# Patient Record
Sex: MALE | Race: WHITE | NOT HISPANIC OR LATINO | URBAN - METROPOLITAN AREA
[De-identification: names, ages, dates, MRNs, and addresses within clinical notes are randomized per-mention and may not be internally consistent; named-entity substitution may affect disease eponyms.]

---

## 2017-01-23 ENCOUNTER — TELEPHONE (OUTPATIENT)
Dept: FAMILY MEDICINE | Facility: CLINIC | Age: 62
End: 2017-01-23

## 2017-01-23 DIAGNOSIS — Z12.11 SPECIAL SCREENING FOR MALIGNANT NEOPLASMS, COLON: ICD-10-CM

## 2017-01-23 DIAGNOSIS — E11.9 TYPE 2 DIABETES MELLITUS WITHOUT COMPLICATION, WITHOUT LONG-TERM CURRENT USE OF INSULIN: Primary | ICD-10-CM

## 2017-01-23 DIAGNOSIS — Z00.00 ANNUAL PHYSICAL EXAM: ICD-10-CM

## 2017-01-23 NOTE — TELEPHONE ENCOUNTER
----- Message from Ashleigh Hawkins sent at 1/23/2017  9:42 AM CST -----  Contact: self  Hemoglobin A1  Lipid(fasting cholesterol)  Colonoscopy/ or FOBT annual screen    Pt calling to schedule the above testing per letter on a Tues or Thurs. Please call [phone].

## 2017-01-27 ENCOUNTER — LAB VISIT (OUTPATIENT)
Dept: LAB | Facility: HOSPITAL | Age: 62
End: 2017-01-27
Attending: FAMILY MEDICINE
Payer: COMMERCIAL

## 2017-01-27 DIAGNOSIS — Z12.11 SPECIAL SCREENING FOR MALIGNANT NEOPLASMS, COLON: ICD-10-CM

## 2017-01-27 DIAGNOSIS — Z00.00 ANNUAL PHYSICAL EXAM: ICD-10-CM

## 2017-01-27 DIAGNOSIS — E11.9 TYPE 2 DIABETES MELLITUS WITHOUT COMPLICATION, WITHOUT LONG-TERM CURRENT USE OF INSULIN: ICD-10-CM

## 2017-01-27 LAB
ALBUMIN SERPL BCP-MCNC: 3.8 G/DL
ALP SERPL-CCNC: 74 U/L
ALT SERPL W/O P-5'-P-CCNC: 27 U/L
ANION GAP SERPL CALC-SCNC: 5 MMOL/L
AST SERPL-CCNC: 21 U/L
BASOPHILS # BLD AUTO: 0.03 K/UL
BASOPHILS NFR BLD: 0.4 %
BILIRUB SERPL-MCNC: 1 MG/DL
BUN SERPL-MCNC: 14 MG/DL
CALCIUM SERPL-MCNC: 9.2 MG/DL
CHLORIDE SERPL-SCNC: 103 MMOL/L
CHOLEST/HDLC SERPL: 5.5 {RATIO}
CO2 SERPL-SCNC: 31 MMOL/L
COMPLEXED PSA SERPL-MCNC: 2.7 NG/ML
CREAT SERPL-MCNC: 1 MG/DL
DIFFERENTIAL METHOD: ABNORMAL
EOSINOPHIL # BLD AUTO: 0.2 K/UL
EOSINOPHIL NFR BLD: 2.2 %
ERYTHROCYTE [DISTWIDTH] IN BLOOD BY AUTOMATED COUNT: 13.2 %
EST. GFR  (AFRICAN AMERICAN): >60 ML/MIN/1.73 M^2
EST. GFR  (NON AFRICAN AMERICAN): >60 ML/MIN/1.73 M^2
GLUCOSE SERPL-MCNC: 122 MG/DL
HCT VFR BLD AUTO: 54.9 %
HDL/CHOLESTEROL RATIO: 18.2 %
HDLC SERPL-MCNC: 154 MG/DL
HDLC SERPL-MCNC: 28 MG/DL
HGB BLD-MCNC: 18.5 G/DL
LDLC SERPL CALC-MCNC: 86.6 MG/DL
LYMPHOCYTES # BLD AUTO: 1.9 K/UL
LYMPHOCYTES NFR BLD: 28 %
MCH RBC QN AUTO: 30.8 PG
MCHC RBC AUTO-ENTMCNC: 33.7 %
MCV RBC AUTO: 91 FL
MONOCYTES # BLD AUTO: 0.5 K/UL
MONOCYTES NFR BLD: 7.2 %
NEUTROPHILS # BLD AUTO: 4.3 K/UL
NEUTROPHILS NFR BLD: 61.8 %
NONHDLC SERPL-MCNC: 126 MG/DL
PLATELET # BLD AUTO: 207 K/UL
PMV BLD AUTO: 9.1 FL
POTASSIUM SERPL-SCNC: 4.8 MMOL/L
PROT SERPL-MCNC: 6.9 G/DL
RBC # BLD AUTO: 6.01 M/UL
SODIUM SERPL-SCNC: 139 MMOL/L
TRIGL SERPL-MCNC: 197 MG/DL
TSH SERPL DL<=0.005 MIU/L-ACNC: 0.89 UIU/ML
URATE SERPL-MCNC: 4.2 MG/DL
WBC # BLD AUTO: 6.92 K/UL

## 2017-01-27 PROCEDURE — 80053 COMPREHEN METABOLIC PANEL: CPT

## 2017-01-27 PROCEDURE — 36415 COLL VENOUS BLD VENIPUNCTURE: CPT | Mod: PO

## 2017-01-27 PROCEDURE — 85025 COMPLETE CBC W/AUTO DIFF WBC: CPT

## 2017-01-27 PROCEDURE — 84443 ASSAY THYROID STIM HORMONE: CPT

## 2017-01-27 PROCEDURE — 84550 ASSAY OF BLOOD/URIC ACID: CPT

## 2017-01-27 PROCEDURE — 80061 LIPID PANEL: CPT

## 2017-01-27 PROCEDURE — 83036 HEMOGLOBIN GLYCOSYLATED A1C: CPT

## 2017-01-27 PROCEDURE — 84153 ASSAY OF PSA TOTAL: CPT

## 2017-01-28 LAB
ESTIMATED AVG GLUCOSE: 154 MG/DL
HBA1C MFR BLD HPLC: 7 %

## 2017-02-07 ENCOUNTER — LAB VISIT (OUTPATIENT)
Dept: LAB | Facility: HOSPITAL | Age: 62
End: 2017-02-07
Attending: INTERNAL MEDICINE
Payer: COMMERCIAL

## 2017-02-07 ENCOUNTER — OFFICE VISIT (OUTPATIENT)
Dept: FAMILY MEDICINE | Facility: CLINIC | Age: 62
End: 2017-02-07
Payer: COMMERCIAL

## 2017-02-07 VITALS
DIASTOLIC BLOOD PRESSURE: 62 MMHG | BODY MASS INDEX: 31.53 KG/M2 | HEART RATE: 66 BPM | TEMPERATURE: 97 F | SYSTOLIC BLOOD PRESSURE: 140 MMHG | OXYGEN SATURATION: 97 % | HEIGHT: 70 IN | RESPIRATION RATE: 14 BRPM | WEIGHT: 220.25 LBS

## 2017-02-07 DIAGNOSIS — M25.561 CHRONIC PAIN OF RIGHT KNEE: ICD-10-CM

## 2017-02-07 DIAGNOSIS — E11.9 TYPE 2 DIABETES MELLITUS WITHOUT COMPLICATION: ICD-10-CM

## 2017-02-07 DIAGNOSIS — D58.2 ELEVATED HEMOGLOBIN: ICD-10-CM

## 2017-02-07 DIAGNOSIS — Z12.11 COLON CANCER SCREENING: ICD-10-CM

## 2017-02-07 DIAGNOSIS — E11.9 TYPE 2 DIABETES MELLITUS WITHOUT COMPLICATION, WITHOUT LONG-TERM CURRENT USE OF INSULIN: Primary | ICD-10-CM

## 2017-02-07 DIAGNOSIS — M17.11 OSTEOARTHRITIS OF RIGHT KNEE, UNSPECIFIED OSTEOARTHRITIS TYPE: ICD-10-CM

## 2017-02-07 DIAGNOSIS — G89.29 CHRONIC PAIN OF RIGHT KNEE: ICD-10-CM

## 2017-02-07 PROCEDURE — 82668 ASSAY OF ERYTHROPOIETIN: CPT

## 2017-02-07 PROCEDURE — 81270 JAK2 GENE: CPT

## 2017-02-07 PROCEDURE — 99214 OFFICE O/P EST MOD 30 MIN: CPT | Mod: S$GLB,,, | Performed by: FAMILY MEDICINE

## 2017-02-07 PROCEDURE — 83036 HEMOGLOBIN GLYCOSYLATED A1C: CPT

## 2017-02-07 PROCEDURE — 3044F HG A1C LEVEL LT 7.0%: CPT | Mod: S$GLB,,, | Performed by: FAMILY MEDICINE

## 2017-02-07 PROCEDURE — 3078F DIAST BP <80 MM HG: CPT | Mod: S$GLB,,, | Performed by: FAMILY MEDICINE

## 2017-02-07 PROCEDURE — 3060F POS MICROALBUMINURIA REV: CPT | Mod: S$GLB,,, | Performed by: FAMILY MEDICINE

## 2017-02-07 PROCEDURE — 99999 PR PBB SHADOW E&M-EST. PATIENT-LVL III: CPT | Mod: PBBFAC,,, | Performed by: FAMILY MEDICINE

## 2017-02-07 PROCEDURE — 3077F SYST BP >= 140 MM HG: CPT | Mod: S$GLB,,, | Performed by: FAMILY MEDICINE

## 2017-02-07 RX ORDER — HYDROCODONE BITARTRATE AND ACETAMINOPHEN 10; 325 MG/1; MG/1
1 TABLET ORAL 3 TIMES DAILY PRN
Qty: 90 TABLET | Refills: 0 | Status: SHIPPED | OUTPATIENT
Start: 2017-04-07 | End: 2017-02-07 | Stop reason: SDUPTHER

## 2017-02-07 RX ORDER — HYDROCODONE BITARTRATE AND ACETAMINOPHEN 10; 325 MG/1; MG/1
1 TABLET ORAL 3 TIMES DAILY PRN
Qty: 90 TABLET | Refills: 0 | Status: SHIPPED | OUTPATIENT
Start: 2017-03-07 | End: 2017-02-07 | Stop reason: SDUPTHER

## 2017-02-07 RX ORDER — HYDROCODONE BITARTRATE AND ACETAMINOPHEN 10; 325 MG/1; MG/1
1 TABLET ORAL 3 TIMES DAILY PRN
Qty: 90 TABLET | Refills: 0 | Status: SHIPPED | OUTPATIENT
Start: 2017-02-07 | End: 2017-02-07 | Stop reason: SDUPTHER

## 2017-02-07 RX ORDER — HYDROCODONE BITARTRATE AND ACETAMINOPHEN 10; 325 MG/1; MG/1
1 TABLET ORAL 3 TIMES DAILY PRN
Qty: 90 TABLET | Refills: 0 | Status: SHIPPED | OUTPATIENT
Start: 2017-03-28 | End: 2017-08-01 | Stop reason: SDUPTHER

## 2017-02-07 RX ORDER — HYDROCODONE BITARTRATE AND ACETAMINOPHEN 10; 325 MG/1; MG/1
1 TABLET ORAL 3 TIMES DAILY PRN
Qty: 90 TABLET | Refills: 0 | Status: SHIPPED | OUTPATIENT
Start: 2017-05-01 | End: 2017-02-07 | Stop reason: SDUPTHER

## 2017-02-07 RX ORDER — HYDROCODONE BITARTRATE AND ACETAMINOPHEN 10; 325 MG/1; MG/1
1 TABLET ORAL 3 TIMES DAILY PRN
Qty: 90 TABLET | Refills: 0 | Status: SHIPPED | OUTPATIENT
Start: 2017-04-28 | End: 2017-08-01 | Stop reason: SDUPTHER

## 2017-02-07 RX ORDER — HYDROCODONE BITARTRATE AND ACETAMINOPHEN 10; 325 MG/1; MG/1
1 TABLET ORAL 3 TIMES DAILY PRN
Qty: 90 TABLET | Refills: 0 | Status: SHIPPED | OUTPATIENT
Start: 2017-04-01 | End: 2017-02-07 | Stop reason: SDUPTHER

## 2017-02-07 RX ORDER — HYDROCODONE BITARTRATE AND ACETAMINOPHEN 10; 325 MG/1; MG/1
1 TABLET ORAL 3 TIMES DAILY PRN
Qty: 90 TABLET | Refills: 0 | Status: SHIPPED | OUTPATIENT
Start: 2017-05-28 | End: 2017-08-01 | Stop reason: SDUPTHER

## 2017-02-07 RX ORDER — HYDROCODONE BITARTRATE AND ACETAMINOPHEN 10; 325 MG/1; MG/1
1 TABLET ORAL 3 TIMES DAILY PRN
Qty: 90 TABLET | Refills: 0 | Status: SHIPPED | OUTPATIENT
Start: 2017-03-01 | End: 2017-02-07 | Stop reason: SDUPTHER
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
## 2017-02-08 LAB
ESTIMATED AVG GLUCOSE: 148 MG/DL
HBA1C MFR BLD HPLC: 6.8 %

## 2017-02-10 ENCOUNTER — LAB VISIT (OUTPATIENT)
Dept: LAB | Facility: HOSPITAL | Age: 62
End: 2017-02-10
Attending: FAMILY MEDICINE
Payer: COMMERCIAL

## 2017-02-10 DIAGNOSIS — Z12.11 COLON CANCER SCREENING: ICD-10-CM

## 2017-02-10 LAB
ERYTHROPOIETIN: 17.8 MIU/ML
JAK2 P.V617F BLD/T QL: NORMAL
JAK2 V617F MUTATION DETECTION BLD RESULT: NORMAL

## 2017-02-10 PROCEDURE — 82272 OCCULT BLD FECES 1-3 TESTS: CPT

## 2017-02-11 LAB
OB PNL STL: NEGATIVE

## 2017-02-21 ENCOUNTER — TELEPHONE (OUTPATIENT)
Dept: FAMILY MEDICINE | Facility: CLINIC | Age: 62
End: 2017-02-21

## 2017-02-21 NOTE — TELEPHONE ENCOUNTER
----- Message from Christi Yepez sent at 2/21/2017 11:38 AM CST -----  Contact: self  [phone]  Pt is requesting lab results. Labs were done on 2-10-17. Pls call pt [phone]. Thanks......Fernanda

## 2017-03-28 ENCOUNTER — OFFICE VISIT (OUTPATIENT)
Dept: OPTOMETRY | Facility: CLINIC | Age: 62
End: 2017-03-28
Payer: COMMERCIAL

## 2017-03-28 DIAGNOSIS — H25.13 NUCLEAR SCLEROSIS, BILATERAL: ICD-10-CM

## 2017-03-28 DIAGNOSIS — E11.9 DIABETES MELLITUS TYPE 2 WITHOUT RETINOPATHY: Primary | ICD-10-CM

## 2017-03-28 DIAGNOSIS — H52.03 HYPEROPIA WITH PRESBYOPIA, BILATERAL: ICD-10-CM

## 2017-03-28 DIAGNOSIS — H52.4 HYPEROPIA WITH PRESBYOPIA, BILATERAL: ICD-10-CM

## 2017-03-28 PROCEDURE — 92014 COMPRE OPH EXAM EST PT 1/>: CPT | Mod: S$GLB,,, | Performed by: OPTOMETRIST

## 2017-03-28 PROCEDURE — 92015 DETERMINE REFRACTIVE STATE: CPT | Mod: S$GLB,,, | Performed by: OPTOMETRIST

## 2017-03-28 PROCEDURE — 99999 PR PBB SHADOW E&M-EST. PATIENT-LVL II: CPT | Mod: PBBFAC,,, | Performed by: OPTOMETRIST

## 2017-03-28 NOTE — MR AVS SNAPSHOT
[facility]  [address] [facility]  [place] [address]  Phone: [phone]  Fax: [phone]                  King Cool Jr.   3/28/2017 7:30 AM   Office Visit    Description:  Male : 1955   Provider:  Tuan Bhakta OD   Department:  [facility]           Reason for Visit     Diabetic Eye Exam           Diagnoses this Visit        Comments    Diabetes mellitus type 2 without retinopathy    -  Primary     Nuclear sclerosis, bilateral         Hyperopia with presbyopia, bilateral                To Do List           Goals (5 Years of Data)     None      Follow-Up and Disposition     Return in about 1 year (around 3/28/2018).      [facility] On Call     [facility] On Call Nurse Care Line -  Assistance  Registered nurses in the [facility] On Call Center provide clinical advisement, health education, appointment booking, and other advisory services.  Call for this free service at [phone].             Medications           Message regarding Medications     Verify the changes and/or additions to your medication regime listed below are the same as discussed with your clinician today.  If any of these changes or additions are incorrect, please notify your healthcare provider.             Verify that the below list of medications is an accurate representation of the medications you are currently taking.  If none reported, the list may be blank. If incorrect, please contact your healthcare provider. Carry this list with you in case of emergency.           Current Medications     ASCORBATE CALCIUM (VITAMIN C ORAL) Take by mouth.    aspirin (ECOTRIN) 81 MG EC tablet Take 81 mg by mouth once daily.    dapagliflozin-metformin (XIGDUO XR) 10-1,000 mg TBph Take 1 tablet by mouth once daily.    fish oil-omega-3 fatty acids 300-1,000 mg capsule Take 2 g by mouth once daily.    hydrocodone-acetaminophen 10-325mg (NORCO)  mg Tab Take 1 tablet by mouth 3 (three) times daily as needed (pain).     hydrocodone-acetaminophen 10-325mg (NORCO)  mg Tab Starting on May 28, 2017. Take 1 tablet by mouth 3 (three) times daily as needed (pain).    hydrocodone-acetaminophen 10-325mg (NORCO)  mg Tab Starting on Apr 28, 2017. Take 1 tablet by mouth 3 (three) times daily as needed (pain).    multivitamin with minerals tablet Take 1 tablet by mouth once daily.    sildenafil (VIAGRA) 100 MG tablet Take 1 tablet (100 mg total) by mouth daily as needed for Erectile Dysfunction.           Clinical Reference Information           Allergies as of 3/28/2017     Penicillins      Immunizations Administered on Date of Encounter - 3/28/2017     None      [facility] Sign-Up     Activating your [facility] account is as easy as 1-2-3!     1) Visit [email or web address], select Sign Up Now, enter this activation code and your date of birth, then select Next.  [email or web address]  Expires: 5/12/2017  8:23 AM      2) Create a username and password to use when you visit [facility] in the future and select a security question in case you lose your password and select Next.    3) Enter your e-mail address and click Sign Up!    Additional Information  If you have questions, please e-mail [email or web address] or call [phone] to talk to our [facility] staff. Remember, [facility] is NOT to be used for urgent needs. For medical emergencies, dial 911.         Language Assistance Services     ATTENTION: Language assistance services are available, free of charge. Please call [phone].      ATENCIÓN: Si habla livia, tiene a kan disposición servicios gratuitos de asistencia lingüística. Llame al [phone].     CHÚ Ý: N?u b?n nói Ti?ng Vi?t, có các d?ch v? h? tr? ngôn ng? mi?n phí dành cho b?n. G?i s? [phone].         [facility] complies with applicable Federal civil rights laws and does not discriminate on the basis of race, color, national origin, age, disability, or sex.

## 2017-03-28 NOTE — PROGRESS NOTES
HPI     Diabetic Eye Exam    Additional comments: Pt is here for DM Check. Last BS was 144 this   morning.           Comments   Pt is here for DM Check. Last BS was 144 this morning.  Denies eye pain and f/f.   No itching, burning or tearing.   No noticeable VA changes since last visit.   No problems with glare.    Meds: At's prn     Hemoglobin A1C       Date                     Value               Ref Range             Status                02/07/2017               6.8 (H)             4.5 - 6.2 %           Final          01/27/2017               7.0 (H)             4.5 - 6.2 %           Final              08/23/2016               7.6 (H)             4.5 - 6.2 %           Final    ----------         Last edited by Tuan Bhakta, OD on 3/28/2017  8:22 AM. (History)            Assessment /Plan     For exam results, see Encounter Report.    Diabetes mellitus type 2 without retinopathy  -No retinopathy noted today.  Continued control with primary care physician and annual comprehensive eye exam.    Nuclear sclerosis, bilateral  -Educated patient on presence of cataracts at today's exam, monitor at annual dilated fundus exam. 8+ years surgical estimate.    Hyperopia with presbyopia, bilateral  Eyeglass Final Rx     Eyeglass Final Rx      Sphere Cylinder Add   Right +0.75 Sphere +2.00   Left +0.75 Sphere +2.00       Type:  Bifocal    Expiration Date:  3/29/2018              Optional  Trial framed in office to demonstrate BVA potential    RTC 1 yr

## 2017-05-24 ENCOUNTER — OFFICE VISIT (OUTPATIENT)
Dept: FAMILY MEDICINE | Facility: CLINIC | Age: 62
End: 2017-05-24
Payer: COMMERCIAL

## 2017-05-24 ENCOUNTER — HOSPITAL ENCOUNTER (OUTPATIENT)
Dept: RADIOLOGY | Facility: HOSPITAL | Age: 62
Discharge: HOME | End: 2017-05-24
Attending: FAMILY MEDICINE
Payer: COMMERCIAL

## 2017-05-24 VITALS
SYSTOLIC BLOOD PRESSURE: 142 MMHG | TEMPERATURE: 98 F | HEIGHT: 70 IN | DIASTOLIC BLOOD PRESSURE: 60 MMHG | BODY MASS INDEX: 30.3 KG/M2 | HEART RATE: 61 BPM | OXYGEN SATURATION: 98 % | WEIGHT: 211.63 LBS | RESPIRATION RATE: 16 BRPM

## 2017-05-24 DIAGNOSIS — G89.4 CHRONIC PAIN SYNDROME: ICD-10-CM

## 2017-05-24 DIAGNOSIS — M79.673 HEEL PAIN, UNSPECIFIED LATERALITY: ICD-10-CM

## 2017-05-24 DIAGNOSIS — E11.9 TYPE 2 DIABETES MELLITUS WITHOUT COMPLICATION, UNSPECIFIED LONG TERM INSULIN USE STATUS: ICD-10-CM

## 2017-05-24 DIAGNOSIS — M79.673 HEEL PAIN, UNSPECIFIED LATERALITY: Primary | ICD-10-CM

## 2017-05-24 PROCEDURE — 73630 X-RAY EXAM OF FOOT: CPT | Mod: 26,RT,, | Performed by: RADIOLOGY

## 2017-05-24 PROCEDURE — 73630 X-RAY EXAM OF FOOT: CPT | Mod: TC,PO,RT

## 2017-05-24 PROCEDURE — 99214 OFFICE O/P EST MOD 30 MIN: CPT | Mod: 25,S$GLB,, | Performed by: FAMILY MEDICINE

## 2017-05-24 PROCEDURE — 3044F HG A1C LEVEL LT 7.0%: CPT | Mod: S$GLB,,, | Performed by: FAMILY MEDICINE

## 2017-05-24 PROCEDURE — 99999 PR PBB SHADOW E&M-EST. PATIENT-LVL IV: CPT | Mod: PBBFAC,,, | Performed by: FAMILY MEDICINE

## 2017-05-24 PROCEDURE — 96372 THER/PROPH/DIAG INJ SC/IM: CPT | Mod: S$GLB,,, | Performed by: FAMILY MEDICINE

## 2017-05-24 RX ORDER — HYDROCODONE BITARTRATE AND ACETAMINOPHEN 10; 325 MG/1; MG/1
1 TABLET ORAL 3 TIMES DAILY PRN
Qty: 90 TABLET | Refills: 0 | Status: SHIPPED | OUTPATIENT
Start: 2017-07-24 | End: 2017-05-26 | Stop reason: SDUPTHER

## 2017-05-24 RX ORDER — TRIAMCINOLONE ACETONIDE 40 MG/ML
40 INJECTION, SUSPENSION INTRA-ARTICULAR; INTRAMUSCULAR
Status: COMPLETED | OUTPATIENT
Start: 2017-05-24 | End: 2017-05-24

## 2017-05-24 RX ORDER — HYDROCODONE BITARTRATE AND ACETAMINOPHEN 10; 325 MG/1; MG/1
1 TABLET ORAL 3 TIMES DAILY PRN
Qty: 90 TABLET | Refills: 0 | Status: SHIPPED | OUTPATIENT
Start: 2017-06-26 | End: 2017-07-26

## 2017-05-24 RX ORDER — HYDROCODONE BITARTRATE AND ACETAMINOPHEN 10; 325 MG/1; MG/1
1 TABLET ORAL 3 TIMES DAILY PRN
Qty: 90 TABLET | Refills: 0 | Status: SHIPPED | OUTPATIENT
Start: 2017-08-24 | End: 2017-09-22

## 2017-05-24 RX ADMIN — TRIAMCINOLONE ACETONIDE 40 MG: 40 INJECTION, SUSPENSION INTRA-ARTICULAR; INTRAMUSCULAR at 09:05

## 2017-05-24 NOTE — PROGRESS NOTES
..Patient given kenalog 40 mg injection left ventrogluteal, tolerated well, no complaints, no reaction noted
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
## 2017-05-26 ENCOUNTER — TELEPHONE (OUTPATIENT)
Dept: FAMILY MEDICINE | Facility: CLINIC | Age: 62
End: 2017-05-26

## 2017-05-26 DIAGNOSIS — G89.4 CHRONIC PAIN SYNDROME: ICD-10-CM

## 2017-05-26 RX ORDER — HYDROCODONE BITARTRATE AND ACETAMINOPHEN 10; 325 MG/1; MG/1
1 TABLET ORAL 3 TIMES DAILY PRN
Qty: 90 TABLET | Refills: 0 | Status: SHIPPED | OUTPATIENT
Start: 2017-05-26 | End: 2017-06-24

## 2017-05-26 NOTE — TELEPHONE ENCOUNTER
Returned call to patient, unable to LM due to VM not being set up. Patient was given scripts with correct dates for months 5/2017- 8/2017

## 2017-05-26 NOTE — TELEPHONE ENCOUNTER
Patient arrived to clinic with script written Feb for May, due to pharmacy policies script written date will not be accepted over 90 days, patient requires new script written. Script given to MD for review

## 2017-05-29 ENCOUNTER — TELEPHONE (OUTPATIENT)
Dept: FAMILY MEDICINE | Facility: CLINIC | Age: 62
End: 2017-05-29

## 2017-05-29 NOTE — TELEPHONE ENCOUNTER
Patient has an apponitment 6/14/17 7:45am with Dr. Mcpherson at [facility], patient was notified.       Patient states that since getting the shot that was given to him in the office, he has broken out in a rash.  Please call the patient.

## 2017-05-29 NOTE — TELEPHONE ENCOUNTER
Patient stated that he broke out in a rash Thursday or Friday night and think that it is from the kenalog given 4/24, patient unsure of when the rash actually started but says he had it over the weekend for sure and it is going away. Patient used cortisone cream and benadryl.
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
## 2017-06-14 ENCOUNTER — OFFICE VISIT (OUTPATIENT)
Dept: PODIATRY | Facility: CLINIC | Age: 62
End: 2017-06-14
Payer: COMMERCIAL

## 2017-06-14 VITALS
BODY MASS INDEX: 29.63 KG/M2 | SYSTOLIC BLOOD PRESSURE: 118 MMHG | HEIGHT: 70 IN | WEIGHT: 207 LBS | DIASTOLIC BLOOD PRESSURE: 60 MMHG

## 2017-06-14 DIAGNOSIS — G89.29 HEEL PAIN, CHRONIC, RIGHT: ICD-10-CM

## 2017-06-14 DIAGNOSIS — M72.2 PLANTAR FASCIITIS: ICD-10-CM

## 2017-06-14 DIAGNOSIS — M79.671 HEEL PAIN, CHRONIC, RIGHT: ICD-10-CM

## 2017-06-14 DIAGNOSIS — E11.9 COMPREHENSIVE DIABETIC FOOT EXAMINATION, TYPE 2 DM, ENCOUNTER FOR: Primary | ICD-10-CM

## 2017-06-14 PROCEDURE — 3044F HG A1C LEVEL LT 7.0%: CPT | Mod: S$GLB,,, | Performed by: PODIATRIST

## 2017-06-14 PROCEDURE — 99999 PR PBB SHADOW E&M-EST. PATIENT-LVL III: CPT | Mod: PBBFAC,,, | Performed by: PODIATRIST

## 2017-06-14 PROCEDURE — 4010F ACE/ARB THERAPY RXD/TAKEN: CPT | Mod: S$GLB,,, | Performed by: PODIATRIST

## 2017-06-14 PROCEDURE — 99203 OFFICE O/P NEW LOW 30 MIN: CPT | Mod: S$GLB,,, | Performed by: PODIATRIST

## 2017-06-14 RX ORDER — LOSARTAN POTASSIUM 100 MG/1
100 TABLET ORAL DAILY
COMMUNITY
Start: 2017-05-30 | End: 2017-12-26 | Stop reason: SDUPTHER

## 2017-06-14 RX ORDER — MELOXICAM 15 MG/1
15 TABLET ORAL DAILY
Qty: 30 TABLET | Refills: 0 | Status: SHIPPED | OUTPATIENT
Start: 2017-06-14 | End: 2017-09-09

## 2017-06-14 RX ORDER — TRIAMCINOLONE ACETONIDE 1 MG/G
OINTMENT TOPICAL
COMMUNITY
Start: 2017-05-30 | End: 2018-03-20

## 2017-06-14 NOTE — PATIENT INSTRUCTIONS
Recommend lotions: eucerin, aquaphor, A&D ointment, gold bond for diabetics, sween    Shoe recommendations: (try 6pm.com, zappos.[email or web address] , [email or web address], or shoes.[email or web address] for discounted prices) you can visit DSW shoes in [place] as well    Asics (GT 1000 or gel foundations), new balance, saucony (stabil c3),  Calderon (transcend), vionic, propet (tennis shoe)    soft brand, clarks, crocs, aerosoles, naturalizers, SAS, ecco, almita, yayo mcnulty, rockports (dress shoes)    Vionic, volitiles, burkenstocks, fitflops, propet (sandals)    Nike comfort thong sandals, crocs (house shoes)    Nail Home remedy:  Vicks Vapor rub OR Listerine and apple cider vinegar in a spray bottle to nails    Occasional soaks for 15-20 mins in luke warm water with 1 cup of listerine and 1 cup of apple cider vinegar are ok You may add several drops of oil of oregano or tea tree oil as well      Understanding Plantar Fasciitis    Plantar fasciitis is a condition that causes foot and heel pain. The plantar fascia is a tough band of tissue that runs across the bottom of the foot from the heel to the toes. This tissue pulls on the heel bone. It supports the arch of the foot as it pushes off the ground. If the tissue becomes irritated or red and swollen (inflamed), it is called plantar fasciitis.  How to say it  PLAN-tuhr fa-see-IY-[id number]   What causes plantar fasciitis?  Plantar fasciitis most often occurs from overusing the plantar fascia. The tissue may become damaged from activities that put repeated stress on the heel and foot. Or it may wear down over time with age and ankle stiffness. You are more likely to have plantar fasciitis if you:  · Do activities that require a lot of running, jumping, or dancing  · Have a job that requires being on your feet for long periods  · Are overweight or obese  · Have certain foot problems, such as a tight Achilles tendon, flat feet, or high arches  · Often wear poorly fitting shoes  Symptoms of plantar fasciitis  The  condition most often causes pain in the heel and the bottom of the foot. The pain may occur when you take your first steps in the morning. It may get better as you walk throughout the day. But as you continue to put weight on the foot, the pain often returns. Pain may also occur after standing or sitting for long periods.  Treating plantar fasciitis  Treatments for plantar fasciitis include:  · Resting the foot. This involves limiting movements that make your foot hurt. You may also need to avoid certain sports and types of work for a time.  · Using cold packs. Put an ice pack on the heel and foot to help reduce pain and swelling.  · Taking pain medicines. Prescription and over-the-counter pain medicines can help relieve pain and swelling.  · Using heel cups or foot inserts (orthotics). These are placed in the shoes to help support the heel or arch and cushion the heel. You may also be told to buy proper-fitting shoes with good arch support and cushioned soles.  · Taping the foot. This supports the arch and limits the movement of the plantar fascia to help relieve symptoms.  · Wearing a night splint. This stretches the plantar fascia and leg muscles while you sleep. This may help relieve pain.  · Doing exercises and physical therapy. These stretch and strengthen the plantar fascia and the muscles in the leg that support the heel and foot.  · Getting shots of medicine into the foot. These may help relieve symptoms for a time.  · Having surgery. This may be needed if other treatments fail to relieve symptoms. During surgery, the surgeon may partially cut the plantar fascia to release tension.  Possible complications of plantar fasciitis  Without proper care and treatment, healing may take longer than normal. Also, symptoms may continue or get worse. Over time, the plantar fascia may be damaged. This can make it hard to walk or even stand without pain.  When to call your healthcare provider  Call your healthcare  provider right away if you have any of these:  · Fever of 100.4°F (38°C) or higher, or as directed  · Symptoms that dont get better with treatment, or get worse  · New symptoms, such as numbness, tingling, or weakness in the foot   © 9349-1679 [employer]. [address] [place] [address], [place] [address]. All rights reserved. This information is not intended as a substitute for professional medical care. Always follow your healthcare professional's instructions.        Treating Plantar Fasciitis  First, your healthcare provider tries to determine the cause of your problem in order to suggest ways to relieve pain. If your pain is due to poor foot mechanics, custom-made shoe inserts (orthoses) may help.    Reduce symptoms  · To relieve mild symptoms, try aspirin, ibuprofen, or other medicines as directed. Rubbing ice on the affected area may also help.  · To reduce severe pain and swelling, your healthcare provider may prescribe pills or injections or a walking cast in some instances. Physical therapy, such as ultrasound or a daily stretching program, may also be recommended. Surgery is rarely required.  · To reduce symptoms caused by poor foot mechanics, your foot may be taped. This supports the arch and temporarily controls movement. Night splints may also help by stretching the fascia.  Control movement  If taping helps, your healthcare provider may prescribe orthoses. Built from plaster casts of your feet, these inserts control the way your foot moves. As a result, your symptoms should go away.  Reduce overuse  Every time your foot strikes the ground, the plantar fascia is stretched. You can reduce the strain on the plantar fascia and the possibility of overuse by following these suggestions:  · Lose any excess weight.  · Avoid running on hard or uneven ground.  · Use orthoses at all times in your shoes and house slippers.  If surgery is needed  Your healthcare provider may consider surgery if other types  of treatment don't control your pain. During surgery, the plantar fascia is partially cut to release tension. As you heal, fibrous tissue fills the space between the heel bone and the plantar fascia.   © 7302-6362 [employer]. [address] [place] [address], [place] [address]. All rights reserved. This information is not intended as a substitute for professional medical care. Always follow your healthcare professional's instructions.        Wearing Proper Shoes                    You walk on your feet every day, forcing them to support the weight of your body. Repeated stress on your feet can cause damage over time. The right shoes can help protect your feet. The wrong shoes can cause more foot problems. Read the information below to help you find a shoe that fits your foot needs.     A good shoe fit will cover your foot outline.       A shoe that doesnt cover the outline is a bad fit.      Whats Your Foot Shape?  To get a good fit, you need to know the shape of your foot. Do this simple test: While standing, place your foot on a piece of paper and trace around it. Is your foot straight or curved? Do you have a foot problem, such as a bunion, that causes your foot outline to show a bulge on the side of your big toe?  Finding Your Fit  Bring your foot outline to the [employer] store to help you find the right shoe. Place a shoe you like on top of the outline to see if it matches the shape. The shoe should cover the outline. (If you have a bunion, the shoe may not cover the bulge on the outline. Look for soft leather shoes to stretch over the bunion.) Once youve found a pair of proper shoes, put them on. Walk around. Be sure the shoes dont rub or pinch. If the shoes feel good, youve found your fit!  The Right Shoe for You  A good shoe has features that provide comfort and support. It must also be the right size and shape for your feet. Look for a shoe made of breathable fabric and lining, such as leather or  canvas. Be sure that shoes have enough tread to prevent slipping. Go to a good shoe store for help finding the right shoe.  Good Shoe Features  An ideal shoe has the following:  · Laces for support. If tying laces is a problem for you, try shoes with Velcro fasteners or joanie.  · A front of the shoe (toe box) with ½ inch space in front of your longest toes.  · An arch shape that supports your foot.  · No more than 1½ inches of heel.  · A stiff, snug back of the shoe to keep your foot from sliding around.  · A smooth lining with no rough seams.  Shoe Shopping Tips  Below are some dos and donts for when you go to the shoe store.  Do:  · Select the shoes that feel right. Wear them around the house. Then bring them to your foot doctor to check for fit. If they dont fit well, return them.  · Shop late in the day, when your feet will be slightly bigger.  · Each time you buy shoes, have both your feet measured while you are standing. Foot size changes with time.  · Pick shoes to suit their purpose. High heels are okay for an occasional night on the town. But for everyday wear, choose a more sensible shoe.  · Try on shoes while wearing any inserts specially made for your feet (orthoses).  · Try on both the right and left shoes. If your feet are different sizes, pick a pair that fits the larger foot.  Dont:  · Dont buy shoes based on shoe size alone. Always try on shoes, as sizes differ from brand to brand and within brands.  · Dont expect shoes to break in. If they dont fit at the store, dont buy them.  · Dont buy a shoe that doesnt match your foot shape.  What About Socks?  Always wear socks with shoes. Socks help absorb sweat and reduce friction and blistering. When shopping for shoes, choose soft, padded socks with seams that dont irritate your feet.  If You Have Foot Problems  Some foot problems cause deformities. This can make it hard to find a good fit. Look for shoes made of soft leather to stretch  over the deformity. If you have bunions, buy shoes with a wider toe box. To fit hammertoes, look for shoes with a tall toe box. If you have arch problems, you may need inserts. In some cases, youll need to have custom footwear or orthoses made for your feet.  Suggested Footwear  Ask your healthcare provider what kind of footwear you need. He or she may recommend a certain brand or shoe store.  © 9421-3546 [employer]. [address] [place] [address]. All rights reserved. This information is not intended as a substitute for professional medical care. Always follow your healthcare professional's instructions.        Toe Extension (Flexibility)    These instructions are for your right foot. Switch sides for your left foot.  1. Sit in a chair. Rest your right ankle on your left knee.  2. Hold your toes with your right hand. Gently bend the toes backward. Feel a stretch in the undersides of the toes and ball of the foot. Hold for 30 to 60 seconds.  3. Then gently bend the toes in the other direction. Gently press on them until your foot is pointed. Hold for 30 to 60 seconds.  4. Repeat 5 times, or as instructed.  © 3882-4315 [employer]. [address] [place] [address]. All rights reserved. This information is not intended as a substitute for professional medical care. Always follow your healthcare professional's instructions.        Recommend lotions: eucerin, aquaphor, A&D ointment, gold bond for diabetics, sween    Shoe recommendations: (try 6pm.com, zappos.com , nordstromrack.com, or shoes.com for discounted prices) you can visit DSW shoes in [place] as well    Asics (GT 1000 or gel foundations), new balance, saucony (stabil c3),  Calderon (transcend), vionic, propet (tennis shoe)    soft brand, clarks, crocs, aerosoles, naturalizers, SAS, ecco, almita, yayo mcnulty, rockports (dress shoes)    Vionic, volitiles, burkenstocks, fitflops, propet (sandals)    Nike comfort thong  sandals, crocs (house shoes)    Nail Home remedy:  Vicks Vapor rub OR Listerine and apple cider vinegar in a spray bottle to nails    Occasional soaks for 15-20 mins in luke warm water with 1 cup of listerine and 1 cup of apple cider vinegar are ok You may add several drops of oil of oregano or tea tree oil as well      Diabetes: Inspecting Your Feet  Diabetes increases your chances of developing foot problems. So inspect your feet every day. This helps you find small skin irritations before they become serious infections. If you have trouble seeing the bottoms of your feet, use a mirror or ask a family member or friend to help.     Pressure spots on the bottom of the foot are common areas where problems develop.   How to check your feet  Below are tips to help you look for foot problems. Try to check your feet at the same time each day, such as when you get out of bed in the morning:  · Check the top of each foot. The tops of toes, back of the heel, and outer edge of the foot can get a lot of rubbing from poor-fitting shoes.  · Check the bottom of each foot. Daily wear and tear often leads to problems at pressure spots.  · Check the toes and nails. Fungal infections often occur between toes. Toenail problems can also be a sign of fungal infections or lead to breaks in the skin.  · Check your shoes, too. Loose objects inside a shoe can injure the foot. Use your hand to feel inside your shoes for things like sylvia, loose stitching, or rough areas that could irritate your skin.  Warning signs  Look for any color changes in the foot. Redness with streaks can signal a severe infection, which needs immediate medical attention. Tell your doctor right away if you have any of these problems:  · Swelling, sometimes with color changes, may be a sign of poor blood flow or infection. Symptoms include tenderness and an increase in the size of your foot.  · Warm or hot areas on your feet may be signs of infection. A foot that  is cold may not be getting enough blood.  · Sensations such as burning, tingling, or pins and needles can be signs of a problem. Also check for areas that may be numb.  · Hot spots are caused by friction or pressure. Look for hot spots in areas that get a lot of rubbing. Hot spots can turn into blisters, calluses, or sores.  · Cracks and sores are caused by dry or irritated skin. They are a sign that the skin is breaking down, which can lead to infection.  · Toenail problems to watch for include nails growing into the skin (ingrown toenail) and causing redness or pain. Thick, yellow, or discolored nails can signal a fungal infection.  · Drainage and odor can develop from untreated sores and ulcers. Call your doctor right away if you notice white or yellow drainage, bleeding, or unpleasant odor.   © 9166-7324 [employer]. [address] [place] [address], [place] [address]. All rights reserved. This information is not intended as a substitute for professional medical care. Always follow your healthcare professional's instructions.

## 2017-06-14 NOTE — LETTER
June 14, 2017      Zeb Hook MD  [address] [place] [address]           [facility]  [address] [facility] [place] [address]  Phone: [phone]          Patient: King Cool Jr.   MR Number: [id number]   YOB: 1955   Date of Visit: 6/14/2017       Dear Dr. Zeb Hook:    Thank you for referring King Cool to me for evaluation. Attached you will find relevant portions of my assessment and plan of care.    If you have questions, please do not hesitate to call me. I look forward to following King Cool along with you.    Sincerely,    Nai Mcpherson DPM    Enclosure  CC:  No Recipients    If you would like to receive this communication electronically, please contact [email or web address] or [phone] to request more information on [employer] Link access.    For providers and/or their staff who would like to refer a patient to [facility], please contact us through our one-stop-shop provider referral line, [facility], at [phone].    If you feel you have received this communication in error or would no longer like to receive these types of communications, please e-mail [email or web address]
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
## 2017-08-01 ENCOUNTER — OFFICE VISIT (OUTPATIENT)
Dept: FAMILY MEDICINE | Facility: CLINIC | Age: 62
End: 2017-08-01
Payer: COMMERCIAL

## 2017-08-01 VITALS
SYSTOLIC BLOOD PRESSURE: 130 MMHG | BODY MASS INDEX: 29.67 KG/M2 | DIASTOLIC BLOOD PRESSURE: 70 MMHG | OXYGEN SATURATION: 97 % | HEART RATE: 72 BPM | WEIGHT: 207.25 LBS | TEMPERATURE: 99 F | HEIGHT: 70 IN

## 2017-08-01 DIAGNOSIS — M25.561 CHRONIC PAIN OF RIGHT KNEE: ICD-10-CM

## 2017-08-01 DIAGNOSIS — H60.90 OTITIS EXTERNA, UNSPECIFIED CHRONICITY, UNSPECIFIED LATERALITY, UNSPECIFIED TYPE: Primary | ICD-10-CM

## 2017-08-01 DIAGNOSIS — M17.11 OSTEOARTHRITIS OF RIGHT KNEE, UNSPECIFIED OSTEOARTHRITIS TYPE: ICD-10-CM

## 2017-08-01 DIAGNOSIS — G89.29 CHRONIC PAIN OF RIGHT KNEE: ICD-10-CM

## 2017-08-01 PROCEDURE — 99215 OFFICE O/P EST HI 40 MIN: CPT | Mod: S$GLB,,, | Performed by: FAMILY MEDICINE

## 2017-08-01 PROCEDURE — 99999 PR PBB SHADOW E&M-EST. PATIENT-LVL III: CPT | Mod: PBBFAC,,, | Performed by: FAMILY MEDICINE

## 2017-08-01 RX ORDER — HYDROCODONE BITARTRATE AND ACETAMINOPHEN 10; 325 MG/1; MG/1
1 TABLET ORAL 3 TIMES DAILY PRN
Qty: 90 TABLET | Refills: 0 | Status: SHIPPED | OUTPATIENT
Start: 2017-08-24 | End: 2017-10-03 | Stop reason: SDUPTHER

## 2017-08-01 RX ORDER — HYDROCODONE BITARTRATE AND ACETAMINOPHEN 10; 325 MG/1; MG/1
1 TABLET ORAL 3 TIMES DAILY PRN
Qty: 90 TABLET | Refills: 0 | Status: SHIPPED | OUTPATIENT
Start: 2017-10-24 | End: 2017-10-03 | Stop reason: SDUPTHER

## 2017-08-01 RX ORDER — CIPROFLOXACIN AND DEXAMETHASONE 3; 1 MG/ML; MG/ML
4 SUSPENSION/ DROPS AURICULAR (OTIC) 2 TIMES DAILY
Qty: 7.5 ML | Refills: 1 | Status: SHIPPED | OUTPATIENT
Start: 2017-08-01 | End: 2018-03-20

## 2017-08-01 RX ORDER — HYDROCODONE BITARTRATE AND ACETAMINOPHEN 10; 325 MG/1; MG/1
1 TABLET ORAL 3 TIMES DAILY PRN
Qty: 90 TABLET | Refills: 0 | Status: SHIPPED | OUTPATIENT
Start: 2017-09-24 | End: 2017-10-03 | Stop reason: SDUPTHER
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
## 2017-08-25 RX ORDER — DAPAGLIFLOZIN AND METFORMIN HYDROCHLORIDE 10; 1000 MG/1; MG/1
1 TABLET, FILM COATED, EXTENDED RELEASE ORAL DAILY
Qty: 30 EACH | Refills: 11 | Status: SHIPPED | OUTPATIENT
Start: 2017-08-25 | End: 2017-11-21 | Stop reason: SDUPTHER

## 2017-08-25 RX ORDER — DAPAGLIFLOZIN AND METFORMIN HYDROCHLORIDE 10; 1000 MG/1; MG/1
TABLET, FILM COATED, EXTENDED RELEASE ORAL
Qty: 30 EACH | Refills: 2 | Status: SHIPPED | OUTPATIENT
Start: 2017-08-25 | End: 2017-08-25

## 2017-08-25 NOTE — TELEPHONE ENCOUNTER
----- Message from Farnaz Lee sent at 8/25/2017  8:45 AM CDT -----  Contact: self  Patient is calling for a RX refill of Xigduo XR 10mg  called into [facility]  pharmacy at [phone]

## 2017-09-09 ENCOUNTER — HOSPITAL ENCOUNTER (EMERGENCY)
Facility: OTHER | Age: 62
Discharge: HOME | End: 2017-09-09
Attending: EMERGENCY MEDICINE
Payer: COMMERCIAL

## 2017-09-09 VITALS
HEIGHT: 71 IN | TEMPERATURE: 99 F | WEIGHT: 203 LBS | RESPIRATION RATE: 18 BRPM | HEART RATE: 69 BPM | DIASTOLIC BLOOD PRESSURE: 78 MMHG | SYSTOLIC BLOOD PRESSURE: 144 MMHG | BODY MASS INDEX: 28.42 KG/M2 | OXYGEN SATURATION: 99 %

## 2017-09-09 DIAGNOSIS — M19.90 OSTEOARTHRITIS, UNSPECIFIED OSTEOARTHRITIS TYPE, UNSPECIFIED SITE: Primary | ICD-10-CM

## 2017-09-09 DIAGNOSIS — M54.50 PAIN, LOW BACK: ICD-10-CM

## 2017-09-09 LAB
BILIRUBIN, POC UA: NEGATIVE
BLOOD, POC UA: NEGATIVE
CLARITY, POC UA: CLEAR
COLOR, POC UA: YELLOW
GLUCOSE, POC UA: ABNORMAL
KETONES, POC UA: NEGATIVE
LEUKOCYTE EST, POC UA: NEGATIVE
NITRITE, POC UA: NEGATIVE
PH UR STRIP: 7 [PH]
POCT GLUCOSE: 184 MG/DL (ref 70–110)
PROTEIN, POC UA: NEGATIVE
SPECIFIC GRAVITY, POC UA: 1.01
UROBILINOGEN, POC UA: 0.2 E.U./DL

## 2017-09-09 PROCEDURE — 81003 URINALYSIS AUTO W/O SCOPE: CPT

## 2017-09-09 PROCEDURE — 82947 ASSAY GLUCOSE BLOOD QUANT: CPT

## 2017-09-09 PROCEDURE — 96372 THER/PROPH/DIAG INJ SC/IM: CPT

## 2017-09-09 PROCEDURE — 63600175 PHARM REV CODE 636 W HCPCS: Performed by: EMERGENCY MEDICINE

## 2017-09-09 PROCEDURE — 99284 EMERGENCY DEPT VISIT MOD MDM: CPT | Mod: 25

## 2017-09-09 RX ORDER — KETOROLAC TROMETHAMINE 30 MG/ML
30 INJECTION, SOLUTION INTRAMUSCULAR; INTRAVENOUS
Status: COMPLETED | OUTPATIENT
Start: 2017-09-09 | End: 2017-09-09

## 2017-09-09 RX ORDER — NAPROXEN 500 MG/1
500 TABLET ORAL 2 TIMES DAILY WITH MEALS
Qty: 20 TABLET | Refills: 0 | Status: SHIPPED | OUTPATIENT
Start: 2017-09-09 | End: 2018-03-20

## 2017-09-09 RX ORDER — METHYLPREDNISOLONE 4 MG/1
TABLET ORAL
Qty: 1 PACKAGE | Refills: 0 | Status: SHIPPED | OUTPATIENT
Start: 2017-09-09 | End: 2017-09-30

## 2017-09-09 RX ORDER — METHOCARBAMOL 750 MG/1
1500 TABLET, FILM COATED ORAL 3 TIMES DAILY
Qty: 30 TABLET | Refills: 0 | Status: SHIPPED | OUTPATIENT
Start: 2017-09-09 | End: 2017-09-14

## 2017-09-09 RX ORDER — ORPHENADRINE CITRATE 30 MG/ML
60 INJECTION INTRAMUSCULAR; INTRAVENOUS
Status: DISCONTINUED | OUTPATIENT
Start: 2017-09-09 | End: 2017-09-09

## 2017-09-09 RX ADMIN — KETOROLAC TROMETHAMINE 30 MG: 30 INJECTION, SOLUTION INTRAMUSCULAR at 09:09
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
## 2017-09-15 DIAGNOSIS — E11.9 TYPE 2 DIABETES MELLITUS WITHOUT COMPLICATION: ICD-10-CM

## 2017-10-03 ENCOUNTER — OFFICE VISIT (OUTPATIENT)
Dept: FAMILY MEDICINE | Facility: CLINIC | Age: 62
End: 2017-10-03
Payer: COMMERCIAL

## 2017-10-03 VITALS
WEIGHT: 209.44 LBS | RESPIRATION RATE: 20 BRPM | HEART RATE: 83 BPM | TEMPERATURE: 98 F | DIASTOLIC BLOOD PRESSURE: 80 MMHG | BODY MASS INDEX: 29.32 KG/M2 | OXYGEN SATURATION: 97 % | SYSTOLIC BLOOD PRESSURE: 146 MMHG | HEIGHT: 71 IN

## 2017-10-03 DIAGNOSIS — M25.561 CHRONIC PAIN OF RIGHT KNEE: ICD-10-CM

## 2017-10-03 DIAGNOSIS — M25.561 RECURRENT PAIN OF RIGHT KNEE: ICD-10-CM

## 2017-10-03 DIAGNOSIS — Z79.4 TYPE 2 DIABETES MELLITUS WITH HYPEROSMOLAR COMA, WITH LONG-TERM CURRENT USE OF INSULIN: Primary | ICD-10-CM

## 2017-10-03 DIAGNOSIS — M17.11 OSTEOARTHRITIS OF RIGHT KNEE, UNSPECIFIED OSTEOARTHRITIS TYPE: ICD-10-CM

## 2017-10-03 DIAGNOSIS — I10 ESSENTIAL HYPERTENSION: ICD-10-CM

## 2017-10-03 DIAGNOSIS — E11.01 TYPE 2 DIABETES MELLITUS WITH HYPEROSMOLAR COMA, WITH LONG-TERM CURRENT USE OF INSULIN: Primary | ICD-10-CM

## 2017-10-03 DIAGNOSIS — G89.29 CHRONIC PAIN OF RIGHT KNEE: ICD-10-CM

## 2017-10-03 LAB
AMP D-AMPHETAMINE 1000 NG/ML: POSITIVE
BAR SECOBARBITAL 300 NG/ML: NEGATIVE
BUP BUPRENORPHINE 10 NG/ML: NEGATIVE
BZO OXAZEPAM 300 NG/ML: POSITIVE
COC BENZOYLECGONINE 300 NG/ML: NEGATIVE
CTP QC/QA: YES
MET D-METHAMPHETAMINE 500 NG/ML: NEGATIVE
MOP MORPHINE 300 NG/ML: POSITIVE
MTD METHADONE 300 NG/ML: NEGATIVE
QXY OXYCODONE 100 NG/ML: POSITIVE
THC 11-NOR-9-TETRAHYDROCANNABINOL-9-CARBOXYLIC ACID: NEGATIVE

## 2017-10-03 PROCEDURE — 99215 OFFICE O/P EST HI 40 MIN: CPT | Mod: 25,S$GLB,, | Performed by: FAMILY MEDICINE

## 2017-10-03 PROCEDURE — 99999 PR PBB SHADOW E&M-EST. PATIENT-LVL III: CPT | Mod: PBBFAC,,, | Performed by: FAMILY MEDICINE

## 2017-10-03 PROCEDURE — 80305 DRUG TEST PRSMV DIR OPT OBS: CPT | Mod: QW,S$GLB,, | Performed by: FAMILY MEDICINE

## 2017-10-03 RX ORDER — HYDROCODONE BITARTRATE AND ACETAMINOPHEN 10; 325 MG/1; MG/1
1 TABLET ORAL 3 TIMES DAILY PRN
Qty: 90 TABLET | Refills: 0 | Status: SHIPPED | OUTPATIENT
Start: 2017-10-24 | End: 2017-12-26 | Stop reason: SDUPTHER

## 2017-10-03 RX ORDER — HYDROCODONE BITARTRATE AND ACETAMINOPHEN 10; 325 MG/1; MG/1
1 TABLET ORAL 3 TIMES DAILY PRN
Qty: 90 TABLET | Refills: 0 | Status: SHIPPED | OUTPATIENT
Start: 2017-12-24 | End: 2017-12-21 | Stop reason: SDUPTHER

## 2017-10-03 RX ORDER — HYDROCODONE BITARTRATE AND ACETAMINOPHEN 10; 325 MG/1; MG/1
1 TABLET ORAL 3 TIMES DAILY PRN
Qty: 90 TABLET | Refills: 0 | Status: SHIPPED | OUTPATIENT
Start: 2017-11-24 | End: 2017-12-26 | Stop reason: SDUPTHER
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
## 2017-11-21 RX ORDER — DAPAGLIFLOZIN AND METFORMIN HYDROCHLORIDE 10; 1000 MG/1; MG/1
1 TABLET, FILM COATED, EXTENDED RELEASE ORAL DAILY
Qty: 30 EACH | Refills: 11 | Status: SHIPPED | OUTPATIENT
Start: 2017-11-21 | End: 2017-12-26 | Stop reason: SDUPTHER

## 2017-11-21 NOTE — TELEPHONE ENCOUNTER
----- Message from Dwayne Stafford sent at 11/21/2017  9:19 AM CST -----  Contact: PT /[phone]  Calling TO get new script diabetic medication. Pt states he's out of med by Thursday.[facility]/ [place].

## 2017-12-07 ENCOUNTER — TELEPHONE (OUTPATIENT)
Dept: FAMILY MEDICINE | Facility: CLINIC | Age: 62
End: 2017-12-07

## 2017-12-07 NOTE — TELEPHONE ENCOUNTER
Patient wanted to know if he was ever tested for HPV. Patient notified that there were no test in his records noting that

## 2017-12-19 ENCOUNTER — TELEPHONE (OUTPATIENT)
Dept: FAMILY MEDICINE | Facility: CLINIC | Age: 62
End: 2017-12-19

## 2017-12-19 DIAGNOSIS — M25.561 CHRONIC PAIN OF RIGHT KNEE: ICD-10-CM

## 2017-12-19 DIAGNOSIS — G89.29 CHRONIC PAIN OF RIGHT KNEE: ICD-10-CM

## 2017-12-19 DIAGNOSIS — M17.11 OSTEOARTHRITIS OF RIGHT KNEE, UNSPECIFIED OSTEOARTHRITIS TYPE: ICD-10-CM

## 2017-12-19 NOTE — TELEPHONE ENCOUNTER
----- Message from Yoni Diaz sent at 12/19/2017  9:45 AM CST -----  Contact: King [phone]  Pt is calling because his prescription was filled out for Sunday. The pharmacy will not fill it out. Instead he wants another prescription for Friday the 22th. The medication is as follows: hydrocodone-acetaminophen 10-325mg (NORCO)  mg Tab. Please call at your earliest inconvenience.

## 2017-12-21 RX ORDER — HYDROCODONE BITARTRATE AND ACETAMINOPHEN 10; 325 MG/1; MG/1
1 TABLET ORAL 3 TIMES DAILY PRN
Qty: 90 TABLET | Refills: 0 | Status: SHIPPED | OUTPATIENT
Start: 2017-12-22 | End: 2017-12-26 | Stop reason: SDUPTHER

## 2017-12-21 NOTE — TELEPHONE ENCOUNTER
Patient notified new script sent, and to bring in old script to office, patient verbalized understanding

## 2017-12-23 ENCOUNTER — OFFICE VISIT (OUTPATIENT)
Dept: URGENT CARE | Facility: CLINIC | Age: 62
End: 2017-12-23
Payer: COMMERCIAL

## 2017-12-23 VITALS
SYSTOLIC BLOOD PRESSURE: 144 MMHG | DIASTOLIC BLOOD PRESSURE: 85 MMHG | HEART RATE: 82 BPM | WEIGHT: 205 LBS | HEIGHT: 71 IN | TEMPERATURE: 97 F | BODY MASS INDEX: 28.7 KG/M2 | OXYGEN SATURATION: 97 %

## 2017-12-23 DIAGNOSIS — B35.6 TINEA CRURIS: Primary | ICD-10-CM

## 2017-12-23 PROCEDURE — 99203 OFFICE O/P NEW LOW 30 MIN: CPT | Mod: S$GLB,,, | Performed by: FAMILY MEDICINE

## 2017-12-23 RX ORDER — NYSTATIN 100000 U/G
OINTMENT TOPICAL 2 TIMES DAILY
Qty: 30 G | Refills: 0 | Status: SHIPPED | OUTPATIENT
Start: 2017-12-23 | End: 2017-12-26

## 2017-12-23 NOTE — PATIENT INSTRUCTIONS
Jock Itch  Jock itch is a rash caused by a fungal infection. It occurs in the skin fold between the thigh and groin where it is warm and moist.  It starts as a small, red, itchy patch that grows larger in the shape of a round ring, 1- to 2- inches wide, and may cause the skin to flake. It may also spread to the scrotum or the skin that covers your testicles. This infection is treated with skin creams or oral medicine.  Home care  The following will help you care for yourself at home:  · If you were prescribed a cream, it should be applied exactly as directed. Some antifungal creams are available without a prescription.  · It may take a week before the fungus starts to go away and it can take about 2 to 3 weeks to completely clear. To prevent recurrence, it is important to keep using the medicine until the rash is all gone.  · Wash the groin area at least once a day with soap and water. Pat dry and apply the medicine. Change to freshly laundered underwear daily.  · Once the rash is gone, keep the area clean and dry to keep it from coming back. If recurrence is a problem, use a medicated antifungal powder daily in the groin area.  Prevention  The following tips may help prevent jock itch:  · Don't share clothes, towels, or sports gear with others unless they have been washed.  · Change underwear daily.  · Keep skin clean and dry, especially after showering or swimming.  · Lose weight.  · Do not wear tight underwear.  · Treat athletes foot if it occurs.  Follow-up care  Follow up with your healthcare provider as advised by our staff if the rash is not starting to get better after 10 days of treatment or if the rash continues to spread.  When to seek medical care  Get prompt medical attention if any of the following occur:  · Fluid draining from the rash  · Increasing redness around the rash  · Rash returns soon after treatment  Date Last Reviewed: 8/1/2016  © 9221-8793 [employer]. [address] [place] [address]  Road, [place] [address]. All rights reserved. This information is not intended as a substitute for professional medical care. Always follow your healthcare professional's instructions.        Understanding Tinea Cruris  Tinea cruris is a type of fungal infection. The fungus infects the skin in the groin. It is more commonly called jock itch. Men are more prone to it than women.  How to say it  TIN-ee-a CROO-ris   What causes tinea cruris?  Tinea cruris occurs when certain types of fungus grow in the groin area. The infection often spreads from the feet to the groin. The fungal infection on the foot is called athletes foot (tinea pedis). The infection can also be passed from person to person. You can get it if you touch an infected surface, such as a towel or bench in a locker room. It is more common if you have unusual heat, sweating, or friction in the groin, or are overweight.  What are the symptoms of tinea cruris?  Symptoms include:  · Red patches along the upper thigh, butt, and groin. The patches have a well-marked border, and sometimes a clear central area.  · Itchiness  · Scaling skin along the border of the rash  · Red bumps (pustules and papules)  · Burning or stinging sensation  How is tinea cruris treated?  With proper treatment, tinea cruris will go away in 2 to 3 weeks. Treatments include:  · Oral or skin medicines. These may help reduce itching.  · Over-the-counter or prescription antifungal medicines for the skin. Thesedestroy the fungus and ease symptoms. You may need to take other fungal medicine by mouth if the infection does not go away.  · Good hygiene.  Keep the groin area clean and dry. Take a bath or shower as soon as possible after physical activity. Dont wear sweaty clothes for an extended time. Wear loose cotton underwear. Drying powders may be helpful if you sweat a lot.  What are the possible complications of tinea cruris?  Possible complications include:  · Repeated fungal  infections  · Bacterial infection of the affected skin  When should I call my healthcare provider?  Call your healthcare provider right away if you have any of these:  · Pain that gets worse  · Symptoms that dont get better, or get worse  · New symptoms   Date Last Reviewed: 3/30/2016  © 4515-9894 [employer]. [address] [place] [address], [place] [address]. All rights reserved. This information is not intended as a substitute for professional medical care. Always follow your healthcare professional's instructions.

## 2017-12-23 NOTE — PROGRESS NOTES
"Subjective:       Patient ID: King Cool Jr. is a 62 y.o. male.    Vitals:  height is 5' 10.5" (1.791 m) and weight is 93 kg (205 lb). His temperature is 97.1 °F (36.2 °C). His blood pressure is 144/85 (abnormal) and his pulse is 82. His oxygen saturation is 97%.     Chief Complaint: Rash    Pt has itchy, swollen rash in the genital area. Pt is sexually active, unprotected w/his girlfriend, she has no rash. Pt had varicella as a child.      Rash   This is a new problem. The current episode started in the past 7 days. The problem has been gradually worsening since onset. The rash is characterized by itchiness and swelling. Pertinent negatives include no fever, joint pain, shortness of breath or sore throat. Past treatments include anti-itch cream. The treatment provided no relief. His past medical history is significant for varicella.     Review of Systems   Constitution: Negative for chills and fever.   HENT: Negative for sore throat.    Respiratory: Negative for shortness of breath.    Skin: Positive for itching and rash.   Musculoskeletal: Negative for joint pain.       Objective:      Physical Exam   Constitutional: He is oriented to person, place, and time. Vital signs are normal. He appears well-developed and well-nourished.   HENT:   Head: Normocephalic.   Nose: Nose normal.   Mouth/Throat: Oropharynx is clear and moist.   Eyes: Pupils are equal, round, and reactive to light.   Neck: Normal range of motion.   Cardiovascular: Normal rate.    Pulmonary/Chest: Effort normal.   Genitourinary: Testes normal. Circumcised. Penile erythema present. No phimosis, paraphimosis, hypospadias or penile tenderness. No discharge found.         Musculoskeletal: Normal range of motion.   Neurological: He is alert and oriented to person, place, and time.   Skin: Rash noted. No purpura noted. Rash is macular and maculopapular. Rash is not nodular, not pustular, not vesicular and not urticarial. There is erythema.     "   Assessment:       1. Tinea cruris        Plan:         Tinea cruris  -     Likely due to irritation from shaving  -     nystatin (MYCOSTATIN) ointment; Apply topically 2 (two) times daily.  Dispense: 30 g; Refill: 0        Patient Instructions       Jock Itch  Jock itch is a rash caused by a fungal infection. It occurs in the skin fold between the thigh and groin where it is warm and moist.  It starts as a small, red, itchy patch that grows larger in the shape of a round ring, 1- to 2- inches wide, and may cause the skin to flake. It may also spread to the scrotum or the skin that covers your testicles. This infection is treated with skin creams or oral medicine.  Home care  The following will help you care for yourself at home:  · If you were prescribed a cream, it should be applied exactly as directed. Some antifungal creams are available without a prescription.  · It may take a week before the fungus starts to go away and it can take about 2 to 3 weeks to completely clear. To prevent recurrence, it is important to keep using the medicine until the rash is all gone.  · Wash the groin area at least once a day with soap and water. Pat dry and apply the medicine. Change to freshly laundered underwear daily.  · Once the rash is gone, keep the area clean and dry to keep it from coming back. If recurrence is a problem, use a medicated antifungal powder daily in the groin area.  Prevention  The following tips may help prevent jock itch:  · Don't share clothes, towels, or sports gear with others unless they have been washed.  · Change underwear daily.  · Keep skin clean and dry, especially after showering or swimming.  · Lose weight.  · Do not wear tight underwear.  · Treat athletes foot if it occurs.  Follow-up care  Follow up with your healthcare provider as advised by our staff if the rash is not starting to get better after 10 days of treatment or if the rash continues to spread.  When to seek medical care  Get  prompt medical attention if any of the following occur:  · Fluid draining from the rash  · Increasing redness around the rash  · Rash returns soon after treatment  Date Last Reviewed: 8/1/2016  © 8219-1035 [employer]. [address] [place] [address], [place] [address]. All rights reserved. This information is not intended as a substitute for professional medical care. Always follow your healthcare professional's instructions.        Understanding Tinea Cruris  Tinea cruris is a type of fungal infection. The fungus infects the skin in the groin. It is more commonly called jock itch. Men are more prone to it than women.  How to say it  TIN-ee-a CROO-ris   What causes tinea cruris?  Tinea cruris occurs when certain types of fungus grow in the groin area. The infection often spreads from the feet to the groin. The fungal infection on the foot is called athletes foot (tinea pedis). The infection can also be passed from person to person. You can get it if you touch an infected surface, such as a towel or bench in a locker room. It is more common if you have unusual heat, sweating, or friction in the groin, or are overweight.  What are the symptoms of tinea cruris?  Symptoms include:  · Red patches along the upper thigh, butt, and groin. The patches have a well-marked border, and sometimes a clear central area.  · Itchiness  · Scaling skin along the border of the rash  · Red bumps (pustules and papules)  · Burning or stinging sensation  How is tinea cruris treated?  With proper treatment, tinea cruris will go away in 2 to 3 weeks. Treatments include:  · Oral or skin medicines. These may help reduce itching.  · Over-the-counter or prescription antifungal medicines for the skin. Thesedestroy the fungus and ease symptoms. You may need to take other fungal medicine by mouth if the infection does not go away.  · Good hygiene.  Keep the groin area clean and dry. Take a bath or shower as soon as possible after physical  activity. Dont wear sweaty clothes for an extended time. Wear loose cotton underwear. Drying powders may be helpful if you sweat a lot.  What are the possible complications of tinea cruris?  Possible complications include:  · Repeated fungal infections  · Bacterial infection of the affected skin  When should I call my healthcare provider?  Call your healthcare provider right away if you have any of these:  · Pain that gets worse  · Symptoms that dont get better, or get worse  · New symptoms   Date Last Reviewed: 3/30/2016  © 0209-6661 [employer]. [address] [place] [address]. All rights reserved. This information is not intended as a substitute for professional medical care. Always follow your healthcare professional's instructions.

## 2017-12-26 ENCOUNTER — LAB VISIT (OUTPATIENT)
Dept: LAB | Facility: HOSPITAL | Age: 62
End: 2017-12-26
Attending: FAMILY MEDICINE
Payer: COMMERCIAL

## 2017-12-26 ENCOUNTER — OFFICE VISIT (OUTPATIENT)
Dept: FAMILY MEDICINE | Facility: CLINIC | Age: 62
End: 2017-12-26
Payer: COMMERCIAL

## 2017-12-26 VITALS
WEIGHT: 212.06 LBS | OXYGEN SATURATION: 97 % | TEMPERATURE: 98 F | SYSTOLIC BLOOD PRESSURE: 142 MMHG | DIASTOLIC BLOOD PRESSURE: 80 MMHG | RESPIRATION RATE: 14 BRPM | BODY MASS INDEX: 30.36 KG/M2 | HEIGHT: 70 IN | HEART RATE: 66 BPM

## 2017-12-26 DIAGNOSIS — G89.29 CHRONIC PAIN OF RIGHT KNEE: ICD-10-CM

## 2017-12-26 DIAGNOSIS — M25.561 RECURRENT PAIN OF RIGHT KNEE: ICD-10-CM

## 2017-12-26 DIAGNOSIS — M17.11 OSTEOARTHRITIS OF RIGHT KNEE, UNSPECIFIED OSTEOARTHRITIS TYPE: ICD-10-CM

## 2017-12-26 DIAGNOSIS — B35.6 TINEA CRURIS: Primary | ICD-10-CM

## 2017-12-26 DIAGNOSIS — E11.9 TYPE 2 DIABETES MELLITUS WITHOUT COMPLICATION, WITHOUT LONG-TERM CURRENT USE OF INSULIN: ICD-10-CM

## 2017-12-26 DIAGNOSIS — I10 ESSENTIAL HYPERTENSION: ICD-10-CM

## 2017-12-26 DIAGNOSIS — M25.561 CHRONIC PAIN OF RIGHT KNEE: ICD-10-CM

## 2017-12-26 DIAGNOSIS — E11.9 TYPE 2 DIABETES MELLITUS WITHOUT COMPLICATION: ICD-10-CM

## 2017-12-26 LAB
ALBUMIN SERPL BCP-MCNC: 4 G/DL
ALP SERPL-CCNC: 74 U/L
ALT SERPL W/O P-5'-P-CCNC: 26 U/L
ANION GAP SERPL CALC-SCNC: 7 MMOL/L
AST SERPL-CCNC: 21 U/L
BASOPHILS # BLD AUTO: 0.05 K/UL
BASOPHILS NFR BLD: 0.8 %
BILIRUB SERPL-MCNC: 1.1 MG/DL
BUN SERPL-MCNC: 18 MG/DL
CALCIUM SERPL-MCNC: 9.8 MG/DL
CHLORIDE SERPL-SCNC: 103 MMOL/L
CHOLEST SERPL-MCNC: 159 MG/DL
CHOLEST/HDLC SERPL: 5 {RATIO}
CO2 SERPL-SCNC: 31 MMOL/L
COMPLEXED PSA SERPL-MCNC: 0.83 NG/ML
CREAT SERPL-MCNC: 1.1 MG/DL
DIFFERENTIAL METHOD: ABNORMAL
EOSINOPHIL # BLD AUTO: 0.3 K/UL
EOSINOPHIL NFR BLD: 4.9 %
ERYTHROCYTE [DISTWIDTH] IN BLOOD BY AUTOMATED COUNT: 12.4 %
EST. GFR  (AFRICAN AMERICAN): >60 ML/MIN/1.73 M^2
EST. GFR  (NON AFRICAN AMERICAN): >60 ML/MIN/1.73 M^2
ESTIMATED AVG GLUCOSE: 131 MG/DL
GLUCOSE SERPL-MCNC: 135 MG/DL
HBA1C MFR BLD HPLC: 6.2 %
HCT VFR BLD AUTO: 53.8 %
HDLC SERPL-MCNC: 32 MG/DL
HDLC SERPL: 20.1 %
HGB BLD-MCNC: 18.2 G/DL
IMM GRANULOCYTES # BLD AUTO: 0.01 K/UL
IMM GRANULOCYTES NFR BLD AUTO: 0.2 %
LDLC SERPL CALC-MCNC: 96 MG/DL
LYMPHOCYTES # BLD AUTO: 1.5 K/UL
LYMPHOCYTES NFR BLD: 25.6 %
MCH RBC QN AUTO: 31.4 PG
MCHC RBC AUTO-ENTMCNC: 33.8 G/DL
MCV RBC AUTO: 93 FL
MONOCYTES # BLD AUTO: 0.6 K/UL
MONOCYTES NFR BLD: 10.1 %
NEUTROPHILS # BLD AUTO: 3.5 K/UL
NEUTROPHILS NFR BLD: 58.4 %
NONHDLC SERPL-MCNC: 127 MG/DL
NRBC BLD-RTO: 0 /100 WBC
PLATELET # BLD AUTO: 202 K/UL
PMV BLD AUTO: 8.7 FL
POTASSIUM SERPL-SCNC: 4.8 MMOL/L
PROT SERPL-MCNC: 7.2 G/DL
RBC # BLD AUTO: 5.8 M/UL
SODIUM SERPL-SCNC: 141 MMOL/L
T4 FREE SERPL-MCNC: 0.86 NG/DL
TRIGL SERPL-MCNC: 155 MG/DL
TSH SERPL DL<=0.005 MIU/L-ACNC: 1.01 UIU/ML
WBC # BLD AUTO: 5.97 K/UL

## 2017-12-26 PROCEDURE — 84443 ASSAY THYROID STIM HORMONE: CPT

## 2017-12-26 PROCEDURE — 99215 OFFICE O/P EST HI 40 MIN: CPT | Mod: S$GLB,,, | Performed by: FAMILY MEDICINE

## 2017-12-26 PROCEDURE — 36415 COLL VENOUS BLD VENIPUNCTURE: CPT | Mod: PO

## 2017-12-26 PROCEDURE — 84153 ASSAY OF PSA TOTAL: CPT

## 2017-12-26 PROCEDURE — 83036 HEMOGLOBIN GLYCOSYLATED A1C: CPT

## 2017-12-26 PROCEDURE — 80061 LIPID PANEL: CPT

## 2017-12-26 PROCEDURE — 99999 PR PBB SHADOW E&M-EST. PATIENT-LVL IV: CPT | Mod: PBBFAC,,, | Performed by: FAMILY MEDICINE

## 2017-12-26 PROCEDURE — 80053 COMPREHEN METABOLIC PANEL: CPT

## 2017-12-26 PROCEDURE — 84439 ASSAY OF FREE THYROXINE: CPT

## 2017-12-26 RX ORDER — HYDROCODONE BITARTRATE AND ACETAMINOPHEN 10; 325 MG/1; MG/1
1 TABLET ORAL 3 TIMES DAILY PRN
Qty: 90 TABLET | Refills: 0 | Status: SHIPPED | OUTPATIENT
Start: 2018-01-19 | End: 2018-03-20 | Stop reason: SDUPTHER

## 2017-12-26 RX ORDER — DAPAGLIFLOZIN AND METFORMIN HYDROCHLORIDE 10; 1000 MG/1; MG/1
1 TABLET, FILM COATED, EXTENDED RELEASE ORAL DAILY
Qty: 30 EACH | Refills: 11 | Status: SHIPPED | OUTPATIENT
Start: 2017-12-26 | End: 2018-06-15 | Stop reason: SDUPTHER

## 2017-12-26 RX ORDER — LANCETS
1 EACH MISCELLANEOUS DAILY
Qty: 60 EACH | Refills: 3 | Status: SHIPPED | OUTPATIENT
Start: 2017-12-26 | End: 2022-03-08

## 2017-12-26 RX ORDER — HYDROCODONE BITARTRATE AND ACETAMINOPHEN 10; 325 MG/1; MG/1
1 TABLET ORAL 3 TIMES DAILY PRN
Qty: 90 TABLET | Refills: 0 | Status: SHIPPED | OUTPATIENT
Start: 2018-02-19 | End: 2018-03-20 | Stop reason: SDUPTHER

## 2017-12-26 RX ORDER — LOSARTAN POTASSIUM 100 MG/1
100 TABLET ORAL DAILY
Qty: 90 TABLET | Refills: 2 | Status: SHIPPED | OUTPATIENT
Start: 2017-12-26 | End: 2020-01-27

## 2017-12-26 RX ORDER — KETOCONAZOLE 20 MG/G
CREAM TOPICAL DAILY
Qty: 30 G | Refills: 1 | Status: SHIPPED | OUTPATIENT
Start: 2017-12-26 | End: 2018-03-20

## 2017-12-26 RX ORDER — KETOCONAZOLE 200 MG/1
200 TABLET ORAL DAILY
Qty: 10 TABLET | Refills: 0 | Status: SHIPPED | OUTPATIENT
Start: 2017-12-26 | End: 2018-01-25

## 2017-12-26 RX ORDER — HYDROCODONE BITARTRATE AND ACETAMINOPHEN 10; 325 MG/1; MG/1
1 TABLET ORAL 3 TIMES DAILY PRN
Qty: 90 TABLET | Refills: 0 | Status: SHIPPED | OUTPATIENT
Start: 2018-03-19 | End: 2018-03-20 | Stop reason: SDUPTHER

## 2017-12-26 RX ORDER — INSULIN PUMP SYRINGE, 3 ML
EACH MISCELLANEOUS
Qty: 1 EACH | Refills: 0 | Status: SHIPPED | OUTPATIENT
Start: 2017-12-26 | End: 2019-01-15 | Stop reason: SDUPTHER
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
## 2017-12-27 ENCOUNTER — TELEPHONE (OUTPATIENT)
Dept: FAMILY MEDICINE | Facility: CLINIC | Age: 62
End: 2017-12-27

## 2017-12-27 NOTE — TELEPHONE ENCOUNTER
Returned call to patient, lm for patient noting provider out of the office today also Message has been forwarded to  for review and response.

## 2017-12-27 NOTE — TELEPHONE ENCOUNTER
----- Message from Terese Mesa sent at 12/27/2017 10:52 AM CST -----  Contact: [phone]  Pt said he is still feeling worse its still spreading more and he is requesting another stronger prescription Please call pt at your earliest convenience.  Thanks !

## 2018-01-05 NOTE — TELEPHONE ENCOUNTER
----- Message from Jaja Ulloa sent at 1/5/2018  1:19 PM CST -----  Contact: self  Patient calling back regarding get medication; Nyquil is not working. Please contact him  at [phone]. Thanks!

## 2018-01-05 NOTE — TELEPHONE ENCOUNTER
----- Message from Dwayne Stafford sent at 1/5/2018 10:20 AM CST -----  Contact: [phone]/PT  Calling to speak with nurse to get something called in for congestive coughing. [facility]/[place]

## 2018-01-05 NOTE — TELEPHONE ENCOUNTER
Patient notified that  is not in office today, messages have been forwarded to the provider. I offered patient an appointment, patient declined. Patient notified message will be forwarded to provider to address.   Please see below. Thanks

## 2018-01-05 NOTE — TELEPHONE ENCOUNTER
Patient stated that he is not better from LOV and was told that additional meds could be called in, patient has a cough and congestion. Please advise, thank you

## 2018-01-08 RX ORDER — FLUTICASONE PROPIONATE 50 MCG
1 SPRAY, SUSPENSION (ML) NASAL DAILY
Qty: 16 G | Refills: 12 | Status: SHIPPED | OUTPATIENT
Start: 2018-01-08 | End: 2018-02-07

## 2018-01-08 RX ORDER — AZITHROMYCIN 250 MG/1
TABLET, FILM COATED ORAL
Qty: 6 TABLET | Refills: 0 | Status: SHIPPED | OUTPATIENT
Start: 2018-01-08 | End: 2018-01-13

## 2018-03-20 ENCOUNTER — OFFICE VISIT (OUTPATIENT)
Dept: FAMILY MEDICINE | Facility: CLINIC | Age: 63
End: 2018-03-20
Payer: MEDICARE

## 2018-03-20 VITALS
WEIGHT: 215.63 LBS | HEART RATE: 88 BPM | OXYGEN SATURATION: 95 % | BODY MASS INDEX: 30.87 KG/M2 | SYSTOLIC BLOOD PRESSURE: 126 MMHG | DIASTOLIC BLOOD PRESSURE: 62 MMHG | TEMPERATURE: 98 F | HEIGHT: 70 IN | RESPIRATION RATE: 20 BRPM

## 2018-03-20 DIAGNOSIS — M25.561 RECURRENT PAIN OF RIGHT KNEE: ICD-10-CM

## 2018-03-20 DIAGNOSIS — M17.11 OSTEOARTHRITIS OF RIGHT KNEE, UNSPECIFIED OSTEOARTHRITIS TYPE: ICD-10-CM

## 2018-03-20 DIAGNOSIS — J01.90 ACUTE BACTERIAL SINUSITIS: ICD-10-CM

## 2018-03-20 DIAGNOSIS — G89.29 CHRONIC PAIN OF RIGHT KNEE: ICD-10-CM

## 2018-03-20 DIAGNOSIS — M25.561 CHRONIC PAIN OF RIGHT KNEE: ICD-10-CM

## 2018-03-20 DIAGNOSIS — B96.89 ACUTE BACTERIAL SINUSITIS: ICD-10-CM

## 2018-03-20 DIAGNOSIS — E11.9 TYPE 2 DIABETES MELLITUS WITHOUT COMPLICATION, WITHOUT LONG-TERM CURRENT USE OF INSULIN: Primary | ICD-10-CM

## 2018-03-20 DIAGNOSIS — I10 ESSENTIAL HYPERTENSION: ICD-10-CM

## 2018-03-20 PROCEDURE — 3078F DIAST BP <80 MM HG: CPT | Mod: CPTII,S$GLB,, | Performed by: FAMILY MEDICINE

## 2018-03-20 PROCEDURE — 3074F SYST BP LT 130 MM HG: CPT | Mod: CPTII,S$GLB,, | Performed by: FAMILY MEDICINE

## 2018-03-20 PROCEDURE — 3044F HG A1C LEVEL LT 7.0%: CPT | Mod: CPTII,S$GLB,, | Performed by: FAMILY MEDICINE

## 2018-03-20 PROCEDURE — 99215 OFFICE O/P EST HI 40 MIN: CPT | Mod: S$GLB,,, | Performed by: FAMILY MEDICINE

## 2018-03-20 PROCEDURE — 99999 PR PBB SHADOW E&M-EST. PATIENT-LVL III: CPT | Mod: PBBFAC,,, | Performed by: FAMILY MEDICINE

## 2018-03-20 RX ORDER — HYDROCODONE BITARTRATE AND ACETAMINOPHEN 10; 325 MG/1; MG/1
1 TABLET ORAL 3 TIMES DAILY PRN
Qty: 90 TABLET | Refills: 0 | Status: SHIPPED | OUTPATIENT
Start: 2018-06-20 | End: 2018-06-15 | Stop reason: SDUPTHER

## 2018-03-20 RX ORDER — LORATADINE 10 MG/1
10 TABLET ORAL DAILY
Qty: 30 TABLET | Refills: 1 | Status: SHIPPED | OUTPATIENT
Start: 2018-03-20 | End: 2019-12-11 | Stop reason: SDUPTHER

## 2018-03-20 RX ORDER — HYDROCODONE BITARTRATE AND ACETAMINOPHEN 10; 325 MG/1; MG/1
1 TABLET ORAL 3 TIMES DAILY PRN
Qty: 90 TABLET | Refills: 0 | Status: SHIPPED | OUTPATIENT
Start: 2018-04-19 | End: 2018-06-15 | Stop reason: SDUPTHER

## 2018-03-20 RX ORDER — HYDROCODONE BITARTRATE AND ACETAMINOPHEN 10; 325 MG/1; MG/1
1 TABLET ORAL 3 TIMES DAILY PRN
Qty: 90 TABLET | Refills: 0 | Status: SHIPPED | OUTPATIENT
Start: 2018-05-19 | End: 2018-06-15 | Stop reason: SDUPTHER

## 2018-03-20 RX ORDER — AZITHROMYCIN 250 MG/1
TABLET, FILM COATED ORAL
Qty: 6 TABLET | Refills: 0 | Status: SHIPPED | OUTPATIENT
Start: 2018-03-20 | End: 2018-05-07 | Stop reason: SDUPTHER

## 2018-03-20 RX ORDER — FLUTICASONE PROPIONATE 50 MCG
1 SPRAY, SUSPENSION (ML) NASAL DAILY
Qty: 16 G | Refills: 12 | Status: SHIPPED | OUTPATIENT
Start: 2018-03-20 | End: 2018-04-19
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
## 2018-04-02 ENCOUNTER — HOSPITAL ENCOUNTER (OUTPATIENT)
Dept: RADIOLOGY | Facility: HOSPITAL | Age: 63
Discharge: HOME | End: 2018-04-02
Attending: FAMILY MEDICINE
Payer: MEDICARE

## 2018-04-02 ENCOUNTER — OFFICE VISIT (OUTPATIENT)
Dept: FAMILY MEDICINE | Facility: CLINIC | Age: 63
End: 2018-04-02
Payer: MEDICARE

## 2018-04-02 VITALS
BODY MASS INDEX: 29.89 KG/M2 | HEART RATE: 66 BPM | DIASTOLIC BLOOD PRESSURE: 66 MMHG | WEIGHT: 208.75 LBS | RESPIRATION RATE: 20 BRPM | HEIGHT: 70 IN | TEMPERATURE: 98 F | OXYGEN SATURATION: 98 % | SYSTOLIC BLOOD PRESSURE: 106 MMHG

## 2018-04-02 DIAGNOSIS — M17.31 POST-TRAUMATIC OSTEOARTHRITIS OF RIGHT KNEE: ICD-10-CM

## 2018-04-02 DIAGNOSIS — E11.9 TYPE 2 DIABETES MELLITUS WITHOUT COMPLICATION, WITHOUT LONG-TERM CURRENT USE OF INSULIN: Primary | ICD-10-CM

## 2018-04-02 DIAGNOSIS — I10 ESSENTIAL HYPERTENSION: ICD-10-CM

## 2018-04-02 DIAGNOSIS — M25.561 RECURRENT PAIN OF RIGHT KNEE: ICD-10-CM

## 2018-04-02 PROCEDURE — 99999 PR PBB SHADOW E&M-EST. PATIENT-LVL IV: CPT | Mod: PBBFAC,,, | Performed by: FAMILY MEDICINE

## 2018-04-02 PROCEDURE — 73560 X-RAY EXAM OF KNEE 1 OR 2: CPT | Mod: 26,RT,, | Performed by: RADIOLOGY

## 2018-04-02 PROCEDURE — 73560 X-RAY EXAM OF KNEE 1 OR 2: CPT | Mod: TC,FY,PO,RT

## 2018-04-02 PROCEDURE — 99214 OFFICE O/P EST MOD 30 MIN: CPT | Mod: 25,S$GLB,, | Performed by: FAMILY MEDICINE

## 2018-04-02 PROCEDURE — 96372 THER/PROPH/DIAG INJ SC/IM: CPT | Mod: S$GLB,,, | Performed by: FAMILY MEDICINE

## 2018-04-02 PROCEDURE — 3078F DIAST BP <80 MM HG: CPT | Mod: CPTII,S$GLB,, | Performed by: FAMILY MEDICINE

## 2018-04-02 PROCEDURE — 3044F HG A1C LEVEL LT 7.0%: CPT | Mod: CPTII,S$GLB,, | Performed by: FAMILY MEDICINE

## 2018-04-02 PROCEDURE — 3074F SYST BP LT 130 MM HG: CPT | Mod: CPTII,S$GLB,, | Performed by: FAMILY MEDICINE

## 2018-04-02 RX ORDER — KETOROLAC TROMETHAMINE 30 MG/ML
60 INJECTION, SOLUTION INTRAMUSCULAR; INTRAVENOUS
Status: COMPLETED | OUTPATIENT
Start: 2018-04-02 | End: 2018-04-02

## 2018-04-02 RX ADMIN — KETOROLAC TROMETHAMINE 60 MG: 30 INJECTION, SOLUTION INTRAMUSCULAR; INTRAVENOUS at 02:04

## 2018-04-02 NOTE — PROGRESS NOTES
Ketorolac 60 mg administered IM to right ventrogluteal. Tolerated well. No complaints. No adverse reaction noted.

## 2018-04-10 ENCOUNTER — HOSPITAL ENCOUNTER (OUTPATIENT)
Dept: RADIOLOGY | Facility: HOSPITAL | Age: 63
Discharge: HOME | End: 2018-04-10
Attending: FAMILY MEDICINE
Payer: MEDICARE

## 2018-04-10 DIAGNOSIS — M25.561 RECURRENT PAIN OF RIGHT KNEE: Primary | ICD-10-CM

## 2018-04-10 DIAGNOSIS — M17.31 POST-TRAUMATIC OSTEOARTHRITIS OF RIGHT KNEE: ICD-10-CM

## 2018-04-10 PROCEDURE — 73721 MRI JNT OF LWR EXTRE W/O DYE: CPT | Mod: 26,RT,, | Performed by: RADIOLOGY

## 2018-04-10 PROCEDURE — 73721 MRI JNT OF LWR EXTRE W/O DYE: CPT | Mod: TC,RT

## 2018-04-17 ENCOUNTER — TELEPHONE (OUTPATIENT)
Dept: ADMINISTRATIVE | Facility: HOSPITAL | Age: 63
End: 2018-04-17

## 2018-04-17 ENCOUNTER — TELEPHONE (OUTPATIENT)
Dept: FAMILY MEDICINE | Facility: CLINIC | Age: 63
End: 2018-04-17

## 2018-04-17 NOTE — TELEPHONE ENCOUNTER
Patient wanted to see someone in [place]. He was given the number to contact [facility] for an appt.

## 2018-04-17 NOTE — TELEPHONE ENCOUNTER
----- Message from Dwayne Stafford sent at 4/17/2018  8:48 AM CDT -----  Contact: [phone]/PT  Calling to speak with nurse regarding results

## 2018-04-25 ENCOUNTER — HOSPITAL ENCOUNTER (OUTPATIENT)
Dept: RADIOLOGY | Facility: HOSPITAL | Age: 63
Discharge: HOME | End: 2018-04-25
Attending: PHYSICIAN ASSISTANT
Payer: MEDICARE

## 2018-04-25 ENCOUNTER — OFFICE VISIT (OUTPATIENT)
Dept: ORTHOPEDICS | Facility: CLINIC | Age: 63
End: 2018-04-25
Payer: MEDICARE

## 2018-04-25 VITALS — HEIGHT: 71 IN | WEIGHT: 206.56 LBS | BODY MASS INDEX: 28.92 KG/M2

## 2018-04-25 DIAGNOSIS — M25.561 ACUTE PAIN OF RIGHT KNEE: ICD-10-CM

## 2018-04-25 DIAGNOSIS — M25.561 ACUTE PAIN OF RIGHT KNEE: Primary | ICD-10-CM

## 2018-04-25 DIAGNOSIS — M17.31 POST-TRAUMATIC OSTEOARTHRITIS OF RIGHT KNEE: Primary | ICD-10-CM

## 2018-04-25 PROCEDURE — 99213 OFFICE O/P EST LOW 20 MIN: CPT | Mod: 25,S$GLB,, | Performed by: PHYSICIAN ASSISTANT

## 2018-04-25 PROCEDURE — 73560 X-RAY EXAM OF KNEE 1 OR 2: CPT | Mod: 26,50,, | Performed by: RADIOLOGY

## 2018-04-25 PROCEDURE — 73560 X-RAY EXAM OF KNEE 1 OR 2: CPT | Mod: TC,50

## 2018-04-25 PROCEDURE — 99999 PR PBB SHADOW E&M-EST. PATIENT-LVL III: CPT | Mod: PBBFAC,,, | Performed by: PHYSICIAN ASSISTANT

## 2018-04-25 PROCEDURE — 20610 DRAIN/INJ JOINT/BURSA W/O US: CPT | Mod: RT,S$GLB,, | Performed by: PHYSICIAN ASSISTANT

## 2018-04-26 RX ORDER — BETAMETHASONE SODIUM PHOSPHATE AND BETAMETHASONE ACETATE 3; 3 MG/ML; MG/ML
6 INJECTION, SUSPENSION INTRA-ARTICULAR; INTRALESIONAL; INTRAMUSCULAR; SOFT TISSUE
Status: COMPLETED | OUTPATIENT
Start: 2018-04-26 | End: 2018-04-26

## 2018-04-26 RX ADMIN — BETAMETHASONE SODIUM PHOSPHATE AND BETAMETHASONE ACETATE 6 MG: 3; 3 INJECTION, SUSPENSION INTRA-ARTICULAR; INTRALESIONAL; INTRAMUSCULAR; SOFT TISSUE at 08:04
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
## 2018-05-07 ENCOUNTER — OFFICE VISIT (OUTPATIENT)
Dept: FAMILY MEDICINE | Facility: CLINIC | Age: 63
End: 2018-05-07
Payer: MEDICARE

## 2018-05-07 ENCOUNTER — LAB VISIT (OUTPATIENT)
Dept: LAB | Facility: HOSPITAL | Age: 63
End: 2018-05-07
Attending: FAMILY MEDICINE
Payer: MEDICARE

## 2018-05-07 VITALS
SYSTOLIC BLOOD PRESSURE: 134 MMHG | WEIGHT: 208.31 LBS | RESPIRATION RATE: 20 BRPM | HEIGHT: 71 IN | DIASTOLIC BLOOD PRESSURE: 66 MMHG | BODY MASS INDEX: 29.16 KG/M2 | HEART RATE: 85 BPM | TEMPERATURE: 98 F | OXYGEN SATURATION: 97 %

## 2018-05-07 DIAGNOSIS — M17.31 POST-TRAUMATIC OSTEOARTHRITIS OF RIGHT KNEE: ICD-10-CM

## 2018-05-07 DIAGNOSIS — B96.89 ACUTE BACTERIAL SINUSITIS: ICD-10-CM

## 2018-05-07 DIAGNOSIS — I10 ESSENTIAL HYPERTENSION: Primary | ICD-10-CM

## 2018-05-07 DIAGNOSIS — E11.9 TYPE 2 DIABETES MELLITUS WITHOUT COMPLICATION, WITHOUT LONG-TERM CURRENT USE OF INSULIN: ICD-10-CM

## 2018-05-07 DIAGNOSIS — M25.561 RECURRENT PAIN OF RIGHT KNEE: ICD-10-CM

## 2018-05-07 DIAGNOSIS — J01.90 ACUTE BACTERIAL SINUSITIS: ICD-10-CM

## 2018-05-07 DIAGNOSIS — E78.5 HYPERLIPIDEMIA, UNSPECIFIED HYPERLIPIDEMIA TYPE: ICD-10-CM

## 2018-05-07 LAB
CHOLEST SERPL-MCNC: 160 MG/DL
CHOLEST/HDLC SERPL: 5.2 {RATIO}
HDLC SERPL-MCNC: 31 MG/DL
HDLC SERPL: 19.4 %
LDLC SERPL CALC-MCNC: 89 MG/DL
NONHDLC SERPL-MCNC: 129 MG/DL
TRIGL SERPL-MCNC: 200 MG/DL

## 2018-05-07 PROCEDURE — 3044F HG A1C LEVEL LT 7.0%: CPT | Mod: CPTII,S$GLB,, | Performed by: FAMILY MEDICINE

## 2018-05-07 PROCEDURE — 3075F SYST BP GE 130 - 139MM HG: CPT | Mod: CPTII,S$GLB,, | Performed by: FAMILY MEDICINE

## 2018-05-07 PROCEDURE — 3078F DIAST BP <80 MM HG: CPT | Mod: CPTII,S$GLB,, | Performed by: FAMILY MEDICINE

## 2018-05-07 PROCEDURE — 3008F BODY MASS INDEX DOCD: CPT | Mod: CPTII,S$GLB,, | Performed by: FAMILY MEDICINE

## 2018-05-07 PROCEDURE — 80061 LIPID PANEL: CPT

## 2018-05-07 PROCEDURE — 99214 OFFICE O/P EST MOD 30 MIN: CPT | Mod: S$GLB,,, | Performed by: FAMILY MEDICINE

## 2018-05-07 PROCEDURE — 36415 COLL VENOUS BLD VENIPUNCTURE: CPT | Mod: PO

## 2018-05-07 PROCEDURE — 83036 HEMOGLOBIN GLYCOSYLATED A1C: CPT

## 2018-05-07 PROCEDURE — 99999 PR PBB SHADOW E&M-EST. PATIENT-LVL III: CPT | Mod: PBBFAC,,, | Performed by: FAMILY MEDICINE

## 2018-05-07 RX ORDER — AZITHROMYCIN 250 MG/1
TABLET, FILM COATED ORAL
Qty: 6 TABLET | Refills: 0 | Status: SHIPPED | OUTPATIENT
Start: 2018-05-07 | End: 2018-06-15 | Stop reason: ALTCHOICE
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
## 2018-05-08 LAB
ESTIMATED AVG GLUCOSE: 131 MG/DL
HBA1C MFR BLD HPLC: 6.2 %

## 2018-06-14 RX ORDER — FLUTICASONE PROPIONATE 50 MCG
SPRAY, SUSPENSION (ML) NASAL
COMMUNITY
Start: 2018-03-20 | End: 2020-11-24

## 2018-06-15 ENCOUNTER — OFFICE VISIT (OUTPATIENT)
Dept: FAMILY MEDICINE | Facility: CLINIC | Age: 63
End: 2018-06-15
Payer: MEDICARE

## 2018-06-15 VITALS
WEIGHT: 207.44 LBS | BODY MASS INDEX: 29.04 KG/M2 | HEART RATE: 76 BPM | HEIGHT: 71 IN | SYSTOLIC BLOOD PRESSURE: 102 MMHG | RESPIRATION RATE: 18 BRPM | TEMPERATURE: 99 F | DIASTOLIC BLOOD PRESSURE: 60 MMHG

## 2018-06-15 DIAGNOSIS — M17.11 OSTEOARTHRITIS OF RIGHT KNEE, UNSPECIFIED OSTEOARTHRITIS TYPE: ICD-10-CM

## 2018-06-15 DIAGNOSIS — G89.29 CHRONIC PAIN OF RIGHT KNEE: ICD-10-CM

## 2018-06-15 DIAGNOSIS — M25.561 CHRONIC PAIN OF RIGHT KNEE: ICD-10-CM

## 2018-06-15 PROCEDURE — 99215 OFFICE O/P EST HI 40 MIN: CPT | Mod: S$GLB,,, | Performed by: FAMILY MEDICINE

## 2018-06-15 PROCEDURE — 3074F SYST BP LT 130 MM HG: CPT | Mod: CPTII,S$GLB,, | Performed by: FAMILY MEDICINE

## 2018-06-15 PROCEDURE — 3008F BODY MASS INDEX DOCD: CPT | Mod: CPTII,S$GLB,, | Performed by: FAMILY MEDICINE

## 2018-06-15 PROCEDURE — 99999 PR PBB SHADOW E&M-EST. PATIENT-LVL III: CPT | Mod: PBBFAC,,, | Performed by: FAMILY MEDICINE

## 2018-06-15 PROCEDURE — 3078F DIAST BP <80 MM HG: CPT | Mod: CPTII,S$GLB,, | Performed by: FAMILY MEDICINE

## 2018-06-15 PROCEDURE — 3044F HG A1C LEVEL LT 7.0%: CPT | Mod: CPTII,S$GLB,, | Performed by: FAMILY MEDICINE

## 2018-06-15 RX ORDER — HYDROCODONE BITARTRATE AND ACETAMINOPHEN 10; 325 MG/1; MG/1
1 TABLET ORAL 3 TIMES DAILY PRN
Qty: 90 TABLET | Refills: 0 | Status: SHIPPED | OUTPATIENT
Start: 2018-07-16 | End: 2018-06-15 | Stop reason: SDUPTHER

## 2018-06-15 RX ORDER — DAPAGLIFLOZIN AND METFORMIN HYDROCHLORIDE 10; 1000 MG/1; MG/1
1 TABLET, FILM COATED, EXTENDED RELEASE ORAL DAILY
Qty: 30 EACH | Refills: 11 | Status: SHIPPED | OUTPATIENT
Start: 2018-06-15 | End: 2018-11-26

## 2018-06-15 RX ORDER — HYDROCODONE BITARTRATE AND ACETAMINOPHEN 10; 325 MG/1; MG/1
1 TABLET ORAL 3 TIMES DAILY PRN
Qty: 90 TABLET | Refills: 0 | Status: SHIPPED | OUTPATIENT
Start: 2018-07-18 | End: 2018-06-21 | Stop reason: SDUPTHER

## 2018-06-15 RX ORDER — HYDROCODONE BITARTRATE AND ACETAMINOPHEN 10; 325 MG/1; MG/1
1 TABLET ORAL 3 TIMES DAILY PRN
Qty: 90 TABLET | Refills: 0 | Status: SHIPPED | OUTPATIENT
Start: 2018-09-18 | End: 2018-09-17 | Stop reason: SDUPTHER

## 2018-06-15 RX ORDER — HYDROCODONE BITARTRATE AND ACETAMINOPHEN 10; 325 MG/1; MG/1
1 TABLET ORAL 3 TIMES DAILY PRN
Qty: 90 TABLET | Refills: 0 | Status: SHIPPED | OUTPATIENT
Start: 2018-08-18 | End: 2018-09-10 | Stop reason: SDUPTHER

## 2018-06-15 RX ORDER — HYDROCODONE BITARTRATE AND ACETAMINOPHEN 10; 325 MG/1; MG/1
1 TABLET ORAL 3 TIMES DAILY PRN
Qty: 90 TABLET | Refills: 0 | Status: SHIPPED | OUTPATIENT
Start: 2018-08-15 | End: 2018-06-15 | Stop reason: SDUPTHER

## 2018-06-15 RX ORDER — HYDROCODONE BITARTRATE AND ACETAMINOPHEN 10; 325 MG/1; MG/1
1 TABLET ORAL 3 TIMES DAILY PRN
Qty: 90 TABLET | Refills: 0 | Status: SHIPPED | OUTPATIENT
Start: 2018-06-15 | End: 2018-06-15 | Stop reason: SDUPTHER
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
## 2018-06-20 ENCOUNTER — TELEPHONE (OUTPATIENT)
Dept: FAMILY MEDICINE | Facility: CLINIC | Age: 63
End: 2018-06-20

## 2018-06-20 NOTE — TELEPHONE ENCOUNTER
----- Message from Lindsay Simon sent at 6/20/2018  3:32 PM CDT -----  Contact: Self   Patient says he brought his script to the Pharmacy and was told he cannot use it because the dated date is past 90 days. Please call to advise at [phone]      HYDROcodone-acetaminophen (NORCO)  mg per tablet      [facility] [place] [facility] [address]

## 2018-06-21 DIAGNOSIS — G89.29 CHRONIC PAIN OF RIGHT KNEE: ICD-10-CM

## 2018-06-21 DIAGNOSIS — M25.561 CHRONIC PAIN OF RIGHT KNEE: ICD-10-CM

## 2018-06-21 DIAGNOSIS — M17.11 OSTEOARTHRITIS OF RIGHT KNEE, UNSPECIFIED OSTEOARTHRITIS TYPE: ICD-10-CM

## 2018-06-21 RX ORDER — HYDROCODONE BITARTRATE AND ACETAMINOPHEN 10; 325 MG/1; MG/1
1 TABLET ORAL 3 TIMES DAILY PRN
Qty: 90 TABLET | Refills: 0 | Status: SHIPPED | OUTPATIENT
Start: 2018-06-21 | End: 2018-09-17 | Stop reason: SDUPTHER

## 2018-06-21 NOTE — TELEPHONE ENCOUNTER
----- Message from Sammie Kinney sent at 6/21/2018  9:51 AM CDT -----  Contact: Self/ [phone]  Pt returning call to office. Thank you.

## 2018-06-21 NOTE — TELEPHONE ENCOUNTER
----- Message from Keila Phelps sent at 6/21/2018  8:42 AM CDT -----  Contact: Self  Pt calling to speak to a nurse regarding status on his script. [phone].

## 2018-08-20 DIAGNOSIS — E11.9 TYPE 2 DIABETES MELLITUS WITHOUT COMPLICATION: ICD-10-CM

## 2018-09-06 ENCOUNTER — TELEPHONE (OUTPATIENT)
Dept: FAMILY MEDICINE | Facility: CLINIC | Age: 63
End: 2018-09-06

## 2018-09-06 NOTE — TELEPHONE ENCOUNTER
----- Message from Kathryn Grant sent at 9/6/2018  3:56 PM CDT -----  Contact: Self   Pt has a script that he never filled. He is asking that you rewrite him a script for the medication. Please call at [phone].    HYDROcodone-acetaminophen (NORCO)  mg per tablet    [facility]   [facility] - [place], [place] - [address]

## 2018-09-07 ENCOUNTER — TELEPHONE (OUTPATIENT)
Dept: FAMILY MEDICINE | Facility: CLINIC | Age: 63
End: 2018-09-07

## 2018-09-07 NOTE — TELEPHONE ENCOUNTER
----- Message from Yoni Diaz sent at 9/7/2018 11:52 AM CDT -----  Contact: King [phone]  Patient is requesting a sooner appointment with Dr. Hook, to assess his foot pain. Please call at your earliest convenience.

## 2018-09-10 ENCOUNTER — OFFICE VISIT (OUTPATIENT)
Dept: FAMILY MEDICINE | Facility: CLINIC | Age: 63
End: 2018-09-10
Payer: MEDICARE

## 2018-09-10 ENCOUNTER — LAB VISIT (OUTPATIENT)
Dept: LAB | Facility: HOSPITAL | Age: 63
End: 2018-09-10
Attending: FAMILY MEDICINE
Payer: MEDICARE

## 2018-09-10 VITALS
SYSTOLIC BLOOD PRESSURE: 128 MMHG | WEIGHT: 208.13 LBS | DIASTOLIC BLOOD PRESSURE: 72 MMHG | RESPIRATION RATE: 16 BRPM | OXYGEN SATURATION: 95 % | HEART RATE: 70 BPM | HEIGHT: 71 IN | BODY MASS INDEX: 29.14 KG/M2 | TEMPERATURE: 99 F

## 2018-09-10 DIAGNOSIS — E11.9 TYPE 2 DIABETES MELLITUS WITHOUT COMPLICATION: ICD-10-CM

## 2018-09-10 DIAGNOSIS — E11.9 TYPE 2 DIABETES MELLITUS WITHOUT COMPLICATION, WITHOUT LONG-TERM CURRENT USE OF INSULIN: Primary | ICD-10-CM

## 2018-09-10 DIAGNOSIS — M25.561 CHRONIC PAIN OF RIGHT KNEE: ICD-10-CM

## 2018-09-10 DIAGNOSIS — F11.20 UNCOMPLICATED OPIOID DEPENDENCE: ICD-10-CM

## 2018-09-10 DIAGNOSIS — G89.29 CHRONIC PAIN OF RIGHT KNEE: ICD-10-CM

## 2018-09-10 LAB
AMP D-AMPHETAMINE 1000 NG/ML: NEGATIVE
BAR SECOBARBITAL 300 NG/ML: NEGATIVE
BUP BUPRENORPHINE 10 NG/ML: NEGATIVE
BZO OXAZEPAM 300 NG/ML: NEGATIVE
COC BENZOYLECGONINE 300 NG/ML: NEGATIVE
CTP QC/QA: YES
MET D-METHAMPHETAMINE 500 NG/ML: NEGATIVE
MOP MORPHINE 300 NG/ML: NEGATIVE
MTD METHADONE 300 NG/ML: NEGATIVE
QXY OXYCODONE 100 NG/ML: POSITIVE
THC 11-NOR-9-TETRAHYDROCANNABINOL-9-CARBOXYLIC ACID: NEGATIVE

## 2018-09-10 PROCEDURE — 99215 OFFICE O/P EST HI 40 MIN: CPT | Mod: PBBFAC,PO | Performed by: PHYSICIAN ASSISTANT

## 2018-09-10 PROCEDURE — 3074F SYST BP LT 130 MM HG: CPT | Mod: CPTII,,, | Performed by: PHYSICIAN ASSISTANT

## 2018-09-10 PROCEDURE — 36415 COLL VENOUS BLD VENIPUNCTURE: CPT | Mod: PO

## 2018-09-10 PROCEDURE — 3078F DIAST BP <80 MM HG: CPT | Mod: CPTII,,, | Performed by: PHYSICIAN ASSISTANT

## 2018-09-10 PROCEDURE — 83036 HEMOGLOBIN GLYCOSYLATED A1C: CPT

## 2018-09-10 PROCEDURE — 99999 PR PBB SHADOW E&M-EST. PATIENT-LVL V: CPT | Mod: PBBFAC,,, | Performed by: PHYSICIAN ASSISTANT

## 2018-09-10 PROCEDURE — 3044F HG A1C LEVEL LT 7.0%: CPT | Mod: CPTII,,, | Performed by: PHYSICIAN ASSISTANT

## 2018-09-10 PROCEDURE — 99213 OFFICE O/P EST LOW 20 MIN: CPT | Mod: S$PBB,,, | Performed by: PHYSICIAN ASSISTANT

## 2018-09-10 PROCEDURE — 3008F BODY MASS INDEX DOCD: CPT | Mod: CPTII,,, | Performed by: PHYSICIAN ASSISTANT

## 2018-09-10 PROCEDURE — 80305 DRUG TEST PRSMV DIR OPT OBS: CPT | Mod: PBBFAC,PO | Performed by: PHYSICIAN ASSISTANT

## 2018-09-10 RX ORDER — HYDROCODONE BITARTRATE AND ACETAMINOPHEN 10; 325 MG/1; MG/1
1 TABLET ORAL 3 TIMES DAILY PRN
Qty: 90 TABLET | Refills: 0 | Status: SHIPPED | OUTPATIENT
Start: 2018-09-10 | End: 2019-01-15 | Stop reason: SDUPTHER

## 2018-09-10 NOTE — PROGRESS NOTES
Subjective:       Patient ID: King Cool Jr. is a 63 y.o. male.    Chief Complaint: Diabetes (foot exam)    Diabetes   He presents for his follow-up diabetic visit. He has type 2 diabetes mellitus. His disease course has been stable. There are no diabetic associated symptoms. Pertinent negatives for diabetes include no weakness. Symptoms are stable. His weight is stable. He is following a diabetic diet. There is no change in his home blood glucose trend. An ACE inhibitor/angiotensin II receptor blocker is being taken. He sees a podiatrist.Eye exam is current.       Review of Systems   Musculoskeletal: Negative for arthralgias.   Skin: Negative for color change, rash and wound.   Neurological: Negative for weakness and numbness.       Objective:      Physical Exam   Constitutional: He appears well-developed and well-nourished.   HENT:   Head: Normocephalic and atraumatic.   Cardiovascular:   Pulses:       Dorsalis pedis pulses are 2+ on the right side, and 2+ on the left side.   Feet:   Right Foot:   Protective Sensation: 10 sites tested. 9 sites sensed.   Skin Integrity: Negative for ulcer, blister or skin breakdown.   Left Foot:   Protective Sensation: 10 sites tested. 9 sites sensed.   Skin Integrity: Negative for ulcer, blister or skin breakdown.   Skin: Skin is warm and dry. He is not diaphoretic.   Psychiatric: He has a normal mood and affect. His behavior is normal.   Vitals reviewed.      Assessment:       1. Type 2 diabetes mellitus without complication, without long-term current use of insulin    2. Chronic pain of right knee    3. Uncomplicated opioid dependence         Plan:         King was seen today for diabetes.    Diagnoses and all orders for this visit:    Type 2 diabetes mellitus without complication, without long-term current use of insulin  -   Foot exam today. Pt refuses statin    Chronic pain of right knee  -     POCT BUP Urine Drug Test  -     HYDROcodone-acetaminophen (NORCO)  mg per  tablet; Take 1 tablet by mouth 3 (three) times daily as needed (pain).    Uncomplicated opioid dependence   -     POCT BUP Urine Drug Test

## 2018-09-11 LAB
ESTIMATED AVG GLUCOSE: 134 MG/DL
HBA1C MFR BLD HPLC: 6.3 %

## 2018-09-12 ENCOUNTER — TELEPHONE (OUTPATIENT)
Dept: FAMILY MEDICINE | Facility: CLINIC | Age: 63
End: 2018-09-12

## 2018-09-12 NOTE — TELEPHONE ENCOUNTER
----- Message from Yoni Diaz sent at 9/12/2018 11:20 AM CDT -----  Contact: MICHELE [phone]  Patient is calling to get his results from September 10. Please call at your earliest convenience.

## 2018-09-17 ENCOUNTER — OFFICE VISIT (OUTPATIENT)
Dept: FAMILY MEDICINE | Facility: CLINIC | Age: 63
End: 2018-09-17
Payer: MEDICARE

## 2018-09-17 VITALS
HEART RATE: 67 BPM | RESPIRATION RATE: 20 BRPM | WEIGHT: 211.19 LBS | TEMPERATURE: 99 F | OXYGEN SATURATION: 97 % | BODY MASS INDEX: 29.56 KG/M2 | DIASTOLIC BLOOD PRESSURE: 60 MMHG | HEIGHT: 71 IN | SYSTOLIC BLOOD PRESSURE: 120 MMHG

## 2018-09-17 DIAGNOSIS — M17.11 OSTEOARTHRITIS OF RIGHT KNEE, UNSPECIFIED OSTEOARTHRITIS TYPE: ICD-10-CM

## 2018-09-17 DIAGNOSIS — G89.29 CHRONIC PAIN OF RIGHT KNEE: ICD-10-CM

## 2018-09-17 DIAGNOSIS — I10 ESSENTIAL HYPERTENSION: ICD-10-CM

## 2018-09-17 DIAGNOSIS — M17.31 POST-TRAUMATIC OSTEOARTHRITIS OF RIGHT KNEE: ICD-10-CM

## 2018-09-17 DIAGNOSIS — M25.561 RECURRENT PAIN OF RIGHT KNEE: ICD-10-CM

## 2018-09-17 DIAGNOSIS — M25.561 CHRONIC PAIN OF RIGHT KNEE: ICD-10-CM

## 2018-09-17 DIAGNOSIS — E11.9 TYPE 2 DIABETES MELLITUS WITHOUT COMPLICATION, WITHOUT LONG-TERM CURRENT USE OF INSULIN: ICD-10-CM

## 2018-09-17 DIAGNOSIS — Z12.11 COLON CANCER SCREENING: Primary | ICD-10-CM

## 2018-09-17 PROCEDURE — 99999 PR PBB SHADOW E&M-EST. PATIENT-LVL III: CPT | Mod: PBBFAC,,, | Performed by: FAMILY MEDICINE

## 2018-09-17 PROCEDURE — 3008F BODY MASS INDEX DOCD: CPT | Mod: CPTII,,, | Performed by: FAMILY MEDICINE

## 2018-09-17 PROCEDURE — 20610 DRAIN/INJ JOINT/BURSA W/O US: CPT | Mod: PBBFAC,PO | Performed by: FAMILY MEDICINE

## 2018-09-17 PROCEDURE — 99215 OFFICE O/P EST HI 40 MIN: CPT | Mod: S$PBB,25,, | Performed by: FAMILY MEDICINE

## 2018-09-17 PROCEDURE — 3044F HG A1C LEVEL LT 7.0%: CPT | Mod: CPTII,,, | Performed by: FAMILY MEDICINE

## 2018-09-17 PROCEDURE — 3074F SYST BP LT 130 MM HG: CPT | Mod: CPTII,,, | Performed by: FAMILY MEDICINE

## 2018-09-17 PROCEDURE — 99213 OFFICE O/P EST LOW 20 MIN: CPT | Mod: PBBFAC,PO | Performed by: FAMILY MEDICINE

## 2018-09-17 PROCEDURE — 3078F DIAST BP <80 MM HG: CPT | Mod: CPTII,,, | Performed by: FAMILY MEDICINE

## 2018-09-17 RX ORDER — HYDROCODONE BITARTRATE AND ACETAMINOPHEN 10; 325 MG/1; MG/1
1 TABLET ORAL 3 TIMES DAILY PRN
Qty: 90 TABLET | Refills: 0 | Status: SHIPPED | OUTPATIENT
Start: 2018-10-17 | End: 2018-11-01 | Stop reason: SDUPTHER

## 2018-09-17 RX ORDER — HYDROCODONE BITARTRATE AND ACETAMINOPHEN 10; 325 MG/1; MG/1
1 TABLET ORAL 3 TIMES DAILY PRN
Qty: 90 TABLET | Refills: 0 | Status: SHIPPED | OUTPATIENT
Start: 2018-09-17 | End: 2018-11-01 | Stop reason: SDUPTHER

## 2018-09-17 RX ORDER — TRIAMCINOLONE ACETONIDE 40 MG/ML
40 INJECTION, SUSPENSION INTRA-ARTICULAR; INTRAMUSCULAR
Status: DISCONTINUED | OUTPATIENT
Start: 2018-09-17 | End: 2018-09-17 | Stop reason: HOSPADM

## 2018-09-17 RX ADMIN — TRIAMCINOLONE ACETONIDE 40 MG: 40 INJECTION, SUSPENSION INTRA-ARTICULAR; INTRAMUSCULAR at 08:09

## 2018-09-17 NOTE — PROCEDURES
Large Joint Aspiration/Injection: R knee  Date/Time: 9/17/2018 8:57 AM  Performed by: Zeb Hook MD  Authorized by: Zeb Hook MD     Consent Done?:  Yes (Verbal)  Indications:  Pain  Procedure site marked: Yes    Timeout: Prior to procedure the correct patient, procedure, and site was verified      Location:  Knee  Site:  R knee  Prep: Patient was prepped and draped in usual sterile fashion    Ultrasonic Guidance for needle placement: No  Needle size:  25 G  Approach:  Lateral  Medications:  40 mg triamcinolone acetonide 40 mg/mL  Patient tolerance:  Patient tolerated the procedure well with no immediate complications
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
## 2018-09-26 ENCOUNTER — TELEPHONE (OUTPATIENT)
Dept: FAMILY MEDICINE | Facility: CLINIC | Age: 63
End: 2018-09-26

## 2018-09-26 DIAGNOSIS — Z12.11 COLON CANCER SCREENING: Primary | ICD-10-CM

## 2018-09-26 NOTE — TELEPHONE ENCOUNTER
----- Message from Zeb Hook MD sent at 9/26/2018  1:44 PM CDT -----  I've reordered a fit kit!    ----- Message -----  From: Deidre Cisse  Sent: 9/25/2018   7:52 AM  To: Zeb Hook MD    Pt mailed in FOBT stool specimen with no name on specimen. Another specimen is needed with name on specimen

## 2018-09-26 NOTE — TELEPHONE ENCOUNTER
Spoke with patient. Notified of below. Verbalized understanding. Would like FITKIT mailed to him. Will be put in the mail today.

## 2018-10-03 ENCOUNTER — LAB VISIT (OUTPATIENT)
Dept: LAB | Facility: HOSPITAL | Age: 63
End: 2018-10-03
Attending: FAMILY MEDICINE
Payer: MEDICARE

## 2018-10-03 DIAGNOSIS — Z12.11 COLON CANCER SCREENING: ICD-10-CM

## 2018-10-03 PROCEDURE — 82274 ASSAY TEST FOR BLOOD FECAL: CPT

## 2018-10-05 LAB — HEMOCCULT STL QL IA: NEGATIVE

## 2018-11-01 ENCOUNTER — OFFICE VISIT (OUTPATIENT)
Dept: FAMILY MEDICINE | Facility: CLINIC | Age: 63
End: 2018-11-01
Payer: MEDICARE

## 2018-11-01 VITALS
WEIGHT: 208.56 LBS | HEART RATE: 82 BPM | OXYGEN SATURATION: 96 % | HEIGHT: 71 IN | SYSTOLIC BLOOD PRESSURE: 126 MMHG | BODY MASS INDEX: 29.2 KG/M2 | DIASTOLIC BLOOD PRESSURE: 78 MMHG | TEMPERATURE: 99 F

## 2018-11-01 DIAGNOSIS — M17.11 OSTEOARTHRITIS OF RIGHT KNEE, UNSPECIFIED OSTEOARTHRITIS TYPE: ICD-10-CM

## 2018-11-01 DIAGNOSIS — M25.561 RECURRENT PAIN OF RIGHT KNEE: ICD-10-CM

## 2018-11-01 DIAGNOSIS — Z23 NEED FOR INFLUENZA VACCINATION: Primary | ICD-10-CM

## 2018-11-01 DIAGNOSIS — M25.561 CHRONIC PAIN OF RIGHT KNEE: ICD-10-CM

## 2018-11-01 DIAGNOSIS — I10 ESSENTIAL HYPERTENSION: ICD-10-CM

## 2018-11-01 DIAGNOSIS — E11.9 TYPE 2 DIABETES MELLITUS WITHOUT COMPLICATION, WITHOUT LONG-TERM CURRENT USE OF INSULIN: ICD-10-CM

## 2018-11-01 DIAGNOSIS — G89.29 CHRONIC PAIN OF RIGHT KNEE: ICD-10-CM

## 2018-11-01 DIAGNOSIS — M17.31 POST-TRAUMATIC OSTEOARTHRITIS OF RIGHT KNEE: ICD-10-CM

## 2018-11-01 PROCEDURE — 99999 PR PBB SHADOW E&M-EST. PATIENT-LVL III: CPT | Mod: PBBFAC,,, | Performed by: FAMILY MEDICINE

## 2018-11-01 PROCEDURE — 3044F HG A1C LEVEL LT 7.0%: CPT | Mod: CPTII,S$GLB,, | Performed by: FAMILY MEDICINE

## 2018-11-01 PROCEDURE — 99215 OFFICE O/P EST HI 40 MIN: CPT | Mod: S$GLB,,, | Performed by: FAMILY MEDICINE

## 2018-11-01 PROCEDURE — 99213 OFFICE O/P EST LOW 20 MIN: CPT | Mod: PBBFAC,PO | Performed by: FAMILY MEDICINE

## 2018-11-01 PROCEDURE — 3074F SYST BP LT 130 MM HG: CPT | Mod: CPTII,S$GLB,, | Performed by: FAMILY MEDICINE

## 2018-11-01 PROCEDURE — 3078F DIAST BP <80 MM HG: CPT | Mod: CPTII,S$GLB,, | Performed by: FAMILY MEDICINE

## 2018-11-01 PROCEDURE — 3008F BODY MASS INDEX DOCD: CPT | Mod: CPTII,S$GLB,, | Performed by: FAMILY MEDICINE

## 2018-11-01 RX ORDER — HYDROCODONE BITARTRATE AND ACETAMINOPHEN 10; 325 MG/1; MG/1
1 TABLET ORAL 3 TIMES DAILY PRN
Qty: 90 TABLET | Refills: 0 | Status: SHIPPED | OUTPATIENT
Start: 2018-12-15 | End: 2019-01-15 | Stop reason: SDUPTHER

## 2018-11-01 RX ORDER — HYDROCODONE BITARTRATE AND ACETAMINOPHEN 10; 325 MG/1; MG/1
1 TABLET ORAL 3 TIMES DAILY PRN
Qty: 90 TABLET | Refills: 0 | Status: SHIPPED | OUTPATIENT
Start: 2018-11-15 | End: 2018-11-01 | Stop reason: SDUPTHER

## 2018-11-24 ENCOUNTER — NURSE TRIAGE (OUTPATIENT)
Dept: ADMINISTRATIVE | Facility: CLINIC | Age: 63
End: 2018-11-24

## 2018-11-24 NOTE — TELEPHONE ENCOUNTER
Reason for Disposition   Caller has URGENT medication question about med that PCP prescribed and triager unable to answer question    Protocols used: ST MEDICATION QUESTION CALL-A-    Pt calling regarding medication (Xigduo XR).  Pt went to  refill @ [facility] and they told Pt his insurance is not covering medication/coupon is not covered anymore.  Pt does have 3 pills left-enough until Tuesday.  Will message MD and Staff.

## 2018-11-26 ENCOUNTER — TELEPHONE (OUTPATIENT)
Dept: FAMILY MEDICINE | Facility: CLINIC | Age: 63
End: 2018-11-26

## 2018-11-26 DIAGNOSIS — E11.9 TYPE 2 DIABETES MELLITUS WITHOUT COMPLICATION, WITHOUT LONG-TERM CURRENT USE OF INSULIN: Primary | ICD-10-CM

## 2018-11-26 RX ORDER — METFORMIN HYDROCHLORIDE 500 MG/1
500 TABLET ORAL 2 TIMES DAILY WITH MEALS
Qty: 180 TABLET | Refills: 3 | Status: SHIPPED | OUTPATIENT
Start: 2018-11-26 | End: 2019-06-11 | Stop reason: SDUPTHER

## 2018-11-26 NOTE — TELEPHONE ENCOUNTER
----- Message from Sarah Reece sent at 11/26/2018  7:45 AM CST -----  Contact: Self/[phone]  Patient called to follow up on a previous message regarding his diabetes medication. He would like to speak to the staff ASAP. Thank you.

## 2018-11-26 NOTE — TELEPHONE ENCOUNTER
Patient notified of medication sent separately to pharmacy per , patient verbalized understanding

## 2019-01-03 DIAGNOSIS — E11.9 TYPE 2 DIABETES MELLITUS WITHOUT COMPLICATION: ICD-10-CM

## 2019-01-15 ENCOUNTER — OFFICE VISIT (OUTPATIENT)
Dept: FAMILY MEDICINE | Facility: CLINIC | Age: 64
End: 2019-01-15
Payer: MEDICARE

## 2019-01-15 ENCOUNTER — LAB VISIT (OUTPATIENT)
Dept: LAB | Facility: HOSPITAL | Age: 64
End: 2019-01-15
Attending: FAMILY MEDICINE
Payer: MEDICARE

## 2019-01-15 VITALS
TEMPERATURE: 98 F | OXYGEN SATURATION: 97 % | HEART RATE: 77 BPM | RESPIRATION RATE: 20 BRPM | SYSTOLIC BLOOD PRESSURE: 128 MMHG | BODY MASS INDEX: 30.37 KG/M2 | DIASTOLIC BLOOD PRESSURE: 70 MMHG | WEIGHT: 216.94 LBS | HEIGHT: 71 IN

## 2019-01-15 DIAGNOSIS — E11.9 TYPE 2 DIABETES MELLITUS WITHOUT COMPLICATION: ICD-10-CM

## 2019-01-15 DIAGNOSIS — M17.31 POST-TRAUMATIC OSTEOARTHRITIS OF RIGHT KNEE: ICD-10-CM

## 2019-01-15 DIAGNOSIS — M17.11 OSTEOARTHRITIS OF RIGHT KNEE, UNSPECIFIED OSTEOARTHRITIS TYPE: ICD-10-CM

## 2019-01-15 DIAGNOSIS — G89.29 CHRONIC PAIN OF RIGHT KNEE: ICD-10-CM

## 2019-01-15 DIAGNOSIS — E11.9 TYPE 2 DIABETES MELLITUS WITHOUT COMPLICATION, WITHOUT LONG-TERM CURRENT USE OF INSULIN: ICD-10-CM

## 2019-01-15 DIAGNOSIS — I10 ESSENTIAL HYPERTENSION: Primary | ICD-10-CM

## 2019-01-15 DIAGNOSIS — M25.561 RECURRENT PAIN OF RIGHT KNEE: ICD-10-CM

## 2019-01-15 DIAGNOSIS — M25.561 CHRONIC PAIN OF RIGHT KNEE: ICD-10-CM

## 2019-01-15 LAB
ESTIMATED AVG GLUCOSE: 154 MG/DL
HBA1C MFR BLD HPLC: 7 %

## 2019-01-15 PROCEDURE — 99999 PR PBB SHADOW E&M-EST. PATIENT-LVL III: ICD-10-PCS | Mod: PBBFAC,,, | Performed by: FAMILY MEDICINE

## 2019-01-15 PROCEDURE — 3078F DIAST BP <80 MM HG: CPT | Mod: CPTII,S$GLB,, | Performed by: FAMILY MEDICINE

## 2019-01-15 PROCEDURE — 99214 PR OFFICE/OUTPT VISIT, EST, LEVL IV, 30-39 MIN: ICD-10-PCS | Mod: S$GLB,,, | Performed by: FAMILY MEDICINE

## 2019-01-15 PROCEDURE — 3008F BODY MASS INDEX DOCD: CPT | Mod: CPTII,S$GLB,, | Performed by: FAMILY MEDICINE

## 2019-01-15 PROCEDURE — 83036 HEMOGLOBIN GLYCOSYLATED A1C: CPT

## 2019-01-15 PROCEDURE — 3045F PR MOST RECENT HEMOGLOBIN A1C LEVEL 7.0-9.0%: CPT | Mod: CPTII,S$GLB,, | Performed by: FAMILY MEDICINE

## 2019-01-15 PROCEDURE — 99214 OFFICE O/P EST MOD 30 MIN: CPT | Mod: S$GLB,,, | Performed by: FAMILY MEDICINE

## 2019-01-15 PROCEDURE — 3078F PR MOST RECENT DIASTOLIC BLOOD PRESSURE < 80 MM HG: ICD-10-PCS | Mod: CPTII,S$GLB,, | Performed by: FAMILY MEDICINE

## 2019-01-15 PROCEDURE — 3074F PR MOST RECENT SYSTOLIC BLOOD PRESSURE < 130 MM HG: ICD-10-PCS | Mod: CPTII,S$GLB,, | Performed by: FAMILY MEDICINE

## 2019-01-15 PROCEDURE — 3008F PR BODY MASS INDEX (BMI) DOCUMENTED: ICD-10-PCS | Mod: CPTII,S$GLB,, | Performed by: FAMILY MEDICINE

## 2019-01-15 PROCEDURE — 3074F SYST BP LT 130 MM HG: CPT | Mod: CPTII,S$GLB,, | Performed by: FAMILY MEDICINE

## 2019-01-15 PROCEDURE — 36415 COLL VENOUS BLD VENIPUNCTURE: CPT | Mod: PO

## 2019-01-15 PROCEDURE — 99999 PR PBB SHADOW E&M-EST. PATIENT-LVL III: CPT | Mod: PBBFAC,,, | Performed by: FAMILY MEDICINE

## 2019-01-15 PROCEDURE — 3045F PR MOST RECENT HEMOGLOBIN A1C LEVEL 7.0-9.0%: ICD-10-PCS | Mod: CPTII,S$GLB,, | Performed by: FAMILY MEDICINE

## 2019-01-15 RX ORDER — HYDROCODONE BITARTRATE AND ACETAMINOPHEN 10; 325 MG/1; MG/1
1 TABLET ORAL 3 TIMES DAILY PRN
Qty: 90 TABLET | Refills: 0 | Status: SHIPPED | OUTPATIENT
Start: 2019-01-15 | End: 2019-02-20 | Stop reason: SDUPTHER

## 2019-01-15 RX ORDER — HYDROCODONE BITARTRATE AND ACETAMINOPHEN 10; 325 MG/1; MG/1
1 TABLET ORAL 3 TIMES DAILY PRN
Qty: 90 TABLET | Refills: 0 | Status: SHIPPED | OUTPATIENT
Start: 2019-02-15 | End: 2019-01-15 | Stop reason: SDUPTHER

## 2019-01-15 RX ORDER — INSULIN PUMP SYRINGE, 3 ML
EACH MISCELLANEOUS
Qty: 1 EACH | Refills: 0 | Status: SHIPPED | OUTPATIENT
Start: 2019-01-15 | End: 2022-03-07

## 2019-01-15 RX ORDER — HYDROCODONE BITARTRATE AND ACETAMINOPHEN 10; 325 MG/1; MG/1
1 TABLET ORAL 3 TIMES DAILY PRN
Qty: 90 TABLET | Refills: 0 | Status: SHIPPED | OUTPATIENT
Start: 2019-03-15 | End: 2019-02-20 | Stop reason: SDUPTHER
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
## 2019-01-24 ENCOUNTER — TELEPHONE (OUTPATIENT)
Dept: FAMILY MEDICINE | Facility: CLINIC | Age: 64
End: 2019-01-24

## 2019-01-24 ENCOUNTER — LAB VISIT (OUTPATIENT)
Dept: LAB | Facility: HOSPITAL | Age: 64
End: 2019-01-24
Attending: FAMILY MEDICINE
Payer: MEDICARE

## 2019-01-24 ENCOUNTER — OFFICE VISIT (OUTPATIENT)
Dept: FAMILY MEDICINE | Facility: CLINIC | Age: 64
End: 2019-01-24
Payer: MEDICARE

## 2019-01-24 VITALS
WEIGHT: 216.25 LBS | HEIGHT: 71 IN | RESPIRATION RATE: 20 BRPM | HEART RATE: 67 BPM | OXYGEN SATURATION: 97 % | BODY MASS INDEX: 30.27 KG/M2 | TEMPERATURE: 98 F | DIASTOLIC BLOOD PRESSURE: 60 MMHG | SYSTOLIC BLOOD PRESSURE: 122 MMHG

## 2019-01-24 DIAGNOSIS — I10 ESSENTIAL HYPERTENSION: ICD-10-CM

## 2019-01-24 DIAGNOSIS — Z12.5 PROSTATE CANCER SCREENING: ICD-10-CM

## 2019-01-24 DIAGNOSIS — N52.9 ERECTILE DYSFUNCTION: ICD-10-CM

## 2019-01-24 DIAGNOSIS — M25.561 RECURRENT PAIN OF RIGHT KNEE: Primary | ICD-10-CM

## 2019-01-24 DIAGNOSIS — H60.90 OTITIS EXTERNA, UNSPECIFIED CHRONICITY, UNSPECIFIED LATERALITY, UNSPECIFIED TYPE: Primary | ICD-10-CM

## 2019-01-24 LAB
ALBUMIN SERPL BCP-MCNC: 4.2 G/DL
ALP SERPL-CCNC: 73 U/L
ALT SERPL W/O P-5'-P-CCNC: 51 U/L
ANION GAP SERPL CALC-SCNC: 7 MMOL/L
AST SERPL-CCNC: 25 U/L
BASOPHILS # BLD AUTO: 0.05 K/UL
BASOPHILS NFR BLD: 0.7 %
BILIRUB SERPL-MCNC: 0.8 MG/DL
BUN SERPL-MCNC: 18 MG/DL
CALCIUM SERPL-MCNC: 10.1 MG/DL
CHLORIDE SERPL-SCNC: 102 MMOL/L
CHOLEST SERPL-MCNC: 180 MG/DL
CHOLEST/HDLC SERPL: 4.6 {RATIO}
CO2 SERPL-SCNC: 27 MMOL/L
COMPLEXED PSA SERPL-MCNC: 0.73 NG/ML
CREAT SERPL-MCNC: 0.9 MG/DL
DIFFERENTIAL METHOD: ABNORMAL
EOSINOPHIL # BLD AUTO: 0.1 K/UL
EOSINOPHIL NFR BLD: 1.3 %
ERYTHROCYTE [DISTWIDTH] IN BLOOD BY AUTOMATED COUNT: 12.3 %
EST. GFR  (AFRICAN AMERICAN): >60 ML/MIN/1.73 M^2
EST. GFR  (NON AFRICAN AMERICAN): >60 ML/MIN/1.73 M^2
GLUCOSE SERPL-MCNC: 145 MG/DL
HCT VFR BLD AUTO: 48.6 %
HDLC SERPL-MCNC: 39 MG/DL
HDLC SERPL: 21.7 %
HGB BLD-MCNC: 17 G/DL
IMM GRANULOCYTES # BLD AUTO: 0.02 K/UL
IMM GRANULOCYTES NFR BLD AUTO: 0.3 %
LDLC SERPL CALC-MCNC: 100.4 MG/DL
LYMPHOCYTES # BLD AUTO: 1.9 K/UL
LYMPHOCYTES NFR BLD: 25.3 %
MCH RBC QN AUTO: 31.4 PG
MCHC RBC AUTO-ENTMCNC: 35 G/DL
MCV RBC AUTO: 90 FL
MONOCYTES # BLD AUTO: 0.8 K/UL
MONOCYTES NFR BLD: 10.4 %
NEUTROPHILS # BLD AUTO: 4.8 K/UL
NEUTROPHILS NFR BLD: 62 %
NONHDLC SERPL-MCNC: 141 MG/DL
NRBC BLD-RTO: 0 /100 WBC
PLATELET # BLD AUTO: 204 K/UL
PMV BLD AUTO: 8.8 FL
POTASSIUM SERPL-SCNC: 4.4 MMOL/L
PROT SERPL-MCNC: 7.4 G/DL
RBC # BLD AUTO: 5.42 M/UL
SODIUM SERPL-SCNC: 136 MMOL/L
TRIGL SERPL-MCNC: 203 MG/DL
TSH SERPL DL<=0.005 MIU/L-ACNC: 1.52 UIU/ML
WBC # BLD AUTO: 7.67 K/UL

## 2019-01-24 PROCEDURE — 36415 COLL VENOUS BLD VENIPUNCTURE: CPT | Mod: PO

## 2019-01-24 PROCEDURE — 3008F BODY MASS INDEX DOCD: CPT | Mod: CPTII,S$GLB,, | Performed by: FAMILY MEDICINE

## 2019-01-24 PROCEDURE — 3074F SYST BP LT 130 MM HG: CPT | Mod: CPTII,S$GLB,, | Performed by: FAMILY MEDICINE

## 2019-01-24 PROCEDURE — 99214 OFFICE O/P EST MOD 30 MIN: CPT | Mod: S$GLB,,, | Performed by: FAMILY MEDICINE

## 2019-01-24 PROCEDURE — 80053 COMPREHEN METABOLIC PANEL: CPT

## 2019-01-24 PROCEDURE — 3074F PR MOST RECENT SYSTOLIC BLOOD PRESSURE < 130 MM HG: ICD-10-PCS | Mod: CPTII,S$GLB,, | Performed by: FAMILY MEDICINE

## 2019-01-24 PROCEDURE — 99214 PR OFFICE/OUTPT VISIT, EST, LEVL IV, 30-39 MIN: ICD-10-PCS | Mod: S$GLB,,, | Performed by: FAMILY MEDICINE

## 2019-01-24 PROCEDURE — 84443 ASSAY THYROID STIM HORMONE: CPT

## 2019-01-24 PROCEDURE — 3008F PR BODY MASS INDEX (BMI) DOCUMENTED: ICD-10-PCS | Mod: CPTII,S$GLB,, | Performed by: FAMILY MEDICINE

## 2019-01-24 PROCEDURE — 85025 COMPLETE CBC W/AUTO DIFF WBC: CPT

## 2019-01-24 PROCEDURE — 99999 PR PBB SHADOW E&M-EST. PATIENT-LVL III: CPT | Mod: PBBFAC,,, | Performed by: FAMILY MEDICINE

## 2019-01-24 PROCEDURE — 99999 PR PBB SHADOW E&M-EST. PATIENT-LVL III: ICD-10-PCS | Mod: PBBFAC,,, | Performed by: FAMILY MEDICINE

## 2019-01-24 PROCEDURE — 84153 ASSAY OF PSA TOTAL: CPT

## 2019-01-24 PROCEDURE — 80061 LIPID PANEL: CPT

## 2019-01-24 PROCEDURE — 3078F DIAST BP <80 MM HG: CPT | Mod: CPTII,S$GLB,, | Performed by: FAMILY MEDICINE

## 2019-01-24 PROCEDURE — 3078F PR MOST RECENT DIASTOLIC BLOOD PRESSURE < 80 MM HG: ICD-10-PCS | Mod: CPTII,S$GLB,, | Performed by: FAMILY MEDICINE

## 2019-01-24 RX ORDER — MELOXICAM 7.5 MG/1
7.5 TABLET ORAL DAILY
Qty: 30 TABLET | Refills: 2 | Status: SHIPPED | OUTPATIENT
Start: 2019-01-24 | End: 2019-02-20

## 2019-01-24 RX ORDER — NEOMYCIN SULFATE, POLYMYXIN B SULFATE AND HYDROCORTISONE 10; 3.5; 1 MG/ML; MG/ML; [USP'U]/ML
3 SUSPENSION/ DROPS AURICULAR (OTIC) 3 TIMES DAILY
Qty: 10 ML | Refills: 1 | Status: SHIPPED | OUTPATIENT
Start: 2019-01-24 | End: 2020-11-24

## 2019-01-24 NOTE — TELEPHONE ENCOUNTER
The ED medication is to [facility]. The anti-inflammatory has been sent to [facility].
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
## 2019-01-24 NOTE — TELEPHONE ENCOUNTER
----- Message from Sammie Kinney sent at 1/24/2019  3:34 PM CST -----  Contact: Self/ [phone]  Pt calling to notify office pharmacy did not get 2nd RX that was supposed to be called in from today's visit. Please call to advise. Thank you.  .  [facility] - [place] - [id number]  [id number]  [place] [id number]  Phone: [phone] Fax: [phone]

## 2019-01-24 NOTE — TELEPHONE ENCOUNTER
Patient stated he was supposed to get something for inflammation as well as the Cialis sent to [facility]. Please advise, thank you

## 2019-01-25 ENCOUNTER — TELEPHONE (OUTPATIENT)
Dept: FAMILY MEDICINE | Facility: CLINIC | Age: 64
End: 2019-01-25

## 2019-01-25 DIAGNOSIS — N52.9 ERECTILE DYSFUNCTION: ICD-10-CM

## 2019-01-25 RX ORDER — SILDENAFIL 100 MG/1
100 TABLET, FILM COATED ORAL DAILY PRN
Qty: 6 TABLET | Refills: 11 | Status: CANCELLED | OUTPATIENT
Start: 2019-01-25

## 2019-01-25 RX ORDER — SILDENAFIL 100 MG/1
100 TABLET, FILM COATED ORAL DAILY PRN
Qty: 6 TABLET | Refills: 11 | Status: SHIPPED | OUTPATIENT
Start: 2019-01-25 | End: 2019-01-28

## 2019-01-25 NOTE — TELEPHONE ENCOUNTER
----- Message from Sammie Kinney sent at 1/25/2019  3:28 PM CST -----  Contact: [phone]/ Self  Second Request  Pt calling to follow up on medication request for [sildenafil (VIAGRA) 100 MG tablet]. Please call today with status. Thank you.  .  [facility] - [place] - [id number]  [id number]  [place] [id number]  Phone: [phone] Fax: [phone]

## 2019-01-25 NOTE — TELEPHONE ENCOUNTER
Patient requesting the ED medication be sent to [facility] instead of [facility]

## 2019-01-25 NOTE — TELEPHONE ENCOUNTER
----- Message from Keila Phelps sent at 1/25/2019 11:23 AM CST -----  Contact: Self   Pt states that a script should've been called into the pharmacy.     sildenafil (VIAGRA) 100 MG tablet      [facility] - [place] - [id number]  [id number]  [place] [id number]  Phone: [phone] Fax: [phone]

## 2019-01-28 ENCOUNTER — TELEPHONE (OUTPATIENT)
Dept: FAMILY MEDICINE | Facility: CLINIC | Age: 64
End: 2019-01-28

## 2019-01-28 DIAGNOSIS — N52.9 ERECTILE DYSFUNCTION: ICD-10-CM

## 2019-01-28 RX ORDER — SILDENAFIL 100 MG/1
TABLET, FILM COATED ORAL
Qty: 6 TABLET | Refills: 11 | Status: SHIPPED | OUTPATIENT
Start: 2019-01-28 | End: 2019-02-20

## 2019-01-28 RX ORDER — SILDENAFIL CITRATE 20 MG/1
20 TABLET ORAL DAILY PRN
Qty: 50 TABLET | Refills: 11 | Status: SHIPPED | OUTPATIENT
Start: 2019-01-28 | End: 2019-02-20

## 2019-01-28 NOTE — TELEPHONE ENCOUNTER
Pt states cost at [facility] for sildenafil 20mg tablets is $32; the 100mg tablets are more expensive

## 2019-01-28 NOTE — TELEPHONE ENCOUNTER
----- Message from Kathryn Grant sent at 1/28/2019  8:22 AM CST -----  Contact: Self   Patient says meds were supposed to be 20 mg tablets and 50 in quantity. Please call at [phone].    sildenafil (VIAGRA) 100 MG tablet      [facility] - [place], [place] - [id number]

## 2019-02-20 ENCOUNTER — OFFICE VISIT (OUTPATIENT)
Dept: FAMILY MEDICINE | Facility: CLINIC | Age: 64
End: 2019-02-20
Payer: MEDICARE

## 2019-02-20 VITALS
OXYGEN SATURATION: 96 % | WEIGHT: 222.44 LBS | SYSTOLIC BLOOD PRESSURE: 128 MMHG | HEART RATE: 70 BPM | TEMPERATURE: 98 F | RESPIRATION RATE: 18 BRPM | BODY MASS INDEX: 31.14 KG/M2 | HEIGHT: 71 IN | DIASTOLIC BLOOD PRESSURE: 64 MMHG

## 2019-02-20 DIAGNOSIS — N52.9 ERECTILE DYSFUNCTION, UNSPECIFIED ERECTILE DYSFUNCTION TYPE: ICD-10-CM

## 2019-02-20 DIAGNOSIS — E11.9 TYPE 2 DIABETES MELLITUS WITHOUT COMPLICATION, WITHOUT LONG-TERM CURRENT USE OF INSULIN: ICD-10-CM

## 2019-02-20 DIAGNOSIS — G89.29 CHRONIC PAIN OF RIGHT KNEE: ICD-10-CM

## 2019-02-20 DIAGNOSIS — J01.90 ACUTE BACTERIAL SINUSITIS: ICD-10-CM

## 2019-02-20 DIAGNOSIS — M25.561 CHRONIC PAIN OF RIGHT KNEE: ICD-10-CM

## 2019-02-20 DIAGNOSIS — M25.561 RECURRENT PAIN OF RIGHT KNEE: ICD-10-CM

## 2019-02-20 DIAGNOSIS — M17.31 POST-TRAUMATIC OSTEOARTHRITIS OF RIGHT KNEE: ICD-10-CM

## 2019-02-20 DIAGNOSIS — M17.11 OSTEOARTHRITIS OF RIGHT KNEE, UNSPECIFIED OSTEOARTHRITIS TYPE: ICD-10-CM

## 2019-02-20 DIAGNOSIS — B96.89 ACUTE BACTERIAL SINUSITIS: ICD-10-CM

## 2019-02-20 DIAGNOSIS — I10 ESSENTIAL HYPERTENSION: Primary | ICD-10-CM

## 2019-02-20 PROCEDURE — 3008F BODY MASS INDEX DOCD: CPT | Mod: HCNC,CPTII,S$GLB, | Performed by: FAMILY MEDICINE

## 2019-02-20 PROCEDURE — 3008F PR BODY MASS INDEX (BMI) DOCUMENTED: ICD-10-PCS | Mod: HCNC,CPTII,S$GLB, | Performed by: FAMILY MEDICINE

## 2019-02-20 PROCEDURE — 99999 PR PBB SHADOW E&M-EST. PATIENT-LVL III: CPT | Mod: PBBFAC,HCNC,, | Performed by: FAMILY MEDICINE

## 2019-02-20 PROCEDURE — 3074F SYST BP LT 130 MM HG: CPT | Mod: HCNC,CPTII,S$GLB, | Performed by: FAMILY MEDICINE

## 2019-02-20 PROCEDURE — 3074F PR MOST RECENT SYSTOLIC BLOOD PRESSURE < 130 MM HG: ICD-10-PCS | Mod: HCNC,CPTII,S$GLB, | Performed by: FAMILY MEDICINE

## 2019-02-20 PROCEDURE — 99214 PR OFFICE/OUTPT VISIT, EST, LEVL IV, 30-39 MIN: ICD-10-PCS | Mod: HCNC,S$GLB,, | Performed by: FAMILY MEDICINE

## 2019-02-20 PROCEDURE — 99999 PR PBB SHADOW E&M-EST. PATIENT-LVL III: ICD-10-PCS | Mod: PBBFAC,HCNC,, | Performed by: FAMILY MEDICINE

## 2019-02-20 PROCEDURE — 3045F PR MOST RECENT HEMOGLOBIN A1C LEVEL 7.0-9.0%: ICD-10-PCS | Mod: HCNC,CPTII,S$GLB, | Performed by: FAMILY MEDICINE

## 2019-02-20 PROCEDURE — 99214 OFFICE O/P EST MOD 30 MIN: CPT | Mod: HCNC,S$GLB,, | Performed by: FAMILY MEDICINE

## 2019-02-20 PROCEDURE — 3078F PR MOST RECENT DIASTOLIC BLOOD PRESSURE < 80 MM HG: ICD-10-PCS | Mod: HCNC,CPTII,S$GLB, | Performed by: FAMILY MEDICINE

## 2019-02-20 PROCEDURE — 3078F DIAST BP <80 MM HG: CPT | Mod: HCNC,CPTII,S$GLB, | Performed by: FAMILY MEDICINE

## 2019-02-20 PROCEDURE — 3045F PR MOST RECENT HEMOGLOBIN A1C LEVEL 7.0-9.0%: CPT | Mod: HCNC,CPTII,S$GLB, | Performed by: FAMILY MEDICINE

## 2019-02-20 RX ORDER — HYDROCODONE BITARTRATE AND ACETAMINOPHEN 10; 325 MG/1; MG/1
1 TABLET ORAL 3 TIMES DAILY PRN
Qty: 90 TABLET | Refills: 0 | Status: SHIPPED | OUTPATIENT
Start: 2019-05-15 | End: 2019-07-10 | Stop reason: SDUPTHER

## 2019-02-20 RX ORDER — HYDROCODONE BITARTRATE AND ACETAMINOPHEN 10; 325 MG/1; MG/1
1 TABLET ORAL 3 TIMES DAILY PRN
Qty: 90 TABLET | Refills: 0 | Status: SHIPPED | OUTPATIENT
Start: 2019-04-15 | End: 2019-05-08 | Stop reason: SDUPTHER

## 2019-02-20 RX ORDER — AZITHROMYCIN 250 MG/1
TABLET, FILM COATED ORAL
Qty: 6 TABLET | Refills: 0 | Status: SHIPPED | OUTPATIENT
Start: 2019-02-20 | End: 2019-05-08

## 2019-02-20 RX ORDER — SILDENAFIL CITRATE 20 MG/1
20 TABLET ORAL DAILY PRN
Qty: 50 TABLET | Refills: 11 | Status: SHIPPED | OUTPATIENT
Start: 2019-02-20 | End: 2021-10-04

## 2019-02-20 RX ORDER — HYDROCODONE BITARTRATE AND ACETAMINOPHEN 10; 325 MG/1; MG/1
1 TABLET ORAL 3 TIMES DAILY PRN
Qty: 90 TABLET | Refills: 0 | Status: SHIPPED | OUTPATIENT
Start: 2019-04-15 | End: 2019-02-20 | Stop reason: SDUPTHER

## 2019-02-20 RX ORDER — GABAPENTIN 300 MG/1
300 CAPSULE ORAL 3 TIMES DAILY
Qty: 90 CAPSULE | Refills: 2 | Status: SHIPPED | OUTPATIENT
Start: 2019-02-20 | End: 2019-05-09 | Stop reason: SDUPTHER

## 2019-02-20 RX ORDER — MELOXICAM 15 MG/1
15 TABLET ORAL DAILY
Qty: 30 TABLET | Refills: 3 | Status: SHIPPED | OUTPATIENT
Start: 2019-02-20 | End: 2019-08-16

## 2019-02-20 RX ORDER — HYDROCODONE BITARTRATE AND ACETAMINOPHEN 10; 325 MG/1; MG/1
1 TABLET ORAL 3 TIMES DAILY PRN
Qty: 90 TABLET | Refills: 0 | Status: SHIPPED | OUTPATIENT
Start: 2019-03-15 | End: 2019-02-20 | Stop reason: SDUPTHER
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
## 2019-05-08 ENCOUNTER — OFFICE VISIT (OUTPATIENT)
Dept: FAMILY MEDICINE | Facility: CLINIC | Age: 64
End: 2019-05-08
Payer: MEDICARE

## 2019-05-08 ENCOUNTER — CLINICAL SUPPORT (OUTPATIENT)
Dept: OPHTHALMOLOGY | Facility: CLINIC | Age: 64
End: 2019-05-08
Attending: FAMILY MEDICINE
Payer: MEDICARE

## 2019-05-08 VITALS
WEIGHT: 222 LBS | HEART RATE: 70 BPM | HEIGHT: 71 IN | OXYGEN SATURATION: 97 % | TEMPERATURE: 98 F | DIASTOLIC BLOOD PRESSURE: 88 MMHG | SYSTOLIC BLOOD PRESSURE: 139 MMHG | BODY MASS INDEX: 31.08 KG/M2

## 2019-05-08 DIAGNOSIS — B35.3 TINEA PEDIS, UNSPECIFIED LATERALITY: ICD-10-CM

## 2019-05-08 DIAGNOSIS — M17.11 OSTEOARTHRITIS OF RIGHT KNEE, UNSPECIFIED OSTEOARTHRITIS TYPE: ICD-10-CM

## 2019-05-08 DIAGNOSIS — M25.561 CHRONIC PAIN OF RIGHT KNEE: Primary | ICD-10-CM

## 2019-05-08 DIAGNOSIS — I10 ESSENTIAL HYPERTENSION: ICD-10-CM

## 2019-05-08 DIAGNOSIS — E11.9 TYPE 2 DIABETES MELLITUS WITHOUT COMPLICATION, WITHOUT LONG-TERM CURRENT USE OF INSULIN: ICD-10-CM

## 2019-05-08 DIAGNOSIS — G89.29 CHRONIC PAIN OF RIGHT KNEE: Primary | ICD-10-CM

## 2019-05-08 PROCEDURE — 92250 FUNDUS PHOTOGRAPHY W/I&R: CPT | Mod: HCNC,S$GLB,, | Performed by: OPHTHALMOLOGY

## 2019-05-08 PROCEDURE — 3045F PR MOST RECENT HEMOGLOBIN A1C LEVEL 7.0-9.0%: CPT | Mod: HCNC,CPTII,S$GLB, | Performed by: FAMILY MEDICINE

## 2019-05-08 PROCEDURE — 3008F PR BODY MASS INDEX (BMI) DOCUMENTED: ICD-10-PCS | Mod: HCNC,CPTII,S$GLB, | Performed by: FAMILY MEDICINE

## 2019-05-08 PROCEDURE — 99999 PR PBB SHADOW E&M-EST. PATIENT-LVL IV: CPT | Mod: PBBFAC,HCNC,, | Performed by: FAMILY MEDICINE

## 2019-05-08 PROCEDURE — 99999 PR PBB SHADOW E&M-EST. PATIENT-LVL IV: ICD-10-PCS | Mod: PBBFAC,HCNC,, | Performed by: FAMILY MEDICINE

## 2019-05-08 PROCEDURE — 3075F PR MOST RECENT SYSTOLIC BLOOD PRESS GE 130-139MM HG: ICD-10-PCS | Mod: HCNC,CPTII,S$GLB, | Performed by: FAMILY MEDICINE

## 2019-05-08 PROCEDURE — 92250 DIABETIC EYE SCREENING PHOTO: ICD-10-PCS | Mod: HCNC,S$GLB,, | Performed by: OPHTHALMOLOGY

## 2019-05-08 PROCEDURE — 3045F PR MOST RECENT HEMOGLOBIN A1C LEVEL 7.0-9.0%: ICD-10-PCS | Mod: HCNC,CPTII,S$GLB, | Performed by: FAMILY MEDICINE

## 2019-05-08 PROCEDURE — 3075F SYST BP GE 130 - 139MM HG: CPT | Mod: HCNC,CPTII,S$GLB, | Performed by: FAMILY MEDICINE

## 2019-05-08 PROCEDURE — 3079F DIAST BP 80-89 MM HG: CPT | Mod: HCNC,CPTII,S$GLB, | Performed by: FAMILY MEDICINE

## 2019-05-08 PROCEDURE — 99214 OFFICE O/P EST MOD 30 MIN: CPT | Mod: HCNC,S$GLB,, | Performed by: FAMILY MEDICINE

## 2019-05-08 PROCEDURE — 99214 PR OFFICE/OUTPT VISIT, EST, LEVL IV, 30-39 MIN: ICD-10-PCS | Mod: HCNC,S$GLB,, | Performed by: FAMILY MEDICINE

## 2019-05-08 PROCEDURE — 3008F BODY MASS INDEX DOCD: CPT | Mod: HCNC,CPTII,S$GLB, | Performed by: FAMILY MEDICINE

## 2019-05-08 PROCEDURE — 3079F PR MOST RECENT DIASTOLIC BLOOD PRESSURE 80-89 MM HG: ICD-10-PCS | Mod: HCNC,CPTII,S$GLB, | Performed by: FAMILY MEDICINE

## 2019-05-08 RX ORDER — HYDROCODONE BITARTRATE AND ACETAMINOPHEN 10; 325 MG/1; MG/1
1 TABLET ORAL 3 TIMES DAILY PRN
Qty: 90 TABLET | Refills: 0 | Status: SHIPPED | OUTPATIENT
Start: 2019-06-15 | End: 2019-07-10 | Stop reason: SDUPTHER

## 2019-05-08 RX ORDER — CLOTRIMAZOLE AND BETAMETHASONE DIPROPIONATE 10; .64 MG/G; MG/G
CREAM TOPICAL 2 TIMES DAILY
Qty: 45 G | Refills: 0 | Status: SHIPPED | OUTPATIENT
Start: 2019-05-08 | End: 2019-05-21 | Stop reason: SDUPTHER

## 2019-05-08 RX ORDER — HYDROCODONE BITARTRATE AND ACETAMINOPHEN 10; 325 MG/1; MG/1
1 TABLET ORAL 3 TIMES DAILY PRN
Qty: 90 TABLET | Refills: 0 | Status: SHIPPED | OUTPATIENT
Start: 2019-07-15 | End: 2019-09-12 | Stop reason: SDUPTHER
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
## 2019-05-08 NOTE — PROGRESS NOTES
HPI     65 Y/o here for screening for diabetic retinopathy with non-dilated   fundus photos per Dr. Lara     Last edited by Rene Cespedes MA on 5/8/2019  1:46 PM. (History)            Assessment /Plan     For exam results, see Encounter Report.    Type 2 diabetes mellitus without complication, without long-term current use of insulin  -     Diabetic Eye Screening Photo      Please see Dr. Malave progress note for interpretation

## 2019-05-09 DIAGNOSIS — M25.561 CHRONIC PAIN OF RIGHT KNEE: ICD-10-CM

## 2019-05-09 DIAGNOSIS — G89.29 CHRONIC PAIN OF RIGHT KNEE: ICD-10-CM

## 2019-05-09 RX ORDER — GABAPENTIN 300 MG/1
300 CAPSULE ORAL 3 TIMES DAILY
Qty: 90 CAPSULE | Refills: 2 | Status: SHIPPED | OUTPATIENT
Start: 2019-05-09 | End: 2019-07-10 | Stop reason: SDUPTHER

## 2019-05-20 ENCOUNTER — TELEPHONE (OUTPATIENT)
Dept: OPHTHALMOLOGY | Facility: CLINIC | Age: 64
End: 2019-05-20

## 2019-05-20 NOTE — TELEPHONE ENCOUNTER
Called patient regarding diabetic eye screening results left voicemail. Requires eye exam due to inadequate image quality for one or both eyes.   Follw up in 1 month.     ----- Message from Lulú Franco sent at 5/16/2019  3:33 PM CDT -----      ----- Message -----  From: Dc Malave MD  Sent: 5/16/2019   3:28 PM  To: Livia Carlisle

## 2019-05-21 DIAGNOSIS — B35.3 TINEA PEDIS, UNSPECIFIED LATERALITY: ICD-10-CM

## 2019-05-21 RX ORDER — CLOTRIMAZOLE AND BETAMETHASONE DIPROPIONATE 10; .64 MG/G; MG/G
CREAM TOPICAL
Qty: 45 G | Refills: 0 | Status: SHIPPED | OUTPATIENT
Start: 2019-05-21 | End: 2020-01-27

## 2019-05-22 ENCOUNTER — TELEPHONE (OUTPATIENT)
Dept: OPHTHALMOLOGY | Facility: CLINIC | Age: 64
End: 2019-05-22

## 2019-05-22 NOTE — TELEPHONE ENCOUNTER
Called patient regarding diabetic eye screening results left voicemail. Requires eye exam due to inadequate image quality for one or both eyes.   Follw up in 1 month.

## 2019-06-04 ENCOUNTER — TELEPHONE (OUTPATIENT)
Dept: OPHTHALMOLOGY | Facility: CLINIC | Age: 64
End: 2019-06-04

## 2019-06-04 NOTE — TELEPHONE ENCOUNTER
Called patient regarding diabetic eye screening results ; gave patient his results and schedule him an appointment    ----- Message from Lulú Franco sent at 5/16/2019  3:33 PM CDT -----      ----- Message -----  From: Dc Malave MD  Sent: 5/16/2019   3:28 PM  To: Livia Carlisle

## 2019-06-06 ENCOUNTER — TELEPHONE (OUTPATIENT)
Dept: FAMILY MEDICINE | Facility: CLINIC | Age: 64
End: 2019-06-06

## 2019-06-06 DIAGNOSIS — B35.3 TINEA PEDIS, UNSPECIFIED LATERALITY: ICD-10-CM

## 2019-06-06 DIAGNOSIS — Z23 NEED FOR TD VACCINE: Primary | ICD-10-CM

## 2019-06-06 NOTE — TELEPHONE ENCOUNTER
Pt says his foot is not all the way healed up he is putting the cream on it as directed.     Pt also want to know the status for the eye test that was suppose to be ordered.    Pt also says he received the T-DAP in 2015 by another provider (Dr. Jayna Torrez) and he forgot and wanted to make sure if he needed on but received it.    Please advise.    ThankYoselin

## 2019-06-06 NOTE — TELEPHONE ENCOUNTER
----- Message from Elsie Glover sent at 6/6/2019 12:50 PM CDT -----  Contact: MICHELE MONTOYA JR. [[id number]]  Name of Who is Calling: MICHELE MONTOYA JR. [[id number]]      What is the request in detail: Patient would like orders for tetanus shot. Please call     Can the clinic reply by [facility]: no    [facility]: [phone]

## 2019-06-07 NOTE — TELEPHONE ENCOUNTER
We will do the eye cam on next office visit so he doesn't have to do a copay.     I can refer to podiatry for evaluation, referral placed.

## 2019-06-11 ENCOUNTER — LAB VISIT (OUTPATIENT)
Dept: LAB | Facility: HOSPITAL | Age: 64
End: 2019-06-11
Attending: FAMILY MEDICINE
Payer: MEDICARE

## 2019-06-11 ENCOUNTER — OFFICE VISIT (OUTPATIENT)
Dept: FAMILY MEDICINE | Facility: CLINIC | Age: 64
End: 2019-06-11
Payer: MEDICARE

## 2019-06-11 VITALS
HEIGHT: 71 IN | DIASTOLIC BLOOD PRESSURE: 82 MMHG | SYSTOLIC BLOOD PRESSURE: 138 MMHG | OXYGEN SATURATION: 95 % | HEART RATE: 83 BPM | BODY MASS INDEX: 30.9 KG/M2 | WEIGHT: 220.69 LBS | TEMPERATURE: 98 F

## 2019-06-11 DIAGNOSIS — E11.9 TYPE 2 DIABETES MELLITUS WITHOUT COMPLICATION, WITHOUT LONG-TERM CURRENT USE OF INSULIN: ICD-10-CM

## 2019-06-11 DIAGNOSIS — I10 ESSENTIAL HYPERTENSION: Primary | ICD-10-CM

## 2019-06-11 PROBLEM — M25.561 RECURRENT PAIN OF RIGHT KNEE: Status: RESOLVED | Noted: 2017-10-03 | Resolved: 2019-06-11

## 2019-06-11 LAB
ALBUMIN SERPL BCP-MCNC: 3.9 G/DL (ref 3.5–5.2)
ALP SERPL-CCNC: 71 U/L (ref 55–135)
ALT SERPL W/O P-5'-P-CCNC: 48 U/L (ref 10–44)
ANION GAP SERPL CALC-SCNC: 10 MMOL/L (ref 8–16)
AST SERPL-CCNC: 31 U/L (ref 10–40)
BILIRUB SERPL-MCNC: 0.6 MG/DL (ref 0.1–1)
BUN SERPL-MCNC: 20 MG/DL (ref 8–23)
CALCIUM SERPL-MCNC: 9.7 MG/DL (ref 8.7–10.5)
CHLORIDE SERPL-SCNC: 101 MMOL/L (ref 95–110)
CO2 SERPL-SCNC: 25 MMOL/L (ref 23–29)
CREAT SERPL-MCNC: 1.1 MG/DL (ref 0.5–1.4)
EST. GFR  (AFRICAN AMERICAN): >60 ML/MIN/1.73 M^2
EST. GFR  (NON AFRICAN AMERICAN): >60 ML/MIN/1.73 M^2
ESTIMATED AVG GLUCOSE: 166 MG/DL (ref 68–131)
GLUCOSE SERPL-MCNC: 226 MG/DL (ref 70–110)
HBA1C MFR BLD HPLC: 7.4 % (ref 4–5.6)
POTASSIUM SERPL-SCNC: 4.3 MMOL/L (ref 3.5–5.1)
PROT SERPL-MCNC: 6.7 G/DL (ref 6–8.4)
SODIUM SERPL-SCNC: 136 MMOL/L (ref 136–145)

## 2019-06-11 PROCEDURE — 3079F PR MOST RECENT DIASTOLIC BLOOD PRESSURE 80-89 MM HG: ICD-10-PCS | Mod: HCNC,CPTII,S$GLB, | Performed by: FAMILY MEDICINE

## 2019-06-11 PROCEDURE — 3045F PR MOST RECENT HEMOGLOBIN A1C LEVEL 7.0-9.0%: ICD-10-PCS | Mod: HCNC,CPTII,S$GLB, | Performed by: FAMILY MEDICINE

## 2019-06-11 PROCEDURE — 2024F 7 FLD RTA PHOTO EVC RTNOPTHY: CPT | Mod: HCNC,S$GLB,, | Performed by: FAMILY MEDICINE

## 2019-06-11 PROCEDURE — 99999 PR PBB SHADOW E&M-EST. PATIENT-LVL IV: ICD-10-PCS | Mod: PBBFAC,HCNC,, | Performed by: FAMILY MEDICINE

## 2019-06-11 PROCEDURE — 99214 OFFICE O/P EST MOD 30 MIN: CPT | Mod: HCNC,S$GLB,, | Performed by: FAMILY MEDICINE

## 2019-06-11 PROCEDURE — 3045F PR MOST RECENT HEMOGLOBIN A1C LEVEL 7.0-9.0%: CPT | Mod: HCNC,CPTII,S$GLB, | Performed by: FAMILY MEDICINE

## 2019-06-11 PROCEDURE — 83036 HEMOGLOBIN GLYCOSYLATED A1C: CPT | Mod: HCNC

## 2019-06-11 PROCEDURE — 80053 COMPREHEN METABOLIC PANEL: CPT | Mod: HCNC

## 2019-06-11 PROCEDURE — 3075F PR MOST RECENT SYSTOLIC BLOOD PRESS GE 130-139MM HG: ICD-10-PCS | Mod: HCNC,CPTII,S$GLB, | Performed by: FAMILY MEDICINE

## 2019-06-11 PROCEDURE — 3008F BODY MASS INDEX DOCD: CPT | Mod: HCNC,CPTII,S$GLB, | Performed by: FAMILY MEDICINE

## 2019-06-11 PROCEDURE — 3079F DIAST BP 80-89 MM HG: CPT | Mod: HCNC,CPTII,S$GLB, | Performed by: FAMILY MEDICINE

## 2019-06-11 PROCEDURE — 2024F PR 7 FIELD PHOTOS WITH INTERP/ REVIEW: ICD-10-PCS | Mod: HCNC,S$GLB,, | Performed by: FAMILY MEDICINE

## 2019-06-11 PROCEDURE — 36415 COLL VENOUS BLD VENIPUNCTURE: CPT | Mod: HCNC,PO

## 2019-06-11 PROCEDURE — 3075F SYST BP GE 130 - 139MM HG: CPT | Mod: HCNC,CPTII,S$GLB, | Performed by: FAMILY MEDICINE

## 2019-06-11 PROCEDURE — 99999 PR PBB SHADOW E&M-EST. PATIENT-LVL IV: CPT | Mod: PBBFAC,HCNC,, | Performed by: FAMILY MEDICINE

## 2019-06-11 PROCEDURE — 3008F PR BODY MASS INDEX (BMI) DOCUMENTED: ICD-10-PCS | Mod: HCNC,CPTII,S$GLB, | Performed by: FAMILY MEDICINE

## 2019-06-11 PROCEDURE — 99214 PR OFFICE/OUTPT VISIT, EST, LEVL IV, 30-39 MIN: ICD-10-PCS | Mod: HCNC,S$GLB,, | Performed by: FAMILY MEDICINE

## 2019-06-11 RX ORDER — METFORMIN HYDROCHLORIDE 500 MG/1
500 TABLET ORAL
Qty: 90 TABLET | Refills: 3
Start: 2019-06-11 | End: 2019-11-05 | Stop reason: SDUPTHER
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
## 2019-07-02 ENCOUNTER — HOSPITAL ENCOUNTER (OUTPATIENT)
Dept: RADIOLOGY | Facility: HOSPITAL | Age: 64
Discharge: HOME | End: 2019-07-02
Attending: PODIATRIST
Payer: MEDICARE

## 2019-07-02 ENCOUNTER — OFFICE VISIT (OUTPATIENT)
Dept: PODIATRY | Facility: CLINIC | Age: 64
End: 2019-07-02
Payer: MEDICARE

## 2019-07-02 VITALS
WEIGHT: 220 LBS | SYSTOLIC BLOOD PRESSURE: 138 MMHG | HEIGHT: 71 IN | DIASTOLIC BLOOD PRESSURE: 84 MMHG | BODY MASS INDEX: 30.8 KG/M2

## 2019-07-02 DIAGNOSIS — M79.672 PAIN IN BOTH FEET: ICD-10-CM

## 2019-07-02 DIAGNOSIS — E11.9 TYPE 2 DIABETES MELLITUS WITHOUT COMPLICATION, WITHOUT LONG-TERM CURRENT USE OF INSULIN: ICD-10-CM

## 2019-07-02 DIAGNOSIS — M79.672 PAIN IN BOTH FEET: Primary | ICD-10-CM

## 2019-07-02 DIAGNOSIS — M76.70 PERONEAL TENDINITIS, UNSPECIFIED LATERALITY: ICD-10-CM

## 2019-07-02 DIAGNOSIS — M79.671 PAIN IN BOTH FEET: Primary | ICD-10-CM

## 2019-07-02 DIAGNOSIS — Z98.890 HISTORY OF FOOT SURGERY: ICD-10-CM

## 2019-07-02 DIAGNOSIS — M79.671 PAIN IN BOTH FEET: ICD-10-CM

## 2019-07-02 PROCEDURE — 99999 PR PBB SHADOW E&M-EST. PATIENT-LVL III: CPT | Mod: PBBFAC,HCNC,, | Performed by: PODIATRIST

## 2019-07-02 PROCEDURE — 73630 XR FOOT COMPLETE 3 VIEW BILATERAL: ICD-10-PCS | Mod: 26,50,HCNC, | Performed by: RADIOLOGY

## 2019-07-02 PROCEDURE — 99214 OFFICE O/P EST MOD 30 MIN: CPT | Mod: HCNC,S$GLB,, | Performed by: PODIATRIST

## 2019-07-02 PROCEDURE — 3079F PR MOST RECENT DIASTOLIC BLOOD PRESSURE 80-89 MM HG: ICD-10-PCS | Mod: HCNC,CPTII,S$GLB, | Performed by: PODIATRIST

## 2019-07-02 PROCEDURE — 73630 X-RAY EXAM OF FOOT: CPT | Mod: 50,TC,HCNC,FY,PO

## 2019-07-02 PROCEDURE — 3008F PR BODY MASS INDEX (BMI) DOCUMENTED: ICD-10-PCS | Mod: HCNC,CPTII,S$GLB, | Performed by: PODIATRIST

## 2019-07-02 PROCEDURE — 3045F PR MOST RECENT HEMOGLOBIN A1C LEVEL 7.0-9.0%: CPT | Mod: HCNC,CPTII,S$GLB, | Performed by: PODIATRIST

## 2019-07-02 PROCEDURE — 2024F 7 FLD RTA PHOTO EVC RTNOPTHY: CPT | Mod: HCNC,S$GLB,, | Performed by: PODIATRIST

## 2019-07-02 PROCEDURE — 3079F DIAST BP 80-89 MM HG: CPT | Mod: HCNC,CPTII,S$GLB, | Performed by: PODIATRIST

## 2019-07-02 PROCEDURE — 3045F PR MOST RECENT HEMOGLOBIN A1C LEVEL 7.0-9.0%: ICD-10-PCS | Mod: HCNC,CPTII,S$GLB, | Performed by: PODIATRIST

## 2019-07-02 PROCEDURE — 2024F PR 7 FIELD PHOTOS WITH INTERP/ REVIEW: ICD-10-PCS | Mod: HCNC,S$GLB,, | Performed by: PODIATRIST

## 2019-07-02 PROCEDURE — 3008F BODY MASS INDEX DOCD: CPT | Mod: HCNC,CPTII,S$GLB, | Performed by: PODIATRIST

## 2019-07-02 PROCEDURE — 3075F SYST BP GE 130 - 139MM HG: CPT | Mod: HCNC,CPTII,S$GLB, | Performed by: PODIATRIST

## 2019-07-02 PROCEDURE — 73630 X-RAY EXAM OF FOOT: CPT | Mod: 26,50,HCNC, | Performed by: RADIOLOGY

## 2019-07-02 PROCEDURE — 99999 PR PBB SHADOW E&M-EST. PATIENT-LVL III: ICD-10-PCS | Mod: PBBFAC,HCNC,, | Performed by: PODIATRIST

## 2019-07-02 PROCEDURE — 99214 PR OFFICE/OUTPT VISIT, EST, LEVL IV, 30-39 MIN: ICD-10-PCS | Mod: HCNC,S$GLB,, | Performed by: PODIATRIST

## 2019-07-02 PROCEDURE — 3075F PR MOST RECENT SYSTOLIC BLOOD PRESS GE 130-139MM HG: ICD-10-PCS | Mod: HCNC,CPTII,S$GLB, | Performed by: PODIATRIST

## 2019-07-02 NOTE — LETTER
July 8, 2019      Gallo Lara MD  [address]  [place] [address]           [facility]  [address] [place]  [place] [address]  Phone: [phone]          Patient: King Cool Jr.   MR Number: [id number]   YOB: 1955   Date of Visit: 7/2/2019       Dear Dr. Gallo Lara:    Thank you for referring King Cool to me for evaluation. Attached you will find relevant portions of my assessment and plan of care.    If you have questions, please do not hesitate to call me. I look forward to following King Cool along with you.    Sincerely,    Yoanna Henry DPM    Enclosure  CC:  No Recipients    If you would like to receive this communication electronically, please contact [email or web address] or [phone] to request more information on [employer] access.    For providers and/or their staff who would like to refer a patient to [facility], please contact us through our one-stop-shop provider referral line, [facility], at [phone].    If you feel you have received this communication in error or would no longer like to receive these types of communications, please e-mail [email or web address]

## 2019-07-05 ENCOUNTER — TELEPHONE (OUTPATIENT)
Dept: PODIATRY | Facility: CLINIC | Age: 64
End: 2019-07-05

## 2019-07-05 NOTE — TELEPHONE ENCOUNTER
----- Message from Yoanna Henry DPM sent at 7/5/2019 12:12 PM CDT -----  Patient requesting to go over xray results in person ( states he would like to view the xrays). Pt. Requesting appt for either 7/9 or 7/10. I explained to the patient that he would have to set up an appt with my partner Dr. Mcpherson as I am out. I also let him know I am not sure of any any appt availability for those dates with Dr. Mcpherson. Could you contact the patient and set up an appt. Thanks.

## 2019-07-05 NOTE — TELEPHONE ENCOUNTER
Contacted patient to go over xray results B/L feet. Patient requesting to go over xray results in person ( states he would like to view the xrays). Pt. Requesting appt for either 7/9 or 7/10. I explained to the patient that he would have to set up an appt with my partner Dr. Mcpherson as I am out. I also let him know I am not sure of any any appt availability for those dates with Dr. Mcpherson. Message sent to nursing staff to set up appt.

## 2019-07-09 ENCOUNTER — OFFICE VISIT (OUTPATIENT)
Dept: OPTOMETRY | Facility: CLINIC | Age: 64
End: 2019-07-09
Payer: MEDICARE

## 2019-07-09 DIAGNOSIS — E11.9 DIABETES MELLITUS TYPE 2 WITHOUT RETINOPATHY: Primary | ICD-10-CM

## 2019-07-09 DIAGNOSIS — H25.13 NUCLEAR SCLEROSIS, BILATERAL: ICD-10-CM

## 2019-07-09 PROCEDURE — 92014 PR EYE EXAM, EST PATIENT,COMPREHESV: ICD-10-PCS | Mod: HCNC,S$GLB,, | Performed by: OPTOMETRIST

## 2019-07-09 PROCEDURE — 99999 PR PBB SHADOW E&M-EST. PATIENT-LVL III: ICD-10-PCS | Mod: PBBFAC,HCNC,, | Performed by: OPTOMETRIST

## 2019-07-09 PROCEDURE — 99999 PR PBB SHADOW E&M-EST. PATIENT-LVL III: CPT | Mod: PBBFAC,HCNC,, | Performed by: OPTOMETRIST

## 2019-07-09 PROCEDURE — 92014 COMPRE OPH EXAM EST PT 1/>: CPT | Mod: HCNC,S$GLB,, | Performed by: OPTOMETRIST

## 2019-07-09 NOTE — PROGRESS NOTES
Subjective:      Patient ID: King Cool Jr. is a 64 y.o. male.    Chief Complaint:  (right foot pain)    King is a 64 y.o. male who presents to the podiatry clinic  with complaint of  right foot pain. Onset of the symptoms was several weeks ago. Precipitating event: none known. Current symptoms include: ability to bear weight, but with some pain. Aggravating factors: any weight bearing. Symptoms have progressed to a point and plateaued. Patient has had no prior foot problems. Evaluation to date: none. Treatment to date: none. Patients rates pain 5/10 on pain scale. Also reports h/o left foot surgery.         Review of Systems   Constitution: Negative for chills, diaphoresis and fever.   Cardiovascular: Negative for claudication, cyanosis, leg swelling and syncope.   Respiratory: Negative for cough and shortness of breath.    Skin: Positive for color change, nail changes and suspicious lesions.   Musculoskeletal: Negative for falls, joint pain, muscle cramps and muscle weakness.   Gastrointestinal: Negative for diarrhea, nausea and vomiting.   Neurological: Negative for disturbances in coordination, numbness, paresthesias, sensory change, tremors and weakness.   Psychiatric/Behavioral: Negative for altered mental status.           Objective:      Physical Exam   Constitutional: He appears well-developed. He is cooperative.   Oriented to time, place, and person.   Cardiovascular:   DP and PT pulses are palpable bilaterally. 3 sec capillary refill time and toes and feet are warm to touch proximally .  There is  hair growth on the feet and toes b/l. There is no edema b/l. No spider veins or varicosities present b/l.      Musculoskeletal:   Equinus noted b/l ankles with < 10 deg DF noted. MMT 5/5 in DF/PF/Inv/Ev resistance with no reproduction of pain in any direction. Passive range of motion of ankle and pedal joints is painless b/l.    Pain upon palpation right lateral foot at 5th metatarsal base. No pain along  peroneal tendons.      Feet:   Right Foot:   Skin Integrity: Negative for callus or dry skin.   Left Foot:   Skin Integrity: Negative for callus or dry skin.   Lymphadenopathy:   Negative lymphadenopathy bilateral popliteal fossa and tarsal tunnel.   Neurological: He is alert.   Light touch, proprioception, and sharp/dull sensation are all intact bilaterally. Protective threshold with the [place]ienstein monofilament is intact bilaterally.    Skin:   No open lesions, lacerations or wounds noted.Interdigital spaces clean, dry and intact b/l. No erythema noted to b/l foot.  Nails normal color and trophic qualities.     Psychiatric: He has a normal mood and affect.             Assessment:       Encounter Diagnoses   Name Primary?    Pain in both feet Yes    Peroneal tendinitis, unspecified laterality     History of foot surgery     Type 2 diabetes mellitus without complication, without long-term current use of insulin          Plan:       King was seen today for rash.    Diagnoses and all orders for this visit:    Pain in both feet  -     X-Ray Foot Complete 3 view Bilateral; Future    Peroneal tendinitis, unspecified laterality    History of foot surgery    Type 2 diabetes mellitus without complication, without long-term current use of insulin      I counseled the patient on his conditions, their implications and medical management.    B/L  foot xray to assess underlying deformity and for underlying osseous pathology.     - Shoe inspection. Diabetic Foot Education. Patient reminded of the importance of good nutrition and blood sugar control to help prevent podiatric complications of diabetes. Patient instructed on proper foot hygeine. We discussed wearing proper shoe gear, daily foot inspections, never walking without protective shoe gear, caution putting sharp instruments to feet     - Discussed DM foot care:  Wear comfortable, proper fitting shoes. Wash feet daily. Dry well. After drying, apply moisturizer to  feet (no lotion to webspaces). Inspect feet daily for skin breaks, blisters, swelling, or redness. Wear cotton socks (preferably white)  Change socks every day. Do NOT walk barefoot. Do NOT use heating pads or warm/hot water soaks     F/u one year DM foot exam sooner PRN    Yoanna Henry DPM

## 2019-07-09 NOTE — PROGRESS NOTES
HPI     DSL- 3/28/17 Dr. Bhakta    65 y/o male is here for Diabetic eye exam. Pt states no Va change. Pt   denies eye allergies, floaters, and flashes.  BLS was around 130's   yesterday. Wear OTC +1.25    Eyemeds  Saline OU PRN     Hemoglobin A1C       Date                     Value               Ref Range             Status                06/11/2019               7.4 (H)             4.0 - 5.6 %           Final                  Last edited by Emma Pugh on 7/9/2019  7:57 AM. (History)            Assessment /Plan     For exam results, see Encounter Report.    Diabetes mellitus type 2 without retinopathy  -No retinopathy noted today.  Continued control with primary care physician and annual comprehensive eye exam.    Nuclear sclerosis, bilateral  -Educated patient on presence of cataracts at today's exam, monitor at annual dilated fundus exam. 8+ years surgical estimate.      RTC 1 yr

## 2019-07-10 ENCOUNTER — OFFICE VISIT (OUTPATIENT)
Dept: FAMILY MEDICINE | Facility: CLINIC | Age: 64
End: 2019-07-10
Payer: MEDICARE

## 2019-07-10 VITALS
WEIGHT: 217.06 LBS | SYSTOLIC BLOOD PRESSURE: 138 MMHG | HEIGHT: 71 IN | TEMPERATURE: 98 F | OXYGEN SATURATION: 96 % | DIASTOLIC BLOOD PRESSURE: 76 MMHG | HEART RATE: 70 BPM | BODY MASS INDEX: 30.39 KG/M2

## 2019-07-10 DIAGNOSIS — E11.9 TYPE 2 DIABETES MELLITUS WITHOUT COMPLICATION, WITHOUT LONG-TERM CURRENT USE OF INSULIN: Chronic | ICD-10-CM

## 2019-07-10 DIAGNOSIS — M25.561 CHRONIC PAIN OF RIGHT KNEE: Primary | ICD-10-CM

## 2019-07-10 DIAGNOSIS — G89.29 CHRONIC PAIN OF RIGHT KNEE: Primary | ICD-10-CM

## 2019-07-10 DIAGNOSIS — I10 ESSENTIAL HYPERTENSION: Chronic | ICD-10-CM

## 2019-07-10 PROCEDURE — 3008F PR BODY MASS INDEX (BMI) DOCUMENTED: ICD-10-PCS | Mod: HCNC,CPTII,S$GLB, | Performed by: FAMILY MEDICINE

## 2019-07-10 PROCEDURE — 3075F PR MOST RECENT SYSTOLIC BLOOD PRESS GE 130-139MM HG: ICD-10-PCS | Mod: HCNC,CPTII,S$GLB, | Performed by: FAMILY MEDICINE

## 2019-07-10 PROCEDURE — 2024F 7 FLD RTA PHOTO EVC RTNOPTHY: CPT | Mod: HCNC,S$GLB,, | Performed by: FAMILY MEDICINE

## 2019-07-10 PROCEDURE — 3045F PR MOST RECENT HEMOGLOBIN A1C LEVEL 7.0-9.0%: ICD-10-PCS | Mod: HCNC,CPTII,S$GLB, | Performed by: FAMILY MEDICINE

## 2019-07-10 PROCEDURE — 99999 PR PBB SHADOW E&M-EST. PATIENT-LVL IV: CPT | Mod: PBBFAC,HCNC,, | Performed by: FAMILY MEDICINE

## 2019-07-10 PROCEDURE — 3008F BODY MASS INDEX DOCD: CPT | Mod: HCNC,CPTII,S$GLB, | Performed by: FAMILY MEDICINE

## 2019-07-10 PROCEDURE — 2024F PR 7 FIELD PHOTOS WITH INTERP/ REVIEW: ICD-10-PCS | Mod: HCNC,S$GLB,, | Performed by: FAMILY MEDICINE

## 2019-07-10 PROCEDURE — 3075F SYST BP GE 130 - 139MM HG: CPT | Mod: HCNC,CPTII,S$GLB, | Performed by: FAMILY MEDICINE

## 2019-07-10 PROCEDURE — 99999 PR PBB SHADOW E&M-EST. PATIENT-LVL IV: ICD-10-PCS | Mod: PBBFAC,HCNC,, | Performed by: FAMILY MEDICINE

## 2019-07-10 PROCEDURE — 99215 PR OFFICE/OUTPT VISIT, EST, LEVL V, 40-54 MIN: ICD-10-PCS | Mod: HCNC,S$GLB,, | Performed by: FAMILY MEDICINE

## 2019-07-10 PROCEDURE — 3045F PR MOST RECENT HEMOGLOBIN A1C LEVEL 7.0-9.0%: CPT | Mod: HCNC,CPTII,S$GLB, | Performed by: FAMILY MEDICINE

## 2019-07-10 PROCEDURE — 99215 OFFICE O/P EST HI 40 MIN: CPT | Mod: HCNC,S$GLB,, | Performed by: FAMILY MEDICINE

## 2019-07-10 PROCEDURE — 3078F PR MOST RECENT DIASTOLIC BLOOD PRESSURE < 80 MM HG: ICD-10-PCS | Mod: HCNC,CPTII,S$GLB, | Performed by: FAMILY MEDICINE

## 2019-07-10 PROCEDURE — 3078F DIAST BP <80 MM HG: CPT | Mod: HCNC,CPTII,S$GLB, | Performed by: FAMILY MEDICINE

## 2019-07-10 RX ORDER — HYDROCODONE BITARTRATE AND ACETAMINOPHEN 10; 325 MG/1; MG/1
1 TABLET ORAL 3 TIMES DAILY PRN
Qty: 90 TABLET | Refills: 0 | Status: CANCELLED | OUTPATIENT
Start: 2019-07-15

## 2019-07-10 RX ORDER — GABAPENTIN 300 MG/1
300 CAPSULE ORAL 3 TIMES DAILY
Qty: 90 CAPSULE | Refills: 2 | Status: SHIPPED | OUTPATIENT
Start: 2019-07-10 | End: 2020-02-03 | Stop reason: SDUPTHER

## 2019-07-10 RX ORDER — HYDROCODONE BITARTRATE AND ACETAMINOPHEN 10; 325 MG/1; MG/1
1 TABLET ORAL 3 TIMES DAILY PRN
Qty: 88 TABLET | Refills: 0 | Status: SHIPPED | OUTPATIENT
Start: 2019-08-15 | End: 2019-09-12 | Stop reason: SDUPTHER

## 2019-07-10 RX ORDER — HYDROCODONE BITARTRATE AND ACETAMINOPHEN 10; 325 MG/1; MG/1
1 TABLET ORAL 3 TIMES DAILY PRN
Qty: 88 TABLET | Refills: 0 | Status: SHIPPED | OUTPATIENT
Start: 2019-09-15 | End: 2019-09-12 | Stop reason: SDUPTHER
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
## 2019-08-16 ENCOUNTER — OFFICE VISIT (OUTPATIENT)
Dept: FAMILY MEDICINE | Facility: CLINIC | Age: 64
End: 2019-08-16
Payer: MEDICARE

## 2019-08-16 ENCOUNTER — HOSPITAL ENCOUNTER (OUTPATIENT)
Dept: RADIOLOGY | Facility: HOSPITAL | Age: 64
Discharge: HOME | End: 2019-08-16
Attending: FAMILY MEDICINE
Payer: MEDICARE

## 2019-08-16 VITALS
WEIGHT: 225 LBS | SYSTOLIC BLOOD PRESSURE: 136 MMHG | BODY MASS INDEX: 31.5 KG/M2 | HEIGHT: 71 IN | DIASTOLIC BLOOD PRESSURE: 70 MMHG | HEART RATE: 84 BPM | OXYGEN SATURATION: 95 % | TEMPERATURE: 98 F

## 2019-08-16 DIAGNOSIS — M25.562 ACUTE PAIN OF LEFT KNEE: ICD-10-CM

## 2019-08-16 DIAGNOSIS — M25.562 ACUTE PAIN OF LEFT KNEE: Primary | ICD-10-CM

## 2019-08-16 PROCEDURE — 96372 PR INJECTION,THERAP/PROPH/DIAG2ST, IM OR SUBCUT: ICD-10-PCS | Mod: HCNC,S$GLB,, | Performed by: FAMILY MEDICINE

## 2019-08-16 PROCEDURE — 3075F PR MOST RECENT SYSTOLIC BLOOD PRESS GE 130-139MM HG: ICD-10-PCS | Mod: HCNC,CPTII,S$GLB, | Performed by: FAMILY MEDICINE

## 2019-08-16 PROCEDURE — 73560 X-RAY EXAM OF KNEE 1 OR 2: CPT | Mod: 26,HCNC,LT, | Performed by: RADIOLOGY

## 2019-08-16 PROCEDURE — 99999 PR PBB SHADOW E&M-EST. PATIENT-LVL IV: ICD-10-PCS | Mod: PBBFAC,HCNC,, | Performed by: FAMILY MEDICINE

## 2019-08-16 PROCEDURE — 99214 OFFICE O/P EST MOD 30 MIN: CPT | Mod: 25,HCNC,S$GLB, | Performed by: FAMILY MEDICINE

## 2019-08-16 PROCEDURE — 96372 THER/PROPH/DIAG INJ SC/IM: CPT | Mod: HCNC,S$GLB,, | Performed by: FAMILY MEDICINE

## 2019-08-16 PROCEDURE — 73560 XR KNEE AP STANDING WITH LEFT LATERAL: ICD-10-PCS | Mod: 26,HCNC,LT, | Performed by: RADIOLOGY

## 2019-08-16 PROCEDURE — 3078F DIAST BP <80 MM HG: CPT | Mod: HCNC,CPTII,S$GLB, | Performed by: FAMILY MEDICINE

## 2019-08-16 PROCEDURE — 3008F PR BODY MASS INDEX (BMI) DOCUMENTED: ICD-10-PCS | Mod: HCNC,CPTII,S$GLB, | Performed by: FAMILY MEDICINE

## 2019-08-16 PROCEDURE — 3008F BODY MASS INDEX DOCD: CPT | Mod: HCNC,CPTII,S$GLB, | Performed by: FAMILY MEDICINE

## 2019-08-16 PROCEDURE — 3075F SYST BP GE 130 - 139MM HG: CPT | Mod: HCNC,CPTII,S$GLB, | Performed by: FAMILY MEDICINE

## 2019-08-16 PROCEDURE — 99214 PR OFFICE/OUTPT VISIT, EST, LEVL IV, 30-39 MIN: ICD-10-PCS | Mod: 25,HCNC,S$GLB, | Performed by: FAMILY MEDICINE

## 2019-08-16 PROCEDURE — 73560 X-RAY EXAM OF KNEE 1 OR 2: CPT | Mod: TC,HCNC,FY,PO,LT

## 2019-08-16 PROCEDURE — 99999 PR PBB SHADOW E&M-EST. PATIENT-LVL IV: CPT | Mod: PBBFAC,HCNC,, | Performed by: FAMILY MEDICINE

## 2019-08-16 PROCEDURE — 3078F PR MOST RECENT DIASTOLIC BLOOD PRESSURE < 80 MM HG: ICD-10-PCS | Mod: HCNC,CPTII,S$GLB, | Performed by: FAMILY MEDICINE

## 2019-08-16 RX ORDER — KETOROLAC TROMETHAMINE 30 MG/ML
30 INJECTION, SOLUTION INTRAMUSCULAR; INTRAVENOUS ONCE
Status: COMPLETED | OUTPATIENT
Start: 2019-08-16 | End: 2019-08-16

## 2019-08-16 RX ORDER — NAPROXEN 500 MG/1
500 TABLET ORAL 2 TIMES DAILY PRN
Qty: 30 TABLET | Refills: 0 | Status: SHIPPED | OUTPATIENT
Start: 2019-08-16 | End: 2020-01-27

## 2019-08-16 RX ADMIN — KETOROLAC TROMETHAMINE 30 MG: 30 INJECTION, SOLUTION INTRAMUSCULAR; INTRAVENOUS at 02:08
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
## 2019-08-16 NOTE — PROGRESS NOTES
Injection given. Tolerated well, instructed to wait 15 min for observation. No reaction noted at discharge.

## 2019-09-12 ENCOUNTER — OFFICE VISIT (OUTPATIENT)
Dept: FAMILY MEDICINE | Facility: CLINIC | Age: 64
End: 2019-09-12
Payer: MEDICARE

## 2019-09-12 VITALS
BODY MASS INDEX: 32.3 KG/M2 | HEIGHT: 71 IN | TEMPERATURE: 98 F | OXYGEN SATURATION: 97 % | DIASTOLIC BLOOD PRESSURE: 68 MMHG | HEART RATE: 78 BPM | WEIGHT: 230.69 LBS | SYSTOLIC BLOOD PRESSURE: 138 MMHG

## 2019-09-12 DIAGNOSIS — E11.9 TYPE 2 DIABETES MELLITUS WITHOUT COMPLICATION, WITHOUT LONG-TERM CURRENT USE OF INSULIN: Chronic | ICD-10-CM

## 2019-09-12 DIAGNOSIS — I10 ESSENTIAL HYPERTENSION: Chronic | ICD-10-CM

## 2019-09-12 DIAGNOSIS — M17.0 PRIMARY OSTEOARTHRITIS OF BOTH KNEES: Primary | ICD-10-CM

## 2019-09-12 PROCEDURE — 99499 RISK ADDL DX/OHS AUDIT: ICD-10-PCS | Mod: HCNC,S$GLB,, | Performed by: FAMILY MEDICINE

## 2019-09-12 PROCEDURE — 3078F PR MOST RECENT DIASTOLIC BLOOD PRESSURE < 80 MM HG: ICD-10-PCS | Mod: HCNC,CPTII,S$GLB, | Performed by: FAMILY MEDICINE

## 2019-09-12 PROCEDURE — 20610 DRAIN/INJ JOINT/BURSA W/O US: CPT | Mod: 50,HCNC,S$GLB, | Performed by: FAMILY MEDICINE

## 2019-09-12 PROCEDURE — 99214 OFFICE O/P EST MOD 30 MIN: CPT | Mod: 25,HCNC,S$GLB, | Performed by: FAMILY MEDICINE

## 2019-09-12 PROCEDURE — 20610 LARGE JOINT ASPIRATION/INJECTION: L KNEE, R KNEE: ICD-10-PCS | Mod: 50,HCNC,S$GLB, | Performed by: FAMILY MEDICINE

## 2019-09-12 PROCEDURE — 3078F DIAST BP <80 MM HG: CPT | Mod: HCNC,CPTII,S$GLB, | Performed by: FAMILY MEDICINE

## 2019-09-12 PROCEDURE — 99214 PR OFFICE/OUTPT VISIT, EST, LEVL IV, 30-39 MIN: ICD-10-PCS | Mod: 25,HCNC,S$GLB, | Performed by: FAMILY MEDICINE

## 2019-09-12 PROCEDURE — 3045F PR MOST RECENT HEMOGLOBIN A1C LEVEL 7.0-9.0%: CPT | Mod: HCNC,CPTII,S$GLB, | Performed by: FAMILY MEDICINE

## 2019-09-12 PROCEDURE — 99999 PR PBB SHADOW E&M-EST. PATIENT-LVL IV: ICD-10-PCS | Mod: PBBFAC,HCNC,, | Performed by: FAMILY MEDICINE

## 2019-09-12 PROCEDURE — 3008F PR BODY MASS INDEX (BMI) DOCUMENTED: ICD-10-PCS | Mod: HCNC,CPTII,S$GLB, | Performed by: FAMILY MEDICINE

## 2019-09-12 PROCEDURE — 99999 PR PBB SHADOW E&M-EST. PATIENT-LVL IV: CPT | Mod: PBBFAC,HCNC,, | Performed by: FAMILY MEDICINE

## 2019-09-12 PROCEDURE — 3008F BODY MASS INDEX DOCD: CPT | Mod: HCNC,CPTII,S$GLB, | Performed by: FAMILY MEDICINE

## 2019-09-12 PROCEDURE — 3045F PR MOST RECENT HEMOGLOBIN A1C LEVEL 7.0-9.0%: ICD-10-PCS | Mod: HCNC,CPTII,S$GLB, | Performed by: FAMILY MEDICINE

## 2019-09-12 PROCEDURE — 99499 UNLISTED E&M SERVICE: CPT | Mod: HCNC,S$GLB,, | Performed by: FAMILY MEDICINE

## 2019-09-12 PROCEDURE — 3075F PR MOST RECENT SYSTOLIC BLOOD PRESS GE 130-139MM HG: ICD-10-PCS | Mod: HCNC,CPTII,S$GLB, | Performed by: FAMILY MEDICINE

## 2019-09-12 PROCEDURE — 3075F SYST BP GE 130 - 139MM HG: CPT | Mod: HCNC,CPTII,S$GLB, | Performed by: FAMILY MEDICINE

## 2019-09-12 RX ORDER — HYDROCODONE BITARTRATE AND ACETAMINOPHEN 10; 325 MG/1; MG/1
1 TABLET ORAL 3 TIMES DAILY PRN
Qty: 86 TABLET | Refills: 0 | Status: SHIPPED | OUTPATIENT
Start: 2019-09-14 | End: 2019-09-12 | Stop reason: SDUPTHER

## 2019-09-12 RX ORDER — HYDROCODONE BITARTRATE AND ACETAMINOPHEN 10; 325 MG/1; MG/1
1 TABLET ORAL 3 TIMES DAILY PRN
Qty: 86 TABLET | Refills: 0 | Status: SHIPPED | OUTPATIENT
Start: 2019-11-14 | End: 2019-11-04 | Stop reason: SDUPTHER

## 2019-09-12 RX ORDER — HYDROCODONE BITARTRATE AND ACETAMINOPHEN 10; 325 MG/1; MG/1
1 TABLET ORAL 3 TIMES DAILY PRN
Qty: 86 TABLET | Refills: 0 | Status: SHIPPED | OUTPATIENT
Start: 2019-10-14 | End: 2019-09-12 | Stop reason: SDUPTHER

## 2019-09-12 RX ORDER — HYDROCODONE BITARTRATE AND ACETAMINOPHEN 10; 325 MG/1; MG/1
1 TABLET ORAL 3 TIMES DAILY PRN
Qty: 86 TABLET | Refills: 0 | Status: SHIPPED | OUTPATIENT
Start: 2019-10-14 | End: 2019-12-11 | Stop reason: SDUPTHER

## 2019-09-12 RX ORDER — HYDROCODONE BITARTRATE AND ACETAMINOPHEN 10; 325 MG/1; MG/1
1 TABLET ORAL 3 TIMES DAILY PRN
Qty: 86 TABLET | Refills: 0 | Status: SHIPPED | OUTPATIENT
Start: 2019-11-14 | End: 2019-11-06 | Stop reason: SDUPTHER

## 2019-09-12 RX ORDER — TRIAMCINOLONE ACETONIDE 40 MG/ML
40 INJECTION, SUSPENSION INTRA-ARTICULAR; INTRAMUSCULAR
Status: SHIPPED | OUTPATIENT
Start: 2019-09-12

## 2019-09-12 RX ADMIN — TRIAMCINOLONE ACETONIDE 40 MG: 40 INJECTION, SUSPENSION INTRA-ARTICULAR; INTRAMUSCULAR at 04:09

## 2019-09-12 NOTE — PROCEDURES
Large Joint Aspiration/Injection: L knee, R knee  Date/Time: 9/12/2019 4:31 PM  Performed by: Gallo Lara MD  Authorized by: Gallo Lara MD     Consent Done?:  Yes (Verbal)  Indications:  Pain  Procedure site marked: Yes    Timeout: Prior to procedure the correct patient, procedure, and site was verified    Anesthesia      Anesthetic total: 9mL    Location:  Knee  Site:  L knee and R knee  Needle size:  25 G  Medications:  40 mg triamcinolone acetonide 40 mg/mL  Patient tolerance:  Patient tolerated the procedure well with no immediate complications
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
## 2019-11-04 DIAGNOSIS — M17.0 PRIMARY OSTEOARTHRITIS OF BOTH KNEES: ICD-10-CM

## 2019-11-04 RX ORDER — HYDROCODONE BITARTRATE AND ACETAMINOPHEN 10; 325 MG/1; MG/1
1 TABLET ORAL 3 TIMES DAILY PRN
Qty: 86 TABLET | Refills: 0 | Status: SHIPPED | OUTPATIENT
Start: 2019-11-14 | End: 2019-12-11 | Stop reason: SDUPTHER

## 2019-11-04 NOTE — TELEPHONE ENCOUNTER
----- Message from Yasmine Young sent at 11/4/2019 10:34 AM CST -----  Contact: Patient   Type: RX Refill Request    Who Called: Patient     Have you contacted your pharmacy: Yes     Refill or New Rx: Refill     RX Name and Strength:HYDROcodone-acetaminophen (NORCO)  mg per tablet     How is the patient currently taking it? (ex. 1XDay):    Is this a 30 day or 90 day RX:    Preferred Pharmacy with phone number:   [facility] - [place] - [address]  [address]  [place] [address]  Phone: [phone] Fax: [phone]        Local or Mail Order: Local     Ordering Provider: Dr. Lara    Would the patient rather a call back or a response via [facility]? Call back     Best Call Back Number: [phone]    Additional Information: Pt is going out of town on 11/10/2019. Pt is requesting to refill before 11/10/2019, prescription is dated for 11/14/2019

## 2019-11-05 DIAGNOSIS — M17.0 PRIMARY OSTEOARTHRITIS OF BOTH KNEES: ICD-10-CM

## 2019-11-05 DIAGNOSIS — E11.9 TYPE 2 DIABETES MELLITUS WITHOUT COMPLICATION, WITHOUT LONG-TERM CURRENT USE OF INSULIN: ICD-10-CM

## 2019-11-05 RX ORDER — METFORMIN HYDROCHLORIDE 500 MG/1
500 TABLET ORAL
Qty: 90 TABLET | Refills: 3
Start: 2019-11-05 | End: 2020-03-25 | Stop reason: SDUPTHER

## 2019-11-06 NOTE — TELEPHONE ENCOUNTER
Patient states that he is leaving out of town 11/10/2019 and would not be able to get the [place] on 11/14.     Patient wants to know can a rx be called in for before the 11/14/2019, patient states that he would be back home 11/18/2019. States that he would be out once he leave and would not be able to have any to take with him.      Please advise..    Thanks,  Yoselin

## 2019-11-08 RX ORDER — HYDROCODONE BITARTRATE AND ACETAMINOPHEN 10; 325 MG/1; MG/1
1 TABLET ORAL 3 TIMES DAILY PRN
Qty: 86 TABLET | Refills: 0 | Status: SHIPPED | OUTPATIENT
Start: 2019-11-14 | End: 2019-12-11 | Stop reason: SDUPTHER

## 2019-12-02 DIAGNOSIS — M17.0 PRIMARY OSTEOARTHRITIS OF BOTH KNEES: ICD-10-CM

## 2019-12-02 RX ORDER — HYDROCODONE BITARTRATE AND ACETAMINOPHEN 10; 325 MG/1; MG/1
1 TABLET ORAL 3 TIMES DAILY PRN
Qty: 86 TABLET | Refills: 0 | OUTPATIENT
Start: 2019-12-02

## 2019-12-02 NOTE — TELEPHONE ENCOUNTER
----- Message from Sienna Payne sent at 12/2/2019 12:12 PM CST -----  Contact: self  Type: RX Refill Request    Who Called: self      Refill or New Rx:refill    RX Name and Strength:HYDROcodone-acetaminophen       Preferred Pharmacy with phone number:[facility] - [place] - [address] [phone] (Phone)  [phone] (Fax)        Local or Mail Order:local    Ordering Provider:Dr Lara    Would the patient rather a call back or a response via [facility]? call    Best Call Back Number:[phone]    Additional Information: pt will  if need to.

## 2019-12-06 DIAGNOSIS — Z12.11 COLON CANCER SCREENING: ICD-10-CM

## 2019-12-11 ENCOUNTER — OFFICE VISIT (OUTPATIENT)
Dept: FAMILY MEDICINE | Facility: CLINIC | Age: 64
End: 2019-12-11
Payer: MEDICARE

## 2019-12-11 ENCOUNTER — HOSPITAL ENCOUNTER (OUTPATIENT)
Dept: RADIOLOGY | Facility: HOSPITAL | Age: 64
Discharge: HOME | End: 2019-12-11
Attending: FAMILY MEDICINE
Payer: MEDICARE

## 2019-12-11 ENCOUNTER — TELEPHONE (OUTPATIENT)
Dept: FAMILY MEDICINE | Facility: CLINIC | Age: 64
End: 2019-12-11

## 2019-12-11 VITALS
OXYGEN SATURATION: 98 % | SYSTOLIC BLOOD PRESSURE: 138 MMHG | HEIGHT: 71 IN | WEIGHT: 232.5 LBS | DIASTOLIC BLOOD PRESSURE: 80 MMHG | BODY MASS INDEX: 32.55 KG/M2 | HEART RATE: 97 BPM | TEMPERATURE: 98 F

## 2019-12-11 DIAGNOSIS — E11.9 TYPE 2 DIABETES MELLITUS WITHOUT COMPLICATION, WITHOUT LONG-TERM CURRENT USE OF INSULIN: Primary | ICD-10-CM

## 2019-12-11 DIAGNOSIS — E11.9 TYPE 2 DIABETES MELLITUS WITHOUT COMPLICATION, WITHOUT LONG-TERM CURRENT USE OF INSULIN: Chronic | ICD-10-CM

## 2019-12-11 DIAGNOSIS — I10 ESSENTIAL HYPERTENSION: Chronic | ICD-10-CM

## 2019-12-11 DIAGNOSIS — Z12.11 ENCOUNTER FOR FIT (FECAL IMMUNOCHEMICAL TEST) SCREENING: ICD-10-CM

## 2019-12-11 DIAGNOSIS — M17.0 PRIMARY OSTEOARTHRITIS OF BOTH KNEES: ICD-10-CM

## 2019-12-11 DIAGNOSIS — M25.552 LEFT HIP PAIN: ICD-10-CM

## 2019-12-11 DIAGNOSIS — J06.9 VIRAL URI: Primary | ICD-10-CM

## 2019-12-11 PROCEDURE — 99999 PR PBB SHADOW E&M-EST. PATIENT-LVL IV: ICD-10-PCS | Mod: PBBFAC,HCNC,, | Performed by: FAMILY MEDICINE

## 2019-12-11 PROCEDURE — 3079F PR MOST RECENT DIASTOLIC BLOOD PRESSURE 80-89 MM HG: ICD-10-PCS | Mod: HCNC,CPTII,S$GLB, | Performed by: FAMILY MEDICINE

## 2019-12-11 PROCEDURE — 3008F BODY MASS INDEX DOCD: CPT | Mod: HCNC,CPTII,S$GLB, | Performed by: FAMILY MEDICINE

## 2019-12-11 PROCEDURE — 3075F SYST BP GE 130 - 139MM HG: CPT | Mod: HCNC,CPTII,S$GLB, | Performed by: FAMILY MEDICINE

## 2019-12-11 PROCEDURE — 3079F DIAST BP 80-89 MM HG: CPT | Mod: HCNC,CPTII,S$GLB, | Performed by: FAMILY MEDICINE

## 2019-12-11 PROCEDURE — 73502 X-RAY EXAM HIP UNI 2-3 VIEWS: CPT | Mod: 26,HCNC,LT, | Performed by: RADIOLOGY

## 2019-12-11 PROCEDURE — 73502 X-RAY EXAM HIP UNI 2-3 VIEWS: CPT | Mod: TC,HCNC,FY,PO,LT

## 2019-12-11 PROCEDURE — 73502 XR HIP 2 VIEW LEFT: ICD-10-PCS | Mod: 26,HCNC,LT, | Performed by: RADIOLOGY

## 2019-12-11 PROCEDURE — 99215 PR OFFICE/OUTPT VISIT, EST, LEVL V, 40-54 MIN: ICD-10-PCS | Mod: HCNC,S$GLB,, | Performed by: FAMILY MEDICINE

## 2019-12-11 PROCEDURE — 99215 OFFICE O/P EST HI 40 MIN: CPT | Mod: HCNC,S$GLB,, | Performed by: FAMILY MEDICINE

## 2019-12-11 PROCEDURE — 3075F PR MOST RECENT SYSTOLIC BLOOD PRESS GE 130-139MM HG: ICD-10-PCS | Mod: HCNC,CPTII,S$GLB, | Performed by: FAMILY MEDICINE

## 2019-12-11 PROCEDURE — 99999 PR PBB SHADOW E&M-EST. PATIENT-LVL IV: CPT | Mod: PBBFAC,HCNC,, | Performed by: FAMILY MEDICINE

## 2019-12-11 PROCEDURE — 3008F PR BODY MASS INDEX (BMI) DOCUMENTED: ICD-10-PCS | Mod: HCNC,CPTII,S$GLB, | Performed by: FAMILY MEDICINE

## 2019-12-11 RX ORDER — BENZONATATE 100 MG/1
100 CAPSULE ORAL 3 TIMES DAILY PRN
Qty: 30 CAPSULE | Refills: 0 | Status: SHIPPED | OUTPATIENT
Start: 2019-12-11 | End: 2020-01-27 | Stop reason: SDUPTHER

## 2019-12-11 RX ORDER — HYDROCODONE BITARTRATE AND ACETAMINOPHEN 10; 325 MG/1; MG/1
1 TABLET ORAL 3 TIMES DAILY PRN
Qty: 84 TABLET | Refills: 0 | Status: SHIPPED | OUTPATIENT
Start: 2020-01-11 | End: 2020-01-27

## 2019-12-11 RX ORDER — HYDROCODONE BITARTRATE AND ACETAMINOPHEN 10; 325 MG/1; MG/1
1 TABLET ORAL 3 TIMES DAILY PRN
Qty: 84 TABLET | Refills: 0 | Status: SHIPPED | OUTPATIENT
Start: 2020-02-11 | End: 2020-01-27

## 2019-12-11 RX ORDER — GLIMEPIRIDE 2 MG/1
2 TABLET ORAL
Qty: 90 TABLET | Refills: 3 | Status: SHIPPED | OUTPATIENT
Start: 2019-12-11 | End: 2020-10-23 | Stop reason: SDUPTHER

## 2019-12-11 RX ORDER — LORATADINE 10 MG/1
10 TABLET ORAL DAILY
Qty: 30 TABLET | Refills: 1 | Status: SHIPPED | OUTPATIENT
Start: 2019-12-11 | End: 2020-01-27 | Stop reason: SDUPTHER

## 2019-12-11 RX ORDER — HYDROCODONE BITARTRATE AND ACETAMINOPHEN 10; 325 MG/1; MG/1
1 TABLET ORAL 3 TIMES DAILY PRN
Qty: 84 TABLET | Refills: 0 | Status: SHIPPED | OUTPATIENT
Start: 2019-12-11 | End: 2020-01-27 | Stop reason: SDUPTHER
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
## 2019-12-21 ENCOUNTER — LAB VISIT (OUTPATIENT)
Dept: LAB | Facility: HOSPITAL | Age: 64
End: 2019-12-21
Attending: FAMILY MEDICINE
Payer: MEDICARE

## 2019-12-21 DIAGNOSIS — Z12.11 ENCOUNTER FOR FIT (FECAL IMMUNOCHEMICAL TEST) SCREENING: ICD-10-CM

## 2019-12-21 PROCEDURE — 82274 ASSAY TEST FOR BLOOD FECAL: CPT | Mod: HCNC

## 2019-12-23 ENCOUNTER — TELEPHONE (OUTPATIENT)
Dept: FAMILY MEDICINE | Facility: CLINIC | Age: 64
End: 2019-12-23

## 2019-12-23 NOTE — TELEPHONE ENCOUNTER
----- Message from Merlin Stewart sent at 12/23/2019 11:59 AM CST -----  Contact: Self: [phone]  Type: Patient Call Back    Who called:Self    What is the request in detail:Patient would like a call to discuss his medication    Can the clinic reply by [facility]? No    Would the patient rather a call back or a response via [facility]?     Best call back number:[phone]

## 2019-12-23 NOTE — TELEPHONE ENCOUNTER
Spoke with patient and he informed me that since he has been taking the Amaryl 2 mg his blood sugars has been ranging 118-200's. He said that his blood sugars normal range between 145-150. If his blood sugars gets less then 145 his hands begins to shake and he starts to feel weak. He said that since he stop the medication for about 2 days he has been feeling better and when he took him blood sugar about an hour ago it was 198.

## 2019-12-26 ENCOUNTER — OFFICE VISIT (OUTPATIENT)
Dept: FAMILY MEDICINE | Facility: CLINIC | Age: 64
End: 2019-12-26
Payer: MEDICARE

## 2019-12-26 VITALS
DIASTOLIC BLOOD PRESSURE: 88 MMHG | HEART RATE: 77 BPM | BODY MASS INDEX: 32.34 KG/M2 | OXYGEN SATURATION: 99 % | WEIGHT: 231 LBS | TEMPERATURE: 98 F | SYSTOLIC BLOOD PRESSURE: 138 MMHG | HEIGHT: 71 IN

## 2019-12-26 DIAGNOSIS — J06.9 VIRAL URI: Primary | ICD-10-CM

## 2019-12-26 DIAGNOSIS — E11.9 TYPE 2 DIABETES MELLITUS WITHOUT COMPLICATION, WITHOUT LONG-TERM CURRENT USE OF INSULIN: Chronic | ICD-10-CM

## 2019-12-26 PROCEDURE — 99214 PR OFFICE/OUTPT VISIT, EST, LEVL IV, 30-39 MIN: ICD-10-PCS | Mod: HCNC,S$GLB,, | Performed by: FAMILY MEDICINE

## 2019-12-26 PROCEDURE — 3079F PR MOST RECENT DIASTOLIC BLOOD PRESSURE 80-89 MM HG: ICD-10-PCS | Mod: HCNC,CPTII,S$GLB, | Performed by: FAMILY MEDICINE

## 2019-12-26 PROCEDURE — 3075F SYST BP GE 130 - 139MM HG: CPT | Mod: HCNC,CPTII,S$GLB, | Performed by: FAMILY MEDICINE

## 2019-12-26 PROCEDURE — 3008F PR BODY MASS INDEX (BMI) DOCUMENTED: ICD-10-PCS | Mod: HCNC,CPTII,S$GLB, | Performed by: FAMILY MEDICINE

## 2019-12-26 PROCEDURE — 3079F DIAST BP 80-89 MM HG: CPT | Mod: HCNC,CPTII,S$GLB, | Performed by: FAMILY MEDICINE

## 2019-12-26 PROCEDURE — 99999 PR PBB SHADOW E&M-EST. PATIENT-LVL IV: CPT | Mod: PBBFAC,HCNC,, | Performed by: FAMILY MEDICINE

## 2019-12-26 PROCEDURE — 3075F PR MOST RECENT SYSTOLIC BLOOD PRESS GE 130-139MM HG: ICD-10-PCS | Mod: HCNC,CPTII,S$GLB, | Performed by: FAMILY MEDICINE

## 2019-12-26 PROCEDURE — 99214 OFFICE O/P EST MOD 30 MIN: CPT | Mod: HCNC,S$GLB,, | Performed by: FAMILY MEDICINE

## 2019-12-26 PROCEDURE — 3008F BODY MASS INDEX DOCD: CPT | Mod: HCNC,CPTII,S$GLB, | Performed by: FAMILY MEDICINE

## 2019-12-26 PROCEDURE — 99999 PR PBB SHADOW E&M-EST. PATIENT-LVL IV: ICD-10-PCS | Mod: PBBFAC,HCNC,, | Performed by: FAMILY MEDICINE

## 2019-12-26 RX ORDER — CODEINE PHOSPHATE AND GUAIFENESIN 10; 100 MG/5ML; MG/5ML
5 SOLUTION ORAL EVERY 8 HOURS PRN
Qty: 180 ML | Refills: 0 | Status: SHIPPED | OUTPATIENT
Start: 2019-12-26 | End: 2020-01-05

## 2019-12-30 LAB — HEMOCCULT STL QL IA: NEGATIVE
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
## 2020-01-14 DIAGNOSIS — J06.9 VIRAL URI: ICD-10-CM

## 2020-01-14 RX ORDER — CODEINE PHOSPHATE AND GUAIFENESIN 10; 100 MG/5ML; MG/5ML
5 SOLUTION ORAL EVERY 8 HOURS PRN
Qty: 180 ML | Refills: 0 | OUTPATIENT
Start: 2020-01-14 | End: 2020-01-24

## 2020-01-14 NOTE — TELEPHONE ENCOUNTER
----- Message from Yoni Diaz sent at 1/14/2020 10:24 AM CST -----  Contact: King [phone]  Type: RX Refill Request    Who Called: King     Refill or New Rx: refill     RX Name and Strength:guaifenesin-codeine 100-10 mg/5 ml (CHERATUSSIN AC)  mg/5 mL syrup    Is this a 30 day or 90 day RX:30 day     Preferred Pharmacy with phone number: [id number] - [place], [place] - [address]    Would the patient rather a call back or a response via [facility]? Call back     Best Call Back Number:[phone]    Additional Information: The patient reports that he still has been coughing and would like more of the cough medication called in.

## 2020-01-27 ENCOUNTER — OFFICE VISIT (OUTPATIENT)
Dept: FAMILY MEDICINE | Facility: CLINIC | Age: 65
End: 2020-01-27
Payer: MEDICARE

## 2020-01-27 VITALS
TEMPERATURE: 98 F | BODY MASS INDEX: 32.79 KG/M2 | SYSTOLIC BLOOD PRESSURE: 126 MMHG | WEIGHT: 234.25 LBS | OXYGEN SATURATION: 97 % | HEIGHT: 71 IN | HEART RATE: 84 BPM | DIASTOLIC BLOOD PRESSURE: 82 MMHG

## 2020-01-27 DIAGNOSIS — J06.9 VIRAL URI: Primary | ICD-10-CM

## 2020-01-27 DIAGNOSIS — M17.0 PRIMARY OSTEOARTHRITIS OF BOTH KNEES: ICD-10-CM

## 2020-01-27 PROCEDURE — 3008F PR BODY MASS INDEX (BMI) DOCUMENTED: ICD-10-PCS | Mod: HCNC,CPTII,S$GLB, | Performed by: FAMILY MEDICINE

## 2020-01-27 PROCEDURE — 3079F PR MOST RECENT DIASTOLIC BLOOD PRESSURE 80-89 MM HG: ICD-10-PCS | Mod: HCNC,CPTII,S$GLB, | Performed by: FAMILY MEDICINE

## 2020-01-27 PROCEDURE — 99215 PR OFFICE/OUTPT VISIT, EST, LEVL V, 40-54 MIN: ICD-10-PCS | Mod: HCNC,S$GLB,, | Performed by: FAMILY MEDICINE

## 2020-01-27 PROCEDURE — 99499 RISK ADDL DX/OHS AUDIT: ICD-10-PCS | Mod: HCNC,S$GLB,, | Performed by: FAMILY MEDICINE

## 2020-01-27 PROCEDURE — 99499 UNLISTED E&M SERVICE: CPT | Mod: HCNC,S$GLB,, | Performed by: FAMILY MEDICINE

## 2020-01-27 PROCEDURE — 99999 PR PBB SHADOW E&M-EST. PATIENT-LVL IV: ICD-10-PCS | Mod: PBBFAC,HCNC,, | Performed by: FAMILY MEDICINE

## 2020-01-27 PROCEDURE — 3008F BODY MASS INDEX DOCD: CPT | Mod: HCNC,CPTII,S$GLB, | Performed by: FAMILY MEDICINE

## 2020-01-27 PROCEDURE — 99999 PR PBB SHADOW E&M-EST. PATIENT-LVL IV: CPT | Mod: PBBFAC,HCNC,, | Performed by: FAMILY MEDICINE

## 2020-01-27 PROCEDURE — 3074F SYST BP LT 130 MM HG: CPT | Mod: HCNC,CPTII,S$GLB, | Performed by: FAMILY MEDICINE

## 2020-01-27 PROCEDURE — 3074F PR MOST RECENT SYSTOLIC BLOOD PRESSURE < 130 MM HG: ICD-10-PCS | Mod: HCNC,CPTII,S$GLB, | Performed by: FAMILY MEDICINE

## 2020-01-27 PROCEDURE — 99215 OFFICE O/P EST HI 40 MIN: CPT | Mod: HCNC,S$GLB,, | Performed by: FAMILY MEDICINE

## 2020-01-27 PROCEDURE — 3079F DIAST BP 80-89 MM HG: CPT | Mod: HCNC,CPTII,S$GLB, | Performed by: FAMILY MEDICINE

## 2020-01-27 RX ORDER — LORATADINE 10 MG/1
10 TABLET ORAL DAILY
Qty: 30 TABLET | Refills: 1 | Status: SHIPPED | OUTPATIENT
Start: 2020-01-27 | End: 2020-04-14

## 2020-01-27 RX ORDER — BENZONATATE 100 MG/1
100 CAPSULE ORAL 3 TIMES DAILY PRN
Qty: 30 CAPSULE | Refills: 0 | Status: SHIPPED | OUTPATIENT
Start: 2020-01-27 | End: 2020-02-24

## 2020-01-27 RX ORDER — HYDROCODONE BITARTRATE AND ACETAMINOPHEN 10; 325 MG/1; MG/1
1 TABLET ORAL 3 TIMES DAILY PRN
Qty: 82 TABLET | Refills: 0 | Status: SHIPPED | OUTPATIENT
Start: 2020-04-11 | End: 2020-07-08 | Stop reason: SDUPTHER

## 2020-01-27 RX ORDER — CODEINE PHOSPHATE AND GUAIFENESIN 10; 100 MG/5ML; MG/5ML
5 SOLUTION ORAL EVERY 8 HOURS PRN
Qty: 180 ML | Refills: 0 | Status: SHIPPED | OUTPATIENT
Start: 2020-01-27 | End: 2020-02-06

## 2020-01-27 RX ORDER — HYDROCODONE BITARTRATE AND ACETAMINOPHEN 10; 325 MG/1; MG/1
1 TABLET ORAL 3 TIMES DAILY PRN
Qty: 82 TABLET | Refills: 0 | Status: SHIPPED | OUTPATIENT
Start: 2020-03-11 | End: 2020-04-15 | Stop reason: SDUPTHER
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
## 2020-01-31 DIAGNOSIS — E11.9 TYPE 2 DIABETES MELLITUS WITHOUT COMPLICATION: ICD-10-CM

## 2020-02-03 DIAGNOSIS — M25.561 CHRONIC PAIN OF RIGHT KNEE: ICD-10-CM

## 2020-02-03 DIAGNOSIS — G89.29 CHRONIC PAIN OF RIGHT KNEE: ICD-10-CM

## 2020-02-03 RX ORDER — GABAPENTIN 300 MG/1
300 CAPSULE ORAL 3 TIMES DAILY
Qty: 90 CAPSULE | Refills: 2 | Status: SHIPPED | OUTPATIENT
Start: 2020-02-03 | End: 2020-04-29

## 2020-02-23 DIAGNOSIS — J06.9 VIRAL URI: ICD-10-CM

## 2020-02-24 RX ORDER — BENZONATATE 100 MG/1
CAPSULE ORAL
Qty: 30 CAPSULE | Refills: 0 | Status: SHIPPED | OUTPATIENT
Start: 2020-02-24 | End: 2020-03-30

## 2020-02-24 NOTE — PROGRESS NOTES
Refill Routing Note     Medication(s) are not appropriate for processing by [facility]:    Medication Outside of Protocol    Appointments  past 12m or future 3m with PCP    Date Provider   Last Visit   1/27/2020 Gallo Lara MD   Next Visit   Visit date not found Gallo Lara MD           Automatic Epic Protocol Generated Data:    Requested Prescriptions   Pending Prescriptions Disp Refills    benzonatate (TESSALON) 100 MG capsule [Pharmacy Med Name: BENZONATATE 100 MG CAPSULE] 30 capsule 0     Sig: TAKE 1 CAPSULE BY MOUTH THREE TIMES A DAY AS NEEDED FOR COUGH       There is no refill protocol information for this order           Note composed:8:14 AM 02/24/2020

## 2020-03-25 DIAGNOSIS — E11.9 TYPE 2 DIABETES MELLITUS WITHOUT COMPLICATION, WITHOUT LONG-TERM CURRENT USE OF INSULIN: ICD-10-CM

## 2020-03-25 RX ORDER — METFORMIN HYDROCHLORIDE 500 MG/1
500 TABLET ORAL
Qty: 90 TABLET | Refills: 3
Start: 2020-03-25 | End: 2020-03-30 | Stop reason: SDUPTHER

## 2020-03-25 NOTE — TELEPHONE ENCOUNTER
----- Message from Isabelle Alyssa sent at 3/25/2020 11:00 AM CDT -----  Contact: self [phone]  .Type: RX Refill Request    Who Called:  Self     Have you contacted your pharmacy: yes     Refill or New Rx refill     RX Name and Strength: metFORMIN (GLUCOPHAGE) 500 MG tablet    Is this a 30 day or 90 day RX: 90 day    Preferred Pharmacy with phone number: .  [facility]/pharm[facility] #[id number] - [place] - [address]  [address] [place] [address]  Phone: [phone] Fax: [phone]    Local or Mail Order: local     Would the patient rather a call back or a response via [facility]?  Call back     Best Call Back Number: [phone]

## 2020-03-26 DIAGNOSIS — E11.9 TYPE 2 DIABETES MELLITUS WITHOUT COMPLICATION, WITHOUT LONG-TERM CURRENT USE OF INSULIN: ICD-10-CM

## 2020-03-27 DIAGNOSIS — E11.9 TYPE 2 DIABETES MELLITUS WITHOUT COMPLICATION: ICD-10-CM

## 2020-03-29 DIAGNOSIS — J06.9 VIRAL URI: ICD-10-CM

## 2020-03-30 DIAGNOSIS — E11.9 TYPE 2 DIABETES MELLITUS WITHOUT COMPLICATION, WITHOUT LONG-TERM CURRENT USE OF INSULIN: ICD-10-CM

## 2020-03-30 RX ORDER — METFORMIN HYDROCHLORIDE 500 MG/1
500 TABLET ORAL
Qty: 90 TABLET | Refills: 3
Start: 2020-03-30 | End: 2021-03-30

## 2020-03-30 RX ORDER — METFORMIN HYDROCHLORIDE 500 MG/1
500 TABLET ORAL 2 TIMES DAILY WITH MEALS
Qty: 180 TABLET | Refills: 0 | Status: SHIPPED | OUTPATIENT
Start: 2020-03-30 | End: 2020-09-04

## 2020-03-30 RX ORDER — METFORMIN HYDROCHLORIDE 500 MG/1
500 TABLET ORAL
Qty: 90 TABLET | Refills: 3 | Status: CANCELLED | OUTPATIENT
Start: 2020-03-30 | End: 2021-03-30

## 2020-03-30 RX ORDER — BENZONATATE 100 MG/1
CAPSULE ORAL
Qty: 30 CAPSULE | Refills: 0 | Status: SHIPPED | OUTPATIENT
Start: 2020-03-30 | End: 2020-04-30

## 2020-03-30 RX ORDER — METFORMIN HYDROCHLORIDE 500 MG/1
500 TABLET ORAL
Qty: 90 TABLET | Refills: 3
Start: 2020-03-30 | End: 2020-03-30 | Stop reason: SDUPTHER

## 2020-03-30 RX ORDER — METFORMIN HYDROCHLORIDE 500 MG/1
TABLET ORAL
Qty: 180 TABLET | Refills: 0 | Status: SHIPPED | OUTPATIENT
Start: 2020-03-30 | End: 2020-03-30 | Stop reason: SDUPTHER

## 2020-03-30 NOTE — PROGRESS NOTES
Refill Authorization Note     is requesting a refill authorization.    Brief assessment and rationale for refill: APPROVE: resending          Medication Therapy Plan: PCP set to no print; will change to normal                              Comments:   Refill Center Care Gap Closure protocols temporarily suspended.   Requested Prescriptions   Signed Prescriptions Disp Refills    metFORMIN (GLUCOPHAGE) 500 MG tablet 180 tablet 0     Sig: TAKE ONE TABLET BY MOUTH TWICE DAILY WITH MEALS       Endocrinology:  Diabetes - Biguanides Failed - 3/30/2020  3:57 PM        Failed - HBA1C is 7.9 or below and within 180 days     Hemoglobin A1C   Date Value Ref Range Status   12/11/2019 9.0 (H) 4.0 - 5.6 % Final     Comment:     ADA Screening Guidelines:  5.7-6.4%  Consistent with prediabetes  >or=6.5%  Consistent with diabetes  High levels of fetal hemoglobin interfere with the HbA1C  assay. Heterozygous hemoglobin variants (HbS, HgC, etc)do  not significantly interfere with this assay.   However, presence of multiple variants may affect accuracy.     06/11/2019 7.4 (H) 4.0 - 5.6 % Final     Comment:     ADA Screening Guidelines:  5.7-6.4%  Consistent with prediabetes  >or=6.5%  Consistent with diabetes  High levels of fetal hemoglobin interfere with the HbA1C  assay. Heterozygous hemoglobin variants (HbS, HgC, etc)do  not significantly interfere with this assay.   However, presence of multiple variants may affect accuracy.     01/15/2019 7.0 (H) 4.0 - 5.6 % Final     Comment:     ADA Screening Guidelines:  5.7-6.4%  Consistent with prediabetes  >or=6.5%  Consistent with diabetes  High levels of fetal hemoglobin interfere with the HbA1C  assay. Heterozygous hemoglobin variants (HbS, HgC, etc)do  not significantly interfere with this assay.   However, presence of multiple variants may affect accuracy.                Passed - Patient is at least 18 years old        Passed - Office visit in past 12 months or future 90 days.      Recent Outpatient Visits            2 months ago Viral URI    [facility] - [facility] Medicine Gallo Lara MD    3 months ago Viral URI    [facility] - Family Medicine Gallo Lara MD    3 months ago Viral URI    [facility] - Family Medicine Gallo Lara MD    6 months ago Primary osteoarthritis of both knees    [facility] Medicine Gallo Lara MD    7 months ago Acute pain of left knee    [facility] Gallo Lara [facility]                    Passed - Cr is 1.3 or below and within 360 days     Creatinine   Date Value Ref Range Status   12/11/2019 1.1 0.5 - 1.4 mg/dL Final   06/11/2019 1.1 0.5 - 1.4 mg/dL Final   01/24/2019 0.9 0.5 - 1.4 mg/dL Final              Passed - eGFR is 30 or above and within 360 days     eGFR if non    Date Value Ref Range Status   12/11/2019 >60.0 >60 mL/min/1.73 m^2 Final     Comment:     Calculation used to obtain the estimated glomerular filtration  rate (eGFR) is the CKD-EPI equation.      06/11/2019 >60.0 >60 mL/min/1.73 m^2 Final     Comment:     Calculation used to obtain the estimated glomerular filtration  rate (eGFR) is the CKD-EPI equation.      01/24/2019 >60.0 >60 mL/min/1.73 m^2 Final     Comment:     Calculation used to obtain the estimated glomerular filtration  rate (eGFR) is the CKD-EPI equation.        eGFR if    Date Value Ref Range Status   12/11/2019 >60.0 >60 mL/min/1.73 m^2 Final   06/11/2019 >60.0 >60 mL/min/1.73 m^2 Final   01/24/2019 >60.0 >60 mL/min/1.73 m^2 Final               Appointments  past 12m or future 3m with PCP    Date Provider   Last Visit   1/27/2020 Gallo Lara MD   Next Visit   3/29/2020 Gallo Lara MD   .  ED visits in past 90 days: 0       Note composed:3:59 PM 03/30/2020

## 2020-03-30 NOTE — TELEPHONE ENCOUNTER
----- Message from Sangeeta Reyes sent at 3/27/2020  4:45 PM CDT -----  Contact: [facility]  Type: RX Refill Request    Who Called: [facility]    Have you contacted your pharmacy:Yes    Refill or New Rx:Refill    RX Name and Strength:metFORMIN (GLUCOPHAGE) 500 MG tablet 90 tablet     Preferred Pharmacy with phone number:[facility]/pharmacy #[id number] - [place] - [address] [facility] [phone] (Phone)  [phone] (Fax)    Local or Mail Order:Local    Ordering Provider:Christopher    Would the patient rather a call back or a response [email or web address]?Call    Best Call Back Number:[phone]    Additional Information: Pharmacy never received Rx requesting verbal for patient

## 2020-03-30 NOTE — PROGRESS NOTES
Refill Routing Note     Medication(s) are appropriate for refill:    Medication Outside of Protocol    Appointments  past 15m or future 3m with PCP    Date Provider   Last Visit   1/27/2020 Gallo Lara MD   Next Visit   Visit date not found Gallo Lara MD       Automatic Epic Protocol Generated Data:    Requested Prescriptions   Pending Prescriptions Disp Refills    benzonatate (TESSALON) 100 MG capsule [Pharmacy Med Name: BENZONATATE 100 MG CAPSULE] 30 capsule 0     Sig: TAKE 1 CAPSULE BY MOUTH 3 TIMES A DAY AS NEEDED FOR COUGH       There is no refill protocol information for this order           Note created:8:59 PM 03/29/2020

## 2020-03-30 NOTE — TELEPHONE ENCOUNTER
----- Message from Ashtyn Matt sent at 3/27/2020  4:21 PM CDT -----  Contact: Self [phone]  Type: Patient Call Back    Who called: Self     What is the request in detail:  Pt. Is calling in regard to medication metFORMIN (GLUCOPHAGE) 500 MG tablet. Pt. Is stating that pharmacy never received prescription.     Can the clinic reply by [facility]? Call back     Would the patient rather a call back or a response via [facility]? Call back     Best call back number: [phone]

## 2020-04-09 DIAGNOSIS — J06.9 VIRAL URI: ICD-10-CM

## 2020-04-14 RX ORDER — LORATADINE 10 MG/1
TABLET ORAL
Qty: 30 TABLET | Refills: 1 | Status: SHIPPED | OUTPATIENT
Start: 2020-04-14 | End: 2020-06-03

## 2020-04-14 NOTE — PROGRESS NOTES
Refill Routing Note     Medication(s) are not appropriate for processing by [facility]:    Disease State Not Assessed by Refill Center -Viral URI    Appointments  past 12m or future 3m with PCP    Date Provider   Last Visit   1/27/2020 Gallo Lara MD   Next Visit   Visit date not found Gallo Lara MD           Automatic Epic Protocol Generated Data:    Requested Prescriptions   Pending Prescriptions Disp Refills    loratadine (CLARITIN) 10 mg tablet [Pharmacy Med Name: LORATADINE 10 MG TABLET] 30 tablet 1     Sig: TAKE 1 TABLET BY MOUTH EVERY DAY       Ear, Nose, and Throat:  Antihistamines Failed - 4/9/2020 12:42 AM        Failed - An appropriate indication is on the problem list     Allergic Rhinitis  Sinusitis  Seasonal Allergies              Passed - Patient is at least 18 years old        Passed - Office visit in past 12 months or future 90 days.     Recent Outpatient Visits            2 months ago Viral URI    [facility] Gallo Lara MD    3 months ago Viral URI    [facility] Gallo Lara MD    4 months ago Viral URI    [facility] Gallo Lara MD    7 months ago Primary osteoarthritis of both knees    [facility] Gallo Lara MD    8 months ago Acute pain of left knee    [facility] Gallo Lara MD                       Note composed:5:54 PM 04/14/2020

## 2020-04-15 ENCOUNTER — TELEPHONE (OUTPATIENT)
Dept: FAMILY MEDICINE | Facility: CLINIC | Age: 65
End: 2020-04-15

## 2020-04-15 ENCOUNTER — OFFICE VISIT (OUTPATIENT)
Dept: FAMILY MEDICINE | Facility: CLINIC | Age: 65
End: 2020-04-15
Payer: MEDICARE

## 2020-04-15 DIAGNOSIS — I10 ESSENTIAL HYPERTENSION: Chronic | ICD-10-CM

## 2020-04-15 DIAGNOSIS — M17.0 PRIMARY OSTEOARTHRITIS OF BOTH KNEES: Primary | ICD-10-CM

## 2020-04-15 PROCEDURE — 99443 PR PHYSICIAN TELEPHONE EVALUATION 21-30 MIN: CPT | Mod: 95,,, | Performed by: FAMILY MEDICINE

## 2020-04-15 PROCEDURE — 99443 PR PHYSICIAN TELEPHONE EVALUATION 21-30 MIN: ICD-10-PCS | Mod: 95,,, | Performed by: FAMILY MEDICINE

## 2020-04-15 RX ORDER — HYDROCODONE BITARTRATE AND ACETAMINOPHEN 10; 325 MG/1; MG/1
1 TABLET ORAL 3 TIMES DAILY PRN
Qty: 80 TABLET | Refills: 0 | Status: SHIPPED | OUTPATIENT
Start: 2020-05-10 | End: 2020-06-08 | Stop reason: SDUPTHER

## 2020-04-15 NOTE — TELEPHONE ENCOUNTER
----- Message from Kelly Alanis sent at 4/15/2020 11:10 AM CDT -----  Type: Patient Call Back    Who called: pt     What is the request in detail: pt calling to verify if he will be able to get --HYDROcodone-acetaminophen (NORCO)  mg per tablet-- for the month of May or will need an appt.     Can the clinic reply by [facility]? No     Would the patient rather a call back or a response via [facility]? Call back     Best call back number: [phone]    Additional Information:
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
## 2020-04-29 DIAGNOSIS — G89.29 CHRONIC PAIN OF RIGHT KNEE: ICD-10-CM

## 2020-04-29 DIAGNOSIS — M25.561 CHRONIC PAIN OF RIGHT KNEE: ICD-10-CM

## 2020-04-29 RX ORDER — GABAPENTIN 300 MG/1
CAPSULE ORAL
Qty: 90 CAPSULE | Refills: 2 | Status: SHIPPED | OUTPATIENT
Start: 2020-04-29 | End: 2020-07-08 | Stop reason: SDUPTHER

## 2020-04-29 NOTE — PROGRESS NOTES
Refill Routing Note    Medication(s) are not appropriate for processing by [facility]:       Outside of protocol           Medication reconciliation completed: No      Appointments naop17a or future 3m with PCP    Date Provider   Last Visit   4/15/2020 Gallo Lara MD   Next Visit   Visit date not found Gallo Lara MD     Automatic Epic Protocol Generated Data:    Requested Prescriptions   Pending Prescriptions Disp Refills    gabapentin (NEURONTIN) 300 MG capsule [Pharmacy Med Name: GABAPENTIN 300 MG CAPSULE] 90 capsule 2     Sig: TAKE 1 CAPSULE BY MOUTH THREE TIMES A DAY       Anticonvulsants Protocol Passed - 4/29/2020 12:17 AM        Passed - Visit with Authorizing provider in past 9 months or upcoming 90 days           Note composed:7:57 AM 04/29/2020

## 2020-04-30 DIAGNOSIS — J06.9 VIRAL URI: ICD-10-CM

## 2020-04-30 RX ORDER — BENZONATATE 100 MG/1
CAPSULE ORAL
Qty: 30 CAPSULE | Refills: 0 | Status: SHIPPED | OUTPATIENT
Start: 2020-04-30 | End: 2020-06-03

## 2020-04-30 NOTE — TELEPHONE ENCOUNTER
Refill Routing Note     Medication(s) are not appropriate for processing by [facility]:    Medication Outside of Protocol    Appointments  past 12m or future 3m with PCP    Date Provider   Last Visit   4/15/2020 Gallo Lara MD   Next Visit   Visit date not found Gallo Lara MD           Automatic Epic Protocol Generated Data:    Requested Prescriptions   Pending Prescriptions Disp Refills    benzonatate (TESSALON) 100 MG capsule [Pharmacy Med Name: BENZONATATE 100 MG CAPSULE] 30 capsule 0     Sig: TAKE 1 CAPSULE BY MOUTH THREE TIMES A DAY AS NEEDED FOR COUGH       There is no refill protocol information for this order           Note composed:2:54 PM 04/30/2020

## 2020-06-03 DIAGNOSIS — J06.9 VIRAL URI: ICD-10-CM

## 2020-06-03 RX ORDER — LORATADINE 10 MG/1
TABLET ORAL
Qty: 90 TABLET | Refills: 1 | Status: SHIPPED | OUTPATIENT
Start: 2020-06-03 | End: 2020-10-12

## 2020-06-03 RX ORDER — BENZONATATE 100 MG/1
CAPSULE ORAL
Qty: 30 CAPSULE | Refills: 0 | Status: SHIPPED | OUTPATIENT
Start: 2020-06-03 | End: 2020-11-24

## 2020-06-03 NOTE — TELEPHONE ENCOUNTER
----- Message from Kelly Alanis sent at 6/3/2020 10:28 AM CDT -----  Type: Patient Call Back    Who called: pt    What is the request in detail: pt asking for appt for orders for inj in his knee and meds refill. Pt declined to see another provider. First available is 07/08.    Can the clinic reply by [facility]? No     Would the patient rather a call back or a response via [facility]? Call back     Best call back number: [phone]    Additional Information:

## 2020-06-03 NOTE — TELEPHONE ENCOUNTER
Refill Routing Note     Medication(s) are not appropriate for processing by [facility]:    Disease State Not Assessed by Refill Center        Loratadine associated with viral URI. Outside [facility] protocol to renew.      Appointments  past 12m or future 3m with PCP    Date Provider   Last Visit   4/15/2020 Gallo Lara MD   Next Visit   7/8/2020 Gallo Lara MD           Automatic Epic Protocol Generated Data:    Requested Prescriptions   Pending Prescriptions Disp Refills    loratadine (CLARITIN) 10 mg tablet [Pharmacy Med Name: LORATADINE 10 MG TABLET] 90 tablet 0     Sig: TAKE 1 TABLET BY MOUTH EVERY DAY       Ear, Nose, and Throat:  Antihistamines Failed - 6/3/2020 11:10 AM        Failed - An appropriate indication is on the problem list     Allergic Rhinitis  Sinusitis  Seasonal Allergies              Passed - Patient is at least 18 years old        Passed - Office visit in past 12 months or future 90 days.     Recent Outpatient Visits            1 month ago Primary osteoarthritis of both knees    [facility] Gallo Lara MD    4 months ago Viral URI    [facility] Gallo Lraa MD    5 months ago Viral URI    [facility] Gallo Lara MD    5 months ago Viral URI    [facility] Gallo Lara MD    8 months ago Primary osteoarthritis of both knees    [facility]          Future Appointments              In 1 month Gallo Lara MD [facility] [place]                   Note composed:12:49 PM 06/03/2020

## 2020-06-03 NOTE — PROGRESS NOTES
Refill Routing Note    Medication(s) are not appropriate for processing by [facility]:       Outside of protocol           Medication reconciliation completed: No      Automatic Epic Protocol Generated Data:    Requested Prescriptions   Pending Prescriptions Disp Refills    benzonatate (TESSALON) 100 MG capsule [Pharmacy Med Name: BENZONATATE 100 MG CAPSULE] 30 capsule 0     Sig: TAKE 1 CAPSULE BY MOUTH 3 TIMES A DAY AS NEEDED FOR COUGH       There is no refill protocol information for this order           Appointments  past 12m or future 3m with PCP    Date Provider   Last Visit   4/15/2020 Gallo Lara MD   Next Visit   7/8/2020 Gallo Lara MD   ED visits in past 90 days: 0     Note composed:2:34 PM 06/03/2020

## 2020-06-08 DIAGNOSIS — M17.0 PRIMARY OSTEOARTHRITIS OF BOTH KNEES: ICD-10-CM

## 2020-06-08 RX ORDER — HYDROCODONE BITARTRATE AND ACETAMINOPHEN 10; 325 MG/1; MG/1
1 TABLET ORAL 3 TIMES DAILY PRN
Qty: 80 TABLET | Refills: 0 | Status: SHIPPED | OUTPATIENT
Start: 2020-06-08 | End: 2020-07-08 | Stop reason: SDUPTHER

## 2020-06-08 NOTE — TELEPHONE ENCOUNTER
----- Message from Isabelle Alyssa sent at 6/8/2020  2:35 PM CDT -----  Contact: self  [phone]  .Type: RX Refill Request    Who Called:  Self     Have you contacted your pharmacy: no    Refill or New Rx: refill     RX Name and Strength: HYDROcodone-acetaminophen (NORCO)  mg per tablet    Preferred Pharmacy with phone number: .  [facility]/pharmacy #[id number] - [place] - [address]  [address] [place] [address]  Phone: [phone] Fax: [phone]    Local or Mail Order: local    Would the patient rather a call back or a response via [facility]?  Call back    Best Call Back Number: [phone]

## 2020-07-08 ENCOUNTER — LAB VISIT (OUTPATIENT)
Dept: LAB | Facility: HOSPITAL | Age: 65
End: 2020-07-08
Attending: FAMILY MEDICINE
Payer: MEDICARE

## 2020-07-08 ENCOUNTER — OFFICE VISIT (OUTPATIENT)
Dept: FAMILY MEDICINE | Facility: CLINIC | Age: 65
End: 2020-07-08
Payer: MEDICARE

## 2020-07-08 VITALS
WEIGHT: 230.25 LBS | HEIGHT: 71 IN | DIASTOLIC BLOOD PRESSURE: 70 MMHG | OXYGEN SATURATION: 95 % | HEART RATE: 96 BPM | TEMPERATURE: 98 F | SYSTOLIC BLOOD PRESSURE: 120 MMHG | BODY MASS INDEX: 32.23 KG/M2

## 2020-07-08 DIAGNOSIS — G89.29 CHRONIC PAIN OF RIGHT KNEE: Primary | ICD-10-CM

## 2020-07-08 DIAGNOSIS — I10 ESSENTIAL HYPERTENSION: Chronic | ICD-10-CM

## 2020-07-08 DIAGNOSIS — M25.561 CHRONIC PAIN OF RIGHT KNEE: Primary | ICD-10-CM

## 2020-07-08 DIAGNOSIS — M17.0 PRIMARY OSTEOARTHRITIS OF BOTH KNEES: ICD-10-CM

## 2020-07-08 PROCEDURE — 3052F HG A1C>EQUAL 8.0%<EQUAL 9.0%: CPT | Mod: HCNC,CPTII,S$GLB, | Performed by: FAMILY MEDICINE

## 2020-07-08 PROCEDURE — 99215 PR OFFICE/OUTPT VISIT, EST, LEVL V, 40-54 MIN: ICD-10-PCS | Mod: 25,HCNC,S$GLB, | Performed by: FAMILY MEDICINE

## 2020-07-08 PROCEDURE — 3052F PR MOST RECENT HEMOGLOBIN A1C LEVEL 8.0 - < 9.0%: ICD-10-PCS | Mod: HCNC,CPTII,S$GLB, | Performed by: FAMILY MEDICINE

## 2020-07-08 PROCEDURE — 3008F PR BODY MASS INDEX (BMI) DOCUMENTED: ICD-10-PCS | Mod: HCNC,CPTII,S$GLB, | Performed by: FAMILY MEDICINE

## 2020-07-08 PROCEDURE — 3008F BODY MASS INDEX DOCD: CPT | Mod: HCNC,CPTII,S$GLB, | Performed by: FAMILY MEDICINE

## 2020-07-08 PROCEDURE — 99499 RISK ADDL DX/OHS AUDIT: ICD-10-PCS | Mod: HCNC,S$GLB,, | Performed by: FAMILY MEDICINE

## 2020-07-08 PROCEDURE — 20610 DRAIN/INJ JOINT/BURSA W/O US: CPT | Mod: HCNC,RT,S$GLB, | Performed by: FAMILY MEDICINE

## 2020-07-08 PROCEDURE — 3074F PR MOST RECENT SYSTOLIC BLOOD PRESSURE < 130 MM HG: ICD-10-PCS | Mod: HCNC,CPTII,S$GLB, | Performed by: FAMILY MEDICINE

## 2020-07-08 PROCEDURE — 3078F DIAST BP <80 MM HG: CPT | Mod: HCNC,CPTII,S$GLB, | Performed by: FAMILY MEDICINE

## 2020-07-08 PROCEDURE — 99215 OFFICE O/P EST HI 40 MIN: CPT | Mod: 25,HCNC,S$GLB, | Performed by: FAMILY MEDICINE

## 2020-07-08 PROCEDURE — 3074F SYST BP LT 130 MM HG: CPT | Mod: HCNC,CPTII,S$GLB, | Performed by: FAMILY MEDICINE

## 2020-07-08 PROCEDURE — 82043 UR ALBUMIN QUANTITATIVE: CPT | Mod: HCNC

## 2020-07-08 PROCEDURE — 20610 LARGE JOINT ASPIRATION/INJECTION: R KNEE: ICD-10-PCS | Mod: HCNC,RT,S$GLB, | Performed by: FAMILY MEDICINE

## 2020-07-08 PROCEDURE — 3078F PR MOST RECENT DIASTOLIC BLOOD PRESSURE < 80 MM HG: ICD-10-PCS | Mod: HCNC,CPTII,S$GLB, | Performed by: FAMILY MEDICINE

## 2020-07-08 PROCEDURE — 99499 UNLISTED E&M SERVICE: CPT | Mod: HCNC,S$GLB,, | Performed by: FAMILY MEDICINE

## 2020-07-08 PROCEDURE — 99999 PR PBB SHADOW E&M-EST. PATIENT-LVL IV: ICD-10-PCS | Mod: PBBFAC,HCNC,, | Performed by: FAMILY MEDICINE

## 2020-07-08 PROCEDURE — 99999 PR PBB SHADOW E&M-EST. PATIENT-LVL IV: CPT | Mod: PBBFAC,HCNC,, | Performed by: FAMILY MEDICINE

## 2020-07-08 RX ORDER — DICLOFENAC SODIUM 10 MG/G
2 GEL TOPICAL 3 TIMES DAILY PRN
Qty: 100 G | Refills: 0 | Status: SHIPPED | OUTPATIENT
Start: 2020-07-08 | End: 2020-09-04

## 2020-07-08 RX ORDER — HYDROCODONE BITARTRATE AND ACETAMINOPHEN 10; 325 MG/1; MG/1
1 TABLET ORAL 3 TIMES DAILY PRN
Qty: 78 TABLET | Refills: 0 | Status: SHIPPED | OUTPATIENT
Start: 2020-07-08 | End: 2020-10-08 | Stop reason: SDUPTHER

## 2020-07-08 RX ORDER — HYDROCODONE BITARTRATE AND ACETAMINOPHEN 10; 325 MG/1; MG/1
1 TABLET ORAL 3 TIMES DAILY PRN
Qty: 78 TABLET | Refills: 0 | Status: SHIPPED | OUTPATIENT
Start: 2020-08-08 | End: 2020-10-08 | Stop reason: SDUPTHER

## 2020-07-08 RX ORDER — GABAPENTIN 300 MG/1
300 CAPSULE ORAL 3 TIMES DAILY
Qty: 90 CAPSULE | Refills: 2 | Status: SHIPPED | OUTPATIENT
Start: 2020-07-08 | End: 2020-09-30

## 2020-07-08 RX ORDER — HYDROCODONE BITARTRATE AND ACETAMINOPHEN 10; 325 MG/1; MG/1
1 TABLET ORAL 3 TIMES DAILY PRN
Qty: 78 TABLET | Refills: 0 | Status: SHIPPED | OUTPATIENT
Start: 2020-09-08 | End: 2020-10-08 | Stop reason: SDUPTHER

## 2020-07-08 RX ORDER — TRIAMCINOLONE ACETONIDE 40 MG/ML
40 INJECTION, SUSPENSION INTRA-ARTICULAR; INTRAMUSCULAR
Status: DISCONTINUED | OUTPATIENT
Start: 2020-07-08 | End: 2020-07-08 | Stop reason: HOSPADM

## 2020-07-08 RX ADMIN — TRIAMCINOLONE ACETONIDE 40 MG: 40 INJECTION, SUSPENSION INTRA-ARTICULAR; INTRAMUSCULAR at 03:07

## 2020-07-08 NOTE — PROGRESS NOTES
[facility]  Progress Note    SUBJECTIVE:     Chief Complaint   Patient presents with    Knee Pain       HPI   King Cool Jr.  is a 65 y.o. male here for follow-up of his chronic conditions. Also has c/o his chronic R knee pain. Last injection > 6 months ago. Rates pain as severe, non-radiating. No falls/trauma. Walking/standing makes it worst. norcos do help.    Review of patient's allergies indicates:   Allergen Reactions    Penicillins Rash       Past Medical History:   Diagnosis Date    Essential (primary) hypertension     Male erectile dysfunction, unspecified     Testicular hypofunction     Type 2 diabetes mellitus without complications      Past Surgical History:   Procedure Laterality Date    HIP SURGERY       Family History   Problem Relation Age of Onset    Blindness Mother     No Known Problems Father     No Known Problems Sister     No Known Problems Brother     No Known Problems Maternal Aunt     No Known Problems Maternal Uncle     No Known Problems Paternal Aunt     No Known Problems Paternal Uncle     No Known Problems Maternal Grandmother     No Known Problems Maternal Grandfather     No Known Problems Paternal Grandmother     No Known Problems Paternal Grandfather     Amblyopia Neg Hx     Cancer Neg Hx     Cataracts Neg Hx     Diabetes Neg Hx     Glaucoma Neg Hx     Hypertension Neg Hx     Macular degeneration Neg Hx     Retinal detachment Neg Hx     Strabismus Neg Hx     Stroke Neg Hx     Thyroid disease Neg Hx      Social History     Tobacco Use    Smoking status: Former Smoker     Types: Cigarettes    Smokeless tobacco: Never Used   Substance Use Topics    Alcohol use: Yes     Comment: rarely     Drug use: No        Review of Systems   Constitutional: Negative for chills, diaphoresis, fever and malaise/fatigue.   Musculoskeletal: Positive for joint pain (R knee). Negative for back pain, falls, myalgias and neck pain.   Skin: Negative.    Neurological:  Negative for dizziness, tingling, sensory change, focal weakness, seizures and weakness.   All other systems reviewed and are negative.    OBJECTIVE:     Vitals:    07/08/20 1451   BP: 120/70   Pulse: 96   Temp: 97.7 °F (36.5 °C)     Body mass index is 32.57 kg/m².    Physical Exam   Constitutional: He is oriented to person, place, and time. He appears distressed (mild).   Musculoskeletal:         General: Tenderness (to palpation of R medial tibial-femoral compartment, decreased joint space. ACL/PCL intact, negative McMurrays sign) present.   Neurological: He is alert and oriented to person, place, and time.   Skin: No rash noted. He is not diaphoretic. No erythema.     Protective Sensation (w/ 10 gram monofilament):  Right: Intact  Left: Intact    Visual Inspection:  Normal -  Bilateral    Pedal Pulses:   Right: Present  Left: Present    Posterior tibialis:   Right:Present  Left: Present      Old records were reviewed. Labs and/or images were independently reviewed.    ASSESSMENT     1. Chronic pain of right knee    2. Primary osteoarthritis of both knees    3. Essential hypertension    4. Uncontrolled type 2 diabetes mellitus without complication, without long-term current use of insulin        PLAN:     Chronic pain of right knee  -     gabapentin (NEURONTIN) 300 MG capsule; Take 1 capsule (300 mg total) by mouth 3 (three) times daily.  Dispense: 90 capsule; Refill: 2  -     Large Joint Aspiration/Injection: R knee  -     triamcinolone acetonide injection 40 mg  -     Patient counseled on good posture and stretching exercises. Continue to place ice packs on affected areas 3-4 times daily. Take medications as prescribed. Instructed patient to call MD if symptoms persist or worsen.    Primary osteoarthritis of both knees  -     HYDROcodone-acetaminophen (NORCO)  mg per tablet; Take 1 tablet by mouth 3 (three) times daily as needed (pain).  Dispense: 78 tablet; Refill: 0  -     HYDROcodone-acetaminophen  (NORCO)  mg per tablet; Take 1 tablet by mouth 3 (three) times daily as needed (pain).  Dispense: 78 tablet; Refill: 0  -     HYDROcodone-acetaminophen (NORCO)  mg per tablet; Take 1 tablet by mouth 3 (three) times daily as needed (pain).  Dispense: 78 tablet; Refill: 0  -     Discussed importance of weaning off such high risk medications which are life threatening, especially taking in combination with other meds. Will start with weaning down on   norcos from 80 to 78. Next goal of 76.    Essential hypertension   -     Stable. Continue current regimen.    Uncontrolled type 2 diabetes mellitus without complication, without long-term current use of insulin  -     Comprehensive metabolic panel; Future  -     Hemoglobin A1C; Future  -     Microalbumin/creatinine urine ratio; Future; Expected date: 07/08/2020  -     Diabetic Eye Screening Photo; Future  -     Instructed patient to take daily glucose AM logs and to write them down to bring with on next visit. Advised patient to decrease intake of carbohydrates/simple sugars.           RTC TYLOR Lara MD  07/08/2020 3:19 PM

## 2020-07-08 NOTE — PROCEDURES
Large Joint Aspiration/Injection: R knee    Date/Time: 7/8/2020 3:00 PM  Performed by: Gallo Lara MD  Authorized by: Gallo Lara MD     Consent Done?:  Yes (Verbal)  Indications:  Pain  Site marked: the procedure site was marked    Timeout: prior to procedure the correct patient, procedure, and site was verified      Details:  Needle Size:  25 G  Approach:  Anterolateral  Location:  Knee  Site:  R knee  Medications:  40 mg triamcinolone acetonide 40 mg/mL  Patient tolerance:  Patient tolerated the procedure well with no immediate complications

## 2020-07-09 ENCOUNTER — TELEPHONE (OUTPATIENT)
Dept: FAMILY MEDICINE | Facility: CLINIC | Age: 65
End: 2020-07-09

## 2020-07-09 DIAGNOSIS — R80.9 TYPE 2 DIABETES MELLITUS WITH MICROALBUMINURIA, WITHOUT LONG-TERM CURRENT USE OF INSULIN: Primary | ICD-10-CM

## 2020-07-09 DIAGNOSIS — E11.29 TYPE 2 DIABETES MELLITUS WITH MICROALBUMINURIA, WITHOUT LONG-TERM CURRENT USE OF INSULIN: Primary | ICD-10-CM

## 2020-07-09 LAB
ALBUMIN/CREAT UR: 35.2 UG/MG (ref 0–30)
CREAT UR-MCNC: 335 MG/DL (ref 23–375)
MICROALBUMIN UR DL<=1MG/L-MCNC: 118 UG/ML

## 2020-07-09 NOTE — TELEPHONE ENCOUNTER
----- Message from Shantell Lovelace sent at 7/9/2020  2:25 PM CDT -----  Pt stated he think he missed his last appt yesterday for 4pm he would like to reschedule      Pt contact [phone]

## 2020-09-03 DIAGNOSIS — E11.9 TYPE 2 DIABETES MELLITUS WITHOUT COMPLICATION, WITHOUT LONG-TERM CURRENT USE OF INSULIN: ICD-10-CM

## 2020-09-03 DIAGNOSIS — M25.561 CHRONIC PAIN OF RIGHT KNEE: ICD-10-CM

## 2020-09-03 DIAGNOSIS — G89.29 CHRONIC PAIN OF RIGHT KNEE: ICD-10-CM

## 2020-09-03 NOTE — TELEPHONE ENCOUNTER
No new care gaps identified.  Powered by [employer]. Reference number: [id number]. 9/03/2020 10:46:36 AM   PADILLAT

## 2020-09-04 RX ORDER — DICLOFENAC SODIUM 10 MG/G
GEL TOPICAL
Qty: 100 G | Refills: 0 | Status: SHIPPED | OUTPATIENT
Start: 2020-09-04 | End: 2020-10-12

## 2020-09-04 RX ORDER — METFORMIN HYDROCHLORIDE 500 MG/1
TABLET ORAL
Qty: 180 TABLET | Refills: 1 | Status: SHIPPED | OUTPATIENT
Start: 2020-09-04 | End: 2021-03-16

## 2020-09-04 NOTE — PROGRESS NOTES
Refill Authorization Note     is requesting a refill authorization.    Brief assessment and rationale for refill: ROUTE: diclofenac -op // APPROVE: metFORMIN -prr          Medication Therapy Plan: CDMR.     Medication reconciliation completed: No                         Comments:   Automatic Epic Protocol Generated Data:    Requested Prescriptions   Pending Prescriptions Disp Refills    diclofenac sodium (VOLTAREN) 1 % Gel [Pharmacy Med Name: DICLOFENAC SODIUM 1% GEL] 100 g 0     Sig: APPLY 2 GRAMS TOPICALLY THREE TIMES DAILY AS NEEDED       There is no refill protocol information for this order      Signed Prescriptions Disp Refills    metFORMIN (GLUCOPHAGE) 500 MG tablet 180 tablet 1     Sig: TAKE 1 TABLET BY MOUTH TWICE A DAY WITH MEALS       Endocrinology:  Diabetes - Biguanides Passed - 9/3/2020 10:46 AM        Passed - Patient is at least 18 years old        Passed - Office visit in past 12 months or future 90 days.     Recent Outpatient Visits            1 month ago Chronic pain of right knee    [facility] Gallo Lara MD    4 months ago Primary osteoarthritis of both knees    [facility] Gallo Lara MD    7 months ago Viral URI    [facility]    8 months ago Viral URI    [facility] Gallo L[facility] MD    8 months ago Viral URI    [facility] Gallo [facility]                    Passed - Cr is 1.3 or below and within 360 days     Creatinine   Date Value Ref Range Status   07/08/2020 1.1 0.5 - 1.4 mg/dL Final   12/11/2019 1.1 0.5 - 1.4 mg/dL Final   06/11/2019 1.1 0.5 - 1.4 mg/dL Final              Passed - HBA1C is 7.9 or below and within 180 days     Hemoglobin A1C   Date Value Ref Range Status   07/08/2020 7.8 (H) 4.0 - 5.6 % Final     Comment:     ADA Screening Guidelines:  5.7-6.4%  Consistent with prediabetes  >or=6.5%  Consistent with diabetes  High levels of fetal hemoglobin interfere with the  HbA1C  assay. Heterozygous hemoglobin variants (HbS, HgC, etc)do  not significantly interfere with this assay.   However, presence of multiple variants may affect accuracy.     12/11/2019 9.0 (H) 4.0 - 5.6 % Final     Comment:     ADA Screening Guidelines:  5.7-6.4%  Consistent with prediabetes  >or=6.5%  Consistent with diabetes  High levels of fetal hemoglobin interfere with the HbA1C  assay. Heterozygous hemoglobin variants (HbS, HgC, etc)do  not significantly interfere with this assay.   However, presence of multiple variants may affect accuracy.     06/11/2019 7.4 (H) 4.0 - 5.6 % Final     Comment:     ADA Screening Guidelines:  5.7-6.4%  Consistent with prediabetes  >or=6.5%  Consistent with diabetes  High levels of fetal hemoglobin interfere with the HbA1C  assay. Heterozygous hemoglobin variants (HbS, HgC, etc)do  not significantly interfere with this assay.   However, presence of multiple variants may affect accuracy.                Passed - eGFR is 30 or above and within 360 days     eGFR if non    Date Value Ref Range Status   07/08/2020 >60.0 >60 mL/min/1.73 m^2 Final     Comment:     Calculation used to obtain the estimated glomerular filtration  rate (eGFR) is the CKD-EPI equation.      12/11/2019 >60.0 >60 mL/min/1.73 m^2 Final     Comment:     Calculation used to obtain the estimated glomerular filtration  rate (eGFR) is the CKD-EPI equation.      06/11/2019 >60.0 >60 mL/min/1.73 m^2 Final     Comment:     Calculation used to obtain the estimated glomerular filtration  rate (eGFR) is the CKD-EPI equation.        eGFR if    Date Value Ref Range Status   07/08/2020 >60.0 >60 mL/min/1.73 m^2 Final   12/11/2019 >60.0 >60 mL/min/1.73 m^2 Final   06/11/2019 >60.0 >60 mL/min/1.73 m^2 Final                    Appointments  past 12m or future 3m with PCP    Date Provider   Last Visit   7/8/2020 Gallo Lara MD   Next Visit   Visit date not found Gallo Lara MD   ED  visits in past 90 days: 0     Note composed:1:10 AM 09/04/2020

## 2020-09-24 ENCOUNTER — TELEPHONE (OUTPATIENT)
Dept: FAMILY MEDICINE | Facility: CLINIC | Age: 65
End: 2020-09-24

## 2020-09-24 NOTE — TELEPHONE ENCOUNTER
----- Message from Sangeeta Reyes sent at 9/24/2020  9:47 AM CDT -----  Regarding: Advice  Contact: King  Type: Patient Call Back    Who called:King    What is the request in detail:Patient called to ask if he needs an appointment to have medication refilled for next month. Please call to advise    Can the clinic reply by [facility]?    Would the patient rather a call back or a response via [facility]? Call    Best call back number:[phone]

## 2020-09-24 NOTE — TELEPHONE ENCOUNTER
Spoke with the pt and scheduled an appt for medication refill.  Patient verbalized understandings.

## 2020-09-30 DIAGNOSIS — M25.561 CHRONIC PAIN OF RIGHT KNEE: ICD-10-CM

## 2020-09-30 DIAGNOSIS — G89.29 CHRONIC PAIN OF RIGHT KNEE: ICD-10-CM

## 2020-09-30 RX ORDER — GABAPENTIN 300 MG/1
CAPSULE ORAL
Qty: 90 CAPSULE | Refills: 2 | Status: SHIPPED | OUTPATIENT
Start: 2020-09-30 | End: 2020-12-18

## 2020-09-30 NOTE — PROGRESS NOTES
Refill Routing Note   Medication(s) are not appropriate for processing by [facility]:       - Outside of protocol           Medication reconciliation completed: No      Automatic Epic Generated Protocol Data:        Requested Prescriptions   Pending Prescriptions Disp Refills    gabapentin (NEURONTIN) 300 MG capsule [Pharmacy Med Name: GABAPENTIN 300 MG CAPSULE] 90 capsule 2     Sig: TAKE 1 CAPSULE BY MOUTH THREE TIMES A DAY       Anticonvulsants Protocol Passed - 9/30/2020  4:24 PM        Passed - Visit with Authorizing provider in past 9 months or upcoming 90 days              Appointments  past 12m or future 3m with PCP    Date Provider   Last Visit   7/8/2020 Gallo Lara MD   Next Visit   10/8/2020 Gallo Lara MD   ED visits in past 90 days: 0     Note composed:5:02 PM 09/30/2020

## 2020-10-08 ENCOUNTER — OFFICE VISIT (OUTPATIENT)
Dept: FAMILY MEDICINE | Facility: CLINIC | Age: 65
End: 2020-10-08
Payer: MEDICARE

## 2020-10-08 VITALS
WEIGHT: 235.81 LBS | HEART RATE: 93 BPM | TEMPERATURE: 99 F | DIASTOLIC BLOOD PRESSURE: 84 MMHG | BODY MASS INDEX: 33.01 KG/M2 | SYSTOLIC BLOOD PRESSURE: 130 MMHG | HEIGHT: 71 IN | OXYGEN SATURATION: 95 %

## 2020-10-08 DIAGNOSIS — G89.29 CHRONIC PAIN OF RIGHT KNEE: ICD-10-CM

## 2020-10-08 DIAGNOSIS — E11.29 TYPE 2 DIABETES MELLITUS WITH MICROALBUMINURIA, WITHOUT LONG-TERM CURRENT USE OF INSULIN: ICD-10-CM

## 2020-10-08 DIAGNOSIS — M25.561 CHRONIC PAIN OF RIGHT KNEE: ICD-10-CM

## 2020-10-08 DIAGNOSIS — M17.0 PRIMARY OSTEOARTHRITIS OF BOTH KNEES: Primary | ICD-10-CM

## 2020-10-08 DIAGNOSIS — R80.9 TYPE 2 DIABETES MELLITUS WITH MICROALBUMINURIA, WITHOUT LONG-TERM CURRENT USE OF INSULIN: ICD-10-CM

## 2020-10-08 PROCEDURE — 3075F SYST BP GE 130 - 139MM HG: CPT | Mod: HCNC,CPTII,S$GLB, | Performed by: FAMILY MEDICINE

## 2020-10-08 PROCEDURE — 99499 UNLISTED E&M SERVICE: CPT | Mod: S$GLB,,, | Performed by: FAMILY MEDICINE

## 2020-10-08 PROCEDURE — 3079F PR MOST RECENT DIASTOLIC BLOOD PRESSURE 80-89 MM HG: ICD-10-PCS | Mod: HCNC,CPTII,S$GLB, | Performed by: FAMILY MEDICINE

## 2020-10-08 PROCEDURE — 99214 PR OFFICE/OUTPT VISIT, EST, LEVL IV, 30-39 MIN: ICD-10-PCS | Mod: HCNC,S$GLB,, | Performed by: FAMILY MEDICINE

## 2020-10-08 PROCEDURE — 99499 RISK ADDL DX/OHS AUDIT: ICD-10-PCS | Mod: S$GLB,,, | Performed by: FAMILY MEDICINE

## 2020-10-08 PROCEDURE — 3051F PR MOST RECENT HEMOGLOBIN A1C LEVEL 7.0 - < 8.0%: ICD-10-PCS | Mod: HCNC,CPTII,S$GLB, | Performed by: FAMILY MEDICINE

## 2020-10-08 PROCEDURE — 3008F PR BODY MASS INDEX (BMI) DOCUMENTED: ICD-10-PCS | Mod: HCNC,CPTII,S$GLB, | Performed by: FAMILY MEDICINE

## 2020-10-08 PROCEDURE — 3075F PR MOST RECENT SYSTOLIC BLOOD PRESS GE 130-139MM HG: ICD-10-PCS | Mod: HCNC,CPTII,S$GLB, | Performed by: FAMILY MEDICINE

## 2020-10-08 PROCEDURE — 99999 PR PBB SHADOW E&M-EST. PATIENT-LVL IV: ICD-10-PCS | Mod: PBBFAC,HCNC,, | Performed by: FAMILY MEDICINE

## 2020-10-08 PROCEDURE — 99214 OFFICE O/P EST MOD 30 MIN: CPT | Mod: HCNC,S$GLB,, | Performed by: FAMILY MEDICINE

## 2020-10-08 PROCEDURE — 3051F HG A1C>EQUAL 7.0%<8.0%: CPT | Mod: HCNC,CPTII,S$GLB, | Performed by: FAMILY MEDICINE

## 2020-10-08 PROCEDURE — 3008F BODY MASS INDEX DOCD: CPT | Mod: HCNC,CPTII,S$GLB, | Performed by: FAMILY MEDICINE

## 2020-10-08 PROCEDURE — 99999 PR PBB SHADOW E&M-EST. PATIENT-LVL IV: CPT | Mod: PBBFAC,HCNC,, | Performed by: FAMILY MEDICINE

## 2020-10-08 PROCEDURE — 3079F DIAST BP 80-89 MM HG: CPT | Mod: HCNC,CPTII,S$GLB, | Performed by: FAMILY MEDICINE

## 2020-10-08 RX ORDER — HYDROCODONE BITARTRATE AND ACETAMINOPHEN 10; 325 MG/1; MG/1
1 TABLET ORAL 3 TIMES DAILY PRN
Qty: 76 TABLET | Refills: 0 | Status: SHIPPED | OUTPATIENT
Start: 2020-11-08 | End: 2020-11-24 | Stop reason: SDUPTHER

## 2020-10-08 RX ORDER — GABAPENTIN 300 MG/1
300 CAPSULE ORAL 3 TIMES DAILY
Qty: 90 CAPSULE | Refills: 2 | Status: CANCELLED | OUTPATIENT
Start: 2020-10-08

## 2020-10-08 RX ORDER — HYDROCODONE BITARTRATE AND ACETAMINOPHEN 10; 325 MG/1; MG/1
1 TABLET ORAL 3 TIMES DAILY PRN
Qty: 76 TABLET | Refills: 0 | Status: SHIPPED | OUTPATIENT
Start: 2020-12-08 | End: 2021-01-06

## 2020-10-08 RX ORDER — HYDROCODONE BITARTRATE AND ACETAMINOPHEN 10; 325 MG/1; MG/1
1 TABLET ORAL 3 TIMES DAILY PRN
Qty: 76 TABLET | Refills: 0 | Status: SHIPPED | OUTPATIENT
Start: 2020-10-08 | End: 2020-11-24 | Stop reason: SDUPTHER

## 2020-10-08 NOTE — PROGRESS NOTES
[facility]  Progress Note    SUBJECTIVE:     Chief Complaint   Patient presents with    Knee Pain       HPI   King Cool Jr.  is a 65 y.o. male here for follow-up of his chronic knee pain. Rates pain as moderate. Standing/walking on it makes it worst. No falls/trauma. The norcos do help with the pain.     Review of patient's allergies indicates:   Allergen Reactions    Penicillins Rash       Past Medical History:   Diagnosis Date    Essential (primary) hypertension     Male erectile dysfunction, unspecified     Testicular hypofunction     Type 2 diabetes mellitus without complications      Past Surgical History:   Procedure Laterality Date    HIP SURGERY       Family History   Problem Relation Age of Onset    Blindness Mother     No Known Problems Father     No Known Problems Sister     No Known Problems Brother     No Known Problems Maternal Aunt     No Known Problems Maternal Uncle     No Known Problems Paternal Aunt     No Known Problems Paternal Uncle     No Known Problems Maternal Grandmother     No Known Problems Maternal Grandfather     No Known Problems Paternal Grandmother     No Known Problems Paternal Grandfather     Amblyopia Neg Hx     Cancer Neg Hx     Cataracts Neg Hx     Diabetes Neg Hx     Glaucoma Neg Hx     Hypertension Neg Hx     Macular degeneration Neg Hx     Retinal detachment Neg Hx     Strabismus Neg Hx     Stroke Neg Hx     Thyroid disease Neg Hx      Social History     Tobacco Use    Smoking status: Former Smoker     Types: Cigarettes    Smokeless tobacco: Never Used   Substance Use Topics    Alcohol use: Yes     Comment: rarely     Drug use: No        Review of Systems   Constitutional: Negative for chills, fever and malaise/fatigue.   HENT: Negative.    Respiratory: Negative.  Negative for cough and shortness of breath.    Cardiovascular: Negative.  Negative for chest pain.   Gastrointestinal: Negative.  Negative for abdominal pain, nausea  and vomiting.   Genitourinary: Negative.    Musculoskeletal: Positive for joint pain.   Neurological: Negative for weakness and headaches.   All other systems reviewed and are negative.    OBJECTIVE:     Vitals:    10/08/20 1442   BP: 130/84   Pulse: 93   Temp: 98.7 °F (37.1 °C)     Body mass index is 33.35 kg/m².    Physical Exam   Constitutional: He is oriented to person, place, and time and well-developed, well-nourished, and in no distress. No distress.   HENT:   Head: Normocephalic and atraumatic.   Nose: Nose normal.   Eyes: Conjunctivae and EOM are normal.   Cardiovascular: Normal rate, regular rhythm and normal heart sounds. Exam reveals no gallop and no friction rub.   No murmur heard.  Pulmonary/Chest: Effort normal and breath sounds normal. No respiratory distress. He has no wheezes. He has no rales. He exhibits no tenderness.   Abdominal: Soft. Bowel sounds are normal. He exhibits no distension. There is no abdominal tenderness. There is no rebound.   Neurological: He is alert and oriented to person, place, and time.   Skin: Skin is warm. He is not diaphoretic.       Old records were reviewed. Labs and/or images were independently reviewed.    ASSESSMENT     1. Primary osteoarthritis of both knees    2. Chronic pain of right knee    3. Type 2 diabetes mellitus with microalbuminuria, without long-term current use of insulin        PLAN:     Primary osteoarthritis of both knees/Chronic pain of right knee  -     HYDROcodone-acetaminophen (NORCO)  mg per tablet; Take 1 tablet by mouth 3 (three) times daily as needed (pain).  Dispense: 76 tablet; Refill: 0  -     HYDROcodone-acetaminophen (NORCO)  mg per tablet; Take 1 tablet by mouth 3 (three) times daily as needed (pain).  Dispense: 76 tablet; Refill: 0  -     HYDROcodone-acetaminophen (NORCO)  mg per tablet; Take 1 tablet by mouth 3 (three) times daily as needed (pain).  Dispense: 76 tablet; Refill: 0  -     Discussed importance of  weaning off such high risk medications which are life threatening, especially taking in combination with other meds. Will start with weaning down on   norcos from 78 to 76, next goal of 74.    Type 2 diabetes mellitus with microalbuminuria, without long-term current use of insulin  -     Diabetic Eye Screening Photo; Future  -     Instructed patient to take daily glucose AM logs and to write them down to bring with on next visit. Advised patient to decrease intake of carbohydrates/simple sugars.           RTC PRJOHNNY Lara MD  10/08/2020 2:59 PM

## 2020-10-09 ENCOUNTER — CLINICAL SUPPORT (OUTPATIENT)
Dept: OPHTHALMOLOGY | Facility: CLINIC | Age: 65
End: 2020-10-09
Attending: FAMILY MEDICINE
Payer: MEDICARE

## 2020-10-09 DIAGNOSIS — J06.9 VIRAL URI: ICD-10-CM

## 2020-10-09 DIAGNOSIS — R80.9 TYPE 2 DIABETES MELLITUS WITH MICROALBUMINURIA, WITHOUT LONG-TERM CURRENT USE OF INSULIN: ICD-10-CM

## 2020-10-09 DIAGNOSIS — M25.561 CHRONIC PAIN OF RIGHT KNEE: ICD-10-CM

## 2020-10-09 DIAGNOSIS — G89.29 CHRONIC PAIN OF RIGHT KNEE: ICD-10-CM

## 2020-10-09 DIAGNOSIS — E11.29 TYPE 2 DIABETES MELLITUS WITH MICROALBUMINURIA, WITHOUT LONG-TERM CURRENT USE OF INSULIN: ICD-10-CM

## 2020-10-09 PROCEDURE — 92250 FUNDUS PHOTOGRAPHY W/I&R: CPT | Mod: 26,HCNC,S$GLB, | Performed by: OPHTHALMOLOGY

## 2020-10-09 PROCEDURE — 92250 DIABETIC EYE SCREENING PHOTO: ICD-10-PCS | Mod: 26,HCNC,S$GLB, | Performed by: OPHTHALMOLOGY

## 2020-10-09 NOTE — PROGRESS NOTES
HPI     66 Y/o here for screening for diabetic retinopathy with non-dilated   fundus photos per Dr. Lara     Last edited by Rene Cespedes MA on 10/9/2020  8:50 AM. (History)            Assessment /Plan     For exam results, see Encounter Report.    Type 2 diabetes mellitus with microalbuminuria, without long-term current use of insulin  -     Diabetic Eye Screening Photo      Please see Dr. Malave progress note for interpretation   Patient pupils were to small

## 2020-10-12 RX ORDER — DICLOFENAC SODIUM 10 MG/G
GEL TOPICAL
Qty: 100 G | Refills: 3 | Status: SHIPPED | OUTPATIENT
Start: 2020-10-12 | End: 2022-10-11

## 2020-10-12 RX ORDER — LORATADINE 10 MG/1
TABLET ORAL
Qty: 90 TABLET | Refills: 3 | Status: SHIPPED | OUTPATIENT
Start: 2020-10-12 | End: 2023-07-14 | Stop reason: SDUPTHER

## 2020-10-23 DIAGNOSIS — E11.9 TYPE 2 DIABETES MELLITUS WITHOUT COMPLICATION, WITHOUT LONG-TERM CURRENT USE OF INSULIN: ICD-10-CM

## 2020-10-23 NOTE — TELEPHONE ENCOUNTER
Care Due:                  Date            Visit Type   Department     Provider  --------------------------------------------------------------------------------                                             [facility]                                           MED/ INTERNAL  Last Visit: 10-      None         MED/ PEDS      [facility]ST[facility]  Next Visit: None Scheduled  None         None Found                                                            Last  Test          Frequency    Reason                     Performed    Due Date  --------------------------------------------------------------------------------    HBA1C.......  6 months...  glimepiride, metFORMIN...  07- 01-    Powered by [employer]. Reference number: [id number]. 10/23/2020 2:47:51 PM   CDT

## 2020-10-26 RX ORDER — GLIMEPIRIDE 2 MG/1
2 TABLET ORAL
Qty: 90 TABLET | Refills: 0 | Status: SHIPPED | OUTPATIENT
Start: 2020-10-26 | End: 2021-03-03 | Stop reason: SDUPTHER

## 2020-10-26 NOTE — PROGRESS NOTES
Refill Authorization Note   King Cool is requesting a refill authorization.  Brief assessment and rationale for refill: Approve    -Medication-related problems identified: Requires labs  Medication Therapy Plan: CDMR. LABS(A1C)    Medication reconciliation completed: No   Comments:       Requested Prescriptions   Pending Prescriptions Disp Refills    glimepiride (AMARYL) 2 MG tablet 90 tablet 0     Sig: Take 1 tablet (2 mg total) by mouth before breakfast.       Endocrinology:  Diabetes - Sulfonylureas Passed - 10/23/2020  2:47 PM        Passed - Patient is at least 18 years old        Passed - Office Visit within last 12 months or future 90 days.     Recent Outpatient Visits            2 weeks ago Primary osteoarthritis of both knees    [facility] Gallo Lara MD    3 months ago Chronic pain of right knee    [facility] Gallo L[facility]    6 months ago [facility] of both knees    [facility]    9 months ago Viral URI    [facility] Gallo Laar MD    10 months ago Viral URI    [facility] Gallo Lara MD                    Passed - HBA1C is 7.9 or below and within 180 days     Hemoglobin A1C   Date Value Ref Range Status   07/08/2020 7.8 (H) 4.0 - 5.6 % Final     Comment:     ADA Screening Guidelines:  5.7-6.4%  Consistent with prediabetes  >or=6.5%  Consistent with diabetes  High levels of fetal hemoglobin interfere with the HbA1C  assay. Heterozygous hemoglobin variants (HbS, HgC, etc)do  not significantly interfere with this assay.   However, presence of multiple variants may affect accuracy.     12/11/2019 9.0 (H) 4.0 - 5.6 % Final     Comment:     ADA Screening Guidelines:  5.7-6.4%  Consistent with prediabetes  >or=6.5%  Consistent with diabetes  High levels of fetal hemoglobin interfere with the HbA1C  assay. Heterozygous hemoglobin variants (HbS, HgC, etc)do  not significantly interfere with  this assay.   However, presence of multiple variants may affect accuracy.     06/11/2019 7.4 (H) 4.0 - 5.6 % Final     Comment:     ADA Screening Guidelines:  5.7-6.4%  Consistent with prediabetes  >or=6.5%  Consistent with diabetes  High levels of fetal hemoglobin interfere with the HbA1C  assay. Heterozygous hemoglobin variants (HbS, HgC, etc)do  not significantly interfere with this assay.   However, presence of multiple variants may affect accuracy.                Passed - Cr is 1.3 or below and within 360 days     Creatinine   Date Value Ref Range Status   07/08/2020 1.1 0.5 - 1.4 mg/dL Final   12/11/2019 1.1 0.5 - 1.4 mg/dL Final   06/11/2019 1.1 0.5 - 1.4 mg/dL Final              Passed - eGFR is 30 or above and within 360 days     eGFR if non    Date Value Ref Range Status   07/08/2020 >60.0 >60 mL/min/1.73 m^2 Final     Comment:     Calculation used to obtain the estimated glomerular filtration  rate (eGFR) is the CKD-EPI equation.      12/11/2019 >60.0 >60 mL/min/1.73 m^2 Final     Comment:     Calculation used to obtain the estimated glomerular filtration  rate (eGFR) is the CKD-EPI equation.      06/11/2019 >60.0 >60 mL/min/1.73 m^2 Final     Comment:     Calculation used to obtain the estimated glomerular filtration  rate (eGFR) is the CKD-EPI equation.        eGFR if    Date Value Ref Range Status   07/08/2020 >60.0 >60 mL/min/1.73 m^2 Final   12/11/2019 >60.0 >60 mL/min/1.73 m^2 Final   06/11/2019 >60.0 >60 mL/min/1.73 m^2 Final                  Appointments  past 12m or future 3m with PCP    Date Provider   Last Visit   10/8/2020 Gallo Lara MD   Next Visit   Visit date not found Gallo Lara MD   ED visits in past 90 days: 0     Note composed:6:32 AM 10/26/2020

## 2020-10-26 NOTE — TELEPHONE ENCOUNTER
Provider Staff:     Action is required for this patient.     Please schedule patient for Labs (A1C)    Thanks!  [facility]     Appointments  past 12m or future 3m with PCP    Date Provider   Last Visit   10/8/2020 Gallo Lara MD   Next Visit   Visit date not found Gallo Lara MD     Note composed:6:32 AM 10/26/2020

## 2020-11-23 ENCOUNTER — OFFICE VISIT (OUTPATIENT)
Dept: INTERNAL MEDICINE | Facility: CLINIC | Age: 65
End: 2020-11-23
Payer: MEDICARE

## 2020-11-23 VITALS
WEIGHT: 227.88 LBS | SYSTOLIC BLOOD PRESSURE: 150 MMHG | HEART RATE: 91 BPM | BODY MASS INDEX: 33.75 KG/M2 | TEMPERATURE: 97 F | DIASTOLIC BLOOD PRESSURE: 88 MMHG | HEIGHT: 69 IN

## 2020-11-23 DIAGNOSIS — I10 ESSENTIAL (PRIMARY) HYPERTENSION: ICD-10-CM

## 2020-11-23 DIAGNOSIS — E29.1 TESTICULAR HYPOFUNCTION: ICD-10-CM

## 2020-11-23 DIAGNOSIS — M17.31 POST-TRAUMATIC OSTEOARTHRITIS OF RIGHT KNEE: ICD-10-CM

## 2020-11-23 DIAGNOSIS — L23.7 ALLERGIC CONTACT DERMATITIS DUE TO PLANTS, EXCEPT FOOD: Primary | ICD-10-CM

## 2020-11-23 DIAGNOSIS — N52.9 ERECTILE DYSFUNCTION, UNSPECIFIED ERECTILE DYSFUNCTION TYPE: ICD-10-CM

## 2020-11-23 DIAGNOSIS — R80.9 TYPE 2 DIABETES MELLITUS WITH MICROALBUMINURIA, WITHOUT LONG-TERM CURRENT USE OF INSULIN: ICD-10-CM

## 2020-11-23 DIAGNOSIS — E66.09 CLASS 1 OBESITY DUE TO EXCESS CALORIES WITH SERIOUS COMORBIDITY AND BODY MASS INDEX (BMI) OF 33.0 TO 33.9 IN ADULT: ICD-10-CM

## 2020-11-23 DIAGNOSIS — E11.9 TYPE 2 DIABETES MELLITUS WITHOUT COMPLICATION, WITHOUT LONG-TERM CURRENT USE OF INSULIN: ICD-10-CM

## 2020-11-23 DIAGNOSIS — E11.29 TYPE 2 DIABETES MELLITUS WITH MICROALBUMINURIA, WITHOUT LONG-TERM CURRENT USE OF INSULIN: ICD-10-CM

## 2020-11-23 PROBLEM — E66.811 CLASS 1 OBESITY DUE TO EXCESS CALORIES WITH SERIOUS COMORBIDITY AND BODY MASS INDEX (BMI) OF 33.0 TO 33.9 IN ADULT: Status: ACTIVE | Noted: 2020-11-23

## 2020-11-23 PROCEDURE — 96372 PR INJECTION,THERAP/PROPH/DIAG2ST, IM OR SUBCUT: ICD-10-PCS | Mod: S$GLB,,, | Performed by: INTERNAL MEDICINE

## 2020-11-23 PROCEDURE — 3072F PR LOW RISK FOR RETINOPATHY: ICD-10-PCS | Mod: S$GLB,,, | Performed by: INTERNAL MEDICINE

## 2020-11-23 PROCEDURE — 99213 PR OFFICE/OUTPT VISIT, EST, LEVL III, 20-29 MIN: ICD-10-PCS | Mod: 25,S$GLB,, | Performed by: INTERNAL MEDICINE

## 2020-11-23 PROCEDURE — 3008F PR BODY MASS INDEX (BMI) DOCUMENTED: ICD-10-PCS | Mod: CPTII,S$GLB,, | Performed by: INTERNAL MEDICINE

## 2020-11-23 PROCEDURE — 99213 OFFICE O/P EST LOW 20 MIN: CPT | Mod: 25,S$GLB,, | Performed by: INTERNAL MEDICINE

## 2020-11-23 PROCEDURE — 2024F 7 FLD RTA PHOTO EVC RTNOPTHY: CPT | Mod: S$GLB,,, | Performed by: INTERNAL MEDICINE

## 2020-11-23 PROCEDURE — 96372 THER/PROPH/DIAG INJ SC/IM: CPT | Mod: S$GLB,,, | Performed by: INTERNAL MEDICINE

## 2020-11-23 PROCEDURE — 3072F LOW RISK FOR RETINOPATHY: CPT | Mod: S$GLB,,, | Performed by: INTERNAL MEDICINE

## 2020-11-23 PROCEDURE — 3079F PR MOST RECENT DIASTOLIC BLOOD PRESSURE 80-89 MM HG: ICD-10-PCS | Mod: CPTII,S$GLB,, | Performed by: INTERNAL MEDICINE

## 2020-11-23 PROCEDURE — 3051F PR MOST RECENT HEMOGLOBIN A1C LEVEL 7.0 - < 8.0%: ICD-10-PCS | Mod: CPTII,S$GLB,, | Performed by: INTERNAL MEDICINE

## 2020-11-23 PROCEDURE — 2024F PR 7 FIELD PHOTOS WITH INTERP/ REVIEW: ICD-10-PCS | Mod: S$GLB,,, | Performed by: INTERNAL MEDICINE

## 2020-11-23 PROCEDURE — 3077F PR MOST RECENT SYSTOLIC BLOOD PRESSURE >= 140 MM HG: ICD-10-PCS | Mod: CPTII,S$GLB,, | Performed by: INTERNAL MEDICINE

## 2020-11-23 PROCEDURE — 1126F AMNT PAIN NOTED NONE PRSNT: CPT | Mod: S$GLB,,, | Performed by: INTERNAL MEDICINE

## 2020-11-23 PROCEDURE — 3051F HG A1C>EQUAL 7.0%<8.0%: CPT | Mod: CPTII,S$GLB,, | Performed by: INTERNAL MEDICINE

## 2020-11-23 PROCEDURE — 3077F SYST BP >= 140 MM HG: CPT | Mod: CPTII,S$GLB,, | Performed by: INTERNAL MEDICINE

## 2020-11-23 PROCEDURE — 3008F BODY MASS INDEX DOCD: CPT | Mod: CPTII,S$GLB,, | Performed by: INTERNAL MEDICINE

## 2020-11-23 PROCEDURE — 3079F DIAST BP 80-89 MM HG: CPT | Mod: CPTII,S$GLB,, | Performed by: INTERNAL MEDICINE

## 2020-11-23 PROCEDURE — 1126F PR PAIN SEVERITY QUANTIFIED, NO PAIN PRESENT: ICD-10-PCS | Mod: S$GLB,,, | Performed by: INTERNAL MEDICINE

## 2020-11-23 RX ORDER — HYDROCORTISONE 25 MG/G
CREAM TOPICAL 2 TIMES DAILY
Qty: 20 G | Refills: 2 | Status: SHIPPED | OUTPATIENT
Start: 2020-11-23

## 2020-11-23 RX ORDER — METHYLPREDNISOLONE 4 MG/1
TABLET ORAL
Qty: 1 PACKAGE | Refills: 0 | Status: SHIPPED | OUTPATIENT
Start: 2020-11-23 | End: 2021-02-02

## 2020-11-23 RX ORDER — BETAMETHASONE SODIUM PHOSPHATE AND BETAMETHASONE ACETATE 3; 3 MG/ML; MG/ML
12 INJECTION, SUSPENSION INTRA-ARTICULAR; INTRALESIONAL; INTRAMUSCULAR; SOFT TISSUE
Status: COMPLETED | OUTPATIENT
Start: 2020-11-23 | End: 2020-11-23

## 2020-11-23 RX ADMIN — BETAMETHASONE SODIUM PHOSPHATE AND BETAMETHASONE ACETATE 12 MG: 3; 3 INJECTION, SUSPENSION INTRA-ARTICULAR; INTRALESIONAL; INTRAMUSCULAR; SOFT TISSUE at 10:11

## 2020-11-23 NOTE — PATIENT INSTRUCTIONS
Poison Ivy Dermatitis (Child)  Poison ivy dermatitis is a skin rash. It is caused by the oil found in the poison ivy plant. The oil is called urushiol. Symptoms can start within a few hours to a few days after contact with the plant. They include an itchy rash, redness, and swelling of the skin. Small blisters can form, which can then break and leak fluid. This fluid is not contagious to others. Only the plant oil can cause rash. The rash usually starts to go away after 1 to 2 weeks, but it may take 4 to 6 weeks to fully clear.  Home care  Your childs healthcare provider may prescribe medicine to help relieve itching and swelling. These may include steroid cream, antihistamines, or calamine lotion. For more severe cases, oral medicine or medicine injected into a muscle  may be given. Follow all instructions for giving medicines to your child.  The diagnosis is usually made by the clinical appearance and the history.  General care  · Follow the healthcare providers instructions on how to care for your childs rash.  · Wash your hands with soap and warm water before and after caring for your child.  · The rash can spread if traces of the plant oil remain on your childs skin. Gently wash the affected areas of the skin with soap and warm water. If needed, over-the-counter products can be used to help remove the plant oil from the skin shortly after exposure.  · Bathe your child in cool water. Adding oatmeal powder or aluminum acetate powder to the water may help soothe itchy skin. These are available over the counter.  · Expose the affected skin to the air so that it dries completely. Do not use a hair dryer on the skin.  · Dress your child in loose cotton clothing.  · Make sure your child does not scratch the affected area. This can delay healing. It can also cause a bacterial infection. You may need to use soft scratch mittens that cover your childs hands. Keep your childs nails trimmed short. You may need to  put gloves on small children to prevent scratching. Hydrocortisone cream (1%) is available over the counter and can reduce itching. Benadryl may be given by mouth if the itching is bothersome.  · Put cold compresses on your childs sores to help soothe symptoms. Do this for 30 minutes 3 to 4 times a day. You can make a cold compress by soaking a cloth in cold water. Squeeze out excess water. You can add colloidal oatmeal to the water to help reduce itching.  · You can also help relieve large areas of itching by giving your child a lukewarm bath with colloidal oatmeal added to the water.  · Make sure to wash the clothes your child was wearing when in contact with the plant. This washes away the plant oil that gave your child the rash and prevent more or worse symptoms.  · Check your childs skin every day for the signs of a worsening rash or infection listed below.  Prevention  Follow these steps to help prevent poison ivy dermatitis:  · If your child is exposed to poison ivy, use a poison ivy wash to remove the plant oil from the skin. Poison ivy wash can be bought in a drugstore with no prescription. The sooner you remove the oil, the more it will help prevent rash. The oil can irritate the skin quickly, but a wash it can still help hours later.  · Wash anything that may have touched the plant oil. This includes clothing, shoes, and toys. Oil can stay on these items for weeks if not washed.  · The oil can also get on a pets fur. If your pet has been in an area where there is poison ivy, clean your pets fur. Otherwise the animal can rub the oil on you and your children.  · If your child has very sensitive skin, he or she should wear long shirts, pants, or gloves even when it is hot outside. Learn what the plant looks like to avoid touching it.  · If your child is very sensitive, consider using an ivy block skin product before they are potentially exposed. There is no guarantee that this will work,  however.  Follow-up care  Follow up with your childs healthcare provider, or as advised. Contact your childs healthcare provider if the rash is not better in 2 weeks.  Special note to parents  Wash your hands well with soap and warm water before and after caring for your child. This helps prevent infection.  When to seek medical advice  Call your childs healthcare provider right away if any of these occur:  · Redness or swelling that gets worse  · Symptoms that dont get better after 1 week of treatment  · Rash spreads to the face or groin, causing swelling  · Pain, redness, or swelling that gets worse  · Foul-smelling fluid leaking from the skin  · Fussiness or crying that cannot be soothed  · Fever as directed by your healthcare professional or:  ¨ Your child is younger than 12 weeks and has a fever of 100.4°F (38°C) or higher because your baby may need to be seen by his or her healthcare provider  ¨ Your child has repeated fevers above 104°F (40°C) at any age  ¨ Your child is younger than 2 years old and his or her fever continues for more than 24 hours or your child is 2 years old or older and his or her fever continues for more than 3 days  Date Last Reviewed: 12/24/2015  © 4145-7447 [employer]. [address] [place] [address], [place] [address]. All rights reserved. This information is not intended as a substitute for professional medical care. Always follow your healthcare professional's instructions.

## 2020-11-23 NOTE — PROGRESS NOTES
Chief C/o:    Diabetes, Hypertension, Erectile Dysfunction, and Rash (Pt. c/o a rash with itching, and redness on (L) side face radaiting to ear, (B) arms and (B) legs x 2 days.)        Health Care Maintenance    Health Maintenance       Date Due Completion Date    HIV Screening 02/02/1971 ---    Naloxone Prescription 02/02/1974 ---    Urine Drug Screen 03/10/2019 9/10/2018    Lipid Panel 01/24/2020 1/24/2019    Colorectal Cancer Screening 12/30/2020 12/30/2019    Hemoglobin A1c 01/08/2021 7/8/2020    Foot Exam 07/08/2021 7/8/2020    Override on 9/10/2018: Done    Override on 6/14/2017: Done    Override on 8/31/2016: Done    Override on 8/6/2015: Done    Urine Microalbumin 07/08/2021 7/8/2020    Eye Exam 10/09/2021 10/9/2020    Override on 7/9/2019: Done    Override on 3/28/2017: Done    TETANUS VACCINE 03/20/2028 3/20/2018 (Declined)    Override on 3/20/2018: Declined                 HISTORY OF PRESENT ILLNESS:    JULIO Cool Jr. is a 65 y.o. male who presents to the clinic today for Diabetes, Hypertension, Erectile Dysfunction, and Rash (Pt. c/o a rash with itching, and redness on (L) side face radaiting to ear, (B) arms and (B) legs x 2 days.)  .  Patient is having severe itching and discomfort because of the poison ivy that he has and the rashes with itching.  Patient is seeing a primary care physician/family practitioner for his medical problems, he is coming every now and then to my office for acute problems on p.r.n. basis.  Patient's record with the primary care was reviewed.                  ALLERGIES AND MEDICATIONS: updated and reviewed.  Review of patient's allergies indicates:   Allergen Reactions    Tamiflu [oseltamivir]      Increases BP    Penicillins Rash     Medication List with Changes/Refills   New Medications    HYDROCORTISONE 2.5 % CREAM    Apply topically 2 (two) times daily.    METHYLPREDNISOLONE (MEDROL DOSEPACK) 4 MG TABLET    use as directed   Current Medications    ASCORBATE  CALCIUM (VITAMIN C ORAL)    Take by mouth once daily.     ASPIRIN (ECOTRIN) 81 MG EC TABLET    Take 81 mg by mouth once daily.    BLOOD SUGAR DIAGNOSTIC STRP    1 strip by Misc.(Non-Drug; Combo Route) route once daily.    BLOOD-GLUCOSE METER (FREESTYLE SYSTEM KIT) KIT    Use as instructed    DICLOFENAC SODIUM (VOLTAREN) 1 % GEL    APPLY 2 GRAMS TOPICALLY THREE TIMES DAILY AS NEEDED    FISH OIL-OMEGA-3 FATTY ACIDS 300-1,000 MG CAPSULE    Take 2 g by mouth once daily.    GABAPENTIN (NEURONTIN) 300 MG CAPSULE    TAKE 1 CAPSULE BY MOUTH THREE TIMES A DAY    GLIMEPIRIDE (AMARYL) 2 MG TABLET    Take 1 tablet (2 mg total) by mouth before breakfast.    HYDROCODONE-ACETAMINOPHEN (NORCO)  MG PER TABLET    Take 1 tablet by mouth 3 (three) times daily as needed (pain).    LANCETS (ACCU-CHEK SOFTCLIX LANCETS) MISC    1 Units by Misc.(Non-Drug; Combo Route) route once daily.    LORATADINE (CLARITIN) 10 MG TABLET    TAKE 1 TABLET BY MOUTH EVERY DAY    METFORMIN (GLUCOPHAGE) 500 MG TABLET    TAKE 1 TABLET BY MOUTH TWICE A DAY WITH MEALS    MULTIVITAMIN WITH MINERALS TABLET    Take 1 tablet by mouth once daily.    SILDENAFIL (REVATIO) 20 MG TAB    Take 1 tablet (20 mg total) by mouth daily as needed.   Discontinued Medications    BENZONATATE (TESSALON) 100 MG CAPSULE    TAKE 1 CAPSULE BY MOUTH 3 TIMES A DAY AS NEEDED FOR COUGH    FLUTICASONE (FLONASE) 50 MCG/ACTUATION NASAL SPRAY        HYDROCODONE-ACETAMINOPHEN (NORCO)  MG PER TABLET    Take 1 tablet by mouth 3 (three) times daily as needed (pain).    HYDROCODONE-ACETAMINOPHEN (NORCO)  MG PER TABLET    Take 1 tablet by mouth 3 (three) times daily as needed (pain).    NEOMYCIN-POLYMYXIN-HYDROCORTISONE (CORTISPORIN) 3.5-10,000-1 MG/ML-UNIT/ML-% OTIC SUSPENSION    Place 3 drops into the right ear 3 (three) times daily.             CARE TEAM:    Patient Care Team:  Gallo Lara MD as PCP - General (Family Medicine)  [place]ia Culp MA as Care Coordinator  "        REVIEW OF SYSTEMS:    Review of Systems   Constitutional: Negative for appetite change, chills, diaphoresis, fatigue, fever and unexpected weight change.   HENT: Negative for congestion, drooling, ear discharge, ear pain, facial swelling, hearing loss, nosebleeds, rhinorrhea, sinus pain, sneezing, sore throat, tinnitus, trouble swallowing and voice change.    Eyes: Negative for pain, discharge, redness, itching and visual disturbance.   Respiratory: Negative for cough, choking, chest tightness, shortness of breath, wheezing and stridor.    Cardiovascular: Negative for chest pain, palpitations and leg swelling.   Gastrointestinal: Negative for abdominal distention, abdominal pain, blood in stool, constipation, diarrhea, nausea and vomiting.   Endocrine: Negative for cold intolerance, heat intolerance, polydipsia, polyphagia and polyuria.   Genitourinary: Negative for difficulty urinating, dysuria, flank pain, frequency, hematuria and urgency.   Musculoskeletal: Positive for arthralgias (Knee joint pains). Negative for back pain, gait problem, joint swelling, myalgias, neck pain and neck stiffness.   Skin: Negative for color change, pallor, rash and wound.        Skin rashes and itching as per HPI   Allergic/Immunologic: Negative for environmental allergies, food allergies and immunocompromised state.   Neurological: Negative for dizziness, tremors, seizures, syncope, speech difficulty, weakness, light-headedness, numbness and headaches.   Hematological: Negative for adenopathy. Does not bruise/bleed easily.   Psychiatric/Behavioral: Negative for agitation, behavioral problems, confusion, decreased concentration, dysphoric mood, hallucinations, sleep disturbance and suicidal ideas. The patient is not nervous/anxious.          PHYSICAL EXAM:    Vitals:    11/23/20 0951   BP: (!) 150/88   Pulse: 91   Temp: 97.2 °F (36.2 °C)     Weight: 103.4 kg (227 lb 13.5 oz)   Height: 5' 8.9" (175 cm)   Body mass index is " "33.75 kg/m².  Vitals:    11/23/20 0951   BP: (!) 150/88   Pulse: 91   Temp: 97.2 °F (36.2 °C)   TempSrc: Temporal   Weight: 103.4 kg (227 lb 13.5 oz)   Height: 5' 8.9" (1.75 m)   PainSc: 0-No pain          Physical Exam   Constitutional: He is oriented to person, place, and time. He appears well-developed, well-nourished and obese.  Non-toxic appearance. He does not appear ill. No distress.   Patient is alert and awake oriented x3, he is comfortable, cooperative, in no obvious distress.   HENT:   Head: Normocephalic and atraumatic.   Right Ear: Tympanic membrane, external ear and ear canal normal.   Left Ear: Tympanic membrane, external ear and ear canal normal.   Nose: Nose normal. No rhinorrhea or nasal congestion.   Mouth/Throat: Oropharynx is clear and moist. Mucous membranes are moist. No oropharyngeal exudate or posterior oropharyngeal erythema. Oropharynx is clear.   Eyes: Pupils are equal, round, and reactive to light. Conjunctivae are normal. Right eye exhibits no discharge. Left eye exhibits no discharge. No scleral icterus.   Neck: Normal range of motion. Neck supple. No JVD present. No muscular tenderness present. No neck rigidity. No tracheal deviation present. No thyromegaly present.   Cardiovascular: Normal rate, regular rhythm, normal heart sounds and normal pulses. Exam reveals no gallop and no friction rub.   No murmur heard.  Pulmonary/Chest: Effort normal and breath sounds normal. No stridor. No respiratory distress. He has no wheezes. He has no rhonchi. He has no rales. He exhibits no tenderness.   Abdominal: Soft. Bowel sounds are normal. He exhibits no distension and no mass. There is no abdominal tenderness. There is no rebound and no guarding. No hernia.   Genitourinary:    Genitourinary Comments: No costovertebral angle tenderness.     Musculoskeletal: Normal range of motion.         General: No swelling, tenderness, deformity or signs of injury.      Right lower leg: No edema.      Left " lower leg: No edema.      Comments: Crepitation both knees, more the right side.   Lymphadenopathy:     He has no cervical adenopathy.   Neurological: He is alert and oriented to person, place, and time. He displays no weakness and normal reflexes. No cranial nerve deficit or sensory deficit. He exhibits normal muscle tone. Coordination and gait normal.   Skin: Skin is warm and dry. Capillary refill takes less than 2 seconds. No bruising, no lesion and no rash noted. He is not diaphoretic. There is erythema (Extensive erythematous rashes on the face, neck, and forearms and legs.). No jaundice or pallor.   Psychiatric: His behavior is normal. Mood, judgment and thought content normal.          Labs:    Lab Results   Component Value Date     (H) 07/08/2020     07/08/2020    K 4.1 07/08/2020     07/08/2020    CO2 23 07/08/2020    BUN 12 07/08/2020    CREATININE 1.1 07/08/2020    CALCIUM 9.2 07/08/2020    PROT 7.1 07/08/2020    ALBUMIN 4.1 07/08/2020    BILITOT 0.6 07/08/2020    ALKPHOS 74 07/08/2020    AST 23 07/08/2020    ALT 40 07/08/2020    ANIONGAP 11 07/08/2020    ESTGFRAFRICA >60.0 07/08/2020    EGFRNONAA >60.0 07/08/2020     Lab Results   Component Value Date    WBC 7.67 01/24/2019    RBC 5.42 01/24/2019    HGB 17.0 01/24/2019    HCT 48.6 01/24/2019    MCV 90 01/24/2019    RDW 12.3 01/24/2019     01/24/2019      Lab Results   Component Value Date    CHOL 180 01/24/2019    TRIG 203 (H) 01/24/2019    HDL 39 (L) 01/24/2019    LDLCALC 100.4 01/24/2019    TOTALCHOLEST 4.6 01/24/2019     Lab Results   Component Value Date    TSH 1.519 01/24/2019     Lab Results   Component Value Date    HGBA1C 7.8 (H) 07/08/2020    ESTIMATEDAVG 177 (H) 07/08/2020      No components found for: MICROALBUMIN/CREATININE    ASSESSMENT & PLAN:    1. Allergic contact dermatitis due to plants, except food  - betamethasone acetate-betamethasone sodium phosphate injection 12 mg  - methylPREDNISolone (MEDROL DOSEPACK) 4  mg tablet; use as directed  Dispense: 1 Package; Refill: 0  - hydrocortisone 2.5 % cream; Apply topically 2 (two) times daily.  Dispense: 20 g; Refill: 2    2. Type 2 diabetes mellitus with microalbuminuria, without long-term current use of insulin    3. Type 2 diabetes mellitus without complication, without long-term current use of insulin    4. Testicular hypofunction    5. Erectile dysfunction, unspecified erectile dysfunction type    6. Essential (primary) hypertension  In controlled  7. Post-traumatic osteoarthritis of right knee    8. Class 1 obesity due to excess calories with serious comorbidity and body mass index (BMI) of 33.0 to 33.9 in adult     Patient is coming essentially for diabetic an irritant dermatitis secondary to poison ivy exposure, treatment as above.  His blood pressure was elevated, patient was advised to follow with his primary care physician for management. General measures: low salt, low fat, vegetables and fruits rich diet. Check BP before taking BP medications and follow precautions and guidelines. Keep a records of your BP and Pulse readings and bring the chart to the office next visit. If BP is consistently above 130/80, I recommend that you book a sooner appointment to address the issue.  Patient is obese any was advised to diet and exercise and lose weight. Low-fat diet, increase vegetables, learn how to count calories, check weight every morning and keep a diet and weight diary.  Bring the diary next visit.  Try to identify one specific food item or habit that you can improve, try to stick to it and add another item after 2-4 weeks interval. If you are not improving as you wish, bring your food diary and we will try, together, find something specific that we can work on.          No orders of the defined types were placed in this encounter.     Follow up if symptoms worsen or fail to improve. or sooner as needed.    Patient was counseled and questions and concerns were  addressed.    Please note:  Parts of this report were done using a dictation software, voice to text, and sometimes the text contains some uncorrected words or sentences that are missed during revision.

## 2020-12-17 DIAGNOSIS — M25.561 CHRONIC PAIN OF RIGHT KNEE: ICD-10-CM

## 2020-12-17 DIAGNOSIS — G89.29 CHRONIC PAIN OF RIGHT KNEE: ICD-10-CM

## 2020-12-18 RX ORDER — GABAPENTIN 300 MG/1
CAPSULE ORAL
Qty: 90 CAPSULE | Refills: 2 | Status: SHIPPED | OUTPATIENT
Start: 2020-12-18 | End: 2021-03-22

## 2020-12-18 NOTE — PROGRESS NOTES
Refill Routing Note   Medication(s) are not appropriate for processing by [facility] for the following reason(s):     - Outside of protocol  [facility] action(s):  Route        Medication reconciliation completed: No   Automatic Epic Generated Protocol Data:        Requested Prescriptions   Pending Prescriptions Disp Refills    gabapentin (NEURONTIN) 300 MG capsule [Pharmacy Med Name: GABAPENTIN 300 MG CAPSULE] 90 capsule 2     Sig: TAKE 1 CAPSULE BY MOUTH THREE TIMES A DAY       Anticonvulsants Protocol Passed - 12/17/2020 10:58 AM        Passed - Visit with Authorizing provider in past 9 months or upcoming 90 days              Appointments  past 12m or future 3m with PCP    Date Provider   Last Visit   10/8/2020 Gallo Lara MD   Next Visit   Visit date not found Gallo Lara MD   ED visits in past 90 days: 0        Note composed:9:02 PM 12/17/2020

## 2020-12-28 ENCOUNTER — TELEPHONE (OUTPATIENT)
Dept: FAMILY MEDICINE | Facility: CLINIC | Age: 65
End: 2020-12-28

## 2020-12-31 ENCOUNTER — TELEPHONE (OUTPATIENT)
Dept: FAMILY MEDICINE | Facility: CLINIC | Age: 65
End: 2020-12-31

## 2021-01-05 ENCOUNTER — PATIENT MESSAGE (OUTPATIENT)
Dept: ADMINISTRATIVE | Facility: HOSPITAL | Age: 66
End: 2021-01-05

## 2021-01-06 ENCOUNTER — OFFICE VISIT (OUTPATIENT)
Dept: FAMILY MEDICINE | Facility: CLINIC | Age: 66
End: 2021-01-06
Payer: MEDICARE

## 2021-01-06 VITALS
SYSTOLIC BLOOD PRESSURE: 138 MMHG | HEART RATE: 89 BPM | BODY MASS INDEX: 32.62 KG/M2 | OXYGEN SATURATION: 96 % | TEMPERATURE: 99 F | DIASTOLIC BLOOD PRESSURE: 80 MMHG | HEIGHT: 71 IN | WEIGHT: 233 LBS

## 2021-01-06 DIAGNOSIS — I10 ESSENTIAL (PRIMARY) HYPERTENSION: Chronic | ICD-10-CM

## 2021-01-06 DIAGNOSIS — Z12.11 ENCOUNTER FOR SCREENING FOR MALIGNANT NEOPLASM OF COLON: ICD-10-CM

## 2021-01-06 DIAGNOSIS — E11.29 TYPE 2 DIABETES MELLITUS WITH MICROALBUMINURIA, WITHOUT LONG-TERM CURRENT USE OF INSULIN: Chronic | ICD-10-CM

## 2021-01-06 DIAGNOSIS — M17.0 PRIMARY OSTEOARTHRITIS OF BOTH KNEES: Primary | ICD-10-CM

## 2021-01-06 DIAGNOSIS — R80.9 TYPE 2 DIABETES MELLITUS WITH MICROALBUMINURIA, WITHOUT LONG-TERM CURRENT USE OF INSULIN: Chronic | ICD-10-CM

## 2021-01-06 PROCEDURE — 1125F AMNT PAIN NOTED PAIN PRSNT: CPT | Mod: HCNC,S$GLB,, | Performed by: FAMILY MEDICINE

## 2021-01-06 PROCEDURE — 99999 PR PBB SHADOW E&M-EST. PATIENT-LVL IV: CPT | Mod: PBBFAC,HCNC,, | Performed by: FAMILY MEDICINE

## 2021-01-06 PROCEDURE — 99214 OFFICE O/P EST MOD 30 MIN: CPT | Mod: HCNC,S$GLB,, | Performed by: FAMILY MEDICINE

## 2021-01-06 PROCEDURE — 3051F PR MOST RECENT HEMOGLOBIN A1C LEVEL 7.0 - < 8.0%: ICD-10-PCS | Mod: HCNC,CPTII,S$GLB, | Performed by: FAMILY MEDICINE

## 2021-01-06 PROCEDURE — 99499 RISK ADDL DX/OHS AUDIT: ICD-10-PCS | Mod: S$GLB,,, | Performed by: FAMILY MEDICINE

## 2021-01-06 PROCEDURE — 99999 PR PBB SHADOW E&M-EST. PATIENT-LVL IV: ICD-10-PCS | Mod: PBBFAC,HCNC,, | Performed by: FAMILY MEDICINE

## 2021-01-06 PROCEDURE — 99214 PR OFFICE/OUTPT VISIT, EST, LEVL IV, 30-39 MIN: ICD-10-PCS | Mod: HCNC,S$GLB,, | Performed by: FAMILY MEDICINE

## 2021-01-06 PROCEDURE — 3079F PR MOST RECENT DIASTOLIC BLOOD PRESSURE 80-89 MM HG: ICD-10-PCS | Mod: HCNC,CPTII,S$GLB, | Performed by: FAMILY MEDICINE

## 2021-01-06 PROCEDURE — 3075F SYST BP GE 130 - 139MM HG: CPT | Mod: HCNC,CPTII,S$GLB, | Performed by: FAMILY MEDICINE

## 2021-01-06 PROCEDURE — 3008F BODY MASS INDEX DOCD: CPT | Mod: HCNC,CPTII,S$GLB, | Performed by: FAMILY MEDICINE

## 2021-01-06 PROCEDURE — 3008F PR BODY MASS INDEX (BMI) DOCUMENTED: ICD-10-PCS | Mod: HCNC,CPTII,S$GLB, | Performed by: FAMILY MEDICINE

## 2021-01-06 PROCEDURE — 1125F PR PAIN SEVERITY QUANTIFIED, PAIN PRESENT: ICD-10-PCS | Mod: HCNC,S$GLB,, | Performed by: FAMILY MEDICINE

## 2021-01-06 PROCEDURE — 3079F DIAST BP 80-89 MM HG: CPT | Mod: HCNC,CPTII,S$GLB, | Performed by: FAMILY MEDICINE

## 2021-01-06 PROCEDURE — 3075F PR MOST RECENT SYSTOLIC BLOOD PRESS GE 130-139MM HG: ICD-10-PCS | Mod: HCNC,CPTII,S$GLB, | Performed by: FAMILY MEDICINE

## 2021-01-06 PROCEDURE — 99499 UNLISTED E&M SERVICE: CPT | Mod: S$GLB,,, | Performed by: FAMILY MEDICINE

## 2021-01-06 PROCEDURE — 3051F HG A1C>EQUAL 7.0%<8.0%: CPT | Mod: HCNC,CPTII,S$GLB, | Performed by: FAMILY MEDICINE

## 2021-01-06 RX ORDER — TESTOSTERONE CYPIONATE 200 MG/ML
INJECTION, SOLUTION INTRAMUSCULAR
COMMUNITY
Start: 2020-11-23 | End: 2021-09-10 | Stop reason: SDUPTHER

## 2021-01-06 RX ORDER — HYDROCODONE BITARTRATE AND ACETAMINOPHEN 10; 325 MG/1; MG/1
1 TABLET ORAL 3 TIMES DAILY PRN
Qty: 74 TABLET | Refills: 0 | Status: SHIPPED | OUTPATIENT
Start: 2021-02-06 | End: 2021-03-02 | Stop reason: SDUPTHER

## 2021-01-06 RX ORDER — HYDROCODONE BITARTRATE AND ACETAMINOPHEN 10; 325 MG/1; MG/1
1 TABLET ORAL 3 TIMES DAILY PRN
Qty: 74 TABLET | Refills: 0 | Status: SHIPPED | OUTPATIENT
Start: 2021-01-06 | End: 2021-03-02 | Stop reason: SDUPTHER

## 2021-01-06 RX ORDER — HYDROCODONE BITARTRATE AND ACETAMINOPHEN 10; 325 MG/1; MG/1
1 TABLET ORAL 3 TIMES DAILY PRN
Qty: 74 TABLET | Refills: 0 | Status: SHIPPED | OUTPATIENT
Start: 2021-03-06 | End: 2021-03-02 | Stop reason: SDUPTHER

## 2021-01-30 ENCOUNTER — HOSPITAL ENCOUNTER (EMERGENCY)
Facility: HOSPITAL | Age: 66
Discharge: HOME | End: 2021-01-30
Attending: EMERGENCY MEDICINE
Payer: MEDICARE

## 2021-01-30 VITALS
OXYGEN SATURATION: 97 % | HEIGHT: 70 IN | WEIGHT: 230 LBS | HEART RATE: 78 BPM | DIASTOLIC BLOOD PRESSURE: 100 MMHG | SYSTOLIC BLOOD PRESSURE: 168 MMHG | TEMPERATURE: 99 F | BODY MASS INDEX: 32.93 KG/M2 | RESPIRATION RATE: 18 BRPM

## 2021-01-30 DIAGNOSIS — M79.603 ARM PAIN: ICD-10-CM

## 2021-01-30 DIAGNOSIS — S13.9XXA NECK SPRAIN, INITIAL ENCOUNTER: Primary | ICD-10-CM

## 2021-01-30 LAB — POCT GLUCOSE: 201 MG/DL (ref 70–110)

## 2021-01-30 PROCEDURE — 99284 EMERGENCY DEPT VISIT MOD MDM: CPT | Mod: 25,HCNC,ER

## 2021-01-30 PROCEDURE — 93010 ELECTROCARDIOGRAM REPORT: CPT | Mod: HCNC,,, | Performed by: INTERNAL MEDICINE

## 2021-01-30 PROCEDURE — 25000003 PHARM REV CODE 250: Mod: HCNC,ER | Performed by: NURSE PRACTITIONER

## 2021-01-30 PROCEDURE — 82962 GLUCOSE BLOOD TEST: CPT | Mod: HCNC,ER

## 2021-01-30 PROCEDURE — 93005 ELECTROCARDIOGRAM TRACING: CPT | Mod: HCNC,ER

## 2021-01-30 PROCEDURE — 93010 EKG 12-LEAD: ICD-10-PCS | Mod: HCNC,,, | Performed by: INTERNAL MEDICINE

## 2021-01-30 RX ORDER — METHOCARBAMOL 500 MG/1
1000 TABLET, FILM COATED ORAL EVERY 6 HOURS PRN
Qty: 40 TABLET | Refills: 0 | Status: SHIPPED | OUTPATIENT
Start: 2021-01-30 | End: 2023-08-29

## 2021-01-30 RX ORDER — IBUPROFEN 600 MG/1
600 TABLET ORAL EVERY 6 HOURS PRN
Qty: 30 TABLET | Refills: 0 | Status: SHIPPED | OUTPATIENT
Start: 2021-01-30 | End: 2021-12-09

## 2021-01-30 RX ORDER — IBUPROFEN 600 MG/1
600 TABLET ORAL ONCE
Status: COMPLETED | OUTPATIENT
Start: 2021-01-30 | End: 2021-01-30

## 2021-01-30 RX ADMIN — IBUPROFEN 600 MG: 600 TABLET ORAL at 11:01

## 2021-02-02 ENCOUNTER — OFFICE VISIT (OUTPATIENT)
Dept: FAMILY MEDICINE | Facility: CLINIC | Age: 66
End: 2021-02-02
Payer: MEDICARE

## 2021-02-02 VITALS
SYSTOLIC BLOOD PRESSURE: 170 MMHG | DIASTOLIC BLOOD PRESSURE: 82 MMHG | WEIGHT: 237 LBS | OXYGEN SATURATION: 95 % | HEART RATE: 84 BPM | BODY MASS INDEX: 33.93 KG/M2 | TEMPERATURE: 98 F | HEIGHT: 70 IN
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
## 2021-02-02 DIAGNOSIS — M54.2 NECK PAIN: Primary | ICD-10-CM

## 2021-02-02 DIAGNOSIS — I10 ESSENTIAL (PRIMARY) HYPERTENSION: Chronic | ICD-10-CM

## 2021-02-02 DIAGNOSIS — S46.812D STRAIN OF LEFT TRAPEZIUS MUSCLE, SUBSEQUENT ENCOUNTER: ICD-10-CM

## 2021-02-02 PROCEDURE — 3079F DIAST BP 80-89 MM HG: CPT | Mod: HCNC,CPTII,S$GLB, | Performed by: NURSE PRACTITIONER

## 2021-02-02 PROCEDURE — 99999 PR PBB SHADOW E&M-EST. PATIENT-LVL V: ICD-10-PCS | Mod: PBBFAC,HCNC,, | Performed by: NURSE PRACTITIONER

## 2021-02-02 PROCEDURE — 1125F AMNT PAIN NOTED PAIN PRSNT: CPT | Mod: HCNC,S$GLB,, | Performed by: NURSE PRACTITIONER

## 2021-02-02 PROCEDURE — 3288F PR FALLS RISK ASSESSMENT DOCUMENTED: ICD-10-PCS | Mod: HCNC,CPTII,S$GLB, | Performed by: NURSE PRACTITIONER

## 2021-02-02 PROCEDURE — 99214 PR OFFICE/OUTPT VISIT, EST, LEVL IV, 30-39 MIN: ICD-10-PCS | Mod: HCNC,S$GLB,, | Performed by: NURSE PRACTITIONER

## 2021-02-02 PROCEDURE — 3008F BODY MASS INDEX DOCD: CPT | Mod: HCNC,CPTII,S$GLB, | Performed by: NURSE PRACTITIONER

## 2021-02-02 PROCEDURE — 99214 OFFICE O/P EST MOD 30 MIN: CPT | Mod: HCNC,S$GLB,, | Performed by: NURSE PRACTITIONER

## 2021-02-02 PROCEDURE — 1101F PT FALLS ASSESS-DOCD LE1/YR: CPT | Mod: HCNC,CPTII,S$GLB, | Performed by: NURSE PRACTITIONER

## 2021-02-02 PROCEDURE — 99999 PR PBB SHADOW E&M-EST. PATIENT-LVL V: CPT | Mod: PBBFAC,HCNC,, | Performed by: NURSE PRACTITIONER

## 2021-02-02 PROCEDURE — 3077F PR MOST RECENT SYSTOLIC BLOOD PRESSURE >= 140 MM HG: ICD-10-PCS | Mod: HCNC,CPTII,S$GLB, | Performed by: NURSE PRACTITIONER

## 2021-02-02 PROCEDURE — 3077F SYST BP >= 140 MM HG: CPT | Mod: HCNC,CPTII,S$GLB, | Performed by: NURSE PRACTITIONER

## 2021-02-02 PROCEDURE — 3008F PR BODY MASS INDEX (BMI) DOCUMENTED: ICD-10-PCS | Mod: HCNC,CPTII,S$GLB, | Performed by: NURSE PRACTITIONER

## 2021-02-02 PROCEDURE — 3288F FALL RISK ASSESSMENT DOCD: CPT | Mod: HCNC,CPTII,S$GLB, | Performed by: NURSE PRACTITIONER

## 2021-02-02 PROCEDURE — 3079F PR MOST RECENT DIASTOLIC BLOOD PRESSURE 80-89 MM HG: ICD-10-PCS | Mod: HCNC,CPTII,S$GLB, | Performed by: NURSE PRACTITIONER

## 2021-02-02 PROCEDURE — 1125F PR PAIN SEVERITY QUANTIFIED, PAIN PRESENT: ICD-10-PCS | Mod: HCNC,S$GLB,, | Performed by: NURSE PRACTITIONER

## 2021-02-02 PROCEDURE — 1101F PR PT FALLS ASSESS DOC 0-1 FALLS W/OUT INJ PAST YR: ICD-10-PCS | Mod: HCNC,CPTII,S$GLB, | Performed by: NURSE PRACTITIONER

## 2021-02-02 RX ORDER — LISINOPRIL 20 MG/1
20 TABLET ORAL DAILY
Qty: 90 TABLET | Refills: 3 | Status: SHIPPED | OUTPATIENT
Start: 2021-02-02 | End: 2021-06-24 | Stop reason: SDUPTHER

## 2021-03-02 ENCOUNTER — HOSPITAL ENCOUNTER (OUTPATIENT)
Dept: RADIOLOGY | Facility: HOSPITAL | Age: 66
Discharge: HOME | End: 2021-03-02
Attending: FAMILY MEDICINE
Payer: MEDICARE

## 2021-03-02 ENCOUNTER — OFFICE VISIT (OUTPATIENT)
Dept: FAMILY MEDICINE | Facility: CLINIC | Age: 66
End: 2021-03-02
Payer: MEDICARE

## 2021-03-02 VITALS
HEART RATE: 79 BPM | BODY MASS INDEX: 32.57 KG/M2 | TEMPERATURE: 99 F | DIASTOLIC BLOOD PRESSURE: 80 MMHG | SYSTOLIC BLOOD PRESSURE: 130 MMHG | WEIGHT: 227.5 LBS | HEIGHT: 70 IN | OXYGEN SATURATION: 95 %

## 2021-03-02 DIAGNOSIS — M54.9 CHRONIC BACK PAIN, UNSPECIFIED BACK LOCATION, UNSPECIFIED BACK PAIN LATERALITY: ICD-10-CM

## 2021-03-02 DIAGNOSIS — G89.29 CHRONIC BACK PAIN, UNSPECIFIED BACK LOCATION, UNSPECIFIED BACK PAIN LATERALITY: ICD-10-CM

## 2021-03-02 DIAGNOSIS — E11.29 TYPE 2 DIABETES MELLITUS WITH MICROALBUMINURIA, WITHOUT LONG-TERM CURRENT USE OF INSULIN: ICD-10-CM

## 2021-03-02 DIAGNOSIS — M17.0 PRIMARY OSTEOARTHRITIS OF BOTH KNEES: ICD-10-CM

## 2021-03-02 DIAGNOSIS — M17.0 PRIMARY OSTEOARTHRITIS OF BOTH KNEES: Primary | ICD-10-CM
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
## 2021-03-02 DIAGNOSIS — F11.29 OPIOID DEPENDENCE WITH OPIOID-INDUCED DISORDER: ICD-10-CM

## 2021-03-02 DIAGNOSIS — R80.9 TYPE 2 DIABETES MELLITUS WITH MICROALBUMINURIA, WITHOUT LONG-TERM CURRENT USE OF INSULIN: ICD-10-CM

## 2021-03-02 PROCEDURE — 1125F AMNT PAIN NOTED PAIN PRSNT: CPT | Mod: S$GLB,,, | Performed by: FAMILY MEDICINE

## 2021-03-02 PROCEDURE — 3288F PR FALLS RISK ASSESSMENT DOCUMENTED: ICD-10-PCS | Mod: CPTII,S$GLB,, | Performed by: FAMILY MEDICINE

## 2021-03-02 PROCEDURE — 96372 THER/PROPH/DIAG INJ SC/IM: CPT | Mod: S$GLB,,, | Performed by: FAMILY MEDICINE

## 2021-03-02 PROCEDURE — 73565 X-RAY EXAM OF KNEES: CPT | Mod: 26,,, | Performed by: RADIOLOGY

## 2021-03-02 PROCEDURE — 73565 X-RAY EXAM OF KNEES: CPT | Mod: TC,FY,PO

## 2021-03-02 PROCEDURE — 3075F SYST BP GE 130 - 139MM HG: CPT | Mod: CPTII,S$GLB,, | Performed by: FAMILY MEDICINE

## 2021-03-02 PROCEDURE — 3008F PR BODY MASS INDEX (BMI) DOCUMENTED: ICD-10-PCS | Mod: CPTII,S$GLB,, | Performed by: FAMILY MEDICINE

## 2021-03-02 PROCEDURE — 3079F DIAST BP 80-89 MM HG: CPT | Mod: CPTII,S$GLB,, | Performed by: FAMILY MEDICINE

## 2021-03-02 PROCEDURE — 3288F FALL RISK ASSESSMENT DOCD: CPT | Mod: CPTII,S$GLB,, | Performed by: FAMILY MEDICINE

## 2021-03-02 PROCEDURE — 1125F PR PAIN SEVERITY QUANTIFIED, PAIN PRESENT: ICD-10-PCS | Mod: S$GLB,,, | Performed by: FAMILY MEDICINE

## 2021-03-02 PROCEDURE — 99215 OFFICE O/P EST HI 40 MIN: CPT | Mod: 25,S$GLB,, | Performed by: FAMILY MEDICINE

## 2021-03-02 PROCEDURE — 99999 PR PBB SHADOW E&M-EST. PATIENT-LVL IV: ICD-10-PCS | Mod: PBBFAC,,, | Performed by: FAMILY MEDICINE

## 2021-03-02 PROCEDURE — 73565 XR KNEE AP STANDING BILATERAL: ICD-10-PCS | Mod: 26,,, | Performed by: RADIOLOGY

## 2021-03-02 PROCEDURE — 3075F PR MOST RECENT SYSTOLIC BLOOD PRESS GE 130-139MM HG: ICD-10-PCS | Mod: CPTII,S$GLB,, | Performed by: FAMILY MEDICINE

## 2021-03-02 PROCEDURE — 99499 UNLISTED E&M SERVICE: CPT | Mod: S$GLB,,, | Performed by: FAMILY MEDICINE

## 2021-03-02 PROCEDURE — 1101F PR PT FALLS ASSESS DOC 0-1 FALLS W/OUT INJ PAST YR: ICD-10-PCS | Mod: CPTII,S$GLB,, | Performed by: FAMILY MEDICINE

## 2021-03-02 PROCEDURE — 3008F BODY MASS INDEX DOCD: CPT | Mod: CPTII,S$GLB,, | Performed by: FAMILY MEDICINE

## 2021-03-02 PROCEDURE — 3079F PR MOST RECENT DIASTOLIC BLOOD PRESSURE 80-89 MM HG: ICD-10-PCS | Mod: CPTII,S$GLB,, | Performed by: FAMILY MEDICINE

## 2021-03-02 PROCEDURE — 99499 RISK ADDL DX/OHS AUDIT: ICD-10-PCS | Mod: S$GLB,,, | Performed by: FAMILY MEDICINE

## 2021-03-02 PROCEDURE — 99215 PR OFFICE/OUTPT VISIT, EST, LEVL V, 40-54 MIN: ICD-10-PCS | Mod: 25,S$GLB,, | Performed by: FAMILY MEDICINE

## 2021-03-02 PROCEDURE — 3051F PR MOST RECENT HEMOGLOBIN A1C LEVEL 7.0 - < 8.0%: ICD-10-PCS | Mod: CPTII,S$GLB,, | Performed by: FAMILY MEDICINE

## 2021-03-02 PROCEDURE — 3051F HG A1C>EQUAL 7.0%<8.0%: CPT | Mod: CPTII,S$GLB,, | Performed by: FAMILY MEDICINE

## 2021-03-02 PROCEDURE — 1101F PT FALLS ASSESS-DOCD LE1/YR: CPT | Mod: CPTII,S$GLB,, | Performed by: FAMILY MEDICINE

## 2021-03-02 PROCEDURE — 96372 PR INJECTION,THERAP/PROPH/DIAG2ST, IM OR SUBCUT: ICD-10-PCS | Mod: S$GLB,,, | Performed by: FAMILY MEDICINE

## 2021-03-02 PROCEDURE — 99999 PR PBB SHADOW E&M-EST. PATIENT-LVL IV: CPT | Mod: PBBFAC,,, | Performed by: FAMILY MEDICINE

## 2021-03-02 RX ORDER — KETOROLAC TROMETHAMINE 30 MG/ML
30 INJECTION, SOLUTION INTRAMUSCULAR; INTRAVENOUS ONCE
Status: COMPLETED | OUTPATIENT
Start: 2021-03-02 | End: 2021-03-02

## 2021-03-02 RX ORDER — HYDROCODONE BITARTRATE AND ACETAMINOPHEN 10; 325 MG/1; MG/1
1 TABLET ORAL 3 TIMES DAILY PRN
Qty: 73 TABLET | Refills: 0 | Status: SHIPPED | OUTPATIENT
Start: 2021-04-02 | End: 2021-06-24

## 2021-03-02 RX ORDER — HYDROCODONE BITARTRATE AND ACETAMINOPHEN 10; 325 MG/1; MG/1
1 TABLET ORAL 3 TIMES DAILY PRN
Qty: 73 TABLET | Refills: 0 | Status: SHIPPED | OUTPATIENT
Start: 2021-03-02 | End: 2021-03-02 | Stop reason: SDUPTHER

## 2021-03-02 RX ORDER — HYDROCODONE BITARTRATE AND ACETAMINOPHEN 10; 325 MG/1; MG/1
1 TABLET ORAL 3 TIMES DAILY PRN
Qty: 73 TABLET | Refills: 0 | Status: SHIPPED | OUTPATIENT
Start: 2021-06-02 | End: 2021-06-02 | Stop reason: SDUPTHER

## 2021-03-02 RX ORDER — HYDROCODONE BITARTRATE AND ACETAMINOPHEN 10; 325 MG/1; MG/1
1 TABLET ORAL 3 TIMES DAILY PRN
Qty: 73 TABLET | Refills: 0 | Status: SHIPPED | OUTPATIENT
Start: 2021-05-02 | End: 2021-06-24

## 2021-03-02 RX ADMIN — KETOROLAC TROMETHAMINE 30 MG: 30 INJECTION, SOLUTION INTRAMUSCULAR; INTRAVENOUS at 09:03

## 2021-03-03 ENCOUNTER — TELEPHONE (OUTPATIENT)
Dept: FAMILY MEDICINE | Facility: CLINIC | Age: 66
End: 2021-03-03

## 2021-03-03 DIAGNOSIS — E11.9 TYPE 2 DIABETES MELLITUS WITHOUT COMPLICATION, WITHOUT LONG-TERM CURRENT USE OF INSULIN: ICD-10-CM

## 2021-03-03 RX ORDER — GLIMEPIRIDE 4 MG/1
4 TABLET ORAL
Qty: 90 TABLET | Refills: 3 | Status: SHIPPED | OUTPATIENT
Start: 2021-03-03 | End: 2021-10-14 | Stop reason: SDUPTHER

## 2021-03-13 DIAGNOSIS — E11.9 TYPE 2 DIABETES MELLITUS WITHOUT COMPLICATION, WITHOUT LONG-TERM CURRENT USE OF INSULIN: ICD-10-CM

## 2021-03-16 RX ORDER — GLIMEPIRIDE 2 MG/1
2 TABLET ORAL
Qty: 90 TABLET | Refills: 0 | OUTPATIENT
Start: 2021-03-16 | End: 2022-03-16

## 2021-03-16 RX ORDER — METFORMIN HYDROCHLORIDE 500 MG/1
TABLET ORAL
Qty: 180 TABLET | Refills: 0 | Status: SHIPPED | OUTPATIENT
Start: 2021-03-16 | End: 2022-02-08

## 2021-03-20 DIAGNOSIS — M25.561 CHRONIC PAIN OF RIGHT KNEE: ICD-10-CM

## 2021-03-20 DIAGNOSIS — G89.29 CHRONIC PAIN OF RIGHT KNEE: ICD-10-CM

## 2021-03-22 RX ORDER — GABAPENTIN 300 MG/1
CAPSULE ORAL
Qty: 90 CAPSULE | Refills: 2 | Status: SHIPPED | OUTPATIENT
Start: 2021-03-22 | End: 2021-06-14

## 2021-04-20 DIAGNOSIS — E11.9 TYPE 2 DIABETES MELLITUS WITHOUT COMPLICATION, WITHOUT LONG-TERM CURRENT USE OF INSULIN: ICD-10-CM

## 2021-04-22 RX ORDER — GLIMEPIRIDE 2 MG/1
2 TABLET ORAL
Qty: 90 TABLET | Refills: 0 | OUTPATIENT
Start: 2021-04-22 | End: 2022-04-22

## 2021-06-02 ENCOUNTER — TELEPHONE (OUTPATIENT)
Dept: OPTOMETRY | Facility: CLINIC | Age: 66
End: 2021-06-02

## 2021-06-02 DIAGNOSIS — M17.0 PRIMARY OSTEOARTHRITIS OF BOTH KNEES: ICD-10-CM

## 2021-06-02 RX ORDER — HYDROCODONE BITARTRATE AND ACETAMINOPHEN 10; 325 MG/1; MG/1
1 TABLET ORAL 3 TIMES DAILY PRN
Qty: 73 TABLET | Refills: 0 | Status: CANCELLED | OUTPATIENT
Start: 2021-06-02

## 2021-06-02 RX ORDER — HYDROCODONE BITARTRATE AND ACETAMINOPHEN 10; 325 MG/1; MG/1
1 TABLET ORAL 3 TIMES DAILY PRN
Qty: 73 TABLET | Refills: 0 | Status: SHIPPED | OUTPATIENT
Start: 2021-06-02 | End: 2021-06-24 | Stop reason: SDUPTHER

## 2021-06-14 DIAGNOSIS — G89.29 CHRONIC PAIN OF RIGHT KNEE: ICD-10-CM

## 2021-06-14 DIAGNOSIS — M25.561 CHRONIC PAIN OF RIGHT KNEE: ICD-10-CM

## 2021-06-14 RX ORDER — GABAPENTIN 300 MG/1
CAPSULE ORAL
Qty: 90 CAPSULE | Refills: 2 | Status: SHIPPED | OUTPATIENT
Start: 2021-06-14 | End: 2021-09-06

## 2021-06-23 ENCOUNTER — PATIENT OUTREACH (OUTPATIENT)
Dept: ADMINISTRATIVE | Facility: HOSPITAL | Age: 66
End: 2021-06-23

## 2021-06-24 ENCOUNTER — HOSPITAL ENCOUNTER (OUTPATIENT)
Dept: RADIOLOGY | Facility: HOSPITAL | Age: 66
Discharge: HOME | End: 2021-06-24
Attending: FAMILY MEDICINE
Payer: MEDICARE

## 2021-06-24 ENCOUNTER — OFFICE VISIT (OUTPATIENT)
Dept: FAMILY MEDICINE | Facility: CLINIC | Age: 66
End: 2021-06-24
Payer: MEDICARE

## 2021-06-24 VITALS
DIASTOLIC BLOOD PRESSURE: 88 MMHG | WEIGHT: 226 LBS | TEMPERATURE: 98 F | OXYGEN SATURATION: 95 % | HEIGHT: 70 IN | HEART RATE: 89 BPM | SYSTOLIC BLOOD PRESSURE: 138 MMHG | BODY MASS INDEX: 32.35 KG/M2

## 2021-06-24 DIAGNOSIS — M17.0 PRIMARY OSTEOARTHRITIS OF BOTH KNEES: Primary | ICD-10-CM

## 2021-06-24 DIAGNOSIS — I10 ESSENTIAL (PRIMARY) HYPERTENSION: Chronic | ICD-10-CM

## 2021-06-24 DIAGNOSIS — R80.9 TYPE 2 DIABETES MELLITUS WITH MICROALBUMINURIA, WITHOUT LONG-TERM CURRENT USE OF INSULIN: ICD-10-CM

## 2021-06-24 DIAGNOSIS — Z12.11 ENCOUNTER FOR SCREENING FOR MALIGNANT NEOPLASM OF COLON: ICD-10-CM

## 2021-06-24 DIAGNOSIS — M25.511 ACUTE PAIN OF RIGHT SHOULDER: ICD-10-CM

## 2021-06-24 DIAGNOSIS — E11.29 TYPE 2 DIABETES MELLITUS WITH MICROALBUMINURIA, WITHOUT LONG-TERM CURRENT USE OF INSULIN: ICD-10-CM

## 2021-06-24 PROCEDURE — 1101F PT FALLS ASSESS-DOCD LE1/YR: CPT | Mod: CPTII,S$GLB,, | Performed by: FAMILY MEDICINE

## 2021-06-24 PROCEDURE — 99214 OFFICE O/P EST MOD 30 MIN: CPT | Mod: 25,S$GLB,, | Performed by: FAMILY MEDICINE

## 2021-06-24 PROCEDURE — 73030 X-RAY EXAM OF SHOULDER: CPT | Mod: TC,FY,PO,RT

## 2021-06-24 PROCEDURE — 3052F HG A1C>EQUAL 8.0%<EQUAL 9.0%: CPT | Mod: CPTII,S$GLB,, | Performed by: FAMILY MEDICINE

## 2021-06-24 PROCEDURE — 3052F PR MOST RECENT HEMOGLOBIN A1C LEVEL 8.0 - < 9.0%: ICD-10-PCS | Mod: CPTII,S$GLB,, | Performed by: FAMILY MEDICINE

## 2021-06-24 PROCEDURE — 73030 XR SHOULDER COMPLETE 2 OR MORE VIEWS RIGHT: ICD-10-PCS | Mod: 26,RT,, | Performed by: RADIOLOGY

## 2021-06-24 PROCEDURE — 1125F AMNT PAIN NOTED PAIN PRSNT: CPT | Mod: S$GLB,,, | Performed by: FAMILY MEDICINE

## 2021-06-24 PROCEDURE — 20610 DRAIN/INJ JOINT/BURSA W/O US: CPT | Mod: RT,S$GLB,, | Performed by: FAMILY MEDICINE

## 2021-06-24 PROCEDURE — 99999 PR PBB SHADOW E&M-EST. PATIENT-LVL IV: CPT | Mod: PBBFAC,,, | Performed by: FAMILY MEDICINE

## 2021-06-24 PROCEDURE — 1125F PR PAIN SEVERITY QUANTIFIED, PAIN PRESENT: ICD-10-PCS | Mod: S$GLB,,, | Performed by: FAMILY MEDICINE

## 2021-06-24 PROCEDURE — 1101F PR PT FALLS ASSESS DOC 0-1 FALLS W/OUT INJ PAST YR: ICD-10-PCS | Mod: CPTII,S$GLB,, | Performed by: FAMILY MEDICINE

## 2021-06-24 PROCEDURE — 3288F PR FALLS RISK ASSESSMENT DOCUMENTED: ICD-10-PCS | Mod: CPTII,S$GLB,, | Performed by: FAMILY MEDICINE

## 2021-06-24 PROCEDURE — 3288F FALL RISK ASSESSMENT DOCD: CPT | Mod: CPTII,S$GLB,, | Performed by: FAMILY MEDICINE

## 2021-06-24 PROCEDURE — 3008F PR BODY MASS INDEX (BMI) DOCUMENTED: ICD-10-PCS | Mod: CPTII,S$GLB,, | Performed by: FAMILY MEDICINE

## 2021-06-24 PROCEDURE — 99214 PR OFFICE/OUTPT VISIT, EST, LEVL IV, 30-39 MIN: ICD-10-PCS | Mod: 25,S$GLB,, | Performed by: FAMILY MEDICINE

## 2021-06-24 PROCEDURE — 99999 PR PBB SHADOW E&M-EST. PATIENT-LVL IV: ICD-10-PCS | Mod: PBBFAC,,, | Performed by: FAMILY MEDICINE

## 2021-06-24 PROCEDURE — 73030 X-RAY EXAM OF SHOULDER: CPT | Mod: 26,RT,, | Performed by: RADIOLOGY

## 2021-06-24 PROCEDURE — 3008F BODY MASS INDEX DOCD: CPT | Mod: CPTII,S$GLB,, | Performed by: FAMILY MEDICINE

## 2021-06-24 PROCEDURE — 20610 LARGE JOINT ASPIRATION/INJECTION: R KNEE: ICD-10-PCS | Mod: RT,S$GLB,, | Performed by: FAMILY MEDICINE

## 2021-06-24 RX ORDER — HYDROCODONE BITARTRATE AND ACETAMINOPHEN 10; 325 MG/1; MG/1
1 TABLET ORAL 3 TIMES DAILY PRN
Qty: 72 TABLET | Refills: 0 | Status: SHIPPED | OUTPATIENT
Start: 2021-09-01 | End: 2021-08-25 | Stop reason: SDUPTHER

## 2021-06-24 RX ORDER — TRIAMCINOLONE ACETONIDE 40 MG/ML
40 INJECTION, SUSPENSION INTRA-ARTICULAR; INTRAMUSCULAR
Status: DISCONTINUED | OUTPATIENT
Start: 2021-06-24 | End: 2021-06-24 | Stop reason: HOSPADM

## 2021-06-24 RX ORDER — KETOROLAC TROMETHAMINE 30 MG/ML
INJECTION, SOLUTION INTRAMUSCULAR; INTRAVENOUS
COMMUNITY
Start: 2021-03-02 | End: 2021-08-10

## 2021-06-24 RX ORDER — HYDROCODONE BITARTRATE AND ACETAMINOPHEN 10; 325 MG/1; MG/1
1 TABLET ORAL 3 TIMES DAILY PRN
Qty: 72 TABLET | Refills: 0 | Status: SHIPPED | OUTPATIENT
Start: 2021-07-01 | End: 2021-10-04 | Stop reason: SDUPTHER

## 2021-06-24 RX ORDER — HYDROCODONE BITARTRATE AND ACETAMINOPHEN 10; 325 MG/1; MG/1
1 TABLET ORAL 3 TIMES DAILY PRN
Qty: 72 TABLET | Refills: 0 | Status: SHIPPED | OUTPATIENT
Start: 2021-08-01 | End: 2021-10-04 | Stop reason: SDUPTHER

## 2021-06-24 RX ORDER — LISINOPRIL 20 MG/1
20 TABLET ORAL DAILY
Qty: 90 TABLET | Refills: 3 | Status: SHIPPED | OUTPATIENT
Start: 2021-06-24 | End: 2021-10-04

## 2021-06-24 RX ADMIN — TRIAMCINOLONE ACETONIDE 40 MG: 40 INJECTION, SUSPENSION INTRA-ARTICULAR; INTRAMUSCULAR at 08:06

## 2021-07-01 ENCOUNTER — PATIENT MESSAGE (OUTPATIENT)
Dept: ADMINISTRATIVE | Facility: OTHER | Age: 66
End: 2021-07-01

## 2021-07-16 ENCOUNTER — TELEPHONE (OUTPATIENT)
Dept: FAMILY MEDICINE | Facility: CLINIC | Age: 66
End: 2021-07-16

## 2021-07-28 ENCOUNTER — TELEPHONE (OUTPATIENT)
Dept: FAMILY MEDICINE | Facility: CLINIC | Age: 66
End: 2021-07-28

## 2021-07-28 DIAGNOSIS — M25.519 SHOULDER PAIN, UNSPECIFIED CHRONICITY, UNSPECIFIED LATERALITY: Primary | ICD-10-CM

## 2021-08-10 ENCOUNTER — PATIENT OUTREACH (OUTPATIENT)
Dept: ADMINISTRATIVE | Facility: OTHER | Age: 66
End: 2021-08-10

## 2021-08-10 ENCOUNTER — OFFICE VISIT (OUTPATIENT)
Dept: ORTHOPEDICS | Facility: CLINIC | Age: 66
End: 2021-08-10
Payer: MEDICARE

## 2021-08-10 VITALS
BODY MASS INDEX: 33.04 KG/M2 | OXYGEN SATURATION: 96 % | HEIGHT: 70 IN | WEIGHT: 230.81 LBS | RESPIRATION RATE: 18 BRPM | HEART RATE: 83 BPM | DIASTOLIC BLOOD PRESSURE: 80 MMHG | SYSTOLIC BLOOD PRESSURE: 126 MMHG

## 2021-08-10 DIAGNOSIS — M25.519 SHOULDER PAIN, UNSPECIFIED CHRONICITY, UNSPECIFIED LATERALITY: ICD-10-CM

## 2021-08-10 PROCEDURE — 1159F MED LIST DOCD IN RCRD: CPT | Mod: CPTII,S$GLB,, | Performed by: ORTHOPAEDIC SURGERY

## 2021-08-10 PROCEDURE — 99204 OFFICE O/P NEW MOD 45 MIN: CPT | Mod: S$GLB,,, | Performed by: ORTHOPAEDIC SURGERY

## 2021-08-10 PROCEDURE — 1101F PR PT FALLS ASSESS DOC 0-1 FALLS W/OUT INJ PAST YR: ICD-10-PCS | Mod: CPTII,S$GLB,, | Performed by: ORTHOPAEDIC SURGERY

## 2021-08-10 PROCEDURE — 3008F PR BODY MASS INDEX (BMI) DOCUMENTED: ICD-10-PCS | Mod: CPTII,S$GLB,, | Performed by: ORTHOPAEDIC SURGERY

## 2021-08-10 PROCEDURE — 3288F FALL RISK ASSESSMENT DOCD: CPT | Mod: CPTII,S$GLB,, | Performed by: ORTHOPAEDIC SURGERY

## 2021-08-10 PROCEDURE — 3074F PR MOST RECENT SYSTOLIC BLOOD PRESSURE < 130 MM HG: ICD-10-PCS | Mod: CPTII,S$GLB,, | Performed by: ORTHOPAEDIC SURGERY

## 2021-08-10 PROCEDURE — 3288F PR FALLS RISK ASSESSMENT DOCUMENTED: ICD-10-PCS | Mod: CPTII,S$GLB,, | Performed by: ORTHOPAEDIC SURGERY

## 2021-08-10 PROCEDURE — 1125F PR PAIN SEVERITY QUANTIFIED, PAIN PRESENT: ICD-10-PCS | Mod: CPTII,S$GLB,, | Performed by: ORTHOPAEDIC SURGERY

## 2021-08-10 PROCEDURE — 99204 PR OFFICE/OUTPT VISIT, NEW, LEVL IV, 45-59 MIN: ICD-10-PCS | Mod: S$GLB,,, | Performed by: ORTHOPAEDIC SURGERY

## 2021-08-10 PROCEDURE — 99999 PR PBB SHADOW E&M-EST. PATIENT-LVL V: ICD-10-PCS | Mod: PBBFAC,,, | Performed by: ORTHOPAEDIC SURGERY

## 2021-08-10 PROCEDURE — 3074F SYST BP LT 130 MM HG: CPT | Mod: CPTII,S$GLB,, | Performed by: ORTHOPAEDIC SURGERY

## 2021-08-10 PROCEDURE — 3051F HG A1C>EQUAL 7.0%<8.0%: CPT | Mod: CPTII,S$GLB,, | Performed by: ORTHOPAEDIC SURGERY

## 2021-08-10 PROCEDURE — 3079F DIAST BP 80-89 MM HG: CPT | Mod: CPTII,S$GLB,, | Performed by: ORTHOPAEDIC SURGERY

## 2021-08-10 PROCEDURE — 1101F PT FALLS ASSESS-DOCD LE1/YR: CPT | Mod: CPTII,S$GLB,, | Performed by: ORTHOPAEDIC SURGERY

## 2021-08-10 PROCEDURE — 1125F AMNT PAIN NOTED PAIN PRSNT: CPT | Mod: CPTII,S$GLB,, | Performed by: ORTHOPAEDIC SURGERY

## 2021-08-10 PROCEDURE — 99999 PR PBB SHADOW E&M-EST. PATIENT-LVL V: CPT | Mod: PBBFAC,,, | Performed by: ORTHOPAEDIC SURGERY

## 2021-08-10 PROCEDURE — 3079F PR MOST RECENT DIASTOLIC BLOOD PRESSURE 80-89 MM HG: ICD-10-PCS | Mod: CPTII,S$GLB,, | Performed by: ORTHOPAEDIC SURGERY

## 2021-08-10 PROCEDURE — 3008F BODY MASS INDEX DOCD: CPT | Mod: CPTII,S$GLB,, | Performed by: ORTHOPAEDIC SURGERY

## 2021-08-10 PROCEDURE — 3051F PR MOST RECENT HEMOGLOBIN A1C LEVEL 7.0 - < 8.0%: ICD-10-PCS | Mod: CPTII,S$GLB,, | Performed by: ORTHOPAEDIC SURGERY

## 2021-08-10 PROCEDURE — 1159F PR MEDICATION LIST DOCUMENTED IN MEDICAL RECORD: ICD-10-PCS | Mod: CPTII,S$GLB,, | Performed by: ORTHOPAEDIC SURGERY

## 2021-08-10 RX ORDER — MELOXICAM 15 MG/1
15 TABLET ORAL DAILY
Qty: 30 TABLET | Refills: 0 | Status: SHIPPED | OUTPATIENT
Start: 2021-08-10 | End: 2021-11-08

## 2021-08-24 ENCOUNTER — TELEPHONE (OUTPATIENT)
Dept: FAMILY MEDICINE | Facility: CLINIC | Age: 66
End: 2021-08-24

## 2021-08-24 DIAGNOSIS — M17.0 PRIMARY OSTEOARTHRITIS OF BOTH KNEES: ICD-10-CM

## 2021-08-25 RX ORDER — HYDROCODONE BITARTRATE AND ACETAMINOPHEN 10; 325 MG/1; MG/1
1 TABLET ORAL 3 TIMES DAILY PRN
Qty: 72 TABLET | Refills: 0 | Status: SHIPPED | OUTPATIENT
Start: 2021-09-23 | End: 2021-10-08 | Stop reason: SDUPTHER

## 2021-09-04 DIAGNOSIS — G89.29 CHRONIC PAIN OF RIGHT KNEE: ICD-10-CM

## 2021-09-04 DIAGNOSIS — M25.561 CHRONIC PAIN OF RIGHT KNEE: ICD-10-CM

## 2021-09-06 RX ORDER — GABAPENTIN 300 MG/1
CAPSULE ORAL
Qty: 90 CAPSULE | Refills: 2 | Status: SHIPPED | OUTPATIENT
Start: 2021-09-06 | End: 2021-09-29

## 2021-09-10 ENCOUNTER — NURSE TRIAGE (OUTPATIENT)
Dept: ADMINISTRATIVE | Facility: CLINIC | Age: 66
End: 2021-09-10

## 2021-09-10 DIAGNOSIS — E29.1 TESTICULAR HYPOFUNCTION: Primary | ICD-10-CM

## 2021-09-10 DIAGNOSIS — M17.0 PRIMARY OSTEOARTHRITIS OF BOTH KNEES: ICD-10-CM

## 2021-09-10 RX ORDER — HYDROCODONE BITARTRATE AND ACETAMINOPHEN 10; 325 MG/1; MG/1
1 TABLET ORAL 3 TIMES DAILY PRN
Qty: 72 TABLET | Refills: 0 | Status: CANCELLED | OUTPATIENT
Start: 2021-09-10

## 2021-09-10 RX ORDER — TESTOSTERONE CYPIONATE 200 MG/ML
200 INJECTION, SOLUTION INTRAMUSCULAR
Qty: 10 ML | Refills: 0 | Status: SHIPPED | OUTPATIENT
Start: 2021-09-10 | End: 2022-02-18 | Stop reason: SDUPTHER

## 2021-09-10 RX ORDER — TESTOSTERONE CYPIONATE 200 MG/ML
200 INJECTION, SOLUTION INTRAMUSCULAR
Qty: 10 ML | Refills: 0 | Status: SHIPPED | OUTPATIENT
Start: 2021-09-10 | End: 2021-09-10 | Stop reason: SDUPTHER

## 2021-09-28 DIAGNOSIS — M25.561 CHRONIC PAIN OF RIGHT KNEE: ICD-10-CM

## 2021-09-28 DIAGNOSIS — G89.29 CHRONIC PAIN OF RIGHT KNEE: ICD-10-CM

## 2021-09-29 RX ORDER — GABAPENTIN 300 MG/1
CAPSULE ORAL
Qty: 90 CAPSULE | Refills: 0 | Status: SHIPPED | OUTPATIENT
Start: 2021-09-29 | End: 2021-10-14

## 2021-10-04 ENCOUNTER — OFFICE VISIT (OUTPATIENT)
Dept: INTERNAL MEDICINE | Facility: CLINIC | Age: 66
End: 2021-10-04
Payer: MEDICARE

## 2021-10-04 VITALS
HEIGHT: 70 IN | SYSTOLIC BLOOD PRESSURE: 129 MMHG | DIASTOLIC BLOOD PRESSURE: 90 MMHG | HEART RATE: 87 BPM | WEIGHT: 227.19 LBS | BODY MASS INDEX: 32.52 KG/M2 | TEMPERATURE: 98 F

## 2021-10-04 DIAGNOSIS — R80.9 TYPE 2 DIABETES MELLITUS WITH MICROALBUMINURIA, WITHOUT LONG-TERM CURRENT USE OF INSULIN: Primary | Chronic | ICD-10-CM

## 2021-10-04 DIAGNOSIS — E11.29 TYPE 2 DIABETES MELLITUS WITH MICROALBUMINURIA, WITHOUT LONG-TERM CURRENT USE OF INSULIN: Primary | Chronic | ICD-10-CM

## 2021-10-04 DIAGNOSIS — I10 ESSENTIAL (PRIMARY) HYPERTENSION: Chronic | ICD-10-CM
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
## 2021-10-04 DIAGNOSIS — E66.09 CLASS 1 OBESITY DUE TO EXCESS CALORIES WITH SERIOUS COMORBIDITY AND BODY MASS INDEX (BMI) OF 32.0 TO 32.9 IN ADULT: ICD-10-CM

## 2021-10-04 PROCEDURE — 3051F HG A1C>EQUAL 7.0%<8.0%: CPT | Mod: CPTII,S$GLB,, | Performed by: INTERNAL MEDICINE

## 2021-10-04 PROCEDURE — 1159F MED LIST DOCD IN RCRD: CPT | Mod: CPTII,S$GLB,, | Performed by: INTERNAL MEDICINE

## 2021-10-04 PROCEDURE — 4010F ACE/ARB THERAPY RXD/TAKEN: CPT | Mod: CPTII,S$GLB,, | Performed by: INTERNAL MEDICINE

## 2021-10-04 PROCEDURE — 1101F PR PT FALLS ASSESS DOC 0-1 FALLS W/OUT INJ PAST YR: ICD-10-PCS | Mod: CPTII,S$GLB,, | Performed by: INTERNAL MEDICINE

## 2021-10-04 PROCEDURE — 99213 OFFICE O/P EST LOW 20 MIN: CPT | Mod: S$GLB,,, | Performed by: INTERNAL MEDICINE

## 2021-10-04 PROCEDURE — 99213 PR OFFICE/OUTPT VISIT, EST, LEVL III, 20-29 MIN: ICD-10-PCS | Mod: S$GLB,,, | Performed by: INTERNAL MEDICINE

## 2021-10-04 PROCEDURE — 1160F RVW MEDS BY RX/DR IN RCRD: CPT | Mod: CPTII,S$GLB,, | Performed by: INTERNAL MEDICINE

## 2021-10-04 PROCEDURE — 3060F PR POS MICROALBUMINURIA RESULT DOCUMENTED/REVIEW: ICD-10-PCS | Mod: CPTII,S$GLB,, | Performed by: INTERNAL MEDICINE

## 2021-10-04 PROCEDURE — 3080F PR MOST RECENT DIASTOLIC BLOOD PRESSURE >= 90 MM HG: ICD-10-PCS | Mod: CPTII,S$GLB,, | Performed by: INTERNAL MEDICINE

## 2021-10-04 PROCEDURE — 3008F PR BODY MASS INDEX (BMI) DOCUMENTED: ICD-10-PCS | Mod: CPTII,S$GLB,, | Performed by: INTERNAL MEDICINE

## 2021-10-04 PROCEDURE — 1160F PR REVIEW ALL MEDS BY PRESCRIBER/CLIN PHARMACIST DOCUMENTED: ICD-10-PCS | Mod: CPTII,S$GLB,, | Performed by: INTERNAL MEDICINE

## 2021-10-04 PROCEDURE — 3060F POS MICROALBUMINURIA REV: CPT | Mod: CPTII,S$GLB,, | Performed by: INTERNAL MEDICINE

## 2021-10-04 PROCEDURE — 3008F BODY MASS INDEX DOCD: CPT | Mod: CPTII,S$GLB,, | Performed by: INTERNAL MEDICINE

## 2021-10-04 PROCEDURE — 3074F SYST BP LT 130 MM HG: CPT | Mod: CPTII,S$GLB,, | Performed by: INTERNAL MEDICINE

## 2021-10-04 PROCEDURE — 3080F DIAST BP >= 90 MM HG: CPT | Mod: CPTII,S$GLB,, | Performed by: INTERNAL MEDICINE

## 2021-10-04 PROCEDURE — 3074F PR MOST RECENT SYSTOLIC BLOOD PRESSURE < 130 MM HG: ICD-10-PCS | Mod: CPTII,S$GLB,, | Performed by: INTERNAL MEDICINE

## 2021-10-04 PROCEDURE — 3051F PR MOST RECENT HEMOGLOBIN A1C LEVEL 7.0 - < 8.0%: ICD-10-PCS | Mod: CPTII,S$GLB,, | Performed by: INTERNAL MEDICINE

## 2021-10-04 PROCEDURE — 3288F FALL RISK ASSESSMENT DOCD: CPT | Mod: CPTII,S$GLB,, | Performed by: INTERNAL MEDICINE

## 2021-10-04 PROCEDURE — 1101F PT FALLS ASSESS-DOCD LE1/YR: CPT | Mod: CPTII,S$GLB,, | Performed by: INTERNAL MEDICINE

## 2021-10-04 PROCEDURE — 3066F NEPHROPATHY DOC TX: CPT | Mod: CPTII,S$GLB,, | Performed by: INTERNAL MEDICINE

## 2021-10-04 PROCEDURE — 1159F PR MEDICATION LIST DOCUMENTED IN MEDICAL RECORD: ICD-10-PCS | Mod: CPTII,S$GLB,, | Performed by: INTERNAL MEDICINE

## 2021-10-04 PROCEDURE — 4010F PR ACE/ARB THEARPY RXD/TAKEN: ICD-10-PCS | Mod: CPTII,S$GLB,, | Performed by: INTERNAL MEDICINE

## 2021-10-04 PROCEDURE — 3066F PR DOCUMENTATION OF TREATMENT FOR NEPHROPATHY: ICD-10-PCS | Mod: CPTII,S$GLB,, | Performed by: INTERNAL MEDICINE

## 2021-10-04 PROCEDURE — 3288F PR FALLS RISK ASSESSMENT DOCUMENTED: ICD-10-PCS | Mod: CPTII,S$GLB,, | Performed by: INTERNAL MEDICINE

## 2021-10-04 RX ORDER — CLINDAMYCIN HYDROCHLORIDE 300 MG/1
300 CAPSULE ORAL EVERY 6 HOURS
Qty: 28 CAPSULE | Refills: 0 | Status: SHIPPED | OUTPATIENT
Start: 2021-10-04 | End: 2021-10-11

## 2021-10-08 DIAGNOSIS — M17.0 PRIMARY OSTEOARTHRITIS OF BOTH KNEES: ICD-10-CM

## 2021-10-08 RX ORDER — HYDROCODONE BITARTRATE AND ACETAMINOPHEN 10; 325 MG/1; MG/1
1 TABLET ORAL 3 TIMES DAILY PRN
Qty: 72 TABLET | Refills: 0 | Status: SHIPPED | OUTPATIENT
Start: 2021-10-08 | End: 2021-11-11 | Stop reason: SDUPTHER

## 2021-10-13 DIAGNOSIS — G89.29 CHRONIC PAIN OF RIGHT KNEE: ICD-10-CM

## 2021-10-13 DIAGNOSIS — M25.561 CHRONIC PAIN OF RIGHT KNEE: ICD-10-CM

## 2021-10-14 ENCOUNTER — TELEPHONE (OUTPATIENT)
Dept: FAMILY MEDICINE | Facility: CLINIC | Age: 66
End: 2021-10-14

## 2021-10-14 DIAGNOSIS — E11.9 TYPE 2 DIABETES MELLITUS WITHOUT COMPLICATION, WITHOUT LONG-TERM CURRENT USE OF INSULIN: ICD-10-CM

## 2021-10-14 RX ORDER — GLIMEPIRIDE 4 MG/1
4 TABLET ORAL
Qty: 90 TABLET | Refills: 1 | Status: SHIPPED | OUTPATIENT
Start: 2021-10-14 | End: 2022-02-08 | Stop reason: SDUPTHER

## 2021-10-14 RX ORDER — GABAPENTIN 300 MG/1
CAPSULE ORAL
Qty: 90 CAPSULE | Refills: 2 | Status: SHIPPED | OUTPATIENT
Start: 2021-10-14 | End: 2022-01-10

## 2021-10-19 ENCOUNTER — OFFICE VISIT (OUTPATIENT)
Dept: INTERNAL MEDICINE | Facility: CLINIC | Age: 66
End: 2021-10-19
Payer: MEDICARE

## 2021-10-19 VITALS
HEART RATE: 82 BPM | HEIGHT: 70 IN | BODY MASS INDEX: 32.93 KG/M2 | SYSTOLIC BLOOD PRESSURE: 151 MMHG | TEMPERATURE: 98 F | DIASTOLIC BLOOD PRESSURE: 84 MMHG | WEIGHT: 230.06 LBS

## 2021-10-19 DIAGNOSIS — I10 ESSENTIAL (PRIMARY) HYPERTENSION: Chronic | ICD-10-CM

## 2021-10-19 DIAGNOSIS — E66.09 CLASS 1 OBESITY DUE TO EXCESS CALORIES WITH SERIOUS COMORBIDITY AND BODY MASS INDEX (BMI) OF 33.0 TO 33.9 IN ADULT: ICD-10-CM

## 2021-10-19 DIAGNOSIS — E11.29 TYPE 2 DIABETES MELLITUS WITH MICROALBUMINURIA, WITHOUT LONG-TERM CURRENT USE OF INSULIN: Chronic | ICD-10-CM

## 2021-10-19 DIAGNOSIS — H60.22 ACUTE MALIGNANT OTITIS EXTERNA OF LEFT EAR: Primary | ICD-10-CM

## 2021-10-19 DIAGNOSIS — R80.9 TYPE 2 DIABETES MELLITUS WITH MICROALBUMINURIA, WITHOUT LONG-TERM CURRENT USE OF INSULIN: Chronic | ICD-10-CM

## 2021-10-19 PROCEDURE — 99213 PR OFFICE/OUTPT VISIT, EST, LEVL III, 20-29 MIN: ICD-10-PCS | Mod: S$GLB,,, | Performed by: INTERNAL MEDICINE

## 2021-10-19 PROCEDURE — 1160F PR REVIEW ALL MEDS BY PRESCRIBER/CLIN PHARMACIST DOCUMENTED: ICD-10-PCS | Mod: CPTII,S$GLB,, | Performed by: INTERNAL MEDICINE

## 2021-10-19 PROCEDURE — 3066F NEPHROPATHY DOC TX: CPT | Mod: CPTII,S$GLB,, | Performed by: INTERNAL MEDICINE

## 2021-10-19 PROCEDURE — 4010F PR ACE/ARB THEARPY RXD/TAKEN: ICD-10-PCS | Mod: CPTII,S$GLB,, | Performed by: INTERNAL MEDICINE

## 2021-10-19 PROCEDURE — 1159F PR MEDICATION LIST DOCUMENTED IN MEDICAL RECORD: ICD-10-PCS | Mod: CPTII,S$GLB,, | Performed by: INTERNAL MEDICINE

## 2021-10-19 PROCEDURE — 1101F PR PT FALLS ASSESS DOC 0-1 FALLS W/OUT INJ PAST YR: ICD-10-PCS | Mod: CPTII,S$GLB,, | Performed by: INTERNAL MEDICINE

## 2021-10-19 PROCEDURE — 4010F ACE/ARB THERAPY RXD/TAKEN: CPT | Mod: CPTII,S$GLB,, | Performed by: INTERNAL MEDICINE

## 2021-10-19 PROCEDURE — 3288F PR FALLS RISK ASSESSMENT DOCUMENTED: ICD-10-PCS | Mod: CPTII,S$GLB,, | Performed by: INTERNAL MEDICINE

## 2021-10-19 PROCEDURE — 1101F PT FALLS ASSESS-DOCD LE1/YR: CPT | Mod: CPTII,S$GLB,, | Performed by: INTERNAL MEDICINE

## 2021-10-19 PROCEDURE — 3077F PR MOST RECENT SYSTOLIC BLOOD PRESSURE >= 140 MM HG: ICD-10-PCS | Mod: CPTII,S$GLB,, | Performed by: INTERNAL MEDICINE

## 2021-10-19 PROCEDURE — 3008F BODY MASS INDEX DOCD: CPT | Mod: CPTII,S$GLB,, | Performed by: INTERNAL MEDICINE

## 2021-10-19 PROCEDURE — 99213 OFFICE O/P EST LOW 20 MIN: CPT | Mod: S$GLB,,, | Performed by: INTERNAL MEDICINE

## 2021-10-19 PROCEDURE — 3066F PR DOCUMENTATION OF TREATMENT FOR NEPHROPATHY: ICD-10-PCS | Mod: CPTII,S$GLB,, | Performed by: INTERNAL MEDICINE

## 2021-10-19 PROCEDURE — 3008F PR BODY MASS INDEX (BMI) DOCUMENTED: ICD-10-PCS | Mod: CPTII,S$GLB,, | Performed by: INTERNAL MEDICINE

## 2021-10-19 PROCEDURE — 3079F PR MOST RECENT DIASTOLIC BLOOD PRESSURE 80-89 MM HG: ICD-10-PCS | Mod: CPTII,S$GLB,, | Performed by: INTERNAL MEDICINE

## 2021-10-19 PROCEDURE — 3060F PR POS MICROALBUMINURIA RESULT DOCUMENTED/REVIEW: ICD-10-PCS | Mod: CPTII,S$GLB,, | Performed by: INTERNAL MEDICINE

## 2021-10-19 PROCEDURE — 3051F HG A1C>EQUAL 7.0%<8.0%: CPT | Mod: CPTII,S$GLB,, | Performed by: INTERNAL MEDICINE

## 2021-10-19 PROCEDURE — 3288F FALL RISK ASSESSMENT DOCD: CPT | Mod: CPTII,S$GLB,, | Performed by: INTERNAL MEDICINE

## 2021-10-19 PROCEDURE — 3051F PR MOST RECENT HEMOGLOBIN A1C LEVEL 7.0 - < 8.0%: ICD-10-PCS | Mod: CPTII,S$GLB,, | Performed by: INTERNAL MEDICINE

## 2021-10-19 PROCEDURE — 1159F MED LIST DOCD IN RCRD: CPT | Mod: CPTII,S$GLB,, | Performed by: INTERNAL MEDICINE

## 2021-10-19 PROCEDURE — 3060F POS MICROALBUMINURIA REV: CPT | Mod: CPTII,S$GLB,, | Performed by: INTERNAL MEDICINE

## 2021-10-19 PROCEDURE — 3079F DIAST BP 80-89 MM HG: CPT | Mod: CPTII,S$GLB,, | Performed by: INTERNAL MEDICINE

## 2021-10-19 PROCEDURE — 3077F SYST BP >= 140 MM HG: CPT | Mod: CPTII,S$GLB,, | Performed by: INTERNAL MEDICINE

## 2021-10-19 PROCEDURE — 1160F RVW MEDS BY RX/DR IN RCRD: CPT | Mod: CPTII,S$GLB,, | Performed by: INTERNAL MEDICINE

## 2021-10-19 RX ORDER — NEOMYCIN SULFATE, POLYMYXIN B SULFATE, HYDROCORTISONE 3.5; 10000; 1 MG/ML; [USP'U]/ML; MG/ML
4 SOLUTION/ DROPS AURICULAR (OTIC) EVERY 6 HOURS
Qty: 10 ML | Refills: 0 | Status: SHIPPED | OUTPATIENT
Start: 2021-10-19 | End: 2021-10-29

## 2021-10-19 RX ORDER — CLINDAMYCIN HYDROCHLORIDE 300 MG/1
300 CAPSULE ORAL EVERY 6 HOURS
Qty: 30 CAPSULE | Refills: 0 | Status: SHIPPED | OUTPATIENT
Start: 2021-10-19 | End: 2022-02-16

## 2021-10-22 ENCOUNTER — OFFICE VISIT (OUTPATIENT)
Dept: INTERNAL MEDICINE | Facility: CLINIC | Age: 66
End: 2021-10-22
Payer: MEDICARE

## 2021-10-22 VITALS
SYSTOLIC BLOOD PRESSURE: 153 MMHG | HEART RATE: 80 BPM | HEIGHT: 69 IN | WEIGHT: 227.5 LBS | TEMPERATURE: 98 F | BODY MASS INDEX: 33.69 KG/M2 | DIASTOLIC BLOOD PRESSURE: 82 MMHG

## 2021-10-22 DIAGNOSIS — I10 ESSENTIAL (PRIMARY) HYPERTENSION: Chronic | ICD-10-CM

## 2021-10-22 DIAGNOSIS — H60.22 ACUTE MALIGNANT OTITIS EXTERNA OF LEFT EAR: Primary | ICD-10-CM

## 2021-10-22 DIAGNOSIS — E66.09 CLASS 1 OBESITY DUE TO EXCESS CALORIES WITH SERIOUS COMORBIDITY AND BODY MASS INDEX (BMI) OF 33.0 TO 33.9 IN ADULT: ICD-10-CM

## 2021-10-22 PROCEDURE — 3008F PR BODY MASS INDEX (BMI) DOCUMENTED: ICD-10-PCS | Mod: CPTII,S$GLB,, | Performed by: INTERNAL MEDICINE

## 2021-10-22 PROCEDURE — 1160F PR REVIEW ALL MEDS BY PRESCRIBER/CLIN PHARMACIST DOCUMENTED: ICD-10-PCS | Mod: CPTII,S$GLB,, | Performed by: INTERNAL MEDICINE

## 2021-10-22 PROCEDURE — 3288F FALL RISK ASSESSMENT DOCD: CPT | Mod: CPTII,S$GLB,, | Performed by: INTERNAL MEDICINE

## 2021-10-22 PROCEDURE — 4010F ACE/ARB THERAPY RXD/TAKEN: CPT | Mod: CPTII,S$GLB,, | Performed by: INTERNAL MEDICINE

## 2021-10-22 PROCEDURE — 3060F POS MICROALBUMINURIA REV: CPT | Mod: CPTII,S$GLB,, | Performed by: INTERNAL MEDICINE

## 2021-10-22 PROCEDURE — 3066F NEPHROPATHY DOC TX: CPT | Mod: CPTII,S$GLB,, | Performed by: INTERNAL MEDICINE

## 2021-10-22 PROCEDURE — 3066F PR DOCUMENTATION OF TREATMENT FOR NEPHROPATHY: ICD-10-PCS | Mod: CPTII,S$GLB,, | Performed by: INTERNAL MEDICINE

## 2021-10-22 PROCEDURE — 4010F PR ACE/ARB THEARPY RXD/TAKEN: ICD-10-PCS | Mod: CPTII,S$GLB,, | Performed by: INTERNAL MEDICINE

## 2021-10-22 PROCEDURE — 3008F BODY MASS INDEX DOCD: CPT | Mod: CPTII,S$GLB,, | Performed by: INTERNAL MEDICINE

## 2021-10-22 PROCEDURE — 99213 PR OFFICE/OUTPT VISIT, EST, LEVL III, 20-29 MIN: ICD-10-PCS | Mod: S$GLB,,, | Performed by: INTERNAL MEDICINE

## 2021-10-22 PROCEDURE — 3077F SYST BP >= 140 MM HG: CPT | Mod: CPTII,S$GLB,, | Performed by: INTERNAL MEDICINE

## 2021-10-22 PROCEDURE — 3051F HG A1C>EQUAL 7.0%<8.0%: CPT | Mod: CPTII,S$GLB,, | Performed by: INTERNAL MEDICINE

## 2021-10-22 PROCEDURE — 3051F PR MOST RECENT HEMOGLOBIN A1C LEVEL 7.0 - < 8.0%: ICD-10-PCS | Mod: CPTII,S$GLB,, | Performed by: INTERNAL MEDICINE

## 2021-10-22 PROCEDURE — 99213 OFFICE O/P EST LOW 20 MIN: CPT | Mod: S$GLB,,, | Performed by: INTERNAL MEDICINE

## 2021-10-22 PROCEDURE — 1101F PT FALLS ASSESS-DOCD LE1/YR: CPT | Mod: CPTII,S$GLB,, | Performed by: INTERNAL MEDICINE

## 2021-10-22 PROCEDURE — 3079F DIAST BP 80-89 MM HG: CPT | Mod: CPTII,S$GLB,, | Performed by: INTERNAL MEDICINE

## 2021-10-22 PROCEDURE — 1159F MED LIST DOCD IN RCRD: CPT | Mod: CPTII,S$GLB,, | Performed by: INTERNAL MEDICINE

## 2021-10-22 PROCEDURE — 3079F PR MOST RECENT DIASTOLIC BLOOD PRESSURE 80-89 MM HG: ICD-10-PCS | Mod: CPTII,S$GLB,, | Performed by: INTERNAL MEDICINE

## 2021-10-22 PROCEDURE — 3060F PR POS MICROALBUMINURIA RESULT DOCUMENTED/REVIEW: ICD-10-PCS | Mod: CPTII,S$GLB,, | Performed by: INTERNAL MEDICINE

## 2021-10-22 PROCEDURE — 1159F PR MEDICATION LIST DOCUMENTED IN MEDICAL RECORD: ICD-10-PCS | Mod: CPTII,S$GLB,, | Performed by: INTERNAL MEDICINE

## 2021-10-22 PROCEDURE — 3077F PR MOST RECENT SYSTOLIC BLOOD PRESSURE >= 140 MM HG: ICD-10-PCS | Mod: CPTII,S$GLB,, | Performed by: INTERNAL MEDICINE

## 2021-10-22 PROCEDURE — 1101F PR PT FALLS ASSESS DOC 0-1 FALLS W/OUT INJ PAST YR: ICD-10-PCS | Mod: CPTII,S$GLB,, | Performed by: INTERNAL MEDICINE

## 2021-10-22 PROCEDURE — 1160F RVW MEDS BY RX/DR IN RCRD: CPT | Mod: CPTII,S$GLB,, | Performed by: INTERNAL MEDICINE

## 2021-10-22 PROCEDURE — 3288F PR FALLS RISK ASSESSMENT DOCUMENTED: ICD-10-PCS | Mod: CPTII,S$GLB,, | Performed by: INTERNAL MEDICINE

## 2021-11-04 DIAGNOSIS — M17.0 PRIMARY OSTEOARTHRITIS OF BOTH KNEES: ICD-10-CM

## 2021-11-04 RX ORDER — HYDROCODONE BITARTRATE AND ACETAMINOPHEN 10; 325 MG/1; MG/1
1 TABLET ORAL 3 TIMES DAILY PRN
Qty: 72 TABLET | Refills: 0 | OUTPATIENT
Start: 2021-11-04

## 2021-11-08 ENCOUNTER — OFFICE VISIT (OUTPATIENT)
Dept: ORTHOPEDICS | Facility: CLINIC | Age: 66
End: 2021-11-08
Payer: MEDICARE

## 2021-11-08 VITALS
DIASTOLIC BLOOD PRESSURE: 80 MMHG | OXYGEN SATURATION: 95 % | HEART RATE: 74 BPM | HEIGHT: 71 IN | RESPIRATION RATE: 18 BRPM | BODY MASS INDEX: 32.37 KG/M2 | SYSTOLIC BLOOD PRESSURE: 140 MMHG | WEIGHT: 231.25 LBS

## 2021-11-08 DIAGNOSIS — M25.519 SHOULDER PAIN, UNSPECIFIED CHRONICITY, UNSPECIFIED LATERALITY: Primary | ICD-10-CM

## 2021-11-08 PROCEDURE — 1101F PT FALLS ASSESS-DOCD LE1/YR: CPT | Mod: CPTII,S$GLB,, | Performed by: ORTHOPAEDIC SURGERY

## 2021-11-08 PROCEDURE — 99213 OFFICE O/P EST LOW 20 MIN: CPT | Mod: S$GLB,,, | Performed by: ORTHOPAEDIC SURGERY

## 2021-11-08 PROCEDURE — 99999 PR PBB SHADOW E&M-EST. PATIENT-LVL V: ICD-10-PCS | Mod: PBBFAC,,, | Performed by: ORTHOPAEDIC SURGERY

## 2021-11-08 PROCEDURE — 3079F PR MOST RECENT DIASTOLIC BLOOD PRESSURE 80-89 MM HG: ICD-10-PCS | Mod: CPTII,S$GLB,, | Performed by: ORTHOPAEDIC SURGERY

## 2021-11-08 PROCEDURE — 3051F HG A1C>EQUAL 7.0%<8.0%: CPT | Mod: CPTII,S$GLB,, | Performed by: ORTHOPAEDIC SURGERY

## 2021-11-08 PROCEDURE — 3077F PR MOST RECENT SYSTOLIC BLOOD PRESSURE >= 140 MM HG: ICD-10-PCS | Mod: CPTII,S$GLB,, | Performed by: ORTHOPAEDIC SURGERY

## 2021-11-08 PROCEDURE — 3051F PR MOST RECENT HEMOGLOBIN A1C LEVEL 7.0 - < 8.0%: ICD-10-PCS | Mod: CPTII,S$GLB,, | Performed by: ORTHOPAEDIC SURGERY

## 2021-11-08 PROCEDURE — 4010F PR ACE/ARB THEARPY RXD/TAKEN: ICD-10-PCS | Mod: CPTII,S$GLB,, | Performed by: ORTHOPAEDIC SURGERY

## 2021-11-08 PROCEDURE — 1159F PR MEDICATION LIST DOCUMENTED IN MEDICAL RECORD: ICD-10-PCS | Mod: CPTII,S$GLB,, | Performed by: ORTHOPAEDIC SURGERY

## 2021-11-08 PROCEDURE — 3066F PR DOCUMENTATION OF TREATMENT FOR NEPHROPATHY: ICD-10-PCS | Mod: CPTII,S$GLB,, | Performed by: ORTHOPAEDIC SURGERY

## 2021-11-08 PROCEDURE — 3288F FALL RISK ASSESSMENT DOCD: CPT | Mod: CPTII,S$GLB,, | Performed by: ORTHOPAEDIC SURGERY

## 2021-11-08 PROCEDURE — 3077F SYST BP >= 140 MM HG: CPT | Mod: CPTII,S$GLB,, | Performed by: ORTHOPAEDIC SURGERY

## 2021-11-08 PROCEDURE — 3060F POS MICROALBUMINURIA REV: CPT | Mod: CPTII,S$GLB,, | Performed by: ORTHOPAEDIC SURGERY

## 2021-11-08 PROCEDURE — 1159F MED LIST DOCD IN RCRD: CPT | Mod: CPTII,S$GLB,, | Performed by: ORTHOPAEDIC SURGERY

## 2021-11-08 PROCEDURE — 3079F DIAST BP 80-89 MM HG: CPT | Mod: CPTII,S$GLB,, | Performed by: ORTHOPAEDIC SURGERY

## 2021-11-08 PROCEDURE — 1160F RVW MEDS BY RX/DR IN RCRD: CPT | Mod: CPTII,S$GLB,, | Performed by: ORTHOPAEDIC SURGERY

## 2021-11-08 PROCEDURE — 1160F PR REVIEW ALL MEDS BY PRESCRIBER/CLIN PHARMACIST DOCUMENTED: ICD-10-PCS | Mod: CPTII,S$GLB,, | Performed by: ORTHOPAEDIC SURGERY

## 2021-11-08 PROCEDURE — 99213 PR OFFICE/OUTPT VISIT, EST, LEVL III, 20-29 MIN: ICD-10-PCS | Mod: S$GLB,,, | Performed by: ORTHOPAEDIC SURGERY

## 2021-11-08 PROCEDURE — 99999 PR PBB SHADOW E&M-EST. PATIENT-LVL V: CPT | Mod: PBBFAC,,, | Performed by: ORTHOPAEDIC SURGERY

## 2021-11-08 PROCEDURE — 1101F PR PT FALLS ASSESS DOC 0-1 FALLS W/OUT INJ PAST YR: ICD-10-PCS | Mod: CPTII,S$GLB,, | Performed by: ORTHOPAEDIC SURGERY

## 2021-11-08 PROCEDURE — 3060F PR POS MICROALBUMINURIA RESULT DOCUMENTED/REVIEW: ICD-10-PCS | Mod: CPTII,S$GLB,, | Performed by: ORTHOPAEDIC SURGERY

## 2021-11-08 PROCEDURE — 3066F NEPHROPATHY DOC TX: CPT | Mod: CPTII,S$GLB,, | Performed by: ORTHOPAEDIC SURGERY

## 2021-11-08 PROCEDURE — 4010F ACE/ARB THERAPY RXD/TAKEN: CPT | Mod: CPTII,S$GLB,, | Performed by: ORTHOPAEDIC SURGERY

## 2021-11-08 PROCEDURE — 3008F PR BODY MASS INDEX (BMI) DOCUMENTED: ICD-10-PCS | Mod: CPTII,S$GLB,, | Performed by: ORTHOPAEDIC SURGERY

## 2021-11-08 PROCEDURE — 3008F BODY MASS INDEX DOCD: CPT | Mod: CPTII,S$GLB,, | Performed by: ORTHOPAEDIC SURGERY

## 2021-11-08 PROCEDURE — 3288F PR FALLS RISK ASSESSMENT DOCUMENTED: ICD-10-PCS | Mod: CPTII,S$GLB,, | Performed by: ORTHOPAEDIC SURGERY

## 2021-11-08 RX ORDER — DICLOFENAC SODIUM 75 MG/1
75 TABLET, DELAYED RELEASE ORAL 2 TIMES DAILY
Qty: 60 TABLET | Refills: 0 | Status: SHIPPED | OUTPATIENT
Start: 2021-11-08 | End: 2021-12-09

## 2021-11-10 ENCOUNTER — TELEPHONE (OUTPATIENT)
Dept: FAMILY MEDICINE | Facility: CLINIC | Age: 66
End: 2021-11-10
Payer: MEDICARE

## 2021-11-11 ENCOUNTER — OFFICE VISIT (OUTPATIENT)
Dept: FAMILY MEDICINE | Facility: CLINIC | Age: 66
End: 2021-11-11
Payer: MEDICARE

## 2021-11-11 VITALS
HEIGHT: 71 IN | DIASTOLIC BLOOD PRESSURE: 78 MMHG | BODY MASS INDEX: 31.67 KG/M2 | SYSTOLIC BLOOD PRESSURE: 144 MMHG | WEIGHT: 226.19 LBS | TEMPERATURE: 99 F | OXYGEN SATURATION: 96 % | HEART RATE: 96 BPM

## 2021-11-11 DIAGNOSIS — E11.29 TYPE 2 DIABETES MELLITUS WITH MICROALBUMINURIA, WITHOUT LONG-TERM CURRENT USE OF INSULIN: Chronic | ICD-10-CM

## 2021-11-11 DIAGNOSIS — Z12.11 ENCOUNTER FOR SCREENING FOR MALIGNANT NEOPLASM OF COLON: ICD-10-CM

## 2021-11-11 DIAGNOSIS — M17.11 PRIMARY OSTEOARTHRITIS OF RIGHT KNEE: ICD-10-CM

## 2021-11-11 DIAGNOSIS — R80.9 TYPE 2 DIABETES MELLITUS WITH MICROALBUMINURIA, WITHOUT LONG-TERM CURRENT USE OF INSULIN: Chronic | ICD-10-CM

## 2021-11-11 DIAGNOSIS — I10 ESSENTIAL (PRIMARY) HYPERTENSION: Primary | Chronic | ICD-10-CM

## 2021-11-11 DIAGNOSIS — M17.0 PRIMARY OSTEOARTHRITIS OF BOTH KNEES: ICD-10-CM

## 2021-11-11 PROCEDURE — 3051F PR MOST RECENT HEMOGLOBIN A1C LEVEL 7.0 - < 8.0%: ICD-10-PCS | Mod: CPTII,S$GLB,, | Performed by: FAMILY MEDICINE

## 2021-11-11 PROCEDURE — 3008F PR BODY MASS INDEX (BMI) DOCUMENTED: ICD-10-PCS | Mod: CPTII,S$GLB,, | Performed by: FAMILY MEDICINE

## 2021-11-11 PROCEDURE — 3060F POS MICROALBUMINURIA REV: CPT | Mod: CPTII,S$GLB,, | Performed by: FAMILY MEDICINE

## 2021-11-11 PROCEDURE — 3077F PR MOST RECENT SYSTOLIC BLOOD PRESSURE >= 140 MM HG: ICD-10-PCS | Mod: CPTII,S$GLB,, | Performed by: FAMILY MEDICINE

## 2021-11-11 PROCEDURE — 1101F PR PT FALLS ASSESS DOC 0-1 FALLS W/OUT INJ PAST YR: ICD-10-PCS | Mod: CPTII,S$GLB,, | Performed by: FAMILY MEDICINE

## 2021-11-11 PROCEDURE — 20610 DRAIN/INJ JOINT/BURSA W/O US: CPT | Mod: RT,S$GLB,, | Performed by: FAMILY MEDICINE

## 2021-11-11 PROCEDURE — 1159F PR MEDICATION LIST DOCUMENTED IN MEDICAL RECORD: ICD-10-PCS | Mod: CPTII,S$GLB,, | Performed by: FAMILY MEDICINE

## 2021-11-11 PROCEDURE — 3078F DIAST BP <80 MM HG: CPT | Mod: CPTII,S$GLB,, | Performed by: FAMILY MEDICINE

## 2021-11-11 PROCEDURE — 99999 PR PBB SHADOW E&M-EST. PATIENT-LVL IV: ICD-10-PCS | Mod: PBBFAC,,, | Performed by: FAMILY MEDICINE

## 2021-11-11 PROCEDURE — 4010F ACE/ARB THERAPY RXD/TAKEN: CPT | Mod: CPTII,S$GLB,, | Performed by: FAMILY MEDICINE

## 2021-11-11 PROCEDURE — 3066F PR DOCUMENTATION OF TREATMENT FOR NEPHROPATHY: ICD-10-PCS | Mod: CPTII,S$GLB,, | Performed by: FAMILY MEDICINE

## 2021-11-11 PROCEDURE — 3060F PR POS MICROALBUMINURIA RESULT DOCUMENTED/REVIEW: ICD-10-PCS | Mod: CPTII,S$GLB,, | Performed by: FAMILY MEDICINE

## 2021-11-11 PROCEDURE — 3288F PR FALLS RISK ASSESSMENT DOCUMENTED: ICD-10-PCS | Mod: CPTII,S$GLB,, | Performed by: FAMILY MEDICINE

## 2021-11-11 PROCEDURE — 1159F MED LIST DOCD IN RCRD: CPT | Mod: CPTII,S$GLB,, | Performed by: FAMILY MEDICINE

## 2021-11-11 PROCEDURE — 3077F SYST BP >= 140 MM HG: CPT | Mod: CPTII,S$GLB,, | Performed by: FAMILY MEDICINE

## 2021-11-11 PROCEDURE — 20610 LARGE JOINT ASPIRATION/INJECTION: R KNEE: ICD-10-PCS | Mod: RT,S$GLB,, | Performed by: FAMILY MEDICINE

## 2021-11-11 PROCEDURE — 3008F BODY MASS INDEX DOCD: CPT | Mod: CPTII,S$GLB,, | Performed by: FAMILY MEDICINE

## 2021-11-11 PROCEDURE — 3066F NEPHROPATHY DOC TX: CPT | Mod: CPTII,S$GLB,, | Performed by: FAMILY MEDICINE

## 2021-11-11 PROCEDURE — 99999 PR PBB SHADOW E&M-EST. PATIENT-LVL IV: CPT | Mod: PBBFAC,,, | Performed by: FAMILY MEDICINE

## 2021-11-11 PROCEDURE — 1101F PT FALLS ASSESS-DOCD LE1/YR: CPT | Mod: CPTII,S$GLB,, | Performed by: FAMILY MEDICINE

## 2021-11-11 PROCEDURE — 4010F PR ACE/ARB THEARPY RXD/TAKEN: ICD-10-PCS | Mod: CPTII,S$GLB,, | Performed by: FAMILY MEDICINE

## 2021-11-11 PROCEDURE — 3051F HG A1C>EQUAL 7.0%<8.0%: CPT | Mod: CPTII,S$GLB,, | Performed by: FAMILY MEDICINE

## 2021-11-11 PROCEDURE — 3078F PR MOST RECENT DIASTOLIC BLOOD PRESSURE < 80 MM HG: ICD-10-PCS | Mod: CPTII,S$GLB,, | Performed by: FAMILY MEDICINE

## 2021-11-11 PROCEDURE — 3288F FALL RISK ASSESSMENT DOCD: CPT | Mod: CPTII,S$GLB,, | Performed by: FAMILY MEDICINE

## 2021-11-11 PROCEDURE — 99214 OFFICE O/P EST MOD 30 MIN: CPT | Mod: 25,S$GLB,, | Performed by: FAMILY MEDICINE

## 2021-11-11 PROCEDURE — 99214 PR OFFICE/OUTPT VISIT, EST, LEVL IV, 30-39 MIN: ICD-10-PCS | Mod: 25,S$GLB,, | Performed by: FAMILY MEDICINE

## 2021-11-11 RX ORDER — TRIAMCINOLONE ACETONIDE 40 MG/ML
40 INJECTION, SUSPENSION INTRA-ARTICULAR; INTRAMUSCULAR
Status: DISCONTINUED | OUTPATIENT
Start: 2021-11-11 | End: 2021-11-11 | Stop reason: HOSPADM

## 2021-11-11 RX ORDER — HYDROCODONE BITARTRATE AND ACETAMINOPHEN 10; 325 MG/1; MG/1
1 TABLET ORAL 3 TIMES DAILY PRN
Qty: 72 TABLET | Refills: 0 | Status: SHIPPED | OUTPATIENT
Start: 2022-01-13 | End: 2022-02-04 | Stop reason: SDUPTHER

## 2021-11-11 RX ORDER — HYDROCODONE BITARTRATE AND ACETAMINOPHEN 10; 325 MG/1; MG/1
1 TABLET ORAL 3 TIMES DAILY PRN
Qty: 72 TABLET | Refills: 0 | Status: SHIPPED | OUTPATIENT
Start: 2021-12-13 | End: 2022-02-04 | Stop reason: SDUPTHER

## 2021-11-11 RX ORDER — HYDROCODONE BITARTRATE AND ACETAMINOPHEN 10; 325 MG/1; MG/1
1 TABLET ORAL 3 TIMES DAILY PRN
Qty: 72 TABLET | Refills: 0 | Status: SHIPPED | OUTPATIENT
Start: 2021-11-13 | End: 2022-02-04 | Stop reason: SDUPTHER

## 2021-11-11 RX ADMIN — TRIAMCINOLONE ACETONIDE 40 MG: 40 INJECTION, SUSPENSION INTRA-ARTICULAR; INTRAMUSCULAR at 01:11

## 2021-11-17 ENCOUNTER — TELEPHONE (OUTPATIENT)
Dept: FAMILY MEDICINE | Facility: CLINIC | Age: 66
End: 2021-11-17
Payer: MEDICARE

## 2021-12-03 ENCOUNTER — CLINICAL SUPPORT (OUTPATIENT)
Dept: REHABILITATION | Facility: HOSPITAL | Age: 66
End: 2021-12-03
Attending: ORTHOPAEDIC SURGERY
Payer: MEDICARE

## 2021-12-03 DIAGNOSIS — M25.511 CHRONIC RIGHT SHOULDER PAIN: ICD-10-CM

## 2021-12-03 DIAGNOSIS — M25.519 SHOULDER PAIN, UNSPECIFIED CHRONICITY, UNSPECIFIED LATERALITY: ICD-10-CM

## 2021-12-03 DIAGNOSIS — R29.898 WEAKNESS OF RIGHT UPPER EXTREMITY: ICD-10-CM

## 2021-12-03 DIAGNOSIS — G89.29 CHRONIC RIGHT SHOULDER PAIN: ICD-10-CM

## 2021-12-03 PROCEDURE — 97140 MANUAL THERAPY 1/> REGIONS: CPT | Mod: PN

## 2021-12-03 PROCEDURE — 97110 THERAPEUTIC EXERCISES: CPT | Mod: PN

## 2021-12-03 PROCEDURE — 97162 PT EVAL MOD COMPLEX 30 MIN: CPT | Mod: PN

## 2021-12-15 ENCOUNTER — PATIENT MESSAGE (OUTPATIENT)
Dept: ADMINISTRATIVE | Facility: HOSPITAL | Age: 66
End: 2021-12-15
Payer: MEDICARE

## 2022-01-07 NOTE — PROGRESS NOTES
[facility]   Physical Therapy Treatment Note     Name: King Cool Jr.  Clinic Number: [id number]    Therapy Diagnosis: No diagnosis found.  Physician: Paty Rodriguez MD    Visit Date: 1/10/2022    Physician Orders: PT Eval and Treat   Medical Diagnosis from Referral: : Shoulder pain, unspecified chronicity, unspecified laterality [M25.519]  Evaluation Date: 12/3/2021  Authorization Period Expiration: 11/08/2022  Plan of Care Expiration: 02/18/2022  Progress Note Due: 01/02/2022  Visit # / Visits authorized: 1/ 20  FOTO: 1/ 3      Precautions: Standard     Time In: 1115  Time Out: 1200  Total Appointment Time (timed & untimed codes): 45 minutes      SUBJECTIVE     Pt reports:Shoulder continues to be painful candi with overhead movements.  .  He was compliant with home exercise program.  Response to previous treatment: no adverse response  Functional change: none to note yet    Pain: 8/10  Location: right shoulder      OBJECTIVE     Objective Measures updated at progress report unless specified.     Treatment     ALISSON received the treatments listed below:        ALISSON received therapeutic exercises to develop strength, endurance, ROM and posture for 35 minutes including:  UBE      2/2  Pulleys flexion    x 3 min  Wall slides flexion   20x  Rows with RTB   2 x 10  B shoulder ext with RTB  2 x 10  Supine dowel flexion   2 x 10                                                                                                                                                                                                                                                                                                                              Chin Tucks                                         2 x 10  Scap Retractions                              2 x 10  Shoulder ER  RTB                             2 x 10     ALISSON received the following manual therapy techniques: were applied for 10 minutes,  including:  STM Right UT supraspinatus, infraspinatus, anterior delt  PROM all planes      hot pack for 10 minutes to NAYE toribio.          Patient Education and Home Exercises     Home Exercises Provided and Patient Education Provided     Education provided:   -     Written Home Exercises Provided: Patient instructed to cont prior HEP. Exercises were reviewed and ALISSON was able to demonstrate them prior to the end of the session.  ALISSON demonstrated good  understanding of the education provided. See EMR under Patient Instructions for exercises provided during therapy sessions    ASSESSMENT     Pt tolerated 1st treatment session post PT eval well. He enters clinc with reports of R shoulder pain. Introduced and prescribed therex performed to address the pt's deficits in shoulder ROM, RTC strength, periscap strength, postural awareness and activity tolerance.         ALISSON Is progressing well towards his goals.   Pt prognosis is Good.     Pt will continue to benefit from skilled outpatient physical therapy to address the deficits listed in the problem list box on initial evaluation, provide pt/family education and to maximize pt's level of independence in the home and community environment.     Pt's spiritual, cultural and educational needs considered and pt agreeable to plan of care and goals.     Anticipated barriers to physical therapy: Chronicity of symptoms       Goals:   Short Term Goals: 4 weeks   1. The patient with report compliance with HEP to maximize functional outcomes.  2. The patient will demonstrate increase in BUE MMT by 1/2 grade to improve pt's functional mobility  3. The patient will decrease pain level by 50% in order to improve QOL.     Long Term Goals: 8 weeks   1. The patient will demonstrate understanding and performance of advanced HEP to allow for independence once discharged from therapy.   2. 2. The patient will demonstrate increase in BUE MMT by 1 grade to improve pt's functional mobility  3.  The patient will report 37 % functional limitation on FOTO to indicate an improvement in overall function.  4. The patient will be able to wash his back w/o pain in order to improve pt's ability to perform ADLs.  5. Pt will be able to reach [place] w/o pain in order to improve his ability to reach into cabinets at his home.  6. Pt will be able to ride his motorcycle w/o pain in order to return to PLOF.      PLAN     Plan of care Certification: 12/3/2021 to 02/18/2022.     Outpatient Physical Therapy 2 times weekly for 6 weeks to include the following interventions: Cervical/Lumbar Traction, Electrical Stimulation Dry Needling, Manual Therapy, Moist Heat/ Ice, Neuromuscular Re-ed, Patient Education, Self Care, Therapeutic Activites, Therapeutic Exercise and Ultrasound.       Gillian Turpin, PTA

## 2022-01-09 DIAGNOSIS — M25.561 CHRONIC PAIN OF RIGHT KNEE: ICD-10-CM

## 2022-01-09 DIAGNOSIS — G89.29 CHRONIC PAIN OF RIGHT KNEE: ICD-10-CM

## 2022-01-09 NOTE — TELEPHONE ENCOUNTER
No new care gaps identified.  Powered by [employer]. Reference number: [id number].   1/09/2022 11:21:50 AM CST

## 2022-01-10 ENCOUNTER — CLINICAL SUPPORT (OUTPATIENT)
Dept: REHABILITATION | Facility: HOSPITAL | Age: 67
End: 2022-01-10
Attending: ORTHOPAEDIC SURGERY
Payer: MEDICARE

## 2022-01-10 DIAGNOSIS — G89.29 CHRONIC RIGHT SHOULDER PAIN: ICD-10-CM

## 2022-01-10 DIAGNOSIS — R29.898 WEAKNESS OF RIGHT UPPER EXTREMITY: ICD-10-CM

## 2022-01-10 DIAGNOSIS — M25.511 CHRONIC RIGHT SHOULDER PAIN: ICD-10-CM

## 2022-01-10 PROCEDURE — 97110 THERAPEUTIC EXERCISES: CPT | Mod: PN,CQ

## 2022-01-10 RX ORDER — GABAPENTIN 300 MG/1
CAPSULE ORAL
Qty: 90 CAPSULE | Refills: 2 | Status: SHIPPED | OUTPATIENT
Start: 2022-01-10 | End: 2022-03-08

## 2022-01-10 RX ORDER — LISINOPRIL 20 MG/1
TABLET ORAL
COMMUNITY
Start: 2021-11-01 | End: 2023-01-03

## 2022-01-10 NOTE — TELEPHONE ENCOUNTER
Left voicemail message to inform the Patient, Rx refill has been approved and sent to preferred pharmacy.

## 2022-01-11 NOTE — PROGRESS NOTES
[facility]   Physical Therapy Treatment Note     Name: King Cool Jr.  Clinic Number: [id number]    Therapy Diagnosis:   Encounter Diagnoses   Name Primary?    Chronic right shoulder pain     Weakness of right upper extremity      Physician: Paty Rodriguez MD    Visit Date: 1/12/2022    Physician Orders: PT Eval and Treat   Medical Diagnosis from Referral: : Shoulder pain, unspecified chronicity, unspecified laterality [M25.519]  Evaluation Date: 12/3/2021  Authorization Period Expiration: 11/08/2022  Plan of Care Expiration: 03/18/2022  Progress Note Due: 02/12/2022  Visit # / Visits authorized: 2/ 20 (+eval)  FOTO: 1/ 3      Precautions: Standard     Time In: 11:35  Time Out: 1220  Total Appointment Time (timed & untimed codes): 45 minutes      SUBJECTIVE     Pt reports: Pt states the heat at the end of last session helped a lot. Pt states his shoulder continues to be painful.   He was compliant with home exercise program.  Response to previous treatment: slight improvement in pain  Functional change: none to note yet    Pain: 7/10  Location: right shoulder      OBJECTIVE     Observation: Forward head & shoulder posture           Shoulder Range of Motion:   Shoulder Left Right   Flexion    Abduction    ER WFL WFL   IR WFL WFL         Upper Extremity Strength  (R) UE   (L) UE     Shoulder flexion: 4/5  Shoulder flexion: 4+/5   Shoulder Abduction: 4+/5  Shoulder abduction: 4+/5   Shoulder ER 4-/5 Painful Shoulder ER 4+/5   Shoulder IR 4+/5 Shoulder IR 4+/5   Elbow flexion: 4+/5 Elbow flexion: 5/5   Elbow extension: 4+/5 Elbow extension: 5/5   Wrist extension: 4+/5 Wrist flexion: 4+/5         Special Tests:  Compression (-)   Distraction (-)   Spurlings (-)   Sharp-Radha (-)   Lateral Flexion Alar Ligament (-)   Neer (-)    Hawkin's Conor (-)    Empty Can (+)   Yerguson's (-)            Joint Mobility: NT     Palpation: TTP on Right UT, supraspinatus, infraspinatus,  "anterior deltoid (reproduced CC)       Sensation: decreased sensation into median N dermatomal pattern        Treatment     ALISSON received the treatments listed below:        ALISSON received therapeutic exercises to develop strength, endurance, ROM and posture for 37 minutes including:     ROM and MMT for re-assessment    UBE         2/2  Pulleys flexion       x 3 min  Wall slides flexion      20x  Rows with +GTB      2 x 10  B shoulder ext with +GTB     2 x 10  Supine dowel flexion +3#     2 x 10   +Supine serratus punch with 3# wand    2x10  +Standing horizontal abduction RTB    2x10  +shoulder IR stretch with towel     15"x5  Scap Retractions                                  2 x 10  Shoulder ER  RTB                                 2 x 10  +sidelying shoulder ER     2x10       ALISSON received the following manual therapy techniques: were applied for 08 minutes, including:  STM Right UT supraspinatus, infraspinatus, anterior delt  PROM all planes  Posterior GHJ mobilizations with ER      hot pack for 10 minutes to R shoulder at end of session (not billed)          Patient Education and Home Exercises     Home Exercises Provided and Patient Education Provided     Education provided:   -     Written Home Exercises Provided: Patient instructed to cont prior HEP. Exercises were reviewed and ALISSON was able to demonstrate them prior to the end of the session.  ALISSON demonstrated good  understanding of the education provided. See EMR under Patient Instructions for exercises provided during therapy sessions    ASSESSMENT     Pt arrived to therapy with slight decrease in subjective pain reported post tx last visit. Continued previously prescribed exercises with additional exercises/stretches for ROM and postural strengthening. He was able to tolerate increased resistance with rows and shoulder extension without complaints of increased pain. Had some discomfort with sidelying ER therefore did not perform with weight today but was " able to complete reps.     Primary PT performed re-assessment. Pt displays improved AROM and strength. However, pt continues to have limitations of ROM, strength, pain, and functional mobility which impact the patient's ability to perform functional activities such as lifting, reaching OH, driving, grooming, dressing, and performing ADLs. Pt is still limited in shoulder flexion/abduction and reaching behind his back secondary to pain.  Pt would benefit from continued skilled physical therapy in order to reach pt's goals. - Daniel Solo PT, DPT      ALISSON Is progressing well towards his goals.   Pt prognosis is Good.     Pt will continue to benefit from skilled outpatient physical therapy to address the deficits listed in the problem list box on initial evaluation, provide pt/family education and to maximize pt's level of independence in the home and community environment.     Pt's spiritual, cultural and educational needs considered and pt agreeable to plan of care and goals.     Anticipated barriers to physical therapy: Chronicity of symptoms       Goals:   Short Term Goals: 4 weeks   1. The patient with report compliance with HEP to maximize functional outcomes.  2. The patient will demonstrate increase in BUE MMT by 1/2 grade to improve pt's functional mobility  3. The patient will decrease pain level by 50% in order to improve QOL.     Long Term Goals: 8 weeks   1. The patient will demonstrate understanding and performance of advanced HEP to allow for independence once discharged from therapy.   2. 2. The patient will demonstrate increase in BUE MMT by 1 grade to improve pt's functional mobility  3. The patient will report 37 % functional limitation on FOTO to indicate an improvement in overall function.  4. The patient will be able to wash his back w/o pain in order to improve pt's ability to perform ADLs.  5. Pt will be able to reach OH w/o pain in order to improve his ability to reach into cabinets at his  home.  6. Pt will be able to ride his motorcycle w/o pain in order to return to PLOF.      PLAN     Plan of care Certification: 12/3/2021 to 03/18/2022.     Outpatient Physical Therapy 2 times weekly for 6 weeks to include the following interventions: Cervical/Lumbar Traction, Electrical Stimulation Dry Needling, Manual Therapy, Moist Heat/ Ice, Neuromuscular Re-ed, Patient Education, Self Care, Therapeutic Activites, Therapeutic Exercise and Ultrasound.       Ant Linn, PTA

## 2022-01-12 ENCOUNTER — CLINICAL SUPPORT (OUTPATIENT)
Dept: REHABILITATION | Facility: HOSPITAL | Age: 67
End: 2022-01-12
Attending: ORTHOPAEDIC SURGERY
Payer: MEDICARE

## 2022-01-12 DIAGNOSIS — G89.29 CHRONIC RIGHT SHOULDER PAIN: ICD-10-CM

## 2022-01-12 DIAGNOSIS — M25.511 CHRONIC RIGHT SHOULDER PAIN: ICD-10-CM

## 2022-01-12 DIAGNOSIS — R29.898 WEAKNESS OF RIGHT UPPER EXTREMITY: ICD-10-CM

## 2022-01-12 PROCEDURE — 97110 THERAPEUTIC EXERCISES: CPT | Mod: PN,CQ

## 2022-01-12 PROCEDURE — 97140 MANUAL THERAPY 1/> REGIONS: CPT | Mod: PN,CQ

## 2022-01-18 ENCOUNTER — CLINICAL SUPPORT (OUTPATIENT)
Dept: REHABILITATION | Facility: HOSPITAL | Age: 67
End: 2022-01-18
Attending: ORTHOPAEDIC SURGERY
Payer: MEDICARE

## 2022-01-18 DIAGNOSIS — G89.29 CHRONIC RIGHT SHOULDER PAIN: ICD-10-CM

## 2022-01-18 DIAGNOSIS — R29.898 WEAKNESS OF RIGHT UPPER EXTREMITY: ICD-10-CM

## 2022-01-18 DIAGNOSIS — M25.511 CHRONIC RIGHT SHOULDER PAIN: ICD-10-CM

## 2022-01-18 PROCEDURE — 97110 THERAPEUTIC EXERCISES: CPT | Mod: PN,CQ

## 2022-01-18 NOTE — PROGRESS NOTES
"[facility]   Physical Therapy Treatment Note     Name: King Cool Jr.  Clinic Number: [id number]    Therapy Diagnosis:   Encounter Diagnoses   Name Primary?    Chronic right shoulder pain     Weakness of right upper extremity      Physician: Paty Rodriguez MD    Visit Date: 1/18/2022    Physician Orders: PT Eval and Treat   Medical Diagnosis from Referral: : Shoulder pain, unspecified chronicity, unspecified laterality [M25.519]  Evaluation Date: 12/3/2021  Authorization Period Expiration: 11/08/2022  Plan of Care Expiration: 03/18/2022  Progress Note Due: 02/12/2022  Visit # / Visits authorized: 2/ 20 (+eval)  FOTO: 1/ 3      Precautions: Standard     Time In: 12:30  Time Out: 1:15   Total Appointment Time (timed & untimed codes): 45 minutes      SUBJECTIVE     Pt reports: Pt states the heat at the end of last session helped a lot.  He is not having as much pain and feels he mobilize better.   He was compliant with home exercise program.  Response to previous treatment: slight improvement in pain  Functional change: none to note yet    Pain: did not rate/10  Location: right shoulder      OBJECTIVE     Observation: Forward head & shoulder posture      Treatment     ALISSON received the treatments listed below:        ALISSON received therapeutic exercises to develop strength, endurance, ROM and posture for 37 minutes including:       UBE         2/2  Pulleys flexion       x 3 min  Wall slides flexion      20x  Rows with +GTB      2 x 10  B shoulder ext with +GTB     2 x 10  Supine dowel flexion +3#     2 x 10   Supine serratus punch with 3# wand    2x10  Standing horizontal abduction RTB    2x10  shoulder IR stretch with towel     15"x5  Scap Retractions                                  2 x 10  Shoulder ER  RTB                                 2 x 10  sidelying shoulder ER     2x10       ALISSON received the following manual therapy techniques: were applied for 08 minutes, including:  STM " Right UT supraspinatus, infraspinatus, anterior delt  PROM all planes  Posterior GHJ mobilizations with ER      hot pack for 10 minutes to R shoulder at end of session (not billed)          Patient Education and Home Exercises     Home Exercises Provided and Patient Education Provided     Education provided:   -     Written Home Exercises Provided: Patient instructed to cont prior HEP. Exercises were reviewed and ALISSON was able to demonstrate them prior to the end of the session.  ALISSON demonstrated good  understanding of the education provided. See EMR under Patient Instructions for exercises provided during therapy sessions    ASSESSMENT     Pt tolerated session well. No pain provocation to note. Continued emphasis on shoulder ROM/mobility and RTC strengthening. Cueing required for postural awareness. Pt is still limited in shoulder flexion/abduction and reaching behind his back secondary to pain.  Pt would benefit from continued skilled physical therapy in order to reach pt's goals.       ALISSON Is progressing well towards his goals.   Pt prognosis is Good.     Pt will continue to benefit from skilled outpatient physical therapy to address the deficits listed in the problem list box on initial evaluation, provide pt/family education and to maximize pt's level of independence in the home and community environment.     Pt's spiritual, cultural and educational needs considered and pt agreeable to plan of care and goals.     Anticipated barriers to physical therapy: Chronicity of symptoms       Goals:   Short Term Goals: 4 weeks   1. The patient with report compliance with HEP to maximize functional outcomes.  2. The patient will demonstrate increase in BUE MMT by 1/2 grade to improve pt's functional mobility  3. The patient will decrease pain level by 50% in order to improve QOL.     Long Term Goals: 8 weeks   1. The patient will demonstrate understanding and performance of advanced HEP to allow for independence once  discharged from therapy.   2. 2. The patient will demonstrate increase in BUE MMT by 1 grade to improve pt's functional mobility  3. The patient will report 37 % functional limitation on FOTO to indicate an improvement in overall function.  4. The patient will be able to wash his back w/o pain in order to improve pt's ability to perform ADLs.  5. Pt will be able to reach OH w/o pain in order to improve his ability to reach into cabinets at his home.  6. Pt will be able to ride his motorcycle w/o pain in order to return to PLOF.      PLAN     Plan of care Certification: 12/3/2021 to 03/18/2022.     Outpatient Physical Therapy 2 times weekly for 6 weeks to include the following interventions: Cervical/Lumbar Traction, Electrical Stimulation Dry Needling, Manual Therapy, Moist Heat/ Ice, Neuromuscular Re-ed, Patient Education, Self Care, Therapeutic Activites, Therapeutic Exercise and Ultrasound.       Gillian Turpin, PTA

## 2022-01-19 ENCOUNTER — CLINICAL SUPPORT (OUTPATIENT)
Dept: REHABILITATION | Facility: HOSPITAL | Age: 67
End: 2022-01-19
Attending: ORTHOPAEDIC SURGERY
Payer: MEDICARE

## 2022-01-19 DIAGNOSIS — M25.511 CHRONIC RIGHT SHOULDER PAIN: ICD-10-CM

## 2022-01-19 DIAGNOSIS — R29.898 WEAKNESS OF RIGHT UPPER EXTREMITY: ICD-10-CM

## 2022-01-19 DIAGNOSIS — G89.29 CHRONIC RIGHT SHOULDER PAIN: ICD-10-CM

## 2022-01-19 PROCEDURE — 97140 MANUAL THERAPY 1/> REGIONS: CPT | Mod: PN,CQ

## 2022-01-19 PROCEDURE — 97110 THERAPEUTIC EXERCISES: CPT | Mod: PN,CQ

## 2022-01-19 NOTE — PROGRESS NOTES
"[facility]   Physical Therapy Treatment Note     Name: King Cool Jr.  Clinic Number: [id number]    Therapy Diagnosis:   Encounter Diagnoses   Name Primary?    Chronic right shoulder pain     Weakness of right upper extremity      Physician: Paty Rodriguez MD    Visit Date: 1/19/2022    Physician Orders: PT Eval and Treat   Medical Diagnosis from Referral: : Shoulder pain, unspecified chronicity, unspecified laterality [M25.519]  Evaluation Date: 12/3/2021  Authorization Period Expiration: 11/08/2022  Plan of Care Expiration: 03/18/2022  Progress Note Due: 02/12/2022  Visit # / Visits authorized: 2/ 20 (+eval)  FOTO: 1/ 3      Precautions: Standard     Time In: 12:30  Time Out: 1:15   Total Appointment Time (timed & untimed codes): 45 minutes      SUBJECTIVE     Pt reports: cut 2 yards this morning so his arm got a good workout. Some pain with weed eater use.   He was compliant with home exercise program.  Response to previous treatment: slight improvement in pain  Functional change: none to note yet    Pain: did not rate/10  Location: right shoulder      OBJECTIVE     Observation: Forward head & shoulder posture      Treatment     ALISSON received the treatments listed below:        ALISSON received therapeutic exercises to develop strength, endurance, ROM and posture for 37 minutes including:       UBE         2/2  Pulleys flexion       x 3 min  Wall slides flexion      20x  Rows with +GTB      2 x 10  B shoulder ext with +GTB     2 x 10  +Shoulder arches +RTB     2 x 10  +waiters carry 5#       2 laps  Supine dowel flexion +5# wand    2 x 10   Supine serratus punch with 5# wand    2x10  Standing horizontal abduction RTB    2x10  shoulder IR stretch with towel     15"x5  Scap Retractions                                  2 x 10  Shoulder ER  RTB                                 2 x 10  sidelying shoulder ER     2x10       ALISSON received the following manual therapy techniques: were applied " for 08 minutes, including:  STM Right UT and Lev Scap,  supraspinatus, infraspinatus, anterior delt  PROM all planes  Posterior GHJ mobilizations with ER      hot pack for 10 minutes to R shoulder at end of session (not billed)          Patient Education and Home Exercises     Home Exercises Provided and Patient Education Provided     Education provided:   -     Written Home Exercises Provided: Patient instructed to cont prior HEP. Exercises were reviewed and ALISSON was able to demonstrate them prior to the end of the session.  ALISSON demonstrated good  understanding of the education provided. See EMR under Patient Instructions for exercises provided during therapy sessions    ASSESSMENT     Pt tolerated session well. No pain provocation to note. Progressed with shoulder stabilization exercises due to reports of continued pain with ADL's (weedeating).  Continued emphasis on shoulder ROM/mobility and RTC strengthening. Cueing required for postural awareness. Continued tissue restrictions evident in R UT/Lev scap musculature.  Pt would benefit from continued skilled physical therapy in order to reach pt's goals.       ALSISON Is progressing well towards his goals.   Pt prognosis is Good.     Pt will continue to benefit from skilled outpatient physical therapy to address the deficits listed in the problem list box on initial evaluation, provide pt/family education and to maximize pt's level of independence in the home and community environment.     Pt's spiritual, cultural and educational needs considered and pt agreeable to plan of care and goals.     Anticipated barriers to physical therapy: Chronicity of symptoms       Goals:   Short Term Goals: 4 weeks   1. The patient with report compliance with HEP to maximize functional outcomes.  2. The patient will demonstrate increase in BUE MMT by 1/2 grade to improve pt's functional mobility  3. The patient will decrease pain level by 50% in order to improve QOL.     Long Term  Goals: 8 weeks   1. The patient will demonstrate understanding and performance of advanced HEP to allow for independence once discharged from therapy.   2. 2. The patient will demonstrate increase in BUE MMT by 1 grade to improve pt's functional mobility  3. The patient will report 37 % functional limitation on FOTO to indicate an improvement in overall function.  4. The patient will be able to wash his back w/o pain in order to improve pt's ability to perform ADLs.  5. Pt will be able to reach [place] w/o pain in order to improve his ability to reach into cabinets at his home.  6. Pt will be able to ride his motorcycle w/o pain in order to return to PLOF.      PLAN     Plan of care Certification: 12/3/2021 to 03/18/2022.     Outpatient Physical Therapy 2 times weekly for 6 weeks to include the following interventions: Cervical/Lumbar Traction, Electrical Stimulation Dry Needling, Manual Therapy, Moist Heat/ Ice, Neuromuscular Re-ed, Patient Education, Self Care, Therapeutic Activites, Therapeutic Exercise and Ultrasound.       Gillian Turpin, PTA

## 2022-01-25 ENCOUNTER — PATIENT OUTREACH (OUTPATIENT)
Dept: ADMINISTRATIVE | Facility: OTHER | Age: 67
End: 2022-01-25

## 2022-01-25 DIAGNOSIS — Z12.11 SCREENING FOR COLON CANCER: Primary | ICD-10-CM

## 2022-01-26 ENCOUNTER — CLINICAL SUPPORT (OUTPATIENT)
Dept: REHABILITATION | Facility: HOSPITAL | Age: 67
End: 2022-01-26
Attending: ORTHOPAEDIC SURGERY
Payer: MEDICARE

## 2022-01-26 DIAGNOSIS — M25.511 CHRONIC RIGHT SHOULDER PAIN: ICD-10-CM

## 2022-01-26 DIAGNOSIS — R29.898 WEAKNESS OF RIGHT UPPER EXTREMITY: ICD-10-CM

## 2022-01-26 DIAGNOSIS — G89.29 CHRONIC RIGHT SHOULDER PAIN: ICD-10-CM

## 2022-01-26 PROCEDURE — 97110 THERAPEUTIC EXERCISES: CPT | Mod: PN

## 2022-01-26 PROCEDURE — 97140 MANUAL THERAPY 1/> REGIONS: CPT | Mod: PN

## 2022-01-26 NOTE — PROGRESS NOTES
"[facility]   Physical Therapy Treatment Note     Name: King Cool Jr.  Clinic Number: [id number]    Therapy Diagnosis:   Encounter Diagnoses   Name Primary?    Chronic right shoulder pain     Weakness of right upper extremity      Physician: Paty Rodriguez MD    Visit Date: 1/26/2022    Physician Orders: PT Eval and Treat   Medical Diagnosis from Referral: : Shoulder pain, unspecified chronicity, unspecified laterality [M25.519]  Evaluation Date: 12/3/2021  Authorization Period Expiration: 11/08/2022  Plan of Care Expiration: 03/18/2022  Progress Note Due: 02/12/2022  Visit # / Visits authorized: 5/ 20 (+eval)  FOTO: 1/ 3      Precautions: Standard     Time In: 12:00  Time Out: 1245   Total Appointment Time (timed & untimed codes): 45 minutes      SUBJECTIVE     Pt reports: that he used his weed eater, , and leaf blower today. States that his shoulder is hurting a little more today. Pt reports that he will not be in tomorrow, because he is going hunting.  He was compliant with home exercise program.  Response to previous treatment: slight improvement in pain  Functional change: none to note yet    Pain: 5/10  Location: right shoulder      OBJECTIVE     Observation: Forward head & shoulder posture      Treatment     ALISSON received the treatments listed below:        ALISSON received therapeutic exercises to develop strength, endurance, ROM and posture for 30 minutes including:       UBE         3/3  Pulleys flexion       x 3 min  Wall slides flexion      20x  Rows with +GTB      2 x 10  B shoulder ext with +GTB     2 x 10  Shoulder arches +RTB     2 x 10  +waiters carry 5#       2 laps  Supine dowel flexion +5# wand    2 x 10   Supine serratus punch with 5# wand    2x15  Standing horizontal abduction RTB    2x10  shoulder IR stretch with towel     15"x5  Scap Retractions                                  2 x 10  Shoulder ER  RTB                                 2 x 10  sidelying " shoulder 4# ER     2x10  +Shoulder IR RTB      2 x 10  +Shoulder Adduction RTB     2 x 10     ALISSON received the following manual therapy techniques: were applied for 15 minutes, including:  STM Right UT and Lev Scap,  supraspinatus, infraspinatus, anterior delt  PROM all planes  Posterior GHJ mobilizations with ER      hot pack for 10 minutes to R shoulder at end of session (not billed)          Patient Education and Home Exercises     Home Exercises Provided and Patient Education Provided     Education provided:   -     Written Home Exercises Provided: Patient instructed to cont prior HEP. Exercises were reviewed and ALISSON was able to demonstrate them prior to the end of the session.  ALISSON demonstrated good  understanding of the education provided. See EMR under Patient Instructions for exercises provided during therapy sessions    ASSESSMENT     Pt tolerated tx well. Pt was progressed with greater scapular strengthening. Therapist provided verbal/tactile cues to maintain proper form. Pt reported no adverse effects to exercises. Pt would benefit from continued skilled physical therapy in order to reach pt's goals.       ALISSON Is progressing well towards his goals.   Pt prognosis is Good.     Pt will continue to benefit from skilled outpatient physical therapy to address the deficits listed in the problem list box on initial evaluation, provide pt/family education and to maximize pt's level of independence in the home and community environment.     Pt's spiritual, cultural and educational needs considered and pt agreeable to plan of care and goals.     Anticipated barriers to physical therapy: Chronicity of symptoms       Goals:   Short Term Goals: 4 weeks   1. The patient with report compliance with HEP to maximize functional outcomes.  2. The patient will demonstrate increase in BUE MMT by 1/2 grade to improve pt's functional mobility  3. The patient will decrease pain level by 50% in order to improve QOL.     Long  Term Goals: 8 weeks   1. The patient will demonstrate understanding and performance of advanced HEP to allow for independence once discharged from therapy.   2. 2. The patient will demonstrate increase in BUE MMT by 1 grade to improve pt's functional mobility  3. The patient will report 37 % functional limitation on FOTO to indicate an improvement in overall function.  4. The patient will be able to wash his back w/o pain in order to improve pt's ability to perform ADLs.  5. Pt will be able to reach [place] w/o pain in order to improve his ability to reach into cabinets at his home.  6. Pt will be able to ride his motorcycle w/o pain in order to return to PLOF.      PLAN     Plan of care Certification: 12/3/2021 to 03/18/2022.     Outpatient Physical Therapy 2 times weekly for 6 weeks to include the following interventions: Cervical/Lumbar Traction, Electrical Stimulation Dry Needling, Manual Therapy, Moist Heat/ Ice, Neuromuscular Re-ed, Patient Education, Self Care, Therapeutic Activites, Therapeutic Exercise and Ultrasound.       Daniel Solo, PT

## 2022-01-31 ENCOUNTER — OFFICE VISIT (OUTPATIENT)
Dept: ORTHOPEDICS | Facility: CLINIC | Age: 67
End: 2022-01-31
Payer: MEDICARE

## 2022-01-31 VITALS
HEIGHT: 70 IN | DIASTOLIC BLOOD PRESSURE: 72 MMHG | BODY MASS INDEX: 31.06 KG/M2 | OXYGEN SATURATION: 98 % | RESPIRATION RATE: 18 BRPM | HEART RATE: 90 BPM | WEIGHT: 216.94 LBS | SYSTOLIC BLOOD PRESSURE: 140 MMHG

## 2022-01-31 DIAGNOSIS — G89.29 CHRONIC RIGHT SHOULDER PAIN: ICD-10-CM

## 2022-01-31 DIAGNOSIS — M25.511 CHRONIC RIGHT SHOULDER PAIN: ICD-10-CM

## 2022-01-31 PROCEDURE — 99213 PR OFFICE/OUTPT VISIT, EST, LEVL III, 20-29 MIN: ICD-10-PCS | Mod: S$GLB,,, | Performed by: ORTHOPAEDIC SURGERY

## 2022-01-31 PROCEDURE — 3008F BODY MASS INDEX DOCD: CPT | Mod: CPTII,S$GLB,, | Performed by: ORTHOPAEDIC SURGERY

## 2022-01-31 PROCEDURE — 1159F MED LIST DOCD IN RCRD: CPT | Mod: CPTII,S$GLB,, | Performed by: ORTHOPAEDIC SURGERY

## 2022-01-31 PROCEDURE — 1101F PT FALLS ASSESS-DOCD LE1/YR: CPT | Mod: CPTII,S$GLB,, | Performed by: ORTHOPAEDIC SURGERY

## 2022-01-31 PROCEDURE — 3077F PR MOST RECENT SYSTOLIC BLOOD PRESSURE >= 140 MM HG: ICD-10-PCS | Mod: CPTII,S$GLB,, | Performed by: ORTHOPAEDIC SURGERY

## 2022-01-31 PROCEDURE — 4010F PR ACE/ARB THEARPY RXD/TAKEN: ICD-10-PCS | Mod: CPTII,S$GLB,, | Performed by: ORTHOPAEDIC SURGERY

## 2022-01-31 PROCEDURE — 3077F SYST BP >= 140 MM HG: CPT | Mod: CPTII,S$GLB,, | Performed by: ORTHOPAEDIC SURGERY

## 2022-01-31 PROCEDURE — 1101F PR PT FALLS ASSESS DOC 0-1 FALLS W/OUT INJ PAST YR: ICD-10-PCS | Mod: CPTII,S$GLB,, | Performed by: ORTHOPAEDIC SURGERY

## 2022-01-31 PROCEDURE — 4010F ACE/ARB THERAPY RXD/TAKEN: CPT | Mod: CPTII,S$GLB,, | Performed by: ORTHOPAEDIC SURGERY

## 2022-01-31 PROCEDURE — 1125F PR PAIN SEVERITY QUANTIFIED, PAIN PRESENT: ICD-10-PCS | Mod: CPTII,S$GLB,, | Performed by: ORTHOPAEDIC SURGERY

## 2022-01-31 PROCEDURE — 3288F FALL RISK ASSESSMENT DOCD: CPT | Mod: CPTII,S$GLB,, | Performed by: ORTHOPAEDIC SURGERY

## 2022-01-31 PROCEDURE — 1160F PR REVIEW ALL MEDS BY PRESCRIBER/CLIN PHARMACIST DOCUMENTED: ICD-10-PCS | Mod: CPTII,S$GLB,, | Performed by: ORTHOPAEDIC SURGERY

## 2022-01-31 PROCEDURE — 1125F AMNT PAIN NOTED PAIN PRSNT: CPT | Mod: CPTII,S$GLB,, | Performed by: ORTHOPAEDIC SURGERY

## 2022-01-31 PROCEDURE — 3008F PR BODY MASS INDEX (BMI) DOCUMENTED: ICD-10-PCS | Mod: CPTII,S$GLB,, | Performed by: ORTHOPAEDIC SURGERY

## 2022-01-31 PROCEDURE — 99999 PR PBB SHADOW E&M-EST. PATIENT-LVL IV: CPT | Mod: PBBFAC,,, | Performed by: ORTHOPAEDIC SURGERY

## 2022-01-31 PROCEDURE — 3288F PR FALLS RISK ASSESSMENT DOCUMENTED: ICD-10-PCS | Mod: CPTII,S$GLB,, | Performed by: ORTHOPAEDIC SURGERY

## 2022-01-31 PROCEDURE — 3078F PR MOST RECENT DIASTOLIC BLOOD PRESSURE < 80 MM HG: ICD-10-PCS | Mod: CPTII,S$GLB,, | Performed by: ORTHOPAEDIC SURGERY

## 2022-01-31 PROCEDURE — 3078F DIAST BP <80 MM HG: CPT | Mod: CPTII,S$GLB,, | Performed by: ORTHOPAEDIC SURGERY

## 2022-01-31 PROCEDURE — 1159F PR MEDICATION LIST DOCUMENTED IN MEDICAL RECORD: ICD-10-PCS | Mod: CPTII,S$GLB,, | Performed by: ORTHOPAEDIC SURGERY

## 2022-01-31 PROCEDURE — 1160F RVW MEDS BY RX/DR IN RCRD: CPT | Mod: CPTII,S$GLB,, | Performed by: ORTHOPAEDIC SURGERY

## 2022-01-31 PROCEDURE — 99213 OFFICE O/P EST LOW 20 MIN: CPT | Mod: S$GLB,,, | Performed by: ORTHOPAEDIC SURGERY

## 2022-01-31 PROCEDURE — 99999 PR PBB SHADOW E&M-EST. PATIENT-LVL IV: ICD-10-PCS | Mod: PBBFAC,,, | Performed by: ORTHOPAEDIC SURGERY

## 2022-01-31 RX ORDER — DIAZEPAM 5 MG/1
5 TABLET ORAL ONCE
Qty: 1 TABLET | Refills: 0 | Status: SHIPPED | OUTPATIENT
Start: 2022-01-31 | End: 2022-10-11
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
## 2022-02-02 ENCOUNTER — HOSPITAL ENCOUNTER (OUTPATIENT)
Dept: RADIOLOGY | Facility: HOSPITAL | Age: 67
Discharge: HOME | End: 2022-02-02
Attending: ORTHOPAEDIC SURGERY
Payer: MEDICARE

## 2022-02-02 DIAGNOSIS — G89.29 CHRONIC RIGHT SHOULDER PAIN: ICD-10-CM

## 2022-02-02 DIAGNOSIS — M25.511 CHRONIC RIGHT SHOULDER PAIN: ICD-10-CM

## 2022-02-02 DIAGNOSIS — M17.0 PRIMARY OSTEOARTHRITIS OF BOTH KNEES: ICD-10-CM

## 2022-02-02 PROCEDURE — 73221 MRI JOINT UPR EXTREM W/O DYE: CPT | Mod: 26,RT,, | Performed by: RADIOLOGY

## 2022-02-02 PROCEDURE — 73221 MRI SHOULDER WITHOUT CONTRAST RIGHT: ICD-10-PCS | Mod: 26,RT,, | Performed by: RADIOLOGY

## 2022-02-02 PROCEDURE — 73221 MRI JOINT UPR EXTREM W/O DYE: CPT | Mod: TC,PO,RT

## 2022-02-02 RX ORDER — HYDROCODONE BITARTRATE AND ACETAMINOPHEN 10; 325 MG/1; MG/1
1 TABLET ORAL 3 TIMES DAILY PRN
Qty: 72 TABLET | Refills: 0 | OUTPATIENT
Start: 2022-02-02

## 2022-02-02 NOTE — TELEPHONE ENCOUNTER
----- Message from Mireya Simon sent at 2/2/2022  3:08 PM CST -----  Regarding: Refill  Contact: Patient  Type: Patient Call Back    Who called: Patient    What is the request in detail: Patient is requesting a refill: hydrocodone    Can the clinic reply by [facility]? No    Would the patient rather a call back or a response via [facility]? Call    Best call back number: [phone]    Additional Information:   [facility]/pharmacy #[id number] - [place] - [address]  [address] [address]  Phone: [phone] Fax: [phone]      Thanks

## 2022-02-02 NOTE — TELEPHONE ENCOUNTER
Care Due:                  Date            Visit Type   Department     Provider  --------------------------------------------------------------------------------                                [facility]                              PRIMARY      MED/ INTERNAL  Last Visit: 11-      CARE ([facility])   MED/ PEDS      PRERNA CHRISTIE  Next Visit: None Scheduled  None         None Found                                                            Last  Test          Frequency    Reason                     Performed    Due Date  --------------------------------------------------------------------------------    HBA1C.......  6 months...  glimepiride, metFORMIN...  06- 12-    Powered by [employer]. Reference number: [id number].   2/02/2022 3:35:17 PM CST

## 2022-02-03 ENCOUNTER — TELEPHONE (OUTPATIENT)
Dept: ORTHOPEDICS | Facility: CLINIC | Age: 67
End: 2022-02-03

## 2022-02-03 NOTE — TELEPHONE ENCOUNTER
Attempted to call patient to discuss his MRI results- Left voicemail.  Will plan to have him follow up with Dr. Rodriguez upon return.  If symptoms worsen or he does not wish to wait, will refer out    Patient returned call- spoke about options.  He would like to wait for now.  Will follow up with me if needed in the meantime.

## 2022-02-04 ENCOUNTER — OFFICE VISIT (OUTPATIENT)
Dept: FAMILY MEDICINE | Facility: CLINIC | Age: 67
End: 2022-02-04
Payer: MEDICARE

## 2022-02-04 ENCOUNTER — DOCUMENTATION ONLY (OUTPATIENT)
Dept: REHABILITATION | Facility: HOSPITAL | Age: 67
End: 2022-02-04

## 2022-02-04 VITALS
HEIGHT: 70 IN | TEMPERATURE: 99 F | OXYGEN SATURATION: 96 % | BODY MASS INDEX: 31.23 KG/M2 | WEIGHT: 218.13 LBS | DIASTOLIC BLOOD PRESSURE: 74 MMHG | SYSTOLIC BLOOD PRESSURE: 124 MMHG | HEART RATE: 98 BPM

## 2022-02-04 DIAGNOSIS — E11.29 TYPE 2 DIABETES MELLITUS WITH MICROALBUMINURIA, WITHOUT LONG-TERM CURRENT USE OF INSULIN: ICD-10-CM

## 2022-02-04 DIAGNOSIS — Z00.00 ROUTINE PHYSICAL EXAMINATION: Primary | ICD-10-CM

## 2022-02-04 DIAGNOSIS — Z12.5 ENCOUNTER FOR SCREENING FOR MALIGNANT NEOPLASM OF PROSTATE: ICD-10-CM

## 2022-02-04 DIAGNOSIS — R80.9 TYPE 2 DIABETES MELLITUS WITH MICROALBUMINURIA, WITHOUT LONG-TERM CURRENT USE OF INSULIN: ICD-10-CM

## 2022-02-04 DIAGNOSIS — M25.511 CHRONIC RIGHT SHOULDER PAIN: ICD-10-CM

## 2022-02-04 DIAGNOSIS — R05.9 COUGH: ICD-10-CM

## 2022-02-04 DIAGNOSIS — M17.0 PRIMARY OSTEOARTHRITIS OF BOTH KNEES: ICD-10-CM

## 2022-02-04 DIAGNOSIS — G89.29 CHRONIC RIGHT SHOULDER PAIN: ICD-10-CM

## 2022-02-04 DIAGNOSIS — I10 ESSENTIAL (PRIMARY) HYPERTENSION: Chronic | ICD-10-CM
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
## 2022-02-04 PROBLEM — H60.22 ACUTE MALIGNANT OTITIS EXTERNA OF LEFT EAR: Status: RESOLVED | Noted: 2021-10-19 | Resolved: 2022-02-04

## 2022-02-04 PROCEDURE — 99215 PR OFFICE/OUTPT VISIT, EST, LEVL V, 40-54 MIN: ICD-10-PCS | Mod: 25,S$GLB,, | Performed by: FAMILY MEDICINE

## 2022-02-04 PROCEDURE — 3078F DIAST BP <80 MM HG: CPT | Mod: CPTII,S$GLB,, | Performed by: FAMILY MEDICINE

## 2022-02-04 PROCEDURE — 3008F PR BODY MASS INDEX (BMI) DOCUMENTED: ICD-10-PCS | Mod: CPTII,S$GLB,, | Performed by: FAMILY MEDICINE

## 2022-02-04 PROCEDURE — 3288F PR FALLS RISK ASSESSMENT DOCUMENTED: ICD-10-PCS | Mod: CPTII,S$GLB,, | Performed by: FAMILY MEDICINE

## 2022-02-04 PROCEDURE — 20610 DRAIN/INJ JOINT/BURSA W/O US: CPT | Mod: 50,S$GLB,, | Performed by: FAMILY MEDICINE

## 2022-02-04 PROCEDURE — 3074F SYST BP LT 130 MM HG: CPT | Mod: CPTII,S$GLB,, | Performed by: FAMILY MEDICINE

## 2022-02-04 PROCEDURE — 3078F PR MOST RECENT DIASTOLIC BLOOD PRESSURE < 80 MM HG: ICD-10-PCS | Mod: CPTII,S$GLB,, | Performed by: FAMILY MEDICINE

## 2022-02-04 PROCEDURE — 99397 PER PM REEVAL EST PAT 65+ YR: CPT | Mod: S$GLB,,, | Performed by: FAMILY MEDICINE

## 2022-02-04 PROCEDURE — 20610 LARGE JOINT ASPIRATION/INJECTION: BILATERAL KNEE: ICD-10-PCS | Mod: 50,S$GLB,, | Performed by: FAMILY MEDICINE

## 2022-02-04 PROCEDURE — 4010F ACE/ARB THERAPY RXD/TAKEN: CPT | Mod: CPTII,S$GLB,, | Performed by: FAMILY MEDICINE

## 2022-02-04 PROCEDURE — 99215 OFFICE O/P EST HI 40 MIN: CPT | Mod: 25,S$GLB,, | Performed by: FAMILY MEDICINE

## 2022-02-04 PROCEDURE — 1159F PR MEDICATION LIST DOCUMENTED IN MEDICAL RECORD: ICD-10-PCS | Mod: CPTII,S$GLB,, | Performed by: FAMILY MEDICINE

## 2022-02-04 PROCEDURE — 1101F PR PT FALLS ASSESS DOC 0-1 FALLS W/OUT INJ PAST YR: ICD-10-PCS | Mod: CPTII,S$GLB,, | Performed by: FAMILY MEDICINE

## 2022-02-04 PROCEDURE — 1125F AMNT PAIN NOTED PAIN PRSNT: CPT | Mod: CPTII,S$GLB,, | Performed by: FAMILY MEDICINE

## 2022-02-04 PROCEDURE — 99999 PR PBB SHADOW E&M-EST. PATIENT-LVL IV: ICD-10-PCS | Mod: PBBFAC,,, | Performed by: FAMILY MEDICINE

## 2022-02-04 PROCEDURE — 3051F HG A1C>EQUAL 7.0%<8.0%: CPT | Mod: CPTII,S$GLB,, | Performed by: FAMILY MEDICINE

## 2022-02-04 PROCEDURE — 1159F MED LIST DOCD IN RCRD: CPT | Mod: CPTII,S$GLB,, | Performed by: FAMILY MEDICINE

## 2022-02-04 PROCEDURE — 3008F BODY MASS INDEX DOCD: CPT | Mod: CPTII,S$GLB,, | Performed by: FAMILY MEDICINE

## 2022-02-04 PROCEDURE — 3051F PR MOST RECENT HEMOGLOBIN A1C LEVEL 7.0 - < 8.0%: ICD-10-PCS | Mod: CPTII,S$GLB,, | Performed by: FAMILY MEDICINE

## 2022-02-04 PROCEDURE — 3074F PR MOST RECENT SYSTOLIC BLOOD PRESSURE < 130 MM HG: ICD-10-PCS | Mod: CPTII,S$GLB,, | Performed by: FAMILY MEDICINE

## 2022-02-04 PROCEDURE — 4010F PR ACE/ARB THEARPY RXD/TAKEN: ICD-10-PCS | Mod: CPTII,S$GLB,, | Performed by: FAMILY MEDICINE

## 2022-02-04 PROCEDURE — 99397 PR PREVENTIVE VISIT,EST,65 & OVER: ICD-10-PCS | Mod: S$GLB,,, | Performed by: FAMILY MEDICINE

## 2022-02-04 PROCEDURE — 1125F PR PAIN SEVERITY QUANTIFIED, PAIN PRESENT: ICD-10-PCS | Mod: CPTII,S$GLB,, | Performed by: FAMILY MEDICINE

## 2022-02-04 PROCEDURE — 3288F FALL RISK ASSESSMENT DOCD: CPT | Mod: CPTII,S$GLB,, | Performed by: FAMILY MEDICINE

## 2022-02-04 PROCEDURE — 1101F PT FALLS ASSESS-DOCD LE1/YR: CPT | Mod: CPTII,S$GLB,, | Performed by: FAMILY MEDICINE

## 2022-02-04 PROCEDURE — 99999 PR PBB SHADOW E&M-EST. PATIENT-LVL IV: CPT | Mod: PBBFAC,,, | Performed by: FAMILY MEDICINE

## 2022-02-04 RX ORDER — PROMETHAZINE HYDROCHLORIDE AND DEXTROMETHORPHAN HYDROBROMIDE 6.25; 15 MG/5ML; MG/5ML
5 SYRUP ORAL EVERY 6 HOURS PRN
Qty: 180 ML | Refills: 0 | Status: SHIPPED | OUTPATIENT
Start: 2022-02-04 | End: 2022-02-14

## 2022-02-04 RX ORDER — TRIAMCINOLONE ACETONIDE 40 MG/ML
40 INJECTION, SUSPENSION INTRA-ARTICULAR; INTRAMUSCULAR
Status: DISCONTINUED | OUTPATIENT
Start: 2022-02-04 | End: 2022-02-04 | Stop reason: HOSPADM

## 2022-02-04 RX ORDER — HYDROCODONE BITARTRATE AND ACETAMINOPHEN 10; 325 MG/1; MG/1
1 TABLET ORAL 3 TIMES DAILY PRN
Qty: 72 TABLET | Refills: 0 | Status: SHIPPED | OUTPATIENT
Start: 2022-04-04 | End: 2022-06-08 | Stop reason: SDUPTHER

## 2022-02-04 RX ORDER — HYDROCODONE BITARTRATE AND ACETAMINOPHEN 10; 325 MG/1; MG/1
1 TABLET ORAL 3 TIMES DAILY PRN
Qty: 72 TABLET | Refills: 0 | Status: SHIPPED | OUTPATIENT
Start: 2022-02-04 | End: 2022-04-20 | Stop reason: SDUPTHER

## 2022-02-04 RX ORDER — HYDROCODONE BITARTRATE AND ACETAMINOPHEN 10; 325 MG/1; MG/1
1 TABLET ORAL 3 TIMES DAILY PRN
Qty: 72 TABLET | Refills: 0 | Status: SHIPPED | OUTPATIENT
Start: 2022-03-04 | End: 2022-06-08 | Stop reason: SDUPTHER

## 2022-02-04 RX ADMIN — TRIAMCINOLONE ACETONIDE 40 MG: 40 INJECTION, SUSPENSION INTRA-ARTICULAR; INTRAMUSCULAR at 02:02

## 2022-02-04 NOTE — PROGRESS NOTES
Therapist called and left VM. Therapist stated that if he has 2 no shows, he will be removed from the schedule.    Daniel Solo PT, DPT

## 2022-02-04 NOTE — PROGRESS NOTES
[facility]  Progress Note    SUBJECTIVE:     Chief Complaint   Patient presents with    knee injections    Medication Refill       HPI   King Cool Jr.  is a 67 y.o. male here for physical exam. Also has issues with b/l chronic knee/R shoulder pain which will be addressed below. Patient has no other new complaints/problems at this time.      Review of patient's allergies indicates:   Allergen Reactions    Tamiflu [oseltamivir]      Increases BP    Penicillins Rash       Past Medical History:   Diagnosis Date    Essential (primary) hypertension     Male erectile dysfunction, unspecified     Testicular hypofunction     Type 2 diabetes mellitus without complications      Past Surgical History:   Procedure Laterality Date    HIP SURGERY       Family History   Problem Relation Age of Onset    Blindness Mother     No Known Problems Father     No Known Problems Brother     No Known Problems Daughter     No Known Problems Son     No Known Problems Daughter     Diabetes Brother     No Known Problems Brother     Amblyopia Neg Hx     Cancer Neg Hx     Cataracts Neg Hx     Glaucoma Neg Hx     Hypertension Neg Hx     Macular degeneration Neg Hx     Retinal detachment Neg Hx     Strabismus Neg Hx     Stroke Neg Hx     Thyroid disease Neg Hx      Social History     Tobacco Use    Smoking status: Former Smoker     Packs/day: 0.25     Types: Cigarettes     Start date: 1972     Quit date: 1976     Years since quittin.2    Smokeless tobacco: Never Used   Substance Use Topics    Alcohol use: Not Currently     Comment: Pt. has a Hx. of  Alcohol use.    Drug use: No        Review of Systems   Constitutional: Negative for chills, diaphoresis and fever.   HENT: Negative for congestion, ear pain and sore throat.    Eyes: Negative for photophobia and discharge.   Respiratory: Negative for cough, shortness of breath and wheezing.    Cardiovascular: Negative for chest pain and  palpitations.   Gastrointestinal: Negative for abdominal pain, constipation, diarrhea, nausea and vomiting.   Genitourinary: Negative for dysuria and hematuria.   Musculoskeletal: Positive for joint pain (b/l knee pain, and R shoulder). Negative for back pain and myalgias.   Skin: Negative for itching and rash.   Neurological: Negative for dizziness, sensory change, focal weakness, weakness and headaches.   All other systems reviewed and are negative.    OBJECTIVE:     Vitals:    02/04/22 1344   BP: 124/74   Pulse: 98   Temp: 98.6 °F (37 °C)     Body mass index is 31.3 kg/m².    Physical Exam  Constitutional:       General: He is in acute distress (mild).      Appearance: He is not diaphoretic.   HENT:      Head: Normocephalic and atraumatic.      Right Ear: Tympanic membrane and ear canal normal. No hemotympanum. Tympanic membrane is not perforated, erythematous or bulging.      Left Ear: Tympanic membrane and ear canal normal. No hemotympanum. Tympanic membrane is not perforated, erythematous or bulging.      Mouth/Throat:      Pharynx: No oropharyngeal exudate.   Eyes:      Conjunctiva/sclera: Conjunctivae normal.      Pupils: Pupils are equal, round, and reactive to light.   Neck:      Thyroid: No thyromegaly.   Cardiovascular:      Rate and Rhythm: Normal rate and regular rhythm.      Heart sounds: Normal heart sounds. No murmur heard.  No friction rub. No gallop.    Pulmonary:      Effort: Pulmonary effort is normal. No respiratory distress.      Breath sounds: Normal breath sounds. No wheezing or rales.   Abdominal:      General: Bowel sounds are normal. There is no distension.      Palpations: Abdomen is soft.      Tenderness: There is no abdominal tenderness. There is no guarding or rebound.   Musculoskeletal:         General: Tenderness (to palpation of L/R medial tibial-femoral compartment, decreased joint space. ACL/PCL intact, negative McMurrays sign) present. Normal range of motion.   Lymphadenopathy:       Cervical: No cervical adenopathy.   Skin:     General: Skin is warm.      Findings: No erythema or rash.   Neurological:      Mental Status: He is alert and oriented to person, place, and time.         Old records were reviewed. Labs and/or images were independently reviewed.    ASSESSMENT     1. Routine physical examination    2. Type 2 diabetes mellitus with microalbuminuria, without long-term current use of insulin    3. Encounter for screening for malignant neoplasm of prostate     4. Essential (primary) hypertension        PLAN:     Routine physical examination  -     CBC Auto Differential; Future  -     Comprehensive Metabolic Panel; Future  -     Hemoglobin A1C; Future  -     Lipid Panel; Future  -     TSH; Future  -     T4, Free; Future  -     PSA, Screening; Future; Expected date: 02/04/2022  -     We briefly discussed diet, exercise, and routine preventive exams. All questions and comments addressed.    Encounter for screening for malignant neoplasm of prostate   -     PSA, Screening; Future; Expected date: 02/04/2022    RTC TYLOR Lara MD  02/04/2022 1:57 PM    Additional Evaluation & Management issues:    In addition to today's Annual Physical, patient has other medical issues that need to be addressed, as well as their associated prescription management that is separate from today's physical, as documented separately below.       HPI  Pt c/o b/l knee pain. This is a chronic issue. Rates pain as moderate-severe. Requesting knee injection today. Also has R shoulder pain which he needs surgery but has been holding off. Ready to get that process started as well.    Review of Systems   Constitutional: Negative for chills, diaphoresis and fever.   HENT: Negative for congestion, ear pain and sore throat.    Eyes: Negative for photophobia and discharge.   Respiratory: Negative for cough, shortness of breath and wheezing.    Cardiovascular: Negative for chest pain and palpitations.    Gastrointestinal: Negative for abdominal pain, constipation, diarrhea, nausea and vomiting.   Genitourinary: Negative for dysuria and hematuria.   Musculoskeletal: Positive for joint pain (b/l knee pain, and R shoulder). Negative for back pain and myalgias.   Skin: Negative for itching and rash.   Neurological: Negative for dizziness, sensory change, focal weakness, weakness and headaches.   All other systems reviewed and are negative.    Physical Exam  Constitutional:       General: He is in acute distress (mild).      Appearance: He is not diaphoretic.   HENT:      Head: Normocephalic and atraumatic.      Right Ear: Tympanic membrane and ear canal normal. No hemotympanum. Tympanic membrane is not perforated, erythematous or bulging.      Left Ear: Tympanic membrane and ear canal normal. No hemotympanum. Tympanic membrane is not perforated, erythematous or bulging.      Mouth/Throat:      Pharynx: No oropharyngeal exudate.   Eyes:      Conjunctiva/sclera: Conjunctivae normal.      Pupils: Pupils are equal, round, and reactive to light.   Neck:      Thyroid: No thyromegaly.   Cardiovascular:      Rate and Rhythm: Normal rate and regular rhythm.      Heart sounds: Normal heart sounds. No murmur heard.  No friction rub. No gallop.    Pulmonary:      Effort: Pulmonary effort is normal. No respiratory distress.      Breath sounds: Normal breath sounds. No wheezing or rales.   Abdominal:      General: Bowel sounds are normal. There is no distension.      Palpations: Abdomen is soft.      Tenderness: There is no abdominal tenderness. There is no guarding or rebound.   Musculoskeletal:         General: Tenderness (to palpation of L/R medial tibial-femoral compartment, decreased joint space. ACL/PCL intact, negative McMurrays sign) present. Normal range of motion.   Lymphadenopathy:      Cervical: No cervical adenopathy.   Skin:     General: Skin is warm.      Findings: No erythema or rash.   Neurological:      Mental  Status: He is alert and oriented to person, place, and time.     Type 2 diabetes mellitus with microalbuminuria, without long-term current use of insulin  -     CBC Auto Differential; Future  -     Comprehensive Metabolic Panel; Future  -     Hemoglobin A1C; Future  -     Lipid Panel; Future  -     TSH; Future  -     T4, Free; Future  -     Instructed patient to take daily glucose AM logs and to write them down to bring with on next visit. Advised patient to decrease intake of carbohydrates/simple sugars.         Essential (primary) hypertension   -     Stable. Continue current regimen.    Primary osteoarthritis of both knees  -     Large Joint Aspiration/Injection: bilateral knee  -     triamcinolone acetonide injection 40 mg  -     triamcinolone acetonide injection 40 mg  -     HYDROcodone-acetaminophen (NORCO)  mg per tablet; Take 1 tablet by mouth 3 (three) times daily as needed (pain).  Dispense: 72 tablet; Refill: 0  -     HYDROcodone-acetaminophen (NORCO)  mg per tablet; Take 1 tablet by mouth 3 (three) times daily as needed (pain).  Dispense: 72 tablet; Refill: 0  -     HYDROcodone-acetaminophen (NORCO)  mg per tablet; Take 1 tablet by mouth 3 (three) times daily as needed (pain).  Dispense: 72 tablet; Refill: 0  -     Stable. Continue current regimen. Unable to wean at this time but am monitoring closely for any drug toxicity. Checked .    Chronic right shoulder pain  -     Ambulatory referral/consult to Orthopedics; Future; Expected date: 02/11/2022  -     To get scheduled for R shoulder surgery.      RTC PRN

## 2022-02-04 NOTE — PROCEDURES
Large Joint Aspiration/Injection: bilateral knee    Date/Time: 2/4/2022 2:40 PM  Performed by: Gallo Lara MD  Authorized by: Gallo Lara MD     Consent Done?:  Yes (Verbal)  Indications:  Pain  Site marked: the procedure site was marked    Timeout: prior to procedure the correct patient, procedure, and site was verified      Details:  Needle Size:  25 G  Approach:  Anterolateral  Location:  Knee  Laterality:  Bilateral  Site:  Bilateral knee  Medications (Right):  40 mg triamcinolone acetonide 40 mg/mL  Medications (Left):  40 mg triamcinolone acetonide 40 mg/mL  Patient tolerance:  Patient tolerated the procedure well with no immediate complications

## 2022-02-07 ENCOUNTER — LAB VISIT (OUTPATIENT)
Dept: LAB | Facility: HOSPITAL | Age: 67
End: 2022-02-07
Attending: FAMILY MEDICINE
Payer: MEDICARE

## 2022-02-07 DIAGNOSIS — Z00.00 ROUTINE PHYSICAL EXAMINATION: ICD-10-CM

## 2022-02-07 DIAGNOSIS — Z12.5 ENCOUNTER FOR SCREENING FOR MALIGNANT NEOPLASM OF PROSTATE: ICD-10-CM

## 2022-02-07 DIAGNOSIS — R80.9 TYPE 2 DIABETES MELLITUS WITH MICROALBUMINURIA, WITHOUT LONG-TERM CURRENT USE OF INSULIN: ICD-10-CM

## 2022-02-07 DIAGNOSIS — E11.29 TYPE 2 DIABETES MELLITUS WITH MICROALBUMINURIA, WITHOUT LONG-TERM CURRENT USE OF INSULIN: ICD-10-CM

## 2022-02-07 PROCEDURE — 80061 LIPID PANEL: CPT | Performed by: FAMILY MEDICINE

## 2022-02-07 PROCEDURE — 84443 ASSAY THYROID STIM HORMONE: CPT | Performed by: FAMILY MEDICINE

## 2022-02-07 PROCEDURE — 84153 ASSAY OF PSA TOTAL: CPT | Performed by: FAMILY MEDICINE

## 2022-02-07 PROCEDURE — 80053 COMPREHEN METABOLIC PANEL: CPT | Performed by: FAMILY MEDICINE

## 2022-02-07 PROCEDURE — 85025 COMPLETE CBC W/AUTO DIFF WBC: CPT | Performed by: FAMILY MEDICINE

## 2022-02-07 PROCEDURE — 36415 COLL VENOUS BLD VENIPUNCTURE: CPT | Mod: PO | Performed by: FAMILY MEDICINE

## 2022-02-07 PROCEDURE — 84439 ASSAY OF FREE THYROXINE: CPT | Performed by: FAMILY MEDICINE

## 2022-02-07 PROCEDURE — 83036 HEMOGLOBIN GLYCOSYLATED A1C: CPT | Performed by: FAMILY MEDICINE

## 2022-02-08 ENCOUNTER — TELEPHONE (OUTPATIENT)
Dept: FAMILY MEDICINE | Facility: CLINIC | Age: 67
End: 2022-02-08

## 2022-02-08 DIAGNOSIS — E11.9 TYPE 2 DIABETES MELLITUS WITHOUT COMPLICATION, WITHOUT LONG-TERM CURRENT USE OF INSULIN: ICD-10-CM

## 2022-02-08 LAB
ALBUMIN SERPL BCP-MCNC: 3.7 G/DL (ref 3.5–5.2)
ALP SERPL-CCNC: 91 U/L (ref 55–135)
ALT SERPL W/O P-5'-P-CCNC: 40 U/L (ref 10–44)
ANION GAP SERPL CALC-SCNC: 14 MMOL/L (ref 8–16)
AST SERPL-CCNC: 25 U/L (ref 10–40)
BASOPHILS # BLD AUTO: 0.07 K/UL (ref 0–0.2)
BASOPHILS NFR BLD: 0.9 % (ref 0–1.9)
BILIRUB SERPL-MCNC: 0.6 MG/DL (ref 0.1–1)
BUN SERPL-MCNC: 13 MG/DL (ref 8–23)
CALCIUM SERPL-MCNC: 9.4 MG/DL (ref 8.7–10.5)
CHLORIDE SERPL-SCNC: 99 MMOL/L (ref 95–110)
CHOLEST SERPL-MCNC: 207 MG/DL (ref 120–199)
CHOLEST/HDLC SERPL: 5.6 {RATIO} (ref 2–5)
CO2 SERPL-SCNC: 23 MMOL/L (ref 23–29)
COMPLEXED PSA SERPL-MCNC: 0.9 NG/ML (ref 0–4)
CREAT SERPL-MCNC: 0.9 MG/DL (ref 0.5–1.4)
DIFFERENTIAL METHOD: ABNORMAL
EOSINOPHIL # BLD AUTO: 0.1 K/UL (ref 0–0.5)
EOSINOPHIL NFR BLD: 0.7 % (ref 0–8)
ERYTHROCYTE [DISTWIDTH] IN BLOOD BY AUTOMATED COUNT: 12.5 % (ref 11.5–14.5)
EST. GFR  (AFRICAN AMERICAN): >60 ML/MIN/1.73 M^2
EST. GFR  (NON AFRICAN AMERICAN): >60 ML/MIN/1.73 M^2
ESTIMATED AVG GLUCOSE: 275 MG/DL (ref 68–131)
GLUCOSE SERPL-MCNC: 239 MG/DL (ref 70–110)
HBA1C MFR BLD: 11.2 % (ref 4–5.6)
HCT VFR BLD AUTO: 49.1 % (ref 40–54)
HDLC SERPL-MCNC: 37 MG/DL (ref 40–75)
HDLC SERPL: 17.9 % (ref 20–50)
HGB BLD-MCNC: 16.4 G/DL (ref 14–18)
IMM GRANULOCYTES # BLD AUTO: 0.13 K/UL (ref 0–0.04)
IMM GRANULOCYTES NFR BLD AUTO: 1.6 % (ref 0–0.5)
LDLC SERPL CALC-MCNC: 117.6 MG/DL (ref 63–159)
LYMPHOCYTES # BLD AUTO: 2.4 K/UL (ref 1–4.8)
LYMPHOCYTES NFR BLD: 29.7 % (ref 18–48)
MCH RBC QN AUTO: 30.6 PG (ref 27–31)
MCHC RBC AUTO-ENTMCNC: 33.4 G/DL (ref 32–36)
MCV RBC AUTO: 92 FL (ref 82–98)
MONOCYTES # BLD AUTO: 0.9 K/UL (ref 0.3–1)
MONOCYTES NFR BLD: 10.4 % (ref 4–15)
NEUTROPHILS # BLD AUTO: 4.6 K/UL (ref 1.8–7.7)
NEUTROPHILS NFR BLD: 56.7 % (ref 38–73)
NONHDLC SERPL-MCNC: 170 MG/DL
NRBC BLD-RTO: 0 /100 WBC
PLATELET # BLD AUTO: 237 K/UL (ref 150–450)
PMV BLD AUTO: 9.6 FL (ref 9.2–12.9)
POTASSIUM SERPL-SCNC: 4.2 MMOL/L (ref 3.5–5.1)
PROT SERPL-MCNC: 6.5 G/DL (ref 6–8.4)
RBC # BLD AUTO: 5.36 M/UL (ref 4.6–6.2)
SODIUM SERPL-SCNC: 136 MMOL/L (ref 136–145)
T4 FREE SERPL-MCNC: 0.75 NG/DL (ref 0.71–1.51)
TRIGL SERPL-MCNC: 262 MG/DL (ref 30–150)
TSH SERPL DL<=0.005 MIU/L-ACNC: 0.86 UIU/ML (ref 0.4–4)
WBC # BLD AUTO: 8.18 K/UL (ref 3.9–12.7)

## 2022-02-08 RX ORDER — GLIMEPIRIDE 4 MG/1
4 TABLET ORAL 2 TIMES DAILY
Qty: 180 TABLET | Refills: 1 | Status: SHIPPED | OUTPATIENT
Start: 2022-02-08 | End: 2022-03-08 | Stop reason: SDUPTHER

## 2022-02-08 NOTE — TELEPHONE ENCOUNTER
Pt is requesting change in metformin.  Pt stated he don't feel right nausea not marcell----- right, going too often.  He was off his meds for 2 weeks while dealing with a bad cold/flu, when he started taking his meds again now he's having symptoms.  Informed pt his body is adjusting to the medication again.  Pt is adamant about changing the medication.  He stated it not doing anything for him.    Appointment schedule

## 2022-02-08 NOTE — TELEPHONE ENCOUNTER
Informed pt to contact orth regarding schedule an earlier appointment, answer any question he have regarding surgery on his shoulder.

## 2022-02-08 NOTE — TELEPHONE ENCOUNTER
Diabetes is TERRIBLE. Is he taking his diabetic meds? Increase glimepiride to twice a day. Check sugars 2x a day. Need to see log in 2 weeks. F/u in office in 1 month.

## 2022-02-08 NOTE — TELEPHONE ENCOUNTER
----- Message from Keila Phelps sent at 2/8/2022 10:07 AM CST -----  Type: Patient Call Back       What is the request in detail: pt calling to speak to a nurse regarding referral for surgeon.       Can the clinic reply by [facility]? No       Would the patient rather a call back or a response via [facility]? Call back       Best call back number: [phone]        Thank you.

## 2022-02-09 ENCOUNTER — OFFICE VISIT (OUTPATIENT)
Dept: SPORTS MEDICINE | Facility: CLINIC | Age: 67
End: 2022-02-09
Payer: MEDICARE

## 2022-02-09 ENCOUNTER — TELEPHONE (OUTPATIENT)
Dept: FAMILY MEDICINE | Facility: CLINIC | Age: 67
End: 2022-02-09

## 2022-02-09 VITALS
DIASTOLIC BLOOD PRESSURE: 95 MMHG | SYSTOLIC BLOOD PRESSURE: 156 MMHG | HEART RATE: 92 BPM | BODY MASS INDEX: 31.21 KG/M2 | HEIGHT: 70 IN | WEIGHT: 218 LBS

## 2022-02-09 DIAGNOSIS — Z12.11 SCREENING FOR COLON CANCER: Primary | ICD-10-CM

## 2022-02-09 DIAGNOSIS — M75.101 TEAR OF RIGHT ROTATOR CUFF, UNSPECIFIED TEAR EXTENT, UNSPECIFIED WHETHER TRAUMATIC: Primary | ICD-10-CM

## 2022-02-09 PROCEDURE — 1125F AMNT PAIN NOTED PAIN PRSNT: CPT | Mod: CPTII,S$GLB,, | Performed by: ORTHOPAEDIC SURGERY

## 2022-02-09 PROCEDURE — 3077F SYST BP >= 140 MM HG: CPT | Mod: CPTII,S$GLB,, | Performed by: ORTHOPAEDIC SURGERY

## 2022-02-09 PROCEDURE — 3046F HEMOGLOBIN A1C LEVEL >9.0%: CPT | Mod: CPTII,S$GLB,, | Performed by: ORTHOPAEDIC SURGERY

## 2022-02-09 PROCEDURE — 1101F PT FALLS ASSESS-DOCD LE1/YR: CPT | Mod: CPTII,S$GLB,, | Performed by: ORTHOPAEDIC SURGERY

## 2022-02-09 PROCEDURE — 99204 OFFICE O/P NEW MOD 45 MIN: CPT | Mod: S$GLB,,, | Performed by: ORTHOPAEDIC SURGERY

## 2022-02-09 PROCEDURE — 1159F MED LIST DOCD IN RCRD: CPT | Mod: CPTII,S$GLB,, | Performed by: ORTHOPAEDIC SURGERY

## 2022-02-09 PROCEDURE — 4010F PR ACE/ARB THEARPY RXD/TAKEN: ICD-10-PCS | Mod: CPTII,S$GLB,, | Performed by: ORTHOPAEDIC SURGERY

## 2022-02-09 PROCEDURE — 3008F PR BODY MASS INDEX (BMI) DOCUMENTED: ICD-10-PCS | Mod: CPTII,S$GLB,, | Performed by: ORTHOPAEDIC SURGERY

## 2022-02-09 PROCEDURE — 3008F BODY MASS INDEX DOCD: CPT | Mod: CPTII,S$GLB,, | Performed by: ORTHOPAEDIC SURGERY

## 2022-02-09 PROCEDURE — 3080F DIAST BP >= 90 MM HG: CPT | Mod: CPTII,S$GLB,, | Performed by: ORTHOPAEDIC SURGERY

## 2022-02-09 PROCEDURE — 3046F PR MOST RECENT HEMOGLOBIN A1C LEVEL > 9.0%: ICD-10-PCS | Mod: CPTII,S$GLB,, | Performed by: ORTHOPAEDIC SURGERY

## 2022-02-09 PROCEDURE — 1101F PR PT FALLS ASSESS DOC 0-1 FALLS W/OUT INJ PAST YR: ICD-10-PCS | Mod: CPTII,S$GLB,, | Performed by: ORTHOPAEDIC SURGERY

## 2022-02-09 PROCEDURE — 99204 PR OFFICE/OUTPT VISIT, NEW, LEVL IV, 45-59 MIN: ICD-10-PCS | Mod: S$GLB,,, | Performed by: ORTHOPAEDIC SURGERY

## 2022-02-09 PROCEDURE — 1159F PR MEDICATION LIST DOCUMENTED IN MEDICAL RECORD: ICD-10-PCS | Mod: CPTII,S$GLB,, | Performed by: ORTHOPAEDIC SURGERY

## 2022-02-09 PROCEDURE — 3288F FALL RISK ASSESSMENT DOCD: CPT | Mod: CPTII,S$GLB,, | Performed by: ORTHOPAEDIC SURGERY

## 2022-02-09 PROCEDURE — 3080F PR MOST RECENT DIASTOLIC BLOOD PRESSURE >= 90 MM HG: ICD-10-PCS | Mod: CPTII,S$GLB,, | Performed by: ORTHOPAEDIC SURGERY

## 2022-02-09 PROCEDURE — 3288F PR FALLS RISK ASSESSMENT DOCUMENTED: ICD-10-PCS | Mod: CPTII,S$GLB,, | Performed by: ORTHOPAEDIC SURGERY

## 2022-02-09 PROCEDURE — 1125F PR PAIN SEVERITY QUANTIFIED, PAIN PRESENT: ICD-10-PCS | Mod: CPTII,S$GLB,, | Performed by: ORTHOPAEDIC SURGERY

## 2022-02-09 PROCEDURE — 3077F PR MOST RECENT SYSTOLIC BLOOD PRESSURE >= 140 MM HG: ICD-10-PCS | Mod: CPTII,S$GLB,, | Performed by: ORTHOPAEDIC SURGERY

## 2022-02-09 PROCEDURE — 99999 PR PBB SHADOW E&M-EST. PATIENT-LVL III: CPT | Mod: PBBFAC,,, | Performed by: ORTHOPAEDIC SURGERY

## 2022-02-09 PROCEDURE — 99999 PR PBB SHADOW E&M-EST. PATIENT-LVL III: ICD-10-PCS | Mod: PBBFAC,,, | Performed by: ORTHOPAEDIC SURGERY

## 2022-02-09 PROCEDURE — 1160F PR REVIEW ALL MEDS BY PRESCRIBER/CLIN PHARMACIST DOCUMENTED: ICD-10-PCS | Mod: CPTII,S$GLB,, | Performed by: ORTHOPAEDIC SURGERY

## 2022-02-09 PROCEDURE — 4010F ACE/ARB THERAPY RXD/TAKEN: CPT | Mod: CPTII,S$GLB,, | Performed by: ORTHOPAEDIC SURGERY

## 2022-02-09 PROCEDURE — 1160F RVW MEDS BY RX/DR IN RCRD: CPT | Mod: CPTII,S$GLB,, | Performed by: ORTHOPAEDIC SURGERY

## 2022-02-09 NOTE — PROGRESS NOTES
NEW PATIENT    HISTORY OF PRESENT ILLNESS   67 y.o. Male with a history of right shoulder pain who is referred today by FEMI Quinones. He states he has had pain and weakness for over a year. He has tried physical therapy with no relief.  He has pain at night as well as pain over the anterior lateral aspect of the shoulder.  He has pain with overhead activities.  He states physical therapy has not helped.  He wants to discuss surgical intervention.      Is affecting ADLs.  Pain is 8/10 at it's worst.        PAST MEDICAL HISTORY    Past Medical History:   Diagnosis Date    Essential (primary) hypertension     Male erectile dysfunction, unspecified     Testicular hypofunction     Type 2 diabetes mellitus without complications        PAST SURGICAL HISTORY     Past Surgical History:   Procedure Laterality Date    HIP SURGERY         FAMILY HISTORY    Family History   Problem Relation Age of Onset    Blindness Mother     No Known Problems Father     No Known Problems Brother     No Known Problems Daughter     No Known Problems Son     No Known Problems Daughter     Diabetes Brother     No Known Problems Brother     Amblyopia Neg Hx     Cancer Neg Hx     Cataracts Neg Hx     Glaucoma Neg Hx     Hypertension Neg Hx     Macular degeneration Neg Hx     Retinal detachment Neg Hx     Strabismus Neg Hx     Stroke Neg Hx     Thyroid disease Neg Hx        SOCIAL HISTORY    Social History     Socioeconomic History    Marital status:     Number of children: 3    Highest education level: GED or equivalent   Tobacco Use    Smoking status: Former Smoker     Packs/day: 0.25     Types: Cigarettes     Start date: 1972     Quit date: 1976     Years since quittin.2    Smokeless tobacco: Never Used   Substance and Sexual Activity    Alcohol use: Not Currently     Comment: Pt. has a Hx. of  Alcohol use.    Drug use: No    Sexual activity: Yes     Partners: Female     Birth  control/protection: None       MEDICATIONS      Current Outpatient Medications:     ASCORBATE CALCIUM (VITAMIN C ORAL), Take by mouth once daily. , Disp: , Rfl:     aspirin (ECOTRIN) 81 MG EC tablet, Take 81 mg by mouth once daily., Disp: , Rfl:     blood sugar diagnostic Strp, 1 strip by Misc.(Non-Drug; Combo Route) route once daily., Disp: 60 each, Rfl: 6    blood-glucose meter (FREESTYLE SYSTEM KIT) kit, Use as instructed, Disp: 1 each, Rfl: 0    clindamycin (CLEOCIN) 300 MG capsule, Take 1 capsule (300 mg total) by mouth every 6 (six) hours., Disp: 30 capsule, Rfl: 0    diazePAM (VALIUM) 5 MG tablet, Take 1 tablet (5 mg total) by mouth once. for 1 dose, Disp: 1 tablet, Rfl: 0    diclofenac (VOLTAREN) 50 MG EC tablet, Take 1 tablet (50 mg total) by mouth 2 (two) times daily., Disp: 60 tablet, Rfl: 1    diclofenac sodium (VOLTAREN) 1 % Gel, APPLY 2 GRAMS TOPICALLY THREE TIMES DAILY AS NEEDED, Disp: 100 g, Rfl: 3    fish oil-omega-3 fatty acids 300-1,000 mg capsule, Take 2 g by mouth once daily., Disp: , Rfl:     gabapentin (NEURONTIN) 300 MG capsule, TAKE 1 CAPSULE BY MOUTH THREE TIMES A DAY, Disp: 90 capsule, Rfl: 2    glimepiride (AMARYL) 4 MG tablet, Take 1 tablet (4 mg total) by mouth 2 (two) times a day., Disp: 180 tablet, Rfl: 1    HYDROcodone-acetaminophen (NORCO)  mg per tablet, Take 1 tablet by mouth 3 (three) times daily as needed (pain)., Disp: 72 tablet, Rfl: 0    [START ON 3/4/2022] HYDROcodone-acetaminophen (NORCO)  mg per tablet, Take 1 tablet by mouth 3 (three) times daily as needed (pain)., Disp: 72 tablet, Rfl: 0    [START ON 4/4/2022] HYDROcodone-acetaminophen (NORCO)  mg per tablet, Take 1 tablet by mouth 3 (three) times daily as needed (pain)., Disp: 72 tablet, Rfl: 0    hydrocortisone 2.5 % cream, Apply topically 2 (two) times daily., Disp: 20 g, Rfl: 2    lancets (ACCU-CHEK SOFTCLIX LANCETS) Misc, 1 Units by Misc.(Non-Drug; Combo Route) route once daily.,  Disp: 60 each, Rfl: 3    lisinopriL (PRINIVIL,ZESTRIL) 20 MG tablet, , Disp: , Rfl:     loratadine (CLARITIN) 10 mg tablet, TAKE 1 TABLET BY MOUTH EVERY DAY, Disp: 90 tablet, Rfl: 3    methocarbamoL (ROBAXIN) 500 MG Tab, Take 2 tablets (1,000 mg total) by mouth every 6 (six) hours as needed (muscle spasms)., Disp: 40 tablet, Rfl: 0    multivitamin with minerals tablet, Take 1 tablet by mouth once daily., Disp: , Rfl:     promethazine-dextromethorphan (PROMETHAZINE-DM) 6.25-15 mg/5 mL Syrp, Take 5 mLs by mouth every 6 (six) hours as needed (cough)., Disp: 180 mL, Rfl: 0    testosterone cypionate (DEPOTESTOTERONE CYPIONATE) 200 mg/mL injection, Inject 1 mL (200 mg total) into the muscle every 14 (fourteen) days., Disp: 10 mL, Rfl: 0  No current facility-administered medications for this visit.    Facility-Administered Medications Ordered in Other Visits:     triamcinolone acetonide injection 40 mg, 40 mg, Intra-articular, , Gallo Lara MD, 40 mg at 09/12/19 1631    ALLERGIES     Review of patient's allergies indicates:   Allergen Reactions    Tamiflu [oseltamivir]      Increases BP    Metformin      Diarrhea, nausea    Penicillins Rash         REVIEW OF SYSTEMS   Constitution: Negative. Negative for chills, fever and night sweats.   HENT: Negative for congestion and headaches.    Eyes: Negative for blurred vision, left vision loss and right vision loss.   Cardiovascular: Negative for chest pain and syncope.   Respiratory: Negative for cough and shortness of breath.    Endocrine: Negative for polydipsia, polyphagia and polyuria.   Hematologic/Lymphatic: Negative for bleeding problem. Does not bruise/bleed easily.   Skin: Negative for dry skin, itching and rash.   Musculoskeletal: Negative for falls. Positive for right shoulder pain  Gastrointestinal: Negative for abdominal pain and bowel incontinence.   Genitourinary: Negative for bladder incontinence and nocturia.   Neurological: Negative for  "disturbances in coordination, loss of balance and seizures.   Psychiatric/Behavioral: Negative for depression. The patient does not have insomnia.    Allergic/Immunologic: Negative for hives and persistent infections.     PHYSICAL EXAMINATION    Vitals: BP (!) 156/95   Pulse 92   Ht 5' 10" (1.778 m)   Wt 98.9 kg (218 lb)   BMI 31.28 kg/m²     General: The patient appears active and healthy with no apparent physical problems.  No disturbance of mood or affect is demonstrated. Alert and Oriented.    HEENT: Eyes normal, pupils equally round, nose normal.    Resp: Equal and symmetrical chest rises. No wheezing    CV: Regular rate    Neck: Supple; nonpainful range of motion.    Extremities: no cyanosis, clubbing, edema, or diffuse swelling.  Palpable pulses, good capillary refill of the digits.  No coolness, discoloration, edema or obvious varicosities.    Skin: no lesions noted.    Lymphatic: no detected adenopathy in the upper or lower extremities.    Neurologic: normal mental status, normal reflexes, normal gait and balance.  Patient is alert and oriented to person, place and time.  No flaccidity or spasticity is noted.  No motor or sensory deficits are noted.  Light touch is intact    Orthopaedic:     SHOULDER EXAM - RIGHT    Inspection:   Normal skin color and appearance with no scars.  No muscle atrophy noted.  No scapular winging.      Palpation: No tenderness of the  lateral edge of the acromion, biceps tendon, trapezius muscle or scapulothoracic bursa.  Tender AC joint    ROM:      PROM:     FE - 180°    Abd/ER -  90°  Abd/IR -  45°   Add/ER -  60°     AROM:    FE - 180° Pain between    Abd/ER -  90°  Abd/IR -  45°   Add/ER -  60°         Tests:     - Vivar, + Neer's, + Cross Arm Adduction, - [place], - [place],   + Speed. - Belly Press,  - Jobes, - Lift Off + Bear hug    Stability: - sulcus, - apprehension, - relocation, - load and shift, + DLS      Motor:  Rotator cuff strength is 4/5 " supraspinatus, 4/5 infraspinatus, 5/5 subscapularis. Biceps, triceps and deltoid strength is 5/5.      Neuro     Distally there are no paresthesias, and sensation is intact to light touch in the median, ulnar, and radial distributions.  Reflexes are 2/2 biceps, triceps and brachioradialis.        IMAGING    MRI Shoulder Without Contrast Right  Narrative: EXAMINATION:  MRI SHOULDER WITHOUT CONTRAST RIGHT    CLINICAL HISTORY:  Shoulder pain, rotator cuff disorder suspected, xray done;  Pain in right shoulder    TECHNIQUE:  MRI right shoulder performed without contrast per routine protocol.    COMPARISON:  Right shoulder radiograph dated 06/24/2021    FINDINGS:  Rotator cuff: There is supraspinatus and infraspinatus tendinosis.  Associated tendinous thinning with partial tear involving both bursal sided and articular sided fibers.  Component of tendinous retraction involving undersurface fibers near the conjoined portion to the level of the superior humeral head, approximately 2.0 cm.  Possible fraying of cranial most subscapularis fibers though appears otherwise intact.  Teres minor appears intact.  No significant cuff muscle atrophy.    Labrum: There is intrasubstance heterogeneity with suspected degenerative SLAP tear.  Additional intermediate linear signal of the posteroinferior labrum concerning for tear.  No large paralabral cyst.    Biceps: Long head biceps appears intact though thickened with interstitial tearing of the intra-articular portion.  Slight medialization of the biceps tendon within the bicipital groove.    Bone: No fracture or infiltrative marrow process.  Cyst-like change underlying the rotator cuff footprint.    Acromioclavicular joint: Intact with DJD.    Glenohumeral joint: No full-thickness chondral defect or significant subchondral edema.    Miscellaneous: No large glenohumeral joint effusion.  Impression: Mixed tendinosis and partial tearing of the distal supraspinatus and infraspinatus with  involvement of bursal sided and articular sided fibers.  Component of tendinous retraction involving undersurface fibers near the conjoined portion to the level of the superior humeral head.  Cuff muscle bulk is maintained.    Intrasubstance abnormality with degenerative SLAP tear of the superior labrum.  Additional tear suspected at the posteroinferior labrum.  No large paralabral cyst.    Marked biceps tendinosis with interstitial tearing of the intra-articular portion.    Electronically signed by: Ryan Flores  Date:    02/02/2022  Time:    16:45    IMAGING:    Xrays include 2 View right shoulder are ordered / images reviewed by me:  No fractures or dislocations noted.  No arthritis of the glenohumeral joint.  Moderate arthritis of the AC joint.  No subluxation of the humeral head.  No evidence of a Hill-Sachs lesion.  Changes to the greater tuberosity noted.        IMPRESSION       ICD-10-CM ICD-9-CM   1. Tear of right rotator cuff, unspecified tear extent, unspecified whether traumatic  M75.101 840.4       MEDICATIONS PRESCRIBED      1. None    RECOMMENDATIONS     1. Surgical vs non-surgical options discussed today; he would like to proceed with surgical intervention when possible   2. Patient will obtain clearance for surgery.   3. RTC for pre-op once he has been medically optimized.  At this point his hemoglobin A1c is too high to be a surgical candidate.  I advised him that he needs to remain on his medications and that he should follow-up with his primary care physician to consider potentially an adjustment to his medications if needed.  4. We discussed a right shoulder arthroscopic rotator cuff repair, subacromial decompression, distal clavicle excision and biceps tenodesis      All questions were answered, pt will contact us for questions or concerns in the interim.

## 2022-02-09 NOTE — TELEPHONE ENCOUNTER
Cutting out all sugary drinks/foods.     Take the glimepiride twice a day. Keep glucose log 2x a day so I can see how aggressive to be with sugar control. F/u in a week with log.

## 2022-02-09 NOTE — TELEPHONE ENCOUNTER
Patient asking how he can lower his A1C as his surgery is on hold until his diabetes is under control

## 2022-02-09 NOTE — TELEPHONE ENCOUNTER
----- Message from SaeJoonmiguel angel Simon sent at 2/9/2022 12:06 PM CST -----  Regarding: Call  Contact: Patient  Type: Patient Call Back    Who called:Patient    What is the request in detail: Patient is requesting a call back. He needs clearance for surgery and needs to discuss diabetes. Please advise.    Can the clinic reply by [facility]? No    Would the patient rather a call back or a response via [facility]? Call    Best call back number: [phone]    Additional Information:    Thanks

## 2022-02-10 ENCOUNTER — DOCUMENTATION ONLY (OUTPATIENT)
Dept: REHABILITATION | Facility: HOSPITAL | Age: 67
End: 2022-02-10

## 2022-02-10 ENCOUNTER — TELEPHONE (OUTPATIENT)
Dept: PREADMISSION TESTING | Facility: HOSPITAL | Age: 67
End: 2022-02-10

## 2022-02-10 NOTE — PROGRESS NOTES
Therapist called and left VM. Therapist stated that the patient may return to PT. Mr Cool's PA messaged therapist stating that Mr. Cool may continue with therapy until he discusses possible surgery with his MD. However, his MD is out until May for maternity leave.    Daniel Solo PT, DPT

## 2022-02-10 NOTE — TELEPHONE ENCOUNTER
----- Message from Oumou Canales sent at 2/9/2022 10:13 AM CST -----  Good morning,    This patient will need full pre-op clearance and will undergo right shoulder rotator cuff repair under general anesthesia. Please let me know if you need any further information.    Thank you-    Oumou Canales, MS, OTC  Clinical/OR Assistant to Alisa Kruger MD  [facility] [place]

## 2022-02-11 NOTE — TELEPHONE ENCOUNTER
----- Message from Oumou Canales sent at 2/9/2022 10:13 AM CST -----  Good morning,    This patient will need full pre-op clearance and will undergo right shoulder rotator cuff repair under general anesthesia. Please let me know if you need any further information.    Thank you-    Oumou Canales, MS, OTC  Clinical/OR Assistant to Alisa Kruger MD  [facility] [place]

## 2022-02-14 ENCOUNTER — TELEPHONE (OUTPATIENT)
Dept: PREADMISSION TESTING | Facility: HOSPITAL | Age: 67
End: 2022-02-14

## 2022-02-14 NOTE — TELEPHONE ENCOUNTER
----- Message from Oumou Canales sent at 2/9/2022 10:13 AM CST -----  Good morning,    This patient will need full pre-op clearance and will undergo right shoulder rotator cuff repair under general anesthesia. Please let me know if you need any further information.    Thank you-    Oumou Canales, MS, OTC  Clinical/OR Assistant to Alisa Kruger MD  [facility] [place]

## 2022-02-14 NOTE — TELEPHONE ENCOUNTER
----- Message from Oumou Canales sent at 2/9/2022 10:13 AM CST -----  Good morning,    This patient will need full pre-op clearance and will undergo right shoulder rotator cuff repair under general anesthesia. Please let me know if you need any further information.    Thank you-    Oumou Canales, MS, OTC  Clinical/OR Assistant to Alisa Kruger MD  [facility] [place]

## 2022-02-16 ENCOUNTER — OFFICE VISIT (OUTPATIENT)
Dept: FAMILY MEDICINE | Facility: CLINIC | Age: 67
End: 2022-02-16
Payer: MEDICARE

## 2022-02-16 VITALS
TEMPERATURE: 99 F | HEART RATE: 91 BPM | SYSTOLIC BLOOD PRESSURE: 132 MMHG | BODY MASS INDEX: 31.22 KG/M2 | WEIGHT: 218.06 LBS | OXYGEN SATURATION: 97 % | HEIGHT: 70 IN | DIASTOLIC BLOOD PRESSURE: 76 MMHG

## 2022-02-16 DIAGNOSIS — E11.29 TYPE 2 DIABETES MELLITUS WITH MICROALBUMINURIA, WITHOUT LONG-TERM CURRENT USE OF INSULIN: Primary | Chronic | ICD-10-CM

## 2022-02-16 DIAGNOSIS — I10 ESSENTIAL (PRIMARY) HYPERTENSION: Chronic | ICD-10-CM

## 2022-02-16 DIAGNOSIS — Z12.11 ENCOUNTER FOR SCREENING FOR MALIGNANT NEOPLASM OF COLON: ICD-10-CM

## 2022-02-16 DIAGNOSIS — R80.9 TYPE 2 DIABETES MELLITUS WITH MICROALBUMINURIA, WITHOUT LONG-TERM CURRENT USE OF INSULIN: Primary | Chronic | ICD-10-CM

## 2022-02-16 PROCEDURE — 3078F DIAST BP <80 MM HG: CPT | Mod: CPTII,S$GLB,, | Performed by: FAMILY MEDICINE

## 2022-02-16 PROCEDURE — 1159F MED LIST DOCD IN RCRD: CPT | Mod: CPTII,S$GLB,, | Performed by: FAMILY MEDICINE

## 2022-02-16 PROCEDURE — 3078F PR MOST RECENT DIASTOLIC BLOOD PRESSURE < 80 MM HG: ICD-10-PCS | Mod: CPTII,S$GLB,, | Performed by: FAMILY MEDICINE

## 2022-02-16 PROCEDURE — 3046F HEMOGLOBIN A1C LEVEL >9.0%: CPT | Mod: CPTII,S$GLB,, | Performed by: FAMILY MEDICINE

## 2022-02-16 PROCEDURE — 3075F SYST BP GE 130 - 139MM HG: CPT | Mod: CPTII,S$GLB,, | Performed by: FAMILY MEDICINE

## 2022-02-16 PROCEDURE — 1101F PT FALLS ASSESS-DOCD LE1/YR: CPT | Mod: CPTII,S$GLB,, | Performed by: FAMILY MEDICINE

## 2022-02-16 PROCEDURE — 3008F BODY MASS INDEX DOCD: CPT | Mod: CPTII,S$GLB,, | Performed by: FAMILY MEDICINE

## 2022-02-16 PROCEDURE — 99214 PR OFFICE/OUTPT VISIT, EST, LEVL IV, 30-39 MIN: ICD-10-PCS | Mod: S$GLB,,, | Performed by: FAMILY MEDICINE

## 2022-02-16 PROCEDURE — 1159F PR MEDICATION LIST DOCUMENTED IN MEDICAL RECORD: ICD-10-PCS | Mod: CPTII,S$GLB,, | Performed by: FAMILY MEDICINE

## 2022-02-16 PROCEDURE — 3075F PR MOST RECENT SYSTOLIC BLOOD PRESS GE 130-139MM HG: ICD-10-PCS | Mod: CPTII,S$GLB,, | Performed by: FAMILY MEDICINE

## 2022-02-16 PROCEDURE — 1101F PR PT FALLS ASSESS DOC 0-1 FALLS W/OUT INJ PAST YR: ICD-10-PCS | Mod: CPTII,S$GLB,, | Performed by: FAMILY MEDICINE

## 2022-02-16 PROCEDURE — 3288F PR FALLS RISK ASSESSMENT DOCUMENTED: ICD-10-PCS | Mod: CPTII,S$GLB,, | Performed by: FAMILY MEDICINE

## 2022-02-16 PROCEDURE — 99999 PR PBB SHADOW E&M-EST. PATIENT-LVL IV: ICD-10-PCS | Mod: PBBFAC,,, | Performed by: FAMILY MEDICINE

## 2022-02-16 PROCEDURE — 3046F PR MOST RECENT HEMOGLOBIN A1C LEVEL > 9.0%: ICD-10-PCS | Mod: CPTII,S$GLB,, | Performed by: FAMILY MEDICINE

## 2022-02-16 PROCEDURE — 99214 OFFICE O/P EST MOD 30 MIN: CPT | Mod: S$GLB,,, | Performed by: FAMILY MEDICINE

## 2022-02-16 PROCEDURE — 99999 PR PBB SHADOW E&M-EST. PATIENT-LVL IV: CPT | Mod: PBBFAC,,, | Performed by: FAMILY MEDICINE

## 2022-02-16 PROCEDURE — 3288F FALL RISK ASSESSMENT DOCD: CPT | Mod: CPTII,S$GLB,, | Performed by: FAMILY MEDICINE

## 2022-02-16 PROCEDURE — 4010F ACE/ARB THERAPY RXD/TAKEN: CPT | Mod: CPTII,S$GLB,, | Performed by: FAMILY MEDICINE

## 2022-02-16 PROCEDURE — 1125F AMNT PAIN NOTED PAIN PRSNT: CPT | Mod: CPTII,S$GLB,, | Performed by: FAMILY MEDICINE

## 2022-02-16 PROCEDURE — 4010F PR ACE/ARB THEARPY RXD/TAKEN: ICD-10-PCS | Mod: CPTII,S$GLB,, | Performed by: FAMILY MEDICINE

## 2022-02-16 PROCEDURE — 1125F PR PAIN SEVERITY QUANTIFIED, PAIN PRESENT: ICD-10-PCS | Mod: CPTII,S$GLB,, | Performed by: FAMILY MEDICINE

## 2022-02-16 PROCEDURE — 3008F PR BODY MASS INDEX (BMI) DOCUMENTED: ICD-10-PCS | Mod: CPTII,S$GLB,, | Performed by: FAMILY MEDICINE

## 2022-02-16 NOTE — PROGRESS NOTES
[facility]  Progress Note    SUBJECTIVE:     Chief Complaint   Patient presents with    Hyperglycemia       HPI   King Cool Jr.  is a 67 y.o. male here for follow-up of his diabetes. Sugars still over 200. Been cutting out sugary drinks. Patient has no other new complaints/problems at this time.      Review of patient's allergies indicates:   Allergen Reactions    Tamiflu [oseltamivir]      Increases BP    Metformin      Diarrhea, nausea    Penicillins Rash       Past Medical History:   Diagnosis Date    Essential (primary) hypertension     Male erectile dysfunction, unspecified     Testicular hypofunction     Type 2 diabetes mellitus without complications      Past Surgical History:   Procedure Laterality Date    HIP SURGERY       Family History   Problem Relation Age of Onset    Blindness Mother     No Known Problems Father     No Known Problems Brother     No Known Problems Daughter     No Known Problems Son     No Known Problems Daughter     Diabetes Brother     No Known Problems Brother     Amblyopia Neg Hx     Cancer Neg Hx     Cataracts Neg Hx     Glaucoma Neg Hx     Hypertension Neg Hx     Macular degeneration Neg Hx     Retinal detachment Neg Hx     Strabismus Neg Hx     Stroke Neg Hx     Thyroid disease Neg Hx      Social History     Tobacco Use    Smoking status: Former Smoker     Packs/day: 0.25     Types: Cigarettes     Start date: 1972     Quit date: 1976     Years since quittin.2    Smokeless tobacco: Never Used   Substance Use Topics    Alcohol use: Not Currently     Comment: Pt. has a Hx. of  Alcohol use.    Drug use: No        Review of Systems   Constitutional: Negative for chills and fever.   HENT: Negative.    Respiratory: Negative.  Negative for shortness of breath.    Cardiovascular: Negative.  Negative for chest pain.   Gastrointestinal: Negative.  Negative for abdominal pain, nausea and vomiting.   Genitourinary: Negative.     Neurological: Negative for headaches.   All other systems reviewed and are negative.    OBJECTIVE:     Vitals:    02/16/22 1533   BP: 132/76   Pulse: 91   Temp: 98.5 °F (36.9 °C)     Body mass index is 31.28 kg/m².    Physical Exam  Constitutional:       General: He is not in acute distress.     Appearance: He is not diaphoretic.   HENT:      Head: Normocephalic and atraumatic.   Eyes:      Extraocular Movements: EOM normal.      Conjunctiva/sclera: Conjunctivae normal.   Pulmonary:      Effort: Pulmonary effort is normal. No respiratory distress.   Skin:     General: Skin is warm.   Neurological:      Mental Status: He is alert and oriented to person, place, and time.         Old records were reviewed. Labs and/or images were independently reviewed.    ASSESSMENT     1. Type 2 diabetes mellitus with microalbuminuria, without long-term current use of insulin    2. Essential (primary) hypertension    3. Encounter for screening for malignant neoplasm of colon        PLAN:     Type 2 diabetes mellitus with microalbuminuria, without long-term current use of insulin  -     Start empagliflozin (JARDIANCE) 25 mg tablet; Take 1 tablet (25 mg total) by mouth once daily.  Dispense: 30 tablet; Refill: 3  -     Diabetic Eye Screening Photo; Future  -     Microalbumin/Creatinine Ratio, Urine; Future; Expected date: 02/16/2022  -     Instructed patient to take daily glucose AM logs and to write them down to bring with on next visit. Advised patient to decrease intake of carbohydrates/simple sugars.         Essential (primary) hypertension   -     Stable. Continue current regimen.    Encounter for screening for malignant neoplasm of colon  -     Fecal Immunochemical Test (iFOBT); Future; Expected date: 02/16/2022      RTC TYLOR Lara MD  02/16/2022 3:54 PM

## 2022-02-22 ENCOUNTER — LAB VISIT (OUTPATIENT)
Dept: LAB | Facility: HOSPITAL | Age: 67
End: 2022-02-22
Payer: MEDICARE

## 2022-02-22 DIAGNOSIS — Z12.11 ENCOUNTER FOR SCREENING FOR MALIGNANT NEOPLASM OF COLON: ICD-10-CM

## 2022-02-22 PROCEDURE — 82274 ASSAY TEST FOR BLOOD FECAL: CPT | Performed by: FAMILY MEDICINE

## 2022-02-24 LAB — HEMOCCULT STL QL IA: NEGATIVE

## 2022-03-04 NOTE — TELEPHONE ENCOUNTER
----- Message from Tresa Da Silva sent at 3/4/2022  7:24 AM CST -----  Type: RX Refill Request    Who Called:  self    Have you contacted your pharmacy: no    Refill or New Rx: refill    RX Name and Strength: blood sugar diagnostic Strp    Preferred Pharmacy with phone number: [facility]/pharmacy #[id number] - [place] - [address]   Phone:  [phone]  Fax:  [phone]    Local or Mail Order:Local    Would the patient rather a call back or a response via [facility]?  call    Best Call Back Number: .[phone] (home)

## 2022-03-07 ENCOUNTER — TELEPHONE (OUTPATIENT)
Dept: FAMILY MEDICINE | Facility: CLINIC | Age: 67
End: 2022-03-07

## 2022-03-07 DIAGNOSIS — M25.561 CHRONIC PAIN OF RIGHT KNEE: ICD-10-CM

## 2022-03-07 DIAGNOSIS — G89.29 CHRONIC PAIN OF RIGHT KNEE: ICD-10-CM

## 2022-03-07 NOTE — TELEPHONE ENCOUNTER
----- Message from Rose Bruno sent at 3/7/2022 11:50 AM CST -----  Type:  Patient Returning Call    Who Called: self     Who Left Message for Patient: Courtney    Does the patient know what this is regarding?: yes     Would the patient rather a call back or a response via [facility]? Call back     Best Call Back Number: [phone]

## 2022-03-07 NOTE — TELEPHONE ENCOUNTER
----- Message from Anni Guzman sent at 3/7/2022  8:00 AM CST -----  Contact: Patient [phone]  Type: Patient Call Back    Who called: Patient     What is the request in detail: Calling to find out if Dr Lara wants him to bring in blood sugar readings today, 03-07-22, or wait until tomorrow, 03-08-22 when he comes in for his appointment? Also, need a refill on test strips. States his insurance won't pay for the strips he was prescribed. Would like to speak to nurse. Please call.   .  [facility]/pharmacy #[id number] - [place] - [address]  [address] [place] [address]  Phone: [phone] Fax: [phone]    Would the patient rather a call back or a response via [facility]? Call back    Best call back number: [phone]

## 2022-03-07 NOTE — TELEPHONE ENCOUNTER
----- Message from SaeJoonmiguel angel Simon sent at 3/7/2022  4:15 PM CST -----  Regarding: Call  Contact: Patient  Type: Patient Call Back    Who called:Patient    What is the request in detail: Patient is requesting a call back. Please advise.    Can the clinic reply by [facility]? No    Would the patient rather a call back or a response via [facility]? Call    Best call back number: [phone]    Additional Information: Patient states insurance covers the accucheck meter and strips. Patient needs orders for device and diabetic supplies.      [facility]/pharmacy #[id number] - [place] - [address]  [address] [place] [address]  Phone: [phone] Fax: [phone]

## 2022-03-07 NOTE — TELEPHONE ENCOUNTER
Spoke to patient. Patient explained that his insurance company will only cover Accu chek. Patient will be in tomorrow with Glucose readings.

## 2022-03-08 ENCOUNTER — OFFICE VISIT (OUTPATIENT)
Dept: FAMILY MEDICINE | Facility: CLINIC | Age: 67
End: 2022-03-08
Payer: MEDICARE

## 2022-03-08 VITALS
HEART RATE: 78 BPM | OXYGEN SATURATION: 95 % | HEIGHT: 70 IN | DIASTOLIC BLOOD PRESSURE: 70 MMHG | TEMPERATURE: 99 F | BODY MASS INDEX: 30.74 KG/M2 | WEIGHT: 214.75 LBS | SYSTOLIC BLOOD PRESSURE: 124 MMHG

## 2022-03-08 DIAGNOSIS — I10 ESSENTIAL (PRIMARY) HYPERTENSION: Chronic | ICD-10-CM

## 2022-03-08 DIAGNOSIS — R80.9 TYPE 2 DIABETES MELLITUS WITH MICROALBUMINURIA, WITHOUT LONG-TERM CURRENT USE OF INSULIN: Primary | Chronic | ICD-10-CM

## 2022-03-08 DIAGNOSIS — E29.1 TESTICULAR HYPOFUNCTION: ICD-10-CM

## 2022-03-08 DIAGNOSIS — E11.9 TYPE 2 DIABETES MELLITUS WITHOUT COMPLICATION, WITHOUT LONG-TERM CURRENT USE OF INSULIN: ICD-10-CM

## 2022-03-08 DIAGNOSIS — E11.29 TYPE 2 DIABETES MELLITUS WITH MICROALBUMINURIA, WITHOUT LONG-TERM CURRENT USE OF INSULIN: Primary | Chronic | ICD-10-CM

## 2022-03-08 PROCEDURE — 3066F NEPHROPATHY DOC TX: CPT | Mod: CPTII,S$GLB,, | Performed by: FAMILY MEDICINE

## 2022-03-08 PROCEDURE — 3046F PR MOST RECENT HEMOGLOBIN A1C LEVEL > 9.0%: ICD-10-PCS | Mod: CPTII,S$GLB,, | Performed by: FAMILY MEDICINE

## 2022-03-08 PROCEDURE — 1159F MED LIST DOCD IN RCRD: CPT | Mod: CPTII,S$GLB,, | Performed by: FAMILY MEDICINE

## 2022-03-08 PROCEDURE — 99214 PR OFFICE/OUTPT VISIT, EST, LEVL IV, 30-39 MIN: ICD-10-PCS | Mod: S$GLB,,, | Performed by: FAMILY MEDICINE

## 2022-03-08 PROCEDURE — 4010F PR ACE/ARB THEARPY RXD/TAKEN: ICD-10-PCS | Mod: CPTII,S$GLB,, | Performed by: FAMILY MEDICINE

## 2022-03-08 PROCEDURE — 3008F BODY MASS INDEX DOCD: CPT | Mod: CPTII,S$GLB,, | Performed by: FAMILY MEDICINE

## 2022-03-08 PROCEDURE — 3061F NEG MICROALBUMINURIA REV: CPT | Mod: CPTII,S$GLB,, | Performed by: FAMILY MEDICINE

## 2022-03-08 PROCEDURE — 3061F PR NEG MICROALBUMINURIA RESULT DOCUMENTED/REVIEW: ICD-10-PCS | Mod: CPTII,S$GLB,, | Performed by: FAMILY MEDICINE

## 2022-03-08 PROCEDURE — 4010F ACE/ARB THERAPY RXD/TAKEN: CPT | Mod: CPTII,S$GLB,, | Performed by: FAMILY MEDICINE

## 2022-03-08 PROCEDURE — 3046F HEMOGLOBIN A1C LEVEL >9.0%: CPT | Mod: CPTII,S$GLB,, | Performed by: FAMILY MEDICINE

## 2022-03-08 PROCEDURE — 3008F PR BODY MASS INDEX (BMI) DOCUMENTED: ICD-10-PCS | Mod: CPTII,S$GLB,, | Performed by: FAMILY MEDICINE

## 2022-03-08 PROCEDURE — 1159F PR MEDICATION LIST DOCUMENTED IN MEDICAL RECORD: ICD-10-PCS | Mod: CPTII,S$GLB,, | Performed by: FAMILY MEDICINE

## 2022-03-08 PROCEDURE — 99999 PR PBB SHADOW E&M-EST. PATIENT-LVL IV: ICD-10-PCS | Mod: PBBFAC,,, | Performed by: FAMILY MEDICINE

## 2022-03-08 PROCEDURE — 1125F AMNT PAIN NOTED PAIN PRSNT: CPT | Mod: CPTII,S$GLB,, | Performed by: FAMILY MEDICINE

## 2022-03-08 PROCEDURE — 99999 PR PBB SHADOW E&M-EST. PATIENT-LVL IV: CPT | Mod: PBBFAC,,, | Performed by: FAMILY MEDICINE

## 2022-03-08 PROCEDURE — 1101F PR PT FALLS ASSESS DOC 0-1 FALLS W/OUT INJ PAST YR: ICD-10-PCS | Mod: CPTII,S$GLB,, | Performed by: FAMILY MEDICINE

## 2022-03-08 PROCEDURE — 3074F PR MOST RECENT SYSTOLIC BLOOD PRESSURE < 130 MM HG: ICD-10-PCS | Mod: CPTII,S$GLB,, | Performed by: FAMILY MEDICINE

## 2022-03-08 PROCEDURE — 3074F SYST BP LT 130 MM HG: CPT | Mod: CPTII,S$GLB,, | Performed by: FAMILY MEDICINE

## 2022-03-08 PROCEDURE — 99214 OFFICE O/P EST MOD 30 MIN: CPT | Mod: S$GLB,,, | Performed by: FAMILY MEDICINE

## 2022-03-08 PROCEDURE — 3288F FALL RISK ASSESSMENT DOCD: CPT | Mod: CPTII,S$GLB,, | Performed by: FAMILY MEDICINE

## 2022-03-08 PROCEDURE — 3078F PR MOST RECENT DIASTOLIC BLOOD PRESSURE < 80 MM HG: ICD-10-PCS | Mod: CPTII,S$GLB,, | Performed by: FAMILY MEDICINE

## 2022-03-08 PROCEDURE — 3288F PR FALLS RISK ASSESSMENT DOCUMENTED: ICD-10-PCS | Mod: CPTII,S$GLB,, | Performed by: FAMILY MEDICINE

## 2022-03-08 PROCEDURE — 1125F PR PAIN SEVERITY QUANTIFIED, PAIN PRESENT: ICD-10-PCS | Mod: CPTII,S$GLB,, | Performed by: FAMILY MEDICINE

## 2022-03-08 PROCEDURE — 3066F PR DOCUMENTATION OF TREATMENT FOR NEPHROPATHY: ICD-10-PCS | Mod: CPTII,S$GLB,, | Performed by: FAMILY MEDICINE

## 2022-03-08 PROCEDURE — 1101F PT FALLS ASSESS-DOCD LE1/YR: CPT | Mod: CPTII,S$GLB,, | Performed by: FAMILY MEDICINE

## 2022-03-08 PROCEDURE — 3078F DIAST BP <80 MM HG: CPT | Mod: CPTII,S$GLB,, | Performed by: FAMILY MEDICINE

## 2022-03-08 RX ORDER — GABAPENTIN 300 MG/1
CAPSULE ORAL
Qty: 90 CAPSULE | Refills: 2 | Status: SHIPPED | OUTPATIENT
Start: 2022-03-08 | End: 2022-05-26

## 2022-03-08 RX ORDER — GLIMEPIRIDE 4 MG/1
4 TABLET ORAL 2 TIMES DAILY
Qty: 180 TABLET | Refills: 3 | Status: SHIPPED | OUTPATIENT
Start: 2022-03-08 | End: 2022-05-03

## 2022-03-08 RX ORDER — INSULIN PUMP SYRINGE, 3 ML
EACH MISCELLANEOUS
Qty: 1 EACH | Refills: 0 | Status: SHIPPED | OUTPATIENT
Start: 2022-03-08 | End: 2023-02-01

## 2022-03-08 RX ORDER — LANCETS
1 EACH MISCELLANEOUS 3 TIMES DAILY
Qty: 200 EACH | Refills: 5 | Status: SHIPPED | OUTPATIENT
Start: 2022-03-08 | End: 2023-02-01

## 2022-03-08 RX ORDER — ISOPROPYL ALCOHOL 70 ML/100ML
1 SWAB TOPICAL
Qty: 400 EACH | Refills: 1 | Status: SHIPPED | OUTPATIENT
Start: 2022-03-08 | End: 2022-10-05

## 2022-03-08 RX ORDER — DEXTROSE 4 G
TABLET,CHEWABLE ORAL
Qty: 1 EACH | Refills: 0 | Status: SHIPPED | OUTPATIENT
Start: 2022-03-08 | End: 2023-02-01

## 2022-03-08 RX ORDER — METFORMIN HYDROCHLORIDE 500 MG/1
500 TABLET, EXTENDED RELEASE ORAL
Qty: 30 TABLET | Refills: 3 | Status: SHIPPED | OUTPATIENT
Start: 2022-03-08 | End: 2022-05-03

## 2022-03-08 RX ORDER — TESTOSTERONE CYPIONATE 200 MG/ML
200 INJECTION, SOLUTION INTRAMUSCULAR
Qty: 10 ML | Refills: 0 | Status: SHIPPED | OUTPATIENT
Start: 2022-03-08 | End: 2023-06-30

## 2022-03-08 RX ORDER — TESTOSTERONE CYPIONATE 200 MG/ML
200 INJECTION, SOLUTION INTRAMUSCULAR
Qty: 10 ML | Refills: 0 | Status: SHIPPED | OUTPATIENT
Start: 2022-03-08 | End: 2022-03-08 | Stop reason: SDUPTHER

## 2022-03-08 NOTE — PROGRESS NOTES
[facility]  Progress Note    SUBJECTIVE:     Chief Complaint   Patient presents with    Diabetes       HPI   King Cool Jr.  is a 67 y.o. male  Here for follow-up of his chronic conditions. Sugars been better controlled. Takes med as directed. Majority of readings under 200. Patient has no other new complaints/problems at this time.      Review of patient's allergies indicates:   Allergen Reactions    Tamiflu [oseltamivir]      Increases BP    Metformin      Diarrhea, nausea    Penicillins Rash       Past Medical History:   Diagnosis Date    Essential (primary) hypertension     Male erectile dysfunction, unspecified     Testicular hypofunction     Type 2 diabetes mellitus without complications      Past Surgical History:   Procedure Laterality Date    HIP SURGERY       Family History   Problem Relation Age of Onset    Blindness Mother     No Known Problems Father     No Known Problems Brother     No Known Problems Daughter     No Known Problems Son     No Known Problems Daughter     Diabetes Brother     No Known Problems Brother     Amblyopia Neg Hx     Cancer Neg Hx     Cataracts Neg Hx     Glaucoma Neg Hx     Hypertension Neg Hx     Macular degeneration Neg Hx     Retinal detachment Neg Hx     Strabismus Neg Hx     Stroke Neg Hx     Thyroid disease Neg Hx      Social History     Tobacco Use    Smoking status: Former Smoker     Packs/day: 0.25     Types: Cigarettes     Start date: 1972     Quit date: 1976     Years since quittin.3    Smokeless tobacco: Never Used   Substance Use Topics    Alcohol use: Not Currently     Comment: Pt. has a Hx. of  Alcohol use.    Drug use: No        Review of Systems   Constitutional: Negative for chills and fever.   HENT: Negative.    Respiratory: Negative.  Negative for shortness of breath.    Cardiovascular: Negative.  Negative for chest pain.   Gastrointestinal: Negative.  Negative for abdominal pain, nausea and  vomiting.   Genitourinary: Negative.    Neurological: Negative for headaches.   All other systems reviewed and are negative.    OBJECTIVE:     Vitals:    03/08/22 0911   BP: 124/70   Pulse: 78   Temp: 98.7 °F (37.1 °C)     Body mass index is 30.81 kg/m².    Physical Exam  Constitutional:       General: He is not in acute distress.     Appearance: He is not diaphoretic.   HENT:      Head: Normocephalic and atraumatic.   Eyes:      Conjunctiva/sclera: Conjunctivae normal.   Pulmonary:      Effort: Pulmonary effort is normal. No respiratory distress.   Skin:     General: Skin is warm.   Neurological:      Mental Status: He is alert and oriented to person, place, and time.         Old records were reviewed. Labs and/or images were independently reviewed.    ASSESSMENT     1. Type 2 diabetes mellitus with microalbuminuria, without long-term current use of insulin    2. Type 2 diabetes mellitus without complication, without long-term current use of insulin    3. Testicular hypofunction    4. Essential (primary) hypertension        PLAN:     Type 2 diabetes mellitus with microalbuminuria, without long-term current use of insulin  -     blood-glucose meter kit; Use as instructed  Dispense: 1 each; Refill: 0  -     lancets Misc; 1 application by Misc.(Non-Drug; Combo Route) route 3 (three) times daily.  Dispense: 200 each; Refill: 5  -     alcohol swabs (ALCOHOL PREP PADS) PadM; Apply 1 each topically as needed.  Dispense: 400 each; Refill: 1  -     blood sugar diagnostic Strp; 1 strip by Misc.(Non-Drug; Combo Route) route 3 (three) times daily.  Dispense: 200 strip; Refill: 5  -     empagliflozin (JARDIANCE) 25 mg tablet; Take 1 tablet (25 mg total) by mouth once daily.  Dispense: 90 tablet; Refill: 3  -     Start metFORMIN (GLUCOPHAGE-XR) 500 MG ER 24hr tablet; Take 1 tablet (500 mg total) by mouth daily with breakfast.  Dispense: 30 tablet; Refill: 3  -     glimepiride (AMARYL) 4 MG tablet; Take 1 tablet (4 mg total) by  mouth 2 (two) times a day.  Dispense: 180 tablet; Refill: 3        -     Instructed patient to take daily glucose AM logs and to write them down to bring with on next visit. Advised patient to decrease intake of carbohydrates/simple sugars.    Testicular hypofunction  -     Discontinue: testosterone cypionate (DEPOTESTOTERONE CYPIONATE) 200 mg/mL injection; Inject 1 mL (200 mg total) into the muscle every 14 (fourteen) days.  Dispense: 10 mL; Refill: 0  -     testosterone cypionate (DEPOTESTOTERONE CYPIONATE) 200 mg/mL injection; Inject 1 mL (200 mg total) into the muscle every 14 (fourteen) days.  Dispense: 10 mL; Refill: 0  -     Stable. Continue current regimen. Unable to wean at this time but am monitoring closely for any drug toxicity. Checked .    Essential (primary) hypertension   -     Stable. Continue current regimen.      RTC TYLOR Lara MD  03/08/2022 9:31 AM

## 2022-03-16 ENCOUNTER — TELEPHONE (OUTPATIENT)
Dept: FAMILY MEDICINE | Facility: CLINIC | Age: 67
End: 2022-03-16

## 2022-03-16 NOTE — TELEPHONE ENCOUNTER
----- Message from Becca Wolfe sent at 3/16/2022  8:34 AM CDT -----  Regarding: Joselo call back  Who called:MICHELE MONTOYA JR. [[id number]]    What is the request in detail: Patient is requesting a call back. He states he would like to let the staff know he brought in his reading for his blood sugar. He would like to further discuss.   Please advise.    Can the clinic reply by [facility]? No    Best call back number: [phone]    Additional Information: N/A

## 2022-04-18 DIAGNOSIS — E11.29 TYPE 2 DIABETES MELLITUS WITH MICROALBUMINURIA, WITHOUT LONG-TERM CURRENT USE OF INSULIN: Primary | ICD-10-CM

## 2022-04-18 DIAGNOSIS — R80.9 TYPE 2 DIABETES MELLITUS WITH MICROALBUMINURIA, WITHOUT LONG-TERM CURRENT USE OF INSULIN: Primary | ICD-10-CM

## 2022-04-19 ENCOUNTER — LAB VISIT (OUTPATIENT)
Dept: LAB | Facility: HOSPITAL | Age: 67
End: 2022-04-19
Attending: FAMILY MEDICINE
Payer: MEDICARE

## 2022-04-19 ENCOUNTER — CLINICAL SUPPORT (OUTPATIENT)
Dept: OPHTHALMOLOGY | Facility: CLINIC | Age: 67
End: 2022-04-19
Attending: FAMILY MEDICINE
Payer: MEDICARE

## 2022-04-19 DIAGNOSIS — E11.29 TYPE 2 DIABETES MELLITUS WITH MICROALBUMINURIA, WITHOUT LONG-TERM CURRENT USE OF INSULIN: Chronic | ICD-10-CM

## 2022-04-19 DIAGNOSIS — E11.29 TYPE 2 DIABETES MELLITUS WITH MICROALBUMINURIA, WITHOUT LONG-TERM CURRENT USE OF INSULIN: ICD-10-CM

## 2022-04-19 DIAGNOSIS — R80.9 TYPE 2 DIABETES MELLITUS WITH MICROALBUMINURIA, WITHOUT LONG-TERM CURRENT USE OF INSULIN: ICD-10-CM

## 2022-04-19 DIAGNOSIS — R80.9 TYPE 2 DIABETES MELLITUS WITH MICROALBUMINURIA, WITHOUT LONG-TERM CURRENT USE OF INSULIN: Chronic | ICD-10-CM

## 2022-04-19 LAB
ESTIMATED AVG GLUCOSE: 166 MG/DL (ref 68–131)
HBA1C MFR BLD: 7.4 % (ref 4–5.6)

## 2022-04-19 PROCEDURE — 36415 COLL VENOUS BLD VENIPUNCTURE: CPT | Mod: PO | Performed by: FAMILY MEDICINE

## 2022-04-19 PROCEDURE — 83036 HEMOGLOBIN GLYCOSYLATED A1C: CPT | Performed by: FAMILY MEDICINE

## 2022-04-19 PROCEDURE — 92228 IMG RTA DETC/MNTR DS PHY/QHP: CPT | Mod: 26,S$GLB,, | Performed by: OPTOMETRIST

## 2022-04-19 PROCEDURE — 92228 DIABETIC EYE SCREENING PHOTO: ICD-10-PCS | Mod: 26,S$GLB,, | Performed by: OPTOMETRIST

## 2022-04-19 NOTE — PROGRESS NOTES
HPI     66 y/o male here for diabetic retinopathy screening with non-dilated   fundus photos per Dr. Lara    Last edited by Sammie Martinez MA on 4/19/2022 10:12 AM. (History)            Assessment /Plan     For exam results, see Encounter Report.    Type 2 diabetes mellitus with microalbuminuria, without long-term current use of insulin  -     Diabetic Eye Screening Photo      Please se dr. Malave progress notes for interpretation

## 2022-04-20 ENCOUNTER — TELEPHONE (OUTPATIENT)
Dept: FAMILY MEDICINE | Facility: CLINIC | Age: 67
End: 2022-04-20
Payer: MEDICARE

## 2022-04-20 DIAGNOSIS — M17.0 PRIMARY OSTEOARTHRITIS OF BOTH KNEES: ICD-10-CM

## 2022-04-20 RX ORDER — HYDROCODONE BITARTRATE AND ACETAMINOPHEN 10; 325 MG/1; MG/1
1 TABLET ORAL 3 TIMES DAILY PRN
Qty: 72 TABLET | Refills: 0 | Status: SHIPPED | OUTPATIENT
Start: 2022-04-20 | End: 2022-06-08 | Stop reason: SDUPTHER

## 2022-04-20 RX ORDER — HYDROCODONE BITARTRATE AND ACETAMINOPHEN 10; 325 MG/1; MG/1
1 TABLET ORAL 3 TIMES DAILY PRN
Qty: 72 TABLET | Refills: 0 | OUTPATIENT
Start: 2022-04-20

## 2022-04-20 NOTE — TELEPHONE ENCOUNTER
----- Message from Mariya Blanca MA sent at 4/20/2022  3:02 PM CDT -----  Type:  Patient Returning Call    Who Called: self    Who Left Message for Patient: ENEIDA ROSAS    Does the patient know what this is regarding?:no    Would the patient rather a call back or a response via [facility]? yes    Best Call Back Number:[phone] (home)

## 2022-04-20 NOTE — TELEPHONE ENCOUNTER
Patient has been informed that rx was sent to the pharmacy. Patient is asking does he have to keep checking his BS x2 or is it ok o do one time a day ?    Humerus fracture

## 2022-04-20 NOTE — TELEPHONE ENCOUNTER
No new care gaps identified.  Powered by [employer]. Reference number: [id number].   4/20/2022 2:50:35 PM CDT

## 2022-04-20 NOTE — TELEPHONE ENCOUNTER
Returned pt call,. Pt did not answer LVM for pt to return call back.       Need to advise pt of not needing a ernesto and that it is every three months.     Sending over refill request to Dr Lara.      ----- Message from Yoni Diaz sent at 4/20/2022  2:15 PM CDT -----  Regarding: question about an appt for refills on medication  Type: Patient Call Back    Who called:King     What is the request in detail: the patient is requesting a call back from the staff. He stated that he was recently in to do labs a few days ago. He will be out of his pain medication on next week and wants to know if he has to make an appt with Dr. Lara for a refill. Please return call at earliest convenience.    Can the clinic reply by [facility]?no     Would the patient rather a call back or a response via [facility]? Call back     Best call back number:[phone]

## 2022-05-02 DIAGNOSIS — R80.9 TYPE 2 DIABETES MELLITUS WITH MICROALBUMINURIA, WITHOUT LONG-TERM CURRENT USE OF INSULIN: Chronic | ICD-10-CM

## 2022-05-02 DIAGNOSIS — E11.9 TYPE 2 DIABETES MELLITUS WITHOUT COMPLICATION, WITHOUT LONG-TERM CURRENT USE OF INSULIN: ICD-10-CM

## 2022-05-02 DIAGNOSIS — E11.29 TYPE 2 DIABETES MELLITUS WITH MICROALBUMINURIA, WITHOUT LONG-TERM CURRENT USE OF INSULIN: Chronic | ICD-10-CM

## 2022-05-02 NOTE — TELEPHONE ENCOUNTER
No new care gaps identified.  Powered by [employer]. Reference number: [id number].   5/02/2022 12:49:54 PM CDT

## 2022-05-03 RX ORDER — METFORMIN HYDROCHLORIDE 500 MG/1
TABLET, EXTENDED RELEASE ORAL
Qty: 90 TABLET | Refills: 1 | Status: SHIPPED | OUTPATIENT
Start: 2022-05-03 | End: 2022-09-06 | Stop reason: SDUPTHER

## 2022-05-03 RX ORDER — GLIMEPIRIDE 4 MG/1
TABLET ORAL
Qty: 90 TABLET | Refills: 1 | Status: SHIPPED | OUTPATIENT
Start: 2022-05-03 | End: 2022-09-06 | Stop reason: SDUPTHER

## 2022-05-03 NOTE — TELEPHONE ENCOUNTER
Refill Routing Note   Medication(s) are not appropriate for processing by [facility] for the following reason(s):      - Medication is a new start (<3 months)    [facility] action(s):  Defer  Approve          --->Care Gap information included in message below if applicable.   Medication reconciliation completed: No   Automatic Epic Generated Protocol Data:    Orders Placed This Encounter    glimepiride (AMARYL) 4 MG tablet      Requested Prescriptions   Pending Prescriptions Disp Refills    metFORMIN (GLUCOPHAGE-XR) 500 MG ER 24hr tablet [Pharmacy Med Name: METFORMIN HCL  MG TABLET] 90 tablet 1     Sig: TAKE 1 TABLET BY MOUTH EVERY DAY WITH BREAKFAST       Endocrinology:  Diabetes - Biguanides Failed - 5/2/2022 12:49 PM        Failed - Matches previous order       Previous Authorizing Provider: Gallo Lara MD (metFORMIN (GLUCOPHAGE-XR) 500 MG ER 24hr tablet)  Previous Authorizing Provider: Gallo Lara MD (glimepiride (AMARYL) 4 MG tablet)  Previous Pharmacy: [facility]/pharmacy #[id number]  [place] - [address] [facility]  Previous Pharmacy: [facility] [place] [address] [facility] [facility] [address]            Passed - Patient is at least 18 years old        Passed - Valid encounter within last 15 months     Recent Visits  Date Type Provider Dept   02/16/22 Office Visit MD [facility]/ Peds   02/04/22 Office Visit MD [facility]   11/11/21 Office Visit MD [facility]   10/22/21 Office Visit MD Marky Aragon - Dr. Torrez Internal Medicine   10/19/21 Office Visit MD Marky Aragon - Dr. Torrez Internal Medicine   10/04/21 Office Visit MD Marky Aragon - Dr. oTrrez Internal Medicine   06/24/21 Office Visit MD [facility]/ [facility]eds   03/02/21 Office Visit MD Skyla [facility]/ Peds    01/06/21 Office Visit Gallo Lara MD [facility]/ Internal Med/ Peds   10/08/20 Office Visit Gallo Lara MD [facility]/ Internal Med/ Peds   Showing recent visits within past 720 days and meeting all other requirements  Future Appointments  No visits were found meeting these conditions.  Showing future appointments within next 150 days and meeting all other requirements      Future Appointments              In 6 days Alisa Kruger MD [facility] - Sports [facility] [place]    In 2 weeks [facility] [place] [facility]                Passed - No ED/Hospital visits since last PCP visit     Last PCP Visit: 3/8/2022 Last Admission:  Last ED Visit: 1/30/2021          Passed - Cr is 1.39 or below and within 360 days     Lab Results   Component Value Date    CREATININE 0.9 02/07/2022    CREATININE 1.2 06/24/2021    CREATININE 1.1 03/02/2021              Passed - HBA1C within 180 days     Lab Results   Component Value Date    HGBA1C 7.4 (H) 04/19/2022    HGBA1C 11.2 (H) 02/07/2022    HGBA1C 7.3 (H) 06/24/2021              Passed - eGFR is 45 or above and within 360 days     Lab Results   Component Value Date    EGFRNONAA >60.0 02/07/2022    EGFRNONAA >60.0 06/24/2021    EGFRNONAA >60 03/02/2021                 Signed Prescriptions Disp Refills    glimepiride (AMARYL) 4 MG tablet 90 tablet 1     Sig: TAKE 1 TABLET BY MOUTH BEFORE BREAKFAST.       Endocrinology:  Diabetes - Sulfonylureas Failed - 5/2/2022 12:49 PM        Failed - Matches previous order       Previous Authorizing Provider: Gallo Lara MD (metFORMIN (GLUCOPHAGE-XR) 500 MG ER 24hr tablet)  Previous Authorizing Provider: Gallo Lara MD (glimepiride (AMARYL) 4 MG tablet)  Previous Pharmacy: [facility]/pharmacy #[id number] - [place] - [address] [facility]  Previous Pharmacy: [facility] - [facility] [address]            Passed - Patient is at least 18 years old        Passed -  Valid encounter within last 15 months     Recent Visits  Date Type Provider Dept   02/16/22 Office Visit Glalo Lara MD [facility]   02/04/22 Office Visit Gallo Lara MD [facility]   11/11/21 Office Visit Gallo Lara MD [facility]   10/22/21 Office Visit MD Marky Aragon - Dr. Torrez Internal Medicine   10/19/21 Office Visit MD Marky Aragon - Dr. Torrez Internal Medicine   10/04/21 Office Visit MD Marky Aragon - Dr. Torrez Internal Medicine   06/24/21 Office Visit MD Skyla [facility]/ Peds   03/02/21 Office Visit Gallo Lara MD [facility]   01/06/21 Office Visit Gallo Lara MD [facility]   10/08/20 Office Visit Gallo Lara MD [facility]/ Internal Med/ Peds   Showing recent visits within past 720 days and meeting all other requirements  Future Appointments  No visits were found meeting these conditions.  Showing future appointments within next 150 days and meeting all other requirements      Future Appointments              In 6 days Alisa Kruger MD [facility] - Sports Med [facility] [place]    In 2 weeks [facility] [place] [facility]                Passed - No ED/Hospital visits since last PCP visit     Last PCP Visit: 3/8/2022 Last Admission:  Last ED Visit: 1/30/2021          Passed - HBA1C within 180 days     Lab Results   Component Value Date    HGBA1C 7.4 (H) 04/19/2022    HGBA1C 11.2 (H) 02/07/2022    HGBA1C 7.3 (H) 06/24/2021              Passed - Cr is 1.39 or below and within 360 days     Lab Results   Component Value Date    CREATININE 0.9 02/07/2022    CREATININE 1.2 06/24/2021    CREATININE 1.1 03/02/2021              Passed - eGFR is 59 or above and within 360 days     Lab Results   Component Value Date    EGFRNONAA >60.0 02/07/2022     EGFRNONAA >60.0 06/24/2021    EGFRNONAA >60 03/02/2021                      Appointments  past 12m or future 3m with PCP    Date Provider   Last Visit   3/8/2022 Gallo Lara MD   Next Visit   Visit date not found Gallo Lara MD   ED visits in past 90 days: 0        Note composed:10:10 AM 05/03/2022

## 2022-05-26 DIAGNOSIS — G89.29 CHRONIC PAIN OF RIGHT KNEE: ICD-10-CM

## 2022-05-26 DIAGNOSIS — M25.561 CHRONIC PAIN OF RIGHT KNEE: ICD-10-CM

## 2022-05-26 DIAGNOSIS — M17.0 PRIMARY OSTEOARTHRITIS OF BOTH KNEES: ICD-10-CM

## 2022-05-26 RX ORDER — HYDROCODONE BITARTRATE AND ACETAMINOPHEN 10; 325 MG/1; MG/1
1 TABLET ORAL 3 TIMES DAILY PRN
Qty: 72 TABLET | Refills: 0 | OUTPATIENT
Start: 2022-05-26

## 2022-05-26 RX ORDER — GABAPENTIN 300 MG/1
CAPSULE ORAL
Qty: 90 CAPSULE | Refills: 2 | Status: SHIPPED | OUTPATIENT
Start: 2022-05-26 | End: 2022-08-29

## 2022-05-26 NOTE — TELEPHONE ENCOUNTER
----- Message from Rose Bruno sent at 5/26/2022  1:41 PM CDT -----  Type: RX Refill Request    Who Called: self     Have you contacted your pharmacy: yes     Refill or New Rx: refill     RX Name and Strength: HYDROcodone-acetaminophen (NORCO)  mg per tablet    Preferred Pharmacy with phone number:   [facility]/pharmacy #[id number] - [place] - [address]  [address] [place] [address]  Phone: [phone] Fax: [phone]    Local or Mail Order: local     Would the patient rather a call back or a response via [facility]? Call back     Best Call Back Number: [phone]

## 2022-05-26 NOTE — TELEPHONE ENCOUNTER
No new care gaps identified.  [facility] Embedded Care Gaps. Reference number: [id number]. 5/26/2022   1:39:37 PM CDT

## 2022-06-08 ENCOUNTER — OFFICE VISIT (OUTPATIENT)
Dept: FAMILY MEDICINE | Facility: CLINIC | Age: 67
End: 2022-06-08
Payer: MEDICARE

## 2022-06-08 VITALS
WEIGHT: 220.88 LBS | TEMPERATURE: 98 F | SYSTOLIC BLOOD PRESSURE: 120 MMHG | DIASTOLIC BLOOD PRESSURE: 70 MMHG | HEART RATE: 86 BPM | HEIGHT: 70 IN | OXYGEN SATURATION: 96 % | BODY MASS INDEX: 31.62 KG/M2

## 2022-06-08 DIAGNOSIS — E11.29 TYPE 2 DIABETES MELLITUS WITH MICROALBUMINURIA, WITHOUT LONG-TERM CURRENT USE OF INSULIN: Chronic | ICD-10-CM

## 2022-06-08 DIAGNOSIS — R80.9 TYPE 2 DIABETES MELLITUS WITH MICROALBUMINURIA, WITHOUT LONG-TERM CURRENT USE OF INSULIN: Chronic | ICD-10-CM

## 2022-06-08 DIAGNOSIS — I10 ESSENTIAL (PRIMARY) HYPERTENSION: Chronic | ICD-10-CM

## 2022-06-08 DIAGNOSIS — M17.0 PRIMARY OSTEOARTHRITIS OF BOTH KNEES: ICD-10-CM

## 2022-06-08 DIAGNOSIS — M17.11 PRIMARY OSTEOARTHRITIS OF RIGHT KNEE: Primary | ICD-10-CM

## 2022-06-08 PROCEDURE — 99215 OFFICE O/P EST HI 40 MIN: CPT | Mod: 25,S$GLB,, | Performed by: FAMILY MEDICINE

## 2022-06-08 PROCEDURE — 1101F PR PT FALLS ASSESS DOC 0-1 FALLS W/OUT INJ PAST YR: ICD-10-PCS | Mod: CPTII,S$GLB,, | Performed by: FAMILY MEDICINE

## 2022-06-08 PROCEDURE — 1159F MED LIST DOCD IN RCRD: CPT | Mod: CPTII,S$GLB,, | Performed by: FAMILY MEDICINE

## 2022-06-08 PROCEDURE — 3066F NEPHROPATHY DOC TX: CPT | Mod: CPTII,S$GLB,, | Performed by: FAMILY MEDICINE

## 2022-06-08 PROCEDURE — 3078F DIAST BP <80 MM HG: CPT | Mod: CPTII,S$GLB,, | Performed by: FAMILY MEDICINE

## 2022-06-08 PROCEDURE — 3074F SYST BP LT 130 MM HG: CPT | Mod: CPTII,S$GLB,, | Performed by: FAMILY MEDICINE

## 2022-06-08 PROCEDURE — 3288F FALL RISK ASSESSMENT DOCD: CPT | Mod: CPTII,S$GLB,, | Performed by: FAMILY MEDICINE

## 2022-06-08 PROCEDURE — 3061F PR NEG MICROALBUMINURIA RESULT DOCUMENTED/REVIEW: ICD-10-PCS | Mod: CPTII,S$GLB,, | Performed by: FAMILY MEDICINE

## 2022-06-08 PROCEDURE — 3051F HG A1C>EQUAL 7.0%<8.0%: CPT | Mod: CPTII,S$GLB,, | Performed by: FAMILY MEDICINE

## 2022-06-08 PROCEDURE — 4010F ACE/ARB THERAPY RXD/TAKEN: CPT | Mod: CPTII,S$GLB,, | Performed by: FAMILY MEDICINE

## 2022-06-08 PROCEDURE — 3078F PR MOST RECENT DIASTOLIC BLOOD PRESSURE < 80 MM HG: ICD-10-PCS | Mod: CPTII,S$GLB,, | Performed by: FAMILY MEDICINE

## 2022-06-08 PROCEDURE — 4010F PR ACE/ARB THEARPY RXD/TAKEN: ICD-10-PCS | Mod: CPTII,S$GLB,, | Performed by: FAMILY MEDICINE

## 2022-06-08 PROCEDURE — 20610 DRAIN/INJ JOINT/BURSA W/O US: CPT | Mod: RT,S$GLB,, | Performed by: FAMILY MEDICINE

## 2022-06-08 PROCEDURE — 20610 LARGE JOINT ASPIRATION/INJECTION: R KNEE: ICD-10-PCS | Mod: RT,S$GLB,, | Performed by: FAMILY MEDICINE

## 2022-06-08 PROCEDURE — 99499 RISK ADDL DX/OHS AUDIT: ICD-10-PCS | Mod: S$GLB,,, | Performed by: FAMILY MEDICINE

## 2022-06-08 PROCEDURE — 3051F PR MOST RECENT HEMOGLOBIN A1C LEVEL 7.0 - < 8.0%: ICD-10-PCS | Mod: CPTII,S$GLB,, | Performed by: FAMILY MEDICINE

## 2022-06-08 PROCEDURE — 99999 PR PBB SHADOW E&M-EST. PATIENT-LVL IV: CPT | Mod: PBBFAC,,, | Performed by: FAMILY MEDICINE

## 2022-06-08 PROCEDURE — 1125F PR PAIN SEVERITY QUANTIFIED, PAIN PRESENT: ICD-10-PCS | Mod: CPTII,S$GLB,, | Performed by: FAMILY MEDICINE

## 2022-06-08 PROCEDURE — 3061F NEG MICROALBUMINURIA REV: CPT | Mod: CPTII,S$GLB,, | Performed by: FAMILY MEDICINE

## 2022-06-08 PROCEDURE — 1101F PT FALLS ASSESS-DOCD LE1/YR: CPT | Mod: CPTII,S$GLB,, | Performed by: FAMILY MEDICINE

## 2022-06-08 PROCEDURE — 3288F PR FALLS RISK ASSESSMENT DOCUMENTED: ICD-10-PCS | Mod: CPTII,S$GLB,, | Performed by: FAMILY MEDICINE

## 2022-06-08 PROCEDURE — 99999 PR PBB SHADOW E&M-EST. PATIENT-LVL IV: ICD-10-PCS | Mod: PBBFAC,,, | Performed by: FAMILY MEDICINE

## 2022-06-08 PROCEDURE — 99215 PR OFFICE/OUTPT VISIT, EST, LEVL V, 40-54 MIN: ICD-10-PCS | Mod: 25,S$GLB,, | Performed by: FAMILY MEDICINE

## 2022-06-08 PROCEDURE — 3066F PR DOCUMENTATION OF TREATMENT FOR NEPHROPATHY: ICD-10-PCS | Mod: CPTII,S$GLB,, | Performed by: FAMILY MEDICINE

## 2022-06-08 PROCEDURE — 1159F PR MEDICATION LIST DOCUMENTED IN MEDICAL RECORD: ICD-10-PCS | Mod: CPTII,S$GLB,, | Performed by: FAMILY MEDICINE

## 2022-06-08 PROCEDURE — 3008F BODY MASS INDEX DOCD: CPT | Mod: CPTII,S$GLB,, | Performed by: FAMILY MEDICINE

## 2022-06-08 PROCEDURE — 3008F PR BODY MASS INDEX (BMI) DOCUMENTED: ICD-10-PCS | Mod: CPTII,S$GLB,, | Performed by: FAMILY MEDICINE

## 2022-06-08 PROCEDURE — 3074F PR MOST RECENT SYSTOLIC BLOOD PRESSURE < 130 MM HG: ICD-10-PCS | Mod: CPTII,S$GLB,, | Performed by: FAMILY MEDICINE

## 2022-06-08 PROCEDURE — 99499 UNLISTED E&M SERVICE: CPT | Mod: S$GLB,,, | Performed by: FAMILY MEDICINE

## 2022-06-08 PROCEDURE — 1125F AMNT PAIN NOTED PAIN PRSNT: CPT | Mod: CPTII,S$GLB,, | Performed by: FAMILY MEDICINE

## 2022-06-08 RX ORDER — HYDROCODONE BITARTRATE AND ACETAMINOPHEN 10; 325 MG/1; MG/1
1 TABLET ORAL 3 TIMES DAILY PRN
Qty: 72 TABLET | Refills: 0 | Status: SHIPPED | OUTPATIENT
Start: 2022-07-08 | End: 2022-09-06

## 2022-06-08 RX ORDER — TRIAMCINOLONE ACETONIDE 40 MG/ML
40 INJECTION, SUSPENSION INTRA-ARTICULAR; INTRAMUSCULAR
Status: DISCONTINUED | OUTPATIENT
Start: 2022-06-08 | End: 2022-06-08 | Stop reason: HOSPADM

## 2022-06-08 RX ORDER — HYDROCODONE BITARTRATE AND ACETAMINOPHEN 10; 325 MG/1; MG/1
1 TABLET ORAL 3 TIMES DAILY PRN
Qty: 72 TABLET | Refills: 0 | Status: SHIPPED | OUTPATIENT
Start: 2022-06-08 | End: 2022-09-06 | Stop reason: SDUPTHER

## 2022-06-08 RX ORDER — HYDROCODONE BITARTRATE AND ACETAMINOPHEN 10; 325 MG/1; MG/1
1 TABLET ORAL 3 TIMES DAILY PRN
Qty: 72 TABLET | Refills: 0 | Status: SHIPPED | OUTPATIENT
Start: 2022-08-08 | End: 2022-09-06

## 2022-06-08 RX ADMIN — TRIAMCINOLONE ACETONIDE 40 MG: 40 INJECTION, SUSPENSION INTRA-ARTICULAR; INTRAMUSCULAR at 01:06

## 2022-06-08 NOTE — PROGRESS NOTES
[facility]  Progress Note    SUBJECTIVE:     Chief Complaint   Patient presents with    Knee Pain    knee injection       HPI   King Cool Jr.  is a 67 y.o. male here for c/o R knee pain. Rates pain as moderate-severe. Walking/standing makes it worst. No falls/trauma. Patient has no other new complaints/problems at this time.      Review of patient's allergies indicates:   Allergen Reactions    Tamiflu [oseltamivir]      Increases BP    Metformin      Diarrhea, nausea    Penicillins Rash       Past Medical History:   Diagnosis Date    Essential (primary) hypertension     Male erectile dysfunction, unspecified     Testicular hypofunction     Type 2 diabetes mellitus without complications      Past Surgical History:   Procedure Laterality Date    HIP SURGERY       Family History   Problem Relation Age of Onset    Blindness Mother     No Known Problems Father     No Known Problems Brother     No Known Problems Daughter     No Known Problems Son     No Known Problems Daughter     Diabetes Brother     No Known Problems Brother     Amblyopia Neg Hx     Cancer Neg Hx     Cataracts Neg Hx     Glaucoma Neg Hx     Hypertension Neg Hx     Macular degeneration Neg Hx     Retinal detachment Neg Hx     Strabismus Neg Hx     Stroke Neg Hx     Thyroid disease Neg Hx      Social History     Tobacco Use    Smoking status: Former Smoker     Packs/day: 0.25     Types: Cigarettes     Start date: 1972     Quit date: 1976     Years since quittin.5    Smokeless tobacco: Never Used   Substance Use Topics    Alcohol use: Not Currently     Comment: Pt. has a Hx. of  Alcohol use.    Drug use: No        Review of Systems   Constitutional: Negative for chills, diaphoresis, fever and malaise/fatigue.   Musculoskeletal: Positive for joint pain (R knee). Negative for back pain, falls, myalgias and neck pain.   Skin: Negative.    Neurological: Negative for dizziness, tingling, sensory  change, focal weakness, seizures and weakness.   All other systems reviewed and are negative.    OBJECTIVE:     Vitals:    06/08/22 1329   BP: 120/70   Pulse: 86   Temp: 98.3 °F (36.8 °C)     Body mass index is 31.7 kg/m².    Physical Exam  Constitutional:       General: He is in acute distress (mild).      Appearance: He is not diaphoretic.   Musculoskeletal:         General: Tenderness (to palpation of R medial tibial-femoral compartment, decreased joint space. ACL/PCL intact, negative McMurrays sign) present.   Skin:     Findings: No erythema or rash.   Neurological:      Mental Status: He is alert and oriented to person, place, and time.         Old records were reviewed. Labs and/or images were independently reviewed.    ASSESSMENT     1. Primary osteoarthritis of right knee    2. Essential (primary) hypertension    3. Type 2 diabetes mellitus with microalbuminuria, without long-term current use of insulin    4. Primary osteoarthritis of both knees        PLAN:     Primary osteoarthritis of right knee  -     Large Joint Aspiration/Injection: R knee  -     triamcinolone acetonide injection 40 mg  -     Patient counseled on good posture and stretching exercises. Continue to place ice packs on affected areas 3-4 times daily. Take medications as prescribed. Instructed patient to call MD if symptoms persist or worsen.    Essential (primary) hypertension   -     Stable. Continue current regimen.    Type 2 diabetes mellitus with microalbuminuria, without long-term current use of insulin   -     Instructed patient to take daily glucose AM logs and to write them down to bring with on next visit. Advised patient to decrease intake of carbohydrates/simple sugars.           Primary osteoarthritis of both knees  -     HYDROcodone-acetaminophen (NORCO)  mg per tablet; Take 1 tablet by mouth 3 (three) times daily as needed (pain).  Dispense: 72 tablet; Refill: 0  -     HYDROcodone-acetaminophen (NORCO)  mg per  tablet; Take 1 tablet by mouth 3 (three) times daily as needed (pain).  Dispense: 72 tablet; Refill: 0  -     HYDROcodone-acetaminophen (NORCO)  mg per tablet; Take 1 tablet by mouth 3 (three) times daily as needed (pain).  Dispense: 72 tablet; Refill: 0  -     Stable. Continue current regimen. Unable to wean at this time but am monitoring closely for any drug toxicity. Checked .    RTC PRJOHNNY Lara MD  06/08/2022 1:44 PM

## 2022-07-14 ENCOUNTER — CLINICAL SUPPORT (OUTPATIENT)
Dept: FAMILY MEDICINE | Facility: CLINIC | Age: 67
End: 2022-07-14
Payer: MEDICARE

## 2022-07-14 DIAGNOSIS — E29.1 TESTICULAR HYPOFUNCTION: Primary | ICD-10-CM

## 2022-07-14 PROCEDURE — 96372 PR INJECTION,THERAP/PROPH/DIAG2ST, IM OR SUBCUT: ICD-10-PCS | Mod: S$GLB,,, | Performed by: FAMILY MEDICINE

## 2022-07-14 PROCEDURE — 96372 THER/PROPH/DIAG INJ SC/IM: CPT | Mod: S$GLB,,, | Performed by: FAMILY MEDICINE

## 2022-07-14 RX ORDER — TESTOSTERONE CYPIONATE 200 MG/ML
200 INJECTION, SOLUTION INTRAMUSCULAR
Status: DISCONTINUED | OUTPATIENT
Start: 2022-07-14 | End: 2022-08-17

## 2022-07-14 RX ADMIN — TESTOSTERONE CYPIONATE 200 MG: 200 INJECTION, SOLUTION INTRAMUSCULAR at 02:07

## 2022-08-04 ENCOUNTER — CLINICAL SUPPORT (OUTPATIENT)
Dept: FAMILY MEDICINE | Facility: CLINIC | Age: 67
End: 2022-08-04
Payer: MEDICARE

## 2022-08-04 DIAGNOSIS — E29.1 TESTICULAR HYPOFUNCTION: Primary | ICD-10-CM

## 2022-08-04 PROCEDURE — 96372 THER/PROPH/DIAG INJ SC/IM: CPT | Mod: S$GLB,,, | Performed by: FAMILY MEDICINE

## 2022-08-04 PROCEDURE — 96372 PR INJECTION,THERAP/PROPH/DIAG2ST, IM OR SUBCUT: ICD-10-PCS | Mod: S$GLB,,, | Performed by: FAMILY MEDICINE

## 2022-08-04 RX ADMIN — TESTOSTERONE CYPIONATE 200 MG: 200 INJECTION, SOLUTION INTRAMUSCULAR at 02:08

## 2022-08-04 NOTE — PROGRESS NOTES
Testosterone iInjection administered as ordered.  Tolerated well.  Told to wait in clinic for 15 mins.  Patient verbalized understanding.

## 2022-08-17 DIAGNOSIS — E29.1 TESTICULAR HYPOFUNCTION: Primary | ICD-10-CM

## 2022-08-17 RX ORDER — TESTOSTERONE CYPIONATE 200 MG/ML
200 INJECTION, SOLUTION INTRAMUSCULAR
Status: COMPLETED | OUTPATIENT
Start: 2022-08-17 | End: 2022-10-25

## 2022-08-18 ENCOUNTER — CLINICAL SUPPORT (OUTPATIENT)
Dept: FAMILY MEDICINE | Facility: CLINIC | Age: 67
End: 2022-08-18
Payer: MEDICARE

## 2022-08-18 DIAGNOSIS — I10 ESSENTIAL (PRIMARY) HYPERTENSION: Primary | ICD-10-CM

## 2022-08-18 PROCEDURE — 96372 THER/PROPH/DIAG INJ SC/IM: CPT | Mod: S$GLB,,, | Performed by: FAMILY MEDICINE

## 2022-08-18 PROCEDURE — 96372 PR INJECTION,THERAP/PROPH/DIAG2ST, IM OR SUBCUT: ICD-10-PCS | Mod: S$GLB,,, | Performed by: FAMILY MEDICINE

## 2022-08-18 RX ADMIN — TESTOSTERONE CYPIONATE 200 MG: 200 INJECTION, SOLUTION INTRAMUSCULAR at 08:08

## 2022-08-27 ENCOUNTER — PES CALL (OUTPATIENT)
Dept: ADMINISTRATIVE | Facility: CLINIC | Age: 67
End: 2022-08-27
Payer: MEDICARE

## 2022-09-01 ENCOUNTER — CLINICAL SUPPORT (OUTPATIENT)
Dept: FAMILY MEDICINE | Facility: CLINIC | Age: 67
End: 2022-09-01
Payer: MEDICARE

## 2022-09-01 DIAGNOSIS — E29.1 TESTICULAR HYPOFUNCTION: Primary | ICD-10-CM

## 2022-09-01 PROCEDURE — 96372 PR INJECTION,THERAP/PROPH/DIAG2ST, IM OR SUBCUT: ICD-10-PCS | Mod: S$GLB,,, | Performed by: FAMILY MEDICINE

## 2022-09-01 PROCEDURE — 96372 THER/PROPH/DIAG INJ SC/IM: CPT | Mod: S$GLB,,, | Performed by: FAMILY MEDICINE

## 2022-09-01 RX ADMIN — TESTOSTERONE CYPIONATE 200 MG: 200 INJECTION, SOLUTION INTRAMUSCULAR at 08:09

## 2022-09-06 ENCOUNTER — OFFICE VISIT (OUTPATIENT)
Dept: FAMILY MEDICINE | Facility: CLINIC | Age: 67
End: 2022-09-06
Payer: MEDICARE

## 2022-09-06 VITALS
DIASTOLIC BLOOD PRESSURE: 68 MMHG | SYSTOLIC BLOOD PRESSURE: 139 MMHG | TEMPERATURE: 99 F | BODY MASS INDEX: 31.7 KG/M2 | HEIGHT: 70 IN

## 2022-09-06 DIAGNOSIS — M17.0 PRIMARY OSTEOARTHRITIS OF BOTH KNEES: Primary | ICD-10-CM

## 2022-09-06 DIAGNOSIS — R80.9 TYPE 2 DIABETES MELLITUS WITH MICROALBUMINURIA, WITHOUT LONG-TERM CURRENT USE OF INSULIN: Chronic | ICD-10-CM

## 2022-09-06 DIAGNOSIS — E11.29 TYPE 2 DIABETES MELLITUS WITH MICROALBUMINURIA, WITHOUT LONG-TERM CURRENT USE OF INSULIN: Chronic | ICD-10-CM

## 2022-09-06 PROCEDURE — 3066F NEPHROPATHY DOC TX: CPT | Mod: CPTII,S$GLB,, | Performed by: FAMILY MEDICINE

## 2022-09-06 PROCEDURE — 3008F BODY MASS INDEX DOCD: CPT | Mod: CPTII,S$GLB,, | Performed by: FAMILY MEDICINE

## 2022-09-06 PROCEDURE — 3061F NEG MICROALBUMINURIA REV: CPT | Mod: CPTII,S$GLB,, | Performed by: FAMILY MEDICINE

## 2022-09-06 PROCEDURE — 1125F AMNT PAIN NOTED PAIN PRSNT: CPT | Mod: CPTII,S$GLB,, | Performed by: FAMILY MEDICINE

## 2022-09-06 PROCEDURE — 3051F HG A1C>EQUAL 7.0%<8.0%: CPT | Mod: CPTII,S$GLB,, | Performed by: FAMILY MEDICINE

## 2022-09-06 PROCEDURE — 3075F PR MOST RECENT SYSTOLIC BLOOD PRESS GE 130-139MM HG: ICD-10-PCS | Mod: CPTII,S$GLB,, | Performed by: FAMILY MEDICINE

## 2022-09-06 PROCEDURE — 3061F PR NEG MICROALBUMINURIA RESULT DOCUMENTED/REVIEW: ICD-10-PCS | Mod: CPTII,S$GLB,, | Performed by: FAMILY MEDICINE

## 2022-09-06 PROCEDURE — 99999 PR PBB SHADOW E&M-EST. PATIENT-LVL II: ICD-10-PCS | Mod: PBBFAC,,, | Performed by: FAMILY MEDICINE

## 2022-09-06 PROCEDURE — 4010F ACE/ARB THERAPY RXD/TAKEN: CPT | Mod: CPTII,S$GLB,, | Performed by: FAMILY MEDICINE

## 2022-09-06 PROCEDURE — 3075F SYST BP GE 130 - 139MM HG: CPT | Mod: CPTII,S$GLB,, | Performed by: FAMILY MEDICINE

## 2022-09-06 PROCEDURE — 4010F PR ACE/ARB THEARPY RXD/TAKEN: ICD-10-PCS | Mod: CPTII,S$GLB,, | Performed by: FAMILY MEDICINE

## 2022-09-06 PROCEDURE — 3078F DIAST BP <80 MM HG: CPT | Mod: CPTII,S$GLB,, | Performed by: FAMILY MEDICINE

## 2022-09-06 PROCEDURE — 3078F PR MOST RECENT DIASTOLIC BLOOD PRESSURE < 80 MM HG: ICD-10-PCS | Mod: CPTII,S$GLB,, | Performed by: FAMILY MEDICINE

## 2022-09-06 PROCEDURE — 99999 PR PBB SHADOW E&M-EST. PATIENT-LVL II: CPT | Mod: PBBFAC,,, | Performed by: FAMILY MEDICINE

## 2022-09-06 PROCEDURE — 99215 PR OFFICE/OUTPT VISIT, EST, LEVL V, 40-54 MIN: ICD-10-PCS | Mod: 25,S$GLB,, | Performed by: FAMILY MEDICINE

## 2022-09-06 PROCEDURE — 3008F PR BODY MASS INDEX (BMI) DOCUMENTED: ICD-10-PCS | Mod: CPTII,S$GLB,, | Performed by: FAMILY MEDICINE

## 2022-09-06 PROCEDURE — 3051F PR MOST RECENT HEMOGLOBIN A1C LEVEL 7.0 - < 8.0%: ICD-10-PCS | Mod: CPTII,S$GLB,, | Performed by: FAMILY MEDICINE

## 2022-09-06 PROCEDURE — 20610 DRAIN/INJ JOINT/BURSA W/O US: CPT | Mod: 50,S$GLB,, | Performed by: FAMILY MEDICINE

## 2022-09-06 PROCEDURE — 20610 LARGE JOINT ASPIRATION/INJECTION: BILATERAL KNEE: ICD-10-PCS | Mod: 50,S$GLB,, | Performed by: FAMILY MEDICINE

## 2022-09-06 PROCEDURE — 99215 OFFICE O/P EST HI 40 MIN: CPT | Mod: 25,S$GLB,, | Performed by: FAMILY MEDICINE

## 2022-09-06 PROCEDURE — 3066F PR DOCUMENTATION OF TREATMENT FOR NEPHROPATHY: ICD-10-PCS | Mod: CPTII,S$GLB,, | Performed by: FAMILY MEDICINE

## 2022-09-06 PROCEDURE — 1125F PR PAIN SEVERITY QUANTIFIED, PAIN PRESENT: ICD-10-PCS | Mod: CPTII,S$GLB,, | Performed by: FAMILY MEDICINE

## 2022-09-06 RX ORDER — METFORMIN HYDROCHLORIDE 500 MG/1
500 TABLET, EXTENDED RELEASE ORAL DAILY
Qty: 90 TABLET | Refills: 3 | Status: SHIPPED | OUTPATIENT
Start: 2022-09-06 | End: 2023-01-03

## 2022-09-06 RX ORDER — HYDROCODONE BITARTRATE AND ACETAMINOPHEN 10; 325 MG/1; MG/1
1 TABLET ORAL 3 TIMES DAILY PRN
Qty: 72 TABLET | Refills: 0 | Status: SHIPPED | OUTPATIENT
Start: 2022-11-06 | End: 2023-01-03 | Stop reason: SDUPTHER

## 2022-09-06 RX ORDER — HYDROCODONE BITARTRATE AND ACETAMINOPHEN 10; 325 MG/1; MG/1
1 TABLET ORAL 3 TIMES DAILY PRN
Qty: 72 TABLET | Refills: 0 | Status: SHIPPED | OUTPATIENT
Start: 2022-10-06 | End: 2022-10-31 | Stop reason: SDUPTHER

## 2022-09-06 RX ORDER — HYDROCODONE BITARTRATE AND ACETAMINOPHEN 10; 325 MG/1; MG/1
1 TABLET ORAL 3 TIMES DAILY PRN
Qty: 72 TABLET | Refills: 0 | Status: SHIPPED | OUTPATIENT
Start: 2022-09-06 | End: 2022-12-05 | Stop reason: SDUPTHER

## 2022-09-06 RX ORDER — GLIMEPIRIDE 4 MG/1
4 TABLET ORAL
Qty: 90 TABLET | Refills: 1 | Status: SHIPPED | OUTPATIENT
Start: 2022-09-06 | End: 2023-02-26 | Stop reason: SDUPTHER

## 2022-09-06 NOTE — PROCEDURES
Large Joint Aspiration/Injection: bilateral knee    Date/Time: 9/6/2022 8:20 AM  Performed by: Gallo Lara MD  Authorized by: Gallo Lara MD     Consent Done?:  Yes (Verbal)  Indications:  Pain  Site marked: the procedure site was marked    Timeout: prior to procedure the correct patient, procedure, and site was verified      Details:  Needle Size:  25 G  Approach:  Anterolateral  Location:  Knee  Laterality:  Bilateral  Site:  Bilateral knee  Patient tolerance:  Patient tolerated the procedure well with no immediate complications

## 2022-09-06 NOTE — PROGRESS NOTES
[facility]  Progress Note    SUBJECTIVE:     Chief Complaint   Patient presents with    Knee Pain       HPI   King Cool Jr.  is a 67 y.o. male here for c/o b/l knee pain. Requesting knee injection today and f/u of his chronic pain medications which he is doing well on. Patient has no other new complaints/problems at this time.      Review of patient's allergies indicates:   Allergen Reactions    Tamiflu [oseltamivir]      Increases BP    Metformin      Diarrhea, nausea    Penicillins Rash       Past Medical History:   Diagnosis Date    Essential (primary) hypertension     Male erectile dysfunction, unspecified     Testicular hypofunction     Type 2 diabetes mellitus without complications      Past Surgical History:   Procedure Laterality Date    HIP SURGERY       Family History   Problem Relation Age of Onset    Blindness Mother     No Known Problems Father     No Known Problems Brother     No Known Problems Daughter     No Known Problems Son     No Known Problems Daughter     Diabetes Brother     No Known Problems Brother     Amblyopia Neg Hx     Cancer Neg Hx     Cataracts Neg Hx     Glaucoma Neg Hx     Hypertension Neg Hx     Macular degeneration Neg Hx     Retinal detachment Neg Hx     Strabismus Neg Hx     Stroke Neg Hx     Thyroid disease Neg Hx      Social History     Tobacco Use    Smoking status: Former     Packs/day: 0.25     Types: Cigarettes     Start date: 1972     Quit date: 1976     Years since quittin.8    Smokeless tobacco: Never   Substance Use Topics    Alcohol use: Not Currently     Comment: Pt. has a Hx. of  Alcohol use.    Drug use: No        Review of Systems   Constitutional:  Negative for chills, diaphoresis, fever and malaise/fatigue.   Musculoskeletal:  Positive for joint pain (both knees). Negative for back pain, falls, myalgias and neck pain.   Skin: Negative.    Neurological:  Negative for dizziness, tingling, sensory change, focal weakness, seizures and  weakness.   OBJECTIVE:     Vitals:    09/06/22 0818   BP: (!) 140/68   Temp: 98.7 °F (37.1 °C)     Body mass index is 31.7 kg/m².    Physical Exam  Constitutional:       General: He is in acute distress (mild).      Appearance: He is not diaphoretic.   Musculoskeletal:         General: Tenderness (to palpation of L/R medial tibial-femoral compartment, decreased joint space. ACL/PCL intact, negative McMurrays sign) present.   Skin:     Findings: No erythema or rash.   Neurological:      Mental Status: He is alert and oriented to person, place, and time.       Old records were reviewed. Labs and/or images were independently reviewed.    ASSESSMENT     1. Primary osteoarthritis of both knees    2. Type 2 diabetes mellitus with microalbuminuria, without long-term current use of insulin    3. Type 2 diabetes mellitus without complication, without long-term current use of insulin        PLAN:     Primary osteoarthritis of both knees  -     HYDROcodone-acetaminophen (NORCO)  mg per tablet; Take 1 tablet by mouth 3 (three) times daily as needed (pain).  Dispense: 72 tablet; Refill: 0  -     HYDROcodone-acetaminophen (NORCO)  mg per tablet; Take 1 tablet by mouth 3 (three) times daily as needed (pain).  Dispense: 72 tablet; Refill: 0  -     HYDROcodone-acetaminophen (NORCO)  mg per tablet; Take 1 tablet by mouth 3 (three) times daily as needed (pain).  Dispense: 72 tablet; Refill: 0  -     Stable. Continue current regimen. Unable to wean at this time but am monitoring closely for any drug toxicity. Checked .    Type 2 diabetes mellitus with microalbuminuria, without long-term current use of insulin  -     metFORMIN (GLUCOPHAGE-XR) 500 MG ER 24hr tablet; Take 1 tablet (500 mg total) by mouth once daily.  Dispense: 90 tablet; Refill: 3  -     glimepiride (AMARYL) 4 MG tablet; Take 1 tablet (4 mg total) by mouth before breakfast.  Dispense: 90 tablet; Refill: 1  -     Instructed patient to take daily  glucose AM logs and to write them down to bring with on next visit. Advised patient to decrease intake of carbohydrates/simple sugars.           RTC PRJOHNNY Lara MD  09/06/2022 8:46 AM

## 2022-09-15 ENCOUNTER — CLINICAL SUPPORT (OUTPATIENT)
Dept: FAMILY MEDICINE | Facility: CLINIC | Age: 67
End: 2022-09-15
Payer: MEDICARE

## 2022-09-15 DIAGNOSIS — E29.1 TESTICULAR HYPOFUNCTION: Primary | ICD-10-CM

## 2022-09-15 PROCEDURE — 96372 THER/PROPH/DIAG INJ SC/IM: CPT | Mod: S$GLB,,, | Performed by: FAMILY MEDICINE

## 2022-09-15 PROCEDURE — 96372 PR INJECTION,THERAP/PROPH/DIAG2ST, IM OR SUBCUT: ICD-10-PCS | Mod: S$GLB,,, | Performed by: FAMILY MEDICINE

## 2022-09-15 RX ADMIN — TESTOSTERONE CYPIONATE 200 MG: 200 INJECTION, SOLUTION INTRAMUSCULAR at 08:09

## 2022-09-22 DIAGNOSIS — E11.29 TYPE 2 DIABETES MELLITUS WITH MICROALBUMINURIA, WITHOUT LONG-TERM CURRENT USE OF INSULIN: Chronic | ICD-10-CM

## 2022-09-22 DIAGNOSIS — R80.9 TYPE 2 DIABETES MELLITUS WITH MICROALBUMINURIA, WITHOUT LONG-TERM CURRENT USE OF INSULIN: Chronic | ICD-10-CM

## 2022-09-22 RX ORDER — PRAVASTATIN SODIUM 20 MG/1
20 TABLET ORAL DAILY
Qty: 90 TABLET | Refills: 3
Start: 2022-09-22 | End: 2022-10-11 | Stop reason: SDUPTHER

## 2022-09-29 ENCOUNTER — CLINICAL SUPPORT (OUTPATIENT)
Dept: FAMILY MEDICINE | Facility: CLINIC | Age: 67
End: 2022-09-29
Payer: MEDICARE

## 2022-09-29 DIAGNOSIS — E29.1 TESTICULAR HYPOFUNCTION: Primary | ICD-10-CM

## 2022-09-29 PROCEDURE — 96373 THER/PROPH/DIAG INJ IA: CPT | Mod: S$GLB,,, | Performed by: FAMILY MEDICINE

## 2022-09-29 PROCEDURE — 96373 PR INJECTION,THERAP/PROPH/DIAGNOST, INTRA-ARTERIAL: ICD-10-PCS | Mod: S$GLB,,, | Performed by: FAMILY MEDICINE

## 2022-09-29 PROCEDURE — 96372 THER/PROPH/DIAG INJ SC/IM: CPT | Mod: S$GLB,,, | Performed by: FAMILY MEDICINE

## 2022-09-29 PROCEDURE — 96372 PR INJECTION,THERAP/PROPH/DIAG2ST, IM OR SUBCUT: ICD-10-PCS | Mod: S$GLB,,, | Performed by: FAMILY MEDICINE

## 2022-09-29 RX ADMIN — TESTOSTERONE CYPIONATE 200 MG: 200 INJECTION, SOLUTION INTRAMUSCULAR at 08:09

## 2022-09-29 NOTE — PROGRESS NOTES
Testosterone injection administered as ordered.  Tolerated well.  Told to wait in clinic for 15 mins.  Patient verbalized understanding.

## 2022-10-11 ENCOUNTER — OFFICE VISIT (OUTPATIENT)
Dept: FAMILY MEDICINE | Facility: CLINIC | Age: 67
End: 2022-10-11
Payer: MEDICARE

## 2022-10-11 ENCOUNTER — TELEPHONE (OUTPATIENT)
Dept: FAMILY MEDICINE | Facility: CLINIC | Age: 67
End: 2022-10-11

## 2022-10-11 ENCOUNTER — CLINICAL SUPPORT (OUTPATIENT)
Dept: FAMILY MEDICINE | Facility: CLINIC | Age: 67
End: 2022-10-11
Payer: MEDICARE

## 2022-10-11 VITALS
DIASTOLIC BLOOD PRESSURE: 78 MMHG | OXYGEN SATURATION: 96 % | TEMPERATURE: 99 F | HEART RATE: 82 BPM | BODY MASS INDEX: 30.98 KG/M2 | HEIGHT: 70 IN | SYSTOLIC BLOOD PRESSURE: 120 MMHG | WEIGHT: 216.38 LBS

## 2022-10-11 DIAGNOSIS — E11.29 TYPE 2 DIABETES MELLITUS WITH MICROALBUMINURIA, WITHOUT LONG-TERM CURRENT USE OF INSULIN: Chronic | ICD-10-CM

## 2022-10-11 DIAGNOSIS — R80.9 TYPE 2 DIABETES MELLITUS WITH MICROALBUMINURIA, WITHOUT LONG-TERM CURRENT USE OF INSULIN: Chronic | ICD-10-CM

## 2022-10-11 DIAGNOSIS — E29.1 TESTICULAR HYPOFUNCTION: Primary | ICD-10-CM

## 2022-10-11 DIAGNOSIS — J01.90 ACUTE RHINOSINUSITIS: Primary | ICD-10-CM

## 2022-10-11 PROCEDURE — 96372 PR INJECTION,THERAP/PROPH/DIAG2ST, IM OR SUBCUT: ICD-10-PCS | Mod: S$GLB,,, | Performed by: FAMILY MEDICINE

## 2022-10-11 PROCEDURE — 3051F HG A1C>EQUAL 7.0%<8.0%: CPT | Mod: CPTII,S$GLB,, | Performed by: FAMILY MEDICINE

## 2022-10-11 PROCEDURE — 4010F ACE/ARB THERAPY RXD/TAKEN: CPT | Mod: CPTII,S$GLB,, | Performed by: FAMILY MEDICINE

## 2022-10-11 PROCEDURE — 3061F NEG MICROALBUMINURIA REV: CPT | Mod: CPTII,S$GLB,, | Performed by: FAMILY MEDICINE

## 2022-10-11 PROCEDURE — 3078F PR MOST RECENT DIASTOLIC BLOOD PRESSURE < 80 MM HG: ICD-10-PCS | Mod: CPTII,S$GLB,, | Performed by: FAMILY MEDICINE

## 2022-10-11 PROCEDURE — 3074F PR MOST RECENT SYSTOLIC BLOOD PRESSURE < 130 MM HG: ICD-10-PCS | Mod: CPTII,S$GLB,, | Performed by: FAMILY MEDICINE

## 2022-10-11 PROCEDURE — 1159F MED LIST DOCD IN RCRD: CPT | Mod: CPTII,S$GLB,, | Performed by: FAMILY MEDICINE

## 2022-10-11 PROCEDURE — 1101F PT FALLS ASSESS-DOCD LE1/YR: CPT | Mod: CPTII,S$GLB,, | Performed by: FAMILY MEDICINE

## 2022-10-11 PROCEDURE — 4010F PR ACE/ARB THEARPY RXD/TAKEN: ICD-10-PCS | Mod: CPTII,S$GLB,, | Performed by: FAMILY MEDICINE

## 2022-10-11 PROCEDURE — 1101F PR PT FALLS ASSESS DOC 0-1 FALLS W/OUT INJ PAST YR: ICD-10-PCS | Mod: CPTII,S$GLB,, | Performed by: FAMILY MEDICINE

## 2022-10-11 PROCEDURE — 3078F DIAST BP <80 MM HG: CPT | Mod: CPTII,S$GLB,, | Performed by: FAMILY MEDICINE

## 2022-10-11 PROCEDURE — 3061F PR NEG MICROALBUMINURIA RESULT DOCUMENTED/REVIEW: ICD-10-PCS | Mod: CPTII,S$GLB,, | Performed by: FAMILY MEDICINE

## 2022-10-11 PROCEDURE — 3051F PR MOST RECENT HEMOGLOBIN A1C LEVEL 7.0 - < 8.0%: ICD-10-PCS | Mod: CPTII,S$GLB,, | Performed by: FAMILY MEDICINE

## 2022-10-11 PROCEDURE — 3288F FALL RISK ASSESSMENT DOCD: CPT | Mod: CPTII,S$GLB,, | Performed by: FAMILY MEDICINE

## 2022-10-11 PROCEDURE — 1126F PR PAIN SEVERITY QUANTIFIED, NO PAIN PRESENT: ICD-10-PCS | Mod: CPTII,S$GLB,, | Performed by: FAMILY MEDICINE

## 2022-10-11 PROCEDURE — 99999 PR PBB SHADOW E&M-EST. PATIENT-LVL IV: ICD-10-PCS | Mod: PBBFAC,,, | Performed by: FAMILY MEDICINE

## 2022-10-11 PROCEDURE — 96372 THER/PROPH/DIAG INJ SC/IM: CPT | Mod: S$GLB,,, | Performed by: FAMILY MEDICINE

## 2022-10-11 PROCEDURE — 3008F BODY MASS INDEX DOCD: CPT | Mod: CPTII,S$GLB,, | Performed by: FAMILY MEDICINE

## 2022-10-11 PROCEDURE — 3066F NEPHROPATHY DOC TX: CPT | Mod: CPTII,S$GLB,, | Performed by: FAMILY MEDICINE

## 2022-10-11 PROCEDURE — 99214 OFFICE O/P EST MOD 30 MIN: CPT | Mod: 25,S$GLB,, | Performed by: FAMILY MEDICINE

## 2022-10-11 PROCEDURE — 99999 PR PBB SHADOW E&M-EST. PATIENT-LVL IV: CPT | Mod: PBBFAC,,, | Performed by: FAMILY MEDICINE

## 2022-10-11 PROCEDURE — 1126F AMNT PAIN NOTED NONE PRSNT: CPT | Mod: CPTII,S$GLB,, | Performed by: FAMILY MEDICINE

## 2022-10-11 PROCEDURE — 3074F SYST BP LT 130 MM HG: CPT | Mod: CPTII,S$GLB,, | Performed by: FAMILY MEDICINE

## 2022-10-11 PROCEDURE — 99214 PR OFFICE/OUTPT VISIT, EST, LEVL IV, 30-39 MIN: ICD-10-PCS | Mod: 25,S$GLB,, | Performed by: FAMILY MEDICINE

## 2022-10-11 PROCEDURE — 1159F PR MEDICATION LIST DOCUMENTED IN MEDICAL RECORD: ICD-10-PCS | Mod: CPTII,S$GLB,, | Performed by: FAMILY MEDICINE

## 2022-10-11 PROCEDURE — 3008F PR BODY MASS INDEX (BMI) DOCUMENTED: ICD-10-PCS | Mod: CPTII,S$GLB,, | Performed by: FAMILY MEDICINE

## 2022-10-11 PROCEDURE — 3288F PR FALLS RISK ASSESSMENT DOCUMENTED: ICD-10-PCS | Mod: CPTII,S$GLB,, | Performed by: FAMILY MEDICINE

## 2022-10-11 PROCEDURE — 3066F PR DOCUMENTATION OF TREATMENT FOR NEPHROPATHY: ICD-10-PCS | Mod: CPTII,S$GLB,, | Performed by: FAMILY MEDICINE

## 2022-10-11 RX ORDER — PRAVASTATIN SODIUM 20 MG/1
20 TABLET ORAL DAILY
Qty: 90 TABLET | Refills: 3 | Status: SHIPPED | OUTPATIENT
Start: 2022-10-11 | End: 2023-02-01

## 2022-10-11 RX ORDER — PROMETHAZINE HYDROCHLORIDE AND DEXTROMETHORPHAN HYDROBROMIDE 6.25; 15 MG/5ML; MG/5ML
5 SYRUP ORAL EVERY 6 HOURS PRN
Qty: 180 ML | Refills: 0 | Status: SHIPPED | OUTPATIENT
Start: 2022-10-11 | End: 2022-11-02

## 2022-10-11 RX ORDER — AZITHROMYCIN 250 MG/1
TABLET, FILM COATED ORAL
Qty: 6 TABLET | Refills: 0 | Status: SHIPPED | OUTPATIENT
Start: 2022-10-11 | End: 2022-11-02

## 2022-10-11 RX ADMIN — TESTOSTERONE CYPIONATE 200 MG: 200 INJECTION, SOLUTION INTRAMUSCULAR at 10:10

## 2022-10-11 NOTE — PROGRESS NOTES
[facility]  Progress Note    SUBJECTIVE:     Chief Complaint   Patient presents with    Sinusitis    Sore Throat       HPI   King Cool Jr.  is a 67 y.o. male here for c/o sinus pressure, cough, congestion for the past couple days. No fevers, chills, body aches, SOB. Patient has no other new complaints/problems at this time.      Review of patient's allergies indicates:   Allergen Reactions    Tamiflu [oseltamivir]      Increases BP    Metformin      Diarrhea, nausea    Penicillins Rash       Past Medical History:   Diagnosis Date    Essential (primary) hypertension     Male erectile dysfunction, unspecified     Testicular hypofunction     Type 2 diabetes mellitus without complications      Past Surgical History:   Procedure Laterality Date    HIP SURGERY       Family History   Problem Relation Age of Onset    Blindness Mother     No Known Problems Father     No Known Problems Brother     No Known Problems Daughter     No Known Problems Son     No Known Problems Daughter     Diabetes Brother     No Known Problems Brother     Amblyopia Neg Hx     Cancer Neg Hx     Cataracts Neg Hx     Glaucoma Neg Hx     Hypertension Neg Hx     Macular degeneration Neg Hx     Retinal detachment Neg Hx     Strabismus Neg Hx     Stroke Neg Hx     Thyroid disease Neg Hx      Social History     Tobacco Use    Smoking status: Former     Packs/day: 0.25     Types: Cigarettes     Start date: 1972     Quit date: 1976     Years since quittin.9    Smokeless tobacco: Never   Substance Use Topics    Alcohol use: Not Currently     Comment: Pt. has a Hx. of  Alcohol use.    Drug use: No        Review of Systems   Constitutional:  Positive for malaise/fatigue. Negative for chills, diaphoresis and fever.   HENT:  Positive for congestion. Negative for ear pain and sore throat.    Respiratory: Negative.  Negative for cough and shortness of breath.    Cardiovascular: Negative.    Neurological:  Positive for headaches.   All  other systems reviewed and are negative.  OBJECTIVE:     Vitals:    10/11/22 1339   BP: 120/78   Pulse: 82   Temp: 98.6 °F (37 °C)     Body mass index is 31.05 kg/m².    Physical Exam  Constitutional:       General: He is in acute distress (mild).      Appearance: He is not diaphoretic.   HENT:      Head: Normocephalic and atraumatic.      Nose:      Right Sinus: Maxillary sinus tenderness present.      Left Sinus: Maxillary sinus tenderness present.      Mouth/Throat:      Pharynx: No oropharyngeal exudate.   Eyes:      Conjunctiva/sclera: Conjunctivae normal.   Pulmonary:      Effort: Pulmonary effort is normal. No respiratory distress.      Breath sounds: Normal breath sounds. No stridor. No wheezing or rales.   Lymphadenopathy:      Cervical: No cervical adenopathy.   Neurological:      Mental Status: He is alert and oriented to person, place, and time.       Old records were reviewed. Labs and/or images were independently reviewed.    ASSESSMENT     1. Acute rhinosinusitis        PLAN:     Acute rhinosinusitis  -     promethazine-dextromethorphan (PROMETHAZINE-DM) 6.25-15 mg/5 mL Syrp; Take 5 mLs by mouth every 6 (six) hours as needed (cough).  Dispense: 180 mL; Refill: 0  -     azithromycin (Z-ASHLEY) 250 MG tablet; Take 2 tabs PO on day 1, then 1 tab daily afterwards  Dispense: 6 tablet; Refill: 0  -     OK to take tylenol as needed PRN fever. Take mucinex and or claritin to help decrease congestion. Educated patient to drink plenty of fluids and to take vitamin C to help boost immune system. Instructed patient to call or RTC if symptoms persist or worsen.    RTC PRN    Gallo Lara MD  10/11/2022 1:47 PM

## 2022-10-11 NOTE — TELEPHONE ENCOUNTER
----- Message from Sammie Malhotra sent at 10/11/2022 11:02 AM CDT -----  Type:  Needs Medical Advice/Symptom-based Call    Who Called: pt     Symptoms (please be specific): sinus problems. Nose running, cough, no fever.  Requesting to get something called in for him. Call pt     How long has patient had these symptoms:  couple days    Would the patient rather a call back or a response via [facility]? call    Best Call Back Number: [phone] (home)       Additional Information:

## 2022-10-25 ENCOUNTER — CLINICAL SUPPORT (OUTPATIENT)
Dept: FAMILY MEDICINE | Facility: CLINIC | Age: 67
End: 2022-10-25
Payer: MEDICARE

## 2022-10-25 DIAGNOSIS — E29.1 TESTICULAR HYPOFUNCTION: Primary | ICD-10-CM

## 2022-10-25 PROCEDURE — 96372 THER/PROPH/DIAG INJ SC/IM: CPT | Mod: S$GLB,,, | Performed by: FAMILY MEDICINE

## 2022-10-25 PROCEDURE — 96372 PR INJECTION,THERAP/PROPH/DIAG2ST, IM OR SUBCUT: ICD-10-PCS | Mod: S$GLB,,, | Performed by: FAMILY MEDICINE

## 2022-10-25 RX ADMIN — TESTOSTERONE CYPIONATE 200 MG: 200 INJECTION, SOLUTION INTRAMUSCULAR at 11:10

## 2022-10-31 DIAGNOSIS — M17.0 PRIMARY OSTEOARTHRITIS OF BOTH KNEES: ICD-10-CM

## 2022-10-31 RX ORDER — HYDROCODONE BITARTRATE AND ACETAMINOPHEN 10; 325 MG/1; MG/1
1 TABLET ORAL 3 TIMES DAILY PRN
Qty: 72 TABLET | Refills: 0 | Status: SHIPPED | OUTPATIENT
Start: 2022-10-31 | End: 2023-01-03 | Stop reason: SDUPTHER

## 2022-10-31 NOTE — TELEPHONE ENCOUNTER
No new care gaps identified.  [facility] Embedded Care Gaps. Reference number: [id number]. 10/31/2022   1:44:07 PM CDT

## 2022-10-31 NOTE — TELEPHONE ENCOUNTER
----- Message from Yoni Diaz sent at 10/31/2022  1:00 PM CDT -----  Regarding: refills/ early refill request  Type: RX Refill Request    Who Called: King    Have you contacted your pharmacy:no    RX Name and Strength:HYDROcodone-acetaminophen (NORCO)  mg per tablet    Is this a 30 day or 90 day RX:30 day     Preferred Pharmacy with phone number:[facility]/pharmacy #[id number] - [place], [place] - [address]    Would the patient rather a call back or a response via [facility]? Call back     Best Call Back Number:[phone]    Additional Information: the patient is calling to request an early  refill for his pain meds. He will be out of the town on the 11/4 and his Rx is not due until 11/6. If possible, he would like to have the rx refilled on 11/4. Patient was instructed to call Dr. Lara, per his pharmacy. Please return call at earliest convenience.

## 2022-11-02 DIAGNOSIS — E11.9 TYPE 2 DIABETES MELLITUS WITHOUT COMPLICATION: ICD-10-CM

## 2022-11-03 DIAGNOSIS — E29.1 TESTICULAR HYPOFUNCTION: ICD-10-CM

## 2022-11-03 RX ORDER — TESTOSTERONE CYPIONATE 200 MG/ML
200 INJECTION, SOLUTION INTRAMUSCULAR
Status: DISCONTINUED | OUTPATIENT
Start: 2022-11-03 | End: 2023-01-10

## 2022-11-04 ENCOUNTER — CLINICAL SUPPORT (OUTPATIENT)
Dept: FAMILY MEDICINE | Facility: CLINIC | Age: 67
End: 2022-11-04
Payer: MEDICARE

## 2022-11-04 DIAGNOSIS — E29.1 TESTICULAR HYPOFUNCTION: Primary | ICD-10-CM

## 2022-11-04 PROCEDURE — 96372 PR INJECTION,THERAP/PROPH/DIAG2ST, IM OR SUBCUT: ICD-10-PCS | Mod: S$GLB,,, | Performed by: FAMILY MEDICINE

## 2022-11-04 PROCEDURE — 96372 THER/PROPH/DIAG INJ SC/IM: CPT | Mod: S$GLB,,, | Performed by: FAMILY MEDICINE

## 2022-11-04 RX ADMIN — TESTOSTERONE CYPIONATE 200 MG: 200 INJECTION, SOLUTION INTRAMUSCULAR at 08:11

## 2022-11-07 ENCOUNTER — PATIENT MESSAGE (OUTPATIENT)
Dept: ADMINISTRATIVE | Facility: HOSPITAL | Age: 67
End: 2022-11-07
Payer: MEDICARE

## 2022-11-08 ENCOUNTER — PATIENT OUTREACH (OUTPATIENT)
Dept: ADMINISTRATIVE | Facility: HOSPITAL | Age: 67
End: 2022-11-08
Payer: MEDICARE

## 2022-11-18 ENCOUNTER — PATIENT MESSAGE (OUTPATIENT)
Dept: FAMILY MEDICINE | Facility: CLINIC | Age: 67
End: 2022-11-18
Payer: MEDICARE

## 2022-11-21 ENCOUNTER — CLINICAL SUPPORT (OUTPATIENT)
Dept: FAMILY MEDICINE | Facility: CLINIC | Age: 67
End: 2022-11-21
Payer: MEDICARE

## 2022-11-21 DIAGNOSIS — E29.1 TESTICULAR HYPOFUNCTION: Primary | ICD-10-CM

## 2022-11-21 PROCEDURE — 96372 THER/PROPH/DIAG INJ SC/IM: CPT | Mod: S$GLB,,, | Performed by: FAMILY MEDICINE

## 2022-11-21 PROCEDURE — 96372 PR INJECTION,THERAP/PROPH/DIAG2ST, IM OR SUBCUT: ICD-10-PCS | Mod: S$GLB,,, | Performed by: FAMILY MEDICINE

## 2022-11-21 RX ADMIN — TESTOSTERONE CYPIONATE 200 MG: 200 INJECTION, SOLUTION INTRAMUSCULAR at 02:11

## 2022-12-05 ENCOUNTER — CLINICAL SUPPORT (OUTPATIENT)
Dept: FAMILY MEDICINE | Facility: CLINIC | Age: 67
End: 2022-12-05
Payer: MEDICARE

## 2022-12-05 DIAGNOSIS — M17.0 PRIMARY OSTEOARTHRITIS OF BOTH KNEES: ICD-10-CM

## 2022-12-05 DIAGNOSIS — E29.1 TESTICULAR HYPOFUNCTION: Primary | ICD-10-CM

## 2022-12-05 PROCEDURE — 96372 PR INJECTION,THERAP/PROPH/DIAG2ST, IM OR SUBCUT: ICD-10-PCS | Mod: S$GLB,,, | Performed by: FAMILY MEDICINE

## 2022-12-05 PROCEDURE — 99499 NO LOS: ICD-10-PCS | Mod: S$GLB,,, | Performed by: FAMILY MEDICINE

## 2022-12-05 PROCEDURE — 99499 UNLISTED E&M SERVICE: CPT | Mod: S$GLB,,, | Performed by: FAMILY MEDICINE

## 2022-12-05 PROCEDURE — 96372 THER/PROPH/DIAG INJ SC/IM: CPT | Mod: S$GLB,,, | Performed by: FAMILY MEDICINE

## 2022-12-05 RX ORDER — HYDROCODONE BITARTRATE AND ACETAMINOPHEN 10; 325 MG/1; MG/1
1 TABLET ORAL 3 TIMES DAILY PRN
Qty: 72 TABLET | Refills: 0 | Status: SHIPPED | OUTPATIENT
Start: 2022-12-05 | End: 2023-01-03 | Stop reason: SDUPTHER

## 2022-12-05 RX ADMIN — TESTOSTERONE CYPIONATE 200 MG: 200 INJECTION, SOLUTION INTRAMUSCULAR at 08:12

## 2022-12-05 NOTE — TELEPHONE ENCOUNTER
Care Due:                  Date            Visit Type   Department     Provider  --------------------------------------------------------------------------------                                SAME DAY -   [facility]                              ESTABLISHED   MED/ INTERNAL  Last Visit: 10-      PATIENT      MED/ PEDS      PRERNA CHRISTIE                               -         [facility]                              PRIMARY      MED/ INTERNAL  Next Visit: 01-      CARE ([facility])   MED/ PEDS      PRERNA CHRISTIE                                                            Last  Test          Frequency    Reason                     Performed    Due Date  --------------------------------------------------------------------------------    HBA1C.......  6 months...  empagliflozin,             04-   10-                             glimepiride, metFORMIN...    [employer] Embedded Care Gaps. Reference number: [id number]. 12/05/2022   8:04:05 AM CST

## 2022-12-23 ENCOUNTER — OFFICE VISIT (OUTPATIENT)
Dept: FAMILY MEDICINE | Facility: CLINIC | Age: 67
End: 2022-12-23
Payer: MEDICARE

## 2022-12-23 ENCOUNTER — CLINICAL SUPPORT (OUTPATIENT)
Dept: FAMILY MEDICINE | Facility: CLINIC | Age: 67
End: 2022-12-23
Payer: MEDICARE

## 2022-12-23 VITALS
SYSTOLIC BLOOD PRESSURE: 138 MMHG | HEART RATE: 90 BPM | HEIGHT: 70 IN | TEMPERATURE: 99 F | WEIGHT: 223.75 LBS | OXYGEN SATURATION: 96 % | DIASTOLIC BLOOD PRESSURE: 76 MMHG | BODY MASS INDEX: 32.03 KG/M2

## 2022-12-23 DIAGNOSIS — H60.502 ACUTE OTITIS EXTERNA OF LEFT EAR, UNSPECIFIED TYPE: Primary | ICD-10-CM

## 2022-12-23 PROCEDURE — 3078F PR MOST RECENT DIASTOLIC BLOOD PRESSURE < 80 MM HG: ICD-10-PCS | Mod: HCNC,CPTII,S$GLB, | Performed by: INTERNAL MEDICINE

## 2022-12-23 PROCEDURE — 3051F HG A1C>EQUAL 7.0%<8.0%: CPT | Mod: HCNC,CPTII,S$GLB, | Performed by: INTERNAL MEDICINE

## 2022-12-23 PROCEDURE — 3075F SYST BP GE 130 - 139MM HG: CPT | Mod: HCNC,CPTII,S$GLB, | Performed by: INTERNAL MEDICINE

## 2022-12-23 PROCEDURE — 3066F PR DOCUMENTATION OF TREATMENT FOR NEPHROPATHY: ICD-10-PCS | Mod: HCNC,CPTII,S$GLB, | Performed by: INTERNAL MEDICINE

## 2022-12-23 PROCEDURE — 99213 OFFICE O/P EST LOW 20 MIN: CPT | Mod: HCNC,S$GLB,, | Performed by: INTERNAL MEDICINE

## 2022-12-23 PROCEDURE — 3078F DIAST BP <80 MM HG: CPT | Mod: HCNC,CPTII,S$GLB, | Performed by: INTERNAL MEDICINE

## 2022-12-23 PROCEDURE — 99999 PR PBB SHADOW E&M-EST. PATIENT-LVL I: CPT | Mod: PBBFAC,,,

## 2022-12-23 PROCEDURE — 1159F PR MEDICATION LIST DOCUMENTED IN MEDICAL RECORD: ICD-10-PCS | Mod: HCNC,CPTII,S$GLB, | Performed by: INTERNAL MEDICINE

## 2022-12-23 PROCEDURE — 3061F PR NEG MICROALBUMINURIA RESULT DOCUMENTED/REVIEW: ICD-10-PCS | Mod: HCNC,CPTII,S$GLB, | Performed by: INTERNAL MEDICINE

## 2022-12-23 PROCEDURE — 3288F FALL RISK ASSESSMENT DOCD: CPT | Mod: HCNC,CPTII,S$GLB, | Performed by: INTERNAL MEDICINE

## 2022-12-23 PROCEDURE — 3075F PR MOST RECENT SYSTOLIC BLOOD PRESS GE 130-139MM HG: ICD-10-PCS | Mod: HCNC,CPTII,S$GLB, | Performed by: INTERNAL MEDICINE

## 2022-12-23 PROCEDURE — 3288F PR FALLS RISK ASSESSMENT DOCUMENTED: ICD-10-PCS | Mod: HCNC,CPTII,S$GLB, | Performed by: INTERNAL MEDICINE

## 2022-12-23 PROCEDURE — 1160F RVW MEDS BY RX/DR IN RCRD: CPT | Mod: HCNC,CPTII,S$GLB, | Performed by: INTERNAL MEDICINE

## 2022-12-23 PROCEDURE — 1101F PT FALLS ASSESS-DOCD LE1/YR: CPT | Mod: HCNC,CPTII,S$GLB, | Performed by: INTERNAL MEDICINE

## 2022-12-23 PROCEDURE — 3008F PR BODY MASS INDEX (BMI) DOCUMENTED: ICD-10-PCS | Mod: HCNC,CPTII,S$GLB, | Performed by: INTERNAL MEDICINE

## 2022-12-23 PROCEDURE — 96372 THER/PROPH/DIAG INJ SC/IM: CPT | Mod: HCNC,S$GLB,, | Performed by: FAMILY MEDICINE

## 2022-12-23 PROCEDURE — 1125F AMNT PAIN NOTED PAIN PRSNT: CPT | Mod: HCNC,CPTII,S$GLB, | Performed by: INTERNAL MEDICINE

## 2022-12-23 PROCEDURE — 1160F PR REVIEW ALL MEDS BY PRESCRIBER/CLIN PHARMACIST DOCUMENTED: ICD-10-PCS | Mod: HCNC,CPTII,S$GLB, | Performed by: INTERNAL MEDICINE

## 2022-12-23 PROCEDURE — 96372 PR INJECTION,THERAP/PROPH/DIAG2ST, IM OR SUBCUT: ICD-10-PCS | Mod: HCNC,S$GLB,, | Performed by: FAMILY MEDICINE

## 2022-12-23 PROCEDURE — 1159F MED LIST DOCD IN RCRD: CPT | Mod: HCNC,CPTII,S$GLB, | Performed by: INTERNAL MEDICINE

## 2022-12-23 PROCEDURE — 99999 PR PBB SHADOW E&M-EST. PATIENT-LVL III: CPT | Mod: PBBFAC,,, | Performed by: INTERNAL MEDICINE

## 2022-12-23 PROCEDURE — 4010F ACE/ARB THERAPY RXD/TAKEN: CPT | Mod: HCNC,CPTII,S$GLB, | Performed by: INTERNAL MEDICINE

## 2022-12-23 PROCEDURE — 99999 PR PBB SHADOW E&M-EST. PATIENT-LVL I: ICD-10-PCS | Mod: PBBFAC,,,

## 2022-12-23 PROCEDURE — 99999 PR PBB SHADOW E&M-EST. PATIENT-LVL III: ICD-10-PCS | Mod: PBBFAC,,, | Performed by: INTERNAL MEDICINE

## 2022-12-23 PROCEDURE — 3051F PR MOST RECENT HEMOGLOBIN A1C LEVEL 7.0 - < 8.0%: ICD-10-PCS | Mod: HCNC,CPTII,S$GLB, | Performed by: INTERNAL MEDICINE

## 2022-12-23 PROCEDURE — 3061F NEG MICROALBUMINURIA REV: CPT | Mod: HCNC,CPTII,S$GLB, | Performed by: INTERNAL MEDICINE

## 2022-12-23 PROCEDURE — 1125F PR PAIN SEVERITY QUANTIFIED, PAIN PRESENT: ICD-10-PCS | Mod: HCNC,CPTII,S$GLB, | Performed by: INTERNAL MEDICINE

## 2022-12-23 PROCEDURE — 1101F PR PT FALLS ASSESS DOC 0-1 FALLS W/OUT INJ PAST YR: ICD-10-PCS | Mod: HCNC,CPTII,S$GLB, | Performed by: INTERNAL MEDICINE

## 2022-12-23 PROCEDURE — 99213 PR OFFICE/OUTPT VISIT, EST, LEVL III, 20-29 MIN: ICD-10-PCS | Mod: HCNC,S$GLB,, | Performed by: INTERNAL MEDICINE

## 2022-12-23 PROCEDURE — 3066F NEPHROPATHY DOC TX: CPT | Mod: HCNC,CPTII,S$GLB, | Performed by: INTERNAL MEDICINE

## 2022-12-23 PROCEDURE — 3008F BODY MASS INDEX DOCD: CPT | Mod: HCNC,CPTII,S$GLB, | Performed by: INTERNAL MEDICINE

## 2022-12-23 PROCEDURE — 4010F PR ACE/ARB THEARPY RXD/TAKEN: ICD-10-PCS | Mod: HCNC,CPTII,S$GLB, | Performed by: INTERNAL MEDICINE

## 2022-12-23 RX ADMIN — TESTOSTERONE CYPIONATE 200 MG: 200 INJECTION, SOLUTION INTRAMUSCULAR at 10:12

## 2022-12-23 NOTE — PROGRESS NOTES
This note was created by combination of typed  and M-Modal dictation.  Transcription errors may be present.  If there are any questions, please contact me.    Assessment and Plan:   Acute otitis externa of left ear, unspecified type  -status post flushing, patient found significant symptomatic improvement.  Finish out the oral antibiotic.  Continue antibiotic eardrops for the next 3 days      There are no discontinued medications.    meds sent this encounter:       Follow Up: No follow-ups on file.    Subjective:     Chief Complaint   Patient presents with    Otalgia       HPI  Yves is a 67 y.o. male.     Social History     Tobacco Use    Smoking status: Former     Packs/day: 0.25     Types: Cigarettes     Start date: 1972     Quit date: 1976     Years since quittin.1    Smokeless tobacco: Never   Substance Use Topics    Alcohol use: Not Currently     Comment: Pt. has a Hx. of  Alcohol use.          Social History     Social History Narrative    Not on file       Last appointment with this clinic was 2022. Last visit with me Visit date not found   To summarize last visit and events leading up to today:  DM2; HTN; lipid  Low testosterone on IM injection  Chronic pain/chronic narcotic     , 10/31    Diabetes Management Status    Statin: Taking  ACE/ARB: Taking    Screening or Prevention Patient's value Goal Complete/Controlled?   HgA1C Testing and Control   Lab Results   Component Value Date    HGBA1C 7.4 (H) 2022      Annually/Less than 8% Yes     Lipid profile : 2022 Annually Yes     LDL control Lab Results   Component Value Date    LDLCALC 117.6 2022    Annually/Less than 100 mg/dl  No     Nephropathy screening Lab Results   Component Value Date    LABMICR 24.0 2022     Lab Results   Component Value Date    PROTEINUA Negative 2017     No results found for: UTPCR   Annually Yes     Blood pressure BP Readings from Last 1 Encounters:   22  138/76    Less than 140/90 Yes     Dilated retinal exam : 04/19/2022 Annually Yes     Foot exam   : 07/08/2020 Annually No           Today's visit:  L ear hurts.  Was in [place].  OTC ear drops. On day 5 went to an UC in [place].  Given rx ear drops, oral antibiotics, not sure the name, and hydrocodone 5 mg. Started antibiotic on Monday. Does not know the name but it's BID.  Was seen and ear drops, antibiotic and something for pain  Still with pain.   No fever no chills.   No sore throat  No sinus congestion.    Pain with chewing, popping  Wears hearing aids  Thinks around 2018 had similar problem.       Patient Care Team:  Gallo Lara MD as PCP - General (Family Medicine)  Zarina Martell MA as Care Coordinator    Patient Active Problem List    Diagnosis Date Noted    Chronic right shoulder pain 12/03/2021    Weakness of right upper extremity 12/03/2021    Opioid dependence with opioid-induced disorder 03/02/2021    Chronic back pain 03/02/2021    Class 1 obesity due to excess calories with serious comorbidity and body mass index (BMI) of 33.0 to 33.9 in adult 11/23/2020    Testicular hypofunction     Male erectile dysfunction, unspecified     Essential (primary) hypertension      2/2/2021 started on Lisinopril 20 mg daily      Type 2 diabetes mellitus with microalbuminuria, without long-term current use of insulin 01/27/2020    Post-traumatic osteoarthritis of right knee 04/02/2018       PAST MEDICAL PROBLEMS, PAST SURGICAL HISTORY: please see relevant portions of the electronic medical record    ALLERGIES AND MEDICATIONS: updated and reviewed.  Review of patient's allergies indicates:   Allergen Reactions    Tamiflu [oseltamivir]      Increases BP    Metformin      Diarrhea, nausea    Penicillins Rash       Medication List with Changes/Refills   Current Medications    ACCU-CHEK GUIDE TEST STRIPS STRP    USE TWICE DAILY    ALCOHOL SWABS (DROPSAFE ALCOHOL PREP PADS) PADM    USE AS DIRECTED AS NEEDED    ASCORBATE  CALCIUM (VITAMIN C ORAL)    Take by mouth once daily.     ASPIRIN (ECOTRIN) 81 MG EC TABLET    Take 81 mg by mouth once daily.    AZITHROMYCIN (Z-ASHLEY) 250 MG TABLET    TAKE 2 TABS BY MOUTH ON DAY 1, THEN 1 TAB DAILY AFTERWARDS    BLOOD SUGAR DIAGNOSTIC STRP    1 strip by Misc.(Non-Drug; Combo Route) route 3 (three) times daily.    BLOOD-GLUCOSE METER (ACCU-CHEK GUIDE GLUCOSE METER) [facility]    Use as directed.    BLOOD-GLUCOSE METER KIT    Use as instructed    DICLOFENAC (VOLTAREN) 50 MG EC TABLET    TAKE 1 TABLET BY MOUTH TWICE A DAY    EMPAGLIFLOZIN (JARDIANCE) 25 MG TABLET    Take 1 tablet (25 mg total) by mouth once daily.    FISH OIL-OMEGA-3 FATTY ACIDS 300-1,000 MG CAPSULE    Take 2 g by mouth once daily.    GABAPENTIN (NEURONTIN) 300 MG CAPSULE    TAKE 1 CAPSULE BY MOUTH THREE TIMES A DAY    GLIMEPIRIDE (AMARYL) 4 MG TABLET    Take 1 tablet (4 mg total) by mouth before breakfast.    HYDROCODONE-ACETAMINOPHEN (NORCO)  MG PER TABLET    Take 1 tablet by mouth 3 (three) times daily as needed (pain).    HYDROCODONE-ACETAMINOPHEN (NORCO)  MG PER TABLET    Take 1 tablet by mouth 3 (three) times daily as needed (pain).    HYDROCODONE-ACETAMINOPHEN (NORCO)  MG PER TABLET    Take 1 tablet by mouth 3 (three) times daily as needed (pain).    HYDROCORTISONE 2.5 % CREAM    Apply topically 2 (two) times daily.    LANCETS MISC    1 application by Misc.(Non-Drug; Combo Route) route 3 (three) times daily.    LISINOPRIL (PRINIVIL,ZESTRIL) 20 MG TABLET        LORATADINE (CLARITIN) 10 MG TABLET    TAKE 1 TABLET BY MOUTH EVERY DAY    METFORMIN (GLUCOPHAGE-XR) 500 MG ER 24HR TABLET    Take 1 tablet (500 mg total) by mouth once daily.    METHOCARBAMOL (ROBAXIN) 500 MG TAB    Take 2 tablets (1,000 mg total) by mouth every 6 (six) hours as needed (muscle spasms).    MULTIVITAMIN WITH MINERALS TABLET    Take 1 tablet by mouth once daily.    PRAVASTATIN (PRAVACHOL) 20 MG TABLET    Take 1 tablet (20 mg total) by mouth once  "daily.    PROMETHAZINE-DEXTROMETHORPHAN (PROMETHAZINE-DM) 6.25-15 MG/5 ML SYRP    TAKE 5 ML BY MOUTH EVERY 6 HOURS AS NEEDED FOR COUGH    TESTOSTERONE CYPIONATE (DEPOTESTOTERONE CYPIONATE) 200 MG/ML INJECTION    Inject 1 mL (200 mg total) into the muscle every 14 (fourteen) days.        Objective:   Physical Exam   Vitals:    12/23/22 1325   BP: 138/76   BP Location: Left arm   Patient Position: Sitting   BP Method: Large (Manual)   Pulse: 90   Temp: 98.5 °F (36.9 °C)   TempSrc: Oral   SpO2: 96%   Weight: 101.5 kg (223 lb 12.3 oz)   Height: 5' 10" (1.778 m)    Body mass index is 32.11 kg/m².  Weight: 101.5 kg (223 lb 12.3 oz)   Height: 5' 10" (177.8 cm)     Physical Exam  Constitutional:       Appearance: He is well-developed.   HENT:      Head: Normocephalic.      Right Ear: Tympanic membrane and ear canal normal.      Ears:      Comments: The left ear canal is swollen as compared to the right, narrowed, with friable mucosa present.  Unable to visualize tympanic membrane  Eyes:      General: No scleral icterus.        Right eye: No discharge.         Left eye: No discharge.   Cardiovascular:      Rate and Rhythm: Normal rate and regular rhythm.      Heart sounds: Normal heart sounds.   Pulmonary:      Effort: Pulmonary effort is normal.      Breath sounds: Normal breath sounds. No wheezing.   Musculoskeletal:      Cervical back: Neck supple.   Lymphadenopathy:      Cervical: No cervical adenopathy.   Skin:     General: Skin is warm and dry.   Neurological:      Mental Status: He is alert and oriented to person, place, and time.   Psychiatric:         Behavior: Behavior normal.     Nursing staff flushed the left ear canal with successful removal of the friable tissue to reveal clear tympanic membrane, still some swelling of the external auditory canal but patient notes significant symptom improvement  "

## 2023-01-03 ENCOUNTER — HOSPITAL ENCOUNTER (OUTPATIENT)
Facility: HOSPITAL | Age: 68
Discharge: HOME | End: 2023-01-03
Attending: EMERGENCY MEDICINE | Admitting: HOSPITALIST
Payer: MEDICARE

## 2023-01-03 ENCOUNTER — OFFICE VISIT (OUTPATIENT)
Dept: FAMILY MEDICINE | Facility: CLINIC | Age: 68
End: 2023-01-03
Payer: MEDICARE

## 2023-01-03 VITALS
WEIGHT: 227.63 LBS | SYSTOLIC BLOOD PRESSURE: 138 MMHG | HEIGHT: 70 IN | DIASTOLIC BLOOD PRESSURE: 82 MMHG | OXYGEN SATURATION: 92 % | TEMPERATURE: 99 F | BODY MASS INDEX: 32.59 KG/M2 | HEART RATE: 161 BPM

## 2023-01-03 VITALS
OXYGEN SATURATION: 95 % | DIASTOLIC BLOOD PRESSURE: 82 MMHG | WEIGHT: 220 LBS | TEMPERATURE: 98 F | HEIGHT: 70 IN | SYSTOLIC BLOOD PRESSURE: 138 MMHG | BODY MASS INDEX: 31.5 KG/M2 | HEART RATE: 89 BPM | RESPIRATION RATE: 19 BRPM

## 2023-01-03 DIAGNOSIS — E11.29 TYPE 2 DIABETES MELLITUS WITH MICROALBUMINURIA, WITHOUT LONG-TERM CURRENT USE OF INSULIN: Chronic | ICD-10-CM

## 2023-01-03 DIAGNOSIS — Z00.00 ROUTINE PHYSICAL EXAMINATION: Primary | ICD-10-CM

## 2023-01-03 DIAGNOSIS — R80.9 TYPE 2 DIABETES MELLITUS WITH MICROALBUMINURIA, WITHOUT LONG-TERM CURRENT USE OF INSULIN: Chronic | ICD-10-CM

## 2023-01-03 DIAGNOSIS — G89.29 CHRONIC PAIN OF RIGHT KNEE: ICD-10-CM

## 2023-01-03 DIAGNOSIS — R79.89 ELEVATED TROPONIN LEVEL NOT DUE TO ACUTE CORONARY SYNDROME: ICD-10-CM

## 2023-01-03 DIAGNOSIS — H60.92 OTITIS EXTERNA OF LEFT EAR, UNSPECIFIED CHRONICITY, UNSPECIFIED TYPE: ICD-10-CM

## 2023-01-03 DIAGNOSIS — I10 ESSENTIAL (PRIMARY) HYPERTENSION: Chronic | ICD-10-CM

## 2023-01-03 DIAGNOSIS — M17.0 PRIMARY OSTEOARTHRITIS OF BOTH KNEES: ICD-10-CM

## 2023-01-03 DIAGNOSIS — R07.9 CHEST PAIN: ICD-10-CM

## 2023-01-03 DIAGNOSIS — M25.561 CHRONIC PAIN OF RIGHT KNEE: ICD-10-CM

## 2023-01-03 DIAGNOSIS — R80.9 TYPE 2 DIABETES MELLITUS WITH MICROALBUMINURIA, WITHOUT LONG-TERM CURRENT USE OF INSULIN: ICD-10-CM

## 2023-01-03 DIAGNOSIS — R00.0 TACHYCARDIA: ICD-10-CM

## 2023-01-03 DIAGNOSIS — I48.92 NEW ONSET ATRIAL FLUTTER: ICD-10-CM

## 2023-01-03 DIAGNOSIS — E11.29 TYPE 2 DIABETES MELLITUS WITH MICROALBUMINURIA, WITHOUT LONG-TERM CURRENT USE OF INSULIN: ICD-10-CM

## 2023-01-03 DIAGNOSIS — E66.09 CLASS 1 OBESITY DUE TO EXCESS CALORIES WITH SERIOUS COMORBIDITY AND BODY MASS INDEX (BMI) OF 33.0 TO 33.9 IN ADULT: ICD-10-CM

## 2023-01-03 DIAGNOSIS — I48.91 A-FIB: ICD-10-CM

## 2023-01-03 DIAGNOSIS — I48.0 PAF (PAROXYSMAL ATRIAL FIBRILLATION): ICD-10-CM

## 2023-01-03 DIAGNOSIS — I48.91 NEW ONSET A-FIB: Primary | ICD-10-CM

## 2023-01-03 PROBLEM — D58.2 ELEVATED HEMOGLOBIN: Status: ACTIVE | Noted: 2023-01-03

## 2023-01-03 LAB
ALBUMIN SERPL BCP-MCNC: 4 G/DL (ref 3.5–5.2)
ALP SERPL-CCNC: 91 U/L (ref 55–135)
ALT SERPL W/O P-5'-P-CCNC: 39 U/L (ref 10–44)
ANION GAP SERPL CALC-SCNC: 11 MMOL/L (ref 8–16)
AST SERPL-CCNC: 24 U/L (ref 10–40)
AV INDEX (PROSTH): 0.64
AV MEAN GRADIENT: 4 MMHG
AV PEAK GRADIENT: 6 MMHG
AV VALVE AREA: 2.2 CM2
AV VELOCITY RATIO: 0.73
BASOPHILS # BLD AUTO: 0.07 K/UL (ref 0–0.2)
BASOPHILS NFR BLD: 0.6 % (ref 0–1.9)
BILIRUB SERPL-MCNC: 0.8 MG/DL (ref 0.1–1)
BNP SERPL-MCNC: 35 PG/ML (ref 0–99)
BSA FOR ECHO PROCEDURE: 2.26 M2
BUN SERPL-MCNC: 18 MG/DL (ref 8–23)
CALCIUM SERPL-MCNC: 8.9 MG/DL (ref 8.7–10.5)
CHLORIDE SERPL-SCNC: 101 MMOL/L (ref 95–110)
CO2 SERPL-SCNC: 21 MMOL/L (ref 23–29)
CREAT SERPL-MCNC: 1.3 MG/DL (ref 0.5–1.4)
CTP QC/QA: YES
CV ECHO LV RWT: 0.49 CM
DIFFERENTIAL METHOD: ABNORMAL
DOP CALC AO PEAK VEL: 1.25 M/S
DOP CALC AO VTI: 24.6 CM
DOP CALC LVOT AREA: 3.4 CM2
DOP CALC LVOT DIAMETER: 2.09 CM
DOP CALC LVOT PEAK VEL: 0.91 M/S
DOP CALC LVOT STROKE VOLUME: 54.18 CM3
DOP CALCLVOT PEAK VEL VTI: 15.8 CM
E WAVE DECELERATION TIME: 185.04 MSEC
E/A RATIO: 0.93
E/E' RATIO: 10.27 M/S
ECHO LV POSTERIOR WALL: 1.16 CM (ref 0.6–1.1)
EJECTION FRACTION: 50 %
EOSINOPHIL # BLD AUTO: 0.1 K/UL (ref 0–0.5)
EOSINOPHIL NFR BLD: 0.4 % (ref 0–8)
ERYTHROCYTE [DISTWIDTH] IN BLOOD BY AUTOMATED COUNT: 13 % (ref 11.5–14.5)
EST. GFR  (NO RACE VARIABLE): >60 ML/MIN/1.73 M^2
FRACTIONAL SHORTENING: 25 % (ref 28–44)
GLUCOSE SERPL-MCNC: 351 MG/DL (ref 70–110)
HCT VFR BLD AUTO: 60.3 % (ref 40–54)
HGB BLD-MCNC: 20.8 G/DL (ref 14–18)
IMM GRANULOCYTES # BLD AUTO: 0.04 K/UL (ref 0–0.04)
IMM GRANULOCYTES NFR BLD AUTO: 0.3 % (ref 0–0.5)
INTERVENTRICULAR SEPTUM: 1.02 CM (ref 0.6–1.1)
LA MINOR: 4.5 CM
LEFT ATRIUM SIZE: 4.58 CM
LEFT INTERNAL DIMENSION IN SYSTOLE: 3.55 CM (ref 2.1–4)
LEFT VENTRICLE DIASTOLIC VOLUME INDEX: 47.57 ML/M2
LEFT VENTRICLE DIASTOLIC VOLUME: 103.23 ML
LEFT VENTRICLE MASS INDEX: 86 G/M2
LEFT VENTRICLE SYSTOLIC VOLUME INDEX: 24.3 ML/M2
LEFT VENTRICLE SYSTOLIC VOLUME: 52.67 ML
LEFT VENTRICULAR INTERNAL DIMENSION IN DIASTOLE: 4.72 CM (ref 3.5–6)
LEFT VENTRICULAR MASS: 186.43 G
LV LATERAL E/E' RATIO: 9.63 M/S
LV SEPTAL E/E' RATIO: 11 M/S
LVOT MG: 2.25 MMHG
LVOT MV: 0.73 CM/S
LYMPHOCYTES # BLD AUTO: 0.8 K/UL (ref 1–4.8)
LYMPHOCYTES NFR BLD: 6.4 % (ref 18–48)
MAGNESIUM SERPL-MCNC: 1.8 MG/DL (ref 1.6–2.6)
MCH RBC QN AUTO: 30.8 PG (ref 27–31)
MCHC RBC AUTO-ENTMCNC: 34.5 G/DL (ref 32–36)
MCV RBC AUTO: 89 FL (ref 82–98)
MONOCYTES # BLD AUTO: 0.7 K/UL (ref 0.3–1)
MONOCYTES NFR BLD: 5.6 % (ref 4–15)
MV PEAK A VEL: 0.83 M/S
MV PEAK E VEL: 0.77 M/S
MV STENOSIS PRESSURE HALF TIME: 53.66 MS
MV VALVE AREA P 1/2 METHOD: 4.1 CM2
NEUTROPHILS # BLD AUTO: 10.3 K/UL (ref 1.8–7.7)
NEUTROPHILS NFR BLD: 86.7 % (ref 38–73)
NRBC BLD-RTO: 0 /100 WBC
PISA TR MAX VEL: 2.13 M/S
PLATELET # BLD AUTO: 238 K/UL (ref 150–450)
PMV BLD AUTO: 8.6 FL (ref 9.2–12.9)
POCT GLUCOSE: 210 MG/DL (ref 70–110)
POTASSIUM SERPL-SCNC: 4.9 MMOL/L (ref 3.5–5.1)
PROT SERPL-MCNC: 7.4 G/DL (ref 6–8.4)
PV PEAK VELOCITY: 0.73 CM/S
RA MAJOR: 5.08 CM
RA PRESSURE: 8 MMHG
RA WIDTH: 3.76 CM
RBC # BLD AUTO: 6.75 M/UL (ref 4.6–6.2)
RIGHT VENTRICULAR END-DIASTOLIC DIMENSION: 3.64 CM
SARS-COV-2 RDRP RESP QL NAA+PROBE: NEGATIVE
SINUS: 3.31 CM
SODIUM SERPL-SCNC: 133 MMOL/L (ref 136–145)
STJ: 3.37 CM
TDI LATERAL: 0.08 M/S
TDI SEPTAL: 0.07 M/S
TDI: 0.08 M/S
TR MAX PG: 18 MMHG
TRICUSPID ANNULAR PLANE SYSTOLIC EXCURSION: 2.14 CM
TROPONIN I SERPL DL<=0.01 NG/ML-MCNC: 0.12 NG/ML (ref 0–0.03)
TROPONIN I SERPL DL<=0.01 NG/ML-MCNC: 0.12 NG/ML (ref 0–0.03)
TSH SERPL DL<=0.005 MIU/L-ACNC: 0.83 UIU/ML (ref 0.4–4)
TV REST PULMONARY ARTERY PRESSURE: 26 MMHG
WBC # BLD AUTO: 11.94 K/UL (ref 3.9–12.7)

## 2023-01-03 PROCEDURE — 3288F PR FALLS RISK ASSESSMENT DOCUMENTED: ICD-10-PCS | Mod: HCNC,CPTII,S$GLB, | Performed by: FAMILY MEDICINE

## 2023-01-03 PROCEDURE — 93005 ELECTROCARDIOGRAM TRACING: CPT | Mod: HCNC

## 2023-01-03 PROCEDURE — 99214 OFFICE O/P EST MOD 30 MIN: CPT | Mod: HCNC,25,S$GLB, | Performed by: FAMILY MEDICINE

## 2023-01-03 PROCEDURE — 3288F FALL RISK ASSESSMENT DOCD: CPT | Mod: HCNC,CPTII,S$GLB, | Performed by: FAMILY MEDICINE

## 2023-01-03 PROCEDURE — 63600175 PHARM REV CODE 636 W HCPCS: Mod: HCNC

## 2023-01-03 PROCEDURE — 93010 ELECTROCARDIOGRAM REPORT: CPT | Mod: HCNC,,, | Performed by: INTERNAL MEDICINE

## 2023-01-03 PROCEDURE — 99999 PR PBB SHADOW E&M-EST. PATIENT-LVL IV: ICD-10-PCS | Mod: PBBFAC,HCNC,, | Performed by: FAMILY MEDICINE

## 2023-01-03 PROCEDURE — 93010 EKG 12-LEAD: ICD-10-PCS | Mod: 76,HCNC,, | Performed by: INTERNAL MEDICINE

## 2023-01-03 PROCEDURE — 93005 EKG 12-LEAD: ICD-10-PCS | Mod: HCNC,S$GLB,, | Performed by: FAMILY MEDICINE

## 2023-01-03 PROCEDURE — 3075F PR MOST RECENT SYSTOLIC BLOOD PRESS GE 130-139MM HG: ICD-10-PCS | Mod: HCNC,CPTII,S$GLB, | Performed by: FAMILY MEDICINE

## 2023-01-03 PROCEDURE — 96366 THER/PROPH/DIAG IV INF ADDON: CPT | Mod: HCNC

## 2023-01-03 PROCEDURE — 83735 ASSAY OF MAGNESIUM: CPT | Mod: HCNC | Performed by: PHYSICIAN ASSISTANT

## 2023-01-03 PROCEDURE — 3008F BODY MASS INDEX DOCD: CPT | Mod: HCNC,CPTII,S$GLB, | Performed by: FAMILY MEDICINE

## 2023-01-03 PROCEDURE — 84484 ASSAY OF TROPONIN QUANT: CPT | Mod: HCNC | Performed by: PHYSICIAN ASSISTANT

## 2023-01-03 PROCEDURE — 1101F PT FALLS ASSESS-DOCD LE1/YR: CPT | Mod: HCNC,CPTII,S$GLB, | Performed by: FAMILY MEDICINE

## 2023-01-03 PROCEDURE — 93010 ELECTROCARDIOGRAM REPORT: CPT | Mod: HCNC,76,, | Performed by: INTERNAL MEDICINE

## 2023-01-03 PROCEDURE — 25000003 PHARM REV CODE 250: Mod: HCNC | Performed by: EMERGENCY MEDICINE

## 2023-01-03 PROCEDURE — 63600175 PHARM REV CODE 636 W HCPCS: Mod: HCNC | Performed by: EMERGENCY MEDICINE

## 2023-01-03 PROCEDURE — 3079F PR MOST RECENT DIASTOLIC BLOOD PRESSURE 80-89 MM HG: ICD-10-PCS | Mod: HCNC,CPTII,S$GLB, | Performed by: FAMILY MEDICINE

## 2023-01-03 PROCEDURE — 99999 PR PBB SHADOW E&M-EST. PATIENT-LVL IV: CPT | Mod: PBBFAC,HCNC,, | Performed by: FAMILY MEDICINE

## 2023-01-03 PROCEDURE — 84443 ASSAY THYROID STIM HORMONE: CPT | Mod: HCNC | Performed by: PHYSICIAN ASSISTANT

## 2023-01-03 PROCEDURE — 1101F PR PT FALLS ASSESS DOC 0-1 FALLS W/OUT INJ PAST YR: ICD-10-PCS | Mod: HCNC,CPTII,S$GLB, | Performed by: FAMILY MEDICINE

## 2023-01-03 PROCEDURE — 99204 OFFICE O/P NEW MOD 45 MIN: CPT | Mod: 25,HCNC,ICN, | Performed by: INTERNAL MEDICINE

## 2023-01-03 PROCEDURE — 99214 PR OFFICE/OUTPT VISIT, EST, LEVL IV, 30-39 MIN: ICD-10-PCS | Mod: HCNC,25,S$GLB, | Performed by: FAMILY MEDICINE

## 2023-01-03 PROCEDURE — 85025 COMPLETE CBC W/AUTO DIFF WBC: CPT | Mod: HCNC | Performed by: PHYSICIAN ASSISTANT

## 2023-01-03 PROCEDURE — 93010 ELECTROCARDIOGRAM REPORT: CPT | Mod: 76,HCNC,S$GLB, | Performed by: INTERNAL MEDICINE

## 2023-01-03 PROCEDURE — 99204 PR OFFICE/OUTPT VISIT, NEW, LEVL IV, 45-59 MIN: ICD-10-PCS | Mod: 25,HCNC,ICN, | Performed by: INTERNAL MEDICINE

## 2023-01-03 PROCEDURE — 3075F SYST BP GE 130 - 139MM HG: CPT | Mod: HCNC,CPTII,S$GLB, | Performed by: FAMILY MEDICINE

## 2023-01-03 PROCEDURE — 96361 HYDRATE IV INFUSION ADD-ON: CPT | Mod: HCNC

## 2023-01-03 PROCEDURE — 96375 TX/PRO/DX INJ NEW DRUG ADDON: CPT | Mod: HCNC

## 2023-01-03 PROCEDURE — 1159F PR MEDICATION LIST DOCUMENTED IN MEDICAL RECORD: ICD-10-PCS | Mod: HCNC,CPTII,S$GLB, | Performed by: FAMILY MEDICINE

## 2023-01-03 PROCEDURE — 3079F DIAST BP 80-89 MM HG: CPT | Mod: HCNC,CPTII,S$GLB, | Performed by: FAMILY MEDICINE

## 2023-01-03 PROCEDURE — 3008F PR BODY MASS INDEX (BMI) DOCUMENTED: ICD-10-PCS | Mod: HCNC,CPTII,S$GLB, | Performed by: FAMILY MEDICINE

## 2023-01-03 PROCEDURE — 82962 GLUCOSE BLOOD TEST: CPT | Mod: HCNC

## 2023-01-03 PROCEDURE — 87635 SARS-COV-2 COVID-19 AMP PRB: CPT | Mod: HCNC | Performed by: EMERGENCY MEDICINE

## 2023-01-03 PROCEDURE — G0378 HOSPITAL OBSERVATION PER HR: HCPCS | Mod: HCNC

## 2023-01-03 PROCEDURE — 99397 PR PREVENTIVE VISIT,EST,65 & OVER: ICD-10-PCS | Mod: 25,HCNC,S$GLB, | Performed by: FAMILY MEDICINE

## 2023-01-03 PROCEDURE — 80053 COMPREHEN METABOLIC PANEL: CPT | Mod: HCNC | Performed by: PHYSICIAN ASSISTANT

## 2023-01-03 PROCEDURE — 1159F MED LIST DOCD IN RCRD: CPT | Mod: HCNC,CPTII,S$GLB, | Performed by: FAMILY MEDICINE

## 2023-01-03 PROCEDURE — 93005 ELECTROCARDIOGRAM TRACING: CPT | Mod: HCNC,S$GLB,, | Performed by: FAMILY MEDICINE

## 2023-01-03 PROCEDURE — 96365 THER/PROPH/DIAG IV INF INIT: CPT | Mod: HCNC

## 2023-01-03 PROCEDURE — 1125F PR PAIN SEVERITY QUANTIFIED, PAIN PRESENT: ICD-10-PCS | Mod: HCNC,CPTII,S$GLB, | Performed by: FAMILY MEDICINE

## 2023-01-03 PROCEDURE — 83880 ASSAY OF NATRIURETIC PEPTIDE: CPT | Mod: HCNC | Performed by: PHYSICIAN ASSISTANT

## 2023-01-03 PROCEDURE — 93010 EKG 12-LEAD: ICD-10-PCS | Mod: 76,HCNC,S$GLB, | Performed by: INTERNAL MEDICINE

## 2023-01-03 PROCEDURE — 96376 TX/PRO/DX INJ SAME DRUG ADON: CPT | Mod: HCNC,59

## 2023-01-03 PROCEDURE — 99397 PER PM REEVAL EST PAT 65+ YR: CPT | Mod: 25,HCNC,S$GLB, | Performed by: FAMILY MEDICINE

## 2023-01-03 PROCEDURE — 93010 ELECTROCARDIOGRAM REPORT: CPT | Mod: 76,HCNC,, | Performed by: INTERNAL MEDICINE

## 2023-01-03 PROCEDURE — 99291 CRITICAL CARE FIRST HOUR: CPT | Mod: HCNC

## 2023-01-03 PROCEDURE — 25000003 PHARM REV CODE 250: Mod: HCNC

## 2023-01-03 PROCEDURE — 1125F AMNT PAIN NOTED PAIN PRSNT: CPT | Mod: HCNC,CPTII,S$GLB, | Performed by: FAMILY MEDICINE

## 2023-01-03 RX ORDER — HEPARIN SODIUM,PORCINE/D5W 25000/250
0-40 INTRAVENOUS SOLUTION INTRAVENOUS CONTINUOUS
Status: DISCONTINUED | OUTPATIENT
Start: 2023-01-03 | End: 2023-01-03

## 2023-01-03 RX ORDER — ATORVASTATIN CALCIUM 10 MG/1
40 TABLET, FILM COATED ORAL NIGHTLY
Status: DISCONTINUED | OUTPATIENT
Start: 2023-01-03 | End: 2023-01-03 | Stop reason: HOSPADM

## 2023-01-03 RX ORDER — GLUCAGON 1 MG
1 KIT INJECTION
Status: DISCONTINUED | OUTPATIENT
Start: 2023-01-03 | End: 2023-01-03 | Stop reason: HOSPADM

## 2023-01-03 RX ORDER — METOPROLOL SUCCINATE 25 MG/1
25 TABLET, EXTENDED RELEASE ORAL DAILY
Status: DISCONTINUED | OUTPATIENT
Start: 2023-01-03 | End: 2023-01-03 | Stop reason: HOSPADM

## 2023-01-03 RX ORDER — NALOXONE HCL 0.4 MG/ML
0.02 VIAL (ML) INJECTION
Status: DISCONTINUED | OUTPATIENT
Start: 2023-01-03 | End: 2023-01-03 | Stop reason: HOSPADM

## 2023-01-03 RX ORDER — ASPIRIN 81 MG/1
81 TABLET ORAL DAILY
Status: DISCONTINUED | OUTPATIENT
Start: 2023-01-03 | End: 2023-01-03

## 2023-01-03 RX ORDER — IBUPROFEN 200 MG
16 TABLET ORAL
Status: DISCONTINUED | OUTPATIENT
Start: 2023-01-03 | End: 2023-01-03 | Stop reason: HOSPADM

## 2023-01-03 RX ORDER — ASPIRIN 325 MG
325 TABLET, DELAYED RELEASE (ENTERIC COATED) ORAL
Status: COMPLETED | OUTPATIENT
Start: 2023-01-03 | End: 2023-01-03

## 2023-01-03 RX ORDER — CLOPIDOGREL 300 MG/1
600 TABLET, FILM COATED ORAL
Status: COMPLETED | OUTPATIENT
Start: 2023-01-03 | End: 2023-01-03

## 2023-01-03 RX ORDER — ASPIRIN 81 MG/1
81 TABLET ORAL DAILY
Status: DISCONTINUED | OUTPATIENT
Start: 2023-01-04 | End: 2023-01-03 | Stop reason: HOSPADM

## 2023-01-03 RX ORDER — HYDROCODONE BITARTRATE AND ACETAMINOPHEN 10; 325 MG/1; MG/1
1 TABLET ORAL 3 TIMES DAILY PRN
Qty: 72 TABLET | Refills: 0 | Status: SHIPPED | OUTPATIENT
Start: 2023-02-16 | End: 2023-06-12 | Stop reason: SDUPTHER

## 2023-01-03 RX ORDER — IBUPROFEN 200 MG
24 TABLET ORAL
Status: DISCONTINUED | OUTPATIENT
Start: 2023-01-03 | End: 2023-01-03 | Stop reason: HOSPADM

## 2023-01-03 RX ORDER — ACETAMINOPHEN 325 MG/1
650 TABLET ORAL EVERY 4 HOURS PRN
Status: DISCONTINUED | OUTPATIENT
Start: 2023-01-03 | End: 2023-01-03 | Stop reason: HOSPADM

## 2023-01-03 RX ORDER — HYDROCODONE BITARTRATE AND ACETAMINOPHEN 10; 325 MG/1; MG/1
1 TABLET ORAL 3 TIMES DAILY PRN
Qty: 72 TABLET | Refills: 0 | Status: SHIPPED | OUTPATIENT
Start: 2023-01-16 | End: 2023-02-28 | Stop reason: CLARIF

## 2023-01-03 RX ORDER — METOPROLOL SUCCINATE 50 MG/1
50 TABLET, EXTENDED RELEASE ORAL
Status: COMPLETED | OUTPATIENT
Start: 2023-01-03 | End: 2023-01-03

## 2023-01-03 RX ORDER — GABAPENTIN 300 MG/1
300 CAPSULE ORAL 3 TIMES DAILY
Qty: 90 CAPSULE | Refills: 1 | Status: SHIPPED | OUTPATIENT
Start: 2023-01-03 | End: 2023-03-23 | Stop reason: SDUPTHER

## 2023-01-03 RX ORDER — DILTIAZEM HYDROCHLORIDE 5 MG/ML
20 INJECTION INTRAVENOUS
Status: COMPLETED | OUTPATIENT
Start: 2023-01-03 | End: 2023-01-03

## 2023-01-03 RX ORDER — TALC
6 POWDER (GRAM) TOPICAL NIGHTLY PRN
Status: DISCONTINUED | OUTPATIENT
Start: 2023-01-03 | End: 2023-01-03 | Stop reason: HOSPADM

## 2023-01-03 RX ORDER — NEOMYCIN SULFATE, POLYMYXIN B SULFATE AND HYDROCORTISONE 10; 3.5; 1 MG/ML; MG/ML; [USP'U]/ML
3 SUSPENSION/ DROPS AURICULAR (OTIC) EVERY 6 HOURS
Status: DISCONTINUED | OUTPATIENT
Start: 2023-01-03 | End: 2023-01-03 | Stop reason: HOSPADM

## 2023-01-03 RX ORDER — SODIUM CHLORIDE 0.9 % (FLUSH) 0.9 %
10 SYRINGE (ML) INJECTION
Status: DISCONTINUED | OUTPATIENT
Start: 2023-01-03 | End: 2023-01-03 | Stop reason: HOSPADM

## 2023-01-03 RX ORDER — INSULIN ASPART 100 [IU]/ML
0-5 INJECTION, SOLUTION INTRAVENOUS; SUBCUTANEOUS
Status: DISCONTINUED | OUTPATIENT
Start: 2023-01-03 | End: 2023-01-03 | Stop reason: HOSPADM

## 2023-01-03 RX ORDER — NEOMYCIN SULFATE, POLYMYXIN B SULFATE AND HYDROCORTISONE 10; 3.5; 1 MG/ML; MG/ML; [USP'U]/ML
3 SUSPENSION/ DROPS AURICULAR (OTIC) 4 TIMES DAILY
Qty: 10 ML | Refills: 0 | Status: SHIPPED | OUTPATIENT
Start: 2023-01-03 | End: 2023-01-17

## 2023-01-03 RX ORDER — HYDROCODONE BITARTRATE AND ACETAMINOPHEN 10; 325 MG/1; MG/1
1 TABLET ORAL 3 TIMES DAILY PRN
Qty: 72 TABLET | Refills: 0 | Status: SHIPPED | OUTPATIENT
Start: 2023-03-16 | End: 2023-03-23 | Stop reason: SDUPTHER

## 2023-01-03 RX ORDER — LANOLIN ALCOHOL/MO/W.PET/CERES
800 CREAM (GRAM) TOPICAL
Status: DISCONTINUED | OUTPATIENT
Start: 2023-01-03 | End: 2023-01-03 | Stop reason: HOSPADM

## 2023-01-03 RX ORDER — DOXYCYCLINE HYCLATE 100 MG
100 TABLET ORAL 2 TIMES DAILY
Qty: 14 TABLET | Refills: 0 | Status: SHIPPED | OUTPATIENT
Start: 2023-01-03 | End: 2023-02-28 | Stop reason: CLARIF

## 2023-01-03 RX ORDER — METOPROLOL SUCCINATE 25 MG/1
25 TABLET, EXTENDED RELEASE ORAL DAILY
Qty: 30 TABLET | Refills: 3 | Status: SHIPPED | OUTPATIENT
Start: 2023-01-03 | End: 2023-01-10 | Stop reason: SDUPTHER

## 2023-01-03 RX ORDER — PRAVASTATIN SODIUM 10 MG/1
20 TABLET ORAL DAILY
Status: DISCONTINUED | OUTPATIENT
Start: 2023-01-04 | End: 2023-01-03

## 2023-01-03 RX ORDER — LANCETS 33 GAUGE
EACH MISCELLANEOUS
COMMUNITY
Start: 2022-10-05

## 2023-01-03 RX ORDER — SODIUM CHLORIDE 0.9 % (FLUSH) 0.9 %
10 SYRINGE (ML) INJECTION EVERY 8 HOURS
Status: DISCONTINUED | OUTPATIENT
Start: 2023-01-03 | End: 2023-01-03

## 2023-01-03 RX ORDER — DOXYCYCLINE HYCLATE 100 MG
100 TABLET ORAL EVERY 12 HOURS
Status: DISCONTINUED | OUTPATIENT
Start: 2023-01-03 | End: 2023-01-03 | Stop reason: HOSPADM

## 2023-01-03 RX ORDER — METOPROLOL SUCCINATE 50 MG/1
50 TABLET, EXTENDED RELEASE ORAL DAILY
Status: DISCONTINUED | OUTPATIENT
Start: 2023-01-03 | End: 2023-01-03

## 2023-01-03 RX ORDER — AMOXICILLIN 250 MG
1 CAPSULE ORAL DAILY PRN
Status: DISCONTINUED | OUTPATIENT
Start: 2023-01-03 | End: 2023-01-03 | Stop reason: HOSPADM

## 2023-01-03 RX ORDER — METOPROLOL SUCCINATE 50 MG/1
50 TABLET, EXTENDED RELEASE ORAL
Status: DISCONTINUED | OUTPATIENT
Start: 2023-01-03 | End: 2023-01-03

## 2023-01-03 RX ADMIN — SODIUM CHLORIDE, SODIUM LACTATE, POTASSIUM CHLORIDE, AND CALCIUM CHLORIDE 500 ML: .6; .31; .03; .02 INJECTION, SOLUTION INTRAVENOUS at 09:01

## 2023-01-03 RX ADMIN — ASPIRIN 325 MG: 325 TABLET, COATED ORAL at 12:01

## 2023-01-03 RX ADMIN — CLOPIDOGREL BISULFATE 600 MG: 300 TABLET, FILM COATED ORAL at 12:01

## 2023-01-03 RX ADMIN — DILTIAZEM HYDROCHLORIDE 20 MG: 5 INJECTION INTRAVENOUS at 11:01

## 2023-01-03 RX ADMIN — METOPROLOL SUCCINATE 50 MG: 50 TABLET, EXTENDED RELEASE ORAL at 11:01

## 2023-01-03 RX ADMIN — DILTIAZEM HYDROCHLORIDE 20 MG: 5 INJECTION INTRAVENOUS at 10:01

## 2023-01-03 RX ADMIN — HEPARIN SODIUM 12 UNITS/KG/HR: 10000 INJECTION, SOLUTION INTRAVENOUS at 01:01

## 2023-01-03 RX ADMIN — SODIUM CHLORIDE, SODIUM LACTATE, POTASSIUM CHLORIDE, AND CALCIUM CHLORIDE 500 ML: .6; .31; .03; .02 INJECTION, SOLUTION INTRAVENOUS at 11:01

## 2023-01-03 NOTE — PROGRESS NOTES
[facility]  Progress Note    SUBJECTIVE:     Chief Complaint   Patient presents with    Ear Fullness    Otalgia       HPI   King Cool Jr.  is a 67 y.o. male here for physical exam. Also has issues with L ear pain which will be addressed below. Patient has no other new complaints/problems at this time.      Review of patient's allergies indicates:   Allergen Reactions    Tamiflu [oseltamivir]      Increases BP    Metformin      Diarrhea, nausea    Penicillins Rash       Past Medical History:   Diagnosis Date    Essential (primary) hypertension     Male erectile dysfunction, unspecified     Testicular hypofunction     Type 2 diabetes mellitus without complications      Past Surgical History:   Procedure Laterality Date    HIP SURGERY       Family History   Problem Relation Age of Onset    Blindness Mother     No Known Problems Father     No Known Problems Brother     No Known Problems Daughter     No Known Problems Son     No Known Problems Daughter     Diabetes Brother     No Known Problems Brother     Amblyopia Neg Hx     Cancer Neg Hx     Cataracts Neg Hx     Glaucoma Neg Hx     Hypertension Neg Hx     Macular degeneration Neg Hx     Retinal detachment Neg Hx     Strabismus Neg Hx     Stroke Neg Hx     Thyroid disease Neg Hx      Social History     Tobacco Use    Smoking status: Former     Packs/day: 0.25     Types: Cigarettes     Start date: 1972     Quit date: 1976     Years since quittin.1    Smokeless tobacco: Never   Substance Use Topics    Alcohol use: Not Currently     Comment: Pt. has a Hx. of  Alcohol use.    Drug use: No        Review of Systems   Constitutional:  Negative for chills, diaphoresis and fever.   HENT:  Positive for ear pain (L ear). Negative for congestion and sore throat.    Eyes:  Negative for photophobia and discharge.   Respiratory:  Negative for cough, shortness of breath and wheezing.    Cardiovascular:  Negative for chest pain and palpitations.    Gastrointestinal:  Negative for abdominal pain, constipation, diarrhea, nausea and vomiting.   Genitourinary:  Negative for dysuria and hematuria.   Musculoskeletal:  Negative for back pain and myalgias.   Skin:  Negative for itching and rash.   Neurological:  Negative for dizziness, sensory change, focal weakness, weakness and headaches.   All other systems reviewed and are negative.  OBJECTIVE:     Vitals:    01/03/23 0805   BP: 138/82   Pulse: (!) 161   Temp: 98.8 °F (37.1 °C)     Body mass index is 32.66 kg/m².    Physical Exam  Constitutional:       General: He is not in acute distress.     Appearance: He is not diaphoretic.   HENT:      Head: Normocephalic and atraumatic.      Right Ear: Tympanic membrane and ear canal normal. No hemotympanum. Tympanic membrane is not perforated, erythematous or bulging.      Left Ear: Tympanic membrane and ear canal normal. Drainage and swelling (canal) present. No hemotympanum. Tympanic membrane is not perforated, erythematous or bulging.      Mouth/Throat:      Pharynx: No oropharyngeal exudate.   Eyes:      Conjunctiva/sclera: Conjunctivae normal.      Pupils: Pupils are equal, round, and reactive to light.   Neck:      Thyroid: No thyromegaly.   Cardiovascular:      Rate and Rhythm: Tachycardia present. Rhythm irregularly irregular.      Heart sounds: Normal heart sounds. No murmur heard.    No friction rub. No gallop.   Pulmonary:      Effort: Pulmonary effort is normal. No respiratory distress.      Breath sounds: Normal breath sounds. No wheezing or rales.   Abdominal:      General: Bowel sounds are normal. There is no distension.      Palpations: Abdomen is soft.      Tenderness: There is no abdominal tenderness. There is no guarding or rebound.   Musculoskeletal:         General: No tenderness. Normal range of motion.   Lymphadenopathy:      Cervical: No cervical adenopathy.   Skin:     General: Skin is warm.      Findings: No erythema or rash.   Neurological:       Mental Status: He is alert and oriented to person, place, and time.     Old records were reviewed. Labs and/or images were independently reviewed.  EKG - new onset atrial flutter  ASSESSMENT     1. Routine physical examination    2. Otitis externa of left ear, unspecified chronicity, unspecified type    3. Type 2 diabetes mellitus with microalbuminuria, without long-term current use of insulin    4. Essential (primary) hypertension    5. Primary osteoarthritis of both knees    6. Chronic pain of right knee    7. New onset atrial flutter        PLAN:     Routine physical examination  -     Comprehensive Metabolic Panel; Future  -     Hemoglobin A1C; Future  -     Lipid Panel; Future  -     CBC Auto Differential; Future  -     TSH; Future  -     T4, Free; Future  -     We briefly discussed diet, exercise, and routine preventive exams. All questions and comments addressed.    RTC TYLOR Lara MD  01/03/2023 8:18 AM    Additional Evaluation & Management issues:    In addition to today's Annual Physical, patient has other medical issues that need to be addressed, as well as their associated prescription management that is separate from today's physical, as documented separately below.     HPI  Patient here for f/u of his chronic conditions. Also has issues with L ear pain which he got abx and ear drops in [place] but didn't help. No fevers, chills. Also noted to have significant tachycardia today but no chest pain, SOB, n/v, palpitatoins.    Review of Systems   Constitutional:  Negative for chills, diaphoresis and fever.   HENT:  Positive for ear pain (L ear). Negative for congestion and sore throat.    Eyes:  Negative for photophobia and discharge.   Respiratory:  Negative for cough, shortness of breath and wheezing.    Cardiovascular:  Negative for chest pain and palpitations.   Gastrointestinal:  Negative for abdominal pain, constipation, diarrhea, nausea and vomiting.   Genitourinary:  Negative for  dysuria and hematuria.   Musculoskeletal:  Negative for back pain and myalgias.   Skin:  Negative for itching and rash.   Neurological:  Negative for dizziness, sensory change, focal weakness, weakness and headaches.   All other systems reviewed and are negative.    Physical Exam  Constitutional:       General: He is not in acute distress.     Appearance: He is not diaphoretic.   HENT:      Head: Normocephalic and atraumatic.      Right Ear: Tympanic membrane and ear canal normal. No hemotympanum. Tympanic membrane is not perforated, erythematous or bulging.      Left Ear: Tympanic membrane and ear canal normal. Drainage and swelling (canal) present. No hemotympanum. Tympanic membrane is not perforated, erythematous or bulging.      Mouth/Throat:      Pharynx: No oropharyngeal exudate.   Eyes:      Conjunctiva/sclera: Conjunctivae normal.      Pupils: Pupils are equal, round, and reactive to light.   Neck:      Thyroid: No thyromegaly.   Cardiovascular:      Rate and Rhythm: Tachycardia present. Rhythm irregularly irregular.      Heart sounds: Normal heart sounds. No murmur heard.    No friction rub. No gallop.   Pulmonary:      Effort: Pulmonary effort is normal. No respiratory distress.      Breath sounds: Normal breath sounds. No wheezing or rales.   Abdominal:      General: Bowel sounds are normal. There is no distension.      Palpations: Abdomen is soft.      Tenderness: There is no abdominal tenderness. There is no guarding or rebound.   Musculoskeletal:         General: No tenderness. Normal range of motion.   Lymphadenopathy:      Cervical: No cervical adenopathy.   Skin:     General: Skin is warm.      Findings: No erythema or rash.   Neurological:      Mental Status: He is alert and oriented to person, place, and time.     EKG - new onset atrial flutter, rate 150-160s. 2:1.     Otitis externa of left ear, unspecified chronicity, unspecified type  -     neomycin-polymyxin-hydrocortisone (CORTISPORIN)  3.5-10,000-1 mg/mL-unit/mL-% otic suspension; Place 3 drops into the left ear 4 (four) times daily.  Dispense: 10 mL; Refill: 0  -     doxycycline (VIBRA-TABS) 100 MG tablet; Take 1 tablet (100 mg total) by mouth 2 (two) times daily.  Dispense: 14 tablet; Refill: 0    Type 2 diabetes mellitus with microalbuminuria, without long-term current use of insulin  -     Comprehensive Metabolic Panel; Future  -     Hemoglobin A1C; Future  -     Lipid Panel; Future  -     CBC Auto Differential; Future  -     TSH; Future  -     T4, Free; Future  -     Instructed patient to take daily glucose AM logs and to write them down to bring with on next visit. Advised patient to decrease intake of carbohydrates/simple sugars.         Essential (primary) hypertension   -     Educated patient to take medications as prescribed, and to record bp logs daily to bring along to next visit. Instructed patient to decrease salt intake. Also told patient to call if any new signs or symptoms develop.    Primary osteoarthritis of both knees  -     HYDROcodone-acetaminophen (NORCO)  mg per tablet; Take 1 tablet by mouth 3 (three) times daily as needed (pain).  Dispense: 72 tablet; Refill: 0  -     HYDROcodone-acetaminophen (NORCO)  mg per tablet; Take 1 tablet by mouth 3 (three) times daily as needed (pain).  Dispense: 72 tablet; Refill: 0  -     HYDROcodone-acetaminophen (NORCO)  mg per tablet; Take 1 tablet by mouth 3 (three) times daily as needed (pain).  Dispense: 72 tablet; Refill: 0  -     Stable. Continue current regimen. Unable to wean at this time but am monitoring closely for any drug toxicity. Checked .    Chronic pain of right knee  -     gabapentin (NEURONTIN) 300 MG capsule; Take 1 capsule (300 mg total) by mouth 3 (three) times daily.  Dispense: 90 capsule; Refill: 1  -     Stable. Continue current regimen.    New Onset Atrial flutter   -     hemodynamically stable, [id number] score of 3. Discussed case with   Josh, recommend ER evaluation. Pt refused ambulance.     RTC PRN

## 2023-01-03 NOTE — ASSESSMENT & PLAN NOTE
Patient's FSGs are uncontrolled due to hyperglycemia on current medication regimen.  Last A1c reviewed-   Lab Results   Component Value Date    HGBA1C 7.4 (H) 04/19/2022     Most recent fingerstick glucose reviewed- No results for input(s): POCTGLUCOSE in the last 24 hours.  Current correctional scale  Medium  Maintain anti-hyperglycemic dose as follows-   Antihyperglycemics (From admission, onward)    Start     Stop Route Frequency Ordered    01/03/23 2100  insulin detemir U-100 pen 9 Units         -- SubQ Nightly 01/03/23 1423    01/03/23 1507  insulin aspart U-100 pen 0-5 Units         -- SubQ Before meals & nightly PRN 01/03/23 1407        Hold Oral hypoglycemics while patient is in the hospital.

## 2023-01-03 NOTE — ASSESSMENT & PLAN NOTE
New AF/FL, pt was unaware of arrhythmia, now back in NSR  CHADSVASC 3  Start Xarelto  Inc toprol 25mg qd  Check echo

## 2023-01-03 NOTE — HOSPITAL COURSE
King Cool Jr. 67 y.o. male placed in observation for new onset afib.  He went to his PCP today for ear pain and annual evaluation.  He was found to have elevated heart rate and directed to the emergency room.  He Was found to be in AFib in RVR which converted to normal sinus rhythm after 2 doses of IV diltiazem.  His troponin was 0.1 and 0.1 repeat.  He was seen by Cardiology and recommended start Xarelto and increase metoprolol to 25 daily.  He was then cleared for discharge by Cardiology.  He was informed of elevated hemoglobin, reports he had been previously been informed of this.  He previously been tested for JAK2 which was negative.  He is eager for discharge and wishes to follow up outpatient.  He underwent an echo prior to discharge which showed an EF of 50%.    He will follow-up with his PCP and Cardiology.  At discharge he was without chest pain shortness of breath or dizziness.  He returned to normal sinus rhythm.

## 2023-01-03 NOTE — PLAN OF CARE
[facility] - Emergency Dept  Discharge Final Note    Primary Care Provider: Gallo Lara MD    Expected Discharge Date: 1/3/2023    Final Discharge Note (most recent)       Final Note - 01/03/23 1649          Final Note    Assessment Type Final Discharge Note     Anticipated Discharge Disposition Home or Self Care     What phone number can be called within the next 1-3 days to see how you are doing after discharge? [phone]     Hospital Resources/Appts/Education Provided Post-Acute resouces added to AVS;Appointments scheduled by Navigator/Coordinator;Appointments scheduled and added to AVS        Post-Acute Status    Discharge Delays None known at this time                     Important Message from Medicare             Contact Info       Gallo Lara MD   Specialty: Family Medicine   Relationship: PCP - General    [address] [place] [id number]   Phone: [phone]       Next Steps: Call on 1/10/2023    Instructions: 10:20 a.m.  see Dr. Lara for your hospital followup appt.    Wil Mahoney MD   Specialty: Cardiology, Interventional Cardiology    [address]  [address]  [facility] [address]   Phone: [phone]       Next Steps: Schedule an appointment as soon as possible for a visit on 2/1/2023    Instructions: at 2:40pm        OK for pt to discharge from  standpoint.

## 2023-01-03 NOTE — CONSULTS
[facility] [place] Emergency Dept  Cardiology  Consult Note    Patient Name: King Cool Jr.  MRN: [id number]  Admission Date: 1/3/2023  Hospital Length of Stay: 0 days  Code Status: Full Code   Attending Provider: Rodriguez Kay MD   Consulting Provider: Wil Mahoney MD  Primary Care Physician: Gallo Lara MD  Principal Problem:New onset a-fib    Patient information was obtained from patient and ER records.     Inpatient consult to Cardiology  Consult performed by: Wil Mahoney MD  Consult ordered by: Quincy Quiroga PA-C  Reason for consult: AF/RVR, elev trop        Subjective:     Chief Complaint:  Ear pain     HPI:   67-year-old male with a history of type 2 diabetes and hypertension presents from a clinic appointment for tachycardia.  The patient was found tachycardic when he was at an appointment for an ear infection.  The patient says that he has had the ear infection for several weeks and has taken antibiotics, but the infection did not clear.  He endorses decreased hearing, but no pain.  The patient said he was told to go to the emergency department because they did an EKG in the clinic that showed that he was in atrial flutter.  He also said that he had nausea this morning and vomited 3 times. He denies any chest pain, headache, dizziness, shortness of breath, chest pain, dysuria or new rashes.    Cardiology consulted for AF and elev trop.    Patient presented to his primary care physician's office today for apparent otitis externa and was noted to have a tachycardia.  He was sent to the emergency room for further evaluation.  He denies any associated symptoms such as angina, dyspnea, palpitations, or syncope.  Was found to be in atrial flutter with rapid ventricular response.  On my examination, and evaluation on telemetry, the patient is back in sinus rhythm.  He was unaware of the arrhythmia.  He has no prior history of stroke or TIA, although he does have a history of diabetes.  His chads Vasc  score is at least 2.        Past Medical History:   Diagnosis Date    Essential (primary) hypertension     Male erectile dysfunction, unspecified     New onset a-fib 1/3/2023    Testicular hypofunction     Type 2 diabetes mellitus without complications        Past Surgical History:   Procedure Laterality Date    HIP SURGERY         Review of patient's allergies indicates:   Allergen Reactions    Tamiflu [oseltamivir]      Increases BP    Metformin      Diarrhea, nausea    Penicillins Rash       Current Facility-Administered Medications on File Prior to Encounter   Medication    testosterone cypionate injection 200 mg    triamcinolone acetonide injection 40 mg     Current Outpatient Medications on File Prior to Encounter   Medication Sig    ACCU-CHEK GUIDE TEST STRIPS Strp USE TWICE DAILY (Patient not taking: Reported on 1/3/2023)    alcohol swabs (DROPSAFE ALCOHOL PREP PADS) PadM USE AS DIRECTED AS NEEDED    ASCORBATE CALCIUM (VITAMIN C ORAL) Take by mouth once daily.     aspirin (ECOTRIN) 81 MG EC tablet Take 81 mg by mouth once daily.    blood sugar diagnostic Strp 1 strip by Misc.(Non-Drug; Combo Route) route 3 (three) times daily. (Patient not taking: Reported on 12/23/2022)    blood-glucose meter (ACCU-CHEK GUIDE GLUCOSE METER) Misc Use as directed. (Patient not taking: Reported on 12/23/2022)    blood-glucose meter kit Use as instructed (Patient not taking: Reported on 12/23/2022)    diclofenac (VOLTAREN) 50 MG EC tablet TAKE 1 TABLET BY MOUTH TWICE A DAY    doxycycline (VIBRA-TABS) 100 MG tablet Take 1 tablet (100 mg total) by mouth 2 (two) times daily.    empagliflozin (JARDIANCE) 25 mg tablet Take 1 tablet (25 mg total) by mouth once daily.    fish oil-omega-3 fatty acids 300-1,000 mg capsule Take 2 g by mouth once daily.    gabapentin (NEURONTIN) 300 MG capsule Take 1 capsule (300 mg total) by mouth 3 (three) times daily.    glimepiride (AMARYL) 4 MG tablet Take 1 tablet (4 mg total) by mouth before  breakfast.    [START ON 3/16/2023] HYDROcodone-acetaminophen (NORCO)  mg per tablet Take 1 tablet by mouth 3 (three) times daily as needed (pain).    [START ON 2/16/2023] HYDROcodone-acetaminophen (NORCO)  mg per tablet Take 1 tablet by mouth 3 (three) times daily as needed (pain).    [START ON 1/16/2023] HYDROcodone-acetaminophen (NORCO)  mg per tablet Take 1 tablet by mouth 3 (three) times daily as needed (pain).    hydrocortisone 2.5 % cream Apply topically 2 (two) times daily.    lancets Misc 1 application by Misc.(Non-Drug; Combo Route) route 3 (three) times daily. (Patient not taking: Reported on 1/3/2023)    lisinopriL (PRINIVIL,ZESTRIL) 20 MG tablet     loratadine (CLARITIN) 10 mg tablet TAKE 1 TABLET BY MOUTH EVERY DAY    methocarbamoL (ROBAXIN) 500 MG Tab Take 2 tablets (1,000 mg total) by mouth every 6 (six) hours as needed (muscle spasms).    multivitamin with minerals tablet Take 1 tablet by mouth once daily.    neomycin-polymyxin-hydrocortisone (CORTISPORIN) 3.5-10,000-1 mg/mL-unit/mL-% otic suspension Place 3 drops into the left ear 4 (four) times daily.    pravastatin (PRAVACHOL) 20 MG tablet Take 1 tablet (20 mg total) by mouth once daily.    testosterone cypionate (DEPOTESTOTERONE CYPIONATE) 200 mg/mL injection Inject 1 mL (200 mg total) into the muscle every 14 (fourteen) days.    TRUEPLUS LANCETS 33 gauge Misc     [DISCONTINUED] azithromycin (Z-ASHLEY) 250 MG tablet TAKE 2 TABS BY MOUTH ON DAY 1, THEN 1 TAB DAILY AFTERWARDS    [DISCONTINUED] gabapentin (NEURONTIN) 300 MG capsule TAKE 1 CAPSULE BY MOUTH THREE TIMES A DAY    [DISCONTINUED] HYDROcodone-acetaminophen (NORCO)  mg per tablet Take 1 tablet by mouth 3 (three) times daily as needed (pain).    [DISCONTINUED] HYDROcodone-acetaminophen (NORCO)  mg per tablet Take 1 tablet by mouth 3 (three) times daily as needed (pain).    [DISCONTINUED] HYDROcodone-acetaminophen (NORCO)  mg per tablet Take 1 tablet by mouth  3 (three) times daily as needed (pain).    [DISCONTINUED] metFORMIN (GLUCOPHAGE-XR) 500 MG ER 24hr tablet Take 1 tablet (500 mg total) by mouth once daily.    [DISCONTINUED] promethazine-dextromethorphan (PROMETHAZINE-DM) 6.25-15 mg/5 mL Syrp TAKE 5 ML BY MOUTH EVERY 6 HOURS AS NEEDED FOR COUGH     Family History       Problem Relation (Age of Onset)    Blindness Mother    Diabetes Brother    No Known Problems Father, Brother, Daughter, Son, Daughter, Brother          Tobacco Use    Smoking status: Former     Packs/day: 0.25     Types: Cigarettes     Start date: 1972     Quit date: 1976     Years since quittin.1    Smokeless tobacco: Never   Substance and Sexual Activity    Alcohol use: Not Currently     Comment: Pt. has a Hx. of  Alcohol use.    Drug use: No    Sexual activity: Yes     Partners: Female     Birth control/protection: None     Review of Systems   Constitutional: Negative for chills, diaphoresis, fever and malaise/fatigue.   HENT:  Positive for ear pain. Negative for nosebleeds.    Eyes:  Negative for blurred vision and double vision.   Cardiovascular:  Negative for chest pain, claudication, cyanosis, dyspnea on exertion, leg swelling, orthopnea, palpitations, paroxysmal nocturnal dyspnea and syncope.   Respiratory:  Negative for cough, shortness of breath and wheezing.    Skin:  Negative for dry skin and poor wound healing.   Musculoskeletal:  Negative for back pain, joint swelling and myalgias.   Gastrointestinal:  Negative for abdominal pain, nausea and vomiting.   Genitourinary:  Negative for hematuria.   Neurological:  Negative for dizziness, headaches, numbness, seizures and weakness.   Psychiatric/Behavioral:  Negative for altered mental status and depression.    Objective:     Vital Signs (Most Recent):  Temp: 98.1 °F (36.7 °C) (23 0925)  Pulse: 86 (23 1325)  Resp: 15 (23 1117)  BP: 138/82 (23 1320)  SpO2: 96 % (23 1325) Vital Signs (24h  Range):  Temp:  [98.1 °F (36.7 °C)-98.8 °F (37.1 °C)] 98.1 °F (36.7 °C)  Pulse:  [] 86  Resp:  [11-20] 15  SpO2:  [92 %-97 %] 96 %  BP: (112-142)/(59-89) 138/82     Weight: 99.8 kg (220 lb)  Body mass index is 31.57 kg/m².    SpO2: 96 %         Intake/Output Summary (Last 24 hours) at 1/3/2023 1437  Last data filed at 1/3/2023 1216  Gross per 24 hour   Intake 1000 ml   Output --   Net 1000 ml       Lines/Drains/Airways       Peripheral Intravenous Line  Duration                  Peripheral IV - Single Lumen 01/03/23 0932 18 G Anterior;Distal;Right Upper Arm <1 day         Peripheral IV - Single Lumen 01/03/23 1326 20 G Anterior;Left;Proximal Forearm <1 day                    Physical Exam  Constitutional:       General: He is not in acute distress.     Appearance: He is well-developed. He is obese. He is not ill-appearing, toxic-appearing or diaphoretic.   HENT:      Head: Normocephalic and atraumatic.   Eyes:      General: No scleral icterus.     Extraocular Movements: Extraocular movements intact.      Conjunctiva/sclera: Conjunctivae normal.      Pupils: Pupils are equal, round, and reactive to light.   Neck:      Thyroid: No thyromegaly.      Vascular: No JVD.      Trachea: No tracheal deviation.   Cardiovascular:      Rate and Rhythm: Normal rate and regular rhythm.      Heart sounds: S1 normal and S2 normal. No murmur heard.    No friction rub. No gallop.   Pulmonary:      Effort: Pulmonary effort is normal. No respiratory distress.      Breath sounds: Normal breath sounds. No stridor. No wheezing, rhonchi or rales.   Chest:      Chest wall: No tenderness.   Abdominal:      General: There is no distension.      Palpations: Abdomen is soft.   Musculoskeletal:         General: No swelling or tenderness. Normal range of motion.      Cervical back: Normal range of motion and neck supple. No rigidity.      Right lower leg: No edema.      Left lower leg: No edema.   Skin:     General: Skin is warm and dry.       Coloration: Skin is not jaundiced.   Neurological:      General: No focal deficit present.      Mental Status: He is alert and oriented to person, place, and time.      Cranial Nerves: No cranial nerve deficit.   Psychiatric:         Mood and Affect: Mood normal.         Behavior: Behavior normal.       Current Medications:   [START ON 1/4/2023] aspirin  81 mg Oral Daily    doxycycline  100 mg Oral Q12H    insulin detemir U-100  9 Units Subcutaneous QHS    [START ON 1/4/2023] metoprolol succinate  12.5 mg Oral Daily    neomycin-polymyxin-hydrocortisone  3 drop Left Ear Q6H    [START ON 1/4/2023] pravastatin  20 mg Oral Daily    testosterone cypionate  200 mg Intramuscular Q14 Days      heparin (porcine) in D5W 12 Units/kg/hr (01/03/23 1307)     acetaminophen, glucagon (human recombinant), glucagon (human recombinant), glucose, glucose, glucose, glucose, heparin (PORCINE), heparin (PORCINE), insulin aspart U-100, magnesium oxide, magnesium oxide, melatonin, naloxone, senna-docusate 8.6-50 mg, sodium chloride 0.9%    Laboratory (all labs reviewed):  CBC:  Recent Labs   Lab 02/07/22  1121 01/03/23  0934   WBC 8.18 11.94   Hemoglobin 16.4 20.8 HH   Hematocrit 49.1 60.3 H   Platelets 237 238       CHEMISTRIES:  Recent Labs   Lab 07/08/20  1540 03/02/21  1030 06/24/21  0925 02/07/22  1121 01/03/23  0934   Glucose 119 H 159 H 246 H 239 H 351 H   Sodium 138 141 139 136 133 L   Potassium 4.1 4.9 4.8 4.2 4.9   BUN 12 20 16 13 18   Creatinine 1.1 1.1 1.2 0.9 1.3   eGFR if non African American >60.0 >60 >60.0 >60.0  --    eGFR  --   --   --   --  >60   Calcium 9.2 9.3 10.1 9.4 8.9       CARDIAC BIOMARKERS:  Recent Labs   Lab 01/03/23  0934 01/03/23  1222   Troponin I 0.119 H 0.125 H       COAGS:        LIPIDS/LFTS:  Recent Labs   Lab 07/08/20  1540 03/02/21  1030 06/24/21  0925 02/07/22  1121 01/03/23  0934   Cholesterol  --  179  --  207 H  --    Triglycerides  --  222 H  --  262 H  --    HDL  --  29 L  --  37 L  --    LDL  Cholesterol  --  105.6  --  117.6  --    Non-HDL Cholesterol  --  150  --  170  --    AST 23 25 20 25 24   ALT 40 31 28 40 39       BNP:  Recent Labs   Lab 01/03/23  0934   BNP 35       TSH:  Recent Labs   Lab 02/07/22  1121 01/03/23  0934   TSH 0.859 0.826       Free T4:  Recent Labs   Lab 02/07/22  1121   Free T4 0.75       Diagnostic Results:  ECG (personally reviewed and interpreted tracing(s)):  1/3/23 0857   1/3/23 1022 AF 84, IRBBB  1/3/23 1515 tele: SR 84    Chest X-Ray (personally reviewed and interpreted image(s)): 1/3/22 NAD    Echo: ordered          Assessment and Plan:     * New onset a-fib  New AF/FL, pt was unaware of arrhythmia, now back in NSR  CHADSVASC 3  Start Xarelto  Inc toprol 25mg qd  Check echo    Elevated troponin level not due to acute coronary syndrome  Flat pattern in setting of AFL/RVR  No anginal sxs  Outpat MPI    Essential (primary) hypertension  Cont med rx  Consider CLAUDIA eval    Type 2 diabetes mellitus with microalbuminuria, without long-term current use of insulin  Per IM    Elevated hemoglobin  Consider heme eval    Otitis externa of left ear    Mgmt per IM    Dyslipidemia       Change prava to atorva 40mg qhs       Check lipids/LFT 6 months (July 2023    VTE Risk Mitigation (From admission, onward)           Ordered     rivaroxaban tablet 20 mg  With dinner         01/03/23 1511     IP VTE HIGH RISK PATIENT  Once         01/03/23 1408     Place sequential compression device  Until discontinued         01/03/23 1408     Place YAMILET hose  Until discontinued         01/03/23 1408                  Dispo planning appropriate  Pt to follow up with me in the office.    Thank you for your consult. I will follow-up with patient. Please contact us if you have any additional questions.    Wil Mahoney MD  Cardiology   [facility] [place] - Emergency Dept

## 2023-01-03 NOTE — HPI
"King Cool Jr. 67 y.o. male with HTN, DM, HLD presents to the hospital with a chief complaint of tachycardia.  He reports being seen today at his PCP for left ear pain which he attributed to an ear infection.  He denies any treatment at home. He describes it as a burning pain without radiation.  He denies any aggravating or alleviating factors.  He reports he was prescribed medications at the PCP visit, and then directed to the emergency room after he was found to have an elevated heart rate and an EKG showing new onset AFib during the PCP visit.  He denies any history of AFib.  He denies any chest pain shortness breath palpitations dizziness syncope melena hematuria hematemesis.  He had 1 episode of emesis yesterday, but attributes this to "bad food."     In the ED, additionally in AFib converted to normal sinus rhythm with IV diltiazem chest x-ray without acute process initial troponin 0.1 and 0.1 and repeat TSH within normal limits COVID negative.  "

## 2023-01-03 NOTE — SUBJECTIVE & OBJECTIVE
Past Medical History:   Diagnosis Date    Essential (primary) hypertension     Male erectile dysfunction, unspecified     New onset a-fib 1/3/2023    Testicular hypofunction     Type 2 diabetes mellitus without complications        Past Surgical History:   Procedure Laterality Date    HIP SURGERY         Review of patient's allergies indicates:   Allergen Reactions    Tamiflu [oseltamivir]      Increases BP    Metformin      Diarrhea, nausea    Penicillins Rash       Current Facility-Administered Medications on File Prior to Encounter   Medication    testosterone cypionate injection 200 mg    triamcinolone acetonide injection 40 mg     Current Outpatient Medications on File Prior to Encounter   Medication Sig    ACCU-CHEK GUIDE TEST STRIPS Strp USE TWICE DAILY (Patient not taking: Reported on 1/3/2023)    alcohol swabs (DROPSAFE ALCOHOL PREP PADS) PadM USE AS DIRECTED AS NEEDED    ASCORBATE CALCIUM (VITAMIN C ORAL) Take by mouth once daily.     aspirin (ECOTRIN) 81 MG EC tablet Take 81 mg by mouth once daily.    blood sugar diagnostic Strp 1 strip by Misc.(Non-Drug; Combo Route) route 3 (three) times daily. (Patient not taking: Reported on 12/23/2022)    blood-glucose meter (ACCU-CHEK GUIDE GLUCOSE METER) Misc Use as directed. (Patient not taking: Reported on 12/23/2022)    blood-glucose meter kit Use as instructed (Patient not taking: Reported on 12/23/2022)    diclofenac (VOLTAREN) 50 MG EC tablet TAKE 1 TABLET BY MOUTH TWICE A DAY    doxycycline (VIBRA-TABS) 100 MG tablet Take 1 tablet (100 mg total) by mouth 2 (two) times daily.    empagliflozin (JARDIANCE) 25 mg tablet Take 1 tablet (25 mg total) by mouth once daily.    fish oil-omega-3 fatty acids 300-1,000 mg capsule Take 2 g by mouth once daily.    gabapentin (NEURONTIN) 300 MG capsule Take 1 capsule (300 mg total) by mouth 3 (three) times daily.    glimepiride (AMARYL) 4 MG tablet Take 1 tablet (4 mg total) by mouth before breakfast.    [START ON 3/16/2023]  HYDROcodone-acetaminophen (NORCO)  mg per tablet Take 1 tablet by mouth 3 (three) times daily as needed (pain).    [START ON 2/16/2023] HYDROcodone-acetaminophen (NORCO)  mg per tablet Take 1 tablet by mouth 3 (three) times daily as needed (pain).    [START ON 1/16/2023] HYDROcodone-acetaminophen (NORCO)  mg per tablet Take 1 tablet by mouth 3 (three) times daily as needed (pain).    hydrocortisone 2.5 % cream Apply topically 2 (two) times daily.    lancets Misc 1 application by Misc.(Non-Drug; Combo Route) route 3 (three) times daily. (Patient not taking: Reported on 1/3/2023)    lisinopriL (PRINIVIL,ZESTRIL) 20 MG tablet     loratadine (CLARITIN) 10 mg tablet TAKE 1 TABLET BY MOUTH EVERY DAY    methocarbamoL (ROBAXIN) 500 MG Tab Take 2 tablets (1,000 mg total) by mouth every 6 (six) hours as needed (muscle spasms).    multivitamin with minerals tablet Take 1 tablet by mouth once daily.    neomycin-polymyxin-hydrocortisone (CORTISPORIN) 3.5-10,000-1 mg/mL-unit/mL-% otic suspension Place 3 drops into the left ear 4 (four) times daily.    pravastatin (PRAVACHOL) 20 MG tablet Take 1 tablet (20 mg total) by mouth once daily.    testosterone cypionate (DEPOTESTOTERONE CYPIONATE) 200 mg/mL injection Inject 1 mL (200 mg total) into the muscle every 14 (fourteen) days.    TRUEPLUS LANCETS 33 gauge Misc     [DISCONTINUED] azithromycin (Z-ASHLEY) 250 MG tablet TAKE 2 TABS BY MOUTH ON DAY 1, THEN 1 TAB DAILY AFTERWARDS    [DISCONTINUED] gabapentin (NEURONTIN) 300 MG capsule TAKE 1 CAPSULE BY MOUTH THREE TIMES A DAY    [DISCONTINUED] HYDROcodone-acetaminophen (NORCO)  mg per tablet Take 1 tablet by mouth 3 (three) times daily as needed (pain).    [DISCONTINUED] HYDROcodone-acetaminophen (NORCO)  mg per tablet Take 1 tablet by mouth 3 (three) times daily as needed (pain).    [DISCONTINUED] HYDROcodone-acetaminophen (NORCO)  mg per tablet Take 1 tablet by mouth 3 (three) times daily as needed  (pain).    [DISCONTINUED] metFORMIN (GLUCOPHAGE-XR) 500 MG ER 24hr tablet Take 1 tablet (500 mg total) by mouth once daily.    [DISCONTINUED] promethazine-dextromethorphan (PROMETHAZINE-DM) 6.25-15 mg/5 mL Syrp TAKE 5 ML BY MOUTH EVERY 6 HOURS AS NEEDED FOR COUGH     Family History       Problem Relation (Age of Onset)    Blindness Mother    Diabetes Brother    No Known Problems Father, Brother, Daughter, Son, Daughter, Brother          Tobacco Use    Smoking status: Former     Packs/day: 0.25     Types: Cigarettes     Start date: 1972     Quit date: 1976     Years since quittin.1    Smokeless tobacco: Never   Substance and Sexual Activity    Alcohol use: Not Currently     Comment: Pt. has a Hx. of  Alcohol use.    Drug use: No    Sexual activity: Yes     Partners: Female     Birth control/protection: None     Review of Systems   Constitutional:  Negative for chills and fever.   HENT:  Positive for ear pain. Negative for nosebleeds and tinnitus.    Eyes:  Negative for photophobia and visual disturbance.   Respiratory:  Negative for shortness of breath and wheezing.    Cardiovascular:  Negative for chest pain, palpitations and leg swelling.   Gastrointestinal:  Negative for abdominal distention, nausea and vomiting.   Genitourinary:  Negative for dysuria, flank pain and hematuria.   Musculoskeletal:  Negative for gait problem and joint swelling.   Skin:  Negative for rash and wound.   Neurological:  Negative for seizures and syncope.   Objective:     Vital Signs (Most Recent):  Temp: 98.1 °F (36.7 °C) (23 0925)  Pulse: 86 (23 1325)  Resp: 15 (23 1117)  BP: 138/82 (23 1320)  SpO2: 96 % (23 1325) Vital Signs (24h Range):  Temp:  [98.1 °F (36.7 °C)-98.8 °F (37.1 °C)] 98.1 °F (36.7 °C)  Pulse:  [] 86  Resp:  [11-20] 15  SpO2:  [92 %-97 %] 96 %  BP: (112-142)/(59-89) 138/82     Weight: 99.8 kg (220 lb)  Body mass index is 31.57 kg/m².    Physical Exam  Vitals and  nursing note reviewed.   Constitutional:       General: He is not in acute distress.     Appearance: He is well-developed. He is not diaphoretic.   HENT:      Head: Normocephalic and atraumatic.      Right Ear: External ear normal.      Ears:      Comments: Left external ear canal with erythema and tenderness on aural retraction  Eyes:      General:         Right eye: No discharge.         Left eye: No discharge.      Conjunctiva/sclera: Conjunctivae normal.   Neck:      Thyroid: No thyromegaly.   Cardiovascular:      Rate and Rhythm: Normal rate and regular rhythm.      Heart sounds: No murmur heard.  Pulmonary:      Effort: Pulmonary effort is normal. No respiratory distress.      Breath sounds: Normal breath sounds.   Abdominal:      General: Bowel sounds are normal. There is no distension.      Palpations: Abdomen is soft. There is no mass.      Tenderness: There is no abdominal tenderness.   Musculoskeletal:         General: No deformity.      Cervical back: Normal range of motion and neck supple.      Right lower leg: No edema.      Left lower leg: No edema.   Skin:     General: Skin is warm and dry.   Neurological:      Mental Status: He is alert and oriented to person, place, and time.   Psychiatric:         Mood and Affect: Mood normal.         Behavior: Behavior normal.           Significant Labs: All pertinent labs within the past 24 hours have been reviewed.  A1C: No results for input(s): HGBA1C in the last 4320 hours.  CBC:   Recent Labs   Lab 01/03/23  0934   WBC 11.94   HGB 20.8*   HCT 60.3*        CMP:   Recent Labs   Lab 01/03/23  0934   *   K 4.9      CO2 21*   *   BUN 18   CREATININE 1.3   CALCIUM 8.9   PROT 7.4   ALBUMIN 4.0   BILITOT 0.8   ALKPHOS 91   AST 24   ALT 39   ANIONGAP 11     Cardiac Markers:   Recent Labs   Lab 01/03/23  0934   BNP 35     Troponin:   Recent Labs   Lab 01/03/23  0934 01/03/23  1222   TROPONINI 0.119* 0.125*     TSH:   Recent Labs   Lab  01/03/23  0934   TSH 0.826       Significant Imaging:   Imaging Results              X-Ray Chest AP Portable (Final result)  Result time 01/03/23 09:56:16      Final result by Sandeep Art MD (01/03/23 09:56:16)                   Impression:      No acute chest disease identified.      Electronically signed by: Sandeep Art MD  Date:    01/03/2023  Time:    09:56               Narrative:    EXAMINATION:  XR CHEST AP PORTABLE    CLINICAL HISTORY:  Chest Pain;    TECHNIQUE:  Single frontal view of the chest was performed.    COMPARISON:  12/08/2008.    FINDINGS:  The heart is not enlarged.  Superior mediastinal structures are unremarkable.  Pulmonary vasculature is within normal limits.  The lungs are free of focal consolidations.  There is no evidence for pneumothorax or large pleural effusions.  Bony structures are grossly intact.  There are surgical clips projecting over the left axilla.

## 2023-01-03 NOTE — ASSESSMENT & PLAN NOTE
Presents with new onset afib found in the PCP annual and for otitis externa. Converted to NSR in the ED after IV dilt. CHADSVASC 3. Troponin 0.1 and 0.1 on repeat. Initial EKG of Afib w/RVR  Discussed risks and benefits of anticoagulation, patient verbalized understanding of beginning anticoagulation.  Started on heparin infusion in the ED  Continue home aspirin/statin  Troponin trend  Telemetry  Echo  Cardiology consulted  Check TSH/Mg

## 2023-01-03 NOTE — PLAN OF CARE
WRITTEN HEALTHCARE DISCHARGE INFORMATION    Follow-up Information       Gallo Lara MD. Call in 1 day(s).    Specialty: Family Medicine  Contact information:  [address] [facility]  [place] [address]  [phone]               Wil Mahoney MD. Schedule an appointment as soon as possible for a visit on 2/1/2023.    Specialties: Cardiology, Interventional Cardiology  Why: at 2:40pm  Contact information:  [address] [facility] [address]  [address]  [place] [address]  [phone]                             If you are unable to make your follow-up appointments, please call the number listed and reschedule the appointment(s).     After discharge, if you need assistance, you can call [facility] On [facility] Nurse Care Line for 24/7 assistance at [phone].     Within 48-72 hours after leaving the hospital you will receive a call from [facility] nurses following up to see how you are doing. The team will ask you a few questions and the call will last approximately 20 minutes. Please answer any calls you may receive from [facility].     We want to continue to support you as you manage your healthcare needs. To manage your health at home:    1) Keep all scheduled appointments;    2)  Get your prescriptions filled; and    3)  Take your medicine as prescribed.      [facility] is happy to have the opportunity to serve you.    Maeve - /Case Management  [phone]

## 2023-01-03 NOTE — DISCHARGE SUMMARY
"[facility]  [facility] Medicine  Discharge Summary      Patient Name: King Cool Jr.  MRN: [id number]  TIFFANY: [id number]  Patient Class: OP- Observation  Admission Date: 1/3/2023  Hospital Length of Stay: 0 days  Discharge Date and Time:  01/03/2023 4:05 PM  Attending Physician: Rodriguez Kay MD   Discharging Provider: Quincy Quiroga PA-C  Primary Care Provider: Gallo Lara MD    Primary Care Team: Networked reference to record PCT     HPI:   King Cool Jr. 67 y.o. male with HTN, DM, HLD presents to the hospital with a chief complaint of tachycardia.  He reports being seen today at his PCP for left ear pain which he attributed to an ear infection.  He denies any treatment at home. He describes it as a burning pain without radiation.  He denies any aggravating or alleviating factors.  He reports he was prescribed medications at the PCP visit, and then directed to the emergency room after he was found to have an elevated heart rate and an EKG showing new onset AFib during the PCP visit.  He denies any history of AFib.  He denies any chest pain shortness breath palpitations dizziness syncope melena hematuria hematemesis.  He had 1 episode of emesis yesterday, but attributes this to "bad food."     In the ED, additionally in AFib converted to normal sinus rhythm with IV diltiazem chest x-ray without acute process initial troponin 0.1 and 0.1 and repeat TSH within normal limits COVID negative.      * No surgery found *      Hospital Course:   King Cool Jr. 67 y.o. male placed in observation for new onset afib.  He went to his PCP today for ear pain and annual evaluation.  He was found to have elevated heart rate and directed to the emergency room.  He Was found to be in AFib in RVR which converted to normal sinus rhythm after 2 doses of IV diltiazem.  His troponin was 0.1 and 0.1 repeat.  He was seen by Cardiology and recommended start Xarelto and increase metoprolol to 25 daily.  He was then " cleared for discharge by Cardiology.  He was informed of elevated hemoglobin, reports he had been previously been informed of this.  He previously been tested for JAK2 which was negative.  He is eager for discharge and wishes to follow up outpatient.  He underwent an echo prior to discharge which showed an EF of 50%.    He will follow-up with his PCP and Cardiology.  At discharge he was without chest pain shortness of breath or dizziness.  He returned to normal sinus rhythm.       Goals of Care Treatment Preferences:  Code Status: Full Code      Consults:   Consults (From admission, onward)          Status Ordering Provider     Case Management/  Once        Provider:  (Not yet assigned)    Completed TESSY GREEN     Inpatient consult to Social Work  Once        Provider:  (Not yet assigned)    Completed FRANCA MAHONEY     Inpatient consult to Cardiology  Once        Provider:  Franca Mahoney MD    Completed TESSY GREEN            * New onset a-fib  Presents with new onset afib found in the PCP annual and for otitis externa. Converted to NSR in the ED after IV dilt. CHADSVASC 3. Troponin 0.1 and 0.1 on repeat. Initial EKG of Afib w/RVR  Discussed risks and benefits of anticoagulation, patient verbalized understanding of beginning anticoagulation.  Started on heparin infusion in the ED  Continue home aspirin/statin  Troponin trend  Telemetry  Echo  Cardiology consulted  Check TSH/Mg    Essential (primary) hypertension  Well controlled. Reports no longer taking lisinopril. Resume if BP elevates  Started on toprol      Final Active Diagnoses:    Diagnosis Date Noted POA    PRINCIPAL PROBLEM:  New onset a-fib [I48.91] 01/03/2023 Yes    Otitis externa of left ear [H60.92] 01/03/2023 Yes    Elevated hemoglobin [D58.2] 01/03/2023 Yes    Elevated troponin level not due to acute coronary syndrome [R77.8] 01/03/2023 Yes    Class 1 obesity due to excess calories with serious comorbidity and body  mass index (BMI) of 33.0 to 33.9 in adult [E66.09, Z68.33] 11/23/2020 Not Applicable    Essential (primary) hypertension [I10]  Yes     Chronic    Type 2 diabetes mellitus with microalbuminuria, without long-term current use of insulin [E11.29, R80.9] 01/27/2020 Yes     Chronic      Problems Resolved During this Admission:       Discharged Condition: stable    Disposition: Home or Self Care    Follow Up:   Follow-up Information       Gallo Lara MD. Call in 1 day(s).    Specialty: Family Medicine  Contact information:  [address] [place] [address]  [phone]               Wil Mahoney MD. Schedule an appointment as soon as possible for a visit on 2/1/2023.    Specialties: Cardiology, Interventional Cardiology  Why: at 2:40pm  Contact information:  [address] [facility] [address]  [address]  [place] [id number] [phone]                           Patient Instructions:      Diet diabetic     Diet Cardiac     Notify your health care provider if you experience any of the following:  temperature >100.4     Notify your health care provider if you experience any of the following:  persistent nausea and vomiting or diarrhea     Notify your health care provider if you experience any of the following:  increased confusion or weakness     Notify your health care provider if you experience any of the following:  persistent dizziness, light-headedness, or visual disturbances     Activity as tolerated       Significant Diagnostic Studies: Labs:   CMP   Recent Labs   Lab 01/03/23  0934   *   K 4.9      CO2 21*   *   BUN 18   CREATININE 1.3   CALCIUM 8.9   PROT 7.4   ALBUMIN 4.0   BILITOT 0.8   ALKPHOS 91   AST 24   ALT 39   ANIONGAP 11   , CBC   Recent Labs   Lab 01/03/23  0934   WBC 11.94   HGB 20.8*   HCT 60.3*       and Troponin   Recent Labs   Lab 01/03/23  0934 01/03/23  1222   TROPONINI 0.119* 0.125*       Pending Diagnostic Studies:       Procedure Component Value Units Date/Time    Echo  [[id number]]     Order Status: Sent Lab Status: No result            Medications:  Reconciled Home Medications:      Medication List        START taking these medications      metoprolol succinate 25 MG 24 hr tablet  Commonly known as: TOPROL-XL  Take 1 tablet (25 mg total) by mouth once daily.     rivaroxaban 20 mg Tab  Commonly known as: XARELTO  Take 1 tablet (20 mg total) by mouth daily with dinner or evening meal.            CONTINUE taking these medications      aspirin 81 MG EC tablet  Commonly known as: ECOTRIN  Take 81 mg by mouth once daily.     diclofenac 50 MG EC tablet  Commonly known as: VOLTAREN  TAKE 1 TABLET BY MOUTH TWICE A DAY     doxycycline 100 MG tablet  Commonly known as: VIBRA-TABS  Take 1 tablet (100 mg total) by mouth 2 (two) times daily.     DROPSAFE ALCOHOL PREP PADS Padm  Generic drug: alcohol swabs  USE AS DIRECTED AS NEEDED     empagliflozin 25 mg tablet  Commonly known as: JARDIANCE  Take 1 tablet (25 mg total) by mouth once daily.     fish oil-omega-3 fatty acids 300-1,000 mg capsule  Take 2 g by mouth once daily.     gabapentin 300 MG capsule  Commonly known as: NEURONTIN  Take 1 capsule (300 mg total) by mouth 3 (three) times daily.     glimepiride 4 MG tablet  Commonly known as: AMARYL  Take 1 tablet (4 mg total) by mouth before breakfast.     * HYDROcodone-acetaminophen  mg per tablet  Commonly known as: NORCO  Take 1 tablet by mouth 3 (three) times daily as needed (pain).  Start taking on: January 16, 2023     * HYDROcodone-acetaminophen  mg per tablet  Commonly known as: NORCO  Take 1 tablet by mouth 3 (three) times daily as needed (pain).  Start taking on: February 16, 2023     * HYDROcodone-acetaminophen  mg per tablet  Commonly known as: NORCO  Take 1 tablet by mouth 3 (three) times daily as needed (pain).  Start taking on: March 16, 2023     hydrocortisone 2.5 % cream  Apply topically 2 (two) times daily.     loratadine 10 mg tablet  Commonly known as:  CLARITIN  TAKE 1 TABLET BY MOUTH EVERY DAY     methocarbamoL 500 MG Tab  Commonly known as: ROBAXIN  Take 2 tablets (1,000 mg total) by mouth every 6 (six) hours as needed (muscle spasms).     multivitamin with minerals tablet  Take 1 tablet by mouth once daily.     neomycin-polymyxin-hydrocortisone 3.5-10,000-1 mg/mL-unit/mL-% otic suspension  Commonly known as: CORTISPORIN  Place 3 drops into the left ear 4 (four) times daily.     pravastatin 20 MG tablet  Commonly known as: PRAVACHOL  Take 1 tablet (20 mg total) by mouth once daily.     testosterone cypionate 200 mg/mL injection  Commonly known as: DEPOTESTOTERONE CYPIONATE  Inject 1 mL (200 mg total) into the muscle every 14 (fourteen) days.     * TRUEPLUS LANCETS 33 gauge Misc  Generic drug: lancets     VITAMIN C ORAL  Take by mouth once daily.           * This list has 4 medication(s) that are the same as other medications prescribed for you. Read the directions carefully, and ask your doctor or other care provider to review them with you.                STOP taking these medications      lisinopriL 20 MG tablet  Commonly known as: PRINIVIL,ZESTRIL            ASK your doctor about these medications      * blood sugar diagnostic Strp  1 strip by Misc.(Non-Drug; Combo Route) route 3 (three) times daily.     * ACCU-CHEK GUIDE TEST STRIPS Strp  Generic drug: blood sugar diagnostic  USE TWICE DAILY     * blood-glucose meter Misc  Commonly known as: ACCU-CHEK GUIDE GLUCOSE METER  Use as directed.     * blood-glucose meter kit  Use as instructed     * lancets Misc  1 application by Misc.(Non-Drug; Combo Route) route 3 (three) times daily.           * This list has 5 medication(s) that are the same as other medications prescribed for you. Read the directions carefully, and ask your doctor or other care provider to review them with you.                  Indwelling Lines/Drains at time of discharge:   Lines/Drains/Airways       None                   Time spent on the  discharge of patient: 39 minutes         Quincy Quiroga PA-C  Department of Hospital Medicine  [facility] - Emergency Dept

## 2023-01-03 NOTE — H&P
"[facility] Medicine  History & Physical    Patient Name: King Cool Jr.  MRN: [id number]  Patient Class: OP- Observation  Admission Date: 1/3/2023  Attending Physician: Rodriguez Kay MD   Primary Care Provider: Gallo Lara MD         Patient information was obtained from patient, past medical records and ER records.     Subjective:     Principal Problem:New onset a-fib    Chief Complaint:   Chief Complaint   Patient presents with    Tachycardia     Pt seen at urgent care for left ear pain this am.  Ekg found rapid heartbeat, told to come to the ED for further evaluation.          HPI: King Cool Jr. 67 y.o. male with HTN, DM, HLD presents to the hospital with a chief complaint of tachycardia.  He reports being seen today at his PCP for left ear pain which he attributed to an ear infection.  He denies any treatment at home. He describes it as a burning pain without radiation.  He denies any aggravating or alleviating factors.  He reports he was prescribed medications at the PCP visit, and then directed to the emergency room after he was found to have an elevated heart rate and an EKG showing new onset AFib during the PCP visit.  He denies any history of AFib.  He denies any chest pain shortness breath palpitations dizziness syncope melena hematuria hematemesis.  He had 1 episode of emesis yesterday, but attributes this to "bad food."     In the ED, additionally in AFib converted to normal sinus rhythm with IV diltiazem chest x-ray without acute process initial troponin 0.1 and 0.1 and repeat TSH within normal limits COVID negative.      Past Medical History:   Diagnosis Date    Essential (primary) hypertension     Male erectile dysfunction, unspecified     New onset a-fib 1/3/2023    Testicular hypofunction     Type 2 diabetes mellitus without complications        Past Surgical History:   Procedure Laterality Date    HIP SURGERY         Review of patient's allergies indicates: "   Allergen Reactions    Tamiflu [oseltamivir]      Increases BP    Metformin      Diarrhea, nausea    Penicillins Rash       Current Facility-Administered Medications on File Prior to Encounter   Medication    testosterone cypionate injection 200 mg    triamcinolone acetonide injection 40 mg     Current Outpatient Medications on File Prior to Encounter   Medication Sig    ACCU-CHEK GUIDE TEST STRIPS Strp USE TWICE DAILY (Patient not taking: Reported on 1/3/2023)    alcohol swabs (DROPSAFE ALCOHOL PREP PADS) PadM USE AS DIRECTED AS NEEDED    ASCORBATE CALCIUM (VITAMIN C ORAL) Take by mouth once daily.     aspirin (ECOTRIN) 81 MG EC tablet Take 81 mg by mouth once daily.    blood sugar diagnostic Strp 1 strip by Misc.(Non-Drug; Combo Route) route 3 (three) times daily. (Patient not taking: Reported on 12/23/2022)    blood-glucose meter (ACCU-CHEK GUIDE GLUCOSE METER) Misc Use as directed. (Patient not taking: Reported on 12/23/2022)    blood-glucose meter kit Use as instructed (Patient not taking: Reported on 12/23/2022)    diclofenac (VOLTAREN) 50 MG EC tablet TAKE 1 TABLET BY MOUTH TWICE A DAY    doxycycline (VIBRA-TABS) 100 MG tablet Take 1 tablet (100 mg total) by mouth 2 (two) times daily.    empagliflozin (JARDIANCE) 25 mg tablet Take 1 tablet (25 mg total) by mouth once daily.    fish oil-omega-3 fatty acids 300-1,000 mg capsule Take 2 g by mouth once daily.    gabapentin (NEURONTIN) 300 MG capsule Take 1 capsule (300 mg total) by mouth 3 (three) times daily.    glimepiride (AMARYL) 4 MG tablet Take 1 tablet (4 mg total) by mouth before breakfast.    [START ON 3/16/2023] HYDROcodone-acetaminophen (NORCO)  mg per tablet Take 1 tablet by mouth 3 (three) times daily as needed (pain).    [START ON 2/16/2023] HYDROcodone-acetaminophen (NORCO)  mg per tablet Take 1 tablet by mouth 3 (three) times daily as needed (pain).    [START ON 1/16/2023] HYDROcodone-acetaminophen (NORCO)  mg per tablet Take 1  tablet by mouth 3 (three) times daily as needed (pain).    hydrocortisone 2.5 % cream Apply topically 2 (two) times daily.    lancets Misc 1 application by Misc.(Non-Drug; Combo Route) route 3 (three) times daily. (Patient not taking: Reported on 1/3/2023)    lisinopriL (PRINIVIL,ZESTRIL) 20 MG tablet     loratadine (CLARITIN) 10 mg tablet TAKE 1 TABLET BY MOUTH EVERY DAY    methocarbamoL (ROBAXIN) 500 MG Tab Take 2 tablets (1,000 mg total) by mouth every 6 (six) hours as needed (muscle spasms).    multivitamin with minerals tablet Take 1 tablet by mouth once daily.    neomycin-polymyxin-hydrocortisone (CORTISPORIN) 3.5-10,000-1 mg/mL-unit/mL-% otic suspension Place 3 drops into the left ear 4 (four) times daily.    pravastatin (PRAVACHOL) 20 MG tablet Take 1 tablet (20 mg total) by mouth once daily.    testosterone cypionate (DEPOTESTOTERONE CYPIONATE) 200 mg/mL injection Inject 1 mL (200 mg total) into the muscle every 14 (fourteen) days.    TRUEPLUS LANCETS 33 gauge Misc     [DISCONTINUED] azithromycin (Z-ASHLEY) 250 MG tablet TAKE 2 TABS BY MOUTH ON DAY 1, THEN 1 TAB DAILY AFTERWARDS    [DISCONTINUED] gabapentin (NEURONTIN) 300 MG capsule TAKE 1 CAPSULE BY MOUTH THREE TIMES A DAY    [DISCONTINUED] HYDROcodone-acetaminophen (NORCO)  mg per tablet Take 1 tablet by mouth 3 (three) times daily as needed (pain).    [DISCONTINUED] HYDROcodone-acetaminophen (NORCO)  mg per tablet Take 1 tablet by mouth 3 (three) times daily as needed (pain).    [DISCONTINUED] HYDROcodone-acetaminophen (NORCO)  mg per tablet Take 1 tablet by mouth 3 (three) times daily as needed (pain).    [DISCONTINUED] metFORMIN (GLUCOPHAGE-XR) 500 MG ER 24hr tablet Take 1 tablet (500 mg total) by mouth once daily.    [DISCONTINUED] promethazine-dextromethorphan (PROMETHAZINE-DM) 6.25-15 mg/5 mL Syrp TAKE 5 ML BY MOUTH EVERY 6 HOURS AS NEEDED FOR COUGH     Family History       Problem Relation (Age of Onset)    Blindness Mother     Diabetes Brother    No Known Problems Father, Brother, Daughter, Son, Daughter, Brother          Tobacco Use    Smoking status: Former     Packs/day: 0.25     Types: Cigarettes     Start date: 1972     Quit date: 1976     Years since quittin.1    Smokeless tobacco: Never   Substance and Sexual Activity    Alcohol use: Not Currently     Comment: Pt. has a Hx. of  Alcohol use.    Drug use: No    Sexual activity: Yes     Partners: Female     Birth control/protection: None     Review of Systems   Constitutional:  Negative for chills and fever.   HENT:  Positive for ear pain. Negative for nosebleeds and tinnitus.    Eyes:  Negative for photophobia and visual disturbance.   Respiratory:  Negative for shortness of breath and wheezing.    Cardiovascular:  Negative for chest pain, palpitations and leg swelling.   Gastrointestinal:  Negative for abdominal distention, nausea and vomiting.   Genitourinary:  Negative for dysuria, flank pain and hematuria.   Musculoskeletal:  Negative for gait problem and joint swelling.   Skin:  Negative for rash and wound.   Neurological:  Negative for seizures and syncope.   Objective:     Vital Signs (Most Recent):  Temp: 98.1 °F (36.7 °C) (23 0925)  Pulse: 86 (23 1325)  Resp: 15 (23 1117)  BP: 138/82 (23 1320)  SpO2: 96 % (23 1325) Vital Signs (24h Range):  Temp:  [98.1 °F (36.7 °C)-98.8 °F (37.1 °C)] 98.1 °F (36.7 °C)  Pulse:  [] 86  Resp:  [11-20] 15  SpO2:  [92 %-97 %] 96 %  BP: (112-142)/(59-89) 138/82     Weight: 99.8 kg (220 lb)  Body mass index is 31.57 kg/m².    Physical Exam  Vitals and nursing note reviewed.   Constitutional:       General: He is not in acute distress.     Appearance: He is well-developed. He is not diaphoretic.   HENT:      Head: Normocephalic and atraumatic.      Right Ear: External ear normal.      Ears:      Comments: Left external ear canal with erythema and tenderness on aural retraction  Eyes:       General:         Right eye: No discharge.         Left eye: No discharge.      Conjunctiva/sclera: Conjunctivae normal.   Neck:      Thyroid: No thyromegaly.   Cardiovascular:      Rate and Rhythm: Normal rate and regular rhythm.      Heart sounds: No murmur heard.  Pulmonary:      Effort: Pulmonary effort is normal. No respiratory distress.      Breath sounds: Normal breath sounds.   Abdominal:      General: Bowel sounds are normal. There is no distension.      Palpations: Abdomen is soft. There is no mass.      Tenderness: There is no abdominal tenderness.   Musculoskeletal:         General: No deformity.      Cervical back: Normal range of motion and neck supple.      Right lower leg: No edema.      Left lower leg: No edema.   Skin:     General: Skin is warm and dry.   Neurological:      Mental Status: He is alert and oriented to person, place, and time.   Psychiatric:         Mood and Affect: Mood normal.         Behavior: Behavior normal.           Significant Labs: All pertinent labs within the past 24 hours have been reviewed.  A1C: No results for input(s): HGBA1C in the last 4320 hours.  CBC:   Recent Labs   Lab 01/03/23  0934   WBC 11.94   HGB 20.8*   HCT 60.3*        CMP:   Recent Labs   Lab 01/03/23  0934   *   K 4.9      CO2 21*   *   BUN 18   CREATININE 1.3   CALCIUM 8.9   PROT 7.4   ALBUMIN 4.0   BILITOT 0.8   ALKPHOS 91   AST 24   ALT 39   ANIONGAP 11     Cardiac Markers:   Recent Labs   Lab 01/03/23  0934   BNP 35     Troponin:   Recent Labs   Lab 01/03/23  0934 01/03/23  1222   TROPONINI 0.119* 0.125*     TSH:   Recent Labs   Lab 01/03/23  0934   TSH 0.826       Significant Imaging:   Imaging Results              X-Ray Chest AP Portable (Final result)  Result time 01/03/23 09:56:16      Final result by Sandeep Art MD (01/03/23 09:56:16)                   Impression:      No acute chest disease identified.      Electronically signed by: Sandeep Art  "MD  Date:    01/03/2023  Time:    09:56               Narrative:    EXAMINATION:  XR CHEST AP PORTABLE    CLINICAL HISTORY:  Chest Pain;    TECHNIQUE:  Single frontal view of the chest was performed.    COMPARISON:  12/08/2008.    FINDINGS:  The heart is not enlarged.  Superior mediastinal structures are unremarkable.  Pulmonary vasculature is within normal limits.  The lungs are free of focal consolidations.  There is no evidence for pneumothorax or large pleural effusions.  Bony structures are grossly intact.  There are surgical clips projecting over the left axilla.                                        Assessment/Plan:     * New onset a-fib  Presents with new onset afib found in the PCP annual and for otitis externa. Converted to NSR in the ED after IV dilt. CHADSVASC 3. Troponin 0.1 and 0.1 on repeat. Initial EKG of Afib w/RVR  Discussed risks and benefits of anticoagulation, patient verbalized understanding of beginning anticoagulation.  Started on heparin infusion in the ED  Continue home aspirin/statin  Troponin trend  Telemetry  Echo  Cardiology consulted  Check TSH/Mg    Elevated hemoglobin  Hemoglobin elevated today at 20. Previous 16. Unclear if related to volume depletion. Reports vomiting after eating "bad food" yesterday, appetite returned to normal and no more vomiting.   Will repeat CBC  Given IVF in the ED    Otitis externa of left ear  Diagnosed today at PCP  Continue corticol drop and doxy prescribed at that visit    Class 1 obesity due to excess calories with serious comorbidity and body mass index (BMI) of 33.0 to 33.9 in adult  Body mass index is 31.57 kg/m². Morbid obesity complicates all aspects of disease management from diagnostic modalities to treatment. Weight loss encouraged and health benefits explained to patient.     Essential (primary) hypertension  Well controlled. Reports no longer taking lisinopril. Resume if BP elevates  Started on toprol    Type 2 diabetes mellitus with " microalbuminuria, without long-term current use of insulin  Patient's FSGs are uncontrolled due to hyperglycemia on current medication regimen.  Last A1c reviewed-   Lab Results   Component Value Date    HGBA1C 7.4 (H) 04/19/2022     Most recent fingerstick glucose reviewed- No results for input(s): POCTGLUCOSE in the last 24 hours.  Current correctional scale  Medium  Maintain anti-hyperglycemic dose as follows-   Antihyperglycemics (From admission, onward)      Start     Stop Route Frequency Ordered    01/03/23 2100  insulin detemir U-100 pen 9 Units         -- SubQ Nightly 01/03/23 1423    01/03/23 1507  insulin aspart U-100 pen 0-5 Units         -- SubQ Before meals & nightly PRN 01/03/23 1407          Hold Oral hypoglycemics while patient is in the hospital.      VTE Risk Mitigation (From admission, onward)           Ordered     IP VTE HIGH RISK PATIENT  Once         01/03/23 1408     Place sequential compression device  Until discontinued         01/03/23 1408     Place YAMILET hose  Until discontinued         01/03/23 1408     heparin 25,000 units in dextrose 5% (100 units/ml) IV bolus from bag - ADDITIONAL PRN BOLUS - 60 units/kg (max bolus 4000 units)  As needed (PRN)        Question:  Heparin Infusion Adjustment (DO NOT MODIFY ANSWER)  Answer:  \\[email or web address]\epic\Images\Pharmacy\HeparinInfusions\heparin LOW INTENSITY nomogram for [facility] [id number].pdf    01/03/23 1240     heparin 25,000 units in dextrose 5% (100 units/ml) IV bolus from bag - ADDITIONAL PRN BOLUS - 30 units/kg (max bolus 4000 units)  As needed (PRN)        Question:  Heparin Infusion Adjustment (DO NOT MODIFY ANSWER)  Answer:  \\[email or web address]\epic\Images\Pharmacy\HeparinInfusions\heparin LOW INTENSITY nomogram for [facility] [id number].pdf    01/03/23 1240     heparin 25,000 units in dextrose 5% 250 mL (100 units/mL) infusion LOW INTENSITY nomogram - [facility]  Continuous        Question Answer Comment   Heparin Infusion Adjustment (DO NOT MODIFY ANSWER)  [email or web address]\epic\Images\Pharmacy\HeparinInfusions\heparin LOW INTENSITY nomogram for [facility] [id number]    Begin at (in units/kg/hr) 12        01/03/23 1240                  VTE: heparin  Code: Full  Diet: diabetic/cardiac  Dispo: pending cardiology eval  As clarification, on 1/3/2023, patient should be admitted for hospital observation services under my care in collaboration with Rodriguez Kay MD. Quincy Quiroga PA-C  Department of Hospital Medicine   [facility] - [place] - Emergency Dept

## 2023-01-03 NOTE — SUBJECTIVE & OBJECTIVE
Past Medical History:   Diagnosis Date    Essential (primary) hypertension     Male erectile dysfunction, unspecified     New onset a-fib 1/3/2023    Testicular hypofunction     Type 2 diabetes mellitus without complications        Past Surgical History:   Procedure Laterality Date    HIP SURGERY         Review of patient's allergies indicates:   Allergen Reactions    Tamiflu [oseltamivir]      Increases BP    Metformin      Diarrhea, nausea    Penicillins Rash       Current Facility-Administered Medications on File Prior to Encounter   Medication    testosterone cypionate injection 200 mg    triamcinolone acetonide injection 40 mg     Current Outpatient Medications on File Prior to Encounter   Medication Sig    ACCU-CHEK GUIDE TEST STRIPS Strp USE TWICE DAILY (Patient not taking: Reported on 1/3/2023)    alcohol swabs (DROPSAFE ALCOHOL PREP PADS) PadM USE AS DIRECTED AS NEEDED    ASCORBATE CALCIUM (VITAMIN C ORAL) Take by mouth once daily.     aspirin (ECOTRIN) 81 MG EC tablet Take 81 mg by mouth once daily.    blood sugar diagnostic Strp 1 strip by Misc.(Non-Drug; Combo Route) route 3 (three) times daily. (Patient not taking: Reported on 12/23/2022)    blood-glucose meter (ACCU-CHEK GUIDE GLUCOSE METER) Misc Use as directed. (Patient not taking: Reported on 12/23/2022)    blood-glucose meter kit Use as instructed (Patient not taking: Reported on 12/23/2022)    diclofenac (VOLTAREN) 50 MG EC tablet TAKE 1 TABLET BY MOUTH TWICE A DAY    doxycycline (VIBRA-TABS) 100 MG tablet Take 1 tablet (100 mg total) by mouth 2 (two) times daily.    empagliflozin (JARDIANCE) 25 mg tablet Take 1 tablet (25 mg total) by mouth once daily.    fish oil-omega-3 fatty acids 300-1,000 mg capsule Take 2 g by mouth once daily.    gabapentin (NEURONTIN) 300 MG capsule Take 1 capsule (300 mg total) by mouth 3 (three) times daily.    glimepiride (AMARYL) 4 MG tablet Take 1 tablet (4 mg total) by mouth before breakfast.    [START ON 3/16/2023]  HYDROcodone-acetaminophen (NORCO)  mg per tablet Take 1 tablet by mouth 3 (three) times daily as needed (pain).    [START ON 2/16/2023] HYDROcodone-acetaminophen (NORCO)  mg per tablet Take 1 tablet by mouth 3 (three) times daily as needed (pain).    [START ON 1/16/2023] HYDROcodone-acetaminophen (NORCO)  mg per tablet Take 1 tablet by mouth 3 (three) times daily as needed (pain).    hydrocortisone 2.5 % cream Apply topically 2 (two) times daily.    lancets Misc 1 application by Misc.(Non-Drug; Combo Route) route 3 (three) times daily. (Patient not taking: Reported on 1/3/2023)    lisinopriL (PRINIVIL,ZESTRIL) 20 MG tablet     loratadine (CLARITIN) 10 mg tablet TAKE 1 TABLET BY MOUTH EVERY DAY    methocarbamoL (ROBAXIN) 500 MG Tab Take 2 tablets (1,000 mg total) by mouth every 6 (six) hours as needed (muscle spasms).    multivitamin with minerals tablet Take 1 tablet by mouth once daily.    neomycin-polymyxin-hydrocortisone (CORTISPORIN) 3.5-10,000-1 mg/mL-unit/mL-% otic suspension Place 3 drops into the left ear 4 (four) times daily.    pravastatin (PRAVACHOL) 20 MG tablet Take 1 tablet (20 mg total) by mouth once daily.    testosterone cypionate (DEPOTESTOTERONE CYPIONATE) 200 mg/mL injection Inject 1 mL (200 mg total) into the muscle every 14 (fourteen) days.    TRUEPLUS LANCETS 33 gauge Misc     [DISCONTINUED] azithromycin (Z-ASHLEY) 250 MG tablet TAKE 2 TABS BY MOUTH ON DAY 1, THEN 1 TAB DAILY AFTERWARDS    [DISCONTINUED] gabapentin (NEURONTIN) 300 MG capsule TAKE 1 CAPSULE BY MOUTH THREE TIMES A DAY    [DISCONTINUED] HYDROcodone-acetaminophen (NORCO)  mg per tablet Take 1 tablet by mouth 3 (three) times daily as needed (pain).    [DISCONTINUED] HYDROcodone-acetaminophen (NORCO)  mg per tablet Take 1 tablet by mouth 3 (three) times daily as needed (pain).    [DISCONTINUED] HYDROcodone-acetaminophen (NORCO)  mg per tablet Take 1 tablet by mouth 3 (three) times daily as needed  (pain).    [DISCONTINUED] metFORMIN (GLUCOPHAGE-XR) 500 MG ER 24hr tablet Take 1 tablet (500 mg total) by mouth once daily.    [DISCONTINUED] promethazine-dextromethorphan (PROMETHAZINE-DM) 6.25-15 mg/5 mL Syrp TAKE 5 ML BY MOUTH EVERY 6 HOURS AS NEEDED FOR COUGH     Family History       Problem Relation (Age of Onset)    Blindness Mother    Diabetes Brother    No Known Problems Father, Brother, Daughter, Son, Daughter, Brother          Tobacco Use    Smoking status: Former     Packs/day: 0.25     Types: Cigarettes     Start date: 1972     Quit date: 1976     Years since quittin.1    Smokeless tobacco: Never   Substance and Sexual Activity    Alcohol use: Not Currently     Comment: Pt. has a Hx. of  Alcohol use.    Drug use: No    Sexual activity: Yes     Partners: Female     Birth control/protection: None     Review of Systems   Constitutional: Negative for chills, diaphoresis, fever and malaise/fatigue.   HENT:  Positive for ear pain. Negative for nosebleeds.    Eyes:  Negative for blurred vision and double vision.   Cardiovascular:  Negative for chest pain, claudication, cyanosis, dyspnea on exertion, leg swelling, orthopnea, palpitations, paroxysmal nocturnal dyspnea and syncope.   Respiratory:  Negative for cough, shortness of breath and wheezing.    Skin:  Negative for dry skin and poor wound healing.   Musculoskeletal:  Negative for back pain, joint swelling and myalgias.   Gastrointestinal:  Negative for abdominal pain, nausea and vomiting.   Genitourinary:  Negative for hematuria.   Neurological:  Negative for dizziness, headaches, numbness, seizures and weakness.   Psychiatric/Behavioral:  Negative for altered mental status and depression.    Objective:     Vital Signs (Most Recent):  Temp: 98.1 °F (36.7 °C) (23 0925)  Pulse: 86 (23 1325)  Resp: 15 (23 1117)  BP: 138/82 (23 1320)  SpO2: 96 % (23 1325) Vital Signs (24h Range):  Temp:  [98.1 °F (36.7 °C)-98.8 °F  (37.1 °C)] 98.1 °F (36.7 °C)  Pulse:  [] 86  Resp:  [11-20] 15  SpO2:  [92 %-97 %] 96 %  BP: (112-142)/(59-89) 138/82     Weight: 99.8 kg (220 lb)  Body mass index is 31.57 kg/m².    SpO2: 96 %         Intake/Output Summary (Last 24 hours) at 1/3/2023 1437  Last data filed at 1/3/2023 1216  Gross per 24 hour   Intake 1000 ml   Output --   Net 1000 ml       Lines/Drains/Airways       Peripheral Intravenous Line  Duration                  Peripheral IV - Single Lumen 01/03/23 0932 18 G Anterior;Distal;Right Upper Arm <1 day         Peripheral IV - Single Lumen 01/03/23 1326 20 G Anterior;Left;Proximal Forearm <1 day                    Physical Exam  Constitutional:       General: He is not in acute distress.     Appearance: He is well-developed. He is obese. He is not ill-appearing, toxic-appearing or diaphoretic.   HENT:      Head: Normocephalic and atraumatic.   Eyes:      General: No scleral icterus.     Extraocular Movements: Extraocular movements intact.      Conjunctiva/sclera: Conjunctivae normal.      Pupils: Pupils are equal, round, and reactive to light.   Neck:      Thyroid: No thyromegaly.      Vascular: No JVD.      Trachea: No tracheal deviation.   Cardiovascular:      Rate and Rhythm: Normal rate and regular rhythm.      Heart sounds: S1 normal and S2 normal. No murmur heard.    No friction rub. No gallop.   Pulmonary:      Effort: Pulmonary effort is normal. No respiratory distress.      Breath sounds: Normal breath sounds. No stridor. No wheezing, rhonchi or rales.   Chest:      Chest wall: No tenderness.   Abdominal:      General: There is no distension.      Palpations: Abdomen is soft.   Musculoskeletal:         General: No swelling or tenderness. Normal range of motion.      Cervical back: Normal range of motion and neck supple. No rigidity.      Right lower leg: No edema.      Left lower leg: No edema.   Skin:     General: Skin is warm and dry.      Coloration: Skin is not jaundiced.    Neurological:      General: No focal deficit present.      Mental Status: He is alert and oriented to person, place, and time.      Cranial Nerves: No cranial nerve deficit.   Psychiatric:         Mood and Affect: Mood normal.         Behavior: Behavior normal.       Current Medications:   [START ON 1/4/2023] aspirin  81 mg Oral Daily    doxycycline  100 mg Oral Q12H    insulin detemir U-100  9 Units Subcutaneous QHS    [START ON 1/4/2023] metoprolol succinate  12.5 mg Oral Daily    neomycin-polymyxin-hydrocortisone  3 drop Left Ear Q6H    [START ON 1/4/2023] pravastatin  20 mg Oral Daily    testosterone cypionate  200 mg Intramuscular Q14 Days      heparin (porcine) in D5W 12 Units/kg/hr (01/03/23 1307)     acetaminophen, glucagon (human recombinant), glucagon (human recombinant), glucose, glucose, glucose, glucose, heparin (PORCINE), heparin (PORCINE), insulin aspart U-100, magnesium oxide, magnesium oxide, melatonin, naloxone, senna-docusate 8.6-50 mg, sodium chloride 0.9%    Laboratory (all labs reviewed):  CBC:  Recent Labs   Lab 02/07/22  1121 01/03/23  0934   WBC 8.18 11.94   Hemoglobin 16.4 20.8 HH   Hematocrit 49.1 60.3 H   Platelets 237 238       CHEMISTRIES:  Recent Labs   Lab 07/08/20  1540 03/02/21  1030 06/24/21  0925 02/07/22  1121 01/03/23  0934   Glucose 119 H 159 H 246 H 239 H 351 H   Sodium 138 141 139 136 133 L   Potassium 4.1 4.9 4.8 4.2 4.9   BUN 12 20 16 13 18   Creatinine 1.1 1.1 1.2 0.9 1.3   eGFR if non African American >60.0 >60 >60.0 >60.0  --    eGFR  --   --   --   --  >60   Calcium 9.2 9.3 10.1 9.4 8.9       CARDIAC BIOMARKERS:  Recent Labs   Lab 01/03/23  0934 01/03/23  1222   Troponin I 0.119 H 0.125 H       COAGS:        LIPIDS/LFTS:  Recent Labs   Lab 07/08/20  1540 03/02/21  1030 06/24/21  0925 02/07/22  1121 01/03/23  0934   Cholesterol  --  179  --  207 H  --    Triglycerides  --  222 H  --  262 H  --    HDL  --  29 L  --  37 L  --    LDL Cholesterol  --  105.6  --  117.6  --     Non-HDL Cholesterol  --  150  --  170  --    AST 23 25 20 25 24   ALT 40 31 28 40 39       BNP:  Recent Labs   Lab 01/03/23  0934   BNP 35       TSH:  Recent Labs   Lab 02/07/22  1121 01/03/23  0934   TSH 0.859 0.826       Free T4:  Recent Labs   Lab 02/07/22  1121   Free T4 0.75       Diagnostic Results:  ECG (personally reviewed and interpreted tracing(s)):  1/3/23 0857   1/3/23 1022 AF 84, IRBBB  1/3/23 1515 tele: SR 84    Chest X-Ray (personally reviewed and interpreted image(s)): 1/3/22 NAD    Echo: ordered

## 2023-01-03 NOTE — HPI
67-year-old male with a history of type 2 diabetes and hypertension presents from a clinic appointment for tachycardia.  The patient was found tachycardic when he was at an appointment for an ear infection.  The patient says that he has had the ear infection for several weeks and has taken antibiotics, but the infection did not clear.  He endorses decreased hearing, but no pain.  The patient said he was told to go to the emergency department because they did an EKG in the clinic that showed that he was in atrial flutter.  He also said that he had nausea this morning and vomited 3 times. He denies any chest pain, headache, dizziness, shortness of breath, chest pain, dysuria or new rashes.    Cardiology consulted for AF and elev trop.    Patient presented to his primary care physician's office today for apparent otitis externa and was noted to have a tachycardia.  He was sent to the emergency room for further evaluation.  He denies any associated symptoms such as angina, dyspnea, palpitations, or syncope.  Was found to be in atrial flutter with rapid ventricular response.  On my examination, and evaluation on telemetry, the patient is back in sinus rhythm.  He was unaware of the arrhythmia.  He has no prior history of stroke or TIA, although he does have a history of diabetes.  His chads Vasc score is at least 2.

## 2023-01-03 NOTE — ED PROVIDER NOTES
Encounter Date: 1/3/2023       History     Chief Complaint   Patient presents with    Tachycardia     Pt seen at urgent care for left ear pain this am.  Ekg found rapid heartbeat, told to come to the ED for further evaluation.       67-year-old male with a history of type 2 diabetes and hypertension presents from a clinic appointment for tachycardia.  The patient was found tachycardic when he was at an appointment for an ear infection.  The patient says that he has had the ear infection for several weeks and has taken antibiotics, but the infection did not clear.  He endorses decreased hearing, but no pain.  The patient said he was told to go to the emergency department because they did an EKG in the clinic that showed that he was in atrial flutter.  He also said that he had nausea this morning and vomited 3 times. He denies any chest pain, headache, dizziness, shortness of breath, chest pain, dysuria or new rashes    The history is provided by the patient. No  was used.   Review of patient's allergies indicates:   Allergen Reactions    Tamiflu [oseltamivir]      Increases BP    Metformin      Diarrhea, nausea    Penicillins Rash     Past Medical History:   Diagnosis Date    Essential (primary) hypertension     Male erectile dysfunction, unspecified     New onset a-fib 1/3/2023    Testicular hypofunction     Type 2 diabetes mellitus without complications      Past Surgical History:   Procedure Laterality Date    HIP SURGERY       Family History   Problem Relation Age of Onset    Blindness Mother     No Known Problems Father     No Known Problems Brother     No Known Problems Daughter     No Known Problems Son     No Known Problems Daughter     Diabetes Brother     No Known Problems Brother     Amblyopia Neg Hx     Cancer Neg Hx     Cataracts Neg Hx     Glaucoma Neg Hx     Hypertension Neg Hx     Macular degeneration Neg Hx     Retinal detachment Neg Hx     Strabismus Neg Hx     Stroke Neg Hx      Thyroid disease Neg Hx      Social History     Tobacco Use    Smoking status: Former     Packs/day: 0.25     Types: Cigarettes     Start date: 1972     Quit date: 1976     Years since quittin.1    Smokeless tobacco: Never   Substance Use Topics    Alcohol use: Not Currently     Comment: Pt. has a Hx. of  Alcohol use.    Drug use: No     Review of Systems   Constitutional:  Negative for chills and fever.   HENT:  Positive for hearing loss. Negative for ear pain and sore throat.    Eyes:  Negative for visual disturbance.   Respiratory:  Negative for shortness of breath.    Cardiovascular:  Negative for chest pain and leg swelling.   Gastrointestinal:  Positive for nausea and vomiting. Negative for abdominal pain and diarrhea.   Genitourinary:  Negative for dysuria and flank pain.   Musculoskeletal:  Negative for back pain, gait problem, neck pain and neck stiffness.   Skin:  Negative for pallor and rash.   Neurological:  Negative for dizziness, syncope, weakness and numbness.   Psychiatric/Behavioral:  Negative for confusion and dysphoric mood.      Physical Exam     Initial Vitals [23 0925]   BP Pulse Resp Temp SpO2   132/75 (!) 152 20 98.1 °F (36.7 °C) 96 %      MAP       --         Physical Exam    Nursing note and vitals reviewed.  Constitutional: He appears well-developed and well-nourished.   HENT:   Head: Normocephalic and atraumatic.   Right Ear: External ear normal.   The pinna of the left ear is not erythematous and nontender, the external auditory canal is fully obstructed with debris, there is no drainage, or bleeding   Eyes: EOM are normal. Pupils are equal, round, and reactive to light.   Neck: Neck supple.   Normal range of motion.  Cardiovascular:  Normal heart sounds and intact distal pulses.           Patient is tachycardic to the 150s been irregular rhythm   Pulmonary/Chest: Breath sounds normal. He has no wheezes. He has no rhonchi. He has no rales.   Abdominal: Abdomen is  soft. Bowel sounds are normal. There is no abdominal tenderness. There is no rebound and no guarding.   Musculoskeletal:         General: No tenderness or edema. Normal range of motion.      Cervical back: Normal range of motion and neck supple.     Neurological: He is alert and oriented to person, place, and time. He has normal strength. GCS score is 15. GCS eye subscore is 4. GCS verbal subscore is 5. GCS motor subscore is 6.   Skin: Skin is warm and dry. Capillary refill takes less than 2 seconds. No rash noted. No erythema. No pallor.   Psychiatric: He has a normal mood and affect. His behavior is normal. Judgment and thought content normal.       ED Course   Critical Care    Date/Time: 1/3/2023 8:09 PM  Performed by: Ant Cha MD  Authorized by: Ant Cha MD   Direct patient critical care time: 20 minutes  Additional history critical care time: 10 minutes  Ordering / reviewing critical care time: 5 minutes  Documentation critical care time: 5 minutes  Consulting other physicians critical care time: 5 minutes  Total critical care time (exclusive of procedural time) : 45 minutes  Critical care time was exclusive of separately billable procedures and treating other patients and teaching time.  Critical care was necessary to treat or prevent imminent or life-threatening deterioration of the following conditions: cardiac failure and circulatory failure.  Critical care was time spent personally by me on the following activities: development of treatment plan with patient or surrogate, discussions with consultants, interpretation of cardiac output measurements, evaluation of patient's response to treatment, examination of patient, obtaining history from patient or surrogate, ordering and performing treatments and interventions, ordering and review of laboratory studies, ordering and review of radiographic studies, pulse oximetry, review of old charts and re-evaluation of patient's  condition.      Labs Reviewed   CBC W/ AUTO DIFFERENTIAL - Abnormal; Notable for the following components:       Result Value    RBC 6.75 (*)     Hemoglobin 20.8 (*)     Hematocrit 60.3 (*)     MPV 8.6 (*)     Gran # (ANC) 10.3 (*)     Lymph # 0.8 (*)     Gran % 86.7 (*)     Lymph % 6.4 (*)     All other components within normal limits    Narrative:     HGB   critical result(s) called and verbal readback obtained from   AYDIN ENGEL @ 10:05AM by GONZALO 01/03/2023 10:08   COMPREHENSIVE METABOLIC PANEL - Abnormal; Notable for the following components:    Sodium 133 (*)     CO2 21 (*)     Glucose 351 (*)     All other components within normal limits   TROPONIN I - Abnormal; Notable for the following components:    Troponin I 0.119 (*)     All other components within normal limits   TROPONIN I - Abnormal; Notable for the following components:    Troponin I 0.125 (*)     All other components within normal limits   POCT GLUCOSE - Abnormal; Notable for the following components:    POCT Glucose 210 (*)     All other components within normal limits   B-TYPE NATRIURETIC PEPTIDE   TSH   MAGNESIUM   MAGNESIUM   SARS-COV-2 RDRP GENE        ECG Results              EKG 12-lead (Final result)  Result time 01/03/23 16:49:42      Final result by Interface, Lab In [facility] (01/03/23 16:49:42)                   Narrative:    Test Reason : R07.9,    Vent. Rate : 084 BPM     Atrial Rate : 416 BPM     P-R Int : 000 ms          QRS Dur : 104 ms      QT Int : 332 ms       P-R-T Axes : 000 081 091 degrees     QTc Int : 392 ms    Atrial fibrillation  Incomplete right bundle branch block  Nonspecific T wave abnormality  Abnormal ECG  When compared with ECG of 03-JAN-2023 10:19,  Significant changes have occurred  Confirmed by Wil Mahoney MD ([id number]) on 1/3/2023 4:49:34 PM    Referred By: [email or web address]   SELF           Confirmed By:Wil Mahoney MD                                     EKG 12-lead (Final result)  Result time 01/03/23 16:49:39       Final result by Interface, Lab In [facility] (01/03/23 16:49:39)                   Narrative:    Test Reason : R00.0,    Vent. Rate : 085 BPM     Atrial Rate : 300 BPM     P-R Int : 000 ms          QRS Dur : 106 ms      QT Int : 370 ms       P-R-T Axes : 000 082 081 degrees     QTc Int : 440 ms    Atrial flutter with variable A-V block  Incomplete right bundle branch block  Abnormal ECG  When compared with ECG of 03-JAN-2023 10:18,  Significant changes have occurred  Confirmed by Wil Mahoney MD ([id number]) on 1/3/2023 4:49:29 PM    Referred By: DONITA   SELF           Confirmed By:Wil Mahoney MD                                  Imaging Results              X-Ray Chest AP Portable (Final result)  Result time 01/03/23 09:56:16      Final result by Sandeep Art MD (01/03/23 09:56:16)                   Impression:      No acute chest disease identified.      Electronically signed by: Sandeep Art MD  Date:    01/03/2023  Time:    09:56               Narrative:    EXAMINATION:  XR CHEST AP PORTABLE    CLINICAL HISTORY:  Chest Pain;    TECHNIQUE:  Single frontal view of the chest was performed.    COMPARISON:  12/08/2008.    FINDINGS:  The heart is not enlarged.  Superior mediastinal structures are unremarkable.  Pulmonary vasculature is within normal limits.  The lungs are free of focal consolidations.  There is no evidence for pneumothorax or large pleural effusions.  Bony structures are grossly intact.  There are surgical clips projecting over the left axilla.                                       Medications   lactated ringers bolus 500 mL (0 mLs Intravenous Stopped 1/3/23 1015)   diltiaZEM injection 20 mg (20 mg Intravenous Given 1/3/23 1015)   diltiaZEM injection 20 mg (20 mg Intravenous Given 1/3/23 1106)   lactated ringers bolus 500 mL (0 mLs Intravenous Stopped 1/3/23 1216)   metoprolol succinate (TOPROL-XL) 24 hr tablet 50 mg (50 mg Oral Given 1/3/23 1107)   aspirin EC tablet 325 mg (325 mg Oral  Given 1/3/23 1213)   clopidogreL tablet 600 mg (600 mg Oral Given 1/3/23 1213)   heparin 25,000 units in dextrose 5% (100 units/ml) IV bolus from bag INITIAL BOLUS (max bolus 4000 units) (4,000 Units Intravenous Bolus from Bag 1/3/23 1308)     Medical Decision Making:   History:   Old Medical Records: I decided to obtain old medical records.  Old Records Summarized: records from clinic visits and records from previous admission(s).       <> Summary of Records: Prior records reviewed for past medical history and current medications  Initial Assessment:   67-year-old male in no acute distress.  EKG in clinic showed possible atrial fibrillation, initial EKG in our department showed the same.  The patient is asymptomatic with stable blood pressure and oxygen saturation. Patient was initially treated with 20 mg IV diltiazem which reduced his rate to 93 beats per minute, repeat 12 lead showed atrial flutter with variable block.       Ddx: ACS vs infection vs dehydration vs metabolic derangement  Independently Interpreted Test(s):   I have ordered and independently interpreted EKG Reading(s) - see summary below       <> Summary of EKG Reading(s): Initial EKG showed a flutter with a rate of 153    Clinical Tests:   Lab Tests: Reviewed  Radiological Study: Reviewed  Medical Tests: Reviewed  ED Management:  The patient converted to normal sinus rhythm after 2 doses of diltiazem.  The patient's troponin was elevated, labs also showed erythrocytosis and hyperglycemia.    The patient was admitted to Hospital Medicine for new onset atrial flutter pending 2nd troponin.          Attending Attestation:   Physician Attestation Statement for Resident:  As the supervising MD   Physician Attestation Statement: I have personally seen and examined this patient.   I agree with the above history.  -:   As the supervising MD I agree with the above PE.     As the supervising MD I agree with the above treatment, course, plan, and disposition.    -: Patient sent in from primary care for rapid heart rate.  Patient found to be in a flutter with 2-1 conduction heart rate in the 150s to 160s.  No chest pain.  No shortness breath.  Patient is asymptomatic.  Making this unclear how long he has been in this rhythm.  Troponin elevated at 0.11.  Likely rate induced but can not rule out atherosclerotic disease underlying over as the cause of atrial f flutter.  Patient has required 2 separate doses of IV Cardizem for rate control.  Will admit for show a rate control permanence as well as further evaluation of non-STEMI.  I was personally present during the entire procedure.  I have reviewed and agree with the residents interpretation of the following: lab data, x-rays and EKG.  I have reviewed the following: old records at this facility.                           Clinical Impression:   Final diagnoses:  [R07.9] Chest pain  [R00.0] Tachycardia  [I48.91] A-fib  [I48.0] PAF (paroxysmal atrial fibrillation)  [E11.29, R80.9] Type 2 diabetes mellitus with microalbuminuria, without long-term current use of insulin (Chronic)  [I10] Essential (primary) hypertension (Chronic)  [E66.09, Z68.33] Class 1 obesity due to excess calories with serious comorbidity and body mass index (BMI) of 33.0 to 33.9 in adult  [R77.8] Elevated troponin level not due to acute coronary syndrome  [I48.91] New onset a-fib (Primary)        ED Disposition Condition    Observation Stable                Omer Alberts MD  Resident  01/03/23 1731       Ant Cha MD  01/03/23 2012

## 2023-01-03 NOTE — ASSESSMENT & PLAN NOTE
Body mass index is 31.57 kg/m². Morbid obesity complicates all aspects of disease management from diagnostic modalities to treatment. Weight loss encouraged and health benefits explained to patient.

## 2023-01-03 NOTE — ED NOTES
68 yo male to ED via POV after being referred to ED by MD for new onset A-Fib w/ RVR. Pt went to [facility] for follow up w/ right ear infection, after EKG patient instructed to come straight to ED. Pt denies SOB, CP, Diarrhea, Abd pain. Pt endorses 2-3 episodes of vomiting this morning, says he thinks it was something he ate last night. Pt doesn't have any abd pain or nausea at this time. Pt placed on BP, pulse ox,and cardiac monitors; side rails up x2; bed in lowest position and call light in reach. AAOx4.

## 2023-01-03 NOTE — ASSESSMENT & PLAN NOTE
"Hemoglobin elevated today at 20. Previous 16. Unclear if related to volume depletion. Reports vomiting after eating "bad food" yesterday, appetite returned to normal and no more vomiting.   Will repeat CBC  Given IVF in the ED  "

## 2023-01-03 NOTE — CONSULTS
Follow-up Information       Gallo Lara MD. Call in 1 day(s).    Specialty: Family Medicine  Contact information:  [address] [facility]  [place] [address] [phone]               Wil Mahoney MD. Schedule an appointment as soon as possible for a visit on 2/1/2023.    Specialties: Cardiology, Interventional Cardiology  Why: at 2:40pm  Contact information:  [address]  [address]  [place] [address] [phone]

## 2023-01-09 ENCOUNTER — PATIENT MESSAGE (OUTPATIENT)
Dept: ADMINISTRATIVE | Facility: HOSPITAL | Age: 68
End: 2023-01-09
Payer: MEDICARE

## 2023-01-10 ENCOUNTER — OFFICE VISIT (OUTPATIENT)
Dept: FAMILY MEDICINE | Facility: CLINIC | Age: 68
End: 2023-01-10
Payer: MEDICARE

## 2023-01-10 VITALS
OXYGEN SATURATION: 97 % | TEMPERATURE: 97 F | WEIGHT: 223.75 LBS | DIASTOLIC BLOOD PRESSURE: 80 MMHG | SYSTOLIC BLOOD PRESSURE: 138 MMHG | HEART RATE: 61 BPM | BODY MASS INDEX: 32.03 KG/M2 | HEIGHT: 70 IN

## 2023-01-10 DIAGNOSIS — E11.29 TYPE 2 DIABETES MELLITUS WITH MICROALBUMINURIA, WITHOUT LONG-TERM CURRENT USE OF INSULIN: Chronic | ICD-10-CM

## 2023-01-10 DIAGNOSIS — I48.91 NEW ONSET A-FIB: Primary | ICD-10-CM

## 2023-01-10 DIAGNOSIS — R80.9 TYPE 2 DIABETES MELLITUS WITH MICROALBUMINURIA, WITHOUT LONG-TERM CURRENT USE OF INSULIN: Chronic | ICD-10-CM

## 2023-01-10 DIAGNOSIS — Z79.01 CHRONIC ANTICOAGULATION: ICD-10-CM

## 2023-01-10 DIAGNOSIS — E29.1 TESTICULAR HYPOFUNCTION: ICD-10-CM

## 2023-01-10 PROCEDURE — 1101F PT FALLS ASSESS-DOCD LE1/YR: CPT | Mod: HCNC,CPTII,S$GLB, | Performed by: FAMILY MEDICINE

## 2023-01-10 PROCEDURE — 99999 PR PBB SHADOW E&M-EST. PATIENT-LVL IV: ICD-10-PCS | Mod: PBBFAC,HCNC,, | Performed by: FAMILY MEDICINE

## 2023-01-10 PROCEDURE — 3288F PR FALLS RISK ASSESSMENT DOCUMENTED: ICD-10-PCS | Mod: HCNC,CPTII,S$GLB, | Performed by: FAMILY MEDICINE

## 2023-01-10 PROCEDURE — 3075F SYST BP GE 130 - 139MM HG: CPT | Mod: HCNC,CPTII,S$GLB, | Performed by: FAMILY MEDICINE

## 2023-01-10 PROCEDURE — 99214 OFFICE O/P EST MOD 30 MIN: CPT | Mod: HCNC,S$GLB,, | Performed by: FAMILY MEDICINE

## 2023-01-10 PROCEDURE — 1159F PR MEDICATION LIST DOCUMENTED IN MEDICAL RECORD: ICD-10-PCS | Mod: HCNC,CPTII,S$GLB, | Performed by: FAMILY MEDICINE

## 2023-01-10 PROCEDURE — 1125F AMNT PAIN NOTED PAIN PRSNT: CPT | Mod: HCNC,CPTII,S$GLB, | Performed by: FAMILY MEDICINE

## 2023-01-10 PROCEDURE — 1159F MED LIST DOCD IN RCRD: CPT | Mod: HCNC,CPTII,S$GLB, | Performed by: FAMILY MEDICINE

## 2023-01-10 PROCEDURE — 3079F DIAST BP 80-89 MM HG: CPT | Mod: HCNC,CPTII,S$GLB, | Performed by: FAMILY MEDICINE

## 2023-01-10 PROCEDURE — 99214 PR OFFICE/OUTPT VISIT, EST, LEVL IV, 30-39 MIN: ICD-10-PCS | Mod: HCNC,S$GLB,, | Performed by: FAMILY MEDICINE

## 2023-01-10 PROCEDURE — 99999 PR PBB SHADOW E&M-EST. PATIENT-LVL IV: CPT | Mod: PBBFAC,HCNC,, | Performed by: FAMILY MEDICINE

## 2023-01-10 PROCEDURE — 1101F PR PT FALLS ASSESS DOC 0-1 FALLS W/OUT INJ PAST YR: ICD-10-PCS | Mod: HCNC,CPTII,S$GLB, | Performed by: FAMILY MEDICINE

## 2023-01-10 PROCEDURE — 1125F PR PAIN SEVERITY QUANTIFIED, PAIN PRESENT: ICD-10-PCS | Mod: HCNC,CPTII,S$GLB, | Performed by: FAMILY MEDICINE

## 2023-01-10 PROCEDURE — 3008F BODY MASS INDEX DOCD: CPT | Mod: HCNC,CPTII,S$GLB, | Performed by: FAMILY MEDICINE

## 2023-01-10 PROCEDURE — 3075F PR MOST RECENT SYSTOLIC BLOOD PRESS GE 130-139MM HG: ICD-10-PCS | Mod: HCNC,CPTII,S$GLB, | Performed by: FAMILY MEDICINE

## 2023-01-10 PROCEDURE — 3008F PR BODY MASS INDEX (BMI) DOCUMENTED: ICD-10-PCS | Mod: HCNC,CPTII,S$GLB, | Performed by: FAMILY MEDICINE

## 2023-01-10 PROCEDURE — 3288F FALL RISK ASSESSMENT DOCD: CPT | Mod: HCNC,CPTII,S$GLB, | Performed by: FAMILY MEDICINE

## 2023-01-10 PROCEDURE — 3079F PR MOST RECENT DIASTOLIC BLOOD PRESSURE 80-89 MM HG: ICD-10-PCS | Mod: HCNC,CPTII,S$GLB, | Performed by: FAMILY MEDICINE

## 2023-01-10 RX ORDER — METOPROLOL SUCCINATE 25 MG/1
25 TABLET, EXTENDED RELEASE ORAL DAILY
Qty: 90 TABLET | Refills: 3 | Status: SHIPPED | OUTPATIENT
Start: 2023-01-10 | End: 2023-02-01 | Stop reason: SDUPTHER

## 2023-01-10 RX ORDER — CIPROFLOXACIN AND DEXAMETHASONE 3; 1 MG/ML; MG/ML
SUSPENSION/ DROPS AURICULAR (OTIC)
COMMUNITY
Start: 2022-12-20 | End: 2023-02-28 | Stop reason: CLARIF

## 2023-01-10 RX ORDER — TESTOSTERONE CYPIONATE 200 MG/ML
100 INJECTION, SOLUTION INTRAMUSCULAR
Status: COMPLETED | OUTPATIENT
Start: 2023-01-11 | End: 2023-03-22

## 2023-01-10 NOTE — PROGRESS NOTES
[facility]  Progress Note    SUBJECTIVE:     Chief Complaint   Patient presents with    Hospital Follow Up       HPI   King Cool Jr.  is a 67 y.o. male here for hospital follow-up. Was dx with new onset a fib which he cardioverted after diltiazem. On metoprolol and xarelto now and doing well. Patient has no other new complaints/problems at this time.      Review of patient's allergies indicates:   Allergen Reactions    Tamiflu [oseltamivir]      Increases BP    Metformin      Diarrhea, nausea    Penicillins Rash       Past Medical History:   Diagnosis Date    Essential (primary) hypertension     Male erectile dysfunction, unspecified     New onset a-fib 1/3/2023    Testicular hypofunction     Type 2 diabetes mellitus without complications      Past Surgical History:   Procedure Laterality Date    HIP SURGERY       Family History   Problem Relation Age of Onset    Blindness Mother     No Known Problems Father     No Known Problems Brother     No Known Problems Daughter     No Known Problems Son     No Known Problems Daughter     Diabetes Brother     No Known Problems Brother     Amblyopia Neg Hx     Cancer Neg Hx     Cataracts Neg Hx     Glaucoma Neg Hx     Hypertension Neg Hx     Macular degeneration Neg Hx     Retinal detachment Neg Hx     Strabismus Neg Hx     Stroke Neg Hx     Thyroid disease Neg Hx      Social History     Tobacco Use    Smoking status: Former     Packs/day: 0.25     Types: Cigarettes     Start date: 1972     Quit date: 1976     Years since quittin.1    Smokeless tobacco: Never   Substance Use Topics    Alcohol use: Not Currently     Comment: Pt. has a Hx. of  Alcohol use.    Drug use: No        Review of Systems   Constitutional:  Negative for chills and fever.   HENT: Negative.     Respiratory: Negative.  Negative for shortness of breath.    Cardiovascular: Negative.  Negative for chest pain.   Gastrointestinal: Negative.  Negative for abdominal pain, nausea and  vomiting.   Genitourinary: Negative.    Neurological:  Negative for headaches.   All other systems reviewed and are negative.  OBJECTIVE:     Vitals:    01/10/23 1008   BP: 138/80   Pulse: 61   Temp: 97 °F (36.1 °C)     Body mass index is 32.11 kg/m².    Physical Exam  Constitutional:       General: He is not in acute distress.     Appearance: He is not diaphoretic.   HENT:      Head: Normocephalic and atraumatic.      Nose: Nose normal.   Eyes:      Conjunctiva/sclera: Conjunctivae normal.   Cardiovascular:      Rate and Rhythm: Normal rate and regular rhythm.      Heart sounds: Normal heart sounds. No murmur heard.    No friction rub. No gallop.   Pulmonary:      Effort: Pulmonary effort is normal. No respiratory distress.      Breath sounds: Normal breath sounds. No wheezing or rales.   Abdominal:      Palpations: Abdomen is soft.   Skin:     General: Skin is warm.   Neurological:      Mental Status: He is alert and oriented to person, place, and time.       Old records were reviewed. Labs and/or images were independently reviewed.    ASSESSMENT     1. New onset a-fib    2. Type 2 diabetes mellitus with microalbuminuria, without long-term current use of insulin    3. Testicular hypofunction        PLAN:     New onset a-fib  -     metoprolol succinate (TOPROL-XL) 25 MG 24 hr tablet; Take 1 tablet (25 mg total) by mouth once daily.  Dispense: 90 tablet; Refill: 3  -     rivaroxaban (XARELTO) 20 mg Tab; Take 1 tablet (20 mg total) by mouth daily with dinner or evening meal.  Dispense: 90 tablet; Refill: 3  -     continue with anti coag and metoprolol.    Type 2 diabetes mellitus with microalbuminuria, without long-term current use of insulin  -     Comprehensive Metabolic Panel; Future  -     CBC Auto Differential; Future  -     Hemoglobin A1C; Future  -     Instructed patient to take daily glucose AM logs and to write them down to bring with on next visit. Advised patient to decrease intake of carbohydrates/simple  sugars.         Testicular hypofunction  -     testosterone cypionate injection 100 mg  -    could be cause of increased h/h. Decreased dosage down.      RTC TYLOR Lara MD  01/10/2023 10:24 AM

## 2023-01-23 ENCOUNTER — CLINICAL SUPPORT (OUTPATIENT)
Dept: FAMILY MEDICINE | Facility: CLINIC | Age: 68
End: 2023-01-23
Payer: MEDICARE

## 2023-01-23 DIAGNOSIS — E29.1 TESTICULAR HYPOFUNCTION: Primary | ICD-10-CM

## 2023-01-23 PROCEDURE — 96372 PR INJECTION,THERAP/PROPH/DIAG2ST, IM OR SUBCUT: ICD-10-PCS | Mod: HCNC,S$GLB,, | Performed by: FAMILY MEDICINE

## 2023-01-23 PROCEDURE — 96372 THER/PROPH/DIAG INJ SC/IM: CPT | Mod: HCNC,S$GLB,, | Performed by: FAMILY MEDICINE

## 2023-01-23 RX ADMIN — TESTOSTERONE CYPIONATE 100 MG: 200 INJECTION, SOLUTION INTRAMUSCULAR at 10:01

## 2023-02-01 ENCOUNTER — OFFICE VISIT (OUTPATIENT)
Dept: CARDIOLOGY | Facility: CLINIC | Age: 68
End: 2023-02-01
Payer: MEDICARE

## 2023-02-01 VITALS
HEART RATE: 89 BPM | DIASTOLIC BLOOD PRESSURE: 90 MMHG | RESPIRATION RATE: 18 BRPM | BODY MASS INDEX: 32.57 KG/M2 | OXYGEN SATURATION: 97 % | HEIGHT: 70 IN | WEIGHT: 227.5 LBS | SYSTOLIC BLOOD PRESSURE: 158 MMHG

## 2023-02-01 DIAGNOSIS — E66.9 NON MORBID OBESITY, UNSPECIFIED OBESITY TYPE: ICD-10-CM

## 2023-02-01 DIAGNOSIS — R79.89 ELEVATED TROPONIN LEVEL NOT DUE TO ACUTE CORONARY SYNDROME: Primary | ICD-10-CM

## 2023-02-01 DIAGNOSIS — Z79.01 CHRONIC ANTICOAGULATION: ICD-10-CM

## 2023-02-01 DIAGNOSIS — R80.9 TYPE 2 DIABETES MELLITUS WITH MICROALBUMINURIA, WITHOUT LONG-TERM CURRENT USE OF INSULIN: Chronic | ICD-10-CM

## 2023-02-01 DIAGNOSIS — Z13.6 SCREENING FOR AAA (ABDOMINAL AORTIC ANEURYSM): ICD-10-CM

## 2023-02-01 DIAGNOSIS — I10 ESSENTIAL (PRIMARY) HYPERTENSION: Chronic | ICD-10-CM

## 2023-02-01 DIAGNOSIS — I48.91 NEW ONSET A-FIB: ICD-10-CM

## 2023-02-01 DIAGNOSIS — I48.0 PAF (PAROXYSMAL ATRIAL FIBRILLATION): ICD-10-CM

## 2023-02-01 DIAGNOSIS — I10 ESSENTIAL HYPERTENSION: ICD-10-CM

## 2023-02-01 DIAGNOSIS — E66.09 CLASS 1 OBESITY DUE TO EXCESS CALORIES WITH SERIOUS COMORBIDITY AND BODY MASS INDEX (BMI) OF 33.0 TO 33.9 IN ADULT: ICD-10-CM

## 2023-02-01 DIAGNOSIS — E78.2 MIXED HYPERLIPIDEMIA: ICD-10-CM

## 2023-02-01 DIAGNOSIS — E11.29 TYPE 2 DIABETES MELLITUS WITH MICROALBUMINURIA, WITHOUT LONG-TERM CURRENT USE OF INSULIN: Chronic | ICD-10-CM

## 2023-02-01 PROCEDURE — 1159F MED LIST DOCD IN RCRD: CPT | Mod: HCNC,CPTII,S$GLB, | Performed by: INTERNAL MEDICINE

## 2023-02-01 PROCEDURE — 1160F PR REVIEW ALL MEDS BY PRESCRIBER/CLIN PHARMACIST DOCUMENTED: ICD-10-PCS | Mod: HCNC,CPTII,S$GLB, | Performed by: INTERNAL MEDICINE

## 2023-02-01 PROCEDURE — 3008F BODY MASS INDEX DOCD: CPT | Mod: HCNC,CPTII,S$GLB, | Performed by: INTERNAL MEDICINE

## 2023-02-01 PROCEDURE — 3008F PR BODY MASS INDEX (BMI) DOCUMENTED: ICD-10-PCS | Mod: HCNC,CPTII,S$GLB, | Performed by: INTERNAL MEDICINE

## 2023-02-01 PROCEDURE — 3288F PR FALLS RISK ASSESSMENT DOCUMENTED: ICD-10-PCS | Mod: HCNC,CPTII,S$GLB, | Performed by: INTERNAL MEDICINE

## 2023-02-01 PROCEDURE — 99999 PR PBB SHADOW E&M-EST. PATIENT-LVL V: ICD-10-PCS | Mod: PBBFAC,HCNC,, | Performed by: INTERNAL MEDICINE

## 2023-02-01 PROCEDURE — 99214 PR OFFICE/OUTPT VISIT, EST, LEVL IV, 30-39 MIN: ICD-10-PCS | Mod: HCNC,S$GLB,, | Performed by: INTERNAL MEDICINE

## 2023-02-01 PROCEDURE — 1160F RVW MEDS BY RX/DR IN RCRD: CPT | Mod: HCNC,CPTII,S$GLB, | Performed by: INTERNAL MEDICINE

## 2023-02-01 PROCEDURE — 1101F PR PT FALLS ASSESS DOC 0-1 FALLS W/OUT INJ PAST YR: ICD-10-PCS | Mod: HCNC,CPTII,S$GLB, | Performed by: INTERNAL MEDICINE

## 2023-02-01 PROCEDURE — 99214 OFFICE O/P EST MOD 30 MIN: CPT | Mod: HCNC,S$GLB,, | Performed by: INTERNAL MEDICINE

## 2023-02-01 PROCEDURE — 3077F PR MOST RECENT SYSTOLIC BLOOD PRESSURE >= 140 MM HG: ICD-10-PCS | Mod: HCNC,CPTII,S$GLB, | Performed by: INTERNAL MEDICINE

## 2023-02-01 PROCEDURE — 3077F SYST BP >= 140 MM HG: CPT | Mod: HCNC,CPTII,S$GLB, | Performed by: INTERNAL MEDICINE

## 2023-02-01 PROCEDURE — 1101F PT FALLS ASSESS-DOCD LE1/YR: CPT | Mod: HCNC,CPTII,S$GLB, | Performed by: INTERNAL MEDICINE

## 2023-02-01 PROCEDURE — 3080F DIAST BP >= 90 MM HG: CPT | Mod: HCNC,CPTII,S$GLB, | Performed by: INTERNAL MEDICINE

## 2023-02-01 PROCEDURE — 1159F PR MEDICATION LIST DOCUMENTED IN MEDICAL RECORD: ICD-10-PCS | Mod: HCNC,CPTII,S$GLB, | Performed by: INTERNAL MEDICINE

## 2023-02-01 PROCEDURE — 3288F FALL RISK ASSESSMENT DOCD: CPT | Mod: HCNC,CPTII,S$GLB, | Performed by: INTERNAL MEDICINE

## 2023-02-01 PROCEDURE — 3080F PR MOST RECENT DIASTOLIC BLOOD PRESSURE >= 90 MM HG: ICD-10-PCS | Mod: HCNC,CPTII,S$GLB, | Performed by: INTERNAL MEDICINE

## 2023-02-01 PROCEDURE — 99999 PR PBB SHADOW E&M-EST. PATIENT-LVL V: CPT | Mod: PBBFAC,HCNC,, | Performed by: INTERNAL MEDICINE

## 2023-02-01 PROCEDURE — 1125F AMNT PAIN NOTED PAIN PRSNT: CPT | Mod: HCNC,CPTII,S$GLB, | Performed by: INTERNAL MEDICINE

## 2023-02-01 PROCEDURE — 1125F PR PAIN SEVERITY QUANTIFIED, PAIN PRESENT: ICD-10-PCS | Mod: HCNC,CPTII,S$GLB, | Performed by: INTERNAL MEDICINE

## 2023-02-01 RX ORDER — ATORVASTATIN CALCIUM 40 MG/1
40 TABLET, FILM COATED ORAL NIGHTLY
Qty: 90 TABLET | Refills: 3 | Status: SHIPPED | OUTPATIENT
Start: 2023-02-01 | End: 2023-02-07 | Stop reason: SDUPTHER

## 2023-02-01 RX ORDER — METOPROLOL SUCCINATE 50 MG/1
50 TABLET, EXTENDED RELEASE ORAL DAILY
Qty: 90 TABLET | Refills: 3 | Status: SHIPPED | OUTPATIENT
Start: 2023-02-01 | End: 2023-02-07 | Stop reason: SDUPTHER

## 2023-02-01 NOTE — PROGRESS NOTES
CARDIOVASCULAR PROGRESS NOTE    REASON FOR CONSULT:   King Cool Jr. is a 67 y.o. male who presents for follow up of PAF, eber trop.    PCP: Jane  HISTORY OF PRESENT ILLNESS:   The patient returns for follow-up of his ER visit for paroxysmal atrial fibrillation with rapid ventricular response and elevated troponin.  He spontaneously converted to sinus rhythm with diltiazem and has since been started on metoprolol.  He denies angina, dyspnea, palpitations, or syncope.  There has been no PND, orthopnea, melena, hematuria, or claudicant symptoms.  He continues take his Xarelto without any issues.    CARDIOVASCULAR HISTORY:   PAF on Xarelto    PAST MEDICAL HISTORY:     Past Medical History:   Diagnosis Date    Essential (primary) hypertension     Male erectile dysfunction, unspecified     New onset a-fib 1/3/2023    Testicular hypofunction     Type 2 diabetes mellitus without complications        PAST SURGICAL HISTORY:     Past Surgical History:   Procedure Laterality Date    HIP SURGERY         ALLERGIES AND MEDICATION:     Review of patient's allergies indicates:   Allergen Reactions    Tamiflu [oseltamivir]      Increases BP    Metformin      Diarrhea, nausea    Penicillins Rash        Medication List            Accurate as of February 1, 2023  3:01 PM. If you have any questions, ask your nurse or doctor.                CHANGE how you take these medications      TRUEPLUS LANCETS 33 gauge Misc  Generic drug: lancets  What changed: Another medication with the same name was removed. Continue taking this medication, and follow the directions you see here.  Changed by: Wil Mahoney MD            CONTINUE taking these medications      aspirin 81 MG EC tablet  Commonly known as: ECOTRIN     ciprofloxacin-dexAMETHasone 0.3-0.1% 0.3-0.1 % Drps  Commonly known as: CIPRODEX     diclofenac 50 MG EC tablet  Commonly known as: VOLTAREN  TAKE 1 TABLET BY MOUTH TWICE A DAY     doxycycline 100 MG tablet  Commonly known as:  VIBRA-TABS  Take 1 tablet (100 mg total) by mouth 2 (two) times daily.     DROPSAFE ALCOHOL PREP PADS Padm  Generic drug: alcohol swabs  USE AS DIRECTED AS NEEDED     empagliflozin 25 mg tablet  Commonly known as: JARDIANCE  Take 1 tablet (25 mg total) by mouth once daily.     fish oil-omega-3 fatty acids 300-1,000 mg capsule     gabapentin 300 MG capsule  Commonly known as: NEURONTIN  Take 1 capsule (300 mg total) by mouth 3 (three) times daily.     glimepiride 4 MG tablet  Commonly known as: AMARYL  Take 1 tablet (4 mg total) by mouth before breakfast.     * HYDROcodone-acetaminophen  mg per tablet  Commonly known as: NORCO  Take 1 tablet by mouth 3 (three) times daily as needed (pain).     * HYDROcodone-acetaminophen  mg per tablet  Commonly known as: NORCO  Take 1 tablet by mouth 3 (three) times daily as needed (pain).  Start taking on: February 16, 2023     * HYDROcodone-acetaminophen  mg per tablet  Commonly known as: NORCO  Take 1 tablet by mouth 3 (three) times daily as needed (pain).  Start taking on: March 16, 2023     hydrocortisone 2.5 % cream  Apply topically 2 (two) times daily.     loratadine 10 mg tablet  Commonly known as: CLARITIN  TAKE 1 TABLET BY MOUTH EVERY DAY     methocarbamoL 500 MG Tab  Commonly known as: ROBAXIN  Take 2 tablets (1,000 mg total) by mouth every 6 (six) hours as needed (muscle spasms).     metoprolol succinate 25 MG 24 hr tablet  Commonly known as: TOPROL-XL  Take 1 tablet (25 mg total) by mouth once daily.     multivitamin with minerals tablet     neomycin-polymyxin-hydrocortisone 3.5-10,000-1 mg/mL-unit/mL-% otic suspension  Commonly known as: CORTISPORIN  PLACE 3 DROPS INTO THE LEFT EAR 4 TIMES DAILY.     pravastatin 20 MG tablet  Commonly known as: PRAVACHOL  Take 1 tablet (20 mg total) by mouth once daily.     rivaroxaban 20 mg Tab  Commonly known as: XARELTO  Take 1 tablet (20 mg total) by mouth daily with dinner or evening meal.     testosterone  cypionate 200 mg/mL injection  Commonly known as: DEPOTESTOTERONE CYPIONATE  Inject 1 mL (200 mg total) into the muscle every 14 (fourteen) days.     VITAMIN C ORAL           * This list has 3 medication(s) that are the same as other medications prescribed for you. Read the directions carefully, and ask your doctor or other care provider to review them with you.                STOP taking these medications      ACCU-CHEK GUIDE TEST STRIPS Strp  Generic drug: blood sugar diagnostic  Stopped by: Wil Mahoney MD     blood sugar diagnostic Strp  Stopped by: Wil Mahoney MD     blood-glucose meter kit  Stopped by: Wil Mahoney MD     blood-glucose meter Misc  Commonly known as: ACCU-CHEK GUIDE GLUCOSE METER  Stopped by: Wil Mahoney MD              SOCIAL HISTORY:     Social History     Socioeconomic History    Marital status:     Number of children: 3    Highest education level: GED or equivalent   Tobacco Use    Smoking status: Former     Packs/day: 0.25     Types: Cigarettes     Start date: 1972     Quit date: 1976     Years since quittin.2    Smokeless tobacco: Never   Substance and Sexual Activity    Alcohol use: Not Currently     Comment: Pt. has a Hx. of  Alcohol use.    Drug use: No    Sexual activity: Yes     Partners: Female     Birth control/protection: None       FAMILY HISTORY:     Family History   Problem Relation Age of Onset    Blindness Mother     No Known Problems Father     No Known Problems Brother     No Known Problems Daughter     No Known Problems Son     No Known Problems Daughter     Diabetes Brother     No Known Problems Brother     Amblyopia Neg Hx     Cancer Neg Hx     Cataracts Neg Hx     Glaucoma Neg Hx     Hypertension Neg Hx     Macular degeneration Neg Hx     Retinal detachment Neg Hx     Strabismus Neg Hx     Stroke Neg Hx     Thyroid disease Neg Hx        REVIEW OF SYSTEMS:   Review of Systems   Constitutional:  Negative for chills,  "diaphoresis and fever.   HENT:  Negative for nosebleeds.    Eyes:  Negative for blurred vision, double vision and photophobia.   Respiratory:  Negative for hemoptysis, shortness of breath and wheezing.    Cardiovascular:  Negative for chest pain, palpitations, orthopnea, claudication, leg swelling and PND.   Gastrointestinal:  Negative for abdominal pain, blood in stool, heartburn, melena, nausea and vomiting.   Genitourinary:  Negative for flank pain and hematuria.   Musculoskeletal:  Negative for falls, myalgias and neck pain.   Skin:  Negative for rash.   Neurological:  Negative for dizziness, seizures, loss of consciousness, weakness and headaches.   Endo/Heme/Allergies:  Negative for polydipsia. Does not bruise/bleed easily.   Psychiatric/Behavioral:  Negative for depression and memory loss. The patient is not nervous/anxious.      PHYSICAL EXAM:     Vitals:    02/01/23 1448   BP: (!) 158/90   Pulse: 89   Resp: 18    Body mass index is 32.65 kg/m².  Weight: 103.2 kg (227 lb 8.2 oz)   Height: 5' 10" (177.8 cm)     Physical Exam  Vitals reviewed.   Constitutional:       General: He is not in acute distress.     Appearance: He is well-developed. He is obese. He is not ill-appearing, toxic-appearing or diaphoretic.   HENT:      Head: Normocephalic and atraumatic.   Eyes:      General: No scleral icterus.     Extraocular Movements: Extraocular movements intact.      Conjunctiva/sclera: Conjunctivae normal.      Pupils: Pupils are equal, round, and reactive to light.   Neck:      Thyroid: No thyromegaly.      Vascular: Normal carotid pulses. No carotid bruit or JVD.      Trachea: Trachea normal.   Cardiovascular:      Rate and Rhythm: Normal rate and regular rhythm.      Pulses:           Carotid pulses are 2+ on the right side and 2+ on the left side.     Heart sounds: S1 normal and S2 normal. No murmur heard.    No friction rub. No gallop.   Pulmonary:      Effort: Pulmonary effort is normal. No respiratory " distress.      Breath sounds: Normal breath sounds. No stridor. No wheezing, rhonchi or rales.   Chest:      Chest wall: No tenderness.   Abdominal:      General: There is no distension.      Palpations: Abdomen is soft.   Musculoskeletal:         General: No swelling or tenderness. Normal range of motion.      Cervical back: Normal range of motion and neck supple. No edema or rigidity.      Right lower leg: No edema.      Left lower leg: No edema.   Feet:      Right foot:      Skin integrity: No ulcer.      Left foot:      Skin integrity: No ulcer.   Skin:     General: Skin is warm and dry.      Coloration: Skin is not jaundiced.   Neurological:      General: No focal deficit present.      Mental Status: He is alert and oriented to person, place, and time.      Cranial Nerves: No cranial nerve deficit.   Psychiatric:         Mood and Affect: Mood normal.         Speech: Speech normal.         Behavior: Behavior normal. Behavior is cooperative.       DATA:   EKG: (personally reviewed tracing)  1/3/23 0857   1/3/23 1022 AF 84, IRBBB  1/3/23 1515 tele:  84    Laboratory:  CBC:  Recent Labs   Lab 02/07/22  1121 01/03/23  0934   WBC 8.18 11.94   Hemoglobin 16.4 20.8 HH   Hematocrit 49.1 60.3 H   Platelets 237 238       CHEMISTRIES:  Recent Labs   Lab 07/08/20  1540 03/02/21  1030 06/24/21  0925 02/07/22  1121 01/03/23  0934   Glucose 119 H 159 H 246 H 239 H 351 H   Sodium 138 141 139 136 133 L   Potassium 4.1 4.9 4.8 4.2 4.9   BUN 12 20 16 13 18   Creatinine 1.1 1.1 1.2 0.9 1.3   eGFR if non African American >60.0 >60 >60.0 >60.0  --    eGFR  --   --   --   --  >60   Calcium 9.2 9.3 10.1 9.4 8.9   Magnesium  --   --   --   --  1.8       CARDIAC BIOMARKERS:  Recent Labs   Lab 01/03/23  0934 01/03/23  1222   Troponin I 0.119 H 0.125 H       COAGS:        LIPIDS/LFTS:  Recent Labs   Lab 03/02/21  1030 06/24/21  0925 02/07/22  1121 01/03/23  0934   Cholesterol 179  --  207 H  --    Triglycerides 222 H  --  262 H   --    HDL 29 L  --  37 L  --    LDL Cholesterol 105.6  --  117.6  --    Non-HDL Cholesterol 150  --  170  --    AST 25 20 25 24   ALT 31 28 40 39     Lab Results   Component Value Date    TSH 0.826 01/03/2023         Cardiovascular Testing:  Echo 1/3/23  The left ventricle is normal in size with concentric remodeling and low normal systolic function.  The estimated ejection fraction is 50%.  Normal right ventricular size with normal right ventricular systolic function.  The estimated PA systolic pressure is 26 mmHg.    ASSESSMENT:   # PAF/RVR with elev trop, now in SR on Xarelto 20mg  # HTN, uncontrolled  # HLP ?on statin  # NIDDM  # ?polycythemia  # BMI 33    PLAN:   Cont med rx  Cont ASA 81mg qd  Cont Xarelto 20mg qhs  Inc toprol 50mg qd  Change prava to atorva 40mg qhs  Ex MPI  Aortic US (screen AAA)  RTC 1 month  Check lipids/LFT 6 months (Aug 2023)      Wil Mahoney MD, FACC

## 2023-02-06 ENCOUNTER — TELEPHONE (OUTPATIENT)
Dept: FAMILY MEDICINE | Facility: CLINIC | Age: 68
End: 2023-02-06
Payer: MEDICARE

## 2023-02-06 NOTE — TELEPHONE ENCOUNTER
----- Message from Nichole Bañuelos sent at 2/6/2023  1:34 PM CST -----  Type: Patient Call Back    Who called:self    What is the request in detail:pt would like an update on medication. Please call    Can the clinic reply by [facility]?    Would the patient rather a call back or a response via [facility]? call    Best call back number:[phone] (home)

## 2023-02-07 ENCOUNTER — TELEPHONE (OUTPATIENT)
Dept: CARDIOLOGY | Facility: CLINIC | Age: 68
End: 2023-02-07
Payer: MEDICARE

## 2023-02-07 ENCOUNTER — OFFICE VISIT (OUTPATIENT)
Dept: CARDIOLOGY | Facility: CLINIC | Age: 68
End: 2023-02-07
Payer: MEDICARE

## 2023-02-07 VITALS
HEART RATE: 80 BPM | BODY MASS INDEX: 32.17 KG/M2 | HEIGHT: 70 IN | SYSTOLIC BLOOD PRESSURE: 148 MMHG | RESPIRATION RATE: 18 BRPM | OXYGEN SATURATION: 96 % | WEIGHT: 224.75 LBS | DIASTOLIC BLOOD PRESSURE: 86 MMHG

## 2023-02-07 DIAGNOSIS — Z00.00 ENCOUNTER FOR MEDICARE ANNUAL WELLNESS EXAM: ICD-10-CM

## 2023-02-07 DIAGNOSIS — R79.89 ELEVATED TROPONIN LEVEL NOT DUE TO ACUTE CORONARY SYNDROME: ICD-10-CM

## 2023-02-07 DIAGNOSIS — I10 ESSENTIAL HYPERTENSION: ICD-10-CM

## 2023-02-07 DIAGNOSIS — E11.29 TYPE 2 DIABETES MELLITUS WITH MICROALBUMINURIA, WITHOUT LONG-TERM CURRENT USE OF INSULIN: ICD-10-CM

## 2023-02-07 DIAGNOSIS — I48.0 PAF (PAROXYSMAL ATRIAL FIBRILLATION): Primary | ICD-10-CM

## 2023-02-07 DIAGNOSIS — E66.09 CLASS 1 OBESITY DUE TO EXCESS CALORIES WITH SERIOUS COMORBIDITY AND BODY MASS INDEX (BMI) OF 33.0 TO 33.9 IN ADULT: ICD-10-CM

## 2023-02-07 DIAGNOSIS — R80.9 TYPE 2 DIABETES MELLITUS WITH MICROALBUMINURIA, WITHOUT LONG-TERM CURRENT USE OF INSULIN: ICD-10-CM

## 2023-02-07 DIAGNOSIS — I48.91 NEW ONSET A-FIB: ICD-10-CM

## 2023-02-07 DIAGNOSIS — E66.9 NON MORBID OBESITY, UNSPECIFIED OBESITY TYPE: ICD-10-CM

## 2023-02-07 DIAGNOSIS — Z79.01 CHRONIC ANTICOAGULATION: ICD-10-CM

## 2023-02-07 DIAGNOSIS — E78.2 MIXED HYPERLIPIDEMIA: ICD-10-CM

## 2023-02-07 PROCEDURE — 3008F PR BODY MASS INDEX (BMI) DOCUMENTED: ICD-10-PCS | Mod: HCNC,CPTII,S$GLB, | Performed by: INTERNAL MEDICINE

## 2023-02-07 PROCEDURE — 1159F PR MEDICATION LIST DOCUMENTED IN MEDICAL RECORD: ICD-10-PCS | Mod: HCNC,CPTII,S$GLB, | Performed by: INTERNAL MEDICINE

## 2023-02-07 PROCEDURE — 1125F PR PAIN SEVERITY QUANTIFIED, PAIN PRESENT: ICD-10-PCS | Mod: HCNC,CPTII,S$GLB, | Performed by: INTERNAL MEDICINE

## 2023-02-07 PROCEDURE — 3077F SYST BP >= 140 MM HG: CPT | Mod: HCNC,CPTII,S$GLB, | Performed by: INTERNAL MEDICINE

## 2023-02-07 PROCEDURE — 3288F FALL RISK ASSESSMENT DOCD: CPT | Mod: HCNC,CPTII,S$GLB, | Performed by: INTERNAL MEDICINE

## 2023-02-07 PROCEDURE — 1160F PR REVIEW ALL MEDS BY PRESCRIBER/CLIN PHARMACIST DOCUMENTED: ICD-10-PCS | Mod: HCNC,CPTII,S$GLB, | Performed by: INTERNAL MEDICINE

## 2023-02-07 PROCEDURE — 1159F MED LIST DOCD IN RCRD: CPT | Mod: HCNC,CPTII,S$GLB, | Performed by: INTERNAL MEDICINE

## 2023-02-07 PROCEDURE — 3288F PR FALLS RISK ASSESSMENT DOCUMENTED: ICD-10-PCS | Mod: HCNC,CPTII,S$GLB, | Performed by: INTERNAL MEDICINE

## 2023-02-07 PROCEDURE — 99214 OFFICE O/P EST MOD 30 MIN: CPT | Mod: HCNC,S$GLB,, | Performed by: INTERNAL MEDICINE

## 2023-02-07 PROCEDURE — 3008F BODY MASS INDEX DOCD: CPT | Mod: HCNC,CPTII,S$GLB, | Performed by: INTERNAL MEDICINE

## 2023-02-07 PROCEDURE — 1101F PT FALLS ASSESS-DOCD LE1/YR: CPT | Mod: HCNC,CPTII,S$GLB, | Performed by: INTERNAL MEDICINE

## 2023-02-07 PROCEDURE — 1101F PR PT FALLS ASSESS DOC 0-1 FALLS W/OUT INJ PAST YR: ICD-10-PCS | Mod: HCNC,CPTII,S$GLB, | Performed by: INTERNAL MEDICINE

## 2023-02-07 PROCEDURE — 3079F PR MOST RECENT DIASTOLIC BLOOD PRESSURE 80-89 MM HG: ICD-10-PCS | Mod: HCNC,CPTII,S$GLB, | Performed by: INTERNAL MEDICINE

## 2023-02-07 PROCEDURE — 3079F DIAST BP 80-89 MM HG: CPT | Mod: HCNC,CPTII,S$GLB, | Performed by: INTERNAL MEDICINE

## 2023-02-07 PROCEDURE — 99214 PR OFFICE/OUTPT VISIT, EST, LEVL IV, 30-39 MIN: ICD-10-PCS | Mod: HCNC,S$GLB,, | Performed by: INTERNAL MEDICINE

## 2023-02-07 PROCEDURE — 99999 PR PBB SHADOW E&M-EST. PATIENT-LVL V: CPT | Mod: PBBFAC,HCNC,, | Performed by: INTERNAL MEDICINE

## 2023-02-07 PROCEDURE — 99999 PR PBB SHADOW E&M-EST. PATIENT-LVL V: ICD-10-PCS | Mod: PBBFAC,HCNC,, | Performed by: INTERNAL MEDICINE

## 2023-02-07 PROCEDURE — 1160F RVW MEDS BY RX/DR IN RCRD: CPT | Mod: HCNC,CPTII,S$GLB, | Performed by: INTERNAL MEDICINE

## 2023-02-07 PROCEDURE — 1125F AMNT PAIN NOTED PAIN PRSNT: CPT | Mod: HCNC,CPTII,S$GLB, | Performed by: INTERNAL MEDICINE

## 2023-02-07 PROCEDURE — 3077F PR MOST RECENT SYSTOLIC BLOOD PRESSURE >= 140 MM HG: ICD-10-PCS | Mod: HCNC,CPTII,S$GLB, | Performed by: INTERNAL MEDICINE

## 2023-02-07 RX ORDER — METOPROLOL SUCCINATE 25 MG/1
25 TABLET, EXTENDED RELEASE ORAL 2 TIMES DAILY
Qty: 180 TABLET | Refills: 3
Start: 2023-02-07 | End: 2023-03-20 | Stop reason: SDUPTHER

## 2023-02-07 RX ORDER — ATORVASTATIN CALCIUM 20 MG/1
20 TABLET, FILM COATED ORAL NIGHTLY
Qty: 90 TABLET | Refills: 3
Start: 2023-02-07 | End: 2023-02-16 | Stop reason: SDUPTHER

## 2023-02-07 NOTE — PROGRESS NOTES
CARDIOVASCULAR PROGRESS NOTE    REASON FOR CONSULT:   King Cool Jr. is a 68 y.o. male who presents for follow up of PAF, eber ramires.    PCP: Jane  HISTORY OF PRESENT ILLNESS:   The patient comes in today earlier than planned complaining of medication side effects.  He is describing a wooziness after taking both his atorvastatin and Toprol.  Apparently is doing fine with his Xarelto, although the pill bottle is empty and I have instructed him to refill it immediately.  He took the Toprol 25 milligram dose this morning, did not have any issues with that.  At this point, I recommended we change his Toprol to 25 milligrams b.i.d. and atorvastatin to 20 milligrams q.h.s. (previously on 40 milligrams) to see if this can alleviate the side effects.  He otherwise has had no angina, dyspnea, palpitations, or syncope.  There has been no PND, orthopnea, melena, hematuria, or claudicant symptoms.    CARDIOVASCULAR HISTORY:   PAF on Xarelto    PAST MEDICAL HISTORY:     Past Medical History:   Diagnosis Date    Essential (primary) hypertension     Male erectile dysfunction, unspecified     New onset a-fib 1/3/2023    Testicular hypofunction     Type 2 diabetes mellitus without complications        PAST SURGICAL HISTORY:     Past Surgical History:   Procedure Laterality Date    HIP SURGERY         ALLERGIES AND MEDICATION:     Review of patient's allergies indicates:   Allergen Reactions    Tamiflu [oseltamivir]      Increases BP    Metformin      Diarrhea, nausea    Penicillins Rash        Medication List            Accurate as of February 7, 2023 11:14 AM. If you have any questions, ask your nurse or doctor.                CONTINUE taking these medications      aspirin 81 MG EC tablet  Commonly known as: ECOTRIN     atorvastatin 40 MG tablet  Commonly known as: LIPITOR  Take 1 tablet (40 mg total) by mouth every evening.     ciprofloxacin-dexAMETHasone 0.3-0.1% 0.3-0.1 % Drps  Commonly known as: CIPRODEX     diclofenac 50 MG  EC tablet  Commonly known as: VOLTAREN  TAKE 1 TABLET BY MOUTH TWICE A DAY     doxycycline 100 MG tablet  Commonly known as: VIBRA-TABS  Take 1 tablet (100 mg total) by mouth 2 (two) times daily.     DROPSAFE ALCOHOL PREP PADS Padm  Generic drug: alcohol swabs  USE AS DIRECTED AS NEEDED     empagliflozin 25 mg tablet  Commonly known as: JARDIANCE  Take 1 tablet (25 mg total) by mouth once daily.     fish oil-omega-3 fatty acids 300-1,000 mg capsule     gabapentin 300 MG capsule  Commonly known as: NEURONTIN  Take 1 capsule (300 mg total) by mouth 3 (three) times daily.     glimepiride 4 MG tablet  Commonly known as: AMARYL  Take 1 tablet (4 mg total) by mouth before breakfast.     * HYDROcodone-acetaminophen  mg per tablet  Commonly known as: NORCO  Take 1 tablet by mouth 3 (three) times daily as needed (pain).     * HYDROcodone-acetaminophen  mg per tablet  Commonly known as: NORCO  Take 1 tablet by mouth 3 (three) times daily as needed (pain).  Start taking on: February 16, 2023     * HYDROcodone-acetaminophen  mg per tablet  Commonly known as: NORCO  Take 1 tablet by mouth 3 (three) times daily as needed (pain).  Start taking on: March 16, 2023     hydrocortisone 2.5 % cream  Apply topically 2 (two) times daily.     loratadine 10 mg tablet  Commonly known as: CLARITIN  TAKE 1 TABLET BY MOUTH EVERY DAY     methocarbamoL 500 MG Tab  Commonly known as: ROBAXIN  Take 2 tablets (1,000 mg total) by mouth every 6 (six) hours as needed (muscle spasms).     metoprolol succinate 50 MG 24 hr tablet  Commonly known as: TOPROL-XL  Take 1 tablet (50 mg total) by mouth once daily.     multivitamin with minerals tablet     neomycin-polymyxin-hydrocortisone 3.5-10,000-1 mg/mL-unit/mL-% otic suspension  Commonly known as: CORTISPORIN  PLACE 3 DROPS INTO THE LEFT EAR 4 TIMES DAILY.     rivaroxaban 20 mg Tab  Commonly known as: XARELTO  Take 1 tablet (20 mg total) by mouth daily with dinner or evening meal.      testosterone cypionate 200 mg/mL injection  Commonly known as: DEPOTESTOTERONE CYPIONATE  Inject 1 mL (200 mg total) into the muscle every 14 (fourteen) days.     TRUEPLUS LANCETS 33 gauge Misc  Generic drug: lancets     VITAMIN C ORAL           * This list has 3 medication(s) that are the same as other medications prescribed for you. Read the directions carefully, and ask your doctor or other care provider to review them with you.                  SOCIAL HISTORY:     Social History     Socioeconomic History    Marital status:     Number of children: 3    Highest education level: GED or equivalent   Tobacco Use    Smoking status: Former     Packs/day: 0.25     Types: Cigarettes     Start date: 1972     Quit date: 1976     Years since quittin.2    Smokeless tobacco: Never   Substance and Sexual Activity    Alcohol use: Not Currently     Comment: Pt. has a Hx. of  Alcohol use.    Drug use: No    Sexual activity: Yes     Partners: Female     Birth control/protection: None       FAMILY HISTORY:     Family History   Problem Relation Age of Onset    Blindness Mother     No Known Problems Father     No Known Problems Brother     No Known Problems Daughter     No Known Problems Son     No Known Problems Daughter     Diabetes Brother     No Known Problems Brother     Amblyopia Neg Hx     Cancer Neg Hx     Cataracts Neg Hx     Glaucoma Neg Hx     Hypertension Neg Hx     Macular degeneration Neg Hx     Retinal detachment Neg Hx     Strabismus Neg Hx     Stroke Neg Hx     Thyroid disease Neg Hx        REVIEW OF SYSTEMS:   Review of Systems   Constitutional:  Negative for chills, diaphoresis and fever.   HENT:  Negative for nosebleeds.    Eyes:  Negative for blurred vision, double vision and photophobia.   Respiratory:  Negative for hemoptysis, shortness of breath and wheezing.    Cardiovascular:  Negative for chest pain, palpitations, orthopnea, claudication, leg swelling and PND.   Gastrointestinal:   "Negative for abdominal pain, blood in stool, heartburn, melena, nausea and vomiting.   Genitourinary:  Negative for flank pain and hematuria.   Musculoskeletal:  Negative for falls, myalgias and neck pain.   Skin:  Negative for rash.   Neurological:  Negative for dizziness, seizures, loss of consciousness, weakness and headaches.   Endo/Heme/Allergies:  Negative for polydipsia. Does not bruise/bleed easily.   Psychiatric/Behavioral:  Negative for depression and memory loss. The patient is not nervous/anxious.      PHYSICAL EXAM:     Vitals:    02/07/23 1106   BP: (!) 148/86   Pulse: 80   Resp: 18      Body mass index is 32.25 kg/m².  Weight: 101.9 kg (224 lb 12.1 oz)   Height: 5' 10" (177.8 cm)     Physical Exam  Vitals reviewed.   Constitutional:       General: He is not in acute distress.     Appearance: He is well-developed. He is obese. He is not ill-appearing, toxic-appearing or diaphoretic.   HENT:      Head: Normocephalic and atraumatic.   Eyes:      General: No scleral icterus.     Extraocular Movements: Extraocular movements intact.      Conjunctiva/sclera: Conjunctivae normal.      Pupils: Pupils are equal, round, and reactive to light.   Neck:      Thyroid: No thyromegaly.      Vascular: Normal carotid pulses. No carotid bruit or JVD.      Trachea: Trachea normal.   Cardiovascular:      Rate and Rhythm: Normal rate and regular rhythm.      Pulses:           Carotid pulses are 2+ on the right side and 2+ on the left side.     Heart sounds: S1 normal and S2 normal. No murmur heard.    No friction rub. No gallop.   Pulmonary:      Effort: Pulmonary effort is normal. No respiratory distress.      Breath sounds: Normal breath sounds. No stridor. No wheezing, rhonchi or rales.   Chest:      Chest wall: No tenderness.   Abdominal:      General: There is no distension.      Palpations: Abdomen is soft.   Musculoskeletal:         General: No swelling or tenderness. Normal range of motion.      Cervical back: " Normal range of motion and neck supple. No edema or rigidity.      Right lower leg: No edema.      Left lower leg: No edema.   Feet:      Right foot:      Skin integrity: No ulcer.      Left foot:      Skin integrity: No ulcer.   Skin:     General: Skin is warm and dry.      Coloration: Skin is not jaundiced.   Neurological:      General: No focal deficit present.      Mental Status: He is alert and oriented to person, place, and time.      Cranial Nerves: No cranial nerve deficit.   Psychiatric:         Mood and Affect: Mood normal.         Speech: Speech normal.         Behavior: Behavior normal. Behavior is cooperative.       DATA:   EKG: (personally reviewed tracing)  1/3/23 0857   1/3/23 1022 AF 84, IRBBB  1/3/23 1515 tele: SR 84    Laboratory:  CBC:  Recent Labs   Lab 02/07/22  1121 01/03/23  0934   WBC 8.18 11.94   Hemoglobin 16.4 20.8 HH   Hematocrit 49.1 60.3 H   Platelets 237 238       CHEMISTRIES:  Recent Labs   Lab 07/08/20  1540 03/02/21  1030 06/24/21  0925 02/07/22  1121 01/03/23  0934   Glucose 119 H 159 H 246 H 239 H 351 H   Sodium 138 141 139 136 133 L   Potassium 4.1 4.9 4.8 4.2 4.9   BUN 12 20 16 13 18   Creatinine 1.1 1.1 1.2 0.9 1.3   eGFR if non African American >60.0 >60 >60.0 >60.0  --    eGFR  --   --   --   --  >60   Calcium 9.2 9.3 10.1 9.4 8.9   Magnesium  --   --   --   --  1.8       CARDIAC BIOMARKERS:  Recent Labs   Lab 01/03/23  0934 01/03/23  1222   Troponin I 0.119 H 0.125 H       COAGS:        LIPIDS/LFTS:  Recent Labs   Lab 03/02/21  1030 06/24/21  0925 02/07/22  1121 01/03/23  0934   Cholesterol 179  --  207 H  --    Triglycerides 222 H  --  262 H  --    HDL 29 L  --  37 L  --    LDL Cholesterol 105.6  --  117.6  --    Non-HDL Cholesterol 150  --  170  --    AST 25 20 25 24   ALT 31 28 40 39     Lab Results   Component Value Date    TSH 0.826 01/03/2023         Cardiovascular Testing:  Echo 1/3/23  The left ventricle is normal in size with concentric remodeling and low  normal systolic function.  The estimated ejection fraction is 50%.  Normal right ventricular size with normal right ventricular systolic function.  The estimated PA systolic pressure is 26 mmHg.    ASSESSMENT:   # PAF/RVR with elev trop, now in SR on Xarelto 20mg  # HTN, uncontrolled, ?side effects with toprol XL 50mg qd  # HLP on atorva 40mg, ?side effects  # NIDDM  # ?polycythemia  # BMI 32, down 1 unit(s) vs last OV    PLAN:   Cont med rx  Cont ASA 81mg qd  Cont Xarelto 20mg qhs  Change toprol 50mg qd->25mg bid  Change atorva 40mg qhs->20mg qhs  Ex MPI  Aortic US (screen AAA)  Enroll in digital med program.  RTC 1 month as prev planned  Check lipids/LFT 6 months (Aug 2023)      Wil Mahoney MD, FACC

## 2023-02-07 NOTE — TELEPHONE ENCOUNTER
----- Message from Isabelle Shah sent at 2/7/2023  9:19 AM CST -----  Regarding: self [phone]  .Type: Patient Call Back    Who called: self    What is the request in detail: PT stated that he's taking atorvastatin (LIPITOR) 40 MG tablet and feeling like he's all over the place. Pt is requesting to discuss medication     Can the clinic reply by [facility]? Call back    Would the patient rather a call back or a response via [facility]? Call back  Best call back number: .[phone]

## 2023-02-09 DIAGNOSIS — Z00.00 ENCOUNTER FOR MEDICARE ANNUAL WELLNESS EXAM: ICD-10-CM

## 2023-02-14 ENCOUNTER — HOSPITAL ENCOUNTER (OUTPATIENT)
Dept: CARDIOLOGY | Facility: HOSPITAL | Age: 68
Discharge: HOME | End: 2023-02-14
Attending: INTERNAL MEDICINE
Payer: MEDICARE

## 2023-02-14 DIAGNOSIS — Z13.6 SCREENING FOR AAA (ABDOMINAL AORTIC ANEURYSM): ICD-10-CM

## 2023-02-14 LAB
ABDOMINAL IMA AP: 1.8 CM
ABDOMINAL IMA PS VEL: 72 CM/S
ABDOMINAL IMA TRANS: 1.8 CM
ABDOMINAL INFRARENAL AORTA AP: 1.9 CM
ABDOMINAL INFRARENAL AORTA PS VEL: 84 CM/S
ABDOMINAL INFRARENAL AORTA TRANS: 1.9 CM
ABDOMINAL JUXTARENAL AORTA AP: 2.4 CM
ABDOMINAL JUXTARENAL AORTA PS VEL: 92 CM/S
ABDOMINAL JUXTARENAL AORTA TRANS: 2.4 CM
ABDOMINAL LT COM ILIAC AP: 1.1 CM
ABDOMINAL LT COM ILIAC TRANS: 1.1 CM
ABDOMINAL LT COM ILIAC VEL: 92 CM/S
ABDOMINAL RT COM ILIAC AP: 1 CM
ABDOMINAL RT COM ILIAC TRANS: 1 CM
ABDOMINAL RT COM ILIAC VEL: 67 CM/S
ABDOMINAL SUPRARENAL AORTA AP: 2 CM
ABDOMINAL SUPRARENAL AORTA PS VEL: 63 CM/S
ABDOMINAL SUPRARENAL AORTA TRANS: 2 CM

## 2023-02-14 PROCEDURE — 93978 VASCULAR STUDY: CPT | Mod: HCNC

## 2023-02-14 PROCEDURE — 93978 VASCULAR STUDY: CPT | Mod: 26,HCNC,, | Performed by: INTERNAL MEDICINE

## 2023-02-14 PROCEDURE — 93978 CV US ABDOMINAL AORTA EVALUATION (CUPID ONLY): ICD-10-PCS | Mod: 26,HCNC,, | Performed by: INTERNAL MEDICINE

## 2023-02-15 ENCOUNTER — HOSPITAL ENCOUNTER (OUTPATIENT)
Dept: RADIOLOGY | Facility: HOSPITAL | Age: 68
Discharge: HOME | End: 2023-02-15
Attending: INTERNAL MEDICINE
Payer: MEDICARE

## 2023-02-15 ENCOUNTER — HOSPITAL ENCOUNTER (OUTPATIENT)
Dept: CARDIOLOGY | Facility: HOSPITAL | Age: 68
Discharge: HOME | End: 2023-02-15
Attending: INTERNAL MEDICINE
Payer: MEDICARE

## 2023-02-15 DIAGNOSIS — I48.0 PAF (PAROXYSMAL ATRIAL FIBRILLATION): ICD-10-CM

## 2023-02-15 DIAGNOSIS — R79.89 ELEVATED TROPONIN LEVEL NOT DUE TO ACUTE CORONARY SYNDROME: ICD-10-CM

## 2023-02-15 LAB
CV STRESS BASE HR: 62 BPM
DIASTOLIC BLOOD PRESSURE: 90 MMHG
NUC STRESS DIASTOLIC VOLUME INDEX: 89
NUC STRESS EJECTION FRACTION: 56 %
NUC STRESS SYSTOLIC VOLUME INDEX: 39
OHS CV CPX 1 MINUTE RECOVERY HEART RATE: 117 BPM
OHS CV CPX 85 PERCENT MAX PREDICTED HEART RATE MALE: 130
OHS CV CPX ESTIMATED METS: 8
OHS CV CPX MAX PREDICTED HEART RATE: 153
OHS CV CPX PATIENT IS FEMALE: 0
OHS CV CPX PATIENT IS MALE: 1
OHS CV CPX PEAK DIASTOLIC BLOOD PRESSURE: 105 MMHG
OHS CV CPX PEAK HEAR RATE: 141 BPM
OHS CV CPX PEAK RATE PRESSURE PRODUCT: NORMAL
OHS CV CPX PEAK SYSTOLIC BLOOD PRESSURE: 196 MMHG
OHS CV CPX PERCENT MAX PREDICTED HEART RATE ACHIEVED: 92
OHS CV CPX RATE PRESSURE PRODUCT PRESENTING: NORMAL
STRESS ECHO POST EXERCISE DUR MIN: 8 MINUTES
STRESS ECHO POST EXERCISE DUR SEC: 42 SECONDS
STRESS ST DEPRESSION: 1 MM
SYSTOLIC BLOOD PRESSURE: 166 MMHG

## 2023-02-15 PROCEDURE — 93016 NUCLEAR STRESS - CARDIOLOGY INTERPRETED (CUPID ONLY): ICD-10-PCS | Mod: HCNC,,, | Performed by: INTERNAL MEDICINE

## 2023-02-15 PROCEDURE — 93017 CV STRESS TEST TRACING ONLY: CPT | Mod: HCNC

## 2023-02-15 PROCEDURE — A9502 TC99M TETROFOSMIN: HCPCS | Mod: HCNC

## 2023-02-15 PROCEDURE — 78452 NUCLEAR STRESS - CARDIOLOGY INTERPRETED (CUPID ONLY): ICD-10-PCS | Mod: 26,HCNC,, | Performed by: INTERNAL MEDICINE

## 2023-02-15 PROCEDURE — 93016 CV STRESS TEST SUPVJ ONLY: CPT | Mod: HCNC,,, | Performed by: INTERNAL MEDICINE

## 2023-02-15 PROCEDURE — 78452 HT MUSCLE IMAGE SPECT MULT: CPT | Mod: HCNC

## 2023-02-15 PROCEDURE — 78452 HT MUSCLE IMAGE SPECT MULT: CPT | Mod: 26,HCNC,, | Performed by: INTERNAL MEDICINE

## 2023-02-15 PROCEDURE — 93018 NUCLEAR STRESS - CARDIOLOGY INTERPRETED (CUPID ONLY): ICD-10-PCS | Mod: HCNC,,, | Performed by: INTERNAL MEDICINE

## 2023-02-15 PROCEDURE — 93018 CV STRESS TEST I&R ONLY: CPT | Mod: HCNC,,, | Performed by: INTERNAL MEDICINE

## 2023-02-16 ENCOUNTER — PATIENT MESSAGE (OUTPATIENT)
Dept: ADMINISTRATIVE | Facility: OTHER | Age: 68
End: 2023-02-16
Payer: MEDICARE

## 2023-02-16 ENCOUNTER — OFFICE VISIT (OUTPATIENT)
Dept: CARDIOLOGY | Facility: CLINIC | Age: 68
End: 2023-02-16
Payer: MEDICARE

## 2023-02-16 ENCOUNTER — PATIENT MESSAGE (OUTPATIENT)
Dept: FAMILY MEDICINE | Facility: CLINIC | Age: 68
End: 2023-02-16
Payer: MEDICARE

## 2023-02-16 VITALS
DIASTOLIC BLOOD PRESSURE: 92 MMHG | HEART RATE: 66 BPM | BODY MASS INDEX: 30.52 KG/M2 | RESPIRATION RATE: 18 BRPM | HEIGHT: 70 IN | SYSTOLIC BLOOD PRESSURE: 148 MMHG | WEIGHT: 213.19 LBS | OXYGEN SATURATION: 97 %

## 2023-02-16 DIAGNOSIS — R94.39 ABNORMAL NUCLEAR STRESS TEST: Primary | ICD-10-CM

## 2023-02-16 DIAGNOSIS — D58.2 ELEVATED HEMOGLOBIN: ICD-10-CM

## 2023-02-16 DIAGNOSIS — I48.0 PAF (PAROXYSMAL ATRIAL FIBRILLATION): ICD-10-CM

## 2023-02-16 DIAGNOSIS — E11.29 TYPE 2 DIABETES MELLITUS WITH MICROALBUMINURIA, WITHOUT LONG-TERM CURRENT USE OF INSULIN: Chronic | ICD-10-CM

## 2023-02-16 DIAGNOSIS — R79.89 ELEVATED TROPONIN LEVEL NOT DUE TO ACUTE CORONARY SYNDROME: ICD-10-CM

## 2023-02-16 DIAGNOSIS — R80.9 TYPE 2 DIABETES MELLITUS WITH MICROALBUMINURIA, WITHOUT LONG-TERM CURRENT USE OF INSULIN: Chronic | ICD-10-CM

## 2023-02-16 DIAGNOSIS — E66.9 NON MORBID OBESITY, UNSPECIFIED OBESITY TYPE: ICD-10-CM

## 2023-02-16 DIAGNOSIS — I10 ESSENTIAL (PRIMARY) HYPERTENSION: Chronic | ICD-10-CM

## 2023-02-16 PROCEDURE — 3008F PR BODY MASS INDEX (BMI) DOCUMENTED: ICD-10-PCS | Mod: HCNC,CPTII,S$GLB, | Performed by: INTERNAL MEDICINE

## 2023-02-16 PROCEDURE — 3077F SYST BP >= 140 MM HG: CPT | Mod: HCNC,CPTII,S$GLB, | Performed by: INTERNAL MEDICINE

## 2023-02-16 PROCEDURE — 99999 PR PBB SHADOW E&M-EST. PATIENT-LVL V: ICD-10-PCS | Mod: PBBFAC,HCNC,, | Performed by: INTERNAL MEDICINE

## 2023-02-16 PROCEDURE — 99999 PR PBB SHADOW E&M-EST. PATIENT-LVL V: CPT | Mod: PBBFAC,HCNC,, | Performed by: INTERNAL MEDICINE

## 2023-02-16 PROCEDURE — 1159F PR MEDICATION LIST DOCUMENTED IN MEDICAL RECORD: ICD-10-PCS | Mod: HCNC,CPTII,S$GLB, | Performed by: INTERNAL MEDICINE

## 2023-02-16 PROCEDURE — 1159F MED LIST DOCD IN RCRD: CPT | Mod: HCNC,CPTII,S$GLB, | Performed by: INTERNAL MEDICINE

## 2023-02-16 PROCEDURE — 3077F PR MOST RECENT SYSTOLIC BLOOD PRESSURE >= 140 MM HG: ICD-10-PCS | Mod: HCNC,CPTII,S$GLB, | Performed by: INTERNAL MEDICINE

## 2023-02-16 PROCEDURE — 1101F PR PT FALLS ASSESS DOC 0-1 FALLS W/OUT INJ PAST YR: ICD-10-PCS | Mod: HCNC,CPTII,S$GLB, | Performed by: INTERNAL MEDICINE

## 2023-02-16 PROCEDURE — 1101F PT FALLS ASSESS-DOCD LE1/YR: CPT | Mod: HCNC,CPTII,S$GLB, | Performed by: INTERNAL MEDICINE

## 2023-02-16 PROCEDURE — 1125F AMNT PAIN NOTED PAIN PRSNT: CPT | Mod: HCNC,CPTII,S$GLB, | Performed by: INTERNAL MEDICINE

## 2023-02-16 PROCEDURE — 99215 OFFICE O/P EST HI 40 MIN: CPT | Mod: HCNC,S$GLB,, | Performed by: INTERNAL MEDICINE

## 2023-02-16 PROCEDURE — 99215 PR OFFICE/OUTPT VISIT, EST, LEVL V, 40-54 MIN: ICD-10-PCS | Mod: HCNC,S$GLB,, | Performed by: INTERNAL MEDICINE

## 2023-02-16 PROCEDURE — 3008F BODY MASS INDEX DOCD: CPT | Mod: HCNC,CPTII,S$GLB, | Performed by: INTERNAL MEDICINE

## 2023-02-16 PROCEDURE — 3288F PR FALLS RISK ASSESSMENT DOCUMENTED: ICD-10-PCS | Mod: HCNC,CPTII,S$GLB, | Performed by: INTERNAL MEDICINE

## 2023-02-16 PROCEDURE — 1160F PR REVIEW ALL MEDS BY PRESCRIBER/CLIN PHARMACIST DOCUMENTED: ICD-10-PCS | Mod: HCNC,CPTII,S$GLB, | Performed by: INTERNAL MEDICINE

## 2023-02-16 PROCEDURE — 1160F RVW MEDS BY RX/DR IN RCRD: CPT | Mod: HCNC,CPTII,S$GLB, | Performed by: INTERNAL MEDICINE

## 2023-02-16 PROCEDURE — 3288F FALL RISK ASSESSMENT DOCD: CPT | Mod: HCNC,CPTII,S$GLB, | Performed by: INTERNAL MEDICINE

## 2023-02-16 PROCEDURE — 3080F PR MOST RECENT DIASTOLIC BLOOD PRESSURE >= 90 MM HG: ICD-10-PCS | Mod: HCNC,CPTII,S$GLB, | Performed by: INTERNAL MEDICINE

## 2023-02-16 PROCEDURE — 1125F PR PAIN SEVERITY QUANTIFIED, PAIN PRESENT: ICD-10-PCS | Mod: HCNC,CPTII,S$GLB, | Performed by: INTERNAL MEDICINE

## 2023-02-16 PROCEDURE — 3080F DIAST BP >= 90 MM HG: CPT | Mod: HCNC,CPTII,S$GLB, | Performed by: INTERNAL MEDICINE

## 2023-02-16 RX ORDER — ATORVASTATIN CALCIUM 40 MG/1
40 TABLET, FILM COATED ORAL NIGHTLY
Qty: 90 TABLET | Refills: 3 | Status: SHIPPED | OUTPATIENT
Start: 2023-02-16 | End: 2023-03-20 | Stop reason: SDUPTHER

## 2023-02-16 RX ORDER — DIPHENHYDRAMINE HCL 25 MG
50 CAPSULE ORAL ONCE
Status: CANCELLED | OUTPATIENT
Start: 2023-03-07

## 2023-02-16 RX ORDER — SODIUM CHLORIDE 0.9 % (FLUSH) 0.9 %
10 SYRINGE (ML) INJECTION
Status: DISCONTINUED | OUTPATIENT
Start: 2023-02-16 | End: 2023-02-28 | Stop reason: CLARIF

## 2023-02-16 RX ORDER — SODIUM CHLORIDE 9 MG/ML
INJECTION, SOLUTION INTRAVENOUS CONTINUOUS
Status: CANCELLED | OUTPATIENT
Start: 2023-03-07 | End: 2023-03-07

## 2023-02-16 NOTE — PROGRESS NOTES
CARDIOVASCULAR PROGRESS NOTE    REASON FOR CONSULT:   King Cool Jr. is a 68 y.o. male who presents for follow up of PAF, elev trop, MPI.    PCP: Jane  HISTORY OF PRESENT ILLNESS:   The patient returns for follow-up of his stress test.  He denies intercurrent angina, dyspnea, palpitations, or syncope.  There has been no PND, orthopnea, melena, hematuria, or claudicant symptoms.  Appears to be tolerating the 25 mg dose of metoprolol and 20 mg dose of atorvastatin.  He also is tolerating the Xarelto 20 mg.      I reviewed the results of his nuclear stress test.  The patient denied any anginal symptoms during the stress test.  Note was made of multivessel ischemia (LAD and RCA) with preserved LV function.    CARDIOVASCULAR HISTORY:   PAF on Xarelto    ?CAD (high risk MPI 2/2023)    PAST MEDICAL HISTORY:     Past Medical History:   Diagnosis Date    Essential (primary) hypertension     Male erectile dysfunction, unspecified     New onset a-fib 1/3/2023    Testicular hypofunction     Type 2 diabetes mellitus without complications        PAST SURGICAL HISTORY:     Past Surgical History:   Procedure Laterality Date    HIP SURGERY         ALLERGIES AND MEDICATION:     Review of patient's allergies indicates:   Allergen Reactions    Tamiflu [oseltamivir]      Increases BP    Metformin      Diarrhea, nausea    Penicillins Rash        Medication List            Accurate as of February 16, 2023  2:21 PM. If you have any questions, ask your nurse or doctor.                CONTINUE taking these medications      aspirin 81 MG EC tablet  Commonly known as: ECOTRIN     atorvastatin 20 MG tablet  Commonly known as: LIPITOR  Take 1 tablet (20 mg total) by mouth every evening.     ciprofloxacin-dexAMETHasone 0.3-0.1% 0.3-0.1 % Drps  Commonly known as: CIPRODEX     diclofenac 50 MG EC tablet  Commonly known as: VOLTAREN  TAKE 1 TABLET BY MOUTH TWICE A DAY     doxycycline 100 MG tablet  Commonly known as: VIBRA-TABS  Take 1 tablet  (100 mg total) by mouth 2 (two) times daily.     DROPSAFE ALCOHOL PREP PADS Padm  Generic drug: alcohol swabs  USE AS DIRECTED AS NEEDED     empagliflozin 25 mg tablet  Commonly known as: JARDIANCE  Take 1 tablet (25 mg total) by mouth once daily.     fish oil-omega-3 fatty acids 300-1,000 mg capsule     gabapentin 300 MG capsule  Commonly known as: NEURONTIN  Take 1 capsule (300 mg total) by mouth 3 (three) times daily.     glimepiride 4 MG tablet  Commonly known as: AMARYL  Take 1 tablet (4 mg total) by mouth before breakfast.     * HYDROcodone-acetaminophen  mg per tablet  Commonly known as: NORCO  Take 1 tablet by mouth 3 (three) times daily as needed (pain).     * HYDROcodone-acetaminophen  mg per tablet  Commonly known as: NORCO  Take 1 tablet by mouth 3 (three) times daily as needed (pain).     * HYDROcodone-acetaminophen  mg per tablet  Commonly known as: NORCO  Take 1 tablet by mouth 3 (three) times daily as needed (pain).  Start taking on: March 16, 2023     hydrocortisone 2.5 % cream  Apply topically 2 (two) times daily.     loratadine 10 mg tablet  Commonly known as: CLARITIN  TAKE 1 TABLET BY MOUTH EVERY DAY     methocarbamoL 500 MG Tab  Commonly known as: ROBAXIN  Take 2 tablets (1,000 mg total) by mouth every 6 (six) hours as needed (muscle spasms).     metoprolol succinate 25 MG 24 hr tablet  Commonly known as: TOPROL-XL  Take 1 tablet (25 mg total) by mouth 2 (two) times daily.     multivitamin with minerals tablet     neomycin-polymyxin-hydrocortisone 3.5-10,000-1 mg/mL-unit/mL-% otic suspension  Commonly known as: CORTISPORIN  PLACE 3 DROPS INTO THE LEFT EAR 4 TIMES DAILY.     rivaroxaban 20 mg Tab  Commonly known as: XARELTO  Take 1 tablet (20 mg total) by mouth daily with dinner or evening meal.     testosterone cypionate 200 mg/mL injection  Commonly known as: DEPOTESTOTERONE CYPIONATE  Inject 1 mL (200 mg total) into the muscle every 14 (fourteen) days.     TRUEPLUS LANCETS  33 gauge Misc  Generic drug: lancets     VITAMIN C ORAL           * This list has 3 medication(s) that are the same as other medications prescribed for you. Read the directions carefully, and ask your doctor or other care provider to review them with you.                  SOCIAL HISTORY:     Social History     Socioeconomic History    Marital status:     Number of children: 3    Highest education level: GED or equivalent   Tobacco Use    Smoking status: Former     Packs/day: 0.25     Types: Cigarettes     Start date: 1972     Quit date: 1976     Years since quittin.2    Smokeless tobacco: Never   Substance and Sexual Activity    Alcohol use: Not Currently     Comment: Pt. has a Hx. of  Alcohol use.    Drug use: No    Sexual activity: Yes     Partners: Female     Birth control/protection: None       FAMILY HISTORY:     Family History   Problem Relation Age of Onset    Blindness Mother     No Known Problems Father     No Known Problems Brother     No Known Problems Daughter     No Known Problems Son     No Known Problems Daughter     Diabetes Brother     No Known Problems Brother     Amblyopia Neg Hx     Cancer Neg Hx     Cataracts Neg Hx     Glaucoma Neg Hx     Hypertension Neg Hx     Macular degeneration Neg Hx     Retinal detachment Neg Hx     Strabismus Neg Hx     Stroke Neg Hx     Thyroid disease Neg Hx        REVIEW OF SYSTEMS:   Review of Systems   Constitutional:  Negative for chills, diaphoresis and fever.   HENT:  Negative for nosebleeds.    Eyes:  Negative for blurred vision, double vision and photophobia.   Respiratory:  Negative for hemoptysis, shortness of breath and wheezing.    Cardiovascular:  Negative for chest pain, palpitations, orthopnea, claudication, leg swelling and PND.   Gastrointestinal:  Negative for abdominal pain, blood in stool, heartburn, melena, nausea and vomiting.   Genitourinary:  Negative for flank pain and hematuria.   Musculoskeletal:  Negative for falls,  "myalgias and neck pain.   Skin:  Negative for rash.   Neurological:  Negative for dizziness, seizures, loss of consciousness, weakness and headaches.   Endo/Heme/Allergies:  Negative for polydipsia. Does not bruise/bleed easily.   Psychiatric/Behavioral:  Negative for depression and memory loss. The patient is not nervous/anxious.      PHYSICAL EXAM:     Vitals:    02/16/23 1414   BP: (!) 148/92   Pulse: 66   Resp: 18      Body mass index is 30.59 kg/m².  Weight: 96.7 kg (213 lb 3 oz)   Height: 5' 10" (177.8 cm)     Physical Exam  Vitals reviewed.   Constitutional:       General: He is not in acute distress.     Appearance: He is well-developed. He is obese. He is not ill-appearing, toxic-appearing or diaphoretic.   HENT:      Head: Normocephalic and atraumatic.   Eyes:      General: No scleral icterus.     Extraocular Movements: Extraocular movements intact.      Conjunctiva/sclera: Conjunctivae normal.      Pupils: Pupils are equal, round, and reactive to light.   Neck:      Thyroid: No thyromegaly.      Vascular: Normal carotid pulses. No carotid bruit or JVD.      Trachea: Trachea normal.   Cardiovascular:      Rate and Rhythm: Normal rate and regular rhythm.      Pulses:           Carotid pulses are 2+ on the right side and 2+ on the left side.       Radial pulses are 2+ on the right side.      Heart sounds: S1 normal and S2 normal. No murmur heard.    No friction rub. No gallop.   Pulmonary:      Effort: Pulmonary effort is normal. No respiratory distress.      Breath sounds: Normal breath sounds. No stridor. No wheezing, rhonchi or rales.   Chest:      Chest wall: No tenderness.   Abdominal:      General: There is no distension.      Palpations: Abdomen is soft.   Musculoskeletal:         General: No swelling or tenderness. Normal range of motion.      Cervical back: Normal range of motion and neck supple. No edema or rigidity.      Right lower leg: No edema.      Left lower leg: No edema.   Feet:      Right " foot:      Skin integrity: No ulcer.      Left foot:      Skin integrity: No ulcer.   Skin:     General: Skin is warm and dry.      Coloration: Skin is not jaundiced.   Neurological:      General: No focal deficit present.      Mental Status: He is alert and oriented to person, place, and time.      Cranial Nerves: No cranial nerve deficit.   Psychiatric:         Mood and Affect: Mood normal.         Speech: Speech normal.         Behavior: Behavior normal. Behavior is cooperative.       DATA:   EKG: (personally reviewed tracing)  1/3/23 0857   1/3/23 1022 AF 84, IRBBB  1/3/23 1515 tele: SR 84    Laboratory:  CBC:  Recent Labs   Lab 02/07/22  1121 01/03/23  0934   WBC 8.18 11.94   Hemoglobin 16.4 20.8 HH   Hematocrit 49.1 60.3 H   Platelets 237 238         CHEMISTRIES:  Recent Labs   Lab 07/08/20  1540 03/02/21  1030 06/24/21  0925 02/07/22  1121 01/03/23  0934   Glucose 119 H 159 H 246 H 239 H 351 H   Sodium 138 141 139 136 133 L   Potassium 4.1 4.9 4.8 4.2 4.9   BUN 12 20 16 13 18   Creatinine 1.1 1.1 1.2 0.9 1.3   eGFR if non African American >60.0 >60 >60.0 >60.0  --    eGFR  --   --   --   --  >60   Calcium 9.2 9.3 10.1 9.4 8.9   Magnesium  --   --   --   --  1.8         CARDIAC BIOMARKERS:  Recent Labs   Lab 01/03/23  0934 01/03/23  1222   Troponin I 0.119 H 0.125 H         COAGS:        LIPIDS/LFTS:  Recent Labs   Lab 03/02/21  1030 06/24/21  0925 02/07/22  1121 01/03/23  0934   Cholesterol 179  --  207 H  --    Triglycerides 222 H  --  262 H  --    HDL 29 L  --  37 L  --    LDL Cholesterol 105.6  --  117.6  --    Non-HDL Cholesterol 150  --  170  --    AST 25 20 25 24   ALT 31 28 40 39       Lab Results   Component Value Date    TSH 0.826 01/03/2023         Cardiovascular Testing:  Ex MPI 2/15/23 (images personally reviewed and interpreted)    The patient exercised for 8 minutes 42 seconds on a Jd protocol, corresponding to a functional capacity of 8 METS, achieving a peak heart rate of 141 bpm,  which is 92 % of the age predicted maximum heart rate. Their exercise capacity was above average.    Abnormal myocardial perfusion scan.    There are two significant perfusion abnormalities.    Perfusion Abnormality #1 - There is a moderate to severe intensity, large sized, reversible perfusion abnormality that is consistent with ischemia in the mid to apical anterior and anteroseptal wall(s) in the typical distribution of the LAD territory.    Perfusion Abnormality #2 - There is a large sized, moderate intensity, mostly reversible perfusion abnormality with some fixed areas in the basal to distal inferior wall(s) in the typical distribution of the RCA territory.    There are no other significant perfusion abnormalities.    The gated perfusion images showed an ejection fraction of 56% post stress.    There is normal wall motion post stress.    The ECG portion of the study is positive for ischemia. Specificity is reduced secondary to hypertensive response.    The patient reported no chest pain during the stress test.    There were no arrhythmias during stress.    The exercise capacity was above average.    Aortic US 2/14/23  No evidence of AAA.    Echo 1/3/23  The left ventricle is normal in size with concentric remodeling and low normal systolic function.  The estimated ejection fraction is 50%.  Normal right ventricular size with normal right ventricular systolic function.  The estimated PA systolic pressure is 26 mmHg.    ASSESSMENT:   # PAF/RVR with elev trop, now in SR on Xarelto 20mg  # HTN, uncontrolled, ?side effects with toprol XL 50mg qd  # HLP on atorva 20mg (?side effect with 40mg vs toprol issue)  # NIDDM  # ?polycythemia  # BMI 31, down 1 unit(s) vs last OV    PLAN:   Cont med rx  Cont ASA 81mg qd  Cont Xarelto 20mg qhs, hold on 3/5/23 for cath  Inc atorva 40mg qhs  Enroll in digital med program (again).  Cath 3/7/23, 1030am, R rad access.  If PCI needed, will use ticagrelor.  RTC 1 month  Check  lipids/LFT 6 months (Aug 2023)    Risks, benefits and alternatives of the catheterization procedure were discussed with the patient and his daughter (Francisca) in attendance, which include but are not limited to: bleeding, infection, death, heart attack, arrhythmia, kidney failure, stroke, need for emergency surgery, etc.  Patient understands and and agrees to proceed.  Consent was placed on the chart.          Wil Mahoney MD, FACC

## 2023-02-20 ENCOUNTER — LAB VISIT (OUTPATIENT)
Dept: LAB | Facility: HOSPITAL | Age: 68
End: 2023-02-20
Attending: FAMILY MEDICINE
Payer: MEDICARE

## 2023-02-20 DIAGNOSIS — R80.9 TYPE 2 DIABETES MELLITUS WITH MICROALBUMINURIA, WITHOUT LONG-TERM CURRENT USE OF INSULIN: ICD-10-CM

## 2023-02-20 DIAGNOSIS — Z00.00 ROUTINE PHYSICAL EXAMINATION: ICD-10-CM

## 2023-02-20 DIAGNOSIS — E29.1 TESTICULAR HYPOFUNCTION: ICD-10-CM

## 2023-02-20 DIAGNOSIS — E11.29 TYPE 2 DIABETES MELLITUS WITH MICROALBUMINURIA, WITHOUT LONG-TERM CURRENT USE OF INSULIN: ICD-10-CM

## 2023-02-20 LAB
ALBUMIN SERPL BCP-MCNC: 4.1 G/DL (ref 3.5–5.2)
ALP SERPL-CCNC: 89 U/L (ref 55–135)
ALT SERPL W/O P-5'-P-CCNC: 43 U/L (ref 10–44)
ANION GAP SERPL CALC-SCNC: 13 MMOL/L (ref 8–16)
AST SERPL-CCNC: 26 U/L (ref 10–40)
BASOPHILS # BLD AUTO: 0.06 K/UL (ref 0–0.2)
BASOPHILS NFR BLD: 1.2 % (ref 0–1.9)
BILIRUB SERPL-MCNC: 0.7 MG/DL (ref 0.1–1)
BUN SERPL-MCNC: 20 MG/DL (ref 8–23)
CALCIUM SERPL-MCNC: 9.4 MG/DL (ref 8.7–10.5)
CHLORIDE SERPL-SCNC: 103 MMOL/L (ref 95–110)
CHOLEST SERPL-MCNC: 129 MG/DL (ref 120–199)
CHOLEST/HDLC SERPL: 3.7 {RATIO} (ref 2–5)
CO2 SERPL-SCNC: 22 MMOL/L (ref 23–29)
CREAT SERPL-MCNC: 0.9 MG/DL (ref 0.5–1.4)
DIFFERENTIAL METHOD: NORMAL
EOSINOPHIL # BLD AUTO: 0.1 K/UL (ref 0–0.5)
EOSINOPHIL NFR BLD: 1.7 % (ref 0–8)
ERYTHROCYTE [DISTWIDTH] IN BLOOD BY AUTOMATED COUNT: 12.2 % (ref 11.5–14.5)
EST. GFR  (NO RACE VARIABLE): >60 ML/MIN/1.73 M^2
ESTIMATED AVG GLUCOSE: 235 MG/DL (ref 68–131)
GLUCOSE SERPL-MCNC: 236 MG/DL (ref 70–110)
HBA1C MFR BLD: 9.8 % (ref 4–5.6)
HCT VFR BLD AUTO: 48 % (ref 40–54)
HDLC SERPL-MCNC: 35 MG/DL (ref 40–75)
HDLC SERPL: 27.1 % (ref 20–50)
HGB BLD-MCNC: 16.1 G/DL (ref 14–18)
IMM GRANULOCYTES # BLD AUTO: 0.02 K/UL (ref 0–0.04)
IMM GRANULOCYTES NFR BLD AUTO: 0.4 % (ref 0–0.5)
LDLC SERPL CALC-MCNC: 74.4 MG/DL (ref 63–159)
LYMPHOCYTES # BLD AUTO: 1.9 K/UL (ref 1–4.8)
LYMPHOCYTES NFR BLD: 36.2 % (ref 18–48)
MCH RBC QN AUTO: 30.1 PG (ref 27–31)
MCHC RBC AUTO-ENTMCNC: 33.5 G/DL (ref 32–36)
MCV RBC AUTO: 90 FL (ref 82–98)
MONOCYTES # BLD AUTO: 0.5 K/UL (ref 0.3–1)
MONOCYTES NFR BLD: 10 % (ref 4–15)
NEUTROPHILS # BLD AUTO: 2.6 K/UL (ref 1.8–7.7)
NEUTROPHILS NFR BLD: 50.5 % (ref 38–73)
NONHDLC SERPL-MCNC: 94 MG/DL
NRBC BLD-RTO: 0 /100 WBC
PLATELET # BLD AUTO: 199 K/UL (ref 150–450)
PMV BLD AUTO: 9.3 FL (ref 9.2–12.9)
POTASSIUM SERPL-SCNC: 4.3 MMOL/L (ref 3.5–5.1)
PROT SERPL-MCNC: 6.8 G/DL (ref 6–8.4)
RBC # BLD AUTO: 5.35 M/UL (ref 4.6–6.2)
SODIUM SERPL-SCNC: 138 MMOL/L (ref 136–145)
T4 FREE SERPL-MCNC: 0.77 NG/DL (ref 0.71–1.51)
TESTOST SERPL-MCNC: 75 NG/DL (ref 304–1227)
TRIGL SERPL-MCNC: 98 MG/DL (ref 30–150)
TSH SERPL DL<=0.005 MIU/L-ACNC: 0.85 UIU/ML (ref 0.4–4)
WBC # BLD AUTO: 5.2 K/UL (ref 3.9–12.7)

## 2023-02-20 PROCEDURE — 80061 LIPID PANEL: CPT | Mod: HCNC | Performed by: FAMILY MEDICINE

## 2023-02-20 PROCEDURE — 84403 ASSAY OF TOTAL TESTOSTERONE: CPT | Mod: HCNC | Performed by: FAMILY MEDICINE

## 2023-02-20 PROCEDURE — 84439 ASSAY OF FREE THYROXINE: CPT | Mod: HCNC | Performed by: FAMILY MEDICINE

## 2023-02-20 PROCEDURE — 85025 COMPLETE CBC W/AUTO DIFF WBC: CPT | Mod: HCNC | Performed by: FAMILY MEDICINE

## 2023-02-20 PROCEDURE — 83036 HEMOGLOBIN GLYCOSYLATED A1C: CPT | Mod: HCNC | Performed by: FAMILY MEDICINE

## 2023-02-20 PROCEDURE — 80053 COMPREHEN METABOLIC PANEL: CPT | Mod: HCNC | Performed by: FAMILY MEDICINE

## 2023-02-20 PROCEDURE — 36415 COLL VENOUS BLD VENIPUNCTURE: CPT | Mod: HCNC,PO | Performed by: FAMILY MEDICINE

## 2023-02-20 PROCEDURE — 84443 ASSAY THYROID STIM HORMONE: CPT | Mod: HCNC | Performed by: FAMILY MEDICINE

## 2023-02-22 ENCOUNTER — TELEPHONE (OUTPATIENT)
Dept: FAMILY MEDICINE | Facility: CLINIC | Age: 68
End: 2023-02-22

## 2023-02-22 ENCOUNTER — CLINICAL SUPPORT (OUTPATIENT)
Dept: FAMILY MEDICINE | Facility: CLINIC | Age: 68
End: 2023-02-22
Payer: MEDICARE

## 2023-02-22 DIAGNOSIS — E29.1 TESTICULAR HYPOFUNCTION: Primary | ICD-10-CM

## 2023-02-22 PROCEDURE — 96372 PR INJECTION,THERAP/PROPH/DIAG2ST, IM OR SUBCUT: ICD-10-PCS | Mod: HCNC,S$GLB,, | Performed by: FAMILY MEDICINE

## 2023-02-22 PROCEDURE — 96372 THER/PROPH/DIAG INJ SC/IM: CPT | Mod: HCNC,S$GLB,, | Performed by: FAMILY MEDICINE

## 2023-02-22 RX ADMIN — TESTOSTERONE CYPIONATE 100 MG: 200 INJECTION, SOLUTION INTRAMUSCULAR at 08:02

## 2023-02-22 NOTE — TELEPHONE ENCOUNTER
----- Message from Indu Clemente sent at 2/22/2023  3:05 PM CST -----  Regarding: [id number]  Type: Patient Call Back    Who called:self    What is the request in detail: pt states he will like a call from the nurse in regards to his diabetes, he will like to know what the doctor would like for him to do in regards to medication and also an appt.      Can the clinic reply by [facility]?no    Would the patient rather a call back or a response via [facility]? Call back    Best call back number:[phone]      Additional Information:

## 2023-02-22 NOTE — TELEPHONE ENCOUNTER
Called patient to confirm that he is on two medications for diabetes, patient butch. Patient informed that we didn't have any appointments in clinic until 03/15, patient asked would he like to go to another clinic patient declined appointment.

## 2023-02-24 ENCOUNTER — PATIENT MESSAGE (OUTPATIENT)
Dept: CARDIOLOGY | Facility: HOSPITAL | Age: 68
End: 2023-02-24
Payer: MEDICARE

## 2023-02-26 ENCOUNTER — TELEPHONE (OUTPATIENT)
Dept: FAMILY MEDICINE | Facility: CLINIC | Age: 68
End: 2023-02-26
Payer: MEDICARE

## 2023-02-26 DIAGNOSIS — E11.29 TYPE 2 DIABETES MELLITUS WITH MICROALBUMINURIA, WITHOUT LONG-TERM CURRENT USE OF INSULIN: Chronic | ICD-10-CM

## 2023-02-26 DIAGNOSIS — R80.9 TYPE 2 DIABETES MELLITUS WITH MICROALBUMINURIA, WITHOUT LONG-TERM CURRENT USE OF INSULIN: Chronic | ICD-10-CM

## 2023-02-26 RX ORDER — GLIMEPIRIDE 4 MG/1
4 TABLET ORAL 2 TIMES DAILY
Qty: 180 TABLET | Refills: 3 | Status: SHIPPED | OUTPATIENT
Start: 2023-02-26 | End: 2024-03-11 | Stop reason: SDUPTHER

## 2023-02-27 NOTE — TELEPHONE ENCOUNTER
Diabetes is TERRIBLE. Increase glimepiride to 4 mg twice a day. Recheck in office in 1 month. Cut out all sugars/carbs

## 2023-02-28 ENCOUNTER — HOSPITAL ENCOUNTER (OUTPATIENT)
Dept: PREADMISSION TESTING | Facility: HOSPITAL | Age: 68
Discharge: HOME | End: 2023-02-28
Attending: INTERNAL MEDICINE
Payer: MEDICARE

## 2023-02-28 VITALS
RESPIRATION RATE: 18 BRPM | OXYGEN SATURATION: 97 % | WEIGHT: 223.56 LBS | HEIGHT: 71 IN | TEMPERATURE: 97 F | DIASTOLIC BLOOD PRESSURE: 88 MMHG | BODY MASS INDEX: 31.3 KG/M2 | SYSTOLIC BLOOD PRESSURE: 149 MMHG | HEART RATE: 84 BPM

## 2023-02-28 DIAGNOSIS — R80.9 TYPE 2 DIABETES MELLITUS WITH MICROALBUMINURIA, WITHOUT LONG-TERM CURRENT USE OF INSULIN: Chronic | ICD-10-CM

## 2023-02-28 DIAGNOSIS — D58.2 ELEVATED HEMOGLOBIN: ICD-10-CM

## 2023-02-28 DIAGNOSIS — E11.29 TYPE 2 DIABETES MELLITUS WITH MICROALBUMINURIA, WITHOUT LONG-TERM CURRENT USE OF INSULIN: Chronic | ICD-10-CM

## 2023-02-28 DIAGNOSIS — R94.39 ABNORMAL NUCLEAR STRESS TEST: ICD-10-CM

## 2023-02-28 LAB
ANION GAP SERPL CALC-SCNC: 9 MMOL/L (ref 8–16)
BASOPHILS # BLD AUTO: 0.07 K/UL (ref 0–0.2)
BASOPHILS NFR BLD: 1.1 % (ref 0–1.9)
BUN SERPL-MCNC: 14 MG/DL (ref 8–23)
CALCIUM SERPL-MCNC: 9.7 MG/DL (ref 8.7–10.5)
CHLORIDE SERPL-SCNC: 103 MMOL/L (ref 95–110)
CO2 SERPL-SCNC: 25 MMOL/L (ref 23–29)
CREAT SERPL-MCNC: 1.1 MG/DL (ref 0.5–1.4)
DIFFERENTIAL METHOD: ABNORMAL
EOSINOPHIL # BLD AUTO: 0.2 K/UL (ref 0–0.5)
EOSINOPHIL NFR BLD: 2.8 % (ref 0–8)
ERYTHROCYTE [DISTWIDTH] IN BLOOD BY AUTOMATED COUNT: 12.3 % (ref 11.5–14.5)
EST. GFR  (NO RACE VARIABLE): >60 ML/MIN/1.73 M^2
GLUCOSE SERPL-MCNC: 317 MG/DL (ref 70–110)
HCT VFR BLD AUTO: 45.8 % (ref 40–54)
HGB BLD-MCNC: 16.1 G/DL (ref 14–18)
IMM GRANULOCYTES # BLD AUTO: 0.04 K/UL (ref 0–0.04)
IMM GRANULOCYTES NFR BLD AUTO: 0.6 % (ref 0–0.5)
INR PPP: 1 (ref 0.8–1.2)
LYMPHOCYTES # BLD AUTO: 2.1 K/UL (ref 1–4.8)
LYMPHOCYTES NFR BLD: 32.7 % (ref 18–48)
MCH RBC QN AUTO: 30.8 PG (ref 27–31)
MCHC RBC AUTO-ENTMCNC: 35.2 G/DL (ref 32–36)
MCV RBC AUTO: 88 FL (ref 82–98)
MONOCYTES # BLD AUTO: 0.5 K/UL (ref 0.3–1)
MONOCYTES NFR BLD: 8.3 % (ref 4–15)
NEUTROPHILS # BLD AUTO: 3.5 K/UL (ref 1.8–7.7)
NEUTROPHILS NFR BLD: 54.5 % (ref 38–73)
NRBC BLD-RTO: 0 /100 WBC
PLATELET # BLD AUTO: 218 K/UL (ref 150–450)
PMV BLD AUTO: 9.1 FL (ref 9.2–12.9)
POTASSIUM SERPL-SCNC: 4.1 MMOL/L (ref 3.5–5.1)
PROTHROMBIN TIME: 10.9 SEC (ref 9–12.5)
RBC # BLD AUTO: 5.22 M/UL (ref 4.6–6.2)
SODIUM SERPL-SCNC: 137 MMOL/L (ref 136–145)
WBC # BLD AUTO: 6.36 K/UL (ref 3.9–12.7)

## 2023-02-28 PROCEDURE — 85610 PROTHROMBIN TIME: CPT | Mod: HCNC | Performed by: INTERNAL MEDICINE

## 2023-02-28 PROCEDURE — 85025 COMPLETE CBC W/AUTO DIFF WBC: CPT | Mod: HCNC | Performed by: INTERNAL MEDICINE

## 2023-02-28 PROCEDURE — 80048 BASIC METABOLIC PNL TOTAL CA: CPT | Mod: HCNC | Performed by: INTERNAL MEDICINE

## 2023-02-28 PROCEDURE — 36415 COLL VENOUS BLD VENIPUNCTURE: CPT | Mod: HCNC | Performed by: INTERNAL MEDICINE

## 2023-02-28 NOTE — TELEPHONE ENCOUNTER
Refill Decision Note   King Cool  is requesting a refill authorization.  Brief Assessment and Rationale for Refill:  Approve     Medication Therapy Plan:       Medication Reconciliation Completed: No   Comments:     No Care Gaps recommended.     Note composed:2:38 PM 02/28/2023

## 2023-02-28 NOTE — TELEPHONE ENCOUNTER
No new care gaps identified.  [facility] Embedded Care Gaps. Reference number: [id number]. 2/28/2023   10:30:45 AM CST

## 2023-02-28 NOTE — TELEPHONE ENCOUNTER
Called pt to inform what dr bass stated. Pt had good understanding. Scheduled pt appointment one month from today!

## 2023-02-28 NOTE — DISCHARGE INSTRUCTIONS
Before 7 AM, enter through the Emergency Entrance..   After 7 AM enter through the Main Entrance.        Your procedure  is scheduled for _____3/7/23____________.    Call [phone] between 2pm and 5pm on ___3/6/23____to find out your arrival time for the day of surgery.    You may have one visitor.  No children allowed.      You will be going to the Same Day Surgery Unit on the 2nd floor of the hospital.    Important instructions:  Do not eat anything after midnight.  You may have plain water, non carbonated.  You may also have Gatorade or Powerade after midnight.    Stop all fluids 2 hours before your surgery.    It is okay to brush your teeth.  Do not have gum, candy or mints.    Do not take any diabetic medication on the morning of surgery unless instructed to do so by your doctor or pre op nurse.    Metformin and Synjardy must be stopped two days before your procedure.  Do not take on the day of procedure.  Resume when instructed.    Please shower the night before and the morning of your surgery.        Use Chlorhexidine soap as instructed by your pre op nurse.   Please place clean linens on your bed the night before surgery. Please wear fresh clean clothing after each shower.    No shaving of procedural area at least 4-5 days before surgery due to increased risk of skin irritation and/or possible infection.    Contact lenses and removable denture work may not be worn during your procedure.    You may wear deodorant only.     Do not wear powder, body lotion, perfume/cologne or make-up.    Do not wear any jewelry or have any metal on your body.    You will be asked to remove any dentures or partials for the procedure.    If you are going home on the same day of surgery, you must arrange for a family member or a friend to drive you home.  Public transportation is prohibited.  You will not be able to drive home if you were given anesthesia or sedation.    Please leave money and valuables home.      You  may bring your cell phone.    Call the doctor if fever or illness should occur before your surgery.    Call [phone] to contact us here if needed.

## 2023-03-07 ENCOUNTER — HOSPITAL ENCOUNTER (OUTPATIENT)
Facility: HOSPITAL | Age: 68
Discharge: HOME | End: 2023-03-07
Attending: INTERNAL MEDICINE | Admitting: INTERNAL MEDICINE
Payer: MEDICARE

## 2023-03-07 VITALS
WEIGHT: 223.5 LBS | RESPIRATION RATE: 18 BRPM | TEMPERATURE: 98 F | HEART RATE: 64 BPM | BODY MASS INDEX: 31.62 KG/M2 | SYSTOLIC BLOOD PRESSURE: 146 MMHG | DIASTOLIC BLOOD PRESSURE: 72 MMHG | OXYGEN SATURATION: 97 %

## 2023-03-07 DIAGNOSIS — D58.2 ELEVATED HEMOGLOBIN: ICD-10-CM

## 2023-03-07 DIAGNOSIS — R94.39 ABNORMAL NUCLEAR STRESS TEST: ICD-10-CM

## 2023-03-07 DIAGNOSIS — Z01.818 PREOP TESTING: ICD-10-CM

## 2023-03-07 DIAGNOSIS — R80.9 TYPE 2 DIABETES MELLITUS WITH MICROALBUMINURIA, WITHOUT LONG-TERM CURRENT USE OF INSULIN: Chronic | ICD-10-CM

## 2023-03-07 DIAGNOSIS — Z01.818 PREOPERATIVE TESTING: ICD-10-CM

## 2023-03-07 DIAGNOSIS — I25.10 CORONARY ARTERY DISEASE INVOLVING NATIVE CORONARY ARTERY OF NATIVE HEART WITHOUT ANGINA PECTORIS: ICD-10-CM

## 2023-03-07 DIAGNOSIS — R79.89 ELEVATED TROPONIN LEVEL NOT DUE TO ACUTE CORONARY SYNDROME: Primary | ICD-10-CM

## 2023-03-07 DIAGNOSIS — E11.29 TYPE 2 DIABETES MELLITUS WITH MICROALBUMINURIA, WITHOUT LONG-TERM CURRENT USE OF INSULIN: Chronic | ICD-10-CM

## 2023-03-07 LAB
LEFT CBA DIAS: 18 CM/S
LEFT CBA SYS: 90 CM/S
LEFT CCA DIST DIAS: 18 CM/S
LEFT CCA DIST SYS: 90 CM/S
LEFT CCA MID DIAS: 17 CM/S
LEFT CCA MID SYS: 89 CM/S
LEFT CCA PROX DIAS: 16 CM/S
LEFT CCA PROX SYS: 108 CM/S
LEFT ECA DIAS: 9 CM/S
LEFT ECA SYS: 106 CM/S
LEFT ICA DIST DIAS: 26 CM/S
LEFT ICA DIST SYS: 73 CM/S
LEFT ICA MID DIAS: 22 CM/S
LEFT ICA MID SYS: 75 CM/S
LEFT ICA PROX DIAS: 16 CM/S
LEFT ICA PROX SYS: 80 CM/S
LEFT VERTEBRAL DIAS: 18 CM/S
LEFT VERTEBRAL SYS: 75 CM/S
OHS CV CAROTID RIGHT ICA EDV HIGHEST: 16
OHS CV CAROTID ULTRASOUND LEFT ICA/CCA RATIO: 0.89
OHS CV CAROTID ULTRASOUND RIGHT ICA/CCA RATIO: 1.42
OHS CV PV CAROTID LEFT HIGHEST CCA: 108
OHS CV PV CAROTID LEFT HIGHEST ICA: 80
OHS CV PV CAROTID RIGHT HIGHEST CCA: 116
OHS CV PV CAROTID RIGHT HIGHEST ICA: 118
OHS CV US CAROTID LEFT HIGHEST EDV: 26
POCT GLUCOSE: 182 MG/DL (ref 70–110)
POCT GLUCOSE: 217 MG/DL (ref 70–110)
RIGHT CBA DIAS: 12 CM/S
RIGHT CBA SYS: 68 CM/S
RIGHT CCA DIST DIAS: 13 CM/S
RIGHT CCA DIST SYS: 83 CM/S
RIGHT CCA MID DIAS: 14 CM/S
RIGHT CCA MID SYS: 101 CM/S
RIGHT CCA PROX DIAS: 10 CM/S
RIGHT CCA PROX SYS: 116 CM/S
RIGHT ECA DIAS: 7 CM/S
RIGHT ECA SYS: 99 CM/S
RIGHT ICA DIST DIAS: 16 CM/S
RIGHT ICA DIST SYS: 55 CM/S
RIGHT ICA MID DIAS: 16 CM/S
RIGHT ICA MID SYS: 63 CM/S
RIGHT ICA PROX DIAS: 14 CM/S
RIGHT ICA PROX SYS: 118 CM/S
RIGHT VERTEBRAL DIAS: 12 CM/S
RIGHT VERTEBRAL SYS: 52 CM/S

## 2023-03-07 PROCEDURE — 63600175 PHARM REV CODE 636 W HCPCS: Mod: HCNC | Performed by: INTERNAL MEDICINE

## 2023-03-07 PROCEDURE — C1894 INTRO/SHEATH, NON-LASER: HCPCS | Mod: HCNC | Performed by: INTERNAL MEDICINE

## 2023-03-07 PROCEDURE — 99152 MOD SED SAME PHYS/QHP 5/>YRS: CPT | Mod: HCNC | Performed by: INTERNAL MEDICINE

## 2023-03-07 PROCEDURE — 99152 PR MOD CONSCIOUS SEDATION, SAME PHYS, 5+ YRS, FIRST 15 MIN: ICD-10-PCS | Mod: HCNC,,, | Performed by: INTERNAL MEDICINE

## 2023-03-07 PROCEDURE — 93458 PR CATH PLACE/CORON ANGIO, IMG SUPER/INTERP,W LEFT HEART VENTRICULOGRAPHY: ICD-10-PCS | Mod: 26,HCNC,, | Performed by: INTERNAL MEDICINE

## 2023-03-07 PROCEDURE — 93458 L HRT ARTERY/VENTRICLE ANGIO: CPT | Mod: 26,HCNC,, | Performed by: INTERNAL MEDICINE

## 2023-03-07 PROCEDURE — C1887 CATHETER, GUIDING: HCPCS | Mod: HCNC | Performed by: INTERNAL MEDICINE

## 2023-03-07 PROCEDURE — 25000003 PHARM REV CODE 250: Mod: HCNC | Performed by: INTERNAL MEDICINE

## 2023-03-07 PROCEDURE — C1769 GUIDE WIRE: HCPCS | Mod: HCNC | Performed by: INTERNAL MEDICINE

## 2023-03-07 PROCEDURE — 99152 MOD SED SAME PHYS/QHP 5/>YRS: CPT | Mod: HCNC,,, | Performed by: INTERNAL MEDICINE

## 2023-03-07 PROCEDURE — A4216 STERILE WATER/SALINE, 10 ML: HCPCS | Mod: HCNC | Performed by: INTERNAL MEDICINE

## 2023-03-07 PROCEDURE — 93458 L HRT ARTERY/VENTRICLE ANGIO: CPT | Mod: HCNC | Performed by: INTERNAL MEDICINE

## 2023-03-07 PROCEDURE — 82962 GLUCOSE BLOOD TEST: CPT | Mod: HCNC | Performed by: INTERNAL MEDICINE

## 2023-03-07 RX ORDER — INSULIN ASPART 100 [IU]/ML
1-10 INJECTION, SOLUTION INTRAVENOUS; SUBCUTANEOUS
Status: DISCONTINUED | OUTPATIENT
Start: 2023-03-07 | End: 2023-03-07 | Stop reason: HOSPADM

## 2023-03-07 RX ORDER — NITROGLYCERIN 20 MG/100ML
INJECTION INTRAVENOUS
Status: DISCONTINUED | OUTPATIENT
Start: 2023-03-07 | End: 2023-03-07 | Stop reason: HOSPADM

## 2023-03-07 RX ORDER — DEXTROSE 40 %
15 GEL (GRAM) ORAL
Status: DISCONTINUED | OUTPATIENT
Start: 2023-03-07 | End: 2023-03-07 | Stop reason: HOSPADM

## 2023-03-07 RX ORDER — MIDAZOLAM HYDROCHLORIDE 1 MG/ML
INJECTION, SOLUTION INTRAMUSCULAR; INTRAVENOUS
Status: DISCONTINUED | OUTPATIENT
Start: 2023-03-07 | End: 2023-03-07 | Stop reason: HOSPADM

## 2023-03-07 RX ORDER — MORPHINE SULFATE 4 MG/ML
2 INJECTION, SOLUTION INTRAMUSCULAR; INTRAVENOUS EVERY 10 MIN PRN
Status: DISCONTINUED | OUTPATIENT
Start: 2023-03-07 | End: 2023-03-07 | Stop reason: HOSPADM

## 2023-03-07 RX ORDER — HEPARIN SODIUM 1000 [USP'U]/ML
INJECTION, SOLUTION INTRAVENOUS; SUBCUTANEOUS
Status: DISCONTINUED | OUTPATIENT
Start: 2023-03-07 | End: 2023-03-07 | Stop reason: HOSPADM

## 2023-03-07 RX ORDER — SODIUM CHLORIDE 9 MG/ML
INJECTION, SOLUTION INTRAVENOUS CONTINUOUS
Status: ACTIVE | OUTPATIENT
Start: 2023-03-07 | End: 2023-03-07

## 2023-03-07 RX ORDER — DIPHENHYDRAMINE HCL 25 MG
50 CAPSULE ORAL ONCE
Status: COMPLETED | OUTPATIENT
Start: 2023-03-07 | End: 2023-03-07

## 2023-03-07 RX ORDER — GLUCAGON 1 MG
1 KIT INJECTION
Status: DISCONTINUED | OUTPATIENT
Start: 2023-03-07 | End: 2023-03-07 | Stop reason: HOSPADM

## 2023-03-07 RX ORDER — NITROGLYCERIN 0.4 MG/1
0.4 TABLET SUBLINGUAL EVERY 5 MIN PRN
Status: DISCONTINUED | OUTPATIENT
Start: 2023-03-07 | End: 2023-03-07 | Stop reason: HOSPADM

## 2023-03-07 RX ORDER — SODIUM CHLORIDE 9 MG/ML
INJECTION, SOLUTION INTRAMUSCULAR; INTRAVENOUS; SUBCUTANEOUS
Status: DISCONTINUED | OUTPATIENT
Start: 2023-03-07 | End: 2023-03-07 | Stop reason: HOSPADM

## 2023-03-07 RX ORDER — ACETAMINOPHEN 325 MG/1
650 TABLET ORAL EVERY 4 HOURS PRN
Status: DISCONTINUED | OUTPATIENT
Start: 2023-03-07 | End: 2023-03-07 | Stop reason: HOSPADM

## 2023-03-07 RX ORDER — LIDOCAINE HYDROCHLORIDE 10 MG/ML
INJECTION, SOLUTION EPIDURAL; INFILTRATION; INTRACAUDAL; PERINEURAL
Status: DISCONTINUED | OUTPATIENT
Start: 2023-03-07 | End: 2023-03-07 | Stop reason: HOSPADM

## 2023-03-07 RX ORDER — ONDANSETRON 8 MG/1
8 TABLET, ORALLY DISINTEGRATING ORAL EVERY 8 HOURS PRN
Status: DISCONTINUED | OUTPATIENT
Start: 2023-03-07 | End: 2023-03-07 | Stop reason: HOSPADM

## 2023-03-07 RX ORDER — HYDROCODONE BITARTRATE AND ACETAMINOPHEN 5; 325 MG/1; MG/1
1 TABLET ORAL EVERY 4 HOURS PRN
Status: DISCONTINUED | OUTPATIENT
Start: 2023-03-07 | End: 2023-03-07 | Stop reason: HOSPADM

## 2023-03-07 RX ORDER — METOPROLOL SUCCINATE 25 MG/1
25 TABLET, EXTENDED RELEASE ORAL ONCE
Status: DISCONTINUED | OUTPATIENT
Start: 2023-03-07 | End: 2023-03-07

## 2023-03-07 RX ORDER — NAPROXEN SODIUM 220 MG/1
81 TABLET, FILM COATED ORAL ONCE
Status: COMPLETED | OUTPATIENT
Start: 2023-03-07 | End: 2023-03-07

## 2023-03-07 RX ORDER — VERAPAMIL HYDROCHLORIDE 2.5 MG/ML
INJECTION, SOLUTION INTRAVENOUS
Status: DISCONTINUED | OUTPATIENT
Start: 2023-03-07 | End: 2023-03-07 | Stop reason: HOSPADM

## 2023-03-07 RX ORDER — ATROPINE SULFATE 0.1 MG/ML
0.5 INJECTION INTRAVENOUS
Status: DISCONTINUED | OUTPATIENT
Start: 2023-03-07 | End: 2023-03-07 | Stop reason: HOSPADM

## 2023-03-07 RX ORDER — FENTANYL CITRATE 50 UG/ML
INJECTION, SOLUTION INTRAMUSCULAR; INTRAVENOUS
Status: DISCONTINUED | OUTPATIENT
Start: 2023-03-07 | End: 2023-03-07 | Stop reason: HOSPADM

## 2023-03-07 RX ORDER — NITROGLYCERIN 0.4 MG/1
0.4 TABLET SUBLINGUAL EVERY 5 MIN PRN
Qty: 60 TABLET | Refills: 3 | Status: SHIPPED | OUTPATIENT
Start: 2023-03-07 | End: 2023-06-15

## 2023-03-07 RX ORDER — DEXTROSE 40 %
30 GEL (GRAM) ORAL
Status: DISCONTINUED | OUTPATIENT
Start: 2023-03-07 | End: 2023-03-07 | Stop reason: HOSPADM

## 2023-03-07 RX ADMIN — SODIUM CHLORIDE: 9 INJECTION, SOLUTION INTRAVENOUS at 09:03

## 2023-03-07 RX ADMIN — SODIUM CHLORIDE 2000 ML: 9 INJECTION, SOLUTION INTRAVENOUS at 10:03

## 2023-03-07 RX ADMIN — ASPIRIN 81 MG CHEWABLE TABLET 81 MG: 81 TABLET CHEWABLE at 09:03

## 2023-03-07 RX ADMIN — DIPHENHYDRAMINE HYDROCHLORIDE 50 MG: 25 CAPSULE ORAL at 09:03

## 2023-03-07 NOTE — DISCHARGE SUMMARY
[facility] [place] - Cath Lab  Discharge Note    SUMMARY     Admit Date: 3/7/2023    Discharge Date and Time:  03/07/2023 3pm    Hospital Course (synopsis of major diagnoses, care, treatment, and services provided during the course of the hospital stay): uneventful LHC/cor angio via R radial. MV CAD noted.    Final Diagnosis: Post-Op Diagnosis Codes:     * Elevated troponin level not due to acute coronary syndrome [R77.8]     * Abnormal nuclear stress test [R94.39]    Disposition: Home or Self Care    Follow Up/Patient Instructions:     Medications:  Reconciled Home Medications:      Medication List        START taking these medications      nitroGLYCERIN 0.4 MG SL tablet  Commonly known as: NITROSTAT  Place 1 tablet (0.4 mg total) under the tongue every 5 (five) minutes as needed for Chest pain.            CONTINUE taking these medications      aspirin 81 MG EC tablet  Commonly known as: ECOTRIN  Take 81 mg by mouth once daily.     atorvastatin 40 MG tablet  Commonly known as: LIPITOR  Take 1 tablet (40 mg total) by mouth every evening.     diclofenac 50 MG EC tablet  Commonly known as: VOLTAREN  TAKE 1 TABLET BY MOUTH TWICE A DAY     DROPSAFE ALCOHOL PREP PADS Padm  Generic drug: alcohol swabs  USE AS DIRECTED AS NEEDED     empagliflozin 25 mg tablet  Commonly known as: JARDIANCE  Take 1 tablet (25 mg total) by mouth once daily.     fish oil-omega-3 fatty acids 300-1,000 mg capsule  Take 2 g by mouth once daily.     gabapentin 300 MG capsule  Commonly known as: NEURONTIN  Take 1 capsule (300 mg total) by mouth 3 (three) times daily.     glimepiride 4 MG tablet  Commonly known as: AMARYL  Take 1 tablet (4 mg total) by mouth 2 (two) times a day.     * HYDROcodone-acetaminophen  mg per tablet  Commonly known as: NORCO  Take 1 tablet by mouth 3 (three) times daily as needed (pain).     * HYDROcodone-acetaminophen  mg per tablet  Commonly known as: NORCO  Take 1 tablet by mouth 3 (three) times daily as needed  (pain).  Start taking on: March 16, 2023     hydrocortisone 2.5 % cream  Apply topically 2 (two) times daily.     loratadine 10 mg tablet  Commonly known as: CLARITIN  TAKE 1 TABLET BY MOUTH EVERY DAY     methocarbamoL 500 MG Tab  Commonly known as: ROBAXIN  Take 2 tablets (1,000 mg total) by mouth every 6 (six) hours as needed (muscle spasms).     metoprolol succinate 25 MG 24 hr tablet  Commonly known as: TOPROL-XL  Take 1 tablet (25 mg total) by mouth 2 (two) times daily.     multivitamin with minerals tablet  Take 1 tablet by mouth once daily.     neomycin-polymyxin-hydrocortisone 3.5-10,000-1 mg/mL-unit/mL-% otic suspension  Commonly known as: CORTISPORIN  PLACE 3 DROPS INTO THE LEFT EAR 4 TIMES DAILY.     rivaroxaban 20 mg Tab  Commonly known as: XARELTO  Take 1 tablet (20 mg total) by mouth daily with dinner or evening meal.    **RESUME ON EVENING OF 3/8/2023**     testosterone cypionate 200 mg/mL injection  Commonly known as: DEPOTESTOTERONE CYPIONATE  Inject 1 mL (200 mg total) into the muscle every 14 (fourteen) days.     TRUEPLUS LANCETS 33 gauge Misc  Generic drug: lancets     VITAMIN C ORAL  Take by mouth once daily.           * This list has 2 medication(s) that are the same as other medications prescribed for you. Read the directions carefully, and ask your doctor or other care provider to review them with you.                Discharge Procedure Orders   Diet general     Remove dressing in 24 hours     Call MD for:  temperature >100.4     Call MD for:  persistent nausea and vomiting     Call MD for:  severe uncontrolled pain     Call MD for:  difficulty breathing, headache or visual disturbances     Call MD for:  redness, tenderness, or signs of infection (pain, swelling, redness, odor or green/yellow discharge around incision site)     Call MD for:  hives     Call MD for:  persistent dizziness or light-headedness     Call MD for:  extreme fatigue     Activity as tolerated      Follow-up Information        Wil Mahoney MD Follow up on 3/16/2023.    Specialties: Cardiology, Interventional Cardiology  Why: 2pm, for planned follow up appointment  Contact information:  [address] [facility] [address]  [address]  [place] [id number] [phone]                               Diet: ADA 2000/cardiac    Activity: ad jose.

## 2023-03-07 NOTE — DISCHARGE INSTRUCTIONS
Limit movement of the wrist.  Do not bend wrist or perform heavy lifting for 24 hours.      NO blood pressure cuff or venipuncture to affected arm for 24 hours.    If bleeding occurs, apply pressure to site for 20 minutes. Apply band aid once bleeding stops.  Call 911 if unable to stop bleeding with pressure.       Drink plenty of fluids for the next 48 hours and follow your doctor's diet orders.  Rest for the next 72 hours. Try not to keep the injected leg bent for a long period of time.  Remove the dressing in 24 hours, and you may shower. Clean the area with soap and water, and apply a band aid for the  next 5 days.        No Lifting over 5-10 lbs., that is, not more than 1 gallon of water, or straining for 72 hours.    No driving, no drinking alcohol, and no signing legal documents for the next 24 hours.    Call your doctor for elevated temperature, shortness of breath, chest pain, or cold discolored leg.   If oozing occurs at the injections site, lie down. Apply pressure with a clean wash cloth for 20 to 30 minutes and call  your doctor.  If severe bleeding occurs, lie down, apply pressure. Call 911 and request an ambulance to take you to the nearest  hospital emergency room.        Fall Prevention  Millions of people fall every year and injure themselves. You may have had anesthesia or sedation which may increase your risk of falling. You may have health issues that put you at an increased risk of falling.     Here are ways to reduce your risk of falling.    Make your home safe by keeping walkways clear of objects you may trip over.  Use non-slip pads under rugs. Do not use area rugs or small throw rugs.  Use non-slip mats in bathtubs and showers.  Install handrails and lights on staircases.  Do not walk in poorly lit areas.  Do not stand on chairs or wobbly ladders.  Use caution when reaching overhead or looking upward. This position can cause a loss of balance.  Be sure your shoes fit properly, have non-slip  bottoms and are in good condition.   Wear shoes both inside and out. Avoid going barefoot or wearing slippers.  Be cautious when going up and down stairs, curbs, and when walking on uneven sidewalks.  If your balance is poor, consider using a cane or walker.  If your fall was related to alcohol use, stop or limit alcohol intake.   If your fall was related to use of sleeping medicines, talk to your doctor about this. You may need to reduce your dosage at bedtime if you awaken during the night to go to the bathroom.    To reduce the need for nighttime bathroom trips:  Avoid drinking fluids for several hours before going to bed  Empty your bladder before going to bed  Men can keep a urinal at the bedside  Stay as active as you can. Balance, flexibility, strength, and endurance all come from exercise. They all play a role in preventing falls. Ask your healthcare provider which types of activity are right for you.  Get your vision checked on a regular basis.  If you have pets, know where they are before you stand up or walk so you don't trip over them.  Use night lights.

## 2023-03-07 NOTE — Clinical Note
The catheter was removed from the and was repositioned into the left ventricle. Hemodynamics were performed.  and Pullback was recorded.

## 2023-03-07 NOTE — INTERVAL H&P NOTE
The patient has been examined and the H&P has been reviewed:    I concur with the findings and no changes have occurred since H&P was written.    Anesthesia/Surgery risks, benefits and alternative options discussed and understood by patient/family.    Lab Results   Component Value Date    WBC 6.36 02/28/2023    HGB 16.1 02/28/2023    HCT 45.8 02/28/2023    MCV 88 02/28/2023     02/28/2023       Lab Results   Component Value Date    INR 1.0 02/28/2023    INR 1.0 05/28/2010    INR 1.0 05/27/2010     BMP  Lab Results   Component Value Date     02/28/2023    K 4.1 02/28/2023     02/28/2023    CO2 25 02/28/2023    BUN 14 02/28/2023    CREATININE 1.1 02/28/2023    CALCIUM 9.7 02/28/2023    ANIONGAP 9 02/28/2023    EGFRNORACEVR >60 02/28/2023           There are no hospital problems to display for this patient.

## 2023-03-07 NOTE — Clinical Note
The DP pulses were 2+ bilaterally. The PT pulses were 2+ bilaterally. The right radial pulse was +2.

## 2023-03-08 ENCOUNTER — PATIENT MESSAGE (OUTPATIENT)
Dept: CARDIOLOGY | Facility: CLINIC | Age: 68
End: 2023-03-08
Payer: MEDICARE

## 2023-03-08 ENCOUNTER — TELEPHONE (OUTPATIENT)
Dept: CARDIOTHORACIC SURGERY | Facility: CLINIC | Age: 68
End: 2023-03-08
Payer: MEDICARE

## 2023-03-08 NOTE — TELEPHONE ENCOUNTER
Returned call to pt. Scheduled pt for appointment tomorrow per his request. Pt verbalized understanding of appointment date, time, and location.       ----- Message from Amanda Rodriguez sent at 3/8/2023  9:33 AM CST -----  Contact: [phone]  the patient is calling to get scheduled for a appt.  Pt access tried but no appts are available.  the patient can be reached at.   [phone]

## 2023-03-09 ENCOUNTER — OFFICE VISIT (OUTPATIENT)
Dept: CARDIOTHORACIC SURGERY | Facility: CLINIC | Age: 68
End: 2023-03-09
Payer: MEDICARE

## 2023-03-09 VITALS
BODY MASS INDEX: 30.39 KG/M2 | SYSTOLIC BLOOD PRESSURE: 151 MMHG | HEIGHT: 71 IN | RESPIRATION RATE: 18 BRPM | OXYGEN SATURATION: 94 % | HEART RATE: 74 BPM | DIASTOLIC BLOOD PRESSURE: 84 MMHG | WEIGHT: 217.06 LBS

## 2023-03-09 DIAGNOSIS — I48.0 PAROXYSMAL ATRIAL FIBRILLATION: ICD-10-CM

## 2023-03-09 DIAGNOSIS — I25.10 CORONARY ARTERY DISEASE INVOLVING NATIVE CORONARY ARTERY OF NATIVE HEART WITHOUT ANGINA PECTORIS: Primary | ICD-10-CM

## 2023-03-09 PROCEDURE — 99999 PR PBB SHADOW E&M-EST. PATIENT-LVL III: CPT | Mod: PBBFAC,HCNC,, | Performed by: THORACIC SURGERY (CARDIOTHORACIC VASCULAR SURGERY)

## 2023-03-09 PROCEDURE — 3008F PR BODY MASS INDEX (BMI) DOCUMENTED: ICD-10-PCS | Mod: HCNC,CPTII,S$GLB, | Performed by: THORACIC SURGERY (CARDIOTHORACIC VASCULAR SURGERY)

## 2023-03-09 PROCEDURE — 3288F FALL RISK ASSESSMENT DOCD: CPT | Mod: HCNC,CPTII,S$GLB, | Performed by: THORACIC SURGERY (CARDIOTHORACIC VASCULAR SURGERY)

## 2023-03-09 PROCEDURE — 3077F PR MOST RECENT SYSTOLIC BLOOD PRESSURE >= 140 MM HG: ICD-10-PCS | Mod: HCNC,CPTII,S$GLB, | Performed by: THORACIC SURGERY (CARDIOTHORACIC VASCULAR SURGERY)

## 2023-03-09 PROCEDURE — 1125F PR PAIN SEVERITY QUANTIFIED, PAIN PRESENT: ICD-10-PCS | Mod: HCNC,CPTII,S$GLB, | Performed by: THORACIC SURGERY (CARDIOTHORACIC VASCULAR SURGERY)

## 2023-03-09 PROCEDURE — 99205 OFFICE O/P NEW HI 60 MIN: CPT | Mod: HCNC,S$GLB,, | Performed by: THORACIC SURGERY (CARDIOTHORACIC VASCULAR SURGERY)

## 2023-03-09 PROCEDURE — 3077F SYST BP >= 140 MM HG: CPT | Mod: HCNC,CPTII,S$GLB, | Performed by: THORACIC SURGERY (CARDIOTHORACIC VASCULAR SURGERY)

## 2023-03-09 PROCEDURE — 3079F PR MOST RECENT DIASTOLIC BLOOD PRESSURE 80-89 MM HG: ICD-10-PCS | Mod: HCNC,CPTII,S$GLB, | Performed by: THORACIC SURGERY (CARDIOTHORACIC VASCULAR SURGERY)

## 2023-03-09 PROCEDURE — 1101F PR PT FALLS ASSESS DOC 0-1 FALLS W/OUT INJ PAST YR: ICD-10-PCS | Mod: HCNC,CPTII,S$GLB, | Performed by: THORACIC SURGERY (CARDIOTHORACIC VASCULAR SURGERY)

## 2023-03-09 PROCEDURE — 3046F PR MOST RECENT HEMOGLOBIN A1C LEVEL > 9.0%: ICD-10-PCS | Mod: HCNC,CPTII,S$GLB, | Performed by: THORACIC SURGERY (CARDIOTHORACIC VASCULAR SURGERY)

## 2023-03-09 PROCEDURE — 1101F PT FALLS ASSESS-DOCD LE1/YR: CPT | Mod: HCNC,CPTII,S$GLB, | Performed by: THORACIC SURGERY (CARDIOTHORACIC VASCULAR SURGERY)

## 2023-03-09 PROCEDURE — 1125F AMNT PAIN NOTED PAIN PRSNT: CPT | Mod: HCNC,CPTII,S$GLB, | Performed by: THORACIC SURGERY (CARDIOTHORACIC VASCULAR SURGERY)

## 2023-03-09 PROCEDURE — 3079F DIAST BP 80-89 MM HG: CPT | Mod: HCNC,CPTII,S$GLB, | Performed by: THORACIC SURGERY (CARDIOTHORACIC VASCULAR SURGERY)

## 2023-03-09 PROCEDURE — 3008F BODY MASS INDEX DOCD: CPT | Mod: HCNC,CPTII,S$GLB, | Performed by: THORACIC SURGERY (CARDIOTHORACIC VASCULAR SURGERY)

## 2023-03-09 PROCEDURE — 3046F HEMOGLOBIN A1C LEVEL >9.0%: CPT | Mod: HCNC,CPTII,S$GLB, | Performed by: THORACIC SURGERY (CARDIOTHORACIC VASCULAR SURGERY)

## 2023-03-09 PROCEDURE — 3288F PR FALLS RISK ASSESSMENT DOCUMENTED: ICD-10-PCS | Mod: HCNC,CPTII,S$GLB, | Performed by: THORACIC SURGERY (CARDIOTHORACIC VASCULAR SURGERY)

## 2023-03-09 PROCEDURE — 99205 PR OFFICE/OUTPT VISIT, NEW, LEVL V, 60-74 MIN: ICD-10-PCS | Mod: HCNC,S$GLB,, | Performed by: THORACIC SURGERY (CARDIOTHORACIC VASCULAR SURGERY)

## 2023-03-09 PROCEDURE — 99999 PR PBB SHADOW E&M-EST. PATIENT-LVL III: ICD-10-PCS | Mod: PBBFAC,HCNC,, | Performed by: THORACIC SURGERY (CARDIOTHORACIC VASCULAR SURGERY)

## 2023-03-09 NOTE — LETTER
March 9, 2023        Wil Mahoney MD  [address]  [address]  [place] [address]             [facility] 2nd Fl  [address]  [facility] [address]  Phone: [phone]   Patient: King Cool Jr.   MR Number: [id number]   YOB: 1955   Date of Visit: 3/9/2023       Dear Dr. Mahoney:    Thank you for referring King Cool to me for evaluation. Below are the relevant portions of my assessment and plan of care.            If you have questions, please do not hesitate to call me. I look forward to following King along with you.    Sincerely,      Nimesh Chan MD           CC  No Recipients

## 2023-03-09 NOTE — PROGRESS NOTES
Subjective:      Patient ID: King Cool Jr. is a 68 y.o. male.    Chief Complaint: No chief complaint on file.      HPI:  King Cool Jr. is a 68 y.o. male who presents to initial surgical evaluation for CABG. Medical conditions include HTN,HLD, A fib on xalreto, DM2 (last A1C 9.8). patient was found to be in af ib with RVR while at PCP visit earlier this year. He was sent to the ER and spontaneously converted. This prompted his cardiologist to perform a stress test and subsequent LHC. Patient is asymptomatic; denies chest pain, shortness of breath, leg swelling, orthopnea, or palpitations. He did not have palpitations when he was found to be in a fib. Patient notes he had a fibroscan performed that showed stage 1 liver disease. Patient did smoke cigarettes between ages [age] but has since quit. No alcohol use.      Family and social history reviewed    Review of patient's allergies indicates:   Allergen Reactions    Tamiflu [oseltamivir]      Increases BP    Metformin Hives     Diarrhea, nausea    Penicillins Rash     Past Medical History:   Diagnosis Date    Coronary artery disease involving native coronary artery of native heart without angina pectoris 3/7/2023    Essential (primary) hypertension     Male erectile dysfunction, unspecified     New onset a-fib 1/3/2023    Testicular hypofunction     Type 2 diabetes mellitus without complications      Past Surgical History:   Procedure Laterality Date    HIP SURGERY      LEFT HEART CATHETERIZATION Left 3/7/2023    Procedure: Left heart cath;  Surgeon: Wil Mahoney MD;  Location: [facility] CATH LAB;  Service: Cardiology;  Laterality: Left;  1030am start, R rad access    RN PREOP 2/28/23     Family History       Problem Relation (Age of Onset)    Blindness Mother    Diabetes Brother    No Known Problems Father, Brother, Daughter, Son, Daughter, Brother          Social History     Socioeconomic History    Marital status:     Number of children: 3    Highest  education level: GED or equivalent   Tobacco Use    Smoking status: Former     Packs/day: 0.25     Types: Cigarettes     Start date: 1972     Quit date: 1976     Years since quittin.3    Smokeless tobacco: Never   Substance and Sexual Activity    Alcohol use: Not Currently     Comment: Pt. has a Hx. of  Alcohol use.    Drug use: No    Sexual activity: Yes     Partners: Female     Birth control/protection: None     Current Outpatient Medications on File Prior to Visit   Medication Sig Dispense Refill    alcohol swabs (DROPSAFE ALCOHOL PREP PADS) PadM USE AS DIRECTED AS NEEDED 400 each 1    ASCORBATE CALCIUM (VITAMIN C ORAL) Take by mouth once daily.       aspirin (ECOTRIN) 81 MG EC tablet Take 81 mg by mouth once daily.      atorvastatin (LIPITOR) 40 MG tablet Take 1 tablet (40 mg total) by mouth every evening. 90 tablet 3    diclofenac (VOLTAREN) 50 MG EC tablet TAKE 1 TABLET BY MOUTH TWICE A DAY 60 tablet 1    empagliflozin (JARDIANCE) 25 mg tablet Take 1 tablet (25 mg total) by mouth once daily. 90 tablet 1    fish oil-omega-3 fatty acids 300-1,000 mg capsule Take 2 g by mouth once daily.      gabapentin (NEURONTIN) 300 MG capsule Take 1 capsule (300 mg total) by mouth 3 (three) times daily. 90 capsule 1    glimepiride (AMARYL) 4 MG tablet Take 1 tablet (4 mg total) by mouth 2 (two) times a day. 180 tablet 3    [START ON 3/16/2023] HYDROcodone-acetaminophen (NORCO)  mg per tablet Take 1 tablet by mouth 3 (three) times daily as needed (pain). 72 tablet 0    HYDROcodone-acetaminophen (NORCO)  mg per tablet Take 1 tablet by mouth 3 (three) times daily as needed (pain). 72 tablet 0    hydrocortisone 2.5 % cream Apply topically 2 (two) times daily. 20 g 2    loratadine (CLARITIN) 10 mg tablet TAKE 1 TABLET BY MOUTH EVERY DAY 90 tablet 3    methocarbamoL (ROBAXIN) 500 MG Tab Take 2 tablets (1,000 mg total) by mouth every 6 (six) hours as needed (muscle spasms). 40 tablet 0    metoprolol  succinate (TOPROL-XL) 25 MG 24 hr tablet Take 1 tablet (25 mg total) by mouth 2 (two) times daily. 180 tablet 3    multivitamin with minerals tablet Take 1 tablet by mouth once daily.      neomycin-polymyxin-hydrocortisone (CORTISPORIN) 3.5-10,000-1 mg/mL-unit/mL-% otic suspension PLACE 3 DROPS INTO THE LEFT EAR 4 TIMES DAILY. 10 mL 0    nitroGLYCERIN (NITROSTAT) 0.4 MG SL tablet Place 1 tablet (0.4 mg total) under the tongue every 5 (five) minutes as needed for Chest pain. 60 tablet 3    rivaroxaban (XARELTO) 20 mg Tab Take 1 tablet (20 mg total) by mouth daily with dinner or evening meal. 90 tablet 3    testosterone cypionate (DEPOTESTOTERONE CYPIONATE) 200 mg/mL injection Inject 1 mL (200 mg total) into the muscle every 14 (fourteen) days. 10 mL 0    TRUEPLUS LANCETS 33 gauge [facility]        Current Facility-Administered Medications on File Prior to Visit   Medication Dose Route Frequency Provider Last Rate Last Admin    testosterone cypionate injection 100 mg  100 mg Intramuscular Q28 Days Gallo Lara MD   100 mg at 02/22/23 0808    triamcinolone acetonide injection 40 mg  40 mg Intra-articular  Gallo Lara MD   40 mg at 09/12/19 1631       Current medications Reviewed    Review of Systems   Constitutional:  Negative for activity change, appetite change, fatigue and fever.   HENT:  Negative for nosebleeds.    Respiratory:  Negative for cough and shortness of breath.    Cardiovascular:  Negative for chest pain, palpitations and leg swelling.   Gastrointestinal:  Negative for abdominal distention, abdominal pain and nausea.   Genitourinary:  Negative for frequency.   Musculoskeletal:  Negative for arthralgias and myalgias.   Skin:  Negative for rash.   Neurological:  Negative for dizziness, seizures and numbness.   Hematological:  Does not bruise/bleed easily.   Objective:   Physical Exam  Vitals reviewed.   HENT:      Head: Normocephalic and atraumatic.   Eyes:      Pupils: Pupils are equal, round, and  reactive to light.   Cardiovascular:      Rate and Rhythm: Normal rate.   Pulmonary:      Effort: Pulmonary effort is normal.   Abdominal:      General: Abdomen is flat.   Musculoskeletal:         General: Normal range of motion.      Cervical back: Normal range of motion.   Skin:     General: Skin is warm and dry.      Capillary Refill: Capillary refill takes less than 2 seconds.      Coloration: Skin is not pale.   Neurological:      General: No focal deficit present.      Mental Status: He is alert.       Diagnostic Results: reviewed   Carotid US   There is 20-39% right Internal Carotid Stenosis.  There is 0-19% left Internal Carotid Stenosis.     Cath   LVEDP: 7mmHg  LVEF: 50% by echo     Dominance: Right  LM: normal  LAD: mid-sub-TO with T1 flow, distal vessel collateralized via L-L and R-L filling.  LCx: MIL, dist 90%  RCA: MLI, anderson's crook prox vessel              RPDA mid 90%  ECHO   The left ventricle is normal in size with concentric remodeling and low normal systolic function.  The estimated ejection fraction is 50%.  Normal right ventricular size with normal right ventricular systolic function.  The estimated PA systolic pressure is 26 mmHg.  Assessment:   CAD   AF   Plan:     CTS Attending Note:    I have personally taken the history and examined this patient and agree with the JOSEFINA's note as stated above.  Very pleasant 68-year-old gentleman who initially presented with atrial fibrillation and an enzyme leak.  This prompted a cardiac evaluation which included a stress test which was positive.  Follow-up coronary angiography demonstrated diffuse disease of the left anterior descending as well as distal disease in the terminal circumflex branch and the posterior descending artery.  I reviewed the patient's angiogram with him and his family.  Given the diffuse disease in his left anterior descending, I do not believe that the benefit of surgical revascularization would justify the risk.  Recommend  medical management with or without percutaneous coronary intervention.

## 2023-03-16 ENCOUNTER — TELEPHONE (OUTPATIENT)
Dept: CARDIOLOGY | Facility: CLINIC | Age: 68
End: 2023-03-16
Payer: MEDICARE

## 2023-03-16 NOTE — TELEPHONE ENCOUNTER
Returned call to patient regarding hand pain. Informed patient that he has ernesto with provider on the 20th and that is the earliest available. Patient stated that wont work. Patient was educated and instructed that he will need to go to the emergency room. Patient verbalized understanding.       Izzy Travis  03/16/2023  10:05 AM        ----- Message from Indu Cornejo sent at 3/16/2023  9:24 AM CDT -----  Regarding: [id number]  Type:  Same Day Appointment Request    Caller is requesting a same day appointment.  Caller declined first available   appointment listed below.      Name of Caller: self    When is the first available appointment? 3/2    Symptoms: sever hand pain       Would the patient rather a call back or a response via [facility]? Call back      Best Call Back Number: [phone]      Additional Information:   pt states his hand is in severe pain, states he can not wait until the next scheduled appt to see doctor gilberto.. he is asking to be seen about another provider.

## 2023-03-20 ENCOUNTER — OFFICE VISIT (OUTPATIENT)
Dept: CARDIOLOGY | Facility: CLINIC | Age: 68
End: 2023-03-20
Payer: MEDICARE

## 2023-03-20 VITALS
WEIGHT: 222 LBS | SYSTOLIC BLOOD PRESSURE: 158 MMHG | OXYGEN SATURATION: 95 % | HEIGHT: 71 IN | RESPIRATION RATE: 18 BRPM | BODY MASS INDEX: 31.08 KG/M2 | DIASTOLIC BLOOD PRESSURE: 76 MMHG | HEART RATE: 75 BPM

## 2023-03-20 DIAGNOSIS — E66.9 NON MORBID OBESITY, UNSPECIFIED OBESITY TYPE: ICD-10-CM

## 2023-03-20 DIAGNOSIS — R94.39 ABNORMAL NUCLEAR STRESS TEST: ICD-10-CM

## 2023-03-20 DIAGNOSIS — I48.91 NEW ONSET A-FIB: ICD-10-CM

## 2023-03-20 DIAGNOSIS — I10 ESSENTIAL (PRIMARY) HYPERTENSION: ICD-10-CM

## 2023-03-20 DIAGNOSIS — E11.29 TYPE 2 DIABETES MELLITUS WITH MICROALBUMINURIA, WITHOUT LONG-TERM CURRENT USE OF INSULIN: ICD-10-CM

## 2023-03-20 DIAGNOSIS — Z79.01 CHRONIC ANTICOAGULATION: ICD-10-CM

## 2023-03-20 DIAGNOSIS — I48.0 PAF (PAROXYSMAL ATRIAL FIBRILLATION): ICD-10-CM

## 2023-03-20 DIAGNOSIS — E78.2 MIXED HYPERLIPIDEMIA: ICD-10-CM

## 2023-03-20 DIAGNOSIS — I25.10 CORONARY ARTERY DISEASE INVOLVING NATIVE CORONARY ARTERY OF NATIVE HEART WITHOUT ANGINA PECTORIS: Primary | ICD-10-CM

## 2023-03-20 DIAGNOSIS — R80.9 TYPE 2 DIABETES MELLITUS WITH MICROALBUMINURIA, WITHOUT LONG-TERM CURRENT USE OF INSULIN: ICD-10-CM

## 2023-03-20 PROCEDURE — 3288F PR FALLS RISK ASSESSMENT DOCUMENTED: ICD-10-PCS | Mod: HCNC,CPTII,S$GLB, | Performed by: INTERNAL MEDICINE

## 2023-03-20 PROCEDURE — 1101F PT FALLS ASSESS-DOCD LE1/YR: CPT | Mod: HCNC,CPTII,S$GLB, | Performed by: INTERNAL MEDICINE

## 2023-03-20 PROCEDURE — 3077F SYST BP >= 140 MM HG: CPT | Mod: HCNC,CPTII,S$GLB, | Performed by: INTERNAL MEDICINE

## 2023-03-20 PROCEDURE — 3078F PR MOST RECENT DIASTOLIC BLOOD PRESSURE < 80 MM HG: ICD-10-PCS | Mod: HCNC,CPTII,S$GLB, | Performed by: INTERNAL MEDICINE

## 2023-03-20 PROCEDURE — 3077F PR MOST RECENT SYSTOLIC BLOOD PRESSURE >= 140 MM HG: ICD-10-PCS | Mod: HCNC,CPTII,S$GLB, | Performed by: INTERNAL MEDICINE

## 2023-03-20 PROCEDURE — 3046F HEMOGLOBIN A1C LEVEL >9.0%: CPT | Mod: HCNC,CPTII,S$GLB, | Performed by: INTERNAL MEDICINE

## 2023-03-20 PROCEDURE — 3078F DIAST BP <80 MM HG: CPT | Mod: HCNC,CPTII,S$GLB, | Performed by: INTERNAL MEDICINE

## 2023-03-20 PROCEDURE — 1125F PR PAIN SEVERITY QUANTIFIED, PAIN PRESENT: ICD-10-PCS | Mod: HCNC,CPTII,S$GLB, | Performed by: INTERNAL MEDICINE

## 2023-03-20 PROCEDURE — 99215 PR OFFICE/OUTPT VISIT, EST, LEVL V, 40-54 MIN: ICD-10-PCS | Mod: HCNC,S$GLB,, | Performed by: INTERNAL MEDICINE

## 2023-03-20 PROCEDURE — 1101F PR PT FALLS ASSESS DOC 0-1 FALLS W/OUT INJ PAST YR: ICD-10-PCS | Mod: HCNC,CPTII,S$GLB, | Performed by: INTERNAL MEDICINE

## 2023-03-20 PROCEDURE — 1160F RVW MEDS BY RX/DR IN RCRD: CPT | Mod: HCNC,CPTII,S$GLB, | Performed by: INTERNAL MEDICINE

## 2023-03-20 PROCEDURE — 99999 PR PBB SHADOW E&M-EST. PATIENT-LVL V: CPT | Mod: PBBFAC,HCNC,, | Performed by: INTERNAL MEDICINE

## 2023-03-20 PROCEDURE — 1125F AMNT PAIN NOTED PAIN PRSNT: CPT | Mod: HCNC,CPTII,S$GLB, | Performed by: INTERNAL MEDICINE

## 2023-03-20 PROCEDURE — 3288F FALL RISK ASSESSMENT DOCD: CPT | Mod: HCNC,CPTII,S$GLB, | Performed by: INTERNAL MEDICINE

## 2023-03-20 PROCEDURE — 1159F PR MEDICATION LIST DOCUMENTED IN MEDICAL RECORD: ICD-10-PCS | Mod: HCNC,CPTII,S$GLB, | Performed by: INTERNAL MEDICINE

## 2023-03-20 PROCEDURE — 99215 OFFICE O/P EST HI 40 MIN: CPT | Mod: HCNC,S$GLB,, | Performed by: INTERNAL MEDICINE

## 2023-03-20 PROCEDURE — 1159F MED LIST DOCD IN RCRD: CPT | Mod: HCNC,CPTII,S$GLB, | Performed by: INTERNAL MEDICINE

## 2023-03-20 PROCEDURE — 3008F BODY MASS INDEX DOCD: CPT | Mod: HCNC,CPTII,S$GLB, | Performed by: INTERNAL MEDICINE

## 2023-03-20 PROCEDURE — 1160F PR REVIEW ALL MEDS BY PRESCRIBER/CLIN PHARMACIST DOCUMENTED: ICD-10-PCS | Mod: HCNC,CPTII,S$GLB, | Performed by: INTERNAL MEDICINE

## 2023-03-20 PROCEDURE — 99999 PR PBB SHADOW E&M-EST. PATIENT-LVL V: ICD-10-PCS | Mod: PBBFAC,HCNC,, | Performed by: INTERNAL MEDICINE

## 2023-03-20 PROCEDURE — 3008F PR BODY MASS INDEX (BMI) DOCUMENTED: ICD-10-PCS | Mod: HCNC,CPTII,S$GLB, | Performed by: INTERNAL MEDICINE

## 2023-03-20 PROCEDURE — 3046F PR MOST RECENT HEMOGLOBIN A1C LEVEL > 9.0%: ICD-10-PCS | Mod: HCNC,CPTII,S$GLB, | Performed by: INTERNAL MEDICINE

## 2023-03-20 RX ORDER — METOPROLOL SUCCINATE 25 MG/1
25 TABLET, EXTENDED RELEASE ORAL DAILY
Qty: 90 TABLET | Refills: 3
Start: 2023-03-20 | End: 2023-12-13 | Stop reason: SDUPTHER

## 2023-03-20 RX ORDER — ATORVASTATIN CALCIUM 80 MG/1
80 TABLET, FILM COATED ORAL NIGHTLY
Qty: 90 TABLET | Refills: 3 | Status: SHIPPED | OUTPATIENT
Start: 2023-03-20 | End: 2024-03-12 | Stop reason: SDUPTHER

## 2023-03-20 RX ORDER — LOSARTAN POTASSIUM 25 MG/1
25 TABLET ORAL DAILY
Qty: 90 TABLET | Refills: 3 | Status: SHIPPED | OUTPATIENT
Start: 2023-03-20 | End: 2023-06-15

## 2023-03-20 NOTE — PROGRESS NOTES
CARDIOVASCULAR PROGRESS NOTE    REASON FOR CONSULT:   King Cool Jr. is a 68 y.o. male who presents for follow up of PAF, CAD.    PCP: Jane  HISTORY OF PRESENT ILLNESS:   The patient returns for follow-up, accompanied by his daughter.  In the interim since his last visit he underwent coronary angiogram noting mid LAD  and branch vessel disease of the circumflex and RCA.  In the absence of symptoms, I recommended CT surgery evaluation.  He was subsequently seen by Dr. Chan who recommended medical therapy versus PCI as indicated.  The patient returns today and denies any angina or dyspnea.  He is had no palpitations or syncope.  There has been no PND, orthopnea, melena, hematuria, or claudicant symptoms.  He does describe some decreased right hypothenar muscle mass.  He denies any pain, weakness, or numbness of the right hand.  The right radial access site appears to be without any issue otherwise.  I offered hand surgical evaluation, the patient declined.  I had a long conversation with the patient regards to indications for surgery versus PCI.  I explained that surgery would be for longevity, but that the surgeon did not feel that the artery was bypassable.  I explained that PCI would be for symptoms, and that in the absence of symptoms, medical therapy is appropriate.  I also offered the patient a 2nd opinion, and he appears to have declined this.  He is also somewhat concerned about the cost of Xarelto for thromboprophylaxis of his atrial fibrillation.  I explained to him that the alternative would be Coumadin, but that I felt Xarelto was a better medication.  I also offered him referral to electrophysiology for possible Watchman implant and he declined this intervention.    CARDIOVASCULAR HISTORY:   PAF on Xarelto    CAD (high risk MPI 2/2023), cath 3/2023 with MV CAD and mid LAD     PAST MEDICAL HISTORY:     Past Medical History:   Diagnosis Date    Coronary artery disease involving native coronary  artery of native heart without angina pectoris 3/7/2023    Essential (primary) hypertension     Male erectile dysfunction, unspecified     New onset a-fib 1/3/2023    Testicular hypofunction     Type 2 diabetes mellitus without complications        PAST SURGICAL HISTORY:     Past Surgical History:   Procedure Laterality Date    HIP SURGERY      LEFT HEART CATHETERIZATION Left 3/7/2023    Procedure: Left heart cath;  Surgeon: Wil Mahoney MD;  Location: [facility] CATH LAB;  Service: Cardiology;  Laterality: Left;  1030am start, R rad access    RN PREOP 2/28/23       ALLERGIES AND MEDICATION:     Review of patient's allergies indicates:   Allergen Reactions    Tamiflu [oseltamivir]      Increases BP    Metformin Hives     Diarrhea, nausea    Penicillins Rash        Medication List            Accurate as of March 20, 2023  8:59 AM. If you have any questions, ask your nurse or doctor.                CONTINUE taking these medications      aspirin 81 MG EC tablet  Commonly known as: ECOTRIN     atorvastatin 40 MG tablet  Commonly known as: LIPITOR  Take 1 tablet (40 mg total) by mouth every evening.     diclofenac 50 MG EC tablet  Commonly known as: VOLTAREN  TAKE 1 TABLET BY MOUTH TWICE A DAY     DROPSAFE ALCOHOL PREP PADS Padm  Generic drug: alcohol swabs  USE AS DIRECTED AS NEEDED     empagliflozin 25 mg tablet  Commonly known as: JARDIANCE  Take 1 tablet (25 mg total) by mouth once daily.     fish oil-omega-3 fatty acids 300-1,000 mg capsule     gabapentin 300 MG capsule  Commonly known as: NEURONTIN  Take 1 capsule (300 mg total) by mouth 3 (three) times daily.     glimepiride 4 MG tablet  Commonly known as: AMARYL  Take 1 tablet (4 mg total) by mouth 2 (two) times a day.     * HYDROcodone-acetaminophen  mg per tablet  Commonly known as: NORCO  Take 1 tablet by mouth 3 (three) times daily as needed (pain).     * HYDROcodone-acetaminophen  mg per tablet  Commonly known as: NORCO  Take 1 tablet by mouth 3  (three) times daily as needed (pain).     hydrocortisone 2.5 % cream  Apply topically 2 (two) times daily.     loratadine 10 mg tablet  Commonly known as: CLARITIN  TAKE 1 TABLET BY MOUTH EVERY DAY     methocarbamoL 500 MG Tab  Commonly known as: ROBAXIN  Take 2 tablets (1,000 mg total) by mouth every 6 (six) hours as needed (muscle spasms).     metoprolol succinate 25 MG 24 hr tablet  Commonly known as: TOPROL-XL  Take 1 tablet (25 mg total) by mouth 2 (two) times daily.     multivitamin with minerals tablet     neomycin-polymyxin-hydrocortisone 3.5-10,000-1 mg/mL-unit/mL-% otic suspension  Commonly known as: CORTISPORIN  PLACE 3 DROPS INTO THE LEFT EAR 4 TIMES DAILY.     nitroGLYCERIN 0.4 MG SL tablet  Commonly known as: NITROSTAT  Place 1 tablet (0.4 mg total) under the tongue every 5 (five) minutes as needed for Chest pain.     rivaroxaban 20 mg Tab  Commonly known as: XARELTO  Take 1 tablet (20 mg total) by mouth daily with dinner or evening meal.     testosterone cypionate 200 mg/mL injection  Commonly known as: DEPOTESTOTERONE CYPIONATE  Inject 1 mL (200 mg total) into the muscle every 14 (fourteen) days.     TRUEPLUS LANCETS 33 gauge Misc  Generic drug: lancets     VITAMIN C ORAL           * This list has 2 medication(s) that are the same as other medications prescribed for you. Read the directions carefully, and ask your doctor or other care provider to review them with you.                  SOCIAL HISTORY:     Social History     Socioeconomic History    Marital status:     Number of children: 3    Highest education level: GED or equivalent   Tobacco Use    Smoking status: Former     Packs/day: 0.25     Types: Cigarettes     Start date: 1972     Quit date: 1976     Years since quittin.3    Smokeless tobacco: Never   Substance and Sexual Activity    Alcohol use: Not Currently     Comment: Pt. has a Hx. of  Alcohol use.    Drug use: No    Sexual activity: Yes     Partners: Female      "Birth control/protection: None       FAMILY HISTORY:     Family History   Problem Relation Age of Onset    Blindness Mother     No Known Problems Father     No Known Problems Brother     No Known Problems Daughter     No Known Problems Son     No Known Problems Daughter     Diabetes Brother     No Known Problems Brother     Amblyopia Neg Hx     Cancer Neg Hx     Cataracts Neg Hx     Glaucoma Neg Hx     Hypertension Neg Hx     Macular degeneration Neg Hx     Retinal detachment Neg Hx     Strabismus Neg Hx     Stroke Neg Hx     Thyroid disease Neg Hx        REVIEW OF SYSTEMS:   Review of Systems   Constitutional:  Negative for chills, diaphoresis and fever.   HENT:  Negative for nosebleeds.    Eyes:  Negative for blurred vision, double vision and photophobia.   Respiratory:  Negative for hemoptysis, shortness of breath and wheezing.    Cardiovascular:  Negative for chest pain, palpitations, orthopnea, claudication, leg swelling and PND.   Gastrointestinal:  Negative for abdominal pain, blood in stool, heartburn, melena, nausea and vomiting.   Genitourinary:  Negative for flank pain and hematuria.   Musculoskeletal:  Negative for falls, myalgias and neck pain.   Skin:  Negative for rash.   Neurological:  Negative for dizziness, seizures, loss of consciousness, weakness and headaches.   Endo/Heme/Allergies:  Negative for polydipsia. Does not bruise/bleed easily.   Psychiatric/Behavioral:  Negative for depression and memory loss. The patient is not nervous/anxious.      PHYSICAL EXAM:     Vitals:    03/20/23 0850   BP: (!) 158/76   Pulse: 75   Resp: 18      Body mass index is 30.96 kg/m².  Weight: 100.7 kg (222 lb 0.1 oz)   Height: 5' 11" (180.3 cm)     Physical Exam  Vitals reviewed.   Constitutional:       General: He is not in acute distress.     Appearance: He is well-developed. He is obese. He is not ill-appearing, toxic-appearing or diaphoretic.   HENT:      Head: Normocephalic and atraumatic.   Eyes:      General: " No scleral icterus.     Extraocular Movements: Extraocular movements intact.      Conjunctiva/sclera: Conjunctivae normal.      Pupils: Pupils are equal, round, and reactive to light.   Neck:      Thyroid: No thyromegaly.      Vascular: Normal carotid pulses. No carotid bruit or JVD.      Trachea: Trachea normal.   Cardiovascular:      Rate and Rhythm: Normal rate and regular rhythm.      Pulses:           Carotid pulses are 2+ on the right side and 2+ on the left side.       Radial pulses are 2+ on the right side.      Heart sounds: S1 normal and S2 normal. No murmur heard.    No friction rub. No gallop.      Comments: R rad access site c/d/i  Pulmonary:      Effort: Pulmonary effort is normal. No respiratory distress.      Breath sounds: Normal breath sounds. No stridor. No wheezing, rhonchi or rales.   Chest:      Chest wall: No tenderness.   Abdominal:      General: There is no distension.      Palpations: Abdomen is soft.   Musculoskeletal:         General: No swelling or tenderness. Normal range of motion.      Cervical back: Normal range of motion and neck supple. No edema or rigidity.      Right lower leg: No edema.      Left lower leg: No edema.   Feet:      Right foot:      Skin integrity: No ulcer.      Left foot:      Skin integrity: No ulcer.   Skin:     General: Skin is warm and dry.      Coloration: Skin is not jaundiced.   Neurological:      Mental Status: He is alert and oriented to person, place, and time.      Cranial Nerves: No cranial nerve deficit (presbycusis).   Psychiatric:         Mood and Affect: Mood normal.         Speech: Speech normal.         Behavior: Behavior normal. Behavior is cooperative.       DATA:   EKG: (personally reviewed tracing)  1/3/23 0857   1/3/23 1022 AF 84, IRBBB  1/3/23 1515 tele:  84    Laboratory:  CBC:  Recent Labs   Lab 01/03/23  0934 02/20/23  1030 02/28/23  0910   WBC 11.94 5.20 6.36   Hemoglobin 20.8 HH 16.1 16.1   Hematocrit 60.3 H 48.0 45.8    Platelets 238 199 218         CHEMISTRIES:  Recent Labs   Lab 07/08/20  1540 03/02/21  1030 06/24/21  0925 02/07/22  1121 01/03/23  0934 02/20/23  1030 02/28/23  0910   Glucose 119 H 159 H 246 H 239 H 351 H 236 H 317 H   Sodium 138 141 139 136 133 L 138 137   Potassium 4.1 4.9 4.8 4.2 4.9 4.3 4.1   BUN 12 20 16 13 18 20 14   Creatinine 1.1 1.1 1.2 0.9 1.3 0.9 1.1   eGFR if non African American >60.0 >60 >60.0 >60.0  --   --   --    eGFR  --   --   --   --  >60 >60.0 >60   Calcium 9.2 9.3 10.1 9.4 8.9 9.4 9.7   Magnesium  --   --   --   --  1.8  --   --          CARDIAC BIOMARKERS:  Recent Labs   Lab 01/03/23  0934 01/03/23  1222   Troponin I 0.119 H 0.125 H         COAGS:  Recent Labs   Lab 02/28/23  0910   INR 1.0         LIPIDS/LFTS:  Recent Labs   Lab 03/02/21  1030 06/24/21  0925 02/07/22  1121 01/03/23  0934 02/20/23  1030   Cholesterol 179  --  207 H  --  129   Triglycerides 222 H  --  262 H  --  98   HDL 29 L  --  37 L  --  35 L   LDL Cholesterol 105.6  --  117.6  --  74.4   Non-HDL Cholesterol 150  --  170  --  94   AST 25   < > 25 24 26   ALT 31   < > 40 39 43    < > = values in this interval not displayed.       Lab Results   Component Value Date    TSH 0.850 02/20/2023         Cardiovascular Testing:  Cath 3/7/23 (images personally reviewed and interpreted)  LVEDP: 7mmHg  LVEF: 50% by echo  Dominance: Right  LM: normal  LAD: mid-sub-TO with T1 flow, distal vessel collateralized via L-L and R-L filling.  LCx: MLI, dist 90%  RCA: MLI, anderson's crook prox vessel              RPDA mid 90%  Hemostasis:  R Radial band  Impression:  Abnl MPI suggesting MV CAD (high risk, performed after troponin release in setting of PAF/RVR).  3V CAD, low normal LV fxn  R rad vasband for hemostasis  Plan:  Cont med rx  Cont ASA 81mg qd  Resume Xarelto 20mg qhs this evening  Cont atorva 40mg qhs  Home today  Follow up with Dr. Mahoney as planned  Check CT chest and carotid US  CTS opinion (with Dr. Chan per pt request) re  CABG vs PCI (vs hybrid vs med rx as pt is asymptomatic).    Ex MPI 2/15/23     The patient exercised for 8 minutes 42 seconds on a Jd protocol, corresponding to a functional capacity of 8 METS, achieving a peak heart rate of 141 bpm, which is 92 % of the age predicted maximum heart rate. Their exercise capacity was above average.    Abnormal myocardial perfusion scan.    There are two significant perfusion abnormalities.    Perfusion Abnormality #1 - There is a moderate to severe intensity, large sized, reversible perfusion abnormality that is consistent with ischemia in the mid to apical anterior and anteroseptal wall(s) in the typical distribution of the LAD territory.    Perfusion Abnormality #2 - There is a large sized, moderate intensity, mostly reversible perfusion abnormality with some fixed areas in the basal to distal inferior wall(s) in the typical distribution of the RCA territory.    There are no other significant perfusion abnormalities.    The gated perfusion images showed an ejection fraction of 56% post stress.    There is normal wall motion post stress.    The ECG portion of the study is positive for ischemia. Specificity is reduced secondary to hypertensive response.    The patient reported no chest pain during the stress test.    There were no arrhythmias during stress.    The exercise capacity was above average.    Aortic US 2/14/23  No evidence of AAA.    Echo 1/3/23  The left ventricle is normal in size with concentric remodeling and low normal systolic function.  The estimated ejection fraction is 50%.  Normal right ventricular size with normal right ventricular systolic function.  The estimated PA systolic pressure is 26 mmHg.    ASSESSMENT:   # CAD, cath 3/2023 with  mid LAD.  Asymptomatic.  Seen by CTS without plans for CABG.   # PAF/RVR with elev trop, now in SR on Xarelto 20mg  # HTN, uncontrolled, (tolerating toprol 25mg qd, intol of 50mg qd).  # HLP on atorva 40mg   # NIDDM  #  ?polycythemia  # BMI 31, stable vs last OV    PLAN:   Cont med rx  Cont ASA 81mg qd  Cont Xarelto 20mg qhs  Inc atorva 80mg qhs  Add losartan 25mg qd  Check BMP 3/22/23  RN BP check with PCP (planned 3/23/23)  Diet/exercise/weight loss  RTC 3 months with lipids/LFT (June 2023)  If pt develops sxs, will consider PCI of mid LAD .      Wil Mahoney MD, [facility]    Addendum 3/21/23:  I discussed case with Dr. Tse as an unofficial 2nd surgical opioion, who also believes that the LAD is not a good surgical target, and that in the absence of sxs, medical rx is appropriate.

## 2023-03-21 ENCOUNTER — PATIENT OUTREACH (OUTPATIENT)
Dept: ADMINISTRATIVE | Facility: HOSPITAL | Age: 68
End: 2023-03-21
Payer: MEDICARE

## 2023-03-21 DIAGNOSIS — E11.9 TYPE 2 DIABETES MELLITUS WITHOUT COMPLICATION, WITHOUT LONG-TERM CURRENT USE OF INSULIN: Primary | ICD-10-CM

## 2023-03-22 ENCOUNTER — CLINICAL SUPPORT (OUTPATIENT)
Dept: FAMILY MEDICINE | Facility: CLINIC | Age: 68
End: 2023-03-22
Payer: MEDICARE

## 2023-03-22 ENCOUNTER — LAB VISIT (OUTPATIENT)
Dept: LAB | Facility: HOSPITAL | Age: 68
End: 2023-03-22
Attending: INTERNAL MEDICINE
Payer: MEDICARE

## 2023-03-22 DIAGNOSIS — E29.1 TESTICULAR HYPOFUNCTION: Primary | ICD-10-CM

## 2023-03-22 DIAGNOSIS — I10 ESSENTIAL (PRIMARY) HYPERTENSION: ICD-10-CM

## 2023-03-22 LAB
ANION GAP SERPL CALC-SCNC: 9 MMOL/L (ref 8–16)
BUN SERPL-MCNC: 17 MG/DL (ref 8–23)
CALCIUM SERPL-MCNC: 9.6 MG/DL (ref 8.7–10.5)
CHLORIDE SERPL-SCNC: 104 MMOL/L (ref 95–110)
CO2 SERPL-SCNC: 26 MMOL/L (ref 23–29)
CREAT SERPL-MCNC: 1.1 MG/DL (ref 0.5–1.4)
EST. GFR  (NO RACE VARIABLE): >60 ML/MIN/1.73 M^2
GLUCOSE SERPL-MCNC: 228 MG/DL (ref 70–110)
POTASSIUM SERPL-SCNC: 4.2 MMOL/L (ref 3.5–5.1)
SODIUM SERPL-SCNC: 139 MMOL/L (ref 136–145)

## 2023-03-22 PROCEDURE — 96372 PR INJECTION,THERAP/PROPH/DIAG2ST, IM OR SUBCUT: ICD-10-PCS | Mod: HCNC,S$GLB,, | Performed by: FAMILY MEDICINE

## 2023-03-22 PROCEDURE — 96372 THER/PROPH/DIAG INJ SC/IM: CPT | Mod: HCNC,S$GLB,, | Performed by: FAMILY MEDICINE

## 2023-03-22 PROCEDURE — 36415 COLL VENOUS BLD VENIPUNCTURE: CPT | Mod: HCNC,PO | Performed by: INTERNAL MEDICINE

## 2023-03-22 PROCEDURE — 80048 BASIC METABOLIC PNL TOTAL CA: CPT | Mod: HCNC | Performed by: INTERNAL MEDICINE

## 2023-03-22 RX ADMIN — TESTOSTERONE CYPIONATE 100 MG: 200 INJECTION, SOLUTION INTRAMUSCULAR at 07:03

## 2023-03-23 ENCOUNTER — LAB VISIT (OUTPATIENT)
Dept: LAB | Facility: HOSPITAL | Age: 68
End: 2023-03-23
Attending: FAMILY MEDICINE
Payer: MEDICARE

## 2023-03-23 ENCOUNTER — CLINICAL SUPPORT (OUTPATIENT)
Dept: OPHTHALMOLOGY | Facility: CLINIC | Age: 68
End: 2023-03-23
Payer: MEDICARE

## 2023-03-23 ENCOUNTER — OFFICE VISIT (OUTPATIENT)
Dept: FAMILY MEDICINE | Facility: CLINIC | Age: 68
End: 2023-03-23
Payer: MEDICARE

## 2023-03-23 VITALS
BODY MASS INDEX: 30.27 KG/M2 | WEIGHT: 216.25 LBS | SYSTOLIC BLOOD PRESSURE: 138 MMHG | HEIGHT: 71 IN | TEMPERATURE: 98 F | OXYGEN SATURATION: 99 % | DIASTOLIC BLOOD PRESSURE: 70 MMHG | HEART RATE: 63 BPM

## 2023-03-23 DIAGNOSIS — M25.561 CHRONIC PAIN OF RIGHT KNEE: ICD-10-CM

## 2023-03-23 DIAGNOSIS — R80.9 TYPE 2 DIABETES MELLITUS WITH MICROALBUMINURIA, WITHOUT LONG-TERM CURRENT USE OF INSULIN: Primary | Chronic | ICD-10-CM

## 2023-03-23 DIAGNOSIS — Z12.11 ENCOUNTER FOR SCREENING FOR MALIGNANT NEOPLASM OF COLON: ICD-10-CM

## 2023-03-23 DIAGNOSIS — R80.9 TYPE 2 DIABETES MELLITUS WITH MICROALBUMINURIA, WITHOUT LONG-TERM CURRENT USE OF INSULIN: Chronic | ICD-10-CM

## 2023-03-23 DIAGNOSIS — E11.29 TYPE 2 DIABETES MELLITUS WITH MICROALBUMINURIA, WITHOUT LONG-TERM CURRENT USE OF INSULIN: Chronic | ICD-10-CM

## 2023-03-23 DIAGNOSIS — G89.29 CHRONIC PAIN OF RIGHT KNEE: ICD-10-CM

## 2023-03-23 DIAGNOSIS — M17.0 PRIMARY OSTEOARTHRITIS OF BOTH KNEES: ICD-10-CM

## 2023-03-23 DIAGNOSIS — F11.29 OPIOID DEPENDENCE WITH OPIOID-INDUCED DISORDER: ICD-10-CM

## 2023-03-23 DIAGNOSIS — E11.29 TYPE 2 DIABETES MELLITUS WITH MICROALBUMINURIA, WITHOUT LONG-TERM CURRENT USE OF INSULIN: Primary | Chronic | ICD-10-CM

## 2023-03-23 LAB
ESTIMATED AVG GLUCOSE: 220 MG/DL (ref 68–131)
HBA1C MFR BLD: 9.3 % (ref 4–5.6)

## 2023-03-23 PROCEDURE — 1159F PR MEDICATION LIST DOCUMENTED IN MEDICAL RECORD: ICD-10-PCS | Mod: HCNC,CPTII,S$GLB, | Performed by: FAMILY MEDICINE

## 2023-03-23 PROCEDURE — 99215 PR OFFICE/OUTPT VISIT, EST, LEVL V, 40-54 MIN: ICD-10-PCS | Mod: 25,HCNC,S$GLB, | Performed by: FAMILY MEDICINE

## 2023-03-23 PROCEDURE — 3288F PR FALLS RISK ASSESSMENT DOCUMENTED: ICD-10-PCS | Mod: HCNC,CPTII,S$GLB, | Performed by: FAMILY MEDICINE

## 2023-03-23 PROCEDURE — 3075F SYST BP GE 130 - 139MM HG: CPT | Mod: HCNC,CPTII,S$GLB, | Performed by: FAMILY MEDICINE

## 2023-03-23 PROCEDURE — 1125F AMNT PAIN NOTED PAIN PRSNT: CPT | Mod: HCNC,CPTII,S$GLB, | Performed by: FAMILY MEDICINE

## 2023-03-23 PROCEDURE — 3046F PR MOST RECENT HEMOGLOBIN A1C LEVEL > 9.0%: ICD-10-PCS | Mod: HCNC,CPTII,S$GLB, | Performed by: FAMILY MEDICINE

## 2023-03-23 PROCEDURE — 3066F NEPHROPATHY DOC TX: CPT | Mod: HCNC,CPTII,S$GLB, | Performed by: FAMILY MEDICINE

## 2023-03-23 PROCEDURE — 1125F PR PAIN SEVERITY QUANTIFIED, PAIN PRESENT: ICD-10-PCS | Mod: HCNC,CPTII,S$GLB, | Performed by: FAMILY MEDICINE

## 2023-03-23 PROCEDURE — 83036 HEMOGLOBIN GLYCOSYLATED A1C: CPT | Mod: HCNC | Performed by: FAMILY MEDICINE

## 2023-03-23 PROCEDURE — 99215 OFFICE O/P EST HI 40 MIN: CPT | Mod: 25,HCNC,S$GLB, | Performed by: FAMILY MEDICINE

## 2023-03-23 PROCEDURE — 3075F PR MOST RECENT SYSTOLIC BLOOD PRESS GE 130-139MM HG: ICD-10-PCS | Mod: HCNC,CPTII,S$GLB, | Performed by: FAMILY MEDICINE

## 2023-03-23 PROCEDURE — 92228 DIABETIC EYE SCREENING PHOTO: ICD-10-PCS | Mod: HCNC,TC,S$GLB, | Performed by: FAMILY MEDICINE

## 2023-03-23 PROCEDURE — 3078F PR MOST RECENT DIASTOLIC BLOOD PRESSURE < 80 MM HG: ICD-10-PCS | Mod: HCNC,CPTII,S$GLB, | Performed by: FAMILY MEDICINE

## 2023-03-23 PROCEDURE — 99999 PR PBB SHADOW E&M-EST. PATIENT-LVL IV: CPT | Mod: PBBFAC,HCNC,, | Performed by: FAMILY MEDICINE

## 2023-03-23 PROCEDURE — 1101F PT FALLS ASSESS-DOCD LE1/YR: CPT | Mod: HCNC,CPTII,S$GLB, | Performed by: FAMILY MEDICINE

## 2023-03-23 PROCEDURE — 3046F HEMOGLOBIN A1C LEVEL >9.0%: CPT | Mod: HCNC,CPTII,S$GLB, | Performed by: FAMILY MEDICINE

## 2023-03-23 PROCEDURE — 3078F DIAST BP <80 MM HG: CPT | Mod: HCNC,CPTII,S$GLB, | Performed by: FAMILY MEDICINE

## 2023-03-23 PROCEDURE — 20610 DRAIN/INJ JOINT/BURSA W/O US: CPT | Mod: HCNC,RT,S$GLB, | Performed by: FAMILY MEDICINE

## 2023-03-23 PROCEDURE — 36415 COLL VENOUS BLD VENIPUNCTURE: CPT | Mod: HCNC,PO | Performed by: FAMILY MEDICINE

## 2023-03-23 PROCEDURE — 4010F PR ACE/ARB THEARPY RXD/TAKEN: ICD-10-PCS | Mod: HCNC,CPTII,S$GLB, | Performed by: FAMILY MEDICINE

## 2023-03-23 PROCEDURE — 4010F ACE/ARB THERAPY RXD/TAKEN: CPT | Mod: HCNC,CPTII,S$GLB, | Performed by: FAMILY MEDICINE

## 2023-03-23 PROCEDURE — 92228 IMG RTA DETC/MNTR DS PHY/QHP: CPT | Mod: HCNC,TC,S$GLB, | Performed by: FAMILY MEDICINE

## 2023-03-23 PROCEDURE — 92228 IMG RTA DETC/MNTR DS PHY/QHP: CPT | Mod: 26,HCNC,S$GLB, | Performed by: OPTOMETRIST

## 2023-03-23 PROCEDURE — 3008F BODY MASS INDEX DOCD: CPT | Mod: HCNC,CPTII,S$GLB, | Performed by: FAMILY MEDICINE

## 2023-03-23 PROCEDURE — 1159F MED LIST DOCD IN RCRD: CPT | Mod: HCNC,CPTII,S$GLB, | Performed by: FAMILY MEDICINE

## 2023-03-23 PROCEDURE — 20610 LARGE JOINT ASPIRATION/INJECTION: R KNEE: ICD-10-PCS | Mod: HCNC,RT,S$GLB, | Performed by: FAMILY MEDICINE

## 2023-03-23 PROCEDURE — 82274 ASSAY TEST FOR BLOOD FECAL: CPT | Mod: HCNC | Performed by: FAMILY MEDICINE

## 2023-03-23 PROCEDURE — 3008F PR BODY MASS INDEX (BMI) DOCUMENTED: ICD-10-PCS | Mod: HCNC,CPTII,S$GLB, | Performed by: FAMILY MEDICINE

## 2023-03-23 PROCEDURE — 1101F PR PT FALLS ASSESS DOC 0-1 FALLS W/OUT INJ PAST YR: ICD-10-PCS | Mod: HCNC,CPTII,S$GLB, | Performed by: FAMILY MEDICINE

## 2023-03-23 PROCEDURE — 3288F FALL RISK ASSESSMENT DOCD: CPT | Mod: HCNC,CPTII,S$GLB, | Performed by: FAMILY MEDICINE

## 2023-03-23 PROCEDURE — 99999 PR PBB SHADOW E&M-EST. PATIENT-LVL IV: ICD-10-PCS | Mod: PBBFAC,HCNC,, | Performed by: FAMILY MEDICINE

## 2023-03-23 PROCEDURE — 3061F NEG MICROALBUMINURIA REV: CPT | Mod: HCNC,CPTII,S$GLB, | Performed by: FAMILY MEDICINE

## 2023-03-23 PROCEDURE — 3061F PR NEG MICROALBUMINURIA RESULT DOCUMENTED/REVIEW: ICD-10-PCS | Mod: HCNC,CPTII,S$GLB, | Performed by: FAMILY MEDICINE

## 2023-03-23 PROCEDURE — 3066F PR DOCUMENTATION OF TREATMENT FOR NEPHROPATHY: ICD-10-PCS | Mod: HCNC,CPTII,S$GLB, | Performed by: FAMILY MEDICINE

## 2023-03-23 PROCEDURE — 92228 DIABETIC EYE SCREENING PHOTO: ICD-10-PCS | Mod: 26,HCNC,S$GLB, | Performed by: OPTOMETRIST

## 2023-03-23 RX ORDER — TRIAMCINOLONE ACETONIDE 40 MG/ML
40 INJECTION, SUSPENSION INTRA-ARTICULAR; INTRAMUSCULAR
Status: DISCONTINUED | OUTPATIENT
Start: 2023-03-23 | End: 2023-03-23 | Stop reason: HOSPADM

## 2023-03-23 RX ORDER — HYDROCODONE BITARTRATE AND ACETAMINOPHEN 10; 325 MG/1; MG/1
1 TABLET ORAL 3 TIMES DAILY PRN
Qty: 72 TABLET | Refills: 0 | Status: SHIPPED | OUTPATIENT
Start: 2023-04-16 | End: 2023-05-17 | Stop reason: SDUPTHER

## 2023-03-23 RX ORDER — GABAPENTIN 300 MG/1
300 CAPSULE ORAL 3 TIMES DAILY
Qty: 270 CAPSULE | Refills: 1 | Status: SHIPPED | OUTPATIENT
Start: 2023-03-23 | End: 2023-10-05 | Stop reason: SDUPTHER

## 2023-03-23 RX ADMIN — TRIAMCINOLONE ACETONIDE 40 MG: 40 INJECTION, SUSPENSION INTRA-ARTICULAR; INTRAMUSCULAR at 08:03

## 2023-03-23 NOTE — PROGRESS NOTES
[facility]  Progress Note    SUBJECTIVE:     Chief Complaint   Patient presents with    Diabetes    Knee Pain       HPI   King Cool Jr.  is a 68 y.o. male here for f/u of his chronic conditions. Also c/o R knee pain which he is requesting knee injection today. Patient has no other new complaints/problems at this time.      Review of patient's allergies indicates:   Allergen Reactions    Tamiflu [oseltamivir]      Increases BP    Metformin Hives     Diarrhea, nausea    Penicillins Rash       Past Medical History:   Diagnosis Date    Coronary artery disease involving native coronary artery of native heart without angina pectoris 3/7/2023    Essential (primary) hypertension     Male erectile dysfunction, unspecified     New onset a-fib 1/3/2023    Testicular hypofunction     Type 2 diabetes mellitus without complications      Past Surgical History:   Procedure Laterality Date    HIP SURGERY      LEFT HEART CATHETERIZATION Left 3/7/2023    Procedure: Left heart cath;  Surgeon: Wil Mahoney MD;  Location: [facility] CATH LAB;  Service: Cardiology;  Laterality: Left;  1030am start, R rad access    RN PREOP 23     Family History   Problem Relation Age of Onset    Blindness Mother     No Known Problems Father     No Known Problems Brother     No Known Problems Daughter     No Known Problems Son     No Known Problems Daughter     Diabetes Brother     No Known Problems Brother     Amblyopia Neg Hx     Cancer Neg Hx     Cataracts Neg Hx     Glaucoma Neg Hx     Hypertension Neg Hx     Macular degeneration Neg Hx     Retinal detachment Neg Hx     Strabismus Neg Hx     Stroke Neg Hx     Thyroid disease Neg Hx      Social History     Tobacco Use    Smoking status: Former     Packs/day: 0.25     Types: Cigarettes     Start date: 1972     Quit date: 1976     Years since quittin.3    Smokeless tobacco: Never   Substance Use Topics    Alcohol use: Not Currently     Comment: Pt. has a Hx. of  Alcohol  use.    Drug use: No        Review of Systems   Constitutional:  Negative for chills and fever.   HENT: Negative.     Respiratory: Negative.  Negative for shortness of breath.    Cardiovascular: Negative.  Negative for chest pain.   Gastrointestinal: Negative.  Negative for abdominal pain, nausea and vomiting.   Genitourinary: Negative.    Musculoskeletal:  Positive for back pain and joint pain (R knee pain).   Neurological:  Negative for headaches.   All other systems reviewed and are negative.  OBJECTIVE:     Vitals:    03/23/23 0805   BP: 138/70   Pulse: 63   Temp: 98.2 °F (36.8 °C)     Body mass index is 30.16 kg/m².    Physical Exam  Constitutional:       General: He is in acute distress (mild).      Appearance: He is not diaphoretic.   HENT:      Head: Normocephalic and atraumatic.   Eyes:      Conjunctiva/sclera: Conjunctivae normal.   Pulmonary:      Effort: Pulmonary effort is normal. No respiratory distress.   Musculoskeletal:         General: Tenderness (to palpation of R medial tibial-femoral compartment, decreased joint space. ACL/PCL intact, negative McMurrays sign) present.   Skin:     General: Skin is warm.      Findings: No erythema or rash.   Neurological:      Mental Status: He is alert and oriented to person, place, and time.       Old records were reviewed. Labs and/or images were independently reviewed.    ASSESSMENT     1. Type 2 diabetes mellitus with microalbuminuria, without long-term current use of insulin    2. Chronic pain of right knee    3. Primary osteoarthritis of both knees    4. Opioid dependence with opioid-induced disorder    5. Encounter for screening for malignant neoplasm of colon        PLAN:     Type 2 diabetes mellitus with microalbuminuria, without long-term current use of insulin  -     Hemoglobin A1C; Future  -     Diabetic Eye Screening Photo; Future  -     Instructed patient to take daily glucose AM logs and to write them down to bring with on next visit. Advised  patient to decrease intake of carbohydrates/simple sugars.         Chronic pain of right knee  -     gabapentin (NEURONTIN) 300 MG capsule; Take 1 capsule (300 mg total) by mouth 3 (three) times daily.  Dispense: 270 capsule; Refill: 1  -     Stable. Continue current regimen.  -     injected R knee. No complications.     Primary osteoarthritis of both knees  -     HYDROcodone-acetaminophen (NORCO)  mg per tablet; Take 1 tablet by mouth 3 (three) times daily as needed (pain).  Dispense: 72 tablet; Refill: 0  -     Stable. Continue current regimen. Unable to wean at this time but am monitoring closely for any drug toxicity. Checked .    Opioid dependence with opioid-induced disorder   -     Stable. Continue current regimen.    Encounter for screening for malignant neoplasm of colon  -     Fecal Immunochemical Test (iFOBT); Future; Expected date: 03/23/2023      RTC TYLOR Lara MD  03/23/2023 8:38 AM

## 2023-03-23 NOTE — PROGRESS NOTES
HPI    69 y/o male here for diabetic retinopathy screening with non-dilated   fundus photos per Dr. Lara  Last edited by Sammie Martinez MA on 3/23/2023  3:52 PM.            Assessment /Plan     For exam results, see Encounter Report.    There are no diagnoses linked to this encounter.      Please see  progress notes for interpretation.

## 2023-03-23 NOTE — PROCEDURES
Large Joint Aspiration/Injection: R knee    Date/Time: 3/23/2023 8:20 AM  Performed by: Gallo Lara MD  Authorized by: Gallo Lara MD     Consent Done?:  Yes (Verbal)  Indications:  Pain  Site marked: the procedure site was marked    Timeout: prior to procedure the correct patient, procedure, and site was verified      Details:  Needle Size:  25 G  Approach:  Anterolateral  Location:  Knee  Site:  R knee  Medications:  40 mg triamcinolone acetonide 40 mg/mL  Patient tolerance:  Patient tolerated the procedure well with no immediate complications

## 2023-03-24 ENCOUNTER — PATIENT MESSAGE (OUTPATIENT)
Dept: FAMILY MEDICINE | Facility: CLINIC | Age: 68
End: 2023-03-24
Payer: MEDICARE

## 2023-03-24 DIAGNOSIS — R80.9 TYPE 2 DIABETES MELLITUS WITH MICROALBUMINURIA, WITHOUT LONG-TERM CURRENT USE OF INSULIN: Primary | ICD-10-CM

## 2023-03-24 DIAGNOSIS — E11.29 TYPE 2 DIABETES MELLITUS WITH MICROALBUMINURIA, WITHOUT LONG-TERM CURRENT USE OF INSULIN: Primary | ICD-10-CM

## 2023-03-24 RX ORDER — DULAGLUTIDE 0.75 MG/.5ML
0.75 INJECTION, SOLUTION SUBCUTANEOUS
Qty: 4 PEN | Refills: 11 | Status: SHIPPED | OUTPATIENT
Start: 2023-03-24 | End: 2023-06-28

## 2023-03-30 LAB — HEMOCCULT STL QL IA: NEGATIVE

## 2023-04-13 DIAGNOSIS — I48.91 NEW ONSET A-FIB: ICD-10-CM

## 2023-04-13 NOTE — TELEPHONE ENCOUNTER
----- Message from Ion Madison sent at 4/13/2023  1:39 PM CDT -----  Regarding: King .[phone]  Type: RX Refill Request     Who Called:King      Have you contacted your pharmacy: Yes     Refill or New Rx: Refill      RX Name and Strength: rivaroxaban (XARELTO) 20 mg Tab    Preferred Pharmacy with phone number: .  [facility]/pharmacy #[id number] - [place] - [address] [facility]  [address]  [place] [address]  Phone: [phone] Fax: [phone]     Local or Mail Order: Local     Ordering Provider: Dr. Holder     Would the patient rather a call back or a response via [facility]? Callback      Best Call Back Number: [phone]      Additional Information: Patient is out of medication and needs help finding assistance with getting medication. You can also reach the Pt through email  at [email or web address].

## 2023-04-14 NOTE — TELEPHONE ENCOUNTER
----- Message from Isabelle Shah sent at 4/14/2023 10:32 AM CDT -----  Regarding: self .[phone]  .Type: Patient Call Back    Who called: self     What is the request in detail: PT stated that the medication cost too much and requesting to get an alternative for rivaroxaban (XARELTO) 20 mg Tab    Can the clinic reply by [facility]? Call back     Would the patient rather a call back or a response via [facility]?  Call back     Best call back number: .[phone]

## 2023-04-14 NOTE — TELEPHONE ENCOUNTER
Patient states the medication cost to high and would like to know if something else can be sent in that is cheaper. Medication patient is referring to (XARELTO)  Please advise.

## 2023-04-14 NOTE — TELEPHONE ENCOUNTER
----- Message from Isabelle Shah sent at 4/14/2023 10:32 AM CDT -----  Regarding: self .[phone]  .Type: Patient Call Back    Who called: self     What is the request in detail: PT stated that the medication cost too much and requesting to get an alternative for rivaroxaban (XARELTO) 20 mg Tab    Can the clinic reply by [facility]? Call back     Would the patient rather a call back or a response via [facility]?  Call back     Best call back number: .[phone]

## 2023-04-20 NOTE — TELEPHONE ENCOUNTER
Spoke with patient stated that coumadin does not work for him. Requesting Xarelto only says Xarelto  is at pharmacy but he can't afford it.  Upon investigation noted PA for Xarelto  is pending . Patient requesting to speak with you , please be advised. Patient informed that you will be in clinic and maybe unable to speak with him. Thank you Dr. Lara.

## 2023-05-08 ENCOUNTER — OFFICE VISIT (OUTPATIENT)
Dept: FAMILY MEDICINE | Facility: CLINIC | Age: 68
End: 2023-05-08
Payer: MEDICARE

## 2023-05-08 VITALS
HEIGHT: 71 IN | SYSTOLIC BLOOD PRESSURE: 122 MMHG | OXYGEN SATURATION: 96 % | WEIGHT: 216.25 LBS | HEART RATE: 61 BPM | DIASTOLIC BLOOD PRESSURE: 60 MMHG | BODY MASS INDEX: 30.27 KG/M2 | TEMPERATURE: 99 F

## 2023-05-08 DIAGNOSIS — H60.502 ACUTE OTITIS EXTERNA OF LEFT EAR, UNSPECIFIED TYPE: Primary | ICD-10-CM

## 2023-05-08 PROCEDURE — 1125F PR PAIN SEVERITY QUANTIFIED, PAIN PRESENT: ICD-10-PCS | Mod: HCNC,CPTII,S$GLB, | Performed by: INTERNAL MEDICINE

## 2023-05-08 PROCEDURE — 4010F PR ACE/ARB THEARPY RXD/TAKEN: ICD-10-PCS | Mod: HCNC,CPTII,S$GLB, | Performed by: INTERNAL MEDICINE

## 2023-05-08 PROCEDURE — 1160F RVW MEDS BY RX/DR IN RCRD: CPT | Mod: HCNC,CPTII,S$GLB, | Performed by: INTERNAL MEDICINE

## 2023-05-08 PROCEDURE — 99999 PR PBB SHADOW E&M-EST. PATIENT-LVL III: CPT | Mod: PBBFAC,HCNC,, | Performed by: INTERNAL MEDICINE

## 2023-05-08 PROCEDURE — 1159F PR MEDICATION LIST DOCUMENTED IN MEDICAL RECORD: ICD-10-PCS | Mod: HCNC,CPTII,S$GLB, | Performed by: INTERNAL MEDICINE

## 2023-05-08 PROCEDURE — 3008F BODY MASS INDEX DOCD: CPT | Mod: HCNC,CPTII,S$GLB, | Performed by: INTERNAL MEDICINE

## 2023-05-08 PROCEDURE — 4010F ACE/ARB THERAPY RXD/TAKEN: CPT | Mod: HCNC,CPTII,S$GLB, | Performed by: INTERNAL MEDICINE

## 2023-05-08 PROCEDURE — 3066F NEPHROPATHY DOC TX: CPT | Mod: HCNC,CPTII,S$GLB, | Performed by: INTERNAL MEDICINE

## 2023-05-08 PROCEDURE — 3074F PR MOST RECENT SYSTOLIC BLOOD PRESSURE < 130 MM HG: ICD-10-PCS | Mod: HCNC,CPTII,S$GLB, | Performed by: INTERNAL MEDICINE

## 2023-05-08 PROCEDURE — 1125F AMNT PAIN NOTED PAIN PRSNT: CPT | Mod: HCNC,CPTII,S$GLB, | Performed by: INTERNAL MEDICINE

## 2023-05-08 PROCEDURE — 99999 PR PBB SHADOW E&M-EST. PATIENT-LVL III: ICD-10-PCS | Mod: PBBFAC,HCNC,, | Performed by: INTERNAL MEDICINE

## 2023-05-08 PROCEDURE — 3046F PR MOST RECENT HEMOGLOBIN A1C LEVEL > 9.0%: ICD-10-PCS | Mod: HCNC,CPTII,S$GLB, | Performed by: INTERNAL MEDICINE

## 2023-05-08 PROCEDURE — 99213 OFFICE O/P EST LOW 20 MIN: CPT | Mod: HCNC,S$GLB,, | Performed by: INTERNAL MEDICINE

## 2023-05-08 PROCEDURE — 3066F PR DOCUMENTATION OF TREATMENT FOR NEPHROPATHY: ICD-10-PCS | Mod: HCNC,CPTII,S$GLB, | Performed by: INTERNAL MEDICINE

## 2023-05-08 PROCEDURE — 3046F HEMOGLOBIN A1C LEVEL >9.0%: CPT | Mod: HCNC,CPTII,S$GLB, | Performed by: INTERNAL MEDICINE

## 2023-05-08 PROCEDURE — 3061F NEG MICROALBUMINURIA REV: CPT | Mod: HCNC,CPTII,S$GLB, | Performed by: INTERNAL MEDICINE

## 2023-05-08 PROCEDURE — 3074F SYST BP LT 130 MM HG: CPT | Mod: HCNC,CPTII,S$GLB, | Performed by: INTERNAL MEDICINE

## 2023-05-08 PROCEDURE — 3288F FALL RISK ASSESSMENT DOCD: CPT | Mod: HCNC,CPTII,S$GLB, | Performed by: INTERNAL MEDICINE

## 2023-05-08 PROCEDURE — 1160F PR REVIEW ALL MEDS BY PRESCRIBER/CLIN PHARMACIST DOCUMENTED: ICD-10-PCS | Mod: HCNC,CPTII,S$GLB, | Performed by: INTERNAL MEDICINE

## 2023-05-08 PROCEDURE — 3078F PR MOST RECENT DIASTOLIC BLOOD PRESSURE < 80 MM HG: ICD-10-PCS | Mod: HCNC,CPTII,S$GLB, | Performed by: INTERNAL MEDICINE

## 2023-05-08 PROCEDURE — 1101F PR PT FALLS ASSESS DOC 0-1 FALLS W/OUT INJ PAST YR: ICD-10-PCS | Mod: HCNC,CPTII,S$GLB, | Performed by: INTERNAL MEDICINE

## 2023-05-08 PROCEDURE — 3008F PR BODY MASS INDEX (BMI) DOCUMENTED: ICD-10-PCS | Mod: HCNC,CPTII,S$GLB, | Performed by: INTERNAL MEDICINE

## 2023-05-08 PROCEDURE — 99213 PR OFFICE/OUTPT VISIT, EST, LEVL III, 20-29 MIN: ICD-10-PCS | Mod: HCNC,S$GLB,, | Performed by: INTERNAL MEDICINE

## 2023-05-08 PROCEDURE — 1101F PT FALLS ASSESS-DOCD LE1/YR: CPT | Mod: HCNC,CPTII,S$GLB, | Performed by: INTERNAL MEDICINE

## 2023-05-08 PROCEDURE — 3288F PR FALLS RISK ASSESSMENT DOCUMENTED: ICD-10-PCS | Mod: HCNC,CPTII,S$GLB, | Performed by: INTERNAL MEDICINE

## 2023-05-08 PROCEDURE — 3078F DIAST BP <80 MM HG: CPT | Mod: HCNC,CPTII,S$GLB, | Performed by: INTERNAL MEDICINE

## 2023-05-08 PROCEDURE — 1159F MED LIST DOCD IN RCRD: CPT | Mod: HCNC,CPTII,S$GLB, | Performed by: INTERNAL MEDICINE

## 2023-05-08 PROCEDURE — 3061F PR NEG MICROALBUMINURIA RESULT DOCUMENTED/REVIEW: ICD-10-PCS | Mod: HCNC,CPTII,S$GLB, | Performed by: INTERNAL MEDICINE

## 2023-05-08 NOTE — PROGRESS NOTES
This note was created by combination of typed  and M-Modal dictation.  Transcription errors may be present.  If there are any questions, please contact me.    Assessment and Plan:   Assessment and Plan   Acute otitis externa of left ear, unspecified type  -some improvement with flushing. Using ciprodex from last  visit. Continue that, hold on po abx. If persisting into the week, consider repeat course of doxycyclnie  Notes the ciprodex expensive, if this recurs in the future consider cortisporin otic.             There are no discontinued medications.    meds sent this encounter:         Follow Up:   Future Appointments   Date Time Provider Department Center   2023  8:30 AM LAB, [facility] [place]   2023  8:45 AM LAB, [facility] [place]   2023  9:00 AM Wil Mahoney MD [facility] CARDIO [place]          Subjective:   Subjective   Chief Complaint   Patient presents with    Cerumen Impaction       HPI  Yves is a 68 y.o. male.     Social History     Tobacco Use    Smoking status: Former     Packs/day: 0.25     Types: Cigarettes     Start date: 1972     Quit date: 1976     Years since quittin.4    Smokeless tobacco: Never   Substance Use Topics    Alcohol use: Not Currently     Comment: Pt. has a Hx. of  Alcohol use.          Social History     Social History Narrative    Not on file       Last appointment with this clinic was 3/23/2023. Last visit with me 2022   To summarize last visit and events leading up to today:    Left ear pain and clogged sensation.  Started on Thursday.    Similar symptoms to eval 2022 when he had otitis externa that he went to an  in [place] for, got po abx and ear drops - ciprodex and po doxycycline.  That time it improved with flushing.   Has leftover ear drops but not the po antibiotic.   No fever no chills  No cough  No sore throat      Patient Care Team:  Gallo Lara MD as PCP - General (Family Medicine)  Zarina Martell,  MA as Care Coordinator  Acacia Lovell as Digital Medicine Health   Elsie Vera PharmD as Hypertension Digital Medicine Clinician (Pharmacist)  Wil Mahoney MD as Hypertension Digital Medicine Responsible Provider (Cardiology)  [facility] as Hypertension Digital Medicine Contract    Patient Active Problem List    Diagnosis Date Noted    Abnormal nuclear stress test 03/07/2023    Coronary artery disease involving native coronary artery of native heart without angina pectoris 03/07/2023    Chronic anticoagulation (on xarelto) 01/10/2023    PAF (paroxysmal atrial fibrillation) 01/03/2023    Otitis externa of left ear 01/03/2023    Elevated hemoglobin 01/03/2023    Elevated troponin level not due to acute coronary syndrome 01/03/2023    Chronic right shoulder pain 12/03/2021    Weakness of right upper extremity 12/03/2021    Opioid dependence with opioid-induced disorder 03/02/2021    Chronic back pain 03/02/2021    Class 1 obesity due to excess calories with serious comorbidity and body mass index (BMI) of 33.0 to 33.9 in adult 11/23/2020    Testicular hypofunction     Male erectile dysfunction, unspecified     Essential (primary) hypertension      2/2/2021 started on Lisinopril 20 mg daily        Type 2 diabetes mellitus with microalbuminuria, without long-term current use of insulin 01/27/2020    Post-traumatic osteoarthritis of right knee 04/02/2018       PAST MEDICAL PROBLEMS, PAST SURGICAL HISTORY: please see relevant portions of the electronic medical record    ALLERGIES AND MEDICATIONS: updated and reviewed.  Review of patient's allergies indicates:   Allergen Reactions    Tamiflu [oseltamivir]      Increases BP    Metformin Hives     Diarrhea, nausea    Penicillins Rash       Medication List with Changes/Refills   Current Medications    ALCOHOL SWABS (DROPSAFE ALCOHOL PREP PADS) PADM    USE AS DIRECTED AS NEEDED    ASCORBATE CALCIUM (VITAMIN C ORAL)    Take by mouth once daily.      ASPIRIN (ECOTRIN) 81 MG EC TABLET    Take 81 mg by mouth once daily.    ATORVASTATIN (LIPITOR) 80 MG TABLET    Take 1 tablet (80 mg total) by mouth every evening.    DICLOFENAC (VOLTAREN) 50 MG EC TABLET    TAKE 1 TABLET BY MOUTH TWICE A DAY    DULAGLUTIDE (TRULICITY) 0.75 MG/0.5 ML PEN INJECTOR    Inject 0.75 mg into the skin every 7 days.    EMPAGLIFLOZIN (JARDIANCE) 25 MG TABLET    Take 1 tablet (25 mg total) by mouth once daily.    FISH OIL-OMEGA-3 FATTY ACIDS 300-1,000 MG CAPSULE    Take 2 g by mouth once daily.    GABAPENTIN (NEURONTIN) 300 MG CAPSULE    Take 1 capsule (300 mg total) by mouth 3 (three) times daily.    GLIMEPIRIDE (AMARYL) 4 MG TABLET    Take 1 tablet (4 mg total) by mouth 2 (two) times a day.    HYDROCODONE-ACETAMINOPHEN (NORCO)  MG PER TABLET    Take 1 tablet by mouth 3 (three) times daily as needed (pain).    HYDROCODONE-ACETAMINOPHEN (NORCO)  MG PER TABLET    Take 1 tablet by mouth 3 (three) times daily as needed (pain).    HYDROCORTISONE 2.5 % CREAM    Apply topically 2 (two) times daily.    LORATADINE (CLARITIN) 10 MG TABLET    TAKE 1 TABLET BY MOUTH EVERY DAY    LOSARTAN (COZAAR) 25 MG TABLET    Take 1 tablet (25 mg total) by mouth once daily.    METHOCARBAMOL (ROBAXIN) 500 MG TAB    Take 2 tablets (1,000 mg total) by mouth every 6 (six) hours as needed (muscle spasms).    METOPROLOL SUCCINATE (TOPROL-XL) 25 MG 24 HR TABLET    Take 1 tablet (25 mg total) by mouth once daily.    MULTIVITAMIN WITH MINERALS TABLET    Take 1 tablet by mouth once daily.    NEOMYCIN-POLYMYXIN-HYDROCORTISONE (CORTISPORIN) 3.5-10,000-1 MG/ML-UNIT/ML-% OTIC SUSPENSION    PLACE 3 DROPS INTO THE LEFT EAR 4 TIMES DAILY.    NITROGLYCERIN (NITROSTAT) 0.4 MG SL TABLET    Place 1 tablet (0.4 mg total) under the tongue every 5 (five) minutes as needed for Chest pain.    RIVAROXABAN (XARELTO) 20 MG TAB    Take 1 tablet (20 mg total) by mouth daily with dinner or evening meal.    TESTOSTERONE CYPIONATE  "(DEPOTESTOTERONE CYPIONATE) 200 MG/ML INJECTION    Inject 1 mL (200 mg total) into the muscle every 14 (fourteen) days.    TRUEPLUS LANCETS 33 GAUGE MISC             Objective:   Objective   Physical Exam   Vitals:    05/08/23 1544   BP: 122/60   Pulse: 61   Temp: 98.7 °F (37.1 °C)   TempSrc: Oral   SpO2: 96%   Weight: 98.1 kg (216 lb 4.3 oz)   Height: 5' 11" (1.803 m)    Body mass index is 30.16 kg/m².  Weight: 98.1 kg (216 lb 4.3 oz)   Height: 5' 11" (180.3 cm)     Physical Exam  Constitutional:       Appearance: Normal appearance.   HENT:      Right Ear: Tympanic membrane and ear canal normal.      Ears:      Comments: Left TM impacted cerumen obscuring.  Ear canal without swelling. No friable mucosa  Lymphadenopathy:      Cervical: No cervical adenopathy.   Neurological:      Mental Status: He is alert.     Nursing staff flushed the ear with partial removal to reveal continued debris in the ear without purulence. Partially visualized TM intact no erythema. Pt noted some symptomatic improvement.        "

## 2023-05-16 ENCOUNTER — TELEPHONE (OUTPATIENT)
Dept: FAMILY MEDICINE | Facility: CLINIC | Age: 68
End: 2023-05-16
Payer: MEDICARE

## 2023-05-16 NOTE — TELEPHONE ENCOUNTER
----- Message from Mariya Blanca MA sent at 5/16/2023 11:00 AM CDT -----  Type: Patient Call Back    Who called: Self    What is the request in detail:was told the provider needs to call the pharmacy to authorize the refill ..     HYDROcodone-acetaminophen (NORCO)  mg per tablet    Can the clinic reply by [facility]?No    Would the patient rather a call back or a response via [facility]? yes    Best call back number: [phone] (home)

## 2023-05-17 DIAGNOSIS — M17.0 PRIMARY OSTEOARTHRITIS OF BOTH KNEES: ICD-10-CM

## 2023-05-17 RX ORDER — HYDROCODONE BITARTRATE AND ACETAMINOPHEN 10; 325 MG/1; MG/1
1 TABLET ORAL 3 TIMES DAILY PRN
Qty: 72 TABLET | Refills: 0 | Status: SHIPPED | OUTPATIENT
Start: 2023-05-17 | End: 2023-06-28

## 2023-05-17 NOTE — TELEPHONE ENCOUNTER
----- Message from Brian Harish sent at 5/17/2023  8:33 AM CDT -----  Regarding: Self [phone]  Type: Patient Call Back    Who called: Self     What is the request in detail: called in regards to checking on the refill for the medication HYDROcodone-acetaminophen (NORCO)  mg per tablet that was sent yesterday. Would like a call back for the status.     Can the clinic reply by [facility]? No     Would the patient rather a call back or a response via [facility]? Call back     Best call back number: [phone]     Additional Information:    Thank you.

## 2023-05-17 NOTE — TELEPHONE ENCOUNTER
No care due was identified.  [facility] Embedded Care Due Messages. Reference number: [id number].   5/17/2023 8:39:06 AM CDT

## 2023-05-18 ENCOUNTER — PES CALL (OUTPATIENT)
Dept: ADMINISTRATIVE | Facility: CLINIC | Age: 68
End: 2023-05-18
Payer: MEDICARE

## 2023-06-08 DIAGNOSIS — M17.0 PRIMARY OSTEOARTHRITIS OF BOTH KNEES: ICD-10-CM

## 2023-06-08 RX ORDER — HYDROCODONE BITARTRATE AND ACETAMINOPHEN 10; 325 MG/1; MG/1
1 TABLET ORAL 3 TIMES DAILY PRN
Qty: 72 TABLET | Refills: 0 | Status: CANCELLED | OUTPATIENT
Start: 2023-06-08

## 2023-06-08 NOTE — TELEPHONE ENCOUNTER
----- Message from Ion Madison sent at 6/8/2023 12:41 PM CDT -----  Regarding: Yves  Type: RX Refill Request     Who Called:Yves      Have you contacted your pharmacy:     Refill or New Rx: Refill      RX Name and Strength: HYDROcodone-acetaminophen (NORCO)  mg per tablet    Preferred Pharmacy with phone number:.  [facility]/pharmacy #[id number] - [place] - [address] [address]  [address]  [place] [address]  Phone: [phone] Fax: [phone]    Local or Mail Order: Local     Ordering Provider: Dr. Galol Lara     Would the patient rather a call back or a response via [facility]? Callback      Best Call Back Number: [phone]      Additional Information: Pt stated that he is out of his medication. Please refill as soon as possible

## 2023-06-08 NOTE — TELEPHONE ENCOUNTER
Dr. Lara is currently out of the office  Medication reviewed and Patient usually gets prescription filled once a month   This is too soon.   Patient will have to request again next week

## 2023-06-08 NOTE — TELEPHONE ENCOUNTER
No care due was identified.  [facility] Embedded Care Due Messages. Reference number: [id number].   6/08/2023 12:59:30 PM CDT

## 2023-06-12 DIAGNOSIS — M17.0 PRIMARY OSTEOARTHRITIS OF BOTH KNEES: ICD-10-CM

## 2023-06-12 RX ORDER — HYDROCODONE BITARTRATE AND ACETAMINOPHEN 10; 325 MG/1; MG/1
1 TABLET ORAL 3 TIMES DAILY PRN
Qty: 48 TABLET | Refills: 0 | Status: SHIPPED | OUTPATIENT
Start: 2023-06-12 | End: 2023-06-28

## 2023-06-12 NOTE — TELEPHONE ENCOUNTER
----- Message from Elsie Miller sent at 6/12/2023 12:05 PM CDT -----  Regarding: Refill request  .Type: RX Refill Request    Who Called:self     Have you contacted your pharmacy:yes     Refill or New Rx: Refill    RX Name and Strength:HYDROcodone-acetaminophen (NORCO)  mg per tablet    Preferred Pharmacy with phone number: .  [facility]/pharmacy #[id number] - [place] - [address]  [address] [place] [address]  Phone: [phone] Fax: [phone]    Local or Mail Order: local     Ordering Provider:ISRA Ruano     Would the patient rather a call back or a response via [facility]? Call     Best Call Back Number: [phone]      Additional Information:

## 2023-06-12 NOTE — TELEPHONE ENCOUNTER
No care due was identified.  [facility] Embedded Care Due Messages. Reference number: [id number].   6/12/2023 12:59:07 PM CDT   no

## 2023-06-13 ENCOUNTER — TELEPHONE (OUTPATIENT)
Dept: FAMILY MEDICINE | Facility: CLINIC | Age: 68
End: 2023-06-13
Payer: MEDICARE

## 2023-06-13 ENCOUNTER — LAB VISIT (OUTPATIENT)
Dept: LAB | Facility: HOSPITAL | Age: 68
End: 2023-06-13
Attending: INTERNAL MEDICINE
Payer: MEDICARE

## 2023-06-13 DIAGNOSIS — E78.2 MIXED HYPERLIPIDEMIA: ICD-10-CM

## 2023-06-13 LAB
ALBUMIN SERPL BCP-MCNC: 4.1 G/DL (ref 3.5–5.2)
ALP SERPL-CCNC: 101 U/L (ref 55–135)
ALT SERPL W/O P-5'-P-CCNC: 26 U/L (ref 10–44)
AST SERPL-CCNC: 20 U/L (ref 10–40)
BILIRUB DIRECT SERPL-MCNC: 0.1 MG/DL (ref 0.1–0.3)
BILIRUB SERPL-MCNC: 0.3 MG/DL (ref 0.1–1)
PROT SERPL-MCNC: 6.7 G/DL (ref 6–8.4)

## 2023-06-13 PROCEDURE — 80076 HEPATIC FUNCTION PANEL: CPT | Mod: HCNC | Performed by: INTERNAL MEDICINE

## 2023-06-13 PROCEDURE — 36415 COLL VENOUS BLD VENIPUNCTURE: CPT | Mod: HCNC,PO | Performed by: INTERNAL MEDICINE

## 2023-06-13 NOTE — TELEPHONE ENCOUNTER
----- Message from Amy Kay sent at 6/13/2023  1:31 PM CDT -----  Regarding: pt called  Can the clinic reply in [facility]:           Please refill the medication(s) listed below. Please call the patient when the prescription(s) is ready for  at this phone number           [phone]                 PLEASE ADVISE PT SAID THAT HE WAS SUPPOSE TO RECEIVE 72 PILLS INSTEAD THEY GAVE HIM 48. PLEASE GIVE HIM A CALL            Medication #1 HYDROcodone-acetaminophen (NORCO)  mg per tablet                  Preferred Pharmacy:  [facility]/pharmacy #[id number] - [place] - [address]  [address] [place] [address]  Phone: [phone] Fax: [phone]

## 2023-06-13 NOTE — TELEPHONE ENCOUNTER
Patient sts that he only received 48 pills of the hydrocodone. The amount of pills he receive would not last him a month. Patient is taking 3 pills a day. Please advise!

## 2023-06-13 NOTE — TELEPHONE ENCOUNTER
-let him know that the 48 tablets should last him for 16 days since he's taking it 3 times daily.    He has an appointment with his PCP in 15 days (on 6/28/23) - so he has enough to get him to his appt with his PCP for more refills.

## 2023-06-13 NOTE — TELEPHONE ENCOUNTER
----- Message from Oscar Luna sent at 6/13/2023 12:54 PM CDT -----  Regarding: Patient Call Back  .Type: Patient Call Back    Who called: Self     What is the request in detail: Calling to find out why the provider wrote his prescription for 48 tablets instead of 72 as he usually does. Please advise.    Can the clinic reply by [facility]? No    Would the patient rather a call back or a response via [facility]? Call back    Best call back number: [phone]     Additional Information:

## 2023-06-14 ENCOUNTER — TELEPHONE (OUTPATIENT)
Dept: FAMILY MEDICINE | Facility: CLINIC | Age: 68
End: 2023-06-14
Payer: MEDICARE

## 2023-06-14 NOTE — TELEPHONE ENCOUNTER
----- Message from Mariya Blanca MA sent at 6/14/2023 11:19 AM CDT -----  Type: Patient Call Back    Who called: Self    What is the request in detail:pt. States his ear feels like it needs to be flushed again and it'd hurting him.. he's asking for someone to call him today please ..     Can the clinic reply by [facility]?No    Would the patient rather a call back or a response via [facility]? yes    Best call back number: [phone] (home)

## 2023-06-14 NOTE — TELEPHONE ENCOUNTER
----- Message from Sheila Kearney sent at 6/14/2023 12:04 PM CDT -----  .Type:  Patient Returning Call    Who Called:  Self     Who Left Message for Patient: Beverley Phelps    Does the patient know what this is regarding?: No     Would the patient rather a call back or a response via [facility]?  Call Back     Best Call Back Number: [phone] (home)       Additional Information:

## 2023-06-15 ENCOUNTER — OFFICE VISIT (OUTPATIENT)
Dept: FAMILY MEDICINE | Facility: CLINIC | Age: 68
End: 2023-06-15
Payer: MEDICARE

## 2023-06-15 VITALS
SYSTOLIC BLOOD PRESSURE: 124 MMHG | OXYGEN SATURATION: 97 % | TEMPERATURE: 98 F | DIASTOLIC BLOOD PRESSURE: 64 MMHG | BODY MASS INDEX: 30.13 KG/M2 | HEIGHT: 71 IN | HEART RATE: 67 BPM | WEIGHT: 215.19 LBS

## 2023-06-15 DIAGNOSIS — R80.9 TYPE 2 DIABETES MELLITUS WITH MICROALBUMINURIA, WITHOUT LONG-TERM CURRENT USE OF INSULIN: Primary | ICD-10-CM

## 2023-06-15 DIAGNOSIS — E11.29 TYPE 2 DIABETES MELLITUS WITH MICROALBUMINURIA, WITHOUT LONG-TERM CURRENT USE OF INSULIN: Primary | ICD-10-CM

## 2023-06-15 DIAGNOSIS — I48.0 PAF (PAROXYSMAL ATRIAL FIBRILLATION): ICD-10-CM

## 2023-06-15 DIAGNOSIS — Z79.01 CHRONIC ANTICOAGULATION: ICD-10-CM

## 2023-06-15 DIAGNOSIS — H60.92 OTITIS EXTERNA OF LEFT EAR, UNSPECIFIED CHRONICITY, UNSPECIFIED TYPE: ICD-10-CM

## 2023-06-15 PROCEDURE — 3288F PR FALLS RISK ASSESSMENT DOCUMENTED: ICD-10-PCS | Mod: HCNC,CPTII,S$GLB, | Performed by: FAMILY MEDICINE

## 2023-06-15 PROCEDURE — 3061F PR NEG MICROALBUMINURIA RESULT DOCUMENTED/REVIEW: ICD-10-PCS | Mod: HCNC,CPTII,S$GLB, | Performed by: FAMILY MEDICINE

## 2023-06-15 PROCEDURE — 3066F PR DOCUMENTATION OF TREATMENT FOR NEPHROPATHY: ICD-10-PCS | Mod: HCNC,CPTII,S$GLB, | Performed by: FAMILY MEDICINE

## 2023-06-15 PROCEDURE — 1101F PT FALLS ASSESS-DOCD LE1/YR: CPT | Mod: HCNC,CPTII,S$GLB, | Performed by: FAMILY MEDICINE

## 2023-06-15 PROCEDURE — 3074F PR MOST RECENT SYSTOLIC BLOOD PRESSURE < 130 MM HG: ICD-10-PCS | Mod: HCNC,CPTII,S$GLB, | Performed by: FAMILY MEDICINE

## 2023-06-15 PROCEDURE — 1125F AMNT PAIN NOTED PAIN PRSNT: CPT | Mod: HCNC,CPTII,S$GLB, | Performed by: FAMILY MEDICINE

## 2023-06-15 PROCEDURE — 1101F PR PT FALLS ASSESS DOC 0-1 FALLS W/OUT INJ PAST YR: ICD-10-PCS | Mod: HCNC,CPTII,S$GLB, | Performed by: FAMILY MEDICINE

## 2023-06-15 PROCEDURE — 99214 OFFICE O/P EST MOD 30 MIN: CPT | Mod: HCNC,S$GLB,, | Performed by: FAMILY MEDICINE

## 2023-06-15 PROCEDURE — 99999 PR PBB SHADOW E&M-EST. PATIENT-LVL V: CPT | Mod: PBBFAC,HCNC,, | Performed by: FAMILY MEDICINE

## 2023-06-15 PROCEDURE — 3066F NEPHROPATHY DOC TX: CPT | Mod: HCNC,CPTII,S$GLB, | Performed by: FAMILY MEDICINE

## 2023-06-15 PROCEDURE — 99999 PR PBB SHADOW E&M-EST. PATIENT-LVL V: ICD-10-PCS | Mod: PBBFAC,HCNC,, | Performed by: FAMILY MEDICINE

## 2023-06-15 PROCEDURE — 3008F PR BODY MASS INDEX (BMI) DOCUMENTED: ICD-10-PCS | Mod: HCNC,CPTII,S$GLB, | Performed by: FAMILY MEDICINE

## 2023-06-15 PROCEDURE — 99214 PR OFFICE/OUTPT VISIT, EST, LEVL IV, 30-39 MIN: ICD-10-PCS | Mod: HCNC,S$GLB,, | Performed by: FAMILY MEDICINE

## 2023-06-15 PROCEDURE — 1160F PR REVIEW ALL MEDS BY PRESCRIBER/CLIN PHARMACIST DOCUMENTED: ICD-10-PCS | Mod: HCNC,CPTII,S$GLB, | Performed by: FAMILY MEDICINE

## 2023-06-15 PROCEDURE — 3008F BODY MASS INDEX DOCD: CPT | Mod: HCNC,CPTII,S$GLB, | Performed by: FAMILY MEDICINE

## 2023-06-15 PROCEDURE — 4010F PR ACE/ARB THEARPY RXD/TAKEN: ICD-10-PCS | Mod: HCNC,CPTII,S$GLB, | Performed by: FAMILY MEDICINE

## 2023-06-15 PROCEDURE — 3288F FALL RISK ASSESSMENT DOCD: CPT | Mod: HCNC,CPTII,S$GLB, | Performed by: FAMILY MEDICINE

## 2023-06-15 PROCEDURE — 3074F SYST BP LT 130 MM HG: CPT | Mod: HCNC,CPTII,S$GLB, | Performed by: FAMILY MEDICINE

## 2023-06-15 PROCEDURE — 3046F HEMOGLOBIN A1C LEVEL >9.0%: CPT | Mod: HCNC,CPTII,S$GLB, | Performed by: FAMILY MEDICINE

## 2023-06-15 PROCEDURE — 3046F PR MOST RECENT HEMOGLOBIN A1C LEVEL > 9.0%: ICD-10-PCS | Mod: HCNC,CPTII,S$GLB, | Performed by: FAMILY MEDICINE

## 2023-06-15 PROCEDURE — 1160F RVW MEDS BY RX/DR IN RCRD: CPT | Mod: HCNC,CPTII,S$GLB, | Performed by: FAMILY MEDICINE

## 2023-06-15 PROCEDURE — 1125F PR PAIN SEVERITY QUANTIFIED, PAIN PRESENT: ICD-10-PCS | Mod: HCNC,CPTII,S$GLB, | Performed by: FAMILY MEDICINE

## 2023-06-15 PROCEDURE — 1159F MED LIST DOCD IN RCRD: CPT | Mod: HCNC,CPTII,S$GLB, | Performed by: FAMILY MEDICINE

## 2023-06-15 PROCEDURE — 4010F ACE/ARB THERAPY RXD/TAKEN: CPT | Mod: HCNC,CPTII,S$GLB, | Performed by: FAMILY MEDICINE

## 2023-06-15 PROCEDURE — 3078F PR MOST RECENT DIASTOLIC BLOOD PRESSURE < 80 MM HG: ICD-10-PCS | Mod: HCNC,CPTII,S$GLB, | Performed by: FAMILY MEDICINE

## 2023-06-15 PROCEDURE — 3061F NEG MICROALBUMINURIA REV: CPT | Mod: HCNC,CPTII,S$GLB, | Performed by: FAMILY MEDICINE

## 2023-06-15 PROCEDURE — 3078F DIAST BP <80 MM HG: CPT | Mod: HCNC,CPTII,S$GLB, | Performed by: FAMILY MEDICINE

## 2023-06-15 PROCEDURE — 1159F PR MEDICATION LIST DOCUMENTED IN MEDICAL RECORD: ICD-10-PCS | Mod: HCNC,CPTII,S$GLB, | Performed by: FAMILY MEDICINE

## 2023-06-15 RX ORDER — NEOMYCIN SULFATE, POLYMYXIN B SULFATE AND HYDROCORTISONE 10; 3.5; 1 MG/ML; MG/ML; [USP'U]/ML
3 SUSPENSION/ DROPS AURICULAR (OTIC) 4 TIMES DAILY
Qty: 10 ML | Refills: 0 | Status: SHIPPED | OUTPATIENT
Start: 2023-06-15 | End: 2023-11-01 | Stop reason: SDUPTHER

## 2023-06-15 NOTE — PROGRESS NOTES
"Routine Office Visit    King Cool Jr.  1955  [id number]      Subjective     King is a 68 y.o. male who presents today for:    Left ear pain - Patient has left ear pain and discomfort. Started a few days ago. He reports some drainage. He tried to clean it out at home but symptoms did not improve. He has had ear infection in the past. He feels his ears needs to be washed out. No fever. No sick contacts.   Diabetes - uncontrolled. Patient has not been able to afford trulicity. He is in the donut hole with xarelto and trulicity . Patient is on fixed income and cannot afford medication. He reports buying xarelto every 4 days for $20. He is scheduled to see his pcp in a few weeks.     Objective     Review of Systems   Constitutional:  Negative for chills and fever.   HENT:  Negative for congestion.    Eyes:  Negative for blurred vision.   Respiratory:  Negative for cough.    Cardiovascular:  Negative for chest pain.   Gastrointestinal:  Negative for abdominal pain, constipation, diarrhea, heartburn, nausea and vomiting.   Genitourinary:  Negative for dysuria.   Musculoskeletal:  Negative for myalgias.   Skin:  Negative for itching and rash.   Neurological:  Negative for dizziness and headaches.   Psychiatric/Behavioral:  Negative for depression.      /64   Pulse 67   Temp 98.4 °F (36.9 °C) (Oral)   Ht 5' 11" (1.803 m)   Wt 97.6 kg (215 lb 2.7 oz)   SpO2 97%   BMI 30.01 kg/m²   Physical Exam  Constitutional:       Appearance: He is well-developed.   HENT:      Head: Normocephalic and atraumatic.   Eyes:      Conjunctiva/sclera: Conjunctivae normal.      Pupils: Pupils are equal, round, and reactive to light.   Neck:      Thyroid: No thyromegaly.      Vascular: No JVD.   Cardiovascular:      Rate and Rhythm: Normal rate and regular rhythm.      Heart sounds: Normal heart sounds.   Pulmonary:      Effort: Pulmonary effort is normal.      Breath sounds: Normal breath sounds. No wheezing.   Abdominal:      " General: Bowel sounds are normal. There is no distension.      Palpations: Abdomen is soft.      Tenderness: There is no abdominal tenderness. There is no guarding.   Musculoskeletal:         General: Normal range of motion.      Cervical back: Normal range of motion and neck supple.   Lymphadenopathy:      Cervical: No cervical adenopathy.   Skin:     General: Skin is warm and dry.   Neurological:      Mental Status: He is alert and oriented to person, place, and time.   Psychiatric:         Behavior: Behavior normal.       Protective Sensation (w/ 10 gram monofilament):  Right: Decreased  Left: Decreased    Visual Inspection:  Normal -  Bilateral    Pedal Pulses:   Right: Present  Left: Present    Posterior Tibialis Pulses:   Right:Present  Left: Present      Assessment     Health Maintenance         Date Due Completion Date    Abdominal Aortic Aneurysm Screening Never done ---    Hemoglobin A1c 06/23/2023 3/23/2023    Diabetes Urine Screening 03/22/2024 3/22/2023    Colorectal Cancer Screening 03/23/2024 3/23/2023    Eye Exam 03/23/2024 3/23/2023    Override on 3/28/2017: Done    Lipid Panel 06/13/2024 6/13/2023    Foot Exam 06/15/2024 6/15/2023    Override on 9/10/2018: Done    Override on 6/14/2017: Done    Override on 8/31/2016: Done    Override on 8/6/2015: Done    TETANUS VACCINE 03/20/2028 3/20/2018 (Declined)    Override on 3/20/2018: Declined              Problem List Items Addressed This Visit          ENT    Otitis externa of left ear    Relevant Medications    neomycin-polymyxin-hydrocortisone (CORTISPORIN) 3.5-10,000-1 mg/mL-unit/mL-% otic suspension  Otitis externa          Cardiac/Vascular    PAF (paroxysmal atrial fibrillation)    Relevant Orders    Ambulatory referral/consult to Pharmacy Assistance  The current medical regimen is effective;  continue present plan and medications.          Hematology    Chronic anticoagulation (on xarelto)    Relevant Orders    Ambulatory referral/consult to  Pharmacy Assistance       Endocrine    Type 2 diabetes mellitus with microalbuminuria, without long-term current use of insulin - Primary (Chronic)    Relevant Orders    Ambulatory referral/consult to Pharmacy Assistance  Uncontrolled   Not on medication   Will send for pharmacy assistance                Follow up if symptoms worsen or fail to improve.

## 2023-06-16 ENCOUNTER — PATIENT MESSAGE (OUTPATIENT)
Dept: PHARMACY | Facility: CLINIC | Age: 68
End: 2023-06-16
Payer: MEDICARE

## 2023-06-16 ENCOUNTER — TELEPHONE (OUTPATIENT)
Dept: PHARMACY | Facility: CLINIC | Age: 68
End: 2023-06-16
Payer: MEDICARE

## 2023-06-16 NOTE — TELEPHONE ENCOUNTER
I have reached out to King Cool Jr. to inform him of the [employer], [employer], and [employer] application process for Jardiance, Trulicity, and Xarelto and whats required to apply.  King Cool Jr. did not answer. I left a voicemail and mailed a letter introducing him to the pharmacy patient assistance program. I will follow up in 5 business days.

## 2023-06-26 DIAGNOSIS — I48.0 PAF (PAROXYSMAL ATRIAL FIBRILLATION): ICD-10-CM

## 2023-06-26 DIAGNOSIS — I10 ESSENTIAL (PRIMARY) HYPERTENSION: Primary | ICD-10-CM

## 2023-06-28 ENCOUNTER — LAB VISIT (OUTPATIENT)
Dept: LAB | Facility: HOSPITAL | Age: 68
End: 2023-06-28
Attending: FAMILY MEDICINE
Payer: MEDICARE

## 2023-06-28 ENCOUNTER — OFFICE VISIT (OUTPATIENT)
Dept: FAMILY MEDICINE | Facility: CLINIC | Age: 68
End: 2023-06-28
Payer: MEDICARE

## 2023-06-28 VITALS
BODY MASS INDEX: 30.05 KG/M2 | DIASTOLIC BLOOD PRESSURE: 76 MMHG | WEIGHT: 214.63 LBS | HEART RATE: 71 BPM | TEMPERATURE: 99 F | SYSTOLIC BLOOD PRESSURE: 120 MMHG | OXYGEN SATURATION: 97 % | HEIGHT: 71 IN

## 2023-06-28 DIAGNOSIS — I10 ESSENTIAL (PRIMARY) HYPERTENSION: Chronic | ICD-10-CM

## 2023-06-28 DIAGNOSIS — R80.9 TYPE 2 DIABETES MELLITUS WITH MICROALBUMINURIA, WITHOUT LONG-TERM CURRENT USE OF INSULIN: Chronic | ICD-10-CM

## 2023-06-28 DIAGNOSIS — E11.29 TYPE 2 DIABETES MELLITUS WITH MICROALBUMINURIA, WITHOUT LONG-TERM CURRENT USE OF INSULIN: Chronic | ICD-10-CM

## 2023-06-28 DIAGNOSIS — M17.0 PRIMARY OSTEOARTHRITIS OF BOTH KNEES: Primary | ICD-10-CM

## 2023-06-28 LAB
ALBUMIN SERPL BCP-MCNC: 4.2 G/DL (ref 3.5–5.2)
ALP SERPL-CCNC: 111 U/L (ref 55–135)
ALT SERPL W/O P-5'-P-CCNC: 31 U/L (ref 10–44)
ANION GAP SERPL CALC-SCNC: 11 MMOL/L (ref 8–16)
AST SERPL-CCNC: 22 U/L (ref 10–40)
BILIRUB SERPL-MCNC: 0.5 MG/DL (ref 0.1–1)
BUN SERPL-MCNC: 21 MG/DL (ref 8–23)
CALCIUM SERPL-MCNC: 9.6 MG/DL (ref 8.7–10.5)
CHLORIDE SERPL-SCNC: 104 MMOL/L (ref 95–110)
CO2 SERPL-SCNC: 24 MMOL/L (ref 23–29)
CREAT SERPL-MCNC: 1.3 MG/DL (ref 0.5–1.4)
EST. GFR  (NO RACE VARIABLE): >60 ML/MIN/1.73 M^2
ESTIMATED AVG GLUCOSE: 160 MG/DL (ref 68–131)
GLUCOSE SERPL-MCNC: 233 MG/DL (ref 70–110)
HBA1C MFR BLD: 7.2 % (ref 4–5.6)
POTASSIUM SERPL-SCNC: 4.8 MMOL/L (ref 3.5–5.1)
PROT SERPL-MCNC: 7 G/DL (ref 6–8.4)
SODIUM SERPL-SCNC: 139 MMOL/L (ref 136–145)

## 2023-06-28 PROCEDURE — 83036 HEMOGLOBIN GLYCOSYLATED A1C: CPT | Mod: HCNC | Performed by: FAMILY MEDICINE

## 2023-06-28 PROCEDURE — 99215 OFFICE O/P EST HI 40 MIN: CPT | Mod: 25,HCNC,S$GLB, | Performed by: FAMILY MEDICINE

## 2023-06-28 PROCEDURE — 99215 PR OFFICE/OUTPT VISIT, EST, LEVL V, 40-54 MIN: ICD-10-PCS | Mod: 25,HCNC,S$GLB, | Performed by: FAMILY MEDICINE

## 2023-06-28 PROCEDURE — 3061F NEG MICROALBUMINURIA REV: CPT | Mod: HCNC,CPTII,S$GLB, | Performed by: FAMILY MEDICINE

## 2023-06-28 PROCEDURE — 3074F SYST BP LT 130 MM HG: CPT | Mod: HCNC,CPTII,S$GLB, | Performed by: FAMILY MEDICINE

## 2023-06-28 PROCEDURE — 1101F PT FALLS ASSESS-DOCD LE1/YR: CPT | Mod: HCNC,CPTII,S$GLB, | Performed by: FAMILY MEDICINE

## 2023-06-28 PROCEDURE — 20610 LARGE JOINT ASPIRATION/INJECTION: R KNEE: ICD-10-PCS | Mod: HCNC,RT,S$GLB, | Performed by: FAMILY MEDICINE

## 2023-06-28 PROCEDURE — 3046F PR MOST RECENT HEMOGLOBIN A1C LEVEL > 9.0%: ICD-10-PCS | Mod: HCNC,CPTII,S$GLB, | Performed by: FAMILY MEDICINE

## 2023-06-28 PROCEDURE — 3046F HEMOGLOBIN A1C LEVEL >9.0%: CPT | Mod: HCNC,CPTII,S$GLB, | Performed by: FAMILY MEDICINE

## 2023-06-28 PROCEDURE — 3078F DIAST BP <80 MM HG: CPT | Mod: HCNC,CPTII,S$GLB, | Performed by: FAMILY MEDICINE

## 2023-06-28 PROCEDURE — 99999 PR PBB SHADOW E&M-EST. PATIENT-LVL III: ICD-10-PCS | Mod: PBBFAC,HCNC,, | Performed by: FAMILY MEDICINE

## 2023-06-28 PROCEDURE — 1101F PR PT FALLS ASSESS DOC 0-1 FALLS W/OUT INJ PAST YR: ICD-10-PCS | Mod: HCNC,CPTII,S$GLB, | Performed by: FAMILY MEDICINE

## 2023-06-28 PROCEDURE — 3078F PR MOST RECENT DIASTOLIC BLOOD PRESSURE < 80 MM HG: ICD-10-PCS | Mod: HCNC,CPTII,S$GLB, | Performed by: FAMILY MEDICINE

## 2023-06-28 PROCEDURE — 3066F NEPHROPATHY DOC TX: CPT | Mod: HCNC,CPTII,S$GLB, | Performed by: FAMILY MEDICINE

## 2023-06-28 PROCEDURE — 99999 PR PBB SHADOW E&M-EST. PATIENT-LVL III: CPT | Mod: PBBFAC,HCNC,, | Performed by: FAMILY MEDICINE

## 2023-06-28 PROCEDURE — 3008F PR BODY MASS INDEX (BMI) DOCUMENTED: ICD-10-PCS | Mod: HCNC,CPTII,S$GLB, | Performed by: FAMILY MEDICINE

## 2023-06-28 PROCEDURE — 3061F PR NEG MICROALBUMINURIA RESULT DOCUMENTED/REVIEW: ICD-10-PCS | Mod: HCNC,CPTII,S$GLB, | Performed by: FAMILY MEDICINE

## 2023-06-28 PROCEDURE — 3066F PR DOCUMENTATION OF TREATMENT FOR NEPHROPATHY: ICD-10-PCS | Mod: HCNC,CPTII,S$GLB, | Performed by: FAMILY MEDICINE

## 2023-06-28 PROCEDURE — 3288F FALL RISK ASSESSMENT DOCD: CPT | Mod: HCNC,CPTII,S$GLB, | Performed by: FAMILY MEDICINE

## 2023-06-28 PROCEDURE — 1126F AMNT PAIN NOTED NONE PRSNT: CPT | Mod: HCNC,CPTII,S$GLB, | Performed by: FAMILY MEDICINE

## 2023-06-28 PROCEDURE — 36415 COLL VENOUS BLD VENIPUNCTURE: CPT | Mod: HCNC,PO | Performed by: FAMILY MEDICINE

## 2023-06-28 PROCEDURE — 4010F ACE/ARB THERAPY RXD/TAKEN: CPT | Mod: HCNC,CPTII,S$GLB, | Performed by: FAMILY MEDICINE

## 2023-06-28 PROCEDURE — 3074F PR MOST RECENT SYSTOLIC BLOOD PRESSURE < 130 MM HG: ICD-10-PCS | Mod: HCNC,CPTII,S$GLB, | Performed by: FAMILY MEDICINE

## 2023-06-28 PROCEDURE — 3008F BODY MASS INDEX DOCD: CPT | Mod: HCNC,CPTII,S$GLB, | Performed by: FAMILY MEDICINE

## 2023-06-28 PROCEDURE — 1126F PR PAIN SEVERITY QUANTIFIED, NO PAIN PRESENT: ICD-10-PCS | Mod: HCNC,CPTII,S$GLB, | Performed by: FAMILY MEDICINE

## 2023-06-28 PROCEDURE — 4010F PR ACE/ARB THEARPY RXD/TAKEN: ICD-10-PCS | Mod: HCNC,CPTII,S$GLB, | Performed by: FAMILY MEDICINE

## 2023-06-28 PROCEDURE — 3288F PR FALLS RISK ASSESSMENT DOCUMENTED: ICD-10-PCS | Mod: HCNC,CPTII,S$GLB, | Performed by: FAMILY MEDICINE

## 2023-06-28 PROCEDURE — 80053 COMPREHEN METABOLIC PANEL: CPT | Mod: HCNC | Performed by: FAMILY MEDICINE

## 2023-06-28 PROCEDURE — 20610 DRAIN/INJ JOINT/BURSA W/O US: CPT | Mod: HCNC,RT,S$GLB, | Performed by: FAMILY MEDICINE

## 2023-06-28 RX ORDER — TRIAMCINOLONE ACETONIDE 40 MG/ML
40 INJECTION, SUSPENSION INTRA-ARTICULAR; INTRAMUSCULAR
Status: DISCONTINUED | OUTPATIENT
Start: 2023-06-28 | End: 2023-06-28 | Stop reason: HOSPADM

## 2023-06-28 RX ORDER — HYDROCODONE BITARTRATE AND ACETAMINOPHEN 10; 325 MG/1; MG/1
1 TABLET ORAL 3 TIMES DAILY PRN
Qty: 72 TABLET | Refills: 0 | Status: SHIPPED | OUTPATIENT
Start: 2023-09-03 | End: 2024-01-02 | Stop reason: SDUPTHER

## 2023-06-28 RX ORDER — SEMAGLUTIDE 0.68 MG/ML
0.5 INJECTION, SOLUTION SUBCUTANEOUS
Qty: 4 EACH | Refills: 3 | Status: SHIPPED | OUTPATIENT
Start: 2023-06-28

## 2023-06-28 RX ORDER — HYDROCODONE BITARTRATE AND ACETAMINOPHEN 10; 325 MG/1; MG/1
1 TABLET ORAL 3 TIMES DAILY PRN
Qty: 72 TABLET | Refills: 0 | Status: SHIPPED | OUTPATIENT
Start: 2023-07-03 | End: 2023-10-03 | Stop reason: SDUPTHER

## 2023-06-28 RX ORDER — HYDROCODONE BITARTRATE AND ACETAMINOPHEN 10; 325 MG/1; MG/1
1 TABLET ORAL 3 TIMES DAILY PRN
Qty: 72 TABLET | Refills: 0 | Status: SHIPPED | OUTPATIENT
Start: 2023-08-03 | End: 2023-11-29 | Stop reason: SDUPTHER

## 2023-06-28 RX ADMIN — TRIAMCINOLONE ACETONIDE 40 MG: 40 INJECTION, SUSPENSION INTRA-ARTICULAR; INTRAMUSCULAR at 01:06

## 2023-06-28 NOTE — PROCEDURES
Large Joint Aspiration/Injection: R knee    Date/Time: 6/28/2023 1:20 PM  Performed by: Gallo Lara MD  Authorized by: Gallo Lara MD     Consent Done?:  Yes (Verbal)  Indications:  Pain  Site marked: the procedure site was marked    Timeout: prior to procedure the correct patient, procedure, and site was verified      Details:  Needle Size:  25 G  Approach:  Anterolateral  Location:  Knee  Site:  R knee  Medications:  40 mg triamcinolone acetonide 40 mg/mL  Patient tolerance:  Patient tolerated the procedure well with no immediate complications

## 2023-06-28 NOTE — PROGRESS NOTES
[facility]  Progress Note    SUBJECTIVE:     Chief Complaint   Patient presents with    Knee Pain       HPI   King Cool Jr.  is a 68 y.o. male here for c/o R knee pain. Rates pain as moderate-severe. Requesting knee injection today. Patient has no other new complaints/problems at this time.      Review of patient's allergies indicates:   Allergen Reactions    Tamiflu [oseltamivir]      Increases BP    Metformin Hives     Diarrhea, nausea    Penicillins Rash       Past Medical History:   Diagnosis Date    Coronary artery disease involving native coronary artery of native heart without angina pectoris 3/7/2023    Essential (primary) hypertension     Male erectile dysfunction, unspecified     New onset a-fib 1/3/2023    Testicular hypofunction     Type 2 diabetes mellitus without complications      Past Surgical History:   Procedure Laterality Date    HIP SURGERY      LEFT HEART CATHETERIZATION Left 3/7/2023    Procedure: Left heart cath;  Surgeon: Wil Mahoney MD;  Location: [facility] CATH LAB;  Service: Cardiology;  Laterality: Left;  1030am start, R rad access    RN PREOP 23     Family History   Problem Relation Age of Onset    Blindness Mother     No Known Problems Father     No Known Problems Brother     No Known Problems Daughter     No Known Problems Son     No Known Problems Daughter     Diabetes Brother     No Known Problems Brother     Amblyopia Neg Hx     Cancer Neg Hx     Cataracts Neg Hx     Glaucoma Neg Hx     Hypertension Neg Hx     Macular degeneration Neg Hx     Retinal detachment Neg Hx     Strabismus Neg Hx     Stroke Neg Hx     Thyroid disease Neg Hx      Social History     Tobacco Use    Smoking status: Former     Packs/day: 0.25     Types: Cigarettes     Start date: 1972     Quit date: 1976     Years since quittin.6    Smokeless tobacco: Never   Substance Use Topics    Alcohol use: Not Currently     Comment: Pt. has a Hx. of  Alcohol use.    Drug use: No         Review of Systems   Constitutional:  Negative for chills, diaphoresis, fever and malaise/fatigue.   Musculoskeletal:  Positive for joint pain (R knee). Negative for back pain, falls, myalgias and neck pain.   Skin: Negative.    Neurological:  Negative for dizziness, tingling, sensory change, focal weakness, seizures and weakness.   All other systems reviewed and are negative.  OBJECTIVE:     Vitals:    06/28/23 1259   BP: 120/76   Pulse: 71   Temp: 98.5 °F (36.9 °C)     Body mass index is 29.93 kg/m².    Physical Exam  Constitutional:       General: He is in acute distress (mild).      Appearance: He is not diaphoretic.   Musculoskeletal:         General: Tenderness (to palpation of R medial tibial-femoral compartment, decreased joint space. ACL/PCL intact, negative McMurrays sign) present.   Skin:     Findings: No erythema or rash.   Neurological:      Mental Status: He is alert and oriented to person, place, and time.       Old records were reviewed. Labs and/or images were independently reviewed.    ASSESSMENT     1. Primary osteoarthritis of both knees    2. Type 2 diabetes mellitus with microalbuminuria, without long-term current use of insulin    3. Essential (primary) hypertension        PLAN:     Primary osteoarthritis of both knees  -     HYDROcodone-acetaminophen (NORCO)  mg per tablet; Take 1 tablet by mouth 3 (three) times daily as needed (pain).  Dispense: 72 tablet; Refill: 0  -     HYDROcodone-acetaminophen (NORCO)  mg per tablet; Take 1 tablet by mouth 3 (three) times daily as needed (pain).  Dispense: 72 tablet; Refill: 0  -     HYDROcodone-acetaminophen (NORCO)  mg per tablet; Take 1 tablet by mouth 3 (three) times daily as needed (pain).  Dispense: 72 tablet; Refill: 0  -     Large Joint Aspiration/Injection: R knee  -     triamcinolone acetonide injection 40 mg  -     Stable. Continue current regimen. Unable to wean at this time but am monitoring closely for any drug  toxicity. Checked .    Type 2 diabetes mellitus with microalbuminuria, without long-term current use of insulin  -     semaglutide (OZEMPIC) 0.25 mg or 0.5 mg (2 mg/3 mL) pen injector; Inject 0.5 mg into the skin every 7 days.  Dispense: 4 each; Refill: 3  -     Comprehensive Metabolic Panel; Future  -     Hemoglobin A1C; Future  -     switch from trulicity to ozempic to see if it cost is better.    Essential (primary) hypertension   -     Stable. Continue current regimen.      RTC TYLOR Lara MD  06/28/2023 1:25 PM

## 2023-06-30 ENCOUNTER — OFFICE VISIT (OUTPATIENT)
Dept: CARDIOLOGY | Facility: CLINIC | Age: 68
End: 2023-06-30
Payer: MEDICARE

## 2023-06-30 VITALS
SYSTOLIC BLOOD PRESSURE: 144 MMHG | OXYGEN SATURATION: 95 % | RESPIRATION RATE: 18 BRPM | DIASTOLIC BLOOD PRESSURE: 76 MMHG | HEIGHT: 71 IN | WEIGHT: 212.75 LBS | BODY MASS INDEX: 29.78 KG/M2 | HEART RATE: 62 BPM

## 2023-06-30 DIAGNOSIS — Z09 FOLLOW-UP EXAM: ICD-10-CM

## 2023-06-30 DIAGNOSIS — I48.0 PAF (PAROXYSMAL ATRIAL FIBRILLATION): ICD-10-CM

## 2023-06-30 DIAGNOSIS — I25.10 CORONARY ARTERY DISEASE INVOLVING NATIVE CORONARY ARTERY OF NATIVE HEART WITHOUT ANGINA PECTORIS: ICD-10-CM

## 2023-06-30 DIAGNOSIS — R80.9 TYPE 2 DIABETES MELLITUS WITH MICROALBUMINURIA, WITHOUT LONG-TERM CURRENT USE OF INSULIN: ICD-10-CM

## 2023-06-30 DIAGNOSIS — E78.2 MIXED HYPERLIPIDEMIA: ICD-10-CM

## 2023-06-30 DIAGNOSIS — Z79.01 CHRONIC ANTICOAGULATION: ICD-10-CM

## 2023-06-30 DIAGNOSIS — E66.9 NON MORBID OBESITY, UNSPECIFIED OBESITY TYPE: ICD-10-CM

## 2023-06-30 DIAGNOSIS — I10 ESSENTIAL (PRIMARY) HYPERTENSION: Primary | ICD-10-CM

## 2023-06-30 DIAGNOSIS — E11.29 TYPE 2 DIABETES MELLITUS WITH MICROALBUMINURIA, WITHOUT LONG-TERM CURRENT USE OF INSULIN: ICD-10-CM

## 2023-06-30 PROCEDURE — 1126F PR PAIN SEVERITY QUANTIFIED, NO PAIN PRESENT: ICD-10-PCS | Mod: HCNC,CPTII,S$GLB, | Performed by: INTERNAL MEDICINE

## 2023-06-30 PROCEDURE — 99999 PR PBB SHADOW E&M-EST. PATIENT-LVL V: ICD-10-PCS | Mod: PBBFAC,HCNC,, | Performed by: INTERNAL MEDICINE

## 2023-06-30 PROCEDURE — 4010F PR ACE/ARB THEARPY RXD/TAKEN: ICD-10-PCS | Mod: HCNC,CPTII,S$GLB, | Performed by: INTERNAL MEDICINE

## 2023-06-30 PROCEDURE — 4010F ACE/ARB THERAPY RXD/TAKEN: CPT | Mod: HCNC,CPTII,S$GLB, | Performed by: INTERNAL MEDICINE

## 2023-06-30 PROCEDURE — 3078F PR MOST RECENT DIASTOLIC BLOOD PRESSURE < 80 MM HG: ICD-10-PCS | Mod: HCNC,CPTII,S$GLB, | Performed by: INTERNAL MEDICINE

## 2023-06-30 PROCEDURE — 3061F NEG MICROALBUMINURIA REV: CPT | Mod: HCNC,CPTII,S$GLB, | Performed by: INTERNAL MEDICINE

## 2023-06-30 PROCEDURE — 1160F PR REVIEW ALL MEDS BY PRESCRIBER/CLIN PHARMACIST DOCUMENTED: ICD-10-PCS | Mod: HCNC,CPTII,S$GLB, | Performed by: INTERNAL MEDICINE

## 2023-06-30 PROCEDURE — 3008F PR BODY MASS INDEX (BMI) DOCUMENTED: ICD-10-PCS | Mod: HCNC,CPTII,S$GLB, | Performed by: INTERNAL MEDICINE

## 2023-06-30 PROCEDURE — 3061F PR NEG MICROALBUMINURIA RESULT DOCUMENTED/REVIEW: ICD-10-PCS | Mod: HCNC,CPTII,S$GLB, | Performed by: INTERNAL MEDICINE

## 2023-06-30 PROCEDURE — 93000 EKG 12-LEAD: ICD-10-PCS | Mod: HCNC,S$GLB,, | Performed by: INTERNAL MEDICINE

## 2023-06-30 PROCEDURE — 1101F PT FALLS ASSESS-DOCD LE1/YR: CPT | Mod: HCNC,CPTII,S$GLB, | Performed by: INTERNAL MEDICINE

## 2023-06-30 PROCEDURE — 3078F DIAST BP <80 MM HG: CPT | Mod: HCNC,CPTII,S$GLB, | Performed by: INTERNAL MEDICINE

## 2023-06-30 PROCEDURE — 3051F HG A1C>EQUAL 7.0%<8.0%: CPT | Mod: HCNC,CPTII,S$GLB, | Performed by: INTERNAL MEDICINE

## 2023-06-30 PROCEDURE — 1160F RVW MEDS BY RX/DR IN RCRD: CPT | Mod: HCNC,CPTII,S$GLB, | Performed by: INTERNAL MEDICINE

## 2023-06-30 PROCEDURE — 1159F PR MEDICATION LIST DOCUMENTED IN MEDICAL RECORD: ICD-10-PCS | Mod: HCNC,CPTII,S$GLB, | Performed by: INTERNAL MEDICINE

## 2023-06-30 PROCEDURE — 3288F FALL RISK ASSESSMENT DOCD: CPT | Mod: HCNC,CPTII,S$GLB, | Performed by: INTERNAL MEDICINE

## 2023-06-30 PROCEDURE — 1101F PR PT FALLS ASSESS DOC 0-1 FALLS W/OUT INJ PAST YR: ICD-10-PCS | Mod: HCNC,CPTII,S$GLB, | Performed by: INTERNAL MEDICINE

## 2023-06-30 PROCEDURE — 99214 OFFICE O/P EST MOD 30 MIN: CPT | Mod: HCNC,S$GLB,, | Performed by: INTERNAL MEDICINE

## 2023-06-30 PROCEDURE — 3066F PR DOCUMENTATION OF TREATMENT FOR NEPHROPATHY: ICD-10-PCS | Mod: HCNC,CPTII,S$GLB, | Performed by: INTERNAL MEDICINE

## 2023-06-30 PROCEDURE — 3051F PR MOST RECENT HEMOGLOBIN A1C LEVEL 7.0 - < 8.0%: ICD-10-PCS | Mod: HCNC,CPTII,S$GLB, | Performed by: INTERNAL MEDICINE

## 2023-06-30 PROCEDURE — 3077F PR MOST RECENT SYSTOLIC BLOOD PRESSURE >= 140 MM HG: ICD-10-PCS | Mod: HCNC,CPTII,S$GLB, | Performed by: INTERNAL MEDICINE

## 2023-06-30 PROCEDURE — 3288F PR FALLS RISK ASSESSMENT DOCUMENTED: ICD-10-PCS | Mod: HCNC,CPTII,S$GLB, | Performed by: INTERNAL MEDICINE

## 2023-06-30 PROCEDURE — 1159F MED LIST DOCD IN RCRD: CPT | Mod: HCNC,CPTII,S$GLB, | Performed by: INTERNAL MEDICINE

## 2023-06-30 PROCEDURE — 3077F SYST BP >= 140 MM HG: CPT | Mod: HCNC,CPTII,S$GLB, | Performed by: INTERNAL MEDICINE

## 2023-06-30 PROCEDURE — 99999 PR PBB SHADOW E&M-EST. PATIENT-LVL V: CPT | Mod: PBBFAC,HCNC,, | Performed by: INTERNAL MEDICINE

## 2023-06-30 PROCEDURE — 1126F AMNT PAIN NOTED NONE PRSNT: CPT | Mod: HCNC,CPTII,S$GLB, | Performed by: INTERNAL MEDICINE

## 2023-06-30 PROCEDURE — 99214 PR OFFICE/OUTPT VISIT, EST, LEVL IV, 30-39 MIN: ICD-10-PCS | Mod: HCNC,S$GLB,, | Performed by: INTERNAL MEDICINE

## 2023-06-30 PROCEDURE — 93000 ELECTROCARDIOGRAM COMPLETE: CPT | Mod: HCNC,S$GLB,, | Performed by: INTERNAL MEDICINE

## 2023-06-30 PROCEDURE — 3066F NEPHROPATHY DOC TX: CPT | Mod: HCNC,CPTII,S$GLB, | Performed by: INTERNAL MEDICINE

## 2023-06-30 PROCEDURE — 3008F BODY MASS INDEX DOCD: CPT | Mod: HCNC,CPTII,S$GLB, | Performed by: INTERNAL MEDICINE

## 2023-06-30 RX ORDER — LOSARTAN POTASSIUM 25 MG/1
25 TABLET ORAL DAILY
Qty: 90 TABLET | Refills: 3 | Status: SHIPPED | OUTPATIENT
Start: 2023-06-30 | End: 2024-03-11 | Stop reason: SDUPTHER

## 2023-06-30 NOTE — PROGRESS NOTES
CARDIOVASCULAR PROGRESS NOTE    REASON FOR CONSULT:   King Cool Jr. is a 68 y.o. male who presents for follow up of PAF, CAD.    PCP: Jane  HISTORY OF PRESENT ILLNESS:   The patient returns for follow-up.  He denies intercurrent angina, dyspnea, palpitations, or syncope.  There has been no PND, orthopnea, melena, hematuria, or claudicant symptoms.  Complaining about the cost of his medications, particularly the Xarelto, as [facility].  I explained him the importance of continuing medical therapy.  It also appears that losartan has dropped off of his medication list.  I will add this back.  He is following in the digital medicine.    CARDIOVASCULAR HISTORY:   PAF on Xarelto    CAD (high risk MPI 2/2023), cath 3/2023 with MV CAD and mid LAD     PAST MEDICAL HISTORY:     Past Medical History:   Diagnosis Date    Coronary artery disease involving native coronary artery of native heart without angina pectoris 3/7/2023    Essential (primary) hypertension     Male erectile dysfunction, unspecified     New onset a-fib 1/3/2023    Testicular hypofunction     Type 2 diabetes mellitus without complications        PAST SURGICAL HISTORY:     Past Surgical History:   Procedure Laterality Date    HIP SURGERY      LEFT HEART CATHETERIZATION Left 3/7/2023    Procedure: Left heart cath;  Surgeon: Wil Mahoney MD;  Location: [facility] CATH LAB;  Service: Cardiology;  Laterality: Left;  1030am start, R rad access    RN PREOP 2/28/23       ALLERGIES AND MEDICATION:     Review of patient's allergies indicates:   Allergen Reactions    Tamiflu [oseltamivir]      Increases BP    Metformin Hives     Diarrhea, nausea    Penicillins Rash        Medication List            Accurate as of June 30, 2023  8:59 AM. If you have any questions, ask your nurse or doctor.                CONTINUE taking these medications      aspirin 81 MG EC tablet  Commonly known as: ECOTRIN     atorvastatin 80 MG tablet  Commonly  known as: LIPITOR  Take 1 tablet (80 mg total) by mouth every evening.     diclofenac 50 MG EC tablet  Commonly known as: VOLTAREN  TAKE 1 TABLET BY MOUTH TWICE A DAY     DROPSAFE ALCOHOL PREP PADS Padm  Generic drug: alcohol swabs  USE AS DIRECTED AS NEEDED     empagliflozin 25 mg tablet  Commonly known as: JARDIANCE  Take 1 tablet (25 mg total) by mouth once daily.     fish oil-omega-3 fatty acids 300-1,000 mg capsule     gabapentin 300 MG capsule  Commonly known as: NEURONTIN  Take 1 capsule (300 mg total) by mouth 3 (three) times daily.     glimepiride 4 MG tablet  Commonly known as: AMARYL  Take 1 tablet (4 mg total) by mouth 2 (two) times a day.     * HYDROcodone-acetaminophen  mg per tablet  Commonly known as: NORCO  Take 1 tablet by mouth 3 (three) times daily as needed (pain).  Start taking on: July 3, 2023     * HYDROcodone-acetaminophen  mg per tablet  Commonly known as: NORCO  Take 1 tablet by mouth 3 (three) times daily as needed (pain).  Start taking on: August 3, 2023     * HYDROcodone-acetaminophen  mg per tablet  Commonly known as: NORCO  Take 1 tablet by mouth 3 (three) times daily as needed (pain).  Start taking on: September 3, 2023     hydrocortisone 2.5 % cream  Apply topically 2 (two) times daily.     loratadine 10 mg tablet  Commonly known as: CLARITIN  TAKE 1 TABLET BY MOUTH EVERY DAY     methocarbamoL 500 MG Tab  Commonly known as: ROBAXIN  Take 2 tablets (1,000 mg total) by mouth every 6 (six) hours as needed (muscle spasms).     metoprolol succinate 25 MG 24 hr tablet  Commonly known as: TOPROL-XL  Take 1 tablet (25 mg total) by mouth once daily.     multivitamin with minerals tablet     * neomycin-polymyxin-hydrocortisone 3.5-10,000-1 mg/mL-unit/mL-% otic suspension  Commonly known as: CORTISPORIN  PLACE 3 DROPS INTO THE LEFT EAR 4 TIMES DAILY.     * neomycin-polymyxin-hydrocortisone 3.5-10,000-1 mg/mL-unit/mL-% otic suspension  Commonly known as: CORTISPORIN  Place 3  drops into the left ear 4 (four) times daily.     OZEMPIC 0.25 mg or 0.5 mg (2 mg/3 mL) pen injector  Generic drug: semaglutide  Inject 0.5 mg into the skin every 7 days.     rivaroxaban 20 mg Tab  Commonly known as: XARELTO  Take 1 tablet (20 mg total) by mouth daily with dinner or evening meal.     testosterone cypionate 200 mg/mL injection  Commonly known as: DEPOTESTOTERONE CYPIONATE  Inject 1 mL (200 mg total) into the muscle every 14 (fourteen) days.     TRUEPLUS LANCETS 33 gauge Misc  Generic drug: lancets     VITAMIN C ORAL           * This list has 5 medication(s) that are the same as other medications prescribed for you. Read the directions carefully, and ask your doctor or other care provider to review them with you.                  SOCIAL HISTORY:     Social History     Socioeconomic History    Marital status:     Number of children: 3    Highest education level: GED or equivalent   Tobacco Use    Smoking status: Former     Packs/day: 0.25     Types: Cigarettes     Start date: 1972     Quit date: 1976     Years since quittin.6    Smokeless tobacco: Never   Substance and Sexual Activity    Alcohol use: Not Currently     Comment: Pt. has a Hx. of  Alcohol use.    Drug use: No    Sexual activity: Yes     Partners: Female     Birth control/protection: None       FAMILY HISTORY:     Family History   Problem Relation Age of Onset    Blindness Mother     No Known Problems Father     No Known Problems Brother     No Known Problems Daughter     No Known Problems Son     No Known Problems Daughter     Diabetes Brother     No Known Problems Brother     Amblyopia Neg Hx     Cancer Neg Hx     Cataracts Neg Hx     Glaucoma Neg Hx     Hypertension Neg Hx     Macular degeneration Neg Hx     Retinal detachment Neg Hx     Strabismus Neg Hx     Stroke Neg Hx     Thyroid disease Neg Hx        REVIEW OF SYSTEMS:   Review of Systems   Constitutional:  Negative for chills, diaphoresis and fever.    HENT:  Negative for nosebleeds.    Eyes:  Negative for blurred vision, double vision and photophobia.   Respiratory:  Negative for hemoptysis, shortness of breath and wheezing.    Cardiovascular:  Negative for chest pain, palpitations, orthopnea, claudication, leg swelling and PND.   Gastrointestinal:  Negative for abdominal pain, blood in stool, heartburn, melena, nausea and vomiting.   Genitourinary:  Negative for flank pain and hematuria.   Musculoskeletal:  Negative for falls, myalgias and neck pain.   Skin:  Negative for rash.   Neurological:  Negative for dizziness, seizures, loss of consciousness, weakness and headaches.   Endo/Heme/Allergies:  Negative for polydipsia. Does not bruise/bleed easily.   Psychiatric/Behavioral:  Negative for depression and memory loss. The patient is not nervous/anxious.      PHYSICAL EXAM:     There were no vitals filed for this visit.     There is no height or weight on file to calculate BMI.            Physical Exam  Vitals reviewed.   Constitutional:       General: He is not in acute distress.     Appearance: He is well-developed. He is obese. He is not ill-appearing, toxic-appearing or diaphoretic.   HENT:      Head: Normocephalic and atraumatic.   Eyes:      General: No scleral icterus.     Extraocular Movements: Extraocular movements intact.      Conjunctiva/sclera: Conjunctivae normal.      Pupils: Pupils are equal, round, and reactive to light.   Neck:      Thyroid: No thyromegaly.      Vascular: Normal carotid pulses. No carotid bruit or JVD.      Trachea: Trachea normal.   Cardiovascular:      Rate and Rhythm: Normal rate and regular rhythm.      Pulses:           Carotid pulses are 2+ on the right side and 2+ on the left side.       Radial pulses are 2+ on the right side.      Heart sounds: S1 normal and S2 normal. No murmur heard.    No friction rub. No gallop.      Comments: R rad access site c/d/i  Pulmonary:      Effort: Pulmonary effort is normal. No  respiratory distress.      Breath sounds: Normal breath sounds. No stridor. No wheezing, rhonchi or rales.   Chest:      Chest wall: No tenderness.   Abdominal:      General: There is no distension.      Palpations: Abdomen is soft.   Musculoskeletal:         General: No swelling or tenderness. Normal range of motion.      Cervical back: Normal range of motion and neck supple. No edema or rigidity.      Right lower leg: No edema.      Left lower leg: No edema.   Feet:      Right foot:      Skin integrity: No ulcer.      Left foot:      Skin integrity: No ulcer.   Skin:     General: Skin is warm and dry.      Coloration: Skin is not jaundiced.   Neurological:      Mental Status: He is alert and oriented to person, place, and time.      Cranial Nerves: No cranial nerve deficit (presbycusis).   Psychiatric:         Mood and Affect: Mood normal.         Speech: Speech normal.         Behavior: Behavior normal. Behavior is cooperative.       DATA:   EKG: (personally reviewed tracing(s))  6/30/23 SR 62, RBBB, IMI-age indet    Laboratory:  CBC:  Recent Labs   Lab 01/03/23  0934 02/20/23  1030 02/28/23  0910   WBC 11.94 5.20 6.36   Hemoglobin 20.8 HH 16.1 16.1   Hematocrit 60.3 H 48.0 45.8   Platelets 238 199 218         CHEMISTRIES:  Recent Labs   Lab 07/08/20  1540 03/02/21  1030 06/24/21  0925 02/07/22  1121 01/03/23  0934 02/20/23  1030 02/28/23  0910 03/22/23  0737 06/28/23  1330   Glucose 119 H 159 H 246 H 239 H 351 H 236 H 317 H 228 H 233 H   Sodium 138 141 139 136 133 L 138 137 139 139   Potassium 4.1 4.9 4.8 4.2 4.9 4.3 4.1 4.2 4.8   BUN 12 20 16 13 18 20 14 17 21   Creatinine 1.1 1.1 1.2 0.9 1.3 0.9 1.1 1.1 1.3   eGFR if non African American >60.0 >60 >60.0 >60.0  --   --   --   --   --    eGFR  --   --   --   --  >60 >60.0 >60 >60.0 >60.0   Calcium 9.2 9.3 10.1 9.4 8.9 9.4 9.7 9.6 9.6   Magnesium  --   --   --   --  1.8  --   --   --   --          CARDIAC BIOMARKERS:  Recent Labs   Lab 01/03/23  0934  01/03/23  1222   Troponin I 0.119 H 0.125 H         COAGS:  Recent Labs   Lab 02/28/23  0910   INR 1.0         LIPIDS/LFTS:  Recent Labs   Lab 02/07/22  1121 01/03/23  0934 02/20/23  1030 06/13/23  0722 06/28/23  1330   Cholesterol 207 H  --  129 149  --    Triglycerides 262 H  --  98 149  --    HDL 37 L  --  35 L 35 L  --    LDL Cholesterol 117.6  --  74.4 84.2  --    Non-HDL Cholesterol 170  --  94 114  --    AST 25   < > 26 20 22   ALT 40   < > 43 26 31    < > = values in this interval not displayed.       Lab Results   Component Value Date    TSH 0.850 02/20/2023         Cardiovascular Testing:  Carotid US 3/7/23  There is 20-39% right Internal Carotid Stenosis.  There is 0-19% left Internal Carotid Stenosis.    Cath 3/7/23 (images prev personally reviewed and interpreted)  LVEDP: 7mmHg  LVEF: 50% by echo  Dominance: Right  LM: normal  LAD: mid-sub-TO with T1 flow, distal vessel collateralized via L-L and R-L filling.  LCx: MLI, dist 90%  RCA: MLI, andersno's crook prox vessel              RPDA mid 90%  Hemostasis:  R Radial band  Impression:  Abnl MPI suggesting MV CAD (high risk, performed after troponin release in setting of PAF/RVR).  3V CAD, low normal LV fxn  R rad vasband for hemostasis  Plan:  Cont med rx  Cont ASA 81mg qd  Resume Xarelto 20mg qhs this evening  Cont atorva 40mg qhs  Home today  Follow up with Dr. Mahoney as planned  Check CT chest and carotid US  CTS opinion (with Dr. Chan per pt request) re CABG vs PCI (vs hybrid vs med rx as pt is asymptomatic).    Ex MPI 2/15/23     The patient exercised for 8 minutes 42 seconds on a Jd protocol, corresponding to a functional capacity of 8 METS, achieving a peak heart rate of 141 bpm, which is 92 % of the age predicted maximum heart rate. Their exercise capacity was above average.    Abnormal myocardial perfusion scan.    There are two significant perfusion abnormalities.    Perfusion Abnormality #1 - There is a moderate to severe intensity,  large sized, reversible perfusion abnormality that is consistent with ischemia in the mid to apical anterior and anteroseptal wall(s) in the typical distribution of the LAD territory.    Perfusion Abnormality #2 - There is a large sized, moderate intensity, mostly reversible perfusion abnormality with some fixed areas in the basal to distal inferior wall(s) in the typical distribution of the RCA territory.    There are no other significant perfusion abnormalities.    The gated perfusion images showed an ejection fraction of 56% post stress.    There is normal wall motion post stress.    The ECG portion of the study is positive for ischemia. Specificity is reduced secondary to hypertensive response.    The patient reported no chest pain during the stress test.    There were no arrhythmias during stress.    The exercise capacity was above average.    Aortic US 2/14/23  No evidence of AAA.    Echo 1/3/23  The left ventricle is normal in size with concentric remodeling and low normal systolic function.  The estimated ejection fraction is 50%.  Normal right ventricular size with normal right ventricular systolic function.  The estimated PA systolic pressure is 26 mmHg.    ASSESSMENT:   # CAD, cath 3/2023 with  mid LAD.  Asymptomatic.  Seen by CTS without plans for CABG.   # PAF/RVR with elev trop, now in SR on Xarelto 20mg  # HTN, uncontrolled, (tolerating toprol 25mg qd, intol of 50mg qd).  # HLP on atorva 40mg   # NIDDM  # ?polycythemia  # BMI 30, down 1 unit(s) vs last OV    PLAN:   Cont med rx  Cont ASA 81mg qd  Cont Xarelto 20mg qhs  Re-add losartan 25mg qd  Check BMP 1 week, BP monitoring per digital med program  Diet/exercise/weight loss  RTC 6 months (Dec 2023)  Surveillance carotid US 1 year (June 2024)  If pt develops sxs, will consider PCI of mid LAD .      Wil Mahoney MD, FACC

## 2023-07-05 ENCOUNTER — LAB VISIT (OUTPATIENT)
Dept: LAB | Facility: HOSPITAL | Age: 68
End: 2023-07-05
Attending: INTERNAL MEDICINE
Payer: MEDICARE

## 2023-07-05 DIAGNOSIS — I10 ESSENTIAL (PRIMARY) HYPERTENSION: ICD-10-CM

## 2023-07-05 LAB
ANION GAP SERPL CALC-SCNC: 10 MMOL/L (ref 8–16)
BUN SERPL-MCNC: 20 MG/DL (ref 8–23)
CALCIUM SERPL-MCNC: 9.2 MG/DL (ref 8.7–10.5)
CHLORIDE SERPL-SCNC: 109 MMOL/L (ref 95–110)
CO2 SERPL-SCNC: 22 MMOL/L (ref 23–29)
CREAT SERPL-MCNC: 0.9 MG/DL (ref 0.5–1.4)
EST. GFR  (NO RACE VARIABLE): >60 ML/MIN/1.73 M^2
GLUCOSE SERPL-MCNC: 150 MG/DL (ref 70–110)
POTASSIUM SERPL-SCNC: 4.3 MMOL/L (ref 3.5–5.1)
SODIUM SERPL-SCNC: 141 MMOL/L (ref 136–145)

## 2023-07-05 PROCEDURE — 36415 COLL VENOUS BLD VENIPUNCTURE: CPT | Mod: HCNC,PO | Performed by: INTERNAL MEDICINE

## 2023-07-05 PROCEDURE — 80048 BASIC METABOLIC PNL TOTAL CA: CPT | Mod: HCNC | Performed by: INTERNAL MEDICINE

## 2023-07-10 ENCOUNTER — PATIENT OUTREACH (OUTPATIENT)
Dept: ADMINISTRATIVE | Facility: HOSPITAL | Age: 68
End: 2023-07-10
Payer: MEDICARE

## 2023-07-11 RX ORDER — MELOXICAM 7.5 MG/1
7.5 TABLET ORAL DAILY
Qty: 30 TABLET | Refills: 2 | Status: SHIPPED | OUTPATIENT
Start: 2023-07-11

## 2023-07-13 ENCOUNTER — TELEPHONE (OUTPATIENT)
Dept: FAMILY MEDICINE | Facility: CLINIC | Age: 68
End: 2023-07-13
Payer: MEDICARE

## 2023-07-13 NOTE — TELEPHONE ENCOUNTER
----- Message from Jacqueline Platt sent at 7/13/2023  9:31 AM CDT -----  Regarding: self [phone]  Type:  Sooner Appointment Request    Patient is requesting a sooner appointment.  Patient declined first available appointment listed as well as another facility and provider .  Patient will not accept being placed on the waitlist and is requesting a message be sent to doctor.    Name of Caller:  self     When is the first available appointment? 7/19    Symptoms:  ear pain    Would the patient rather a call back or a response via [facility]? Call back     Best Call Back Number: [phone]    Additional Information:  Pt stated he would like a call back to be seen today.

## 2023-07-14 ENCOUNTER — OFFICE VISIT (OUTPATIENT)
Dept: FAMILY MEDICINE | Facility: CLINIC | Age: 68
End: 2023-07-14
Payer: MEDICARE

## 2023-07-14 VITALS
HEIGHT: 71 IN | OXYGEN SATURATION: 95 % | HEART RATE: 87 BPM | WEIGHT: 210.63 LBS | SYSTOLIC BLOOD PRESSURE: 122 MMHG | BODY MASS INDEX: 29.49 KG/M2 | DIASTOLIC BLOOD PRESSURE: 64 MMHG | TEMPERATURE: 98 F

## 2023-07-14 DIAGNOSIS — H61.22 IMPACTED CERUMEN OF LEFT EAR: ICD-10-CM

## 2023-07-14 DIAGNOSIS — Z97.4 USES HEARING AID: ICD-10-CM

## 2023-07-14 DIAGNOSIS — H92.02 LEFT EAR PAIN: Primary | ICD-10-CM

## 2023-07-14 DIAGNOSIS — H91.90 HEARING LOSS, UNSPECIFIED HEARING LOSS TYPE, UNSPECIFIED LATERALITY: ICD-10-CM

## 2023-07-14 DIAGNOSIS — J30.9 ALLERGIC RHINITIS, UNSPECIFIED SEASONALITY, UNSPECIFIED TRIGGER: ICD-10-CM

## 2023-07-14 PROBLEM — J06.9 VIRAL URI: Status: ACTIVE | Noted: 2023-07-14

## 2023-07-14 PROCEDURE — 1159F MED LIST DOCD IN RCRD: CPT | Mod: HCNC,CPTII,S$GLB, | Performed by: INTERNAL MEDICINE

## 2023-07-14 PROCEDURE — 3061F PR NEG MICROALBUMINURIA RESULT DOCUMENTED/REVIEW: ICD-10-PCS | Mod: HCNC,CPTII,S$GLB, | Performed by: INTERNAL MEDICINE

## 2023-07-14 PROCEDURE — 3051F HG A1C>EQUAL 7.0%<8.0%: CPT | Mod: HCNC,CPTII,S$GLB, | Performed by: INTERNAL MEDICINE

## 2023-07-14 PROCEDURE — 4010F ACE/ARB THERAPY RXD/TAKEN: CPT | Mod: HCNC,CPTII,S$GLB, | Performed by: INTERNAL MEDICINE

## 2023-07-14 PROCEDURE — 1125F AMNT PAIN NOTED PAIN PRSNT: CPT | Mod: HCNC,CPTII,S$GLB, | Performed by: INTERNAL MEDICINE

## 2023-07-14 PROCEDURE — 3008F BODY MASS INDEX DOCD: CPT | Mod: HCNC,CPTII,S$GLB, | Performed by: INTERNAL MEDICINE

## 2023-07-14 PROCEDURE — 1101F PR PT FALLS ASSESS DOC 0-1 FALLS W/OUT INJ PAST YR: ICD-10-PCS | Mod: HCNC,CPTII,S$GLB, | Performed by: INTERNAL MEDICINE

## 2023-07-14 PROCEDURE — 99999 PR PBB SHADOW E&M-EST. PATIENT-LVL III: CPT | Mod: PBBFAC,HCNC,, | Performed by: INTERNAL MEDICINE

## 2023-07-14 PROCEDURE — 99214 OFFICE O/P EST MOD 30 MIN: CPT | Mod: HCNC,S$GLB,, | Performed by: INTERNAL MEDICINE

## 2023-07-14 PROCEDURE — 3074F PR MOST RECENT SYSTOLIC BLOOD PRESSURE < 130 MM HG: ICD-10-PCS | Mod: HCNC,CPTII,S$GLB, | Performed by: INTERNAL MEDICINE

## 2023-07-14 PROCEDURE — 3066F NEPHROPATHY DOC TX: CPT | Mod: HCNC,CPTII,S$GLB, | Performed by: INTERNAL MEDICINE

## 2023-07-14 PROCEDURE — 3051F PR MOST RECENT HEMOGLOBIN A1C LEVEL 7.0 - < 8.0%: ICD-10-PCS | Mod: HCNC,CPTII,S$GLB, | Performed by: INTERNAL MEDICINE

## 2023-07-14 PROCEDURE — 1160F RVW MEDS BY RX/DR IN RCRD: CPT | Mod: HCNC,CPTII,S$GLB, | Performed by: INTERNAL MEDICINE

## 2023-07-14 PROCEDURE — 4010F PR ACE/ARB THEARPY RXD/TAKEN: ICD-10-PCS | Mod: HCNC,CPTII,S$GLB, | Performed by: INTERNAL MEDICINE

## 2023-07-14 PROCEDURE — 3078F DIAST BP <80 MM HG: CPT | Mod: HCNC,CPTII,S$GLB, | Performed by: INTERNAL MEDICINE

## 2023-07-14 PROCEDURE — 1125F PR PAIN SEVERITY QUANTIFIED, PAIN PRESENT: ICD-10-PCS | Mod: HCNC,CPTII,S$GLB, | Performed by: INTERNAL MEDICINE

## 2023-07-14 PROCEDURE — 99999 PR PBB SHADOW E&M-EST. PATIENT-LVL III: ICD-10-PCS | Mod: PBBFAC,HCNC,, | Performed by: INTERNAL MEDICINE

## 2023-07-14 PROCEDURE — 3288F PR FALLS RISK ASSESSMENT DOCUMENTED: ICD-10-PCS | Mod: HCNC,CPTII,S$GLB, | Performed by: INTERNAL MEDICINE

## 2023-07-14 PROCEDURE — 99214 PR OFFICE/OUTPT VISIT, EST, LEVL IV, 30-39 MIN: ICD-10-PCS | Mod: HCNC,S$GLB,, | Performed by: INTERNAL MEDICINE

## 2023-07-14 PROCEDURE — 3074F SYST BP LT 130 MM HG: CPT | Mod: HCNC,CPTII,S$GLB, | Performed by: INTERNAL MEDICINE

## 2023-07-14 PROCEDURE — 3008F PR BODY MASS INDEX (BMI) DOCUMENTED: ICD-10-PCS | Mod: HCNC,CPTII,S$GLB, | Performed by: INTERNAL MEDICINE

## 2023-07-14 PROCEDURE — 3066F PR DOCUMENTATION OF TREATMENT FOR NEPHROPATHY: ICD-10-PCS | Mod: HCNC,CPTII,S$GLB, | Performed by: INTERNAL MEDICINE

## 2023-07-14 PROCEDURE — 3061F NEG MICROALBUMINURIA REV: CPT | Mod: HCNC,CPTII,S$GLB, | Performed by: INTERNAL MEDICINE

## 2023-07-14 PROCEDURE — 1160F PR REVIEW ALL MEDS BY PRESCRIBER/CLIN PHARMACIST DOCUMENTED: ICD-10-PCS | Mod: HCNC,CPTII,S$GLB, | Performed by: INTERNAL MEDICINE

## 2023-07-14 PROCEDURE — 1101F PT FALLS ASSESS-DOCD LE1/YR: CPT | Mod: HCNC,CPTII,S$GLB, | Performed by: INTERNAL MEDICINE

## 2023-07-14 PROCEDURE — 1159F PR MEDICATION LIST DOCUMENTED IN MEDICAL RECORD: ICD-10-PCS | Mod: HCNC,CPTII,S$GLB, | Performed by: INTERNAL MEDICINE

## 2023-07-14 PROCEDURE — 3078F PR MOST RECENT DIASTOLIC BLOOD PRESSURE < 80 MM HG: ICD-10-PCS | Mod: HCNC,CPTII,S$GLB, | Performed by: INTERNAL MEDICINE

## 2023-07-14 PROCEDURE — 3288F FALL RISK ASSESSMENT DOCD: CPT | Mod: HCNC,CPTII,S$GLB, | Performed by: INTERNAL MEDICINE

## 2023-07-14 RX ORDER — LORATADINE 10 MG/1
10 TABLET ORAL DAILY
Qty: 90 TABLET | Refills: 0 | Status: SHIPPED | OUTPATIENT
Start: 2023-07-14

## 2023-07-14 NOTE — PROGRESS NOTES
CHIEF COMPLAINT:   Chief Complaint   Patient presents with    Otalgia     X 2 days   -left           HISTORY OF PRESENT ILLNESS:  King Cool Jr. is a 68 y.o. male who presents to the clinic today for Otalgia (X 2 days /-left )  .      The patient presents to clinic today with complaint of left ear pain that started a few days ago.  Reports that he has been seen in the office here 2 or 3 times before with the same complaint.  The 1st 2 times he had his ear washed out which helped.  Was prescribed ear drops.  This last time he was only prescribed ear drops, but he thinks his ear needs to be cleaned out.  The ear drops did help a little bit.  He states he wears hearing aids for chronic hearing loss.  He states he cleans his hearing aids regularly.  Is followed by a provider outside of [facility] for his hearing aids, but he does not regularly see ENT and has not seen anybody for professional ear wax removal in the recent past.    PAST MEDICAL HISTORY:  Past Medical History:   Diagnosis Date    Coronary artery disease involving native coronary artery of native heart without angina pectoris 3/7/2023    Essential (primary) hypertension     Male erectile dysfunction, unspecified     New onset a-fib 1/3/2023    Testicular hypofunction     Type 2 diabetes mellitus without complications        PAST SURGICAL HISTORY:  Past Surgical History:   Procedure Laterality Date    HIP SURGERY      LEFT HEART CATHETERIZATION Left 3/7/2023    Procedure: Left heart cath;  Surgeon: Wil Mahoney MD;  Location: [facility] CATH LAB;  Service: Cardiology;  Laterality: Left;  1030am start, R rad access    RN PREOP 23       SOCIAL HISTORY:  Social History     Socioeconomic History    Marital status:     Number of children: 3    Highest education level: GED or equivalent   Tobacco Use    Smoking status: Former     Packs/day: 0.25     Types: Cigarettes     Start date: 1972     Quit date: 1976     Years since quittin.6     Smokeless tobacco: Never   Substance and Sexual Activity    Alcohol use: Not Currently     Comment: Pt. has a Hx. of  Alcohol use.    Drug use: No    Sexual activity: Yes     Partners: Female     Birth control/protection: None       FAMILY HISTORY:  Family History   Problem Relation Age of Onset    Blindness Mother     No Known Problems Father     No Known Problems Brother     No Known Problems Daughter     No Known Problems Son     No Known Problems Daughter     Diabetes Brother     No Known Problems Brother     Amblyopia Neg Hx     Cancer Neg Hx     Cataracts Neg Hx     Glaucoma Neg Hx     Hypertension Neg Hx     Macular degeneration Neg Hx     Retinal detachment Neg Hx     Strabismus Neg Hx     Stroke Neg Hx     Thyroid disease Neg Hx        ALLERGIES AND MEDICATIONS: updated and reviewed.  Review of patient's allergies indicates:   Allergen Reactions    Tamiflu [oseltamivir]      Increases BP    Metformin Hives     Diarrhea, nausea    Penicillins Rash     Medication List with Changes/Refills   Current Medications    ALCOHOL SWABS (DROPSAFE ALCOHOL PREP PADS) PADM    USE AS DIRECTED AS NEEDED    ASCORBATE CALCIUM (VITAMIN C ORAL)    Take by mouth once daily.     ASPIRIN (ECOTRIN) 81 MG EC TABLET    Take 81 mg by mouth once daily.    ATORVASTATIN (LIPITOR) 80 MG TABLET    Take 1 tablet (80 mg total) by mouth every evening.    EMPAGLIFLOZIN (JARDIANCE) 25 MG TABLET    Take 1 tablet (25 mg total) by mouth once daily.    FISH OIL-OMEGA-3 FATTY ACIDS 300-1,000 MG CAPSULE    Take 2 g by mouth once daily.    GABAPENTIN (NEURONTIN) 300 MG CAPSULE    Take 1 capsule (300 mg total) by mouth 3 (three) times daily.    GLIMEPIRIDE (AMARYL) 4 MG TABLET    Take 1 tablet (4 mg total) by mouth 2 (two) times a day.    HYDROCODONE-ACETAMINOPHEN (NORCO)  MG PER TABLET    Take 1 tablet by mouth 3 (three) times daily as needed (pain).    HYDROCODONE-ACETAMINOPHEN (NORCO)  MG PER TABLET    Take 1 tablet by mouth 3 (three)  times daily as needed (pain).    HYDROCODONE-ACETAMINOPHEN (NORCO)  MG PER TABLET    Take 1 tablet by mouth 3 (three) times daily as needed (pain).    HYDROCORTISONE 2.5 % CREAM    Apply topically 2 (two) times daily.    LOSARTAN (COZAAR) 25 MG TABLET    Take 1 tablet (25 mg total) by mouth once daily.    MELOXICAM (MOBIC) 7.5 MG TABLET    Take 1 tablet (7.5 mg total) by mouth once daily.    METHOCARBAMOL (ROBAXIN) 500 MG TAB    Take 2 tablets (1,000 mg total) by mouth every 6 (six) hours as needed (muscle spasms).    METOPROLOL SUCCINATE (TOPROL-XL) 25 MG 24 HR TABLET    Take 1 tablet (25 mg total) by mouth once daily.    MULTIVITAMIN WITH MINERALS TABLET    Take 1 tablet by mouth once daily.    NEOMYCIN-POLYMYXIN-HYDROCORTISONE (CORTISPORIN) 3.5-10,000-1 MG/ML-UNIT/ML-% OTIC SUSPENSION    PLACE 3 DROPS INTO THE LEFT EAR 4 TIMES DAILY.    NEOMYCIN-POLYMYXIN-HYDROCORTISONE (CORTISPORIN) 3.5-10,000-1 MG/ML-UNIT/ML-% OTIC SUSPENSION    Place 3 drops into the left ear 4 (four) times daily.    RIVAROXABAN (XARELTO) 20 MG TAB    Take 1 tablet (20 mg total) by mouth daily with dinner or evening meal.    SEMAGLUTIDE (OZEMPIC) 0.25 MG OR 0.5 MG (2 MG/3 ML) PEN INJECTOR    Inject 0.5 mg into the skin every 7 days.    TRUEPLUS LANCETS 33 GAUGE MISC       Changed and/or Refilled Medications    Modified Medication Previous Medication    LORATADINE (CLARITIN) 10 MG TABLET loratadine (CLARITIN) 10 mg tablet       Take 1 tablet (10 mg total) by mouth once daily.    TAKE 1 TABLET BY MOUTH EVERY DAY         CARE TEAM:  Patient Care Team:  Gallo Lara MD as PCP - General (Family Medicine)  Zarina Martell MA as Care Coordinator  Acacia Lovell as Digital Medicine Health   Elsie Vera PharmD as Hypertension Digital Medicine Clinician (Pharmacist)  Wil Mahoney MD as Hypertension Digital Medicine Responsible Provider (Cardiology)  [facility] as Hypertension Digital Medicine Contract  "      REVIEW OF SYSTEMS:  Review of Systems   Constitutional:  Negative for chills and fever.   HENT:  Positive for ear pain, hearing loss and postnasal drip (he has mild chronic allergies and needs a refill on his allergy medication).    Respiratory:  Negative for cough and shortness of breath.    Hematological:  Negative for adenopathy.       PHYSICAL EXAMINATION/VITALS:  Vitals:    07/14/23 1442   BP: 122/64   Pulse: 87   Temp: 98.1 °F (36.7 °C)   TempSrc: Oral   SpO2: 95%   Weight: 95.5 kg (210 lb 10.4 oz)   Height: 5' 11" (1.803 m)       Body mass index is 29.38 kg/m².      General appearance - alert, well appearing, and in no distress, overweight  Ears - right ear normal with scant cerumen, left TM/ear canal obscured by cerumen and debris          ASSESSMENT AND PLAN:   1. Left ear pain/2. Impacted cerumen of left ear  His left ear was cleaned out to the best of our ability with removal of a large amount of debris/wax.  I discussed with him to discontinue using Q-tips.  He will continue with warm water and hydrogen peroxide to hopefully keep the wax from building up again.  There was still a scant bit of cerumen against his ear drum which we could not remove.  I will refer him to ENT for further evaluation and treatment.  -     Ambulatory referral/consult to ENT; Future; Expected date: 07/21/2023    3. Hearing loss, unspecified hearing loss type, unspecified laterality/4. Uses hearing aid  Followed by outside hearing specialist.    5. Allergic rhinitis, unspecified seasonality, unspecified trigger  The current medical regimen is effective;  continue present plan and medications.   -     loratadine (CLARITIN) 10 mg tablet; Take 1 tablet (10 mg total) by mouth once daily.  Dispense: 90 tablet; Refill: 0            Orders Placed This Encounter   Procedures    Ambulatory referral/consult to ENT      FOLLOW UP: Follow up if symptoms worsen or fail to improve. or sooner as needed.    The patient will follow-up as " per routine with his primary care physician for follow up of his chronic medical condition(s) and routine health maintenance.

## 2023-07-17 ENCOUNTER — TELEPHONE (OUTPATIENT)
Dept: OTOLARYNGOLOGY | Facility: CLINIC | Age: 68
End: 2023-07-17
Payer: MEDICARE

## 2023-07-17 NOTE — TELEPHONE ENCOUNTER
----- Message from Ion David Los sent at 7/17/2023  8:46 AM CDT -----  Regarding: Yves  Type:  Sooner Appointment Request     Patient is requesting a sooner appointment.  Patient declined first available appointment listed as well as another facility and provider .  Patient will not accept being placed on the waitlist and is requesting a message be sent to doctor.     Name of Caller: Yves     When is the first available appointment? 09/21    Symptoms: Ear Cleaning     Would the patient rather a call back or a response via [facility]? Callback     Best Call Back Number: [phone]      Additional Information:

## 2023-08-29 ENCOUNTER — TELEPHONE (OUTPATIENT)
Dept: FAMILY MEDICINE | Facility: CLINIC | Age: 68
End: 2023-08-29

## 2023-08-29 ENCOUNTER — OFFICE VISIT (OUTPATIENT)
Dept: FAMILY MEDICINE | Facility: CLINIC | Age: 68
End: 2023-08-29
Payer: MEDICARE

## 2023-08-29 VITALS
HEART RATE: 80 BPM | DIASTOLIC BLOOD PRESSURE: 70 MMHG | TEMPERATURE: 98 F | WEIGHT: 210.19 LBS | OXYGEN SATURATION: 96 % | SYSTOLIC BLOOD PRESSURE: 132 MMHG | BODY MASS INDEX: 29.43 KG/M2 | HEIGHT: 71 IN

## 2023-08-29 DIAGNOSIS — M54.16 LUMBAR RADICULOPATHY: ICD-10-CM

## 2023-08-29 DIAGNOSIS — I10 ESSENTIAL (PRIMARY) HYPERTENSION: Chronic | ICD-10-CM

## 2023-08-29 DIAGNOSIS — M62.838 MUSCLE SPASM: Primary | ICD-10-CM

## 2023-08-29 DIAGNOSIS — E11.29 TYPE 2 DIABETES MELLITUS WITH MICROALBUMINURIA, WITHOUT LONG-TERM CURRENT USE OF INSULIN: Chronic | ICD-10-CM

## 2023-08-29 DIAGNOSIS — M17.12 PRIMARY OSTEOARTHRITIS OF LEFT KNEE: Primary | ICD-10-CM

## 2023-08-29 DIAGNOSIS — M25.522 LEFT ELBOW PAIN: ICD-10-CM

## 2023-08-29 DIAGNOSIS — R80.9 TYPE 2 DIABETES MELLITUS WITH MICROALBUMINURIA, WITHOUT LONG-TERM CURRENT USE OF INSULIN: Chronic | ICD-10-CM

## 2023-08-29 PROBLEM — Z79.01 CHRONIC ANTICOAGULATION: Chronic | Status: ACTIVE | Noted: 2023-01-10

## 2023-08-29 PROBLEM — D58.2 ELEVATED HEMOGLOBIN: Status: RESOLVED | Noted: 2023-01-03 | Resolved: 2023-08-29

## 2023-08-29 PROBLEM — I48.0 PAF (PAROXYSMAL ATRIAL FIBRILLATION): Chronic | Status: ACTIVE | Noted: 2023-01-03

## 2023-08-29 PROBLEM — H60.92 OTITIS EXTERNA OF LEFT EAR: Status: RESOLVED | Noted: 2023-01-03 | Resolved: 2023-08-29

## 2023-08-29 PROBLEM — J06.9 VIRAL URI: Status: RESOLVED | Noted: 2023-07-14 | Resolved: 2023-08-29

## 2023-08-29 PROCEDURE — 3051F PR MOST RECENT HEMOGLOBIN A1C LEVEL 7.0 - < 8.0%: ICD-10-PCS | Mod: HCNC,CPTII,S$GLB, | Performed by: FAMILY MEDICINE

## 2023-08-29 PROCEDURE — 3008F PR BODY MASS INDEX (BMI) DOCUMENTED: ICD-10-PCS | Mod: HCNC,CPTII,S$GLB, | Performed by: FAMILY MEDICINE

## 2023-08-29 PROCEDURE — 20610 LARGE JOINT ASPIRATION/INJECTION: L KNEE: ICD-10-PCS | Mod: HCNC,LT,S$GLB, | Performed by: FAMILY MEDICINE

## 2023-08-29 PROCEDURE — 3008F BODY MASS INDEX DOCD: CPT | Mod: HCNC,CPTII,S$GLB, | Performed by: FAMILY MEDICINE

## 2023-08-29 PROCEDURE — 3075F PR MOST RECENT SYSTOLIC BLOOD PRESS GE 130-139MM HG: ICD-10-PCS | Mod: HCNC,CPTII,S$GLB, | Performed by: FAMILY MEDICINE

## 2023-08-29 PROCEDURE — 3075F SYST BP GE 130 - 139MM HG: CPT | Mod: HCNC,CPTII,S$GLB, | Performed by: FAMILY MEDICINE

## 2023-08-29 PROCEDURE — 3051F HG A1C>EQUAL 7.0%<8.0%: CPT | Mod: HCNC,CPTII,S$GLB, | Performed by: FAMILY MEDICINE

## 2023-08-29 PROCEDURE — 3288F FALL RISK ASSESSMENT DOCD: CPT | Mod: HCNC,CPTII,S$GLB, | Performed by: FAMILY MEDICINE

## 2023-08-29 PROCEDURE — 4010F PR ACE/ARB THEARPY RXD/TAKEN: ICD-10-PCS | Mod: HCNC,CPTII,S$GLB, | Performed by: FAMILY MEDICINE

## 2023-08-29 PROCEDURE — 3078F DIAST BP <80 MM HG: CPT | Mod: HCNC,CPTII,S$GLB, | Performed by: FAMILY MEDICINE

## 2023-08-29 PROCEDURE — 1125F PR PAIN SEVERITY QUANTIFIED, PAIN PRESENT: ICD-10-PCS | Mod: HCNC,CPTII,S$GLB, | Performed by: FAMILY MEDICINE

## 2023-08-29 PROCEDURE — 99999 PR PBB SHADOW E&M-EST. PATIENT-LVL V: CPT | Mod: PBBFAC,HCNC,, | Performed by: FAMILY MEDICINE

## 2023-08-29 PROCEDURE — 1159F MED LIST DOCD IN RCRD: CPT | Mod: HCNC,CPTII,S$GLB, | Performed by: FAMILY MEDICINE

## 2023-08-29 PROCEDURE — 1101F PR PT FALLS ASSESS DOC 0-1 FALLS W/OUT INJ PAST YR: ICD-10-PCS | Mod: HCNC,CPTII,S$GLB, | Performed by: FAMILY MEDICINE

## 2023-08-29 PROCEDURE — 3066F NEPHROPATHY DOC TX: CPT | Mod: HCNC,CPTII,S$GLB, | Performed by: FAMILY MEDICINE

## 2023-08-29 PROCEDURE — 3061F PR NEG MICROALBUMINURIA RESULT DOCUMENTED/REVIEW: ICD-10-PCS | Mod: HCNC,CPTII,S$GLB, | Performed by: FAMILY MEDICINE

## 2023-08-29 PROCEDURE — 1125F AMNT PAIN NOTED PAIN PRSNT: CPT | Mod: HCNC,CPTII,S$GLB, | Performed by: FAMILY MEDICINE

## 2023-08-29 PROCEDURE — 20610 DRAIN/INJ JOINT/BURSA W/O US: CPT | Mod: HCNC,LT,S$GLB, | Performed by: FAMILY MEDICINE

## 2023-08-29 PROCEDURE — 1101F PT FALLS ASSESS-DOCD LE1/YR: CPT | Mod: HCNC,CPTII,S$GLB, | Performed by: FAMILY MEDICINE

## 2023-08-29 PROCEDURE — 1159F PR MEDICATION LIST DOCUMENTED IN MEDICAL RECORD: ICD-10-PCS | Mod: HCNC,CPTII,S$GLB, | Performed by: FAMILY MEDICINE

## 2023-08-29 PROCEDURE — 99999 PR PBB SHADOW E&M-EST. PATIENT-LVL V: ICD-10-PCS | Mod: PBBFAC,HCNC,, | Performed by: FAMILY MEDICINE

## 2023-08-29 PROCEDURE — 99214 OFFICE O/P EST MOD 30 MIN: CPT | Mod: HCNC,25,S$GLB, | Performed by: FAMILY MEDICINE

## 2023-08-29 PROCEDURE — 3066F PR DOCUMENTATION OF TREATMENT FOR NEPHROPATHY: ICD-10-PCS | Mod: HCNC,CPTII,S$GLB, | Performed by: FAMILY MEDICINE

## 2023-08-29 PROCEDURE — 3061F NEG MICROALBUMINURIA REV: CPT | Mod: HCNC,CPTII,S$GLB, | Performed by: FAMILY MEDICINE

## 2023-08-29 PROCEDURE — 3078F PR MOST RECENT DIASTOLIC BLOOD PRESSURE < 80 MM HG: ICD-10-PCS | Mod: HCNC,CPTII,S$GLB, | Performed by: FAMILY MEDICINE

## 2023-08-29 PROCEDURE — 99214 PR OFFICE/OUTPT VISIT, EST, LEVL IV, 30-39 MIN: ICD-10-PCS | Mod: HCNC,25,S$GLB, | Performed by: FAMILY MEDICINE

## 2023-08-29 PROCEDURE — 4010F ACE/ARB THERAPY RXD/TAKEN: CPT | Mod: HCNC,CPTII,S$GLB, | Performed by: FAMILY MEDICINE

## 2023-08-29 PROCEDURE — 3288F PR FALLS RISK ASSESSMENT DOCUMENTED: ICD-10-PCS | Mod: HCNC,CPTII,S$GLB, | Performed by: FAMILY MEDICINE

## 2023-08-29 RX ORDER — TRIAMCINOLONE ACETONIDE 40 MG/ML
40 INJECTION, SUSPENSION INTRA-ARTICULAR; INTRAMUSCULAR
Status: DISCONTINUED | OUTPATIENT
Start: 2023-08-29 | End: 2023-08-29 | Stop reason: HOSPADM

## 2023-08-29 RX ORDER — METHOCARBAMOL 500 MG/1
500 TABLET, FILM COATED ORAL 3 TIMES DAILY PRN
Qty: 30 TABLET | Refills: 0 | Status: SHIPPED | OUTPATIENT
Start: 2023-08-29 | End: 2023-12-13

## 2023-08-29 RX ADMIN — TRIAMCINOLONE ACETONIDE 40 MG: 40 INJECTION, SUSPENSION INTRA-ARTICULAR; INTRAMUSCULAR at 02:08

## 2023-08-29 NOTE — TELEPHONE ENCOUNTER
----- Message from Deidre Moraes sent at 8/29/2023  2:53 PM CDT -----  Regarding: rx  Name of who is calling:   King      What is the request in detail: Pt is requesting a call back in ref to rx for arm pain is not at the pharmacy      Can the clinic reply by [facility]:yes      What number to call back if not [facility]:[phone]

## 2023-08-29 NOTE — PROGRESS NOTES
[facility]  Progress Note    SUBJECTIVE:     Chief Complaint   Patient presents with    Arm Pain    Knee Pain    Otalgia       HPI   King Cool Jr.  is a 68 y.o. male here c/o L knee pain and L arm pain. Requesting knee injection today. The L elbow has been on/off. Rating pain as moderate. No falls/trauma. Certain positions make it worst.     Review of patient's allergies indicates:   Allergen Reactions    Tamiflu [oseltamivir]      Increases BP    Metformin Hives     Diarrhea, nausea    Penicillins Rash       Past Medical History:   Diagnosis Date    Coronary artery disease involving native coronary artery of native heart without angina pectoris 3/7/2023    Essential (primary) hypertension     Male erectile dysfunction, unspecified     New onset a-fib 1/3/2023    Testicular hypofunction     Type 2 diabetes mellitus without complications      Past Surgical History:   Procedure Laterality Date    HIP SURGERY      LEFT HEART CATHETERIZATION Left 3/7/2023    Procedure: Left heart cath;  Surgeon: Wil Mahoney MD;  Location: [facility] CATH LAB;  Service: Cardiology;  Laterality: Left;  1030am start, R rad access    RN PREOP 23     Family History   Problem Relation Age of Onset    Blindness Mother     No Known Problems Father     No Known Problems Brother     No Known Problems Daughter     No Known Problems Son     No Known Problems Daughter     Diabetes Brother     No Known Problems Brother     Amblyopia Neg Hx     Cancer Neg Hx     Cataracts Neg Hx     Glaucoma Neg Hx     Hypertension Neg Hx     Macular degeneration Neg Hx     Retinal detachment Neg Hx     Strabismus Neg Hx     Stroke Neg Hx     Thyroid disease Neg Hx      Social History     Tobacco Use    Smoking status: Former     Current packs/day: 0.00     Average packs/day: 0.3 packs/day for 4.0 years (1.0 ttl pk-yrs)     Types: Cigarettes     Start date: 1972     Quit date: 1976     Years since quittin.7    Smokeless tobacco:  Never   Substance Use Topics    Alcohol use: Not Currently     Comment: Pt. has a Hx. of  Alcohol use.    Drug use: No        Review of Systems   Constitutional:  Negative for chills and fever.   HENT: Negative.     Respiratory: Negative.  Negative for shortness of breath.    Cardiovascular: Negative.  Negative for chest pain.   Gastrointestinal: Negative.  Negative for abdominal pain, nausea and vomiting.   Genitourinary: Negative.    Musculoskeletal:  Positive for joint pain (L knee and L elbow). Negative for falls.   Neurological:  Negative for headaches.   All other systems reviewed and are negative.    OBJECTIVE:     Vitals:    08/29/23 1354   BP: 132/70   Pulse: 80   Temp: 98 °F (36.7 °C)     Body mass index is 29.32 kg/m².    Physical Exam  Constitutional:       General: He is in acute distress (mild).   HENT:      Head: Normocephalic and atraumatic.   Musculoskeletal:         General: Tenderness (to palpation of L medial tibial-femoral compartment, decreased joint space. ACL/PCL intact, negative McMurrays sign) present.      Comments: Good ROM of L elbow. No obvious fractures, dislocations. No true point tenderness     Skin:     Findings: No rash.       Old records were reviewed. Labs and/or images were independently reviewed.    ASSESSMENT     1. Primary osteoarthritis of left knee    2. Left elbow pain    3. Lumbar radiculopathy    4. Type 2 diabetes mellitus with microalbuminuria, without long-term current use of insulin    5. Essential (primary) hypertension        PLAN:     Primary osteoarthritis of left knee  -     Large Joint Aspiration/Injection: L knee  -     triamcinolone acetonide injection 40 mg  -     ICE, rest, knee brace as discussed.    Left elbow pain  -     X-Ray Elbow Complete Left; Future; Expected date: 08/29/2023    Lumbar radiculopathy  -     Ambulatory referral/consult to Orthopedics; Future; Expected date: 09/05/2023  -     will defer MRI to orthopedics.    Type 2 diabetes mellitus  with microalbuminuria, without long-term current use of insulin   -     Instructed patient to take daily glucose AM logs and to write them down to bring with on next visit. Advised patient to decrease intake of carbohydrates/simple sugars.         Essential (primary) hypertension   -     Stable. Continue current regimen.    RTC TYLOR Lraa MD  08/29/2023 2:10 PM

## 2023-08-29 NOTE — PROCEDURES
Large Joint Aspiration/Injection: L knee    Date/Time: 8/29/2023 2:20 PM    Performed by: Gallo Lara MD  Authorized by: Gallo Lara MD    Consent Done?:  Yes (Verbal)  Indications:  Pain  Site marked: the procedure site was marked    Timeout: prior to procedure the correct patient, procedure, and site was verified      Details:  Needle Size:  25 G  Approach:  Anterolateral  Location:  Knee  Site:  L knee  Medications:  40 mg triamcinolone acetonide 40 mg/mL  Patient tolerance:  Patient tolerated the procedure well with no immediate complications

## 2023-08-30 ENCOUNTER — TELEPHONE (OUTPATIENT)
Dept: OTOLARYNGOLOGY | Facility: CLINIC | Age: 68
End: 2023-08-30
Payer: MEDICARE

## 2023-08-30 ENCOUNTER — HOSPITAL ENCOUNTER (OUTPATIENT)
Dept: RADIOLOGY | Facility: HOSPITAL | Age: 68
Discharge: HOME | End: 2023-08-30
Payer: MEDICARE

## 2023-08-30 ENCOUNTER — PATIENT MESSAGE (OUTPATIENT)
Dept: FAMILY MEDICINE | Facility: CLINIC | Age: 68
End: 2023-08-30
Payer: MEDICARE

## 2023-08-30 DIAGNOSIS — M25.522 LEFT ELBOW PAIN: ICD-10-CM

## 2023-08-30 DIAGNOSIS — M25.522 LEFT ELBOW PAIN: Primary | ICD-10-CM

## 2023-08-30 PROCEDURE — 73080 X-RAY EXAM OF ELBOW: CPT | Mod: TC,HCNC,FY,PO,LT

## 2023-08-30 PROCEDURE — 73080 XR ELBOW COMPLETE 3 VIEW LEFT: ICD-10-PCS | Mod: 26,HCNC,LT, | Performed by: RADIOLOGY

## 2023-08-30 PROCEDURE — 73080 X-RAY EXAM OF ELBOW: CPT | Mod: 26,HCNC,LT, | Performed by: RADIOLOGY

## 2023-09-28 ENCOUNTER — OFFICE VISIT (OUTPATIENT)
Dept: ORTHOPEDICS | Facility: CLINIC | Age: 68
End: 2023-09-28
Payer: MEDICARE

## 2023-09-28 DIAGNOSIS — M25.522 LEFT ELBOW PAIN: ICD-10-CM

## 2023-09-28 PROCEDURE — 1160F RVW MEDS BY RX/DR IN RCRD: CPT | Mod: CPTII,S$GLB,, | Performed by: ORTHOPAEDIC SURGERY

## 2023-09-28 PROCEDURE — 3066F NEPHROPATHY DOC TX: CPT | Mod: CPTII,S$GLB,, | Performed by: ORTHOPAEDIC SURGERY

## 2023-09-28 PROCEDURE — 1126F PR PAIN SEVERITY QUANTIFIED, NO PAIN PRESENT: ICD-10-PCS | Mod: CPTII,S$GLB,, | Performed by: ORTHOPAEDIC SURGERY

## 2023-09-28 PROCEDURE — 99213 PR OFFICE/OUTPT VISIT, EST, LEVL III, 20-29 MIN: ICD-10-PCS | Mod: S$GLB,,, | Performed by: ORTHOPAEDIC SURGERY

## 2023-09-28 PROCEDURE — 99999 PR PBB SHADOW E&M-EST. PATIENT-LVL IV: CPT | Mod: PBBFAC,,, | Performed by: ORTHOPAEDIC SURGERY

## 2023-09-28 PROCEDURE — 3061F PR NEG MICROALBUMINURIA RESULT DOCUMENTED/REVIEW: ICD-10-PCS | Mod: CPTII,S$GLB,, | Performed by: ORTHOPAEDIC SURGERY

## 2023-09-28 PROCEDURE — 99213 OFFICE O/P EST LOW 20 MIN: CPT | Mod: S$GLB,,, | Performed by: ORTHOPAEDIC SURGERY

## 2023-09-28 PROCEDURE — 1159F PR MEDICATION LIST DOCUMENTED IN MEDICAL RECORD: ICD-10-PCS | Mod: CPTII,S$GLB,, | Performed by: ORTHOPAEDIC SURGERY

## 2023-09-28 PROCEDURE — 3288F FALL RISK ASSESSMENT DOCD: CPT | Mod: CPTII,S$GLB,, | Performed by: ORTHOPAEDIC SURGERY

## 2023-09-28 PROCEDURE — 3066F PR DOCUMENTATION OF TREATMENT FOR NEPHROPATHY: ICD-10-PCS | Mod: CPTII,S$GLB,, | Performed by: ORTHOPAEDIC SURGERY

## 2023-09-28 PROCEDURE — 3051F PR MOST RECENT HEMOGLOBIN A1C LEVEL 7.0 - < 8.0%: ICD-10-PCS | Mod: CPTII,S$GLB,, | Performed by: ORTHOPAEDIC SURGERY

## 2023-09-28 PROCEDURE — 3061F NEG MICROALBUMINURIA REV: CPT | Mod: CPTII,S$GLB,, | Performed by: ORTHOPAEDIC SURGERY

## 2023-09-28 PROCEDURE — 99999 PR PBB SHADOW E&M-EST. PATIENT-LVL IV: ICD-10-PCS | Mod: PBBFAC,,, | Performed by: ORTHOPAEDIC SURGERY

## 2023-09-28 PROCEDURE — 4010F ACE/ARB THERAPY RXD/TAKEN: CPT | Mod: CPTII,S$GLB,, | Performed by: ORTHOPAEDIC SURGERY

## 2023-09-28 PROCEDURE — 1159F MED LIST DOCD IN RCRD: CPT | Mod: CPTII,S$GLB,, | Performed by: ORTHOPAEDIC SURGERY

## 2023-09-28 PROCEDURE — 1160F PR REVIEW ALL MEDS BY PRESCRIBER/CLIN PHARMACIST DOCUMENTED: ICD-10-PCS | Mod: CPTII,S$GLB,, | Performed by: ORTHOPAEDIC SURGERY

## 2023-09-28 PROCEDURE — 1101F PT FALLS ASSESS-DOCD LE1/YR: CPT | Mod: CPTII,S$GLB,, | Performed by: ORTHOPAEDIC SURGERY

## 2023-09-28 PROCEDURE — 1101F PR PT FALLS ASSESS DOC 0-1 FALLS W/OUT INJ PAST YR: ICD-10-PCS | Mod: CPTII,S$GLB,, | Performed by: ORTHOPAEDIC SURGERY

## 2023-09-28 PROCEDURE — 4010F PR ACE/ARB THEARPY RXD/TAKEN: ICD-10-PCS | Mod: CPTII,S$GLB,, | Performed by: ORTHOPAEDIC SURGERY

## 2023-09-28 PROCEDURE — 3051F HG A1C>EQUAL 7.0%<8.0%: CPT | Mod: CPTII,S$GLB,, | Performed by: ORTHOPAEDIC SURGERY

## 2023-09-28 PROCEDURE — 3288F PR FALLS RISK ASSESSMENT DOCUMENTED: ICD-10-PCS | Mod: CPTII,S$GLB,, | Performed by: ORTHOPAEDIC SURGERY

## 2023-09-28 PROCEDURE — 1126F AMNT PAIN NOTED NONE PRSNT: CPT | Mod: CPTII,S$GLB,, | Performed by: ORTHOPAEDIC SURGERY
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
## 2023-10-02 DIAGNOSIS — R80.9 TYPE 2 DIABETES MELLITUS WITH MICROALBUMINURIA, WITHOUT LONG-TERM CURRENT USE OF INSULIN: Chronic | ICD-10-CM

## 2023-10-02 DIAGNOSIS — E11.29 TYPE 2 DIABETES MELLITUS WITH MICROALBUMINURIA, WITHOUT LONG-TERM CURRENT USE OF INSULIN: Chronic | ICD-10-CM

## 2023-10-02 DIAGNOSIS — M17.0 PRIMARY OSTEOARTHRITIS OF BOTH KNEES: ICD-10-CM

## 2023-10-02 RX ORDER — PRAVASTATIN SODIUM 20 MG/1
20 TABLET ORAL
Qty: 90 TABLET | Refills: 3 | OUTPATIENT
Start: 2023-10-02

## 2023-10-02 NOTE — TELEPHONE ENCOUNTER
Care Due:                  Date            Visit Type   Department     Provider  --------------------------------------------------------------------------------                                [facility]                              PRIMARY      MED/ INTERNAL  Last Visit: 08-      CARE ([facility])   MED/ PEDS      PRERNA CHRISTIE                              [facility]                              PRIMARY      MED/ INTERNAL  Next Visit: 10-      CARE ([facility])   MED/ PEDS      PRERNA CHRISTIE                                                            Last  Test          Frequency    Reason                     Performed    Due Date  --------------------------------------------------------------------------------    HBA1C.......  6 months...  empagliflozin,             06- 12-                             glimepiride, semaglutide.    [facility] Embedded Care Due Messages. Reference number: [id number].   10/02/2023 12:56:46 AM CDT

## 2023-10-02 NOTE — TELEPHONE ENCOUNTER
----- Message from Elsie Miller sent at 10/2/2023 10:25 AM CDT -----  Regarding: Refill request  .Type: RX Refill Request    Who Called:self     Have you contacted your pharmacy:yes     Refill or New Rx: Refill    RX Name and Strength:HYDROcodone-acetaminophen (NORCO)  mg per tablet    Preferred Pharmacy with phone number:.  [facility]/pharmacy #[id number] - [place] - [address] [facility]  [address] [place] [address]  Phone: [phone] Fax: [phone]    Local or Mail Order:local     Ordering Provider:NEFTALI Lara     Would the patient rather a call back or a response via [facility]? Call     Best Call Back Number:.[phone]      Additional Information:

## 2023-10-02 NOTE — TELEPHONE ENCOUNTER
Provider Staff:  Action required for this patient     Please see care gap opportunities below in Care Due Message.    Thanks!  [facility]     Appointments      Date Provider   Last Visit   8/29/2023 Gallo Lara MD   Next Visit   10/5/2023 Gallo Lara MD     Refill Decision Note   King Cool  is requesting a refill authorization.  Brief Assessment and Rationale for Refill:  Quick Discontinue     Medication Therapy Plan:  discontinued on 2/1/2023 by Wil Mahoney MD      Comments:     Note composed:5:40 AM 10/02/2023             Appointments     Last Visit   8/29/2023 Gallo Lara MD   Next Visit   10/5/2023 Gallo Lara MD

## 2023-10-02 NOTE — TELEPHONE ENCOUNTER
No care due was identified.  [facility] Embedded Care Due Messages. Reference number: [id number].   10/02/2023 10:24:02 AM CDT

## 2023-10-03 RX ORDER — HYDROCODONE BITARTRATE AND ACETAMINOPHEN 10; 325 MG/1; MG/1
1 TABLET ORAL 3 TIMES DAILY PRN
Qty: 72 TABLET | Refills: 0 | Status: SHIPPED | OUTPATIENT
Start: 2023-10-03 | End: 2023-11-01 | Stop reason: SDUPTHER

## 2023-10-03 NOTE — TELEPHONE ENCOUNTER
----- Message from Kierra Issa sent at 10/2/2023  4:41 PM CDT -----  Regarding: Return Call  .Type:  Patient Returning Call    Who Called:  Self     Who Left Message for Patient:  NONA    Does the patient know what this is regarding?: Yes     Would the patient rather a call back or a response via [facility]? Call     Best Call Back Number:  [phone]

## 2023-10-03 NOTE — TELEPHONE ENCOUNTER
----- Message from Indu Cornejo sent at 10/3/2023 12:45 PM CDT -----  Regarding: [id number]  Type: RX Refill Request    Who Called: self    Have you contacted your pharmacy:yes    Refill or New Rx:refill    RX Name and Strength:HYDROcodone-acetaminophen (NORCO)  mg per tablet        Preferred Pharmacy with phone number:  [facility]/pharmacy #[id number] - [place] - [address] [facility]  [address]  [place] [address]  Phone: [phone] Fax: [phone]        Local or Mail Order:local    Ordering Provider:Jane     Would the patient rather a call back or a response [facility]? Callback     Best Call Back Number:[phone]      Additional Information: Pt states he is reminding the provider to put in his refill

## 2023-10-03 NOTE — TELEPHONE ENCOUNTER
Quick DC. Inappropriate Request    Refill Authorization Note   King Cool  is requesting a refill authorization.  Brief Assessment and Rationale for Refill:  Quick Discontinue  Medication Therapy Plan:  discontinued on 2/1/2023 by Wil Mahoney MD    Medication Reconciliation Completed:  No      Comments:     Note composed:7:35 PM 10/02/2023

## 2023-10-04 DIAGNOSIS — M25.561 CHRONIC PAIN OF RIGHT KNEE: ICD-10-CM

## 2023-10-04 DIAGNOSIS — G89.29 CHRONIC PAIN OF RIGHT KNEE: ICD-10-CM

## 2023-10-04 NOTE — TELEPHONE ENCOUNTER
----- Message from Isabelle Shah sent at 10/4/2023  2:16 PM CDT -----  Regarding: self .[phone]  .Type: RX Refill Request    Who Called: self     Have you contacted your pharmacy:yes     Refill or New Rx: refill     RX Name and Strength: gabapentin (NEURONTIN) 300 MG capsule      Preferred Pharmacy with phone number .  [facility]/pharmacy #[id number] - [place] - [address]  [address] [place] [address]  Phone: [phone] Fax: [phone]    Local or Mail Order: local     Would the patient rather a call back or a response via [facility]? Call back     Best Call Back Number: .[phone]

## 2023-10-04 NOTE — TELEPHONE ENCOUNTER
No care due was identified.  [facility] Embedded Care Due Messages. Reference number: [id number].   10/04/2023 2:08:57 PM CDT

## 2023-10-05 ENCOUNTER — OFFICE VISIT (OUTPATIENT)
Dept: FAMILY MEDICINE | Facility: CLINIC | Age: 68
End: 2023-10-05
Payer: MEDICARE

## 2023-10-05 VITALS
OXYGEN SATURATION: 98 % | DIASTOLIC BLOOD PRESSURE: 70 MMHG | WEIGHT: 210 LBS | HEIGHT: 71 IN | HEART RATE: 92 BPM | SYSTOLIC BLOOD PRESSURE: 136 MMHG | BODY MASS INDEX: 29.4 KG/M2 | TEMPERATURE: 98 F

## 2023-10-05 DIAGNOSIS — M25.561 CHRONIC PAIN OF RIGHT KNEE: ICD-10-CM

## 2023-10-05 DIAGNOSIS — M17.11 OSTEOARTHRITIS OF RIGHT KNEE, UNSPECIFIED OSTEOARTHRITIS TYPE: Primary | ICD-10-CM

## 2023-10-05 DIAGNOSIS — G89.29 CHRONIC PAIN OF RIGHT KNEE: ICD-10-CM

## 2023-10-05 PROCEDURE — 4010F ACE/ARB THERAPY RXD/TAKEN: CPT | Mod: CPTII,S$GLB,, | Performed by: FAMILY MEDICINE

## 2023-10-05 PROCEDURE — 3075F PR MOST RECENT SYSTOLIC BLOOD PRESS GE 130-139MM HG: ICD-10-PCS | Mod: CPTII,S$GLB,, | Performed by: FAMILY MEDICINE

## 2023-10-05 PROCEDURE — 20610 LARGE JOINT ASPIRATION/INJECTION: R KNEE: ICD-10-PCS | Mod: RT,S$GLB,, | Performed by: FAMILY MEDICINE

## 2023-10-05 PROCEDURE — 3051F PR MOST RECENT HEMOGLOBIN A1C LEVEL 7.0 - < 8.0%: ICD-10-PCS | Mod: CPTII,S$GLB,, | Performed by: FAMILY MEDICINE

## 2023-10-05 PROCEDURE — 3061F PR NEG MICROALBUMINURIA RESULT DOCUMENTED/REVIEW: ICD-10-PCS | Mod: CPTII,S$GLB,, | Performed by: FAMILY MEDICINE

## 2023-10-05 PROCEDURE — 99214 PR OFFICE/OUTPT VISIT, EST, LEVL IV, 30-39 MIN: ICD-10-PCS | Mod: 25,S$GLB,, | Performed by: FAMILY MEDICINE

## 2023-10-05 PROCEDURE — 99999 PR PBB SHADOW E&M-EST. PATIENT-LVL IV: ICD-10-PCS | Mod: PBBFAC,,, | Performed by: FAMILY MEDICINE

## 2023-10-05 PROCEDURE — 99214 OFFICE O/P EST MOD 30 MIN: CPT | Mod: 25,S$GLB,, | Performed by: FAMILY MEDICINE

## 2023-10-05 PROCEDURE — 1101F PT FALLS ASSESS-DOCD LE1/YR: CPT | Mod: CPTII,S$GLB,, | Performed by: FAMILY MEDICINE

## 2023-10-05 PROCEDURE — 1159F MED LIST DOCD IN RCRD: CPT | Mod: CPTII,S$GLB,, | Performed by: FAMILY MEDICINE

## 2023-10-05 PROCEDURE — 3066F PR DOCUMENTATION OF TREATMENT FOR NEPHROPATHY: ICD-10-PCS | Mod: CPTII,S$GLB,, | Performed by: FAMILY MEDICINE

## 2023-10-05 PROCEDURE — 3078F PR MOST RECENT DIASTOLIC BLOOD PRESSURE < 80 MM HG: ICD-10-PCS | Mod: CPTII,S$GLB,, | Performed by: FAMILY MEDICINE

## 2023-10-05 PROCEDURE — 1125F PR PAIN SEVERITY QUANTIFIED, PAIN PRESENT: ICD-10-PCS | Mod: CPTII,S$GLB,, | Performed by: FAMILY MEDICINE

## 2023-10-05 PROCEDURE — 1125F AMNT PAIN NOTED PAIN PRSNT: CPT | Mod: CPTII,S$GLB,, | Performed by: FAMILY MEDICINE

## 2023-10-05 PROCEDURE — 3078F DIAST BP <80 MM HG: CPT | Mod: CPTII,S$GLB,, | Performed by: FAMILY MEDICINE

## 2023-10-05 PROCEDURE — 3075F SYST BP GE 130 - 139MM HG: CPT | Mod: CPTII,S$GLB,, | Performed by: FAMILY MEDICINE

## 2023-10-05 PROCEDURE — 99999 PR PBB SHADOW E&M-EST. PATIENT-LVL IV: CPT | Mod: PBBFAC,,, | Performed by: FAMILY MEDICINE

## 2023-10-05 PROCEDURE — 3288F PR FALLS RISK ASSESSMENT DOCUMENTED: ICD-10-PCS | Mod: CPTII,S$GLB,, | Performed by: FAMILY MEDICINE

## 2023-10-05 PROCEDURE — 20610 DRAIN/INJ JOINT/BURSA W/O US: CPT | Mod: RT,S$GLB,, | Performed by: FAMILY MEDICINE

## 2023-10-05 PROCEDURE — 3061F NEG MICROALBUMINURIA REV: CPT | Mod: CPTII,S$GLB,, | Performed by: FAMILY MEDICINE

## 2023-10-05 PROCEDURE — 1101F PR PT FALLS ASSESS DOC 0-1 FALLS W/OUT INJ PAST YR: ICD-10-PCS | Mod: CPTII,S$GLB,, | Performed by: FAMILY MEDICINE

## 2023-10-05 PROCEDURE — 3008F BODY MASS INDEX DOCD: CPT | Mod: CPTII,S$GLB,, | Performed by: FAMILY MEDICINE

## 2023-10-05 PROCEDURE — 3288F FALL RISK ASSESSMENT DOCD: CPT | Mod: CPTII,S$GLB,, | Performed by: FAMILY MEDICINE

## 2023-10-05 PROCEDURE — 3066F NEPHROPATHY DOC TX: CPT | Mod: CPTII,S$GLB,, | Performed by: FAMILY MEDICINE

## 2023-10-05 PROCEDURE — 4010F PR ACE/ARB THEARPY RXD/TAKEN: ICD-10-PCS | Mod: CPTII,S$GLB,, | Performed by: FAMILY MEDICINE

## 2023-10-05 PROCEDURE — 3008F PR BODY MASS INDEX (BMI) DOCUMENTED: ICD-10-PCS | Mod: CPTII,S$GLB,, | Performed by: FAMILY MEDICINE

## 2023-10-05 PROCEDURE — 1159F PR MEDICATION LIST DOCUMENTED IN MEDICAL RECORD: ICD-10-PCS | Mod: CPTII,S$GLB,, | Performed by: FAMILY MEDICINE

## 2023-10-05 PROCEDURE — 3051F HG A1C>EQUAL 7.0%<8.0%: CPT | Mod: CPTII,S$GLB,, | Performed by: FAMILY MEDICINE

## 2023-10-05 RX ORDER — GABAPENTIN 300 MG/1
300 CAPSULE ORAL 3 TIMES DAILY
Qty: 270 CAPSULE | Refills: 1 | Status: SHIPPED | OUTPATIENT
Start: 2023-10-05 | End: 2023-10-05

## 2023-10-05 RX ORDER — TRIAMCINOLONE ACETONIDE 40 MG/ML
40 INJECTION, SUSPENSION INTRA-ARTICULAR; INTRAMUSCULAR
Status: DISCONTINUED | OUTPATIENT
Start: 2023-10-05 | End: 2023-10-05 | Stop reason: HOSPADM

## 2023-10-05 RX ORDER — GABAPENTIN 300 MG/1
300 CAPSULE ORAL 3 TIMES DAILY
Qty: 270 CAPSULE | Refills: 2 | Status: SHIPPED | OUTPATIENT
Start: 2023-10-05 | End: 2024-03-11 | Stop reason: SDUPTHER

## 2023-10-05 RX ADMIN — TRIAMCINOLONE ACETONIDE 40 MG: 40 INJECTION, SUSPENSION INTRA-ARTICULAR; INTRAMUSCULAR at 09:10

## 2023-10-05 NOTE — PROCEDURES
Large Joint Aspiration/Injection: R knee    Date/Time: 10/5/2023 9:40 AM    Performed by: Gallo Lara MD  Authorized by: Gallo Lara MD    Consent Done?:  Yes (Verbal)  Indications:  Pain  Site marked: the procedure site was marked    Timeout: prior to procedure the correct patient, procedure, and site was verified      Details:  Needle Size:  25 G  Approach:  Anterolateral  Location:  Knee  Site:  R knee  Medications:  40 mg triamcinolone acetonide 40 mg/mL  Patient tolerance:  Patient tolerated the procedure well with no immediate complications

## 2023-10-05 NOTE — PROGRESS NOTES
[facility]  Progress Note    SUBJECTIVE:     Chief Complaint   Patient presents with    Knee Pain     right       HPI   King Cool Jr.  is a 68 y.o. male here for c/o R knee pain. Requesting knee injection today. Rates pain as moderate-severe. Walking/standing long periods make it worst. Patient has no other new complaints/problems at this time.      Review of patient's allergies indicates:   Allergen Reactions    Tamiflu [oseltamivir]      Increases BP    Metformin Hives     Diarrhea, nausea    Penicillins Rash       Past Medical History:   Diagnosis Date    Coronary artery disease involving native coronary artery of native heart without angina pectoris 3/7/2023    Essential (primary) hypertension     Male erectile dysfunction, unspecified     New onset a-fib 1/3/2023    Testicular hypofunction     Type 2 diabetes mellitus without complications      Past Surgical History:   Procedure Laterality Date    HIP SURGERY      LEFT HEART CATHETERIZATION Left 3/7/2023    Procedure: Left heart cath;  Surgeon: Wil Mahoney MD;  Location: [facility] CATH LAB;  Service: Cardiology;  Laterality: Left;  1030am start, R rad access    RN PREOP 23     Family History   Problem Relation Age of Onset    Blindness Mother     No Known Problems Father     No Known Problems Brother     No Known Problems Daughter     No Known Problems Son     No Known Problems Daughter     Diabetes Brother     No Known Problems Brother     Amblyopia Neg Hx     Cancer Neg Hx     Cataracts Neg Hx     Glaucoma Neg Hx     Hypertension Neg Hx     Macular degeneration Neg Hx     Retinal detachment Neg Hx     Strabismus Neg Hx     Stroke Neg Hx     Thyroid disease Neg Hx      Social History     Tobacco Use    Smoking status: Former     Current packs/day: 0.00     Average packs/day: 0.3 packs/day for 4.0 years (1.0 ttl pk-yrs)     Types: Cigarettes     Start date: 1972     Quit date: 1976     Years since quittin.8    Smokeless  tobacco: Never   Substance Use Topics    Alcohol use: Not Currently     Comment: Pt. has a Hx. of  Alcohol use.    Drug use: No        Review of Systems   Constitutional:  Negative for chills and fever.   HENT: Negative.     Respiratory: Negative.  Negative for shortness of breath.    Cardiovascular: Negative.  Negative for chest pain.   Gastrointestinal: Negative.  Negative for abdominal pain, nausea and vomiting.   Genitourinary: Negative.    Musculoskeletal:  Positive for joint pain (R knee).   Neurological:  Negative for headaches.   All other systems reviewed and are negative.    OBJECTIVE:     Vitals:    10/05/23 0939   BP: 136/70   Pulse: 92   Temp: 98.2 °F (36.8 °C)     Body mass index is 29.29 kg/m².    Physical Exam  Constitutional:       General: He is in acute distress (mild).      Appearance: He is not diaphoretic.   Musculoskeletal:         General: Tenderness (to palpation of R medial tibial-femoral compartment, decreased joint space. ACL/PCL intact, negative McMurrays sign) present.   Skin:     Findings: No erythema or rash.   Neurological:      Mental Status: He is alert and oriented to person, place, and time.         Old records were reviewed. Labs and/or images were independently reviewed.    ASSESSMENT     1. Osteoarthritis of right knee, unspecified osteoarthritis type    2. Chronic pain of right knee        PLAN:     Osteoarthritis of right knee, unspecified osteoarthritis type  -     Large Joint Aspiration/Injection: R knee  -     triamcinolone acetonide injection 40 mg  -     Patient counseled on good posture and stretching exercises. Continue to place ice packs on affected areas 3-4 times daily. Take medications as prescribed. Instructed patient to call MD if symptoms persist or worsen.    Chronic pain of right knee  -     gabapentin (NEURONTIN) 300 MG capsule; Take 1 capsule (300 mg total) by mouth 3 (three) times daily.  Dispense: 270 capsule; Refill: 1  -     Stable. Continue current  regimen.    RTC PRN    Gallo Lara MD  10/05/2023 9:56 AM

## 2023-10-19 ENCOUNTER — NURSE TRIAGE (OUTPATIENT)
Dept: ADMINISTRATIVE | Facility: CLINIC | Age: 68
End: 2023-10-19
Payer: MEDICARE

## 2023-10-19 NOTE — TELEPHONE ENCOUNTER
[place]    PCP:  Dr. Gallo Lara    He reports taking Xarelto.  C/O nosebleeds Q morning when he wakes up.  He started taking the Xarelto every other day and the nosebleeds resolved.  He denies any symptoms at this time.  Per protocol, care advised is  when office open.  Pt VU.  Advised to call for worsening/questions/concerns.  VU.     Reason for Disposition   [1] Caller has NON-URGENT medicine question about med that PCP prescribed AND [2] triager unable to answer question    Additional Information   Negative: [1] Intentional drug overdose AND [2] suicidal thoughts or ideas   Negative: MORE THAN A DOUBLE DOSE of a prescription or over-the-counter (OTC) drug   Negative: [1] DOUBLE DOSE (an extra dose or lesser amount) of prescription drug AND [2] any symptoms (e.g., dizziness, nausea, pain, sleepiness)   Negative: [1] DOUBLE DOSE (an extra dose or lesser amount) of over-the-counter (OTC) drug AND [2] any symptoms (e.g., dizziness, nausea, pain, sleepiness)   Negative: Took another person's prescription drug   Negative: [1] DOUBLE DOSE (an extra dose or lesser amount) of prescription drug AND [2] NO symptoms  (Exception: A double dose of antibiotics.)   Negative: Diabetes drug error or overdose (e.g., took wrong type of insulin or took extra dose)   Negative: [1] Prescription not at pharmacy AND [2] was prescribed by PCP recently (Exception: Triager has access to EMR and prescription is recorded there. Go to Home Care and confirm for pharmacy.)   Negative: [1] Pharmacy calling with prescription question AND [2] triager unable to answer question   Negative: [1] Caller has URGENT medicine question about med that PCP or specialist prescribed AND [2] triager unable to answer question   Negative: Medicine patch causing local rash or itching   Negative: [1] Caller has medicine question about med NOT prescribed by PCP AND [2] triager unable to answer question (e.g., compatibility with other med, storage)    Negative: Prescription request for new medicine (not a refill)    Protocols used: Medication Question Call-A-AH

## 2023-10-20 ENCOUNTER — TELEPHONE (OUTPATIENT)
Dept: CARDIOLOGY | Facility: CLINIC | Age: 68
End: 2023-10-20
Payer: MEDICARE

## 2023-10-20 DIAGNOSIS — R04.0 EPISTAXIS: Primary | ICD-10-CM

## 2023-10-20 DIAGNOSIS — Z79.01 ANTICOAGULATED: ICD-10-CM

## 2023-10-20 NOTE — PROGRESS NOTES
Patient complaining of epistaxis with Xarelto, and has dropped the dose to every other day.  We explained to him that this is ineffective for preventing stroke in atrial fibrillation.  He refuses to take the Xarelto daily.      I will change Xarelto to Eliquis 5 mg b.i.d..  I will also order an ENT consultation.

## 2023-10-20 NOTE — TELEPHONE ENCOUNTER
Ma Spoke with patient in regards to a nose bleed that the medication xarelto was causing. Ma advise the patient that  Stated that he advise the patient to take Xarelto daily for it to be effective. And that the patient should try some saline nasal spray to try to keep his nasal airway moist and to see ENT if needed.When Ma called to give the patient advice patient stated that he stopped the medication Xarelto daily and that he take the medication every other day and it has now stopped the nose bleed. Ma reached to patient again to schedule ENT appointment that is on 11/01/2023 and to let him know that  has made a medication change to Eliquis and to schedule office visit.

## 2023-10-23 ENCOUNTER — TELEPHONE (OUTPATIENT)
Dept: FAMILY MEDICINE | Facility: CLINIC | Age: 68
End: 2023-10-23
Payer: MEDICARE

## 2023-10-23 DIAGNOSIS — R04.0 EPISTAXIS: Primary | ICD-10-CM

## 2023-10-23 NOTE — TELEPHONE ENCOUNTER
----- Message from Isabelle Sahh sent at 10/23/2023 11:11 AM CDT -----  Regarding: self [phone]  .Type:  Patient Returning Call    Who Called: self     Who Left Message for Patient:  nurse    Does the patient know what this is regarding? no    Would the patient rather a call back or a response via [facility]? Call back     Best Call Back Number: .[phone]

## 2023-10-23 NOTE — TELEPHONE ENCOUNTER
----- Message from Sammie Malhotra sent at 10/23/2023  4:13 PM CDT -----  Type:  Patient Returning Call    Who Called: pt     Who Left Message for Patient: don     Does the patient know what this is regarding?:no    Would the patient rather a call back or a response via [facility]? call    Best Call Back Number:[phone] (home)       Additional Information:

## 2023-10-31 ENCOUNTER — OFFICE VISIT (OUTPATIENT)
Dept: OTOLARYNGOLOGY | Facility: CLINIC | Age: 68
End: 2023-10-31
Payer: MEDICARE

## 2023-10-31 VITALS
HEART RATE: 79 BPM | WEIGHT: 212 LBS | SYSTOLIC BLOOD PRESSURE: 153 MMHG | DIASTOLIC BLOOD PRESSURE: 82 MMHG | BODY MASS INDEX: 29.68 KG/M2 | HEIGHT: 71 IN

## 2023-10-31 DIAGNOSIS — Z79.01 ANTICOAGULATED: ICD-10-CM

## 2023-10-31 DIAGNOSIS — R04.0 EPISTAXIS: ICD-10-CM

## 2023-10-31 PROCEDURE — 3061F PR NEG MICROALBUMINURIA RESULT DOCUMENTED/REVIEW: ICD-10-PCS | Mod: CPTII,S$GLB,, | Performed by: OTOLARYNGOLOGY

## 2023-10-31 PROCEDURE — 3051F PR MOST RECENT HEMOGLOBIN A1C LEVEL 7.0 - < 8.0%: ICD-10-PCS | Mod: CPTII,S$GLB,, | Performed by: OTOLARYNGOLOGY

## 2023-10-31 PROCEDURE — 3066F NEPHROPATHY DOC TX: CPT | Mod: CPTII,S$GLB,, | Performed by: OTOLARYNGOLOGY

## 2023-10-31 PROCEDURE — 3288F FALL RISK ASSESSMENT DOCD: CPT | Mod: CPTII,S$GLB,, | Performed by: OTOLARYNGOLOGY

## 2023-10-31 PROCEDURE — 99203 PR OFFICE/OUTPT VISIT, NEW, LEVL III, 30-44 MIN: ICD-10-PCS | Mod: S$GLB,,, | Performed by: OTOLARYNGOLOGY

## 2023-10-31 PROCEDURE — 3288F PR FALLS RISK ASSESSMENT DOCUMENTED: ICD-10-PCS | Mod: CPTII,S$GLB,, | Performed by: OTOLARYNGOLOGY

## 2023-10-31 PROCEDURE — 3079F PR MOST RECENT DIASTOLIC BLOOD PRESSURE 80-89 MM HG: ICD-10-PCS | Mod: CPTII,S$GLB,, | Performed by: OTOLARYNGOLOGY

## 2023-10-31 PROCEDURE — 1160F PR REVIEW ALL MEDS BY PRESCRIBER/CLIN PHARMACIST DOCUMENTED: ICD-10-PCS | Mod: CPTII,S$GLB,, | Performed by: OTOLARYNGOLOGY

## 2023-10-31 PROCEDURE — 3077F SYST BP >= 140 MM HG: CPT | Mod: CPTII,S$GLB,, | Performed by: OTOLARYNGOLOGY

## 2023-10-31 PROCEDURE — 1159F PR MEDICATION LIST DOCUMENTED IN MEDICAL RECORD: ICD-10-PCS | Mod: CPTII,S$GLB,, | Performed by: OTOLARYNGOLOGY

## 2023-10-31 PROCEDURE — 3008F BODY MASS INDEX DOCD: CPT | Mod: CPTII,S$GLB,, | Performed by: OTOLARYNGOLOGY

## 2023-10-31 PROCEDURE — 1101F PR PT FALLS ASSESS DOC 0-1 FALLS W/OUT INJ PAST YR: ICD-10-PCS | Mod: CPTII,S$GLB,, | Performed by: OTOLARYNGOLOGY

## 2023-10-31 PROCEDURE — 3008F PR BODY MASS INDEX (BMI) DOCUMENTED: ICD-10-PCS | Mod: CPTII,S$GLB,, | Performed by: OTOLARYNGOLOGY

## 2023-10-31 PROCEDURE — 3079F DIAST BP 80-89 MM HG: CPT | Mod: CPTII,S$GLB,, | Performed by: OTOLARYNGOLOGY

## 2023-10-31 PROCEDURE — 1101F PT FALLS ASSESS-DOCD LE1/YR: CPT | Mod: CPTII,S$GLB,, | Performed by: OTOLARYNGOLOGY

## 2023-10-31 PROCEDURE — 4010F PR ACE/ARB THEARPY RXD/TAKEN: ICD-10-PCS | Mod: CPTII,S$GLB,, | Performed by: OTOLARYNGOLOGY

## 2023-10-31 PROCEDURE — 3066F PR DOCUMENTATION OF TREATMENT FOR NEPHROPATHY: ICD-10-PCS | Mod: CPTII,S$GLB,, | Performed by: OTOLARYNGOLOGY

## 2023-10-31 PROCEDURE — 3051F HG A1C>EQUAL 7.0%<8.0%: CPT | Mod: CPTII,S$GLB,, | Performed by: OTOLARYNGOLOGY

## 2023-10-31 PROCEDURE — 3077F PR MOST RECENT SYSTOLIC BLOOD PRESSURE >= 140 MM HG: ICD-10-PCS | Mod: CPTII,S$GLB,, | Performed by: OTOLARYNGOLOGY

## 2023-10-31 PROCEDURE — 99203 OFFICE O/P NEW LOW 30 MIN: CPT | Mod: S$GLB,,, | Performed by: OTOLARYNGOLOGY

## 2023-10-31 PROCEDURE — 1159F MED LIST DOCD IN RCRD: CPT | Mod: CPTII,S$GLB,, | Performed by: OTOLARYNGOLOGY

## 2023-10-31 PROCEDURE — 1160F RVW MEDS BY RX/DR IN RCRD: CPT | Mod: CPTII,S$GLB,, | Performed by: OTOLARYNGOLOGY

## 2023-10-31 PROCEDURE — 4010F ACE/ARB THERAPY RXD/TAKEN: CPT | Mod: CPTII,S$GLB,, | Performed by: OTOLARYNGOLOGY

## 2023-10-31 PROCEDURE — 3061F NEG MICROALBUMINURIA REV: CPT | Mod: CPTII,S$GLB,, | Performed by: OTOLARYNGOLOGY

## 2023-10-31 NOTE — PROGRESS NOTES
Mr. Cool     Vitals:    10/31/23 1349   BP: (!) 153/82   Pulse: 79       Chief Complaint:  Nosebleeds     HPI:   is a 68-year-old white male who presents with several days of nosebleeds proximally 2 weeks ago.  He was on Xarelto daily at the time for coronary artery disease.  He states that he tried taking this every other day which did stop his nosebleeds however his cardiologist was concerned about this change in therapy and switched him to Eliquis twice daily.  He states that since then he has had no further nosebleeds.    Review of Systems:  Constitutional:   weight loss or weight gain: Negative  Allergy/Immunologic:   Negative  Nasal Congestion/Obstruction:   Negative  Nosebleeds:   Positive as above  Sinus infections:   Negative  Headache/Facial Pain:   Negative  Snoring/CLAUDIA:   Negative  Throat: Infections/Pain:   Negative  Hoarseness/Speech Disturbance:   Negative  Trauma Hx:  Negative    Cardiovascular:  M/I Angina: Negative, positive for atrial fibrillation and coronary artery disease.  Hypertension:  Positive  Endocrine:    DM/Steroids:  Positive  GI:   Dysphagia/Reflux: Negative  :   GYN Pregnancy: Negative  Renal:   Dialysis: Negative  Lymphatic:   Neck Mass/Lymphadenopathy: Negative  Muscoloskeletal:   Negative  Hematologic:   Bleeding Disorders/Anemia: Negative  Neurologic:    Cranial/Neuralgia: Negative  Pulmonary:   Asthma/SOB/Cough: Negative  Skin Disorders: Negative    Past Medical/Surgical/Family/Social History:    ENT Surgery: Negative  Occupational Exposure: Negative   Problems: Negative  Cancer: Negative    Past Family History:   Family history of Cancer: Negative    Past Social History:   Tobacco: Nonsmoker   Alcohol:  Non Drinker      Allergies and medications: Reviewed per med card.    Physical Examination:  Ears:   External auditory canals:  Clear   Hearing: Grossly intact   Tympanic Membranes: Clear  Nose:   External: Normal   Intranasal:  Slight septal bowing to the  left, turbinates 1 to 2+, nasal airway clear.  No prominent vessels lesions or masses are noted intranasally.  Mouth:   Intraorally: Lips, teeth, and gums:  Upper and lower plates   Oropharynx: Normal   Mucosa: Normal   Tongue: Normal  Throat:      Palate: Normal palate with elevation   Tonsils:  1 to 2+ bilaterally   Posterior Pharynx: Normal  Fiberoptic exam: Not performed  Head/Face:     Inspection: Normal and atraumatic   Palpation/Percussion: Non tender   Facial strength: Normal and symmetric   Salivary glands: Normal  Neck: Supple  Thyroid: No masses  Lymphatics: No nodes  Respiratory:   Effort: Normal  Eyes:   Ocular Mobility: Normal   Vision: Grossly intact  Neuro/Psych:   Cranial Nerves: Grossly Intact   Orientation: Normal   Mood/Affect: Normal      Assessment/Plan:  I have discussed my findings with him in detail as well as my recommendations for treatment.  I have given him an epistaxis precaution sheet and reviewed all the recommendations with him in detail.  He will maintain nasal moisturization and will return to clinic p.r.n.

## 2023-11-01 ENCOUNTER — OFFICE VISIT (OUTPATIENT)
Dept: FAMILY MEDICINE | Facility: CLINIC | Age: 68
End: 2023-11-01
Payer: MEDICARE

## 2023-11-01 VITALS
HEART RATE: 66 BPM | DIASTOLIC BLOOD PRESSURE: 70 MMHG | RESPIRATION RATE: 16 BRPM | BODY MASS INDEX: 29.54 KG/M2 | OXYGEN SATURATION: 98 % | SYSTOLIC BLOOD PRESSURE: 126 MMHG | TEMPERATURE: 99 F | HEIGHT: 71 IN | WEIGHT: 211 LBS

## 2023-11-01 DIAGNOSIS — M17.0 PRIMARY OSTEOARTHRITIS OF BOTH KNEES: ICD-10-CM

## 2023-11-01 DIAGNOSIS — M17.31 POST-TRAUMATIC OSTEOARTHRITIS OF RIGHT KNEE: Primary | ICD-10-CM

## 2023-11-01 DIAGNOSIS — J34.89 NOSE IRRITATION: ICD-10-CM

## 2023-11-01 PROCEDURE — 1101F PT FALLS ASSESS-DOCD LE1/YR: CPT | Mod: HCNC,CPTII,S$GLB, | Performed by: NURSE PRACTITIONER

## 2023-11-01 PROCEDURE — 3074F SYST BP LT 130 MM HG: CPT | Mod: HCNC,CPTII,S$GLB, | Performed by: NURSE PRACTITIONER

## 2023-11-01 PROCEDURE — 1159F MED LIST DOCD IN RCRD: CPT | Mod: HCNC,CPTII,S$GLB, | Performed by: NURSE PRACTITIONER

## 2023-11-01 PROCEDURE — 1160F PR REVIEW ALL MEDS BY PRESCRIBER/CLIN PHARMACIST DOCUMENTED: ICD-10-PCS | Mod: HCNC,CPTII,S$GLB, | Performed by: NURSE PRACTITIONER

## 2023-11-01 PROCEDURE — 3008F PR BODY MASS INDEX (BMI) DOCUMENTED: ICD-10-PCS | Mod: HCNC,CPTII,S$GLB, | Performed by: NURSE PRACTITIONER

## 2023-11-01 PROCEDURE — 3051F HG A1C>EQUAL 7.0%<8.0%: CPT | Mod: HCNC,CPTII,S$GLB, | Performed by: NURSE PRACTITIONER

## 2023-11-01 PROCEDURE — 3066F NEPHROPATHY DOC TX: CPT | Mod: HCNC,CPTII,S$GLB, | Performed by: NURSE PRACTITIONER

## 2023-11-01 PROCEDURE — 3061F PR NEG MICROALBUMINURIA RESULT DOCUMENTED/REVIEW: ICD-10-PCS | Mod: HCNC,CPTII,S$GLB, | Performed by: NURSE PRACTITIONER

## 2023-11-01 PROCEDURE — 1101F PR PT FALLS ASSESS DOC 0-1 FALLS W/OUT INJ PAST YR: ICD-10-PCS | Mod: HCNC,CPTII,S$GLB, | Performed by: NURSE PRACTITIONER

## 2023-11-01 PROCEDURE — 1125F AMNT PAIN NOTED PAIN PRSNT: CPT | Mod: HCNC,CPTII,S$GLB, | Performed by: NURSE PRACTITIONER

## 2023-11-01 PROCEDURE — 3008F BODY MASS INDEX DOCD: CPT | Mod: HCNC,CPTII,S$GLB, | Performed by: NURSE PRACTITIONER

## 2023-11-01 PROCEDURE — 1160F RVW MEDS BY RX/DR IN RCRD: CPT | Mod: HCNC,CPTII,S$GLB, | Performed by: NURSE PRACTITIONER

## 2023-11-01 PROCEDURE — 99999 PR PBB SHADOW E&M-EST. PATIENT-LVL V: CPT | Mod: PBBFAC,HCNC,, | Performed by: NURSE PRACTITIONER

## 2023-11-01 PROCEDURE — 4010F ACE/ARB THERAPY RXD/TAKEN: CPT | Mod: HCNC,CPTII,S$GLB, | Performed by: NURSE PRACTITIONER

## 2023-11-01 PROCEDURE — 99214 PR OFFICE/OUTPT VISIT, EST, LEVL IV, 30-39 MIN: ICD-10-PCS | Mod: HCNC,S$GLB,, | Performed by: NURSE PRACTITIONER

## 2023-11-01 PROCEDURE — 99999 PR PBB SHADOW E&M-EST. PATIENT-LVL V: ICD-10-PCS | Mod: PBBFAC,HCNC,, | Performed by: NURSE PRACTITIONER

## 2023-11-01 PROCEDURE — 1125F PR PAIN SEVERITY QUANTIFIED, PAIN PRESENT: ICD-10-PCS | Mod: HCNC,CPTII,S$GLB, | Performed by: NURSE PRACTITIONER

## 2023-11-01 PROCEDURE — 3074F PR MOST RECENT SYSTOLIC BLOOD PRESSURE < 130 MM HG: ICD-10-PCS | Mod: HCNC,CPTII,S$GLB, | Performed by: NURSE PRACTITIONER

## 2023-11-01 PROCEDURE — 3051F PR MOST RECENT HEMOGLOBIN A1C LEVEL 7.0 - < 8.0%: ICD-10-PCS | Mod: HCNC,CPTII,S$GLB, | Performed by: NURSE PRACTITIONER

## 2023-11-01 PROCEDURE — 3078F DIAST BP <80 MM HG: CPT | Mod: HCNC,CPTII,S$GLB, | Performed by: NURSE PRACTITIONER

## 2023-11-01 PROCEDURE — 99214 OFFICE O/P EST MOD 30 MIN: CPT | Mod: HCNC,S$GLB,, | Performed by: NURSE PRACTITIONER

## 2023-11-01 PROCEDURE — 3061F NEG MICROALBUMINURIA REV: CPT | Mod: HCNC,CPTII,S$GLB, | Performed by: NURSE PRACTITIONER

## 2023-11-01 PROCEDURE — 4010F PR ACE/ARB THEARPY RXD/TAKEN: ICD-10-PCS | Mod: HCNC,CPTII,S$GLB, | Performed by: NURSE PRACTITIONER

## 2023-11-01 PROCEDURE — 3066F PR DOCUMENTATION OF TREATMENT FOR NEPHROPATHY: ICD-10-PCS | Mod: HCNC,CPTII,S$GLB, | Performed by: NURSE PRACTITIONER

## 2023-11-01 PROCEDURE — 3288F PR FALLS RISK ASSESSMENT DOCUMENTED: ICD-10-PCS | Mod: HCNC,CPTII,S$GLB, | Performed by: NURSE PRACTITIONER

## 2023-11-01 PROCEDURE — 1159F PR MEDICATION LIST DOCUMENTED IN MEDICAL RECORD: ICD-10-PCS | Mod: HCNC,CPTII,S$GLB, | Performed by: NURSE PRACTITIONER

## 2023-11-01 PROCEDURE — 3078F PR MOST RECENT DIASTOLIC BLOOD PRESSURE < 80 MM HG: ICD-10-PCS | Mod: HCNC,CPTII,S$GLB, | Performed by: NURSE PRACTITIONER

## 2023-11-01 PROCEDURE — 3288F FALL RISK ASSESSMENT DOCD: CPT | Mod: HCNC,CPTII,S$GLB, | Performed by: NURSE PRACTITIONER

## 2023-11-01 RX ORDER — HYDROCODONE BITARTRATE AND ACETAMINOPHEN 10; 325 MG/1; MG/1
1 TABLET ORAL 3 TIMES DAILY PRN
Qty: 72 TABLET | Refills: 0 | Status: SHIPPED | OUTPATIENT
Start: 2023-11-01 | End: 2024-01-02 | Stop reason: SDUPTHER

## 2023-11-01 RX ORDER — VITAMIN A 3000 MCG
2 CAPSULE ORAL 2 TIMES DAILY
Qty: 104 ML | Refills: 0 | Status: SHIPPED | OUTPATIENT
Start: 2023-11-01

## 2023-11-01 RX ORDER — DICLOFENAC SODIUM 10 MG/G
2 GEL TOPICAL DAILY
Qty: 450 G | Refills: 0 | Status: SHIPPED | OUTPATIENT
Start: 2023-11-01 | End: 2024-03-11 | Stop reason: SDUPTHER

## 2023-11-01 NOTE — TELEPHONE ENCOUNTER
Last Office Visit Info:   The patient's last visit with Gallo Lara MD was on 10/5/2023.    The patient's last visit in current department was on Visit date not found.        Last CBC Results:   Lab Results   Component Value Date    WBC 6.36 02/28/2023    HGB 16.1 02/28/2023    HCT 45.8 02/28/2023     02/28/2023       Last CMP Results  Lab Results   Component Value Date     07/05/2023    K 4.3 07/05/2023     07/05/2023    CO2 22 (L) 07/05/2023    BUN 20 07/05/2023    CREATININE 0.9 07/05/2023    CALCIUM 9.2 07/05/2023    ALBUMIN 4.2 06/28/2023    AST 22 06/28/2023    ALT 31 06/28/2023       Last Lipids  Lab Results   Component Value Date    CHOL 149 06/13/2023    TRIG 149 06/13/2023    HDL 35 (L) 06/13/2023    LDLCALC 84.2 06/13/2023       Last A1C  Lab Results   Component Value Date    HGBA1C 7.2 (H) 06/28/2023       Last TSH  Lab Results   Component Value Date    TSH 0.850 02/20/2023             Current Med Refills  Medication List with Changes/Refills   Current Medications    ALCOHOL SWABS (DROPSAFE ALCOHOL PREP PADS) PADM    USE AS DIRECTED AS NEEDED       Start Date: 10/5/2022 End Date: --    APIXABAN (ELIQUIS) 5 MG TAB    Take 1 tablet (5 mg total) by mouth 2 (two) times daily.       Start Date: 10/20/2023End Date: --    ASCORBATE CALCIUM (VITAMIN C ORAL)    Take by mouth once daily.        Start Date: --        End Date: --    ASPIRIN (ECOTRIN) 81 MG EC TABLET    Take 81 mg by mouth once daily.       Start Date: --        End Date: --    ATORVASTATIN (LIPITOR) 80 MG TABLET    Take 1 tablet (80 mg total) by mouth every evening.       Start Date: 3/20/2023 End Date: --    EMPAGLIFLOZIN (JARDIANCE) 25 MG TABLET    Take 1 tablet (25 mg total) by mouth once daily.       Start Date: 2/28/2023 End Date: --    FISH OIL-OMEGA-3 FATTY ACIDS 300-1,000 MG CAPSULE    Take 2 g by mouth once daily.       Start Date: --        End Date: --    GABAPENTIN (NEURONTIN) 300 MG CAPSULE    TAKE 1 CAPSULE BY  MOUTH THREE TIMES A DAY       Start Date: 10/5/2023 End Date: --    GLIMEPIRIDE (AMARYL) 4 MG TABLET    Take 1 tablet (4 mg total) by mouth 2 (two) times a day.       Start Date: 2/26/2023 End Date: --    HYDROCODONE-ACETAMINOPHEN (NORCO)  MG PER TABLET    Take 1 tablet by mouth 3 (three) times daily as needed (pain).       Start Date: 8/3/2023  End Date: --    HYDROCODONE-ACETAMINOPHEN (NORCO)  MG PER TABLET    Take 1 tablet by mouth 3 (three) times daily as needed (pain).       Start Date: 9/3/2023  End Date: --    HYDROCODONE-ACETAMINOPHEN (NORCO)  MG PER TABLET    Take 1 tablet by mouth 3 (three) times daily as needed (pain).       Start Date: 10/3/2023 End Date: --    HYDROCORTISONE 2.5 % CREAM    Apply topically 2 (two) times daily.       Start Date: 11/23/2020End Date: --    LORATADINE (CLARITIN) 10 MG TABLET    Take 1 tablet (10 mg total) by mouth once daily.       Start Date: 7/14/2023 End Date: --    LOSARTAN (COZAAR) 25 MG TABLET    Take 1 tablet (25 mg total) by mouth once daily.       Start Date: 6/30/2023 End Date: --    MELOXICAM (MOBIC) 7.5 MG TABLET    Take 1 tablet (7.5 mg total) by mouth once daily.       Start Date: 7/11/2023 End Date: --    METHOCARBAMOL (ROBAXIN) 500 MG TAB    Take 1 tablet (500 mg total) by mouth 3 (three) times daily as needed.       Start Date: 8/29/2023 End Date: --    METOPROLOL SUCCINATE (TOPROL-XL) 25 MG 24 HR TABLET    Take 1 tablet (25 mg total) by mouth once daily.       Start Date: 3/20/2023 End Date: 3/19/2024    MULTIVITAMIN WITH MINERALS TABLET    Take 1 tablet by mouth once daily.       Start Date: --        End Date: --    NEOMYCIN-POLYMYXIN-HYDROCORTISONE (CORTISPORIN) 3.5-10,000-1 MG/ML-UNIT/ML-% OTIC SUSPENSION    PLACE 3 DROPS INTO THE LEFT EAR 4 TIMES DAILY.       Start Date: 1/17/2023 End Date: --    SEMAGLUTIDE (OZEMPIC) 0.25 MG OR 0.5 MG (2 MG/3 ML) PEN INJECTOR    Inject 0.5 mg into the skin every 7 days.       Start Date: 6/28/2023  End Date: --    TRUEPLUS LANCETS 33 GAUGE MISC           Start Date: 10/5/2022 End Date: --       Order(s) placed per written order guidelines:     Please advise.

## 2023-11-01 NOTE — TELEPHONE ENCOUNTER
No care due was identified.  [facility] Embedded Care Due Messages. Reference number: [id number].   11/01/2023 2:08:00 PM CDT

## 2023-11-09 NOTE — PROGRESS NOTES
"Subjective:      Patient ID: King Cool Jr. is a 68 y.o. male.  New to me but seen previously in clinic by a fellow provider. Pt presents to clinic with hx of chronic arthritis and improving flare then stable nose bleeds. States he received steroid shot into right knee ~1mth ago at PCP office, still having mild swelling and worsening pain after standing and walking all day. Did see ENT yesterday about nosebleeds and was instructed to use saline daily to keep nose moist though has not started.     Review of Systems   Constitutional:  Negative for activity change, appetite change, chills, diaphoresis, fatigue, fever and unexpected weight change.   HENT:  Positive for nosebleeds. Negative for nasal congestion, dental problem, drooling, ear discharge, ear pain, facial swelling, postnasal drip, rhinorrhea, sinus pressure/congestion, sneezing, sore throat, trouble swallowing and voice change.    Eyes:  Negative for pain and visual disturbance.   Respiratory:  Negative for cough, chest tightness and shortness of breath.    Cardiovascular:  Negative for chest pain, palpitations, leg swelling and claudication.   Gastrointestinal:  Negative for abdominal pain, change in bowel habit, constipation, diarrhea, nausea and vomiting.   Endocrine: Negative.    Genitourinary:  Negative for difficulty urinating.   Musculoskeletal:  Positive for arthralgias, gait problem, joint swelling, leg pain and joint deformity. Negative for back pain, myalgias, neck pain and neck stiffness.   Integumentary:  Negative for rash.   Allergic/Immunologic: Negative.    Neurological:  Negative for speech difficulty and headaches.   Hematological: Negative.    Psychiatric/Behavioral: Negative.     All other systems reviewed and are negative.        Objective:     Vitals:    11/01/23 1401   BP: 126/70   Pulse: 66   Resp: 16   Temp: 98.5 °F (36.9 °C)   TempSrc: Oral   SpO2: 98%   Weight: 95.7 kg (210 lb 15.7 oz)   Height: 5' 11" (1.803 m)     Physical " Exam  Vitals and nursing note reviewed.   Constitutional:       General: He is not in acute distress.     Appearance: Normal appearance. He is well-developed and well-groomed. He is not ill-appearing.   HENT:      Head: Normocephalic and atraumatic.      Right Ear: Tympanic membrane, ear canal and external ear normal.      Left Ear: Tympanic membrane, ear canal and external ear normal.      Nose: Nose normal.      Right Nostril: No foreign body, epistaxis, septal hematoma or occlusion.      Left Nostril: No foreign body, epistaxis, septal hematoma or occlusion.      Left Turbinates: Enlarged and pale.      Right Sinus: No maxillary sinus tenderness or frontal sinus tenderness.      Left Sinus: No maxillary sinus tenderness or frontal sinus tenderness.      Mouth/Throat:      Lips: Pink.      Mouth: Mucous membranes are moist.      Pharynx: Oropharynx is clear. Uvula midline.   Eyes:      General: Lids are normal. Vision grossly intact. Gaze aligned appropriately.      Extraocular Movements: Extraocular movements intact.      Conjunctiva/sclera: Conjunctivae normal.      Pupils: Pupils are equal, round, and reactive to light.   Neck:      Trachea: Phonation normal.   Cardiovascular:      Rate and Rhythm: Normal rate and regular rhythm.      Heart sounds: Normal heart sounds.   Pulmonary:      Effort: Pulmonary effort is normal. No accessory muscle usage or respiratory distress.      Breath sounds: Normal breath sounds and air entry. No wheezing or rales.   Abdominal:      General: Abdomen is flat. Bowel sounds are normal. There is no distension.      Palpations: Abdomen is soft.      Tenderness: There is no abdominal tenderness.   Musculoskeletal:      Cervical back: Neck supple.      Right upper leg: Normal.      Left upper leg: Normal.      Right knee: Crepitus present. No swelling, deformity, effusion, erythema, ecchymosis, lacerations or bony tenderness. Decreased range of motion. Tenderness present.      Left  knee: Crepitus present. No swelling, deformity, effusion, erythema, ecchymosis, lacerations or bony tenderness. Decreased range of motion. No tenderness.      Right lower leg: Normal. No edema.      Left lower leg: Normal. No edema.   Skin:     General: Skin is warm and dry.      Findings: No rash.   Neurological:      General: No focal deficit present.      Mental Status: He is alert and oriented to person, place, and time. Mental status is at baseline.      Sensory: Sensation is intact.      Motor: Motor function is intact.      Coordination: Coordination is intact.      Gait: Gait is intact.   Psychiatric:         Attention and Perception: Attention and perception normal.         Mood and Affect: Mood and affect normal.         Speech: Speech normal.         Behavior: Behavior normal. Behavior is cooperative.         Thought Content: Thought content normal.         Cognition and Memory: Cognition and memory normal.         Judgment: Judgment normal.       Assessment and Plan:     1. Post-traumatic osteoarthritis of right knee  Discussed DJD and its various treatment modalities.  Continue current regimen and add topical NSAID as needed   Return to clinic as needed.  - diclofenac sodium (VOLTAREN) 1 % Gel; Apply 2 g topically once daily.  Dispense: 450 g; Refill: 0    2. Nose irritation  No acute hemorrhage  or obstruction, continue ENT plan   - sodium chloride (SALINE NASAL) 0.65 % nasal spray; 2 sprays by Nasal route 2 (two) times a day.  Dispense: 104 mL; Refill: 0           ALANNA Kelly, FNP-C  Family/Internal Medicine  [facility]

## 2023-11-29 ENCOUNTER — NURSE TRIAGE (OUTPATIENT)
Dept: ADMINISTRATIVE | Facility: CLINIC | Age: 68
End: 2023-11-29
Payer: MEDICARE

## 2023-11-29 DIAGNOSIS — M17.0 PRIMARY OSTEOARTHRITIS OF BOTH KNEES: ICD-10-CM

## 2023-11-29 RX ORDER — HYDROCODONE BITARTRATE AND ACETAMINOPHEN 10; 325 MG/1; MG/1
1 TABLET ORAL 3 TIMES DAILY PRN
Qty: 72 TABLET | Refills: 0 | Status: SHIPPED | OUTPATIENT
Start: 2023-11-29 | End: 2024-01-02 | Stop reason: SDUPTHER

## 2023-11-29 RX ORDER — HYDROCODONE BITARTRATE AND ACETAMINOPHEN 10; 325 MG/1; MG/1
1 TABLET ORAL 3 TIMES DAILY PRN
Qty: 72 TABLET | Refills: 0 | Status: SHIPPED | OUTPATIENT
Start: 2023-11-29 | End: 2023-11-29 | Stop reason: SDUPTHER

## 2023-11-29 NOTE — TELEPHONE ENCOUNTER
Spoke with patient who states he has a lump at the waistline near his lower abdomen.  Patient states the lump is painful.  The lump is about 2 inches in size. Advised ED patient declined.  He wants to be seen in the office setting. PCP office notified.     Reason for Disposition   Hernia suspected (bulge in groin or abdomen) and painful or vomiting     Patient states he has a lump at the waist in the lower abdomen.  However he is answering no when asked.  Lump is also causing him pain.    Additional Information   Negative: Sounds like a life-threatening emergency to the triager    Protocols used: Skin Lump or Localized Swelling-A-OH

## 2023-11-29 NOTE — TELEPHONE ENCOUNTER
Patient states he can not find the norco anywhere and would like a paper rx to bring to with him as he tries to locate a pharmacy that can fill his pain med

## 2023-11-29 NOTE — TELEPHONE ENCOUNTER
----- Message from Elder Lloyd sent at 11/29/2023 11:16 AM CST -----  Regarding: Self .[phone]   Type: RX Refill Request    Who Called: Self     Have you contacted your pharmacy: yes     Refill    RX Name and Strength:HYDROcodone-acetaminophen (NORCO)  mg per tablet    Preferred Pharmacy with phone number: pt will take to a different pharmacy     Local or Mail Order: local     Would the patient rather a call back or a response via [facility]? Call back     Best Call Back Number: .[phone]      Additional Information:  Pt would like a paper script to take to a different pharmacy, due to [facility] not having the medication in stock.     Thank you.

## 2023-11-29 NOTE — TELEPHONE ENCOUNTER
No care due was identified.  [facility] Embedded Care Due Messages. Reference number: [id number].   11/29/2023 2:31:47 PM CST

## 2023-11-29 NOTE — TELEPHONE ENCOUNTER
----- Message from Sammie Malhotra sent at 11/29/2023  1:55 PM CST -----  Type: Patient Call Back    Who called:pt     What is the request in detail:pt requesting to speak to nurse in regards to getting HYDROcodone-acetaminophen (NORCO)  mg per tablet   For a paper prescription. He is having trouble filling it. The pharmacy doesn't have any meds. Call pt     Can the clinic reply by [facility]?    Would the patient rather a call back or a response via [facility]? call    Best call back number:[phone] (home)       Additional Information:

## 2023-12-04 ENCOUNTER — TELEPHONE (OUTPATIENT)
Dept: FAMILY MEDICINE | Facility: CLINIC | Age: 68
End: 2023-12-04
Payer: MEDICARE

## 2023-12-04 ENCOUNTER — OFFICE VISIT (OUTPATIENT)
Dept: FAMILY MEDICINE | Facility: CLINIC | Age: 68
End: 2023-12-04
Payer: MEDICARE

## 2023-12-04 VITALS
TEMPERATURE: 98 F | DIASTOLIC BLOOD PRESSURE: 74 MMHG | HEIGHT: 70 IN | HEART RATE: 79 BPM | WEIGHT: 208.56 LBS | BODY MASS INDEX: 29.86 KG/M2 | OXYGEN SATURATION: 97 % | SYSTOLIC BLOOD PRESSURE: 128 MMHG
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
## 2023-12-04 DIAGNOSIS — L02.91 ABSCESS: Primary | ICD-10-CM

## 2023-12-04 PROCEDURE — 3074F PR MOST RECENT SYSTOLIC BLOOD PRESSURE < 130 MM HG: ICD-10-PCS | Mod: HCNC,CPTII,S$GLB, | Performed by: FAMILY MEDICINE

## 2023-12-04 PROCEDURE — 3288F PR FALLS RISK ASSESSMENT DOCUMENTED: ICD-10-PCS | Mod: HCNC,CPTII,S$GLB, | Performed by: FAMILY MEDICINE

## 2023-12-04 PROCEDURE — 4010F ACE/ARB THERAPY RXD/TAKEN: CPT | Mod: HCNC,CPTII,S$GLB, | Performed by: FAMILY MEDICINE

## 2023-12-04 PROCEDURE — 3078F DIAST BP <80 MM HG: CPT | Mod: HCNC,CPTII,S$GLB, | Performed by: FAMILY MEDICINE

## 2023-12-04 PROCEDURE — 1159F PR MEDICATION LIST DOCUMENTED IN MEDICAL RECORD: ICD-10-PCS | Mod: HCNC,CPTII,S$GLB, | Performed by: FAMILY MEDICINE

## 2023-12-04 PROCEDURE — 3066F PR DOCUMENTATION OF TREATMENT FOR NEPHROPATHY: ICD-10-PCS | Mod: HCNC,CPTII,S$GLB, | Performed by: FAMILY MEDICINE

## 2023-12-04 PROCEDURE — 3288F FALL RISK ASSESSMENT DOCD: CPT | Mod: HCNC,CPTII,S$GLB, | Performed by: FAMILY MEDICINE

## 2023-12-04 PROCEDURE — 3051F HG A1C>EQUAL 7.0%<8.0%: CPT | Mod: HCNC,CPTII,S$GLB, | Performed by: FAMILY MEDICINE

## 2023-12-04 PROCEDURE — 3074F SYST BP LT 130 MM HG: CPT | Mod: HCNC,CPTII,S$GLB, | Performed by: FAMILY MEDICINE

## 2023-12-04 PROCEDURE — 99999 PR PBB SHADOW E&M-EST. PATIENT-LVL IV: CPT | Mod: PBBFAC,HCNC,, | Performed by: FAMILY MEDICINE

## 2023-12-04 PROCEDURE — 1101F PR PT FALLS ASSESS DOC 0-1 FALLS W/OUT INJ PAST YR: ICD-10-PCS | Mod: HCNC,CPTII,S$GLB, | Performed by: FAMILY MEDICINE

## 2023-12-04 PROCEDURE — 1101F PT FALLS ASSESS-DOCD LE1/YR: CPT | Mod: HCNC,CPTII,S$GLB, | Performed by: FAMILY MEDICINE

## 2023-12-04 PROCEDURE — 99999 PR PBB SHADOW E&M-EST. PATIENT-LVL IV: ICD-10-PCS | Mod: PBBFAC,HCNC,, | Performed by: FAMILY MEDICINE

## 2023-12-04 PROCEDURE — 3051F PR MOST RECENT HEMOGLOBIN A1C LEVEL 7.0 - < 8.0%: ICD-10-PCS | Mod: HCNC,CPTII,S$GLB, | Performed by: FAMILY MEDICINE

## 2023-12-04 PROCEDURE — 3061F PR NEG MICROALBUMINURIA RESULT DOCUMENTED/REVIEW: ICD-10-PCS | Mod: HCNC,CPTII,S$GLB, | Performed by: FAMILY MEDICINE

## 2023-12-04 PROCEDURE — 99214 PR OFFICE/OUTPT VISIT, EST, LEVL IV, 30-39 MIN: ICD-10-PCS | Mod: HCNC,S$GLB,, | Performed by: FAMILY MEDICINE

## 2023-12-04 PROCEDURE — 3061F NEG MICROALBUMINURIA REV: CPT | Mod: HCNC,CPTII,S$GLB, | Performed by: FAMILY MEDICINE

## 2023-12-04 PROCEDURE — 3008F PR BODY MASS INDEX (BMI) DOCUMENTED: ICD-10-PCS | Mod: HCNC,CPTII,S$GLB, | Performed by: FAMILY MEDICINE

## 2023-12-04 PROCEDURE — 1125F PR PAIN SEVERITY QUANTIFIED, PAIN PRESENT: ICD-10-PCS | Mod: HCNC,CPTII,S$GLB, | Performed by: FAMILY MEDICINE

## 2023-12-04 PROCEDURE — 3066F NEPHROPATHY DOC TX: CPT | Mod: HCNC,CPTII,S$GLB, | Performed by: FAMILY MEDICINE

## 2023-12-04 PROCEDURE — 3008F BODY MASS INDEX DOCD: CPT | Mod: HCNC,CPTII,S$GLB, | Performed by: FAMILY MEDICINE

## 2023-12-04 PROCEDURE — 3078F PR MOST RECENT DIASTOLIC BLOOD PRESSURE < 80 MM HG: ICD-10-PCS | Mod: HCNC,CPTII,S$GLB, | Performed by: FAMILY MEDICINE

## 2023-12-04 PROCEDURE — 1125F AMNT PAIN NOTED PAIN PRSNT: CPT | Mod: HCNC,CPTII,S$GLB, | Performed by: FAMILY MEDICINE

## 2023-12-04 PROCEDURE — 4010F PR ACE/ARB THEARPY RXD/TAKEN: ICD-10-PCS | Mod: HCNC,CPTII,S$GLB, | Performed by: FAMILY MEDICINE

## 2023-12-04 PROCEDURE — 99214 OFFICE O/P EST MOD 30 MIN: CPT | Mod: HCNC,S$GLB,, | Performed by: FAMILY MEDICINE

## 2023-12-04 PROCEDURE — 1159F MED LIST DOCD IN RCRD: CPT | Mod: HCNC,CPTII,S$GLB, | Performed by: FAMILY MEDICINE

## 2023-12-04 RX ORDER — TRIAMCINOLONE ACETONIDE 1 MG/G
OINTMENT TOPICAL 2 TIMES DAILY PRN
Qty: 30 G | Refills: 0 | Status: SHIPPED | OUTPATIENT
Start: 2023-12-04

## 2023-12-04 RX ORDER — DOXYCYCLINE HYCLATE 100 MG
100 TABLET ORAL 2 TIMES DAILY
Qty: 14 TABLET | Refills: 0 | Status: SHIPPED | OUTPATIENT
Start: 2023-12-04

## 2023-12-04 NOTE — TELEPHONE ENCOUNTER
Left voicemail for pt to call the clinic back in regards to inform patient paper script for (NORCO) is ready for

## 2023-12-04 NOTE — PROGRESS NOTES
[facility]  Progress Note    SUBJECTIVE:     Chief Complaint   Patient presents with    Follow-up    Poison Ivy     Feet last week       HPI   King Cool Jr.  is a 68 y.o. male here for concerns about a boil on his abdominal area. No fevers, chill. It did start draining and he took some antibiotics at home he found. Unsure what the name was. Also had an insect bite on his foot which is very itchy.    Review of patient's allergies indicates:   Allergen Reactions    Tamiflu [oseltamivir]      Increases BP    Metformin Hives     Diarrhea, nausea    Penicillins Rash       Past Medical History:   Diagnosis Date    Coronary artery disease involving native coronary artery of native heart without angina pectoris 3/7/2023    Essential (primary) hypertension     Male erectile dysfunction, unspecified     New onset a-fib 1/3/2023    Testicular hypofunction     Type 2 diabetes mellitus without complications      Past Surgical History:   Procedure Laterality Date    HIP SURGERY      LEFT HEART CATHETERIZATION Left 3/7/2023    Procedure: Left heart cath;  Surgeon: Wil Mahoney MD;  Location: [facility] CATH LAB;  Service: Cardiology;  Laterality: Left;  1030am start, R rad access    RN PREOP 2/28/23     Family History   Problem Relation Age of Onset    Blindness Mother     No Known Problems Father     No Known Problems Brother     No Known Problems Daughter     No Known Problems Son     No Known Problems Daughter     Diabetes Brother     No Known Problems Brother     Amblyopia Neg Hx     Cancer Neg Hx     Cataracts Neg Hx     Glaucoma Neg Hx     Hypertension Neg Hx     Macular degeneration Neg Hx     Retinal detachment Neg Hx     Strabismus Neg Hx     Stroke Neg Hx     Thyroid disease Neg Hx      Social History     Tobacco Use    Smoking status: Former     Current packs/day: 0.00     Average packs/day: 0.3 packs/day for 4.0 years (1.0 ttl pk-yrs)     Types: Cigarettes     Start date: 11/23/1972     Quit date:  1976     Years since quittin.0    Smokeless tobacco: Never   Substance Use Topics    Alcohol use: Yes     Comment: occ    Drug use: No        Review of Systems   Musculoskeletal:  Positive for back pain and joint pain.   Skin:         + abscess     OBJECTIVE:     Vitals:    23 1509   BP: 128/74   Pulse: 79   Temp: 98.1 °F (36.7 °C)     Body mass index is 29.92 kg/m².    Physical Exam  Constitutional:       General: He is not in acute distress.     Appearance: He is not diaphoretic.   HENT:      Head: Normocephalic and atraumatic.   Eyes:      Conjunctiva/sclera: Conjunctivae normal.   Pulmonary:      Effort: Pulmonary effort is normal. No respiratory distress.   Skin:     General: Skin is warm.      Comments: + boil in RLQ abdominal area. Slight tender to touch. Superficial, with some surrounding erythema. Size about 2.5 cm.    Neurological:      Mental Status: He is alert and oriented to person, place, and time.         Old records were reviewed. Labs and/or images were independently reviewed.    ASSESSMENT     1. Abscess    2. Insect bite, unspecified site, initial encounter        PLAN:     Abscess  -     doxycycline (VIBRA-TABS) 100 MG tablet; Take 1 tablet (100 mg total) by mouth 2 (two) times daily.  Dispense: 14 tablet; Refill: 0  -     keep area clean and dry. Take meds as directed. Seems it has already started draining on its own. No I&D recommended today.     Insect bite, unspecified site, initial encounter  -     triamcinolone acetonide 0.1% (KENALOG) 0.1 % ointment; Apply topically 2 (two) times daily as needed.  Dispense: 30 g; Refill: 0      RTC TYLOR Lara MD  2023 3:17 PM

## 2023-12-13 ENCOUNTER — OFFICE VISIT (OUTPATIENT)
Dept: CARDIOLOGY | Facility: CLINIC | Age: 68
End: 2023-12-13
Payer: MEDICARE

## 2023-12-13 VITALS
BODY MASS INDEX: 30.3 KG/M2 | WEIGHT: 211.63 LBS | HEART RATE: 78 BPM | DIASTOLIC BLOOD PRESSURE: 78 MMHG | OXYGEN SATURATION: 96 % | RESPIRATION RATE: 18 BRPM | HEIGHT: 70 IN | SYSTOLIC BLOOD PRESSURE: 126 MMHG

## 2023-12-13 DIAGNOSIS — R80.9 TYPE 2 DIABETES MELLITUS WITH MICROALBUMINURIA, WITHOUT LONG-TERM CURRENT USE OF INSULIN: ICD-10-CM

## 2023-12-13 DIAGNOSIS — E11.29 TYPE 2 DIABETES MELLITUS WITH MICROALBUMINURIA, WITHOUT LONG-TERM CURRENT USE OF INSULIN: ICD-10-CM

## 2023-12-13 DIAGNOSIS — E66.9 NON MORBID OBESITY, UNSPECIFIED OBESITY TYPE: ICD-10-CM

## 2023-12-13 DIAGNOSIS — R04.0 EPISTAXIS: ICD-10-CM

## 2023-12-13 DIAGNOSIS — I48.0 PAF (PAROXYSMAL ATRIAL FIBRILLATION): Primary | ICD-10-CM

## 2023-12-13 DIAGNOSIS — Z79.01 CHRONIC ANTICOAGULATION: ICD-10-CM

## 2023-12-13 DIAGNOSIS — I65.21 CAROTID ATHEROSCLEROSIS, RIGHT: ICD-10-CM

## 2023-12-13 DIAGNOSIS — E78.2 MIXED HYPERLIPIDEMIA: ICD-10-CM

## 2023-12-13 DIAGNOSIS — I10 ESSENTIAL (PRIMARY) HYPERTENSION: ICD-10-CM

## 2023-12-13 DIAGNOSIS — I25.10 CORONARY ARTERY DISEASE INVOLVING NATIVE CORONARY ARTERY OF NATIVE HEART WITHOUT ANGINA PECTORIS: ICD-10-CM

## 2023-12-13 DIAGNOSIS — I48.91 NEW ONSET A-FIB: ICD-10-CM

## 2023-12-13 PROCEDURE — 3078F DIAST BP <80 MM HG: CPT | Mod: HCNC,CPTII,S$GLB, | Performed by: INTERNAL MEDICINE

## 2023-12-13 PROCEDURE — 3051F HG A1C>EQUAL 7.0%<8.0%: CPT | Mod: HCNC,CPTII,S$GLB, | Performed by: INTERNAL MEDICINE

## 2023-12-13 PROCEDURE — 1159F MED LIST DOCD IN RCRD: CPT | Mod: HCNC,CPTII,S$GLB, | Performed by: INTERNAL MEDICINE

## 2023-12-13 PROCEDURE — 3051F PR MOST RECENT HEMOGLOBIN A1C LEVEL 7.0 - < 8.0%: ICD-10-PCS | Mod: HCNC,CPTII,S$GLB, | Performed by: INTERNAL MEDICINE

## 2023-12-13 PROCEDURE — 1160F PR REVIEW ALL MEDS BY PRESCRIBER/CLIN PHARMACIST DOCUMENTED: ICD-10-PCS | Mod: HCNC,CPTII,S$GLB, | Performed by: INTERNAL MEDICINE

## 2023-12-13 PROCEDURE — 1101F PR PT FALLS ASSESS DOC 0-1 FALLS W/OUT INJ PAST YR: ICD-10-PCS | Mod: HCNC,CPTII,S$GLB, | Performed by: INTERNAL MEDICINE

## 2023-12-13 PROCEDURE — 3008F BODY MASS INDEX DOCD: CPT | Mod: HCNC,CPTII,S$GLB, | Performed by: INTERNAL MEDICINE

## 2023-12-13 PROCEDURE — 3061F NEG MICROALBUMINURIA REV: CPT | Mod: HCNC,CPTII,S$GLB, | Performed by: INTERNAL MEDICINE

## 2023-12-13 PROCEDURE — 3066F NEPHROPATHY DOC TX: CPT | Mod: HCNC,CPTII,S$GLB, | Performed by: INTERNAL MEDICINE

## 2023-12-13 PROCEDURE — 1126F PR PAIN SEVERITY QUANTIFIED, NO PAIN PRESENT: ICD-10-PCS | Mod: HCNC,CPTII,S$GLB, | Performed by: INTERNAL MEDICINE

## 2023-12-13 PROCEDURE — 3074F SYST BP LT 130 MM HG: CPT | Mod: HCNC,CPTII,S$GLB, | Performed by: INTERNAL MEDICINE

## 2023-12-13 PROCEDURE — 1160F RVW MEDS BY RX/DR IN RCRD: CPT | Mod: HCNC,CPTII,S$GLB, | Performed by: INTERNAL MEDICINE

## 2023-12-13 PROCEDURE — 3066F PR DOCUMENTATION OF TREATMENT FOR NEPHROPATHY: ICD-10-PCS | Mod: HCNC,CPTII,S$GLB, | Performed by: INTERNAL MEDICINE

## 2023-12-13 PROCEDURE — 99999 PR PBB SHADOW E&M-EST. PATIENT-LVL III: ICD-10-PCS | Mod: PBBFAC,HCNC,, | Performed by: INTERNAL MEDICINE

## 2023-12-13 PROCEDURE — 3061F PR NEG MICROALBUMINURIA RESULT DOCUMENTED/REVIEW: ICD-10-PCS | Mod: HCNC,CPTII,S$GLB, | Performed by: INTERNAL MEDICINE

## 2023-12-13 PROCEDURE — 99214 OFFICE O/P EST MOD 30 MIN: CPT | Mod: HCNC,S$GLB,, | Performed by: INTERNAL MEDICINE

## 2023-12-13 PROCEDURE — 1101F PT FALLS ASSESS-DOCD LE1/YR: CPT | Mod: HCNC,CPTII,S$GLB, | Performed by: INTERNAL MEDICINE

## 2023-12-13 PROCEDURE — 4010F PR ACE/ARB THEARPY RXD/TAKEN: ICD-10-PCS | Mod: HCNC,CPTII,S$GLB, | Performed by: INTERNAL MEDICINE

## 2023-12-13 PROCEDURE — 3288F PR FALLS RISK ASSESSMENT DOCUMENTED: ICD-10-PCS | Mod: HCNC,CPTII,S$GLB, | Performed by: INTERNAL MEDICINE

## 2023-12-13 PROCEDURE — 1159F PR MEDICATION LIST DOCUMENTED IN MEDICAL RECORD: ICD-10-PCS | Mod: HCNC,CPTII,S$GLB, | Performed by: INTERNAL MEDICINE

## 2023-12-13 PROCEDURE — 1126F AMNT PAIN NOTED NONE PRSNT: CPT | Mod: HCNC,CPTII,S$GLB, | Performed by: INTERNAL MEDICINE

## 2023-12-13 PROCEDURE — 3074F PR MOST RECENT SYSTOLIC BLOOD PRESSURE < 130 MM HG: ICD-10-PCS | Mod: HCNC,CPTII,S$GLB, | Performed by: INTERNAL MEDICINE

## 2023-12-13 PROCEDURE — 3288F FALL RISK ASSESSMENT DOCD: CPT | Mod: HCNC,CPTII,S$GLB, | Performed by: INTERNAL MEDICINE

## 2023-12-13 PROCEDURE — 3078F PR MOST RECENT DIASTOLIC BLOOD PRESSURE < 80 MM HG: ICD-10-PCS | Mod: HCNC,CPTII,S$GLB, | Performed by: INTERNAL MEDICINE

## 2023-12-13 PROCEDURE — 3008F PR BODY MASS INDEX (BMI) DOCUMENTED: ICD-10-PCS | Mod: HCNC,CPTII,S$GLB, | Performed by: INTERNAL MEDICINE

## 2023-12-13 PROCEDURE — 99214 PR OFFICE/OUTPT VISIT, EST, LEVL IV, 30-39 MIN: ICD-10-PCS | Mod: HCNC,S$GLB,, | Performed by: INTERNAL MEDICINE

## 2023-12-13 PROCEDURE — 99999 PR PBB SHADOW E&M-EST. PATIENT-LVL III: CPT | Mod: PBBFAC,HCNC,, | Performed by: INTERNAL MEDICINE

## 2023-12-13 PROCEDURE — 4010F ACE/ARB THERAPY RXD/TAKEN: CPT | Mod: HCNC,CPTII,S$GLB, | Performed by: INTERNAL MEDICINE

## 2023-12-13 RX ORDER — METOPROLOL SUCCINATE 25 MG/1
25 TABLET, EXTENDED RELEASE ORAL DAILY
Qty: 90 TABLET | Refills: 3 | Status: SHIPPED | OUTPATIENT
Start: 2023-12-13 | End: 2024-03-11 | Stop reason: SDUPTHER

## 2023-12-13 NOTE — PROGRESS NOTES
CARDIOVASCULAR PROGRESS NOTE    REASON FOR CONSULT:   King Cool Jr. is a 68 y.o. male who presents for follow up of PAF, CAD.    PCP: Jane  HISTORY OF PRESENT ILLNESS:   Last seen June 2023.    The patient returns for follow up.  He denies intercurrent angina, dyspnea, palpitations, or syncope.  There has been no PND, orthopnea, melena, hematuria, or claudication symptoms.  In the interim since his last visit, he was seen by ENT for epistaxis.  He is no longer taking Xarelto and is now on Eliquis 5 mg b.i.d..  He has not had any recurrent episodes of epistaxis on Eliquis.    CARDIOVASCULAR HISTORY:   PAF on Xarelto    CAD (high risk MPI 2/2023), cath 3/2023 with MV CAD and mid LAD     PAST MEDICAL HISTORY:     Past Medical History:   Diagnosis Date    Coronary artery disease involving native coronary artery of native heart without angina pectoris 3/7/2023    Essential (primary) hypertension     Male erectile dysfunction, unspecified     New onset a-fib 1/3/2023    Testicular hypofunction     Type 2 diabetes mellitus without complications        PAST SURGICAL HISTORY:     Past Surgical History:   Procedure Laterality Date    HIP SURGERY      LEFT HEART CATHETERIZATION Left 3/7/2023    Procedure: Left heart cath;  Surgeon: Wil Mahoney MD;  Location: [facility] CATH LAB;  Service: Cardiology;  Laterality: Left;  1030am start, R rad access    RN PREOP 2/28/23       ALLERGIES AND MEDICATION:     Review of patient's allergies indicates:   Allergen Reactions    Tamiflu [oseltamivir]      Increases BP    Metformin Hives     Diarrhea, nausea    Penicillins Rash        Medication List            Accurate as of December 13, 2023 11:02 AM. If you have any questions, ask your nurse or doctor.                CONTINUE taking these medications      apixaban 5 mg Tab  Commonly known as: ELIQUIS  Take 1 tablet (5 mg total) by mouth 2 (two) times daily.     aspirin 81 MG EC tablet  Commonly known as: ECOTRIN     atorvastatin  80 MG tablet  Commonly known as: LIPITOR  Take 1 tablet (80 mg total) by mouth every evening.     diclofenac sodium 1 % Gel  Commonly known as: VOLTAREN  Apply 2 g topically once daily.     doxycycline 100 MG tablet  Commonly known as: VIBRA-TABS  Take 1 tablet (100 mg total) by mouth 2 (two) times daily.     DROPSAFE ALCOHOL PREP PADS Padm  Generic drug: alcohol swabs  USE AS DIRECTED AS NEEDED     empagliflozin 25 mg tablet  Commonly known as: Jardiance  Take 1 tablet (25 mg total) by mouth once daily.     fish oil-omega-3 fatty acids 300-1,000 mg capsule     gabapentin 300 MG capsule  Commonly known as: NEURONTIN  TAKE 1 CAPSULE BY MOUTH THREE TIMES A DAY     glimepiride 4 MG tablet  Commonly known as: AMARYL  Take 1 tablet (4 mg total) by mouth 2 (two) times a day.     * HYDROcodone-acetaminophen  mg per tablet  Commonly known as: NORCO  Take 1 tablet by mouth 3 (three) times daily as needed (pain).     * HYDROcodone-acetaminophen  mg per tablet  Commonly known as: NORCO  Take 1 tablet by mouth 3 (three) times daily as needed (pain).     * HYDROcodone-acetaminophen  mg per tablet  Commonly known as: NORCO  Take 1 tablet by mouth 3 (three) times daily as needed (pain).     hydrocortisone 2.5 % cream  Apply topically 2 (two) times daily.     loratadine 10 mg tablet  Commonly known as: CLARITIN  Take 1 tablet (10 mg total) by mouth once daily.     losartan 25 MG tablet  Commonly known as: COZAAR  Take 1 tablet (25 mg total) by mouth once daily.     meloxicam 7.5 MG tablet  Commonly known as: MOBIC  Take 1 tablet (7.5 mg total) by mouth once daily.     methocarbamoL 500 MG Tab  Commonly known as: ROBAXIN  Take 1 tablet (500 mg total) by mouth 3 (three) times daily as needed.     metoprolol succinate 25 MG 24 hr tablet  Commonly known as: TOPROL-XL  Take 1 tablet (25 mg total) by mouth once daily.     multivitamin with minerals tablet     neomycin-polymyxin-hydrocortisone 3.5-10,000-1  mg/mL-unit/mL-% otic suspension  Commonly known as: CORTISPORIN  PLACE 3 DROPS INTO THE LEFT EAR 4 TIMES DAILY.     OZEMPIC 0.25 mg or 0.5 mg (2 mg/3 mL) pen injector  Generic drug: semaglutide  Inject 0.5 mg into the skin every 7 days.     Saline NasaL 0.65 % nasal spray  Generic drug: sodium chloride  2 sprays by Nasal route 2 (two) times a day.     triamcinolone acetonide 0.1% 0.1 % ointment  Commonly known as: KENALOG  Apply topically 2 (two) times daily as needed.     TRUEPLUS LANCETS 33 gauge Misc  Generic drug: lancets     VITAMIN C ORAL           * This list has 3 medication(s) that are the same as other medications prescribed for you. Read the directions carefully, and ask your doctor or other care provider to review them with you.                  SOCIAL HISTORY:     Social History     Socioeconomic History    Marital status:     Number of children: 3    Highest education level: GED or equivalent   Tobacco Use    Smoking status: Former     Current packs/day: 0.00     Average packs/day: 0.3 packs/day for 4.0 years (1.0 ttl pk-yrs)     Types: Cigarettes     Start date: 1972     Quit date: 1976     Years since quittin.0    Smokeless tobacco: Never   Substance and Sexual Activity    Alcohol use: Yes     Comment: occ    Drug use: No    Sexual activity: Yes     Partners: Female     Birth control/protection: None     Social Determinants of Health     Financial Resource Strain: Low Risk  (2020)    Overall Financial Resource Strain (CARDIA)     Difficulty of Paying Living Expenses: Not hard at all   Food Insecurity: No Food Insecurity (2020)    Hunger Vital Sign     Worried About Running Out of Food in the Last Year: Never true     Ran Out of Food in the Last Year: Never true   Transportation Needs: No Transportation Needs (2020)    PRAPARE - Transportation     Lack of Transportation (Medical): No     Lack of Transportation (Non-Medical): No   Physical Activity: Inactive  (11/23/2020)    Exercise Vital Sign     Days of Exercise per Week: 0 days     Minutes of Exercise per Session: 0 min   Stress: No Stress Concern Present (11/23/2020)    [employer] - Occupational Stress Questionnaire     Feeling of Stress : Not at all   Social Connections: Socially Isolated (11/23/2020)    Social Connection and Isolation Panel [NHANES]     Frequency of Communication with Friends and Family: Three times a week     Frequency of Social Gatherings with Friends and Family: Once a week     Attends Yazidism Services: Never     Active Member of Clubs or Organizations: No     Attends Club or Organization Meetings: Never     Marital Status:        FAMILY HISTORY:     Family History   Problem Relation Age of Onset    Blindness Mother     No Known Problems Father     No Known Problems Brother     No Known Problems Daughter     No Known Problems Son     No Known Problems Daughter     Diabetes Brother     No Known Problems Brother     Amblyopia Neg Hx     Cancer Neg Hx     Cataracts Neg Hx     Glaucoma Neg Hx     Hypertension Neg Hx     Macular degeneration Neg Hx     Retinal detachment Neg Hx     Strabismus Neg Hx     Stroke Neg Hx     Thyroid disease Neg Hx        REVIEW OF SYSTEMS:   Review of Systems   Constitutional:  Negative for chills, diaphoresis and fever.   HENT:  Negative for nosebleeds.    Eyes:  Negative for blurred vision, double vision and photophobia.   Respiratory:  Negative for hemoptysis, shortness of breath and wheezing.    Cardiovascular:  Negative for chest pain, palpitations, orthopnea, claudication, leg swelling and PND.   Gastrointestinal:  Negative for abdominal pain, blood in stool, heartburn, melena, nausea and vomiting.   Genitourinary:  Negative for flank pain and hematuria.   Musculoskeletal:  Negative for falls, myalgias and neck pain.   Skin:  Negative for rash.   Neurological:  Negative for dizziness, seizures, loss of consciousness, weakness  "and headaches.   Endo/Heme/Allergies:  Negative for polydipsia. Does not bruise/bleed easily.   Psychiatric/Behavioral:  Negative for depression and memory loss. The patient is not nervous/anxious.        PHYSICAL EXAM:     Vitals:    12/13/23 1026   BP: 126/78   Pulse: 78   Resp: 18        Body mass index is 30.37 kg/m².  Weight: 96 kg (211 lb 10.3 oz)   Height: 5' 10" (177.8 cm)     Physical Exam  Vitals reviewed.   Constitutional:       General: He is not in acute distress.     Appearance: He is well-developed. He is obese. He is not ill-appearing, toxic-appearing or diaphoretic.   HENT:      Head: Normocephalic and atraumatic.   Eyes:      General: No scleral icterus.     Extraocular Movements: Extraocular movements intact.      Conjunctiva/sclera: Conjunctivae normal.      Pupils: Pupils are equal, round, and reactive to light.   Neck:      Thyroid: No thyromegaly.      Vascular: Normal carotid pulses. No carotid bruit or JVD.      Trachea: Trachea normal.   Cardiovascular:      Rate and Rhythm: Normal rate and regular rhythm.      Pulses:           Carotid pulses are 2+ on the right side and 2+ on the left side.       Radial pulses are 2+ on the right side.      Heart sounds: S1 normal and S2 normal. No murmur heard.     No friction rub. No gallop.   Pulmonary:      Effort: Pulmonary effort is normal. No respiratory distress.      Breath sounds: Normal breath sounds. No stridor. No wheezing, rhonchi or rales.   Chest:      Chest wall: No tenderness.   Abdominal:      General: There is no distension.      Palpations: Abdomen is soft.   Musculoskeletal:         General: No swelling or tenderness. Normal range of motion.      Cervical back: Normal range of motion and neck supple. No edema or rigidity.      Right lower leg: No edema.      Left lower leg: No edema.   Feet:      Right foot:      Skin integrity: No ulcer.      Left foot:      Skin integrity: No ulcer.   Skin:     General: Skin is warm and dry.      " Coloration: Skin is not jaundiced.   Neurological:      Mental Status: He is alert and oriented to person, place, and time.      Cranial Nerves: No cranial nerve deficit (presbycusis).   Psychiatric:         Mood and Affect: Mood normal.         Speech: Speech normal.         Behavior: Behavior normal. Behavior is cooperative.         DATA:   EKG: (personally reviewed tracing(s))  12/13/23 SR 65, low volt, IRBBB, early transition/IMI-age indet    Laboratory:  CBC:  Recent Labs   Lab 01/03/23  0934 02/20/23  1030 02/28/23  0910   WBC 11.94 5.20 6.36   Hemoglobin 20.8 HH 16.1 16.1   Hematocrit 60.3 H 48.0 45.8   Platelets 238 199 218         CHEMISTRIES:  Recent Labs   Lab 03/02/21  1030 06/24/21  0925 02/07/22  1121 02/07/22  1121 01/03/23  0934 02/20/23  1030 02/28/23  0910 03/22/23  0737 06/28/23  1330 07/05/23  0944   Glucose 159 H 246 H 239 H  --  351 H 236 H 317 H 228 H 233 H 150 H   Sodium 141 139 136  --  133 L 138 137 139 139 141   Potassium 4.9 4.8 4.2  --  4.9 4.3 4.1 4.2 4.8 4.3   BUN 20 16 13  --  18 20 14 17 21 20   Creatinine 1.1 1.2 0.9  --  1.3 0.9 1.1 1.1 1.3 0.9   eGFR if non African American >60 >60.0 >60.0  --   --   --   --   --   --   --    eGFR  --   --   --    < > >60 >60.0 >60 >60.0 >60.0 >60.0   Calcium 9.3 10.1 9.4  --  8.9 9.4 9.7 9.6 9.6 9.2   Magnesium  --   --   --   --  1.8  --   --   --   --   --     < > = values in this interval not displayed.         CARDIAC BIOMARKERS:  Recent Labs   Lab 01/03/23  0934 01/03/23  1222   Troponin I 0.119 H 0.125 H         COAGS:  Recent Labs   Lab 02/28/23  0910   INR 1.0         LIPIDS/LFTS:  Recent Labs   Lab 02/07/22  1121 01/03/23  0934 02/20/23  1030 06/13/23  0722 06/28/23  1330   Cholesterol 207 H  --  129 149  --    Triglycerides 262 H  --  98 149  --    HDL 37 L  --  35 L 35 L  --    LDL Cholesterol 117.6  --  74.4 84.2  --    Non-HDL Cholesterol 170  --  94 114  --    AST 25   < > 26 20 22   ALT 40   < > 43 26 31    < > = values in this  interval not displayed.       Lab Results   Component Value Date    TSH 0.850 02/20/2023         Cardiovascular Testing:  Carotid US 3/7/23  There is 20-39% right Internal Carotid Stenosis.  There is 0-19% left Internal Carotid Stenosis.    Cath 3/7/23 (images prev personally reviewed and interpreted)  LVEDP: 7mmHg  LVEF: 50% by echo  Dominance: Right  LM: normal  LAD: mid-sub-TO with T1 flow, distal vessel collateralized via L-L and R-L filling.  LCx: MLI, dist 90%  RCA: MLI, anderson's crook prox vessel              RPDA mid 90%  Hemostasis:  R Radial band  Impression:  Abnl MPI suggesting MV CAD (high risk, performed after troponin release in setting of PAF/RVR).  3V CAD, low normal LV fxn  R rad vasband for hemostasis  Plan:  Cont med rx  Cont ASA 81mg qd  Resume Xarelto 20mg qhs this evening  Cont atorva 40mg qhs  Home today  Follow up with Dr. Mahoney as planned  Check CT chest and carotid US  CTS opinion (with Dr. Chan per pt request) re CABG vs PCI (vs hybrid vs med rx as pt is asymptomatic).    Ex MPI 2/15/23     The patient exercised for 8 minutes 42 seconds on a Jd protocol, corresponding to a functional capacity of 8 METS, achieving a peak heart rate of 141 bpm, which is 92 % of the age predicted maximum heart rate. Their exercise capacity was above average.    Abnormal myocardial perfusion scan.    There are two significant perfusion abnormalities.    Perfusion Abnormality #1 - There is a moderate to severe intensity, large sized, reversible perfusion abnormality that is consistent with ischemia in the mid to apical anterior and anteroseptal wall(s) in the typical distribution of the LAD territory.    Perfusion Abnormality #2 - There is a large sized, moderate intensity, mostly reversible perfusion abnormality with some fixed areas in the basal to distal inferior wall(s) in the typical distribution of the RCA territory.    There are no other significant perfusion abnormalities.    The gated  perfusion images showed an ejection fraction of 56% post stress.    There is normal wall motion post stress.    The ECG portion of the study is positive for ischemia. Specificity is reduced secondary to hypertensive response.    The patient reported no chest pain during the stress test.    There were no arrhythmias during stress.    The exercise capacity was above average.    Aortic US 2/14/23  No evidence of AAA.    Echo 1/3/23  The left ventricle is normal in size with concentric remodeling and low normal systolic function.  The estimated ejection fraction is 50%.  Normal right ventricular size with normal right ventricular systolic function.  The estimated PA systolic pressure is 26 mmHg.    ASSESSMENT:   # CAD, cath 3/2023 with  mid LAD.  Asymptomatic.  Seen by CTS without plans for CABG.   # PAF/RVR with elev trop, now in SR on apixaban 5mg bid  # HTN, controlled, (tolerating toprol 25mg qd, intol of 50mg qd).  # HLP on atorva 40mg   # NIDDM  # ?polycythemia  # R carotid atherosclerosis (CUS 3/2023)  # BMI 30, stable vs last OV    PLAN:   Cont med rx  Cont ASA 81mg qd  Cont apixaban 5mg bid (prev intol of Xarelto 20mg->epistaxis)  Diet/exercise/weight loss  RTC 6 months with surveillance carotid US 1 (June 2024).  Consider statin titration at that time.  If pt develops sxs, will consider PCI of mid LAD .  If patient develops recurrent epistaxis on apixaban, consider referral for Watchman.      Wil Mahoney MD, FACC

## 2024-01-02 ENCOUNTER — OFFICE VISIT (OUTPATIENT)
Dept: FAMILY MEDICINE | Facility: CLINIC | Age: 69
End: 2024-01-02
Payer: MEDICARE

## 2024-01-02 VITALS
TEMPERATURE: 98 F | HEART RATE: 74 BPM | WEIGHT: 211.56 LBS | OXYGEN SATURATION: 97 % | SYSTOLIC BLOOD PRESSURE: 134 MMHG | DIASTOLIC BLOOD PRESSURE: 70 MMHG | BODY MASS INDEX: 30.29 KG/M2 | HEIGHT: 70 IN

## 2024-01-02 DIAGNOSIS — M25.561 CHRONIC PAIN OF RIGHT KNEE: ICD-10-CM

## 2024-01-02 DIAGNOSIS — R80.9 TYPE 2 DIABETES MELLITUS WITH MICROALBUMINURIA, WITHOUT LONG-TERM CURRENT USE OF INSULIN: ICD-10-CM

## 2024-01-02 DIAGNOSIS — M17.0 PRIMARY OSTEOARTHRITIS OF BOTH KNEES: Primary | ICD-10-CM

## 2024-01-02 DIAGNOSIS — I48.0 PAF (PAROXYSMAL ATRIAL FIBRILLATION): ICD-10-CM

## 2024-01-02 DIAGNOSIS — G89.29 CHRONIC PAIN OF RIGHT KNEE: ICD-10-CM

## 2024-01-02 DIAGNOSIS — F11.29 OPIOID DEPENDENCE WITH OPIOID-INDUCED DISORDER: ICD-10-CM

## 2024-01-02 DIAGNOSIS — I10 ESSENTIAL (PRIMARY) HYPERTENSION: Chronic | ICD-10-CM

## 2024-01-02 DIAGNOSIS — E11.29 TYPE 2 DIABETES MELLITUS WITH MICROALBUMINURIA, WITHOUT LONG-TERM CURRENT USE OF INSULIN: ICD-10-CM

## 2024-01-02 PROCEDURE — 3075F SYST BP GE 130 - 139MM HG: CPT | Mod: HCNC,CPTII,S$GLB, | Performed by: FAMILY MEDICINE

## 2024-01-02 PROCEDURE — 1125F AMNT PAIN NOTED PAIN PRSNT: CPT | Mod: HCNC,CPTII,S$GLB, | Performed by: FAMILY MEDICINE

## 2024-01-02 PROCEDURE — 1101F PT FALLS ASSESS-DOCD LE1/YR: CPT | Mod: HCNC,CPTII,S$GLB, | Performed by: FAMILY MEDICINE

## 2024-01-02 PROCEDURE — 1159F MED LIST DOCD IN RCRD: CPT | Mod: HCNC,CPTII,S$GLB, | Performed by: FAMILY MEDICINE

## 2024-01-02 PROCEDURE — 3078F DIAST BP <80 MM HG: CPT | Mod: HCNC,CPTII,S$GLB, | Performed by: FAMILY MEDICINE

## 2024-01-02 PROCEDURE — 3008F BODY MASS INDEX DOCD: CPT | Mod: HCNC,CPTII,S$GLB, | Performed by: FAMILY MEDICINE

## 2024-01-02 PROCEDURE — 3288F FALL RISK ASSESSMENT DOCD: CPT | Mod: HCNC,CPTII,S$GLB, | Performed by: FAMILY MEDICINE

## 2024-01-02 PROCEDURE — 20610 DRAIN/INJ JOINT/BURSA W/O US: CPT | Mod: HCNC,RT,S$GLB, | Performed by: FAMILY MEDICINE

## 2024-01-02 PROCEDURE — 99999 PR PBB SHADOW E&M-EST. PATIENT-LVL IV: CPT | Mod: PBBFAC,HCNC,, | Performed by: FAMILY MEDICINE

## 2024-01-02 PROCEDURE — 99215 OFFICE O/P EST HI 40 MIN: CPT | Mod: HCNC,25,S$GLB, | Performed by: FAMILY MEDICINE

## 2024-01-02 RX ORDER — HYDROCODONE BITARTRATE AND ACETAMINOPHEN 10; 325 MG/1; MG/1
1 TABLET ORAL 3 TIMES DAILY PRN
Qty: 72 TABLET | Refills: 0 | Status: SHIPPED | OUTPATIENT
Start: 2024-03-03

## 2024-01-02 RX ORDER — HYDROCODONE BITARTRATE AND ACETAMINOPHEN 10; 325 MG/1; MG/1
1 TABLET ORAL 3 TIMES DAILY PRN
Qty: 72 TABLET | Refills: 0 | Status: SHIPPED | OUTPATIENT
Start: 2024-01-03

## 2024-01-02 RX ORDER — TRIAMCINOLONE ACETONIDE 40 MG/ML
40 INJECTION, SUSPENSION INTRA-ARTICULAR; INTRAMUSCULAR
Status: DISCONTINUED | OUTPATIENT
Start: 2024-01-02 | End: 2024-01-02 | Stop reason: HOSPADM

## 2024-01-02 RX ORDER — HYDROCODONE BITARTRATE AND ACETAMINOPHEN 10; 325 MG/1; MG/1
1 TABLET ORAL 3 TIMES DAILY PRN
Qty: 72 TABLET | Refills: 0 | Status: SHIPPED | OUTPATIENT
Start: 2024-02-03

## 2024-01-02 RX ADMIN — TRIAMCINOLONE ACETONIDE 40 MG: 40 INJECTION, SUSPENSION INTRA-ARTICULAR; INTRAMUSCULAR at 02:01

## 2024-01-02 NOTE — PROCEDURES
Large Joint Aspiration/Injection: R knee    Date/Time: 1/2/2024 2:20 PM    Performed by: Gallo Lara MD  Authorized by: Gallo Lara MD    Consent Done?:  Yes (Verbal)  Indications:  Pain  Site marked: the procedure site was marked    Timeout: prior to procedure the correct patient, procedure, and site was verified      Details:  Needle Size:  25 G  Approach:  Anterolateral  Location:  Knee  Site:  R knee  Medications:  40 mg triamcinolone acetonide 40 mg/mL  Patient tolerance:  Patient tolerated the procedure well with no immediate complications

## 2024-01-02 NOTE — PROGRESS NOTES
[facility]  Progress Note    SUBJECTIVE:     Chief Complaint   Patient presents with    Sciatica    Knee Pain     Right        HPI   King Cool Jr.  is a 68 y.o. male here for c/o R knee pain. Also here for f/u of his chronic conditions. Requesting knee injection today. Patient has no other new complaints/problems at this time.      Review of patient's allergies indicates:   Allergen Reactions    Tamiflu [oseltamivir]      Increases BP    Metformin Hives     Diarrhea, nausea    Penicillins Rash       Past Medical History:   Diagnosis Date    Coronary artery disease involving native coronary artery of native heart without angina pectoris 3/7/2023    Essential (primary) hypertension     Male erectile dysfunction, unspecified     New onset a-fib 1/3/2023    Testicular hypofunction     Type 2 diabetes mellitus without complications      Past Surgical History:   Procedure Laterality Date    HIP SURGERY      LEFT HEART CATHETERIZATION Left 3/7/2023    Procedure: Left heart cath;  Surgeon: Wil Mahoney MD;  Location: [facility] CATH LAB;  Service: Cardiology;  Laterality: Left;  1030am start, R rad access    RN PREOP 23     Family History   Problem Relation Age of Onset    Blindness Mother     No Known Problems Father     No Known Problems Brother     No Known Problems Daughter     No Known Problems Son     No Known Problems Daughter     Diabetes Brother     No Known Problems Brother     Amblyopia Neg Hx     Cancer Neg Hx     Cataracts Neg Hx     Glaucoma Neg Hx     Hypertension Neg Hx     Macular degeneration Neg Hx     Retinal detachment Neg Hx     Strabismus Neg Hx     Stroke Neg Hx     Thyroid disease Neg Hx      Social History     Tobacco Use    Smoking status: Former     Current packs/day: 0.00     Average packs/day: 0.3 packs/day for 4.0 years (1.0 ttl pk-yrs)     Types: Cigarettes     Start date: 1972     Quit date: 1976     Years since quittin.1    Smokeless tobacco: Never    Substance Use Topics    Alcohol use: Yes     Comment: occ    Drug use: No        Review of Systems   Constitutional:  Negative for chills, diaphoresis, fever and malaise/fatigue.   Musculoskeletal:  Positive for joint pain (R knee pain). Negative for back pain, falls, myalgias and neck pain.   Skin: Negative.    Neurological:  Negative for dizziness, tingling, sensory change, focal weakness, seizures and weakness.   All other systems reviewed and are negative.    OBJECTIVE:     Vitals:    01/02/24 1418   BP: 134/70   Pulse: 74   Temp: 98.4 °F (36.9 °C)     Body mass index is 30.35 kg/m².    Physical Exam  Constitutional:       General: He is in acute distress (mild).      Appearance: He is not diaphoretic.   Musculoskeletal:         General: Tenderness (to palpation of R medial tibial-femoral compartment, decreased joint space. ACL/PCL intact, negative McMurrays sign) present.   Skin:     Findings: No erythema or rash.   Neurological:      Mental Status: He is alert and oriented to person, place, and time.         Old records were reviewed. Labs and/or images were independently reviewed.    ASSESSMENT     1. Primary osteoarthritis of both knees    2. Essential (primary) hypertension    3. Type 2 diabetes mellitus with microalbuminuria, without long-term current use of insulin    4. Opioid dependence with opioid-induced disorder    5. PAF (paroxysmal atrial fibrillation)    6. Chronic pain of right knee        PLAN:     Primary osteoarthritis of both knees  -     HYDROcodone-acetaminophen (NORCO)  mg per tablet; Take 1 tablet by mouth 3 (three) times daily as needed (pain).  Dispense: 72 tablet; Refill: 0  -     HYDROcodone-acetaminophen (NORCO)  mg per tablet; Take 1 tablet by mouth 3 (three) times daily as needed (pain).  Dispense: 72 tablet; Refill: 0  -     HYDROcodone-acetaminophen (NORCO)  mg per tablet; Take 1 tablet by mouth 3 (three) times daily as needed (pain).  Dispense: 72 tablet;  Refill: 0  -     Stable. Continue current regimen. Unable to wean at this time but am monitoring closely for any drug toxicity. Checked .    Essential (primary) hypertension   -     Stable. Continue current regimen.    Type 2 diabetes mellitus with microalbuminuria, without long-term current use of insulin   -     Stable. Continue current regimen.    Opioid dependence with opioid-induced disorder   -     Stable. Continue current regimen.    PAF (paroxysmal atrial fibrillation)   -     Stable. Continue current regimen.    Chronic pain of right knee  -     Large Joint Aspiration/Injection: R knee  -     triamcinolone acetonide injection 40 mg      RTC PRN    Gallo Lara MD  01/02/2024 2:37 PM

## 2024-03-10 ENCOUNTER — NURSE TRIAGE (OUTPATIENT)
Dept: ADMINISTRATIVE | Facility: CLINIC | Age: 69
End: 2024-03-10
Payer: MEDICARE
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
## 2024-03-11 DIAGNOSIS — E11.29 TYPE 2 DIABETES MELLITUS WITH MICROALBUMINURIA, WITHOUT LONG-TERM CURRENT USE OF INSULIN: Chronic | ICD-10-CM

## 2024-03-11 DIAGNOSIS — M25.561 CHRONIC PAIN OF RIGHT KNEE: ICD-10-CM

## 2024-03-11 DIAGNOSIS — I48.91 NEW ONSET A-FIB: ICD-10-CM

## 2024-03-11 DIAGNOSIS — R80.9 TYPE 2 DIABETES MELLITUS WITH MICROALBUMINURIA, WITHOUT LONG-TERM CURRENT USE OF INSULIN: Chronic | ICD-10-CM

## 2024-03-11 DIAGNOSIS — G89.29 CHRONIC PAIN OF RIGHT KNEE: ICD-10-CM

## 2024-03-11 DIAGNOSIS — M17.31 POST-TRAUMATIC OSTEOARTHRITIS OF RIGHT KNEE: ICD-10-CM

## 2024-03-11 DIAGNOSIS — M17.0 PRIMARY OSTEOARTHRITIS OF BOTH KNEES: ICD-10-CM

## 2024-03-11 RX ORDER — METOPROLOL SUCCINATE 25 MG/1
25 TABLET, EXTENDED RELEASE ORAL DAILY
Qty: 90 TABLET | Refills: 3 | Status: SHIPPED | OUTPATIENT
Start: 2024-03-11 | End: 2025-03-11

## 2024-03-11 RX ORDER — HYDROCODONE BITARTRATE AND ACETAMINOPHEN 10; 325 MG/1; MG/1
1 TABLET ORAL 3 TIMES DAILY PRN
Qty: 72 TABLET | Refills: 0 | Status: SHIPPED | OUTPATIENT
Start: 2024-03-11 | End: 2024-04-09 | Stop reason: SDUPTHER

## 2024-03-11 RX ORDER — LOSARTAN POTASSIUM 25 MG/1
25 TABLET ORAL DAILY
Qty: 90 TABLET | Refills: 3 | Status: SHIPPED | OUTPATIENT
Start: 2024-03-11

## 2024-03-11 RX ORDER — GABAPENTIN 300 MG/1
300 CAPSULE ORAL 3 TIMES DAILY
Qty: 270 CAPSULE | Refills: 2 | Status: SHIPPED | OUTPATIENT
Start: 2024-03-11 | End: 2024-04-09 | Stop reason: SDUPTHER

## 2024-03-11 RX ORDER — GLIMEPIRIDE 4 MG/1
4 TABLET ORAL 2 TIMES DAILY
Qty: 180 TABLET | Refills: 3 | Status: SHIPPED | OUTPATIENT
Start: 2024-03-11

## 2024-03-11 RX ORDER — DICLOFENAC SODIUM 10 MG/G
2 GEL TOPICAL DAILY
Qty: 450 G | Refills: 0 | Status: SHIPPED | OUTPATIENT
Start: 2024-03-11

## 2024-03-11 NOTE — TELEPHONE ENCOUNTER
Care Due:                  Date            Visit Type   Department     Provider  --------------------------------------------------------------------------------                                STACY      [facility]                              FOLLOWUP/OF  MED/ INTERNAL  Last Visit: 01-      FICE VISIT   MED/ PEDS      PRERNA CHRISTIE                               -         [facility]                              PRIMARY      MED/ INTERNAL  Next Visit: 03-      CARE ([facility])   MED/ PEDS      PRERNA CHRISTIE                                                            Last  Test          Frequency    Reason                     Performed    Due Date  --------------------------------------------------------------------------------    HBA1C.......  6 months...  empagliflozin,             06- 12-                             glimepiride, semaglutide.    [employer] Embedded Care Due Messages. Reference number: [id number].   3/11/2024 9:01:43 AM CDT
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
## 2024-03-12 ENCOUNTER — TELEPHONE (OUTPATIENT)
Dept: FAMILY MEDICINE | Facility: CLINIC | Age: 69
End: 2024-03-12
Payer: MEDICARE

## 2024-03-12 RX ORDER — ATORVASTATIN CALCIUM 80 MG/1
80 TABLET, FILM COATED ORAL NIGHTLY
Qty: 90 TABLET | Refills: 3 | Status: SHIPPED | OUTPATIENT
Start: 2024-03-12
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
## 2024-03-14 ENCOUNTER — OFFICE VISIT (OUTPATIENT)
Dept: FAMILY MEDICINE | Facility: CLINIC | Age: 69
End: 2024-03-14
Payer: MEDICARE

## 2024-03-14 VITALS
BODY MASS INDEX: 29.38 KG/M2 | TEMPERATURE: 98 F | SYSTOLIC BLOOD PRESSURE: 110 MMHG | WEIGHT: 205.25 LBS | DIASTOLIC BLOOD PRESSURE: 70 MMHG | HEART RATE: 86 BPM | HEIGHT: 70 IN | OXYGEN SATURATION: 97 %

## 2024-03-14 DIAGNOSIS — I10 ESSENTIAL (PRIMARY) HYPERTENSION: Chronic | ICD-10-CM

## 2024-03-14 DIAGNOSIS — R80.9 TYPE 2 DIABETES MELLITUS WITH MICROALBUMINURIA, WITHOUT LONG-TERM CURRENT USE OF INSULIN: Chronic | ICD-10-CM

## 2024-03-14 DIAGNOSIS — M17.12 OSTEOARTHRITIS OF LEFT KNEE, UNSPECIFIED OSTEOARTHRITIS TYPE: Primary | ICD-10-CM

## 2024-03-14 DIAGNOSIS — E11.29 TYPE 2 DIABETES MELLITUS WITH MICROALBUMINURIA, WITHOUT LONG-TERM CURRENT USE OF INSULIN: Chronic | ICD-10-CM

## 2024-03-14 PROCEDURE — 3008F BODY MASS INDEX DOCD: CPT | Mod: HCNC,CPTII,S$GLB, | Performed by: FAMILY MEDICINE

## 2024-03-14 PROCEDURE — 99999 PR PBB SHADOW E&M-EST. PATIENT-LVL IV: CPT | Mod: PBBFAC,HCNC,, | Performed by: FAMILY MEDICINE

## 2024-03-14 PROCEDURE — 20610 DRAIN/INJ JOINT/BURSA W/O US: CPT | Mod: HCNC,LT,S$GLB, | Performed by: FAMILY MEDICINE

## 2024-03-14 PROCEDURE — 99214 OFFICE O/P EST MOD 30 MIN: CPT | Mod: 25,HCNC,S$GLB, | Performed by: FAMILY MEDICINE

## 2024-03-14 PROCEDURE — 1159F MED LIST DOCD IN RCRD: CPT | Mod: HCNC,CPTII,S$GLB, | Performed by: FAMILY MEDICINE

## 2024-03-14 PROCEDURE — 3078F DIAST BP <80 MM HG: CPT | Mod: HCNC,CPTII,S$GLB, | Performed by: FAMILY MEDICINE

## 2024-03-14 PROCEDURE — 3074F SYST BP LT 130 MM HG: CPT | Mod: HCNC,CPTII,S$GLB, | Performed by: FAMILY MEDICINE

## 2024-03-14 PROCEDURE — 4010F ACE/ARB THERAPY RXD/TAKEN: CPT | Mod: HCNC,CPTII,S$GLB, | Performed by: FAMILY MEDICINE

## 2024-03-14 PROCEDURE — 1125F AMNT PAIN NOTED PAIN PRSNT: CPT | Mod: HCNC,CPTII,S$GLB, | Performed by: FAMILY MEDICINE

## 2024-03-14 RX ORDER — TRIAMCINOLONE ACETONIDE 40 MG/ML
40 INJECTION, SUSPENSION INTRA-ARTICULAR; INTRAMUSCULAR
Status: DISCONTINUED | OUTPATIENT
Start: 2024-03-14 | End: 2024-03-14 | Stop reason: HOSPADM

## 2024-03-14 RX ADMIN — TRIAMCINOLONE ACETONIDE 40 MG: 40 INJECTION, SUSPENSION INTRA-ARTICULAR; INTRAMUSCULAR at 03:03

## 2024-03-14 NOTE — PROGRESS NOTES
[facility]  Progress Note    SUBJECTIVE:     Chief Complaint   Patient presents with    Knee Pain       HPI   King Cool Jr.  is a 69 y.o. male here for c/o L knee pain. Rates pain as moderate-severe. Requesting knee injection today. Patient has no other new complaints/problems at this time.      Review of patient's allergies indicates:   Allergen Reactions    Tamiflu [oseltamivir]      Increases BP    Metformin Hives     Diarrhea, nausea    Penicillins Rash       Past Medical History:   Diagnosis Date    Coronary artery disease involving native coronary artery of native heart without angina pectoris 3/7/2023    Essential (primary) hypertension     Male erectile dysfunction, unspecified     New onset a-fib 1/3/2023    Testicular hypofunction     Type 2 diabetes mellitus without complications      Past Surgical History:   Procedure Laterality Date    HIP SURGERY      LEFT HEART CATHETERIZATION Left 3/7/2023    Procedure: Left heart cath;  Surgeon: Wil Mahoney MD;  Location: [facility] CATH LAB;  Service: Cardiology;  Laterality: Left;  1030am start, R rad access    RN PREOP 23     Family History   Problem Relation Age of Onset    Blindness Mother     No Known Problems Father     No Known Problems Brother     No Known Problems Daughter     No Known Problems Son     No Known Problems Daughter     Diabetes Brother     No Known Problems Brother     Amblyopia Neg Hx     Cancer Neg Hx     Cataracts Neg Hx     Glaucoma Neg Hx     Hypertension Neg Hx     Macular degeneration Neg Hx     Retinal detachment Neg Hx     Strabismus Neg Hx     Stroke Neg Hx     Thyroid disease Neg Hx      Social History     Tobacco Use    Smoking status: Former     Current packs/day: 0.00     Average packs/day: 0.3 packs/day for 4.0 years (1.0 ttl pk-yrs)     Types: Cigarettes     Start date: 1972     Quit date: 1976     Years since quittin.3    Smokeless tobacco: Never   Substance Use Topics    Alcohol use: Yes      Comment: occ    Drug use: No        Review of Systems   Constitutional:  Negative for chills, diaphoresis, fever and malaise/fatigue.   Musculoskeletal:  Positive for joint pain (L knee). Negative for back pain, falls, myalgias and neck pain.   Skin: Negative.    Neurological:  Negative for dizziness, tingling, sensory change, focal weakness, seizures and weakness.   All other systems reviewed and are negative.    OBJECTIVE:     Vitals:    03/14/24 1446   BP: 110/70   Pulse: 86   Temp: 98.3 °F (36.8 °C)     Body mass index is 29.45 kg/m².    Physical Exam  Constitutional:       General: He is in acute distress (mild).   HENT:      Head: Normocephalic and atraumatic.   Musculoskeletal:         General: Tenderness (to palpation of L medial tibial-femoral compartment, decreased joint space. ACL/PCL intact, negative McMurrays sign) present.   Skin:     Findings: No rash.         Old records were reviewed. Labs and/or images were independently reviewed.    ASSESSMENT     1. Osteoarthritis of left knee, unspecified osteoarthritis type    2. Type 2 diabetes mellitus with microalbuminuria, without long-term current use of insulin    3. Essential (primary) hypertension        PLAN:     Osteoarthritis of left knee, unspecified osteoarthritis type  -     Large Joint Aspiration/Injection: L knee  -     triamcinolone acetonide injection 40 mg  -     Patient counseled on good posture and stretching exercises. Continue to place ice packs on affected areas 3-4 times daily. Take medications as prescribed. Instructed patient to call MD if symptoms persist or worsen.    Type 2 diabetes mellitus with microalbuminuria, without long-term current use of insulin  -     CBC Auto Differential; Future  -     Comprehensive Metabolic Panel; Future  -     Hemoglobin A1C; Future  -     Lipid Panel; Future  -     TSH; Future  -     Instructed patient to take daily glucose AM logs and to write them down to bring with on next visit. Advised  patient to decrease intake of carbohydrates/simple sugars.         Essential (primary) hypertension   -     Educated patient to take medications as prescribed, and to record bp logs daily to bring along to next visit. Instructed patient to decrease salt intake. Also told patient to call if any new signs or symptoms develop.    RTC TYLOR Lara MD  03/14/2024 3:16 PM

## 2024-03-14 NOTE — PROCEDURES
Large Joint Aspiration/Injection: L knee    Date/Time: 3/14/2024 3:40 PM    Performed by: Gallo Lara MD  Authorized by: Gallo Lara MD    Consent Done?:  Yes (Verbal)  Indications:  Pain  Site marked: the procedure site was marked    Timeout: prior to procedure the correct patient, procedure, and site was verified      Details:  Needle Size:  25 G  Approach:  Anterolateral  Location:  Knee  Site:  L knee  Medications:  40 mg triamcinolone acetonide 40 mg/mL  Patient tolerance:  Patient tolerated the procedure well with no immediate complications

## 2024-03-28 ENCOUNTER — TELEPHONE (OUTPATIENT)
Dept: FAMILY MEDICINE | Facility: CLINIC | Age: 69
End: 2024-03-28
Payer: MEDICARE

## 2024-03-28 NOTE — TELEPHONE ENCOUNTER
Spoke with patient to inform him that no appointments were available with  before 04/04 but appointment on 04/08 has been placed on waitilist.

## 2024-03-28 NOTE — TELEPHONE ENCOUNTER
----- Message from Malia Perez sent at 3/28/2024 10:03 AM CDT -----  Regarding: self  Who called: self        What is the request in detail: pt would like call back from nurse in regards to getting knee inj before he leave out town       Can the clinic reply by [facility]? No        Would the patient rather a call back or a response via [facility]? Call back        Best call back number:[phone]

## 2024-04-03 ENCOUNTER — TELEPHONE (OUTPATIENT)
Dept: FAMILY MEDICINE | Facility: CLINIC | Age: 69
End: 2024-04-03
Payer: MEDICARE

## 2024-04-03 NOTE — TELEPHONE ENCOUNTER
Left message on voice mail to return call to clinic. Patient cannot get his medications refilled @ this time. Too soon.

## 2024-04-03 NOTE — TELEPHONE ENCOUNTER
----- Message from Samantha Easton sent at 4/3/2024  2:40 PM CDT -----  Regarding: self  Type: RX Refill Request     Who Called:self     Have you contacted your pharmacy:no     Refill     RX Name and Strength:HYDROcodone-acetaminophen (NORCO)  mg per tablet     Preferred Pharmacy with phone number:   [facility]/pharmacy #[id number] - [place] - [address] [facility]  [address] [facility]  [place] [address]  Phone: [phone] Fax: [phone]           Local or Mail Order:local     Would the patient rather a call back or a response via [facility]?call     Best Call Back Number: [phone]       Additional Information:pt stating he is going out of town and would like to fill this tomorrow      Thank you.

## 2024-04-09 DIAGNOSIS — M17.0 PRIMARY OSTEOARTHRITIS OF BOTH KNEES: ICD-10-CM

## 2024-04-09 DIAGNOSIS — G89.29 CHRONIC PAIN OF RIGHT KNEE: ICD-10-CM

## 2024-04-09 DIAGNOSIS — M25.561 CHRONIC PAIN OF RIGHT KNEE: ICD-10-CM

## 2024-04-09 RX ORDER — HYDROCODONE BITARTRATE AND ACETAMINOPHEN 10; 325 MG/1; MG/1
1 TABLET ORAL 3 TIMES DAILY PRN
Qty: 72 TABLET | Refills: 0 | Status: SHIPPED | OUTPATIENT
Start: 2024-04-09 | End: 2024-04-19 | Stop reason: SDUPTHER

## 2024-04-09 RX ORDER — GABAPENTIN 300 MG/1
300 CAPSULE ORAL 3 TIMES DAILY
Qty: 270 CAPSULE | Refills: 2 | Status: SHIPPED | OUTPATIENT
Start: 2024-04-09

## 2024-04-09 NOTE — TELEPHONE ENCOUNTER
----- Message from Lyly Madden sent at 4/9/2024  8:34 AM CDT -----  .Type:  Sooner Appointment Request    Patient is requesting a sooner appointment.  Patient declined first available appointment listed as well as another facility and provider .  Patient will not accept being placed on the waitlist and is requesting a message be sent to doctor.    Name of Caller: SELF    When is the first available appointment? 4/19    Symptoms: REFILL, KNEE CHECK    Would the patient rather a call back or a response via [facility]? CALL    Best Call Back Number: [phone] (home)       Additional Information: ALSO NEEDS HYDROcodone-acetaminophen (NORCO)  mg per tablet REFILLED

## 2024-04-09 NOTE — TELEPHONE ENCOUNTER
Care Due:                  Date            Visit Type   Department     Provider  --------------------------------------------------------------------------------                                [facility]                              PRIMARY      MED/ INTERNAL  Last Visit: 03-      CARE ([facility])   MED/ PEDS      PRERNA CHRISTIE                              [facility]                              PRIMARY      MED/ INTERNAL  Next Visit: 04-      CARE ([facility])   MED/ PEDS      PRERNA CHRISTIE                                                            Last  Test          Frequency    Reason                     Performed    Due Date  --------------------------------------------------------------------------------    CBC.........  12 months..  diclofenac...............  02- 02-    CMP.........  12 months..  diclofenac,                06- 06-                             empagliflozin,                             glimepiride, losartan....    [employer] Embedded Care Due Messages. Reference number: [id number].   4/09/2024 8:50:31 AM CDT

## 2024-04-19 ENCOUNTER — OFFICE VISIT (OUTPATIENT)
Dept: FAMILY MEDICINE | Facility: CLINIC | Age: 69
End: 2024-04-19
Payer: MEDICARE

## 2024-04-19 VITALS
HEIGHT: 70 IN | DIASTOLIC BLOOD PRESSURE: 62 MMHG | TEMPERATURE: 99 F | WEIGHT: 201.81 LBS | BODY MASS INDEX: 28.89 KG/M2 | SYSTOLIC BLOOD PRESSURE: 110 MMHG | HEART RATE: 70 BPM | OXYGEN SATURATION: 97 %

## 2024-04-19 DIAGNOSIS — M17.0 PRIMARY OSTEOARTHRITIS OF BOTH KNEES: ICD-10-CM

## 2024-04-19 DIAGNOSIS — M17.11 OSTEOARTHRITIS OF RIGHT KNEE, UNSPECIFIED OSTEOARTHRITIS TYPE: Primary | ICD-10-CM

## 2024-04-19 PROCEDURE — 99999 PR PBB SHADOW E&M-EST. PATIENT-LVL IV: CPT | Mod: PBBFAC,,, | Performed by: FAMILY MEDICINE

## 2024-04-19 PROCEDURE — 20610 DRAIN/INJ JOINT/BURSA W/O US: CPT | Mod: RT,S$GLB,, | Performed by: FAMILY MEDICINE

## 2024-04-19 PROCEDURE — 3008F BODY MASS INDEX DOCD: CPT | Mod: CPTII,S$GLB,, | Performed by: FAMILY MEDICINE

## 2024-04-19 PROCEDURE — 4010F ACE/ARB THERAPY RXD/TAKEN: CPT | Mod: CPTII,S$GLB,, | Performed by: FAMILY MEDICINE

## 2024-04-19 PROCEDURE — 3074F SYST BP LT 130 MM HG: CPT | Mod: CPTII,S$GLB,, | Performed by: FAMILY MEDICINE

## 2024-04-19 PROCEDURE — 3078F DIAST BP <80 MM HG: CPT | Mod: CPTII,S$GLB,, | Performed by: FAMILY MEDICINE

## 2024-04-19 PROCEDURE — 99215 OFFICE O/P EST HI 40 MIN: CPT | Mod: 25,S$GLB,, | Performed by: FAMILY MEDICINE

## 2024-04-19 PROCEDURE — 1125F AMNT PAIN NOTED PAIN PRSNT: CPT | Mod: CPTII,S$GLB,, | Performed by: FAMILY MEDICINE

## 2024-04-19 PROCEDURE — 3288F FALL RISK ASSESSMENT DOCD: CPT | Mod: CPTII,S$GLB,, | Performed by: FAMILY MEDICINE

## 2024-04-19 PROCEDURE — 1159F MED LIST DOCD IN RCRD: CPT | Mod: CPTII,S$GLB,, | Performed by: FAMILY MEDICINE

## 2024-04-19 PROCEDURE — 1101F PT FALLS ASSESS-DOCD LE1/YR: CPT | Mod: CPTII,S$GLB,, | Performed by: FAMILY MEDICINE

## 2024-04-19 RX ORDER — HYDROCODONE BITARTRATE AND ACETAMINOPHEN 10; 325 MG/1; MG/1
1 TABLET ORAL 3 TIMES DAILY PRN
Qty: 72 TABLET | Refills: 0 | Status: SHIPPED | OUTPATIENT
Start: 2024-05-08

## 2024-04-19 RX ORDER — TRIAMCINOLONE ACETONIDE 40 MG/ML
40 INJECTION, SUSPENSION INTRA-ARTICULAR; INTRAMUSCULAR
Status: DISCONTINUED | OUTPATIENT
Start: 2024-04-19 | End: 2024-04-19 | Stop reason: HOSPADM

## 2024-04-19 RX ORDER — HYDROCODONE BITARTRATE AND ACETAMINOPHEN 10; 325 MG/1; MG/1
1 TABLET ORAL 3 TIMES DAILY PRN
Qty: 72 TABLET | Refills: 0 | Status: SHIPPED | OUTPATIENT
Start: 2024-07-08

## 2024-04-19 RX ORDER — HYDROCODONE BITARTRATE AND ACETAMINOPHEN 10; 325 MG/1; MG/1
1 TABLET ORAL 3 TIMES DAILY PRN
Qty: 72 TABLET | Refills: 0 | Status: SHIPPED | OUTPATIENT
Start: 2024-06-08

## 2024-04-19 RX ADMIN — TRIAMCINOLONE ACETONIDE 40 MG: 40 INJECTION, SUSPENSION INTRA-ARTICULAR; INTRAMUSCULAR at 03:04

## 2024-04-19 NOTE — PROCEDURES
Large Joint Aspiration/Injection: R knee    Date/Time: 4/19/2024 3:20 PM    Performed by: Gallo Lara MD  Authorized by: Gallo Lara MD    Consent Done?:  Yes (Verbal)  Indications:  Pain  Site marked: the procedure site was marked    Timeout: prior to procedure the correct patient, procedure, and site was verified      Details:  Needle Size:  25 G  Approach:  Anterolateral  Location:  Knee  Site:  R knee  Medications:  40 mg triamcinolone acetonide 40 mg/mL  Patient tolerance:  Patient tolerated the procedure well with no immediate complications

## 2024-04-19 NOTE — PROGRESS NOTES
[facility]  Progress Note    SUBJECTIVE:     Chief Complaint   Patient presents with    Knee Pain       HPI   King Cool Jr.  is a 69 y.o. male here for c/o R knee pain. Rates pain as moderate-severe. Requesting knee injection today. Patient has no other new complaints/problems at this time.      Review of patient's allergies indicates:   Allergen Reactions    Tamiflu [oseltamivir]      Increases BP    Metformin Hives     Diarrhea, nausea    Penicillins Rash       Past Medical History:   Diagnosis Date    Coronary artery disease involving native coronary artery of native heart without angina pectoris 3/7/2023    Essential (primary) hypertension     Male erectile dysfunction, unspecified     New onset a-fib 1/3/2023    Testicular hypofunction     Type 2 diabetes mellitus without complications      Past Surgical History:   Procedure Laterality Date    HIP SURGERY      LEFT HEART CATHETERIZATION Left 3/7/2023    Procedure: Left heart cath;  Surgeon: Wil Mahoney MD;  Location: [facility] CATH LAB;  Service: Cardiology;  Laterality: Left;  1030am start, R rad access    RN PREOP 23     Family History   Problem Relation Name Age of Onset    Blindness Mother      No Known Problems Father      No Known Problems Brother      No Known Problems Daughter      No Known Problems Son      No Known Problems Daughter      Diabetes Brother      No Known Problems Brother      Amblyopia Neg Hx      Cancer Neg Hx      Cataracts Neg Hx      Glaucoma Neg Hx      Hypertension Neg Hx      Macular degeneration Neg Hx      Retinal detachment Neg Hx      Strabismus Neg Hx      Stroke Neg Hx      Thyroid disease Neg Hx       Social History     Tobacco Use    Smoking status: Former     Current packs/day: 0.00     Average packs/day: 0.3 packs/day for 4.0 years (1.0 ttl pk-yrs)     Types: Cigarettes     Start date: 1972     Quit date: 1976     Years since quittin.4    Smokeless tobacco: Never   Substance Use  Topics    Alcohol use: Yes     Comment: occ    Drug use: No        Review of Systems   Musculoskeletal:  Positive for joint pain (R knee pain). Negative for falls.     OBJECTIVE:     Vitals:    04/19/24 1449   BP: 110/62   Pulse: 70   Temp: 98.6 °F (37 °C)     Body mass index is 28.96 kg/m².    Physical Exam  Constitutional:       General: He is in acute distress (mild).      Appearance: He is not diaphoretic.   Musculoskeletal:         General: Tenderness (to palpation of R medial tibial-femoral compartment, decreased joint space. ACL/PCL intact, negative McMurrays sign) present.   Skin:     Findings: No erythema or rash.   Neurological:      Mental Status: He is alert and oriented to person, place, and time.         Old records were reviewed. Labs and/or images were independently reviewed.    ASSESSMENT     1. Osteoarthritis of right knee, unspecified osteoarthritis type    2. Primary osteoarthritis of both knees        PLAN:     Osteoarthritis of right knee, unspecified osteoarthritis type  -     Large Joint Aspiration/Injection: R knee  -     triamcinolone acetonide injection 40 mg  -     injected today without issues.     Primary osteoarthritis of both knees  -     HYDROcodone-acetaminophen (NORCO)  mg per tablet; Take 1 tablet by mouth 3 (three) times daily as needed (pain).  Dispense: 72 tablet; Refill: 0  -     HYDROcodone-acetaminophen (NORCO)  mg per tablet; Take 1 tablet by mouth 3 (three) times daily as needed (pain).  Dispense: 72 tablet; Refill: 0  -     HYDROcodone-acetaminophen (NORCO)  mg per tablet; Take 1 tablet by mouth 3 (three) times daily as needed (pain).  Dispense: 72 tablet; Refill: 0  -     Stable. Continue current regimen. Unable to wean at this time but am monitoring closely for any drug toxicity. Checked .      RTC PRJOHNNY Lara MD  04/19/2024 3:16 PM

## 2024-04-20 ENCOUNTER — PATIENT MESSAGE (OUTPATIENT)
Dept: ADMINISTRATIVE | Facility: HOSPITAL | Age: 69
End: 2024-04-20
Payer: MEDICARE

## 2024-04-30 ENCOUNTER — TELEPHONE (OUTPATIENT)
Dept: FAMILY MEDICINE | Facility: CLINIC | Age: 69
End: 2024-04-30
Payer: MEDICARE

## 2024-04-30 NOTE — TELEPHONE ENCOUNTER
----- Message from Elsie Miller sent at 4/30/2024 11:21 AM CDT -----  Regarding: Return call  .Type:  Patient Returning Call    Who Called: self     Who Left Message for Patient: Lina Dai LPN    Does the patient know what this is regarding?:yes    Would the patient rather a call back or a response via [facility]? Call     Best Call Back Number:.[phone]      Additional Information:

## 2024-04-30 NOTE — TELEPHONE ENCOUNTER
----- Message from Elsie Miller sent at 4/30/2024 10:31 AM CDT -----  Regarding: Refill request  .Type: RX Refill Request    Who Called:self     Have you contacted your pharmacy:no    Refill or New Rx: Refill    RX Name and Strength:gabapentin (NEURONTIN) 300 MG capsule and HYDROcodone-acetaminophen (NORCO)  mg per tablet    Preferred Pharmacy with phone number:.  [facility]/pharmacy #[id number] - [place] - [address]  [address] [place] [address]  Phone: [phone] Fax: [phone]    Local or Mail Order:local     Ordering Provider:NEFTALI Lara     Would the patient rather a call back or a response via [facility]? Call     Best Call Back Number:.[phone]      Additional Information:

## 2024-04-30 NOTE — TELEPHONE ENCOUNTER
----- Message from Shreya Castro sent at 4/30/2024 12:00 PM CDT -----  Regarding: self  [phone]  .Type:  Patient Returning Call    Who Called: self    Who Left Message for Patient: Lina    Does the patient know what this is regarding?yes    Would the patient rather a call back or a response via [facility]? Call back    Best Call Back Number: [phone]
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
## 2024-05-08 ENCOUNTER — PATIENT MESSAGE (OUTPATIENT)
Dept: FAMILY MEDICINE | Facility: CLINIC | Age: 69
End: 2024-05-08
Payer: MEDICARE

## 2024-05-08 ENCOUNTER — TELEPHONE (OUTPATIENT)
Dept: FAMILY MEDICINE | Facility: CLINIC | Age: 69
End: 2024-05-08
Payer: MEDICARE
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
## 2024-05-09 ENCOUNTER — TELEPHONE (OUTPATIENT)
Dept: FAMILY MEDICINE | Facility: CLINIC | Age: 69
End: 2024-05-09
Payer: MEDICARE
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
## 2024-05-09 NOTE — TELEPHONE ENCOUNTER
"----- Message from Elsie Miller sent at 5/9/2024 10:01 AM CDT -----  Regarding: Return call  .Type:  Patient Returning Call    Who Called: self     Who Left Message for Patient: Gallo Lara MD    Does the patient know what this is regarding?:caller states "it's no longer there"    Would the patient rather a call back or a response via [facility]? Call     Best Call Back Number:.[phone]      Additional Information:  "

## 2024-05-09 NOTE — TELEPHONE ENCOUNTER
----- Message from Edward Cisse sent at 5/9/2024 10:56 AM CDT -----  Regarding: self  Type:  Patient Returning Call    Who Called:self    Who Left Message for Patient: Yaritza Soliman RN    Does the patient know what this is regarding?:no    Would the patient rather a call back or a response via [facility]?callback    Best Call Back Number:[phone]    Additional Information:

## 2024-05-13 ENCOUNTER — PATIENT OUTREACH (OUTPATIENT)
Dept: ADMINISTRATIVE | Facility: HOSPITAL | Age: 69
End: 2024-05-13
Payer: MEDICARE

## 2024-06-18 ENCOUNTER — PATIENT OUTREACH (OUTPATIENT)
Dept: ADMINISTRATIVE | Facility: HOSPITAL | Age: 69
End: 2024-06-18
Payer: MEDICARE

## 2024-06-18 DIAGNOSIS — R80.9 TYPE 2 DIABETES MELLITUS WITH MICROALBUMINURIA, WITHOUT LONG-TERM CURRENT USE OF INSULIN: Primary | Chronic | ICD-10-CM

## 2024-06-18 DIAGNOSIS — E11.29 TYPE 2 DIABETES MELLITUS WITH MICROALBUMINURIA, WITHOUT LONG-TERM CURRENT USE OF INSULIN: Primary | Chronic | ICD-10-CM

## 2024-06-21 ENCOUNTER — LAB VISIT (OUTPATIENT)
Dept: LAB | Facility: HOSPITAL | Age: 69
End: 2024-06-21
Attending: FAMILY MEDICINE
Payer: MEDICARE

## 2024-06-21 DIAGNOSIS — E11.29 TYPE 2 DIABETES MELLITUS WITH MICROALBUMINURIA, WITHOUT LONG-TERM CURRENT USE OF INSULIN: Chronic | ICD-10-CM

## 2024-06-21 DIAGNOSIS — R80.9 TYPE 2 DIABETES MELLITUS WITH MICROALBUMINURIA, WITHOUT LONG-TERM CURRENT USE OF INSULIN: Chronic | ICD-10-CM

## 2024-06-21 LAB
ALBUMIN SERPL BCP-MCNC: 3.7 G/DL (ref 3.5–5.2)
ALP SERPL-CCNC: 108 U/L (ref 55–135)
ALT SERPL W/O P-5'-P-CCNC: 27 U/L (ref 10–44)
ANION GAP SERPL CALC-SCNC: 10 MMOL/L (ref 8–16)
AST SERPL-CCNC: 20 U/L (ref 10–40)
BASOPHILS # BLD AUTO: 0.06 K/UL (ref 0–0.2)
BASOPHILS NFR BLD: 0.9 % (ref 0–1.9)
BILIRUB SERPL-MCNC: 0.8 MG/DL (ref 0.1–1)
BUN SERPL-MCNC: 18 MG/DL (ref 8–23)
CALCIUM SERPL-MCNC: 9.2 MG/DL (ref 8.7–10.5)
CHLORIDE SERPL-SCNC: 101 MMOL/L (ref 95–110)
CHOLEST SERPL-MCNC: 223 MG/DL (ref 120–199)
CHOLEST/HDLC SERPL: 5.9 {RATIO} (ref 2–5)
CO2 SERPL-SCNC: 24 MMOL/L (ref 23–29)
CREAT SERPL-MCNC: 1 MG/DL (ref 0.5–1.4)
DIFFERENTIAL METHOD BLD: ABNORMAL
EOSINOPHIL # BLD AUTO: 0.1 K/UL (ref 0–0.5)
EOSINOPHIL NFR BLD: 1.8 % (ref 0–8)
ERYTHROCYTE [DISTWIDTH] IN BLOOD BY AUTOMATED COUNT: 12.3 % (ref 11.5–14.5)
EST. GFR  (NO RACE VARIABLE): >60 ML/MIN/1.73 M^2
ESTIMATED AVG GLUCOSE: 280 MG/DL (ref 68–131)
GLUCOSE SERPL-MCNC: 292 MG/DL (ref 70–110)
HBA1C MFR BLD: 11.4 % (ref 4–5.6)
HCT VFR BLD AUTO: 43.6 % (ref 40–54)
HDLC SERPL-MCNC: 38 MG/DL (ref 40–75)
HDLC SERPL: 17 % (ref 20–50)
HGB BLD-MCNC: 14.7 G/DL (ref 14–18)
IMM GRANULOCYTES # BLD AUTO: 0.05 K/UL (ref 0–0.04)
IMM GRANULOCYTES NFR BLD AUTO: 0.8 % (ref 0–0.5)
LDLC SERPL CALC-MCNC: 123 MG/DL (ref 63–159)
LYMPHOCYTES # BLD AUTO: 2.3 K/UL (ref 1–4.8)
LYMPHOCYTES NFR BLD: 35.6 % (ref 18–48)
MCH RBC QN AUTO: 30.9 PG (ref 27–31)
MCHC RBC AUTO-ENTMCNC: 33.7 G/DL (ref 32–36)
MCV RBC AUTO: 92 FL (ref 82–98)
MONOCYTES # BLD AUTO: 0.6 K/UL (ref 0.3–1)
MONOCYTES NFR BLD: 9.4 % (ref 4–15)
NEUTROPHILS # BLD AUTO: 3.4 K/UL (ref 1.8–7.7)
NEUTROPHILS NFR BLD: 51.5 % (ref 38–73)
NONHDLC SERPL-MCNC: 185 MG/DL
NRBC BLD-RTO: 0 /100 WBC
PLATELET # BLD AUTO: 318 K/UL (ref 150–450)
PMV BLD AUTO: 9.1 FL (ref 9.2–12.9)
POTASSIUM SERPL-SCNC: 4 MMOL/L (ref 3.5–5.1)
PROT SERPL-MCNC: 6.8 G/DL (ref 6–8.4)
RBC # BLD AUTO: 4.75 M/UL (ref 4.6–6.2)
SODIUM SERPL-SCNC: 135 MMOL/L (ref 136–145)
TRIGL SERPL-MCNC: 310 MG/DL (ref 30–150)
TSH SERPL DL<=0.005 MIU/L-ACNC: 1.17 UIU/ML (ref 0.4–4)
WBC # BLD AUTO: 6.52 K/UL (ref 3.9–12.7)

## 2024-06-21 PROCEDURE — 80053 COMPREHEN METABOLIC PANEL: CPT | Mod: HCNC | Performed by: FAMILY MEDICINE

## 2024-06-21 PROCEDURE — 85025 COMPLETE CBC W/AUTO DIFF WBC: CPT | Mod: HCNC | Performed by: FAMILY MEDICINE

## 2024-06-21 PROCEDURE — 83036 HEMOGLOBIN GLYCOSYLATED A1C: CPT | Mod: HCNC | Performed by: FAMILY MEDICINE

## 2024-06-21 PROCEDURE — 36415 COLL VENOUS BLD VENIPUNCTURE: CPT | Mod: HCNC,PO | Performed by: FAMILY MEDICINE

## 2024-06-21 PROCEDURE — 84443 ASSAY THYROID STIM HORMONE: CPT | Mod: HCNC | Performed by: FAMILY MEDICINE

## 2024-06-21 PROCEDURE — 80061 LIPID PANEL: CPT | Mod: HCNC | Performed by: FAMILY MEDICINE

## 2024-06-25 ENCOUNTER — PATIENT OUTREACH (OUTPATIENT)
Dept: ADMINISTRATIVE | Facility: HOSPITAL | Age: 69
End: 2024-06-25
Payer: MEDICARE

## 2024-08-08 ENCOUNTER — TELEPHONE (OUTPATIENT)
Dept: FAMILY MEDICINE | Facility: CLINIC | Age: 69
End: 2024-08-08
Payer: MEDICARE

## 2024-08-08 DIAGNOSIS — M17.0 PRIMARY OSTEOARTHRITIS OF BOTH KNEES: ICD-10-CM

## 2024-08-08 RX ORDER — HYDROCODONE BITARTRATE AND ACETAMINOPHEN 10; 325 MG/1; MG/1
1 TABLET ORAL 3 TIMES DAILY PRN
Qty: 72 TABLET | Refills: 0 | OUTPATIENT
Start: 2024-08-08

## 2024-08-08 RX ORDER — HYDROCODONE BITARTRATE AND ACETAMINOPHEN 10; 325 MG/1; MG/1
1 TABLET ORAL 3 TIMES DAILY PRN
Qty: 72 TABLET | Refills: 0 | Status: CANCELLED | OUTPATIENT
Start: 2024-08-08

## 2024-08-09 ENCOUNTER — TELEPHONE (OUTPATIENT)
Dept: FAMILY MEDICINE | Facility: CLINIC | Age: 69
End: 2024-08-09
Payer: MEDICARE

## 2024-08-09 DIAGNOSIS — M17.0 PRIMARY OSTEOARTHRITIS OF BOTH KNEES: ICD-10-CM

## 2024-08-09 NOTE — TELEPHONE ENCOUNTER
No care due was identified.  [facility] Embedded Care Due Messages. Reference number: [id number].   8/09/2024 4:38:07 PM CDT

## 2024-08-09 NOTE — TELEPHONE ENCOUNTER
----- Message from Edward Cisse sent at 8/9/2024  4:05 PM CDT -----  Regarding: self  Type:  Patient Returning Call    Who Called:self    Who Left Message for Patient: Debo Briseno    Does the patient know what this is regarding?:no    Would the patient rather a call back or a response via [facility]?callback    Best Call Back Number:[phone]    Additional Information:

## 2024-08-12 RX ORDER — HYDROCODONE BITARTRATE AND ACETAMINOPHEN 10; 325 MG/1; MG/1
1 TABLET ORAL 3 TIMES DAILY PRN
Qty: 72 TABLET | Refills: 0 | OUTPATIENT
Start: 2024-08-12

## 2024-08-13 ENCOUNTER — CLINICAL SUPPORT (OUTPATIENT)
Dept: FAMILY MEDICINE | Facility: CLINIC | Age: 69
End: 2024-08-13
Attending: FAMILY MEDICINE
Payer: MEDICARE

## 2024-08-13 ENCOUNTER — LAB VISIT (OUTPATIENT)
Dept: LAB | Facility: HOSPITAL | Age: 69
End: 2024-08-13
Attending: FAMILY MEDICINE
Payer: MEDICARE

## 2024-08-13 ENCOUNTER — OFFICE VISIT (OUTPATIENT)
Dept: FAMILY MEDICINE | Facility: CLINIC | Age: 69
End: 2024-08-13
Payer: MEDICARE

## 2024-08-13 VITALS
TEMPERATURE: 98 F | BODY MASS INDEX: 29.02 KG/M2 | DIASTOLIC BLOOD PRESSURE: 68 MMHG | WEIGHT: 202.69 LBS | HEIGHT: 70 IN | HEART RATE: 68 BPM | OXYGEN SATURATION: 96 % | SYSTOLIC BLOOD PRESSURE: 128 MMHG

## 2024-08-13 DIAGNOSIS — E11.29 TYPE 2 DIABETES MELLITUS WITH MICROALBUMINURIA, WITHOUT LONG-TERM CURRENT USE OF INSULIN: Chronic | ICD-10-CM

## 2024-08-13 DIAGNOSIS — Z12.11 ENCOUNTER FOR SCREENING FOR MALIGNANT NEOPLASM OF COLON: ICD-10-CM

## 2024-08-13 DIAGNOSIS — R80.9 TYPE 2 DIABETES MELLITUS WITH MICROALBUMINURIA, WITHOUT LONG-TERM CURRENT USE OF INSULIN: Chronic | ICD-10-CM

## 2024-08-13 DIAGNOSIS — M17.0 PRIMARY OSTEOARTHRITIS OF BOTH KNEES: ICD-10-CM

## 2024-08-13 DIAGNOSIS — M25.461 SWELLING OF KNEE JOINT, RIGHT: ICD-10-CM

## 2024-08-13 DIAGNOSIS — M17.11 OSTEOARTHRITIS OF RIGHT KNEE, UNSPECIFIED OSTEOARTHRITIS TYPE: Primary | ICD-10-CM

## 2024-08-13 LAB
ALBUMIN SERPL BCP-MCNC: 3.8 G/DL (ref 3.5–5.2)
ALP SERPL-CCNC: 105 U/L (ref 55–135)
ALT SERPL W/O P-5'-P-CCNC: 20 U/L (ref 10–44)
ANION GAP SERPL CALC-SCNC: 8 MMOL/L (ref 8–16)
AST SERPL-CCNC: 15 U/L (ref 10–40)
BILIRUB SERPL-MCNC: 0.4 MG/DL (ref 0.1–1)
BUN SERPL-MCNC: 12 MG/DL (ref 8–23)
CALCIUM SERPL-MCNC: 9.5 MG/DL (ref 8.7–10.5)
CHLORIDE SERPL-SCNC: 102 MMOL/L (ref 95–110)
CO2 SERPL-SCNC: 28 MMOL/L (ref 23–29)
CREAT SERPL-MCNC: 1 MG/DL (ref 0.5–1.4)
EST. GFR  (NO RACE VARIABLE): >60 ML/MIN/1.73 M^2
ESTIMATED AVG GLUCOSE: 283 MG/DL (ref 68–131)
GLUCOSE SERPL-MCNC: 318 MG/DL (ref 70–110)
HBA1C MFR BLD: 11.5 % (ref 4–5.6)
POTASSIUM SERPL-SCNC: 4.2 MMOL/L (ref 3.5–5.1)
PROT SERPL-MCNC: 6.6 G/DL (ref 6–8.4)
SODIUM SERPL-SCNC: 138 MMOL/L (ref 136–145)

## 2024-08-13 PROCEDURE — 83036 HEMOGLOBIN GLYCOSYLATED A1C: CPT | Mod: HCNC | Performed by: FAMILY MEDICINE

## 2024-08-13 PROCEDURE — 92228 IMG RTA DETC/MNTR DS PHY/QHP: CPT | Mod: 26,HCNC,S$GLB, | Performed by: OPTOMETRIST

## 2024-08-13 PROCEDURE — 36415 COLL VENOUS BLD VENIPUNCTURE: CPT | Mod: HCNC,PO | Performed by: FAMILY MEDICINE

## 2024-08-13 PROCEDURE — 80053 COMPREHEN METABOLIC PANEL: CPT | Mod: HCNC | Performed by: FAMILY MEDICINE

## 2024-08-13 PROCEDURE — 99999 PR PBB SHADOW E&M-EST. PATIENT-LVL IV: CPT | Mod: PBBFAC,HCNC,, | Performed by: FAMILY MEDICINE

## 2024-08-13 RX ORDER — LANCETS
1 EACH MISCELLANEOUS 3 TIMES DAILY
Qty: 200 EACH | Refills: 5 | Status: SHIPPED | OUTPATIENT
Start: 2024-08-13 | End: 2024-08-14

## 2024-08-13 RX ORDER — INSULIN PUMP SYRINGE, 3 ML
EACH MISCELLANEOUS
Qty: 1 EACH | Refills: 0 | Status: ON HOLD | OUTPATIENT
Start: 2024-08-13

## 2024-08-13 RX ORDER — HYDROCODONE BITARTRATE AND ACETAMINOPHEN 10; 325 MG/1; MG/1
1 TABLET ORAL 3 TIMES DAILY PRN
Qty: 72 TABLET | Refills: 0 | Status: ON HOLD | OUTPATIENT
Start: 2024-09-13

## 2024-08-13 RX ORDER — TRIAMCINOLONE ACETONIDE 40 MG/ML
40 INJECTION, SUSPENSION INTRA-ARTICULAR; INTRAMUSCULAR
Status: DISCONTINUED | OUTPATIENT
Start: 2024-08-13 | End: 2024-08-13 | Stop reason: HOSPADM

## 2024-08-13 RX ORDER — HYDROCODONE BITARTRATE AND ACETAMINOPHEN 10; 325 MG/1; MG/1
1 TABLET ORAL 3 TIMES DAILY PRN
Qty: 72 TABLET | Refills: 0 | Status: ON HOLD | OUTPATIENT
Start: 2024-10-13

## 2024-08-13 RX ORDER — HYDROCODONE BITARTRATE AND ACETAMINOPHEN 10; 325 MG/1; MG/1
1 TABLET ORAL 3 TIMES DAILY PRN
Qty: 72 TABLET | Refills: 0 | Status: ON HOLD | OUTPATIENT
Start: 2024-08-13

## 2024-08-13 RX ADMIN — TRIAMCINOLONE ACETONIDE 40 MG: 40 INJECTION, SUSPENSION INTRA-ARTICULAR; INTRAMUSCULAR at 02:08

## 2024-08-13 NOTE — PROGRESS NOTES
[facility]  Progress Note    SUBJECTIVE:     Chief Complaint   Patient presents with    Knee Pain     RX refills        HPI   King Cool Jr.  is a 69 y.o. male here for R knee pain. Requesting knee injection today. Also here for follow-up of his chronic conditions. Patient has no other new complaints/problems at this time.      Review of patient's allergies indicates:   Allergen Reactions    Tamiflu [oseltamivir]      Increases BP    Metformin Hives     Diarrhea, nausea    Penicillins Rash       Past Medical History:   Diagnosis Date    Coronary artery disease involving native coronary artery of native heart without angina pectoris 3/7/2023    Essential (primary) hypertension     Male erectile dysfunction, unspecified     New onset a-fib 1/3/2023    Testicular hypofunction     Type 2 diabetes mellitus without complications      Past Surgical History:   Procedure Laterality Date    HIP SURGERY      LEFT HEART CATHETERIZATION Left 3/7/2023    Procedure: Left heart cath;  Surgeon: Wil Mahoney MD;  Location: [facility] CATH LAB;  Service: Cardiology;  Laterality: Left;  1030am start, R rad access    RN PREOP 23     Family History   Problem Relation Name Age of Onset    Blindness Mother      No Known Problems Father      No Known Problems Brother      No Known Problems Daughter      No Known Problems Son      No Known Problems Daughter      Diabetes Brother      No Known Problems Brother      Amblyopia Neg Hx      Cancer Neg Hx      Cataracts Neg Hx      Glaucoma Neg Hx      Hypertension Neg Hx      Macular degeneration Neg Hx      Retinal detachment Neg Hx      Strabismus Neg Hx      Stroke Neg Hx      Thyroid disease Neg Hx       Social History     Tobacco Use    Smoking status: Former     Current packs/day: 0.00     Average packs/day: 0.3 packs/day for 4.0 years (1.0 ttl pk-yrs)     Types: Cigarettes     Start date: 1972     Quit date: 1976     Years since quittin.7    Smokeless  tobacco: Never   Substance Use Topics    Alcohol use: Yes     Comment: occ    Drug use: No        Review of Systems   Constitutional:  Negative for chills, diaphoresis, fever and malaise/fatigue.   HENT:  Negative for hearing loss.    Eyes:  Negative for discharge.   Respiratory:  Negative for wheezing.    Cardiovascular:  Negative for chest pain and palpitations.   Gastrointestinal:  Negative for blood in stool, constipation, diarrhea and vomiting.   Genitourinary:  Negative for hematuria and urgency.   Musculoskeletal:  Positive for back pain and joint pain (R knee). Negative for falls, myalgias and neck pain.   Skin: Negative.    Neurological:  Negative for dizziness, tingling, sensory change, focal weakness, seizures, weakness and headaches.   Endo/Heme/Allergies:  Negative for polydipsia.   All other systems reviewed and are negative.    OBJECTIVE:     Vitals:    08/13/24 1358   BP: 128/68   Pulse: 68   Temp: 98.1 °F (36.7 °C)     Body mass index is 29.09 kg/m².    Physical Exam  Constitutional:       General: He is in acute distress (mild).      Appearance: He is not diaphoretic.   HENT:      Head: Normocephalic and atraumatic.   Eyes:      Conjunctiva/sclera: Conjunctivae normal.   Pulmonary:      Effort: Pulmonary effort is normal. No respiratory distress.   Musculoskeletal:         General: Tenderness (to palpation of R medial tibial-femoral compartment, decreased joint space. ACL/PCL intact, negative McMurrays sign) present.   Skin:     General: Skin is warm.      Findings: No erythema or rash.   Neurological:      Mental Status: He is alert and oriented to person, place, and time.         Old records were reviewed. Labs and/or images were independently reviewed.    ASSESSMENT     1. Osteoarthritis of right knee, unspecified osteoarthritis type    2. Primary osteoarthritis of both knees    3. Type 2 diabetes mellitus with microalbuminuria, without long-term current use of insulin    4. Encounter for  screening for malignant neoplasm of colon        PLAN:     Osteoarthritis of right knee, unspecified osteoarthritis type  -     Large Joint Aspiration/Injection: R knee  -     triamcinolone acetonide injection 40 mg    Primary osteoarthritis of both knees  -     HYDROcodone-acetaminophen (NORCO)  mg per tablet; Take 1 tablet by mouth 3 (three) times daily as needed (pain).  Dispense: 72 tablet; Refill: 0  -     HYDROcodone-acetaminophen (NORCO)  mg per tablet; Take 1 tablet by mouth 3 (three) times daily as needed (pain).  Dispense: 72 tablet; Refill: 0  -     HYDROcodone-acetaminophen (NORCO)  mg per tablet; Take 1 tablet by mouth 3 (three) times daily as needed (pain).  Dispense: 72 tablet; Refill: 0  \-     Stable. Continue current regimen. Unable to wean at this time but am monitoring closely for any drug toxicity. Checked .    Type 2 diabetes mellitus with microalbuminuria, without long-term current use of insulin  -     Comprehensive Metabolic Panel; Future  -     Hemoglobin A1C; Future  -     lancets Misc; 1 application  by Misc.(Non-Drug; Combo Route) route 3 (three) times daily.  Dispense: 200 each; Refill: 5  -     blood-glucose meter kit; Use as instructed  Dispense: 1 each; Refill: 0  -     blood sugar diagnostic Strp; 1 strip by Misc.(Non-Drug; Combo Route) route 3 (three) times daily.  Dispense: 200 strip; Refill: 5  -     Diabetic Eye Screening Photo; Future  -     Instructed patient to take daily glucose AM logs and to write them down to bring with on next visit. Advised patient to decrease intake of carbohydrates/simple sugars.         Encounter for screening for malignant neoplasm of colon  -     Fecal Immunochemical Test (iFOBT); Future; Expected date: 08/13/2024      [facility] TYLOR Lara MD  08/13/2024 2:19 PM

## 2024-08-13 NOTE — PROCEDURES
Large Joint Aspiration/Injection: R knee    Date/Time: 8/13/2024 2:20 PM    Performed by: Gallo Lara MD  Authorized by: Gallo Lara MD    Consent Done?:  Yes (Verbal)  Indications:  Pain  Site marked: the procedure site was marked    Timeout: prior to procedure the correct patient, procedure, and site was verified      Details:  Needle Size:  25 G  Approach:  Anterolateral  Location:  Knee  Site:  R knee  Medications:  40 mg triamcinolone acetonide 40 mg/mL  Patient tolerance:  Patient tolerated the procedure well with no immediate complications

## 2024-08-13 NOTE — TELEPHONE ENCOUNTER
Refill Routing Note   Medication(s) are not appropriate for processing by [facility] for the following reason(s):        Other    [facility] action(s):  Defer      Medication Therapy Plan: Pharmacy comment: Script Clarification:PLEASE SEND WITH HOW MANY TIMES TO TEST DAILY.      Appointments  past 12m or future 3m with PCP    Date Provider   Last Visit   8/13/2024 Gallo Lara MD   Next Visit   11/13/2024 Gallo Lara MD   ED visits in past 90 days: 0        Note composed:6:48 PM 08/13/2024

## 2024-08-13 NOTE — TELEPHONE ENCOUNTER
No care due was identified.  [facility] Embedded Care Due Messages. Reference number: [id number].   8/13/2024 2:33:38 PM CDT

## 2024-08-14 ENCOUNTER — PATIENT MESSAGE (OUTPATIENT)
Dept: FAMILY MEDICINE | Facility: CLINIC | Age: 69
End: 2024-08-14
Payer: MEDICARE

## 2024-08-14 DIAGNOSIS — E11.29 TYPE 2 DIABETES MELLITUS WITH MICROALBUMINURIA, WITHOUT LONG-TERM CURRENT USE OF INSULIN: Chronic | ICD-10-CM

## 2024-08-14 DIAGNOSIS — R80.9 TYPE 2 DIABETES MELLITUS WITH MICROALBUMINURIA, WITHOUT LONG-TERM CURRENT USE OF INSULIN: Chronic | ICD-10-CM

## 2024-08-14 RX ORDER — LANCETS 33 GAUGE
EACH MISCELLANEOUS
OUTPATIENT
Start: 2024-08-14

## 2024-08-14 RX ORDER — INSULIN PUMP SYRINGE, 3 ML
1 EACH MISCELLANEOUS 3 TIMES DAILY
Qty: 1 EACH | Refills: 0 | Status: ON HOLD | OUTPATIENT
Start: 2024-08-14

## 2024-08-14 RX ORDER — LANCETS
1 EACH MISCELLANEOUS 3 TIMES DAILY
Qty: 200 EACH | Refills: 5 | Status: ON HOLD | OUTPATIENT
Start: 2024-08-14

## 2024-08-15 ENCOUNTER — OFFICE VISIT (OUTPATIENT)
Dept: ORTHOPEDICS | Facility: CLINIC | Age: 69
End: 2024-08-15
Payer: MEDICARE

## 2024-08-15 ENCOUNTER — HOSPITAL ENCOUNTER (OUTPATIENT)
Dept: RADIOLOGY | Facility: HOSPITAL | Age: 69
Discharge: HOME | End: 2024-08-15
Attending: NURSE PRACTITIONER
Payer: MEDICARE

## 2024-08-15 ENCOUNTER — HOSPITAL ENCOUNTER (INPATIENT)
Facility: HOSPITAL | Age: 69
LOS: 9 days | Discharge: HOME HEALTH | DRG: 857 | End: 2024-08-24
Attending: EMERGENCY MEDICINE | Admitting: HOSPITALIST
Payer: MEDICARE

## 2024-08-15 DIAGNOSIS — R07.9 CHEST PAIN: ICD-10-CM

## 2024-08-15 DIAGNOSIS — R80.9 TYPE 2 DIABETES MELLITUS WITH MICROALBUMINURIA, WITHOUT LONG-TERM CURRENT USE OF INSULIN: Chronic | ICD-10-CM

## 2024-08-15 DIAGNOSIS — E11.29 TYPE 2 DIABETES MELLITUS WITH MICROALBUMINURIA, WITHOUT LONG-TERM CURRENT USE OF INSULIN: Chronic | ICD-10-CM

## 2024-08-15 DIAGNOSIS — M25.561 ACUTE PAIN OF RIGHT KNEE: ICD-10-CM

## 2024-08-15 DIAGNOSIS — M00.261 STREPTOCOCCAL ARTHRITIS OF RIGHT KNEE: Primary | ICD-10-CM

## 2024-08-15 DIAGNOSIS — M25.561 ACUTE PAIN OF RIGHT KNEE: Primary | ICD-10-CM

## 2024-08-15 DIAGNOSIS — M00.9 PYOGENIC ARTHRITIS OF RIGHT KNEE JOINT, DUE TO UNSPECIFIED ORGANISM: ICD-10-CM

## 2024-08-15 DIAGNOSIS — M11.261 PSEUDOGOUT OF RIGHT KNEE: ICD-10-CM

## 2024-08-15 DIAGNOSIS — R00.0 TACHYCARDIA: ICD-10-CM

## 2024-08-15 DIAGNOSIS — M25.461 SWELLING OF KNEE JOINT, RIGHT: Primary | ICD-10-CM

## 2024-08-15 LAB
25(OH)D3+25(OH)D2 SERPL-MCNC: 57 NG/ML (ref 30–96)
ALBUMIN SERPL BCP-MCNC: 4 G/DL (ref 3.5–5.2)
ALP SERPL-CCNC: 115 U/L (ref 55–135)
ALT SERPL W/O P-5'-P-CCNC: 19 U/L (ref 10–44)
ANION GAP SERPL CALC-SCNC: 12 MMOL/L (ref 8–16)
APPEARANCE FLD: NORMAL
AST SERPL-CCNC: 12 U/L (ref 10–40)
BASOPHILS # BLD AUTO: 0.04 K/UL (ref 0–0.2)
BASOPHILS NFR BLD: 0.3 % (ref 0–1.9)
BILIRUB SERPL-MCNC: 1.1 MG/DL (ref 0.1–1)
BODY FLD TYPE: ABNORMAL
BODY FLD TYPE: NORMAL
BUN SERPL-MCNC: 17 MG/DL (ref 8–23)
CALCIUM SERPL-MCNC: 10 MG/DL (ref 8.7–10.5)
CHLORIDE SERPL-SCNC: 101 MMOL/L (ref 95–110)
CO2 SERPL-SCNC: 24 MMOL/L (ref 23–29)
COLOR FLD: YELLOW
CREAT SERPL-MCNC: 1 MG/DL (ref 0.5–1.4)
CRP SERPL-MCNC: 144.8 MG/L (ref 0–8.2)
CRP SERPL-MCNC: 144.8 MG/L (ref 0–8.2)
CRYSTALS FLD MICRO: POSITIVE
DIFFERENTIAL METHOD BLD: ABNORMAL
EOSINOPHIL # BLD AUTO: 0 K/UL (ref 0–0.5)
EOSINOPHIL NFR BLD: 0 % (ref 0–8)
ERYTHROCYTE [DISTWIDTH] IN BLOOD BY AUTOMATED COUNT: 12.7 % (ref 11.5–14.5)
ERYTHROCYTE [SEDIMENTATION RATE] IN BLOOD BY PHOTOMETRIC METHOD: 12 MM/HR (ref 0–23)
ERYTHROCYTE [SEDIMENTATION RATE] IN BLOOD BY PHOTOMETRIC METHOD: 12 MM/HR (ref 0–23)
EST. GFR  (NO RACE VARIABLE): >60 ML/MIN/1.73 M^2
GLUCOSE SERPL-MCNC: 330 MG/DL (ref 70–110)
HCT VFR BLD AUTO: 45 % (ref 40–54)
HGB BLD-MCNC: 15.6 G/DL (ref 14–18)
IMM GRANULOCYTES # BLD AUTO: 0.06 K/UL (ref 0–0.04)
IMM GRANULOCYTES NFR BLD AUTO: 0.5 % (ref 0–0.5)
LACTATE SERPL-SCNC: 1.5 MMOL/L (ref 0.5–2.2)
LYMPHOCYTES # BLD AUTO: 1.5 K/UL (ref 1–4.8)
LYMPHOCYTES NFR BLD: 12.2 % (ref 18–48)
LYMPHOCYTES NFR FLD MANUAL: 4 %
MAGNESIUM SERPL-MCNC: 2.1 MG/DL (ref 1.6–2.6)
MCH RBC QN AUTO: 31.3 PG (ref 27–31)
MCHC RBC AUTO-ENTMCNC: 34.7 G/DL (ref 32–36)
MCV RBC AUTO: 90 FL (ref 82–98)
MONOCYTES # BLD AUTO: 1.3 K/UL (ref 0.3–1)
MONOCYTES NFR BLD: 10.9 % (ref 4–15)
MONOS+MACROS NFR FLD MANUAL: 11 %
NEUTROPHILS # BLD AUTO: 9.2 K/UL (ref 1.8–7.7)
NEUTROPHILS NFR BLD: 76.1 % (ref 38–73)
NEUTROPHILS NFR FLD MANUAL: 85 %
NRBC BLD-RTO: 0 /100 WBC
PATH INTERP FLD-IMP: NORMAL
PHOSPHATE SERPL-MCNC: 3 MG/DL (ref 2.7–4.5)
PLATELET # BLD AUTO: 259 K/UL (ref 150–450)
PMV BLD AUTO: 9 FL (ref 9.2–12.9)
POCT GLUCOSE: 370 MG/DL (ref 70–110)
POTASSIUM SERPL-SCNC: 4.2 MMOL/L (ref 3.5–5.1)
PREALB SERPL-MCNC: 21 MG/DL (ref 20–43)
PROT SERPL-MCNC: 7.7 G/DL (ref 6–8.4)
RBC # BLD AUTO: 4.98 M/UL (ref 4.6–6.2)
SODIUM SERPL-SCNC: 137 MMOL/L (ref 136–145)
WBC # BLD AUTO: 12.09 K/UL (ref 3.9–12.7)
WBC # FLD: NORMAL /CU MM

## 2024-08-15 PROCEDURE — 86140 C-REACTIVE PROTEIN: CPT | Mod: HCNC | Performed by: NURSE PRACTITIONER

## 2024-08-15 PROCEDURE — 25000003 PHARM REV CODE 250: Mod: HCNC | Performed by: PHYSICIAN ASSISTANT

## 2024-08-15 PROCEDURE — 99999 PR PBB SHADOW E&M-EST. PATIENT-LVL IV: CPT | Mod: PBBFAC,HCNC,, | Performed by: NURSE PRACTITIONER

## 2024-08-15 PROCEDURE — 25000003 PHARM REV CODE 250: Mod: HCNC | Performed by: NURSE PRACTITIONER

## 2024-08-15 PROCEDURE — 63600175 PHARM REV CODE 636 W HCPCS: Mod: HCNC | Performed by: PHYSICIAN ASSISTANT

## 2024-08-15 PROCEDURE — 63600175 PHARM REV CODE 636 W HCPCS: Mod: HCNC | Performed by: NURSE PRACTITIONER

## 2024-08-15 PROCEDURE — 99291 CRITICAL CARE FIRST HOUR: CPT | Mod: HCNC

## 2024-08-15 PROCEDURE — 96375 TX/PRO/DX INJ NEW DRUG ADDON: CPT | Mod: HCNC

## 2024-08-15 PROCEDURE — 73560 X-RAY EXAM OF KNEE 1 OR 2: CPT | Mod: TC,HCNC,RT

## 2024-08-15 PROCEDURE — 85025 COMPLETE CBC W/AUTO DIFF WBC: CPT | Mod: HCNC | Performed by: PHYSICIAN ASSISTANT

## 2024-08-15 PROCEDURE — 99223 1ST HOSP IP/OBS HIGH 75: CPT | Mod: HCNC,57,GC, | Performed by: ORTHOPAEDIC SURGERY

## 2024-08-15 PROCEDURE — 73560 X-RAY EXAM OF KNEE 1 OR 2: CPT | Mod: 26,HCNC,RT, | Performed by: RADIOLOGY

## 2024-08-15 PROCEDURE — 83605 ASSAY OF LACTIC ACID: CPT | Mod: HCNC | Performed by: PHYSICIAN ASSISTANT

## 2024-08-15 PROCEDURE — 80053 COMPREHEN METABOLIC PANEL: CPT | Mod: HCNC | Performed by: PHYSICIAN ASSISTANT

## 2024-08-15 PROCEDURE — 85652 RBC SED RATE AUTOMATED: CPT | Mod: HCNC | Performed by: NURSE PRACTITIONER

## 2024-08-15 PROCEDURE — 82306 VITAMIN D 25 HYDROXY: CPT | Mod: HCNC

## 2024-08-15 PROCEDURE — 93010 ELECTROCARDIOGRAM REPORT: CPT | Mod: HCNC,,, | Performed by: INTERNAL MEDICINE

## 2024-08-15 PROCEDURE — 96374 THER/PROPH/DIAG INJ IV PUSH: CPT | Mod: HCNC

## 2024-08-15 PROCEDURE — 12000002 HC ACUTE/MED SURGE SEMI-PRIVATE ROOM: Mod: HCNC

## 2024-08-15 PROCEDURE — 87040 BLOOD CULTURE FOR BACTERIA: CPT | Mod: HCNC | Performed by: PHYSICIAN ASSISTANT

## 2024-08-15 PROCEDURE — 89051 BODY FLUID CELL COUNT: CPT | Mod: HCNC | Performed by: NURSE PRACTITIONER

## 2024-08-15 PROCEDURE — 84134 ASSAY OF PREALBUMIN: CPT | Mod: HCNC

## 2024-08-15 PROCEDURE — 87205 SMEAR GRAM STAIN: CPT | Mod: HCNC | Performed by: NURSE PRACTITIONER

## 2024-08-15 PROCEDURE — 83735 ASSAY OF MAGNESIUM: CPT | Mod: HCNC

## 2024-08-15 PROCEDURE — 87116 MYCOBACTERIA CULTURE: CPT | Mod: HCNC | Performed by: NURSE PRACTITIONER

## 2024-08-15 PROCEDURE — 84100 ASSAY OF PHOSPHORUS: CPT | Mod: HCNC

## 2024-08-15 PROCEDURE — 87075 CULTR BACTERIA EXCEPT BLOOD: CPT | Mod: HCNC | Performed by: NURSE PRACTITIONER

## 2024-08-15 PROCEDURE — 89060 EXAM SYNOVIAL FLUID CRYSTALS: CPT | Mod: HCNC | Performed by: NURSE PRACTITIONER

## 2024-08-15 PROCEDURE — 93005 ELECTROCARDIOGRAM TRACING: CPT | Mod: HCNC

## 2024-08-15 PROCEDURE — 87102 FUNGUS ISOLATION CULTURE: CPT | Mod: HCNC | Performed by: NURSE PRACTITIONER

## 2024-08-15 RX ORDER — OMEGA-3-ACID ETHYL ESTERS 1 G/1
1 CAPSULE, LIQUID FILLED ORAL DAILY
Status: DISCONTINUED | OUTPATIENT
Start: 2024-08-16 | End: 2024-08-24 | Stop reason: HOSPADM

## 2024-08-15 RX ORDER — HYDROMORPHONE HYDROCHLORIDE 1 MG/ML
0.2 INJECTION, SOLUTION INTRAMUSCULAR; INTRAVENOUS; SUBCUTANEOUS EVERY 4 HOURS PRN
Status: DISCONTINUED | OUTPATIENT
Start: 2024-08-15 | End: 2024-08-24 | Stop reason: HOSPADM

## 2024-08-15 RX ORDER — METHOCARBAMOL 750 MG/1
750 TABLET, FILM COATED ORAL 3 TIMES DAILY
Status: DISCONTINUED | OUTPATIENT
Start: 2024-08-15 | End: 2024-08-24 | Stop reason: HOSPADM

## 2024-08-15 RX ORDER — ATORVASTATIN CALCIUM 40 MG/1
80 TABLET, FILM COATED ORAL NIGHTLY
Status: DISCONTINUED | OUTPATIENT
Start: 2024-08-15 | End: 2024-08-22

## 2024-08-15 RX ORDER — GABAPENTIN 300 MG/1
300 CAPSULE ORAL 3 TIMES DAILY
Status: DISCONTINUED | OUTPATIENT
Start: 2024-08-15 | End: 2024-08-24 | Stop reason: HOSPADM

## 2024-08-15 RX ORDER — INSULIN ASPART 100 [IU]/ML
3 INJECTION, SOLUTION INTRAVENOUS; SUBCUTANEOUS ONCE
Status: COMPLETED | OUTPATIENT
Start: 2024-08-15 | End: 2024-08-16

## 2024-08-15 RX ORDER — IBUPROFEN 200 MG
16 TABLET ORAL
Status: DISCONTINUED | OUTPATIENT
Start: 2024-08-15 | End: 2024-08-24 | Stop reason: HOSPADM

## 2024-08-15 RX ORDER — ONDANSETRON HYDROCHLORIDE 2 MG/ML
4 INJECTION, SOLUTION INTRAVENOUS EVERY 8 HOURS PRN
Status: DISCONTINUED | OUTPATIENT
Start: 2024-08-15 | End: 2024-08-24 | Stop reason: HOSPADM

## 2024-08-15 RX ORDER — OXYCODONE HYDROCHLORIDE 5 MG/1
15 TABLET ORAL ONCE
Status: COMPLETED | OUTPATIENT
Start: 2024-08-15 | End: 2024-08-15

## 2024-08-15 RX ORDER — INSULIN GLARGINE 100 [IU]/ML
10 INJECTION, SOLUTION SUBCUTANEOUS 2 TIMES DAILY
Status: DISCONTINUED | OUTPATIENT
Start: 2024-08-15 | End: 2024-08-16

## 2024-08-15 RX ORDER — LOSARTAN POTASSIUM 25 MG/1
25 TABLET ORAL DAILY
Status: DISCONTINUED | OUTPATIENT
Start: 2024-08-16 | End: 2024-08-18

## 2024-08-15 RX ORDER — OXYCODONE HYDROCHLORIDE 5 MG/1
5 TABLET ORAL EVERY 4 HOURS PRN
Status: DISCONTINUED | OUTPATIENT
Start: 2024-08-15 | End: 2024-08-24 | Stop reason: HOSPADM

## 2024-08-15 RX ORDER — INSULIN ASPART 100 [IU]/ML
0-5 INJECTION, SOLUTION INTRAVENOUS; SUBCUTANEOUS
Status: DISCONTINUED | OUTPATIENT
Start: 2024-08-15 | End: 2024-08-24 | Stop reason: HOSPADM

## 2024-08-15 RX ORDER — SODIUM CHLORIDE 0.9 % (FLUSH) 0.9 %
10 SYRINGE (ML) INJECTION EVERY 12 HOURS PRN
Status: DISCONTINUED | OUTPATIENT
Start: 2024-08-15 | End: 2024-08-24 | Stop reason: HOSPADM

## 2024-08-15 RX ORDER — ONDANSETRON HYDROCHLORIDE 2 MG/ML
4 INJECTION, SOLUTION INTRAVENOUS
Status: COMPLETED | OUTPATIENT
Start: 2024-08-15 | End: 2024-08-15

## 2024-08-15 RX ORDER — IBUPROFEN 200 MG
24 TABLET ORAL
Status: DISCONTINUED | OUTPATIENT
Start: 2024-08-15 | End: 2024-08-24 | Stop reason: HOSPADM

## 2024-08-15 RX ORDER — TALC
6 POWDER (GRAM) TOPICAL NIGHTLY PRN
Status: DISCONTINUED | OUTPATIENT
Start: 2024-08-15 | End: 2024-08-24 | Stop reason: HOSPADM

## 2024-08-15 RX ORDER — OXYCODONE HYDROCHLORIDE 10 MG/1
10 TABLET ORAL EVERY 4 HOURS PRN
Status: DISCONTINUED | OUTPATIENT
Start: 2024-08-15 | End: 2024-08-24 | Stop reason: HOSPADM

## 2024-08-15 RX ORDER — MORPHINE SULFATE 4 MG/ML
4 INJECTION, SOLUTION INTRAMUSCULAR; INTRAVENOUS
Status: COMPLETED | OUTPATIENT
Start: 2024-08-15 | End: 2024-08-15

## 2024-08-15 RX ORDER — ACETAMINOPHEN 500 MG
1000 TABLET ORAL EVERY 8 HOURS
Status: DISCONTINUED | OUTPATIENT
Start: 2024-08-15 | End: 2024-08-20

## 2024-08-15 RX ORDER — GLUCAGON 1 MG
1 KIT INJECTION
Status: DISCONTINUED | OUTPATIENT
Start: 2024-08-15 | End: 2024-08-24 | Stop reason: HOSPADM

## 2024-08-15 RX ORDER — METOPROLOL SUCCINATE 25 MG/1
25 TABLET, EXTENDED RELEASE ORAL DAILY
Status: DISCONTINUED | OUTPATIENT
Start: 2024-08-16 | End: 2024-08-24 | Stop reason: HOSPADM

## 2024-08-15 RX ORDER — NALOXONE HCL 0.4 MG/ML
0.02 VIAL (ML) INJECTION
Status: DISCONTINUED | OUTPATIENT
Start: 2024-08-15 | End: 2024-08-24 | Stop reason: HOSPADM

## 2024-08-15 RX ADMIN — CEFEPIME 2 G: 2 INJECTION, POWDER, FOR SOLUTION INTRAVENOUS at 05:08

## 2024-08-15 RX ADMIN — METHOCARBAMOL 750 MG: 750 TABLET ORAL at 09:08

## 2024-08-15 RX ADMIN — INSULIN GLARGINE 10 UNITS: 100 INJECTION, SOLUTION SUBCUTANEOUS at 09:08

## 2024-08-15 RX ADMIN — SODIUM CHLORIDE 1000 ML: 9 INJECTION, SOLUTION INTRAVENOUS at 05:08

## 2024-08-15 RX ADMIN — ONDANSETRON 4 MG: 2 INJECTION INTRAMUSCULAR; INTRAVENOUS at 05:08

## 2024-08-15 RX ADMIN — VANCOMYCIN HYDROCHLORIDE 1750 MG: 500 INJECTION, POWDER, LYOPHILIZED, FOR SOLUTION INTRAVENOUS at 06:08

## 2024-08-15 RX ADMIN — GABAPENTIN 300 MG: 300 CAPSULE ORAL at 09:08

## 2024-08-15 RX ADMIN — OXYCODONE 15 MG: 5 TABLET ORAL at 06:08

## 2024-08-15 RX ADMIN — ACETAMINOPHEN 1000 MG: 325 TABLET ORAL at 09:08

## 2024-08-15 RX ADMIN — MORPHINE SULFATE 4 MG: 4 INJECTION INTRAVENOUS at 05:08

## 2024-08-15 RX ADMIN — ATORVASTATIN CALCIUM 80 MG: 40 TABLET, FILM COATED ORAL at 09:08

## 2024-08-15 NOTE — ASSESSMENT & PLAN NOTE
"Patient's FSGs are uncontrolled due to hyperglycemia on current medication regimen.  Last A1c reviewed-   Lab Results   Component Value Date    HGBA1C 11.5 (H) 08/13/2024     Most recent fingerstick glucose reviewed- No results for input(s): "POCTGLUCOSE" in the last 24 hours.  Current correctional scale  Low  Maintain anti-hyperglycemic dose as follows-   Antihyperglycemics (From admission, onward)      Start     Stop Route Frequency Ordered    08/15/24 2100  insulin glargine U-100 (Lantus) pen 10 Units         -- SubQ 2 times daily 08/15/24 1827    08/15/24 1917  insulin aspart U-100 pen 0-5 Units         -- SubQ Before meals & nightly PRN 08/15/24 1817          Hold Oral hypoglycemics while patient is in the hospital.   -Accuchecks AC/HS  -Diabetic/cardiac diet  "

## 2024-08-15 NOTE — H&P
[facility]  [facility]  History & Physical    Patient Name: King Cool Jr.  MRN: [id number]  Patient Class: IP- Inpatient  Admission Date: 8/15/2024  Attending Physician: Lorna Galvan MD   Primary Care Provider: Gallo Lara MD         Patient information was obtained from patient, past medical records, and ER records.     Subjective:     Principal Problem:Septic joint of right knee joint    Chief Complaint:   Chief Complaint   Patient presents with    Knee Injury     Pt had right knee drained earlier today. Told to report to ER for emergency surgery.         HPI: King Cool Jr. is a 69 y.o. male with a PMHx of CAD, a fib on eliquis, HTN, HLD, DM2, and osteoarthritis who presents to the ED from orthopedic clinic for evaluation of septic right knee. The patient reports he has a history of OA in his right knee and receives intra articular steroid injections every 3 months. His last injection was 8/13. He reports beginning yesterday he noticed increased swelling and pain to his right knee. He notes it was so painful he was unable to get out of bed this morning. He denies any trauma or falls. He states at baseline he ambulates unassisted. Denies numbness/tingling. He was seen in ortho clinic today and had an arthrocentesis which showed [id number] WBCs and positive crystals. R knee xray showed severe osteoarthritis. He denies fever/chills, N/V/D, abdominal pain, CP, SOB, cough, or dysuria.    In the ED, pt tachycardic and mildly hypertensive otherwise vitals stable, afebrile. WBC 12.09k. Glucose 330. Tbili 1.1. LA 1.5. Blood cxs in process. The patient received morphine, zofran, 1L NS, cefepime, and vancomycin. Ortho was consulted and recommend continuing NPO with plan for OR tomorrow for I&D right knee.     Past Medical History:   Diagnosis Date    Coronary artery disease involving native coronary artery of native heart without angina pectoris 3/7/2023    Essential (primary) hypertension      Male erectile dysfunction, unspecified     New onset a-fib 1/3/2023    Testicular hypofunction     Type 2 diabetes mellitus without complications        Past Surgical History:   Procedure Laterality Date    HIP SURGERY      LEFT HEART CATHETERIZATION Left 3/7/2023    Procedure: Left heart cath;  Surgeon: Wil Mahoney MD;  Location: [facility] CATH LAB;  Service: Cardiology;  Laterality: Left;  1030am start, R rad access    RN PREOP 2/28/23       Review of patient's allergies indicates:   Allergen Reactions    Tamiflu [oseltamivir]      Increases BP    Metformin Hives     Diarrhea, nausea    Penicillins Rash       Current Facility-Administered Medications on File Prior to Encounter   Medication    triamcinolone acetonide injection 40 mg     Current Outpatient Medications on File Prior to Encounter   Medication Sig    alcohol swabs (DROPSAFE ALCOHOL PREP PADS) PadM USE AS DIRECTED AS NEEDED    apixaban (ELIQUIS) 5 mg Tab Take 1 tablet (5 mg total) by mouth 2 (two) times daily.    ASCORBATE CALCIUM (VITAMIN C ORAL) Take by mouth once daily.     aspirin (ECOTRIN) 81 MG EC tablet Take 81 mg by mouth once daily.    atorvastatin (LIPITOR) 80 MG tablet Take 1 tablet (80 mg total) by mouth every evening.    blood sugar diagnostic Strp 1 strip by Misc.(Non-Drug; Combo Route) route 3 (three) times daily.    blood-glucose meter kit Use as instructed    blood-glucose meter kit 1 each by Other route 3 (three) times daily. Use as instructed    diclofenac sodium (VOLTAREN) 1 % Gel Apply 2 g topically once daily.    doxycycline (VIBRA-TABS) 100 MG tablet Take 1 tablet (100 mg total) by mouth 2 (two) times daily.    empagliflozin (JARDIANCE) 25 mg tablet Take 1 tablet (25 mg total) by mouth once daily.    fish oil-omega-3 fatty acids 300-1,000 mg capsule Take 2 g by mouth once daily.    gabapentin (NEURONTIN) 300 MG capsule Take 1 capsule (300 mg total) by mouth 3 (three) times daily.    glimepiride (AMARYL) 4 MG tablet Take 1  tablet (4 mg total) by mouth 2 (two) times a day.    [START ON 10/13/2024] HYDROcodone-acetaminophen (NORCO)  mg per tablet Take 1 tablet by mouth 3 (three) times daily as needed (pain).    [START ON 2024] HYDROcodone-acetaminophen (NORCO)  mg per tablet Take 1 tablet by mouth 3 (three) times daily as needed (pain).    HYDROcodone-acetaminophen (NORCO)  mg per tablet Take 1 tablet by mouth 3 (three) times daily as needed (pain).    hydrocortisone 2.5 % cream Apply topically 2 (two) times daily.    lancets Misc 1 application  by Misc.(Non-Drug; Combo Route) route 3 (three) times daily.    loratadine (CLARITIN) 10 mg tablet Take 1 tablet (10 mg total) by mouth once daily.    losartan (COZAAR) 25 MG tablet Take 1 tablet (25 mg total) by mouth once daily.    meloxicam (MOBIC) 7.5 MG tablet Take 1 tablet (7.5 mg total) by mouth once daily.    metoprolol succinate (TOPROL-XL) 25 MG 24 hr tablet Take 1 tablet (25 mg total) by mouth once daily.    multivitamin with minerals tablet Take 1 tablet by mouth once daily.    neomycin-polymyxin-hydrocortisone (CORTISPORIN) 3.5-10,000-1 mg/mL-unit/mL-% otic suspension PLACE 3 DROPS INTO THE LEFT EAR 4 TIMES DAILY.    semaglutide (OZEMPIC) 0.25 mg or 0.5 mg (2 mg/3 mL) pen injector Inject 0.5 mg into the skin every 7 days.    sodium chloride (SALINE NASAL) 0.65 % nasal spray 2 sprays by Nasal route 2 (two) times a day.    triamcinolone acetonide 0.1% (KENALOG) 0.1 % ointment Apply topically 2 (two) times daily as needed.     Family History       Problem Relation (Age of Onset)    Blindness Mother    Diabetes Brother    No Known Problems Father, Brother, Daughter, Son, Daughter, Brother          Tobacco Use    Smoking status: Former     Current packs/day: 0.00     Average packs/day: 0.3 packs/day for 4.0 years (1.0 ttl pk-yrs)     Types: Cigarettes     Start date: 1972     Quit date: 1976     Years since quittin.7    Smokeless tobacco: Never    Substance and Sexual Activity    Alcohol use: Yes     Comment: occ    Drug use: No    Sexual activity: Yes     Partners: Female     Birth control/protection: None     Review of Systems   Constitutional:  Negative for appetite change, chills, diaphoresis, fatigue and fever.   HENT:  Negative for congestion, rhinorrhea and sore throat.    Eyes:  Negative for photophobia and visual disturbance.   Respiratory:  Negative for cough, shortness of breath and wheezing.    Cardiovascular:  Negative for chest pain, palpitations and leg swelling.   Gastrointestinal:  Negative for abdominal distention, abdominal pain, diarrhea, nausea and vomiting.   Genitourinary:  Negative for dysuria, frequency and hematuria.   Musculoskeletal:  Positive for arthralgias (right knee), gait problem and joint swelling. Negative for neck pain.   Skin:  Negative for color change, pallor, rash and wound.   Neurological:  Negative for tremors, syncope, light-headedness, numbness and headaches.   Psychiatric/Behavioral:  Negative for confusion and hallucinations. The patient is not nervous/anxious.      Objective:     Vital Signs (Most Recent):  Temp: 98.6 °F (37 °C) (08/15/24 1617)  Pulse: 101 (08/15/24 1617)  Resp: 18 (08/15/24 1741)  BP: (!) 153/87 (08/15/24 1617)  SpO2: 95 % (08/15/24 1617) Vital Signs (24h Range):  Temp:  [98.6 °F (37 °C)] 98.6 °F (37 °C)  Pulse:  [101] 101  Resp:  [18-20] 18  SpO2:  [95 %] 95 %  BP: (153)/(87) 153/87     Weight: 91.2 kg (201 lb)  Body mass index is 28.84 kg/m².     Physical Exam  Vitals and nursing note reviewed.   Constitutional:       General: He is not in acute distress.     Appearance: He is not toxic-appearing or diaphoretic.   HENT:      Head: Normocephalic and atraumatic.      Nose: Nose normal.      Mouth/Throat:      Mouth: Mucous membranes are moist.   Eyes:      Pupils: Pupils are equal, round, and reactive to light.   Cardiovascular:      Rate and Rhythm: Normal rate and regular rhythm.       Pulses: Normal pulses.           Dorsalis pedis pulses are 2+ on the right side.        Posterior tibial pulses are 2+ on the right side.   Pulmonary:      Effort: Pulmonary effort is normal. No respiratory distress.      Breath sounds: No wheezing, rhonchi or rales.   Abdominal:      General: Bowel sounds are normal. There is no distension.      Palpations: Abdomen is soft.      Tenderness: There is no abdominal tenderness. There is no guarding.   Musculoskeletal:      Cervical back: Normal range of motion.      Right knee: Swelling present. Decreased range of motion. Tenderness present.   Skin:     General: Skin is warm and dry.      Capillary Refill: Capillary refill takes less than 2 seconds.   Neurological:      General: No focal deficit present.      Mental Status: He is alert.      Sensory: No sensory deficit.      Motor: No weakness.   Psychiatric:         Mood and Affect: Mood normal.         Behavior: Behavior normal.              CRANIAL NERVES     CN III, IV, VI   Pupils are equal, round, and reactive to light.       Significant Labs: All pertinent labs within the past 24 hours have been reviewed.  CBC:   Recent Labs   Lab 08/15/24  1700   WBC 12.09   HGB 15.6   HCT 45.0        CMP:   Recent Labs   Lab 08/15/24  1700      K 4.2      CO2 24   *   BUN 17   CREATININE 1.0   CALCIUM 10.0   PROT 7.7   ALBUMIN 4.0   BILITOT 1.1*   ALKPHOS 115   AST 12   ALT 19   ANIONGAP 12     Lactic Acid:   Recent Labs   Lab 08/15/24  1700   LACTATE 1.5       Significant Imaging: I have reviewed all pertinent imaging results/findings within the past 24 hours.      Assessment/Plan:     * Septic joint of right knee joint  -Afebrile, WBC 12.09  -LA 1.5.  -CRP and sed rate pending.  -Blood cxs in process.  -Arthrocentesis of right knee with [id number] WBCs and positive crystals.  -The patient was started on cefepime and vancomycin, continue for now.  -Ortho consulted, plan for OR tomorrow for I&D right  knee.   -NPO after midnight.  -MM pain control.  -Elevate RLE.  -Fall precautions.    Coronary artery disease involving native coronary artery of native heart without angina pectoris  Patient with known CAD s/p stent placement, which is controlled Will continue Statin and monitor for S/Sx of angina/ACS. Continue to monitor on telemetry.   -Hold ASA pending I&D.    PAF (paroxysmal atrial fibrillation)  Chronic anticoagulation   Patient with Paroxysmal (<7 days) atrial fibrillation which is controlled currently with Beta Blocker. Patient is currently in sinus rhythm.[id number] Score: 3. Anticoagulation indicated. Anticoagulation done with eliquis .  -Hold eliquis pending I&D tomorrow.    Class 1 obesity due to excess calories with serious comorbidity and body mass index (BMI) of 33.0 to 33.9 in adult  Body mass index is 28.84 kg/m². Obesity complicates all aspects of disease management from diagnostic modalities to treatment.     Essential (primary) hypertension  Chronic, controlled. Latest blood pressure and vitals reviewed-     Temp:  [98.6 °F (37 °C)]   Pulse:  [101]   Resp:  [18-20]   BP: (153)/(87)   SpO2:  [95 %] .   Home meds for hypertension were reviewed and noted below.   Hypertension Medications               losartan (COZAAR) 25 MG tablet Take 1 tablet (25 mg total) by mouth once daily.    metoprolol succinate (TOPROL-XL) 25 MG 24 hr tablet Take 1 tablet (25 mg total) by mouth once daily.            While in the hospital, will manage blood pressure as follows; Continue home antihypertensive regimen    Will utilize p.r.n. blood pressure medication only if patient's blood pressure greater than 180/110 and he develops symptoms such as worsening chest pain or shortness of breath.    Type 2 diabetes mellitus with microalbuminuria, without long-term current use of insulin  Patient's FSGs are uncontrolled due to hyperglycemia on current medication regimen.  Last A1c reviewed-   Lab Results   Component Value Date  "   HGBA1C 11.5 (H) 08/13/2024     Most recent fingerstick glucose reviewed- No results for input(s): "POCTGLUCOSE" in the last 24 hours.  Current correctional scale  Low  Maintain anti-hyperglycemic dose as follows-   Antihyperglycemics (From admission, onward)      Start     Stop Route Frequency Ordered    08/15/24 2100  insulin glargine U-100 (Lantus) pen 10 Units         -- SubQ 2 times daily 08/15/24 1827    08/15/24 1917  insulin aspart U-100 pen 0-5 Units         -- SubQ Before meals & nightly PRN 08/15/24 1817          Hold Oral hypoglycemics while patient is in the hospital.   -Accuchecks AC/HS  -Diabetic/cardiac diet      VTE Risk Mitigation (From admission, onward)           Ordered     IP VTE LOW RISK PATIENT  Once         08/15/24 1817     Place sequential compression device  Until discontinued         08/15/24 1817                                    Evelyn Stallings NP  Department of Hospital Medicine  [facility]          "

## 2024-08-15 NOTE — ASSESSMENT & PLAN NOTE
Chronic anticoagulation   Patient with Paroxysmal (<7 days) atrial fibrillation which is controlled currently with Beta Blocker. Patient is currently in sinus rhythm.[id number] Score: 3. Anticoagulation indicated. Anticoagulation done with eliquis .  -Hold eliquis pending I&D tomorrow.

## 2024-08-15 NOTE — ED TRIAGE NOTES
King Travon Wetzel, a 69 y.o. male presents to the ED w/ complaint of sent by ortho for sx due to infection found after knee was tapped today    Triage note:  Chief Complaint   Patient presents with    Knee Injury     Pt had right knee drained earlier today. Told to report to ER for emergency surgery.      Review of patient's allergies indicates:   Allergen Reactions    Tamiflu [oseltamivir]      Increases BP    Metformin Hives     Diarrhea, nausea    Penicillins Rash     Past Medical History:   Diagnosis Date    Coronary artery disease involving native coronary artery of native heart without angina pectoris 3/7/2023    Essential (primary) hypertension     Male erectile dysfunction, unspecified     New onset a-fib 1/3/2023    Testicular hypofunction     Type 2 diabetes mellitus without complications          APPEARANCE: awake and alert in NAD. PAIN  10/10  SKIN: warm, dry and intact. No breakdown or bruising.  MUSCULOSKELETAL: Patient moving all extremities spontaneously, no obvious swelling or deformities noted. Ambulates independently.  RESPIRATORY: Denies shortness of breath.Respirations unlabored.   CARDIAC: Denies CP, 2+ distal pulses; no peripheral edema  ABDOMEN: S/ND/NT, Denies nausea  : voids spontaneously, denies difficulty  Neurologic: AAO x 4; follows commands equal strength in all extremities; denies numbness/tingling. Denies dizziness

## 2024-08-15 NOTE — PROGRESS NOTES
SUBJECTIVE:     Chief Complaint & History of Present Illness:  King Cool Jr. is a Established 69 y.o. year old male patient here with a history of constant right knee pain which started several days ago.  He reports he was seen by his PCP (Dr. Lara) on Tuesday who had injected his knee with steroids.  He reports his knee started to swell and got even more painful the next day.    He currently denies fever.  He reports his knee pain is sharp/achy pain that he rates 10/10.  He reports it hurts to move it.  He denies trauma.  He endorses a PMH of diabetes and takes anticoagulation medication for his A-fib.  He states he has not really tried anything to alleviate his knee pain since his injection.  He has a known PMH of osteoarthritis, last seen by Dr. Rodriguez for shoulder OA. He said it does not feel like gout.  He is requesting to have his knee drained.      Review of patient's allergies indicates:   Allergen Reactions    Tamiflu [oseltamivir]      Increases BP    Metformin Hives     Diarrhea, nausea    Penicillins Rash         Current Outpatient Medications   Medication Sig Dispense Refill    alcohol swabs (DROPSAFE ALCOHOL PREP PADS) PadM USE AS DIRECTED AS NEEDED 400 each 1    apixaban (ELIQUIS) 5 mg Tab Take 1 tablet (5 mg total) by mouth 2 (two) times daily. 180 tablet 3    ASCORBATE CALCIUM (VITAMIN C ORAL) Take by mouth once daily.       aspirin (ECOTRIN) 81 MG EC tablet Take 81 mg by mouth once daily.      atorvastatin (LIPITOR) 80 MG tablet Take 1 tablet (80 mg total) by mouth every evening. 90 tablet 3    blood sugar diagnostic Strp 1 strip by Misc.(Non-Drug; Combo Route) route 3 (three) times daily. 200 strip 5    blood-glucose meter kit Use as instructed 1 each 0    blood-glucose meter kit 1 each by Other route 3 (three) times daily. Use as instructed 1 each 0    diclofenac sodium (VOLTAREN) 1 % Gel Apply 2 g topically once daily. 450 g 0    doxycycline (VIBRA-TABS) 100 MG tablet Take 1 tablet (100  mg total) by mouth 2 (two) times daily. 14 tablet 0    empagliflozin (JARDIANCE) 25 mg tablet Take 1 tablet (25 mg total) by mouth once daily. 90 tablet 1    fish oil-omega-3 fatty acids 300-1,000 mg capsule Take 2 g by mouth once daily.      gabapentin (NEURONTIN) 300 MG capsule Take 1 capsule (300 mg total) by mouth 3 (three) times daily. 270 capsule 2    glimepiride (AMARYL) 4 MG tablet Take 1 tablet (4 mg total) by mouth 2 (two) times a day. 180 tablet 3    [START ON 10/13/2024] HYDROcodone-acetaminophen (NORCO)  mg per tablet Take 1 tablet by mouth 3 (three) times daily as needed (pain). 72 tablet 0    [START ON 9/13/2024] HYDROcodone-acetaminophen (NORCO)  mg per tablet Take 1 tablet by mouth 3 (three) times daily as needed (pain). 72 tablet 0    HYDROcodone-acetaminophen (NORCO)  mg per tablet Take 1 tablet by mouth 3 (three) times daily as needed (pain). 72 tablet 0    hydrocortisone 2.5 % cream Apply topically 2 (two) times daily. 20 g 2    lancets Misc 1 application  by Misc.(Non-Drug; Combo Route) route 3 (three) times daily. 200 each 5    loratadine (CLARITIN) 10 mg tablet Take 1 tablet (10 mg total) by mouth once daily. 90 tablet 0    losartan (COZAAR) 25 MG tablet Take 1 tablet (25 mg total) by mouth once daily. 90 tablet 3    meloxicam (MOBIC) 7.5 MG tablet Take 1 tablet (7.5 mg total) by mouth once daily. 30 tablet 2    metoprolol succinate (TOPROL-XL) 25 MG 24 hr tablet Take 1 tablet (25 mg total) by mouth once daily. 90 tablet 3    multivitamin with minerals tablet Take 1 tablet by mouth once daily.      neomycin-polymyxin-hydrocortisone (CORTISPORIN) 3.5-10,000-1 mg/mL-unit/mL-% otic suspension PLACE 3 DROPS INTO THE LEFT EAR 4 TIMES DAILY. 10 mL 0    semaglutide (OZEMPIC) 0.25 mg or 0.5 mg (2 mg/3 mL) pen injector Inject 0.5 mg into the skin every 7 days. 4 each 3    sodium chloride (SALINE NASAL) 0.65 % nasal spray 2 sprays by Nasal route 2 (two) times a day. 104 mL 0     triamcinolone acetonide 0.1% (KENALOG) 0.1 % ointment Apply topically 2 (two) times daily as needed. 30 g 0     No current facility-administered medications for this visit.     Facility-Administered Medications Ordered in Other Visits   Medication Dose Route Frequency Provider Last Rate Last Admin    triamcinolone acetonide injection 40 mg  40 mg Intra-articular  Gallo Lara MD   40 mg at 09/12/19 1631       Past Medical History:   Diagnosis Date    Coronary artery disease involving native coronary artery of native heart without angina pectoris 3/7/2023    Essential (primary) hypertension     Male erectile dysfunction, unspecified     New onset a-fib 1/3/2023    Testicular hypofunction     Type 2 diabetes mellitus without complications        Past Surgical History:   Procedure Laterality Date    HIP SURGERY      LEFT HEART CATHETERIZATION Left 3/7/2023    Procedure: Left heart cath;  Surgeon: Wil Mahoney MD;  Location: [facility] CATH LAB;  Service: Cardiology;  Laterality: Left;  1030am start, R rad access    RN PREOP 2/28/23       Family History   Problem Relation Name Age of Onset    Blindness Mother      No Known Problems Father      No Known Problems Brother      No Known Problems Daughter      No Known Problems Son      No Known Problems Daughter      Diabetes Brother      No Known Problems Brother      Amblyopia Neg Hx      Cancer Neg Hx      Cataracts Neg Hx      Glaucoma Neg Hx      Hypertension Neg Hx      Macular degeneration Neg Hx      Retinal detachment Neg Hx      Strabismus Neg Hx      Stroke Neg Hx      Thyroid disease Neg Hx           Review of Systems:  ROS:  Constitutional: no fever or chills  Eyes: no visual changes  ENT: no nasal congestion or sore throat  Respiratory: no cough or shortness of breath  Cardiovascular: no chest pain or palpitations  Gastrointestinal: no nausea or vomiting, tolerating diet  Genitourinary: no hematuria or dysuria  Integument/Breast: no rash or  "pruritis  Hematologic/Lymphatic: no easy bruising or lymphadenopathy  Musculoskeletal: no arthralgias or myalgias  Neurological: no seizures or tremors  Behavioral/Psych: no auditory or visual hallucinations  Endocrine: no heat or cold intolerance      OBJECTIVE:     PHYSICAL EXAM:  Vital Signs (Most Recent)  There were no vitals filed for this visit.     ,   Estimated body mass index is 29.09 kg/m² as calculated from the following:    Height as of 8/13/24: 5' 10" (1.778 m).    Weight as of 8/13/24: 92 kg (202 lb 11.4 oz).   General Appearance: Well nourished, well developed, in no acute distress.  HENT: Normal cephalic, oropharynx pink and moist  Eyes: PERRLA bilaterally and EOM x 4  Respiratory: Even and unlabored  Skin: Warm and Dry.   Psychiatric: AAO x 4, Mood & affect are normal.    right  Knee Exam:  Knee Range of Motion:limited by pain   Effusion:yes  Condition of skin:intact  Location of tenderness:globally   Strength:limited by pain  Stability:  Not tested due to pain.    left  Knee Exam:  Knee Range of Motion:normal   Effusion:none  Condition of skin:intact  Location of tenderness:None   Strength:normal  Stability:  stable to testing, Lachman: stable, LCL: stable, MCL: stable, and PCL: stable  Varus /Valgus stress:   Mild varus  Stacey:   negative      Hip Examination:  normal    RADIOGRAPHS:  Xray of his right knee was obtained, findings showed no acute fracture.  He has severe OA on lateral and medial side on the right knee.  Radiologist does mention a degenerative mild depression of the medial tibial plateau.    All radiographs were personally reviewed by me.    ASSESSMENT/PLAN:       ICD-10-CM ICD-9-CM   1. Swelling of knee joint, right  M25.461 719.06   2. Type 2 diabetes mellitus with microalbuminuria, without long-term current use of insulin  E11.29 250.40    R80.9 791.0       -King Cool Jr. presents to clinic today with c/c right knee pain and swelling that has gotten worse since having his " knee injected with steroids on Tuesday.  -X-ray as above.    Patient has noticeable swelling to his right knee.  He has pain with mild palpation.  No warmth felt on exam.  I will attempt to aspirate and send the fluid to the lab for analysis.  I advised the patient I will call him with updates once results are received.    Lab Results   Component Value Date    HGBA1C 11.5 (H) 08/13/2024     Advised patient his DM is not well controlled.  Recommend he see endocrine for assistance.    See procedure report.    1600 addendum:  Body Fluid Type  Joint Fluid, Right Knee   Fluid Appearance  Turbid   Fluid Color  Yellow   WBC, Body Fluid /cu mm [id number]   Comment: Reference ranges for body fluids not established.  Correlate clinically.   Segs, Fluid % 85   Lymphs, Fluid % 4   Monocytes/Macrophages, Fluid % 11     Calcium pyrophosphate crystals are present in fluid analysis.    I discuss findings with Dr. Bustillos.  Advised him of the patient's history of uncontrolled DM, possible septic joint, recent steroid injection, pain in his right knee and A-fib currently on oral anticoagulation.  He recommended patient go to the ED for admission as he will need medical clearance and a washout.  Patient notified of recommendations and lab findings received.  Called Ortho Resident to advised of situation and alerted ED that patient will be coming in for admission.

## 2024-08-15 NOTE — ASSESSMENT & PLAN NOTE
Chronic, controlled. Latest blood pressure and vitals reviewed-     Temp:  [98.6 °F (37 °C)]   Pulse:  [101]   Resp:  [18-20]   BP: (153)/(87)   SpO2:  [95 %] .   Home meds for hypertension were reviewed and noted below.   Hypertension Medications               losartan (COZAAR) 25 MG tablet Take 1 tablet (25 mg total) by mouth once daily.    metoprolol succinate (TOPROL-XL) 25 MG 24 hr tablet Take 1 tablet (25 mg total) by mouth once daily.            While in the hospital, will manage blood pressure as follows; Continue home antihypertensive regimen    Will utilize p.r.n. blood pressure medication only if patient's blood pressure greater than 180/110 and he develops symptoms such as worsening chest pain or shortness of breath.   Admitted

## 2024-08-15 NOTE — CONSULTS
King Cool Jr. is a 69 y.o. male with Pmhx of OA, uncontrolled DM2 presenting with right knee pain and swelling onset 2 days ago. Symptoms started after receiving steroid injection into the knee 2 days ago. He has been receiving intraarticular steroid injection every 3 months due to OA. They deny recent injury to the joint. They are unable to bear weight on the joint. They deny a history of septic arthritis or gout flare. They denied fever, chills, nausea or vomiting. They denied an open wound around the joint. Patient denies numbness and tingling. Denies any other musculoskeletal pain or injuries. Walks w/out assisted devices at baseline. He takes eliquis.    They deny IV drug use.  They deny tobacco use, reports marijuana use   They deny alcohol use.   They deny immunosuppressant medications.  They deny chemotherapy.  They deny radiation therapy.      Review of patient's allergies indicates:   Allergen Reactions    Tamiflu [oseltamivir]      Increases BP    Metformin Hives     Diarrhea, nausea    Penicillins Rash       Past Medical History:   Diagnosis Date    Coronary artery disease involving native coronary artery of native heart without angina pectoris 3/7/2023    Essential (primary) hypertension     Male erectile dysfunction, unspecified     New onset a-fib 1/3/2023    Testicular hypofunction     Type 2 diabetes mellitus without complications      Past Surgical History:   Procedure Laterality Date    HIP SURGERY      LEFT HEART CATHETERIZATION Left 3/7/2023    Procedure: Left heart cath;  Surgeon: Wil Mahoney MD;  Location: [facility] CATH LAB;  Service: Cardiology;  Laterality: Left;  1030am start, R rad access    RN PREOP 2/28/23     Family History   Problem Relation Name Age of Onset    Blindness Mother      No Known Problems Father      No Known Problems Brother      No Known Problems Daughter      No Known Problems Son      No Known Problems Daughter      Diabetes Brother      No Known Problems  Brother      Amblyopia Neg Hx      Cancer Neg Hx      Cataracts Neg Hx      Glaucoma Neg Hx      Hypertension Neg Hx      Macular degeneration Neg Hx      Retinal detachment Neg Hx      Strabismus Neg Hx      Stroke Neg Hx      Thyroid disease Neg Hx       Social History     Tobacco Use    Smoking status: Former     Current packs/day: 0.00     Average packs/day: 0.3 packs/day for 4.0 years (1.0 ttl pk-yrs)     Types: Cigarettes     Start date: 1972     Quit date: 1976     Years since quittin.7    Smokeless tobacco: Never   Substance Use Topics    Alcohol use: Yes     Comment: occ    Drug use: No        Review of Systems:  Constitutional: Negative for fevers and chills  HENT: Negative for congestion and headaches   Eyes: Negative for blurred vision   Cardiovascular: Negative for chest pain and palpitations  Respiratory: Negative for cough and shortness of breath   Hematologic/Lymphatic: Negative for bleeding problem. Does not bruise/bleed easily  Skin: Negative for dry skin and rash  Musculoskeletal: Positive for wrist pain; negative for back pain  Gastrointestinal: Negative for abdominal pain and n/v/d  Neurological: Negative for numbness and paresthesias     OBJECTIVE:     Vital Signs:  Temp:  [98.6 °F (37 °C)] 98.6 °F (37 °C)  Pulse:  [101] 101  Resp:  [18-20] 18  SpO2:  [95 %] 95 %  BP: (153)/(87) 153/87    Physical Exam    Gen:  No acute distress, well-developed, well nourished.  CV:  Peripherally well-perfused.   Respiratory:  Normal respiratory effort. No accessory muscle use.   Head/Neck:  Normocephalic.  Atraumatic. Sclera anicteric. TM. Neck supple.  Neuro: No FND. Awake. Alert. Oriented to person, place, time, and situation.  Abdomen: Soft, NTND.    MSK:  Right Lower Extremity  Inspection  - Significant swelling of the knee.  Palpation  - TTP to the knee   Range of motion  - AROM and PROM of the ankle, and foot intact  - ROM of the knee limited due to pain  Neurovascular  -  TA/EHL/Gastroc/FHL assessed in isolation without deficit  - SILT throughout  - Compartments soft  - DP 2+   - Muscle tone normal    Left Lower Extremity  Inspection  - Skin intact throughout, no open wounds  - No swelling  - No ecchymosis, erythema, or signs of cellulitis  Palpation  - NonTTP throughout, no palpable abnormality   Range of motion  - AROM and PROM of the hip, knee, ankle, and foot intact  Stability  - No evidence of joint dislocation or abnormal laxity,   Neurovascular  - TA/EHL/Gastroc/FHL assessed in isolation without deficit  - SILT throughout  - Compartments soft  - DP 2+   - Muscle tone normal      Xr of right knee revealed tricompartmental OA with significant knee effusion.    Assessment & Plan    King Cool Jr. is a 69 y.o. male w/history of right knee OA & multiple injections, uncontrolled DM2 presenting with right knee septic arthritis.  Analysis of synovial fluid collected in the ED was concerning for septic arthritis. Plan for OR tomorrow for I&D right knee.    Plan:  -Admit to  for IV abx and preop optimization  -Pain: multimodal regimen limiting narcotics   -DVT Ppx: per primary   -Abx: pt getting cefepime & Vanc in the ED  -NPO midnight for I&D tomorrow        Joint Fluid Analysis:   Cultures pending, will continue to follow   Lab Results   Component Value Date    BODYFLUIDTYP Joint Fluid, Right Knee 08/15/2024    WBCBF [id number] 08/15/2024    SEGS 85 08/15/2024    CRYST Positive (A) 08/15/2024     Lab Results   Component Value Date    WBC 12.09 08/15/2024          Workup discussed with patient/family regarding regarding diagnosis of septic arthritis of the right knee. The risks/benefits/alternatives to operative management were explained, with risks including but not limited to: continued infection, bleeding, pain, stiffness, nerve/vascular injury, need for further surgeries, DVT/PE, even death. They express full understanding and wish to proceed with surgery.  No guarantees were  implied or stated, and all questions were answered.

## 2024-08-15 NOTE — ASSESSMENT & PLAN NOTE
Body mass index is 28.84 kg/m². Obesity complicates all aspects of disease management from diagnostic modalities to treatment.

## 2024-08-15 NOTE — ASSESSMENT & PLAN NOTE
-Afebrile, WBC 12.09  -LA 1.5.  -CRP and sed rate pending.  -Blood cxs in process.  -Arthrocentesis of right knee with 461601 WBCs and positive crystals.  -The patient was started on cefepime and vancomycin, continue for now.  -Ortho consulted, plan for OR tomorrow for I&D right knee.   -NPO after midnight.  -MM pain control.  -Elevate RLE.  -Fall precautions.

## 2024-08-15 NOTE — ASSESSMENT & PLAN NOTE
Patient with known CAD s/p stent placement, which is controlled Will continue Statin and monitor for S/Sx of angina/ACS. Continue to monitor on telemetry.   -Hold ASA pending I&D.

## 2024-08-15 NOTE — HPI
King Cool Jr. is a 69 y.o. male with a PMHx of CAD, a fib on eliquis, HTN, HLD, DM2, and osteoarthritis who presents to the ED from orthopedic clinic for evaluation of septic right knee. The patient reports he has a history of OA in his right knee and receives intra articular steroid injections every 3 months. His last injection was 8/13. He reports beginning yesterday he noticed increased swelling and pain to his right knee. He notes it was so painful he was unable to get out of bed this morning. He denies any trauma or falls. He states at baseline he ambulates unassisted. Denies numbness/tingling. He was seen in ortho clinic today and had an arthrocentesis which showed [id number] WBCs and positive crystals for calcium pyrophosphate crystals. R knee xray showed severe osteoarthritis. He denies fever/chills, N/V/D, abdominal pain, CP, SOB, cough, or dysuria.    In the ED, pt tachycardic and mildly hypertensive otherwise vitals stable, afebrile. WBC 12.09k. Glucose 330. Tbili 1.1. LA 1.5. Blood cxs in process. The patient received morphine, zofran, 1L NS, cefepime, and vancomycin. Ortho was consulted and recommend continuing NPO with plan for OR tomorrow for I&D right knee.

## 2024-08-15 NOTE — ED PROVIDER NOTES
Encounter Date: 8/15/2024       History     Chief Complaint   Patient presents with    Knee Injury     Pt had right knee drained earlier today. Told to report to ER for emergency surgery.      4:53 PM  Patient is a 69-year-old male with a history of DM 2, HTN, AFib, CAD who presents to [facility] ED with his daughter for emergent evaluation of septic right knee.    Patient has arthritis of his right knee and gets intra-articular injections about every 3 months.  He had one 2 days ago on 08/13.  States 1 day ago he had right knee swelling and limited range of motion.  He followed up with Orthopedic surgery today in clinic and had an arthrocentesis which showed 143,000 WBCs and signs of pseudogout.  Patient denies any fever, chills, diaphoresis.  He states his right knee is never this swollen despite his chronic pain.  He did not take any pain medication today and has 10/10 pain.        Review of patient's allergies indicates:   Allergen Reactions    Tamiflu [oseltamivir]      Increases BP    Metformin Hives     Diarrhea, nausea    Penicillins Rash     Past Medical History:   Diagnosis Date    Coronary artery disease involving native coronary artery of native heart without angina pectoris 3/7/2023    Essential (primary) hypertension     Male erectile dysfunction, unspecified     New onset a-fib 1/3/2023    Testicular hypofunction     Type 2 diabetes mellitus without complications      Past Surgical History:   Procedure Laterality Date    HIP SURGERY      LEFT HEART CATHETERIZATION Left 3/7/2023    Procedure: Left heart cath;  Surgeon: Wil Mahoney MD;  Location: [facility] CATH LAB;  Service: Cardiology;  Laterality: Left;  1030am start, R rad access    RN PREOP 2/28/23     Family History   Problem Relation Name Age of Onset    Blindness Mother      No Known Problems Father      No Known Problems Brother      No Known Problems Daughter      No Known Problems Son      No Known Problems Daughter      Diabetes Brother      No  Known Problems Brother      Amblyopia Neg Hx      Cancer Neg Hx      Cataracts Neg Hx      Glaucoma Neg Hx      Hypertension Neg Hx      Macular degeneration Neg Hx      Retinal detachment Neg Hx      Strabismus Neg Hx      Stroke Neg Hx      Thyroid disease Neg Hx       Social History     Tobacco Use    Smoking status: Former     Current packs/day: 0.00     Average packs/day: 0.3 packs/day for 4.0 years (1.0 ttl pk-yrs)     Types: Cigarettes     Start date: 1972     Quit date: 1976     Years since quittin.7    Smokeless tobacco: Never   Substance Use Topics    Alcohol use: Yes     Comment: occ    Drug use: No     Review of Systems   Constitutional:  Negative for fever.   HENT:  Negative for sore throat.    Respiratory:  Negative for shortness of breath.    Cardiovascular:  Negative for chest pain.   Gastrointestinal:  Negative for nausea.   Genitourinary:  Negative for dysuria.   Musculoskeletal:  Positive for arthralgias and joint swelling. Negative for back pain.   Skin:  Negative for rash.   Neurological:  Negative for weakness.   Hematological:  Does not bruise/bleed easily.       Physical Exam     Initial Vitals [08/15/24 1617]   BP Pulse Resp Temp SpO2   (!) 153/87 101 20 98.6 °F (37 °C) 95 %      MAP       --         Physical Exam    Vitals reviewed.  Constitutional: He appears well-developed and well-nourished. He is not diaphoretic. He is cooperative.  Non-toxic appearance. He does not have a sickly appearance. He does not appear ill. No distress.   HENT:   Head: Normocephalic and atraumatic.   Nose: Nose normal.   Mouth/Throat: No trismus in the jaw.   Eyes: Conjunctivae and EOM are normal.   Neck:   Normal range of motion.  Pulmonary/Chest: No accessory muscle usage. No tachypnea. No respiratory distress.   Abdominal: He exhibits no distension.   Musculoskeletal:      Cervical back: Normal range of motion.      Right knee: Swelling and effusion present. Decreased range of motion.  Tenderness present.      Comments: Can range R knee between 100-90 degrees with pain. Some overlying warmth.      Neurological: He is alert. He has normal strength.   Skin: Skin is dry. No pallor.         ED Course   Procedures  Labs Reviewed   CBC W/ AUTO DIFFERENTIAL - Abnormal       Result Value    WBC 12.09      RBC 4.98      Hemoglobin 15.6      Hematocrit 45.0      MCV 90      MCH 31.3 (*)     MCHC 34.7      RDW 12.7      Platelets 259      MPV 9.0 (*)     Immature Granulocytes 0.5      Gran # (ANC) 9.2 (*)     Immature Grans (Abs) 0.06 (*)     Lymph # 1.5      Mono # 1.3 (*)     Eos # 0.0      Baso # 0.04      nRBC 0      Gran % 76.1 (*)     Lymph % 12.2 (*)     Mono % 10.9      Eosinophil % 0.0      Basophil % 0.3      Differential Method Automated     COMPREHENSIVE METABOLIC PANEL - Abnormal    Sodium 137      Potassium 4.2      Chloride 101      CO2 24      Glucose 330 (*)     BUN 17      Creatinine 1.0      Calcium 10.0      Total Protein 7.7      Albumin 4.0      Total Bilirubin 1.1 (*)     Alkaline Phosphatase 115      AST 12      ALT 19      eGFR >60.0      Anion Gap 12     C-REACTIVE PROTEIN - Abnormal    .8 (*)    C-REACTIVE PROTEIN - Abnormal    .8 (*)    CULTURE, BLOOD   CULTURE, BLOOD   LACTIC ACID, PLASMA    Lactate (Lactic Acid) 1.5     PHOSPHORUS    Phosphorus 3.0     MAGNESIUM    Magnesium 2.1     PREALBUMIN    Prealbumin 21     VITAMIN D    Vit D, 25-Hydroxy 57     SEDIMENTATION RATE    Sed Rate 12     SEDIMENTATION RATE    Sed Rate 12     POCT GLUCOSE, HAND-HELD DEVICE          Imaging Results              X-Ray Chest 1 View Pre-OP (Final result)  Result time 08/15/24 19:33:20      Final result by Cipriano Garcia MD (08/15/24 19:33:20)                   Impression:      1. Chronic appearing interstitial findings, no large focal consolidation.      Electronically signed by: Cipriano Garcia MD  Date:    08/15/2024  Time:    19:33               Narrative:    EXAMINATION:  XR  CHEST 1 VIEW PRE-OP    CLINICAL HISTORY:  surgery;    TECHNIQUE:  Single frontal view of the chest was performed.    COMPARISON:  01/03/2023    FINDINGS:  The cardiomediastinal silhouette is not enlarged, magnified by technique..  There is no pleural effusion.  The trachea is midline.  The lungs are symmetrically expanded bilaterally with coarse interstitial attenuation and bilateral basilar subsegmental atelectasis..  No large focal consolidation seen.  There is no pneumothorax.  The osseous structures are remarkable for degenerative change..                                       Medications   acetaminophen tablet 1,000 mg (has no administration in time range)   methocarbamoL tablet 750 mg (has no administration in time range)   oxyCODONE immediate release tablet 5 mg (has no administration in time range)   oxyCODONE immediate release tablet 10 mg (has no administration in time range)   atorvastatin tablet 80 mg (has no administration in time range)   multivitamin tablet (has no administration in time range)   metoprolol succinate (TOPROL-XL) 24 hr tablet 25 mg (has no administration in time range)   gabapentin capsule 300 mg (has no administration in time range)   omega-3 acid ethyl esters capsule 1 g (has no administration in time range)   sodium chloride 0.9% flush 10 mL (has no administration in time range)   naloxone 0.4 mg/mL injection 0.02 mg (has no administration in time range)   glucose chewable tablet 16 g (has no administration in time range)   glucose chewable tablet 24 g (has no administration in time range)   glucagon (human recombinant) injection 1 mg (has no administration in time range)   ondansetron injection 4 mg (has no administration in time range)   insulin aspart U-100 pen 0-5 Units (has no administration in time range)   melatonin tablet 6 mg (has no administration in time range)   dextrose 10% bolus 125 mL 125 mL (has no administration in time range)   dextrose 10% bolus 250 mL 250 mL (has  no administration in time range)   HYDROmorphone injection 0.2 mg (has no administration in time range)   insulin glargine U-100 (Lantus) pen 10 Units (has no administration in time range)   vancomycin - pharmacy to dose (has no administration in time range)   ceFEPIme (MAXIPIME) 2 g in D5W 100 mL IVPB (MB+) (has no administration in time range)   losartan tablet 25 mg (has no administration in time range)   vancomycin 1,500 mg in D5W 250 mL IVPB (Vial-Mate) (1,500 mg Intravenous Trough Due As Scheduled Before Dose 8/17/24 0600)   vancomycin 1.75 g in 5 % dextrose 500 mL IVPB (1,750 mg Intravenous New Bag 8/15/24 1849)   sodium chloride 0.9% bolus 1,000 mL 1,000 mL (0 mLs Intravenous Stopped 8/15/24 1841)   morphine injection 4 mg (4 mg Intravenous Given 8/15/24 1741)   ondansetron injection 4 mg (4 mg Intravenous Given 8/15/24 1740)   ceFEPIme (MAXIPIME) 2 g in D5W 100 mL IVPB (MB+) (0 g Intravenous Stopped 8/15/24 1822)   oxyCODONE immediate release tablet 15 mg (15 mg Oral Given 8/15/24 1838)     Medical Decision Making  Patient had synovial fluid analyzed after arthrocentesis which showed a significant amount of wbc's that is concerning for septic arthritis despite signs of pseudogout.  He had a recent procedure 2 days prior.  We will initiate septic workup, start IV vanc and cefepime, give medication for pain relief, discuss case with Orthopedic surgery, and likely admit to hospital medicine for further evaluation and management.  He had an x-ray today prior to arrival that showed signs of osteoarthritis without acute fractures or dislocations.    Amount and/or Complexity of Data Reviewed  External Data Reviewed: labs and notes.  Labs: ordered. Decision-making details documented in ED Course.  ECG/medicine tests: ordered and independent interpretation performed.    Risk  Parenteral controlled substances.  Decision regarding hospitalization.               ED Course as of 08/15/24 2105   u Aug 15, 2024   6772  BP(!): 153/87 [CL]   1636 Temp: 98.6 °F (37 °C) [CL]   1636 Pulse: 101 [CL]   1636 Resp: 20 [CL]   1636 SpO2: 95 % [CL]   1710 Case discussed with Orthopedic surgery.  They recommend admission to Hospital Medicine and agree with IV antibiotics.  Likely no surgery today. [CL]   1719 On my independent interpretation, EKG with NSR at 94 beats per minute.  Normal intervals. No STEMI. [CL]   1734 WBC: 12.09 [CL]   1734 Hemoglobin: 15.6 [CL]   1751 Sodium: 137 [CL]   1751 Potassium: 4.2 [CL]   1751 Creatinine: 1.0 [CL]   1751 BILIRUBIN TOTAL(!): 1.1 [CL]   1751 AST: 12 [CL]   1751 ALT: 19 [CL]   1804 Anion Gap: 12  Doubt DKA/HHS. [CL]   1804 CO2: 24 [CL]   1804 Case discussed with Hospital Medicine who will admit to their service for further evaluation and management in this patient with septic arthritis of the right knee. [CL]      ED Course User Index  [CL] Elvia Yang PA-C          I have reviewed patient's chart and discussed this case with my supervising MD.     Elvia Yang PA-C  Emergent Department  [facility]  Spectralink #[id number] or #[phone]                   Clinical Impression:  Final diagnoses:  [R00.0] Tachycardia  [M00.9] Pyogenic arthritis of right knee joint, due to unspecified organism (Primary)          ED Disposition Condition    Admit Stable          Future Appointments   Date Time Provider Department Center   11/13/2024  1:20 PM Gallo Lara MD [facility]   1/3/2025 10:00 AM Taran Martell MD [facility]             Elvia Yang PA-C  08/15/24 6630

## 2024-08-15 NOTE — SUBJECTIVE & OBJECTIVE
Past Medical History:   Diagnosis Date    Coronary artery disease involving native coronary artery of native heart without angina pectoris 3/7/2023    Essential (primary) hypertension     Male erectile dysfunction, unspecified     New onset a-fib 1/3/2023    Testicular hypofunction     Type 2 diabetes mellitus without complications        Past Surgical History:   Procedure Laterality Date    HIP SURGERY      LEFT HEART CATHETERIZATION Left 3/7/2023    Procedure: Left heart cath;  Surgeon: Wil Mahoney MD;  Location: [facility] CATH LAB;  Service: Cardiology;  Laterality: Left;  1030am start, R rad access    RN PREOP 2/28/23       Review of patient's allergies indicates:   Allergen Reactions    Tamiflu [oseltamivir]      Increases BP    Metformin Hives     Diarrhea, nausea    Penicillins Rash       Current Facility-Administered Medications on File Prior to Encounter   Medication    triamcinolone acetonide injection 40 mg     Current Outpatient Medications on File Prior to Encounter   Medication Sig    alcohol swabs (DROPSAFE ALCOHOL PREP PADS) PadM USE AS DIRECTED AS NEEDED    apixaban (ELIQUIS) 5 mg Tab Take 1 tablet (5 mg total) by mouth 2 (two) times daily.    ASCORBATE CALCIUM (VITAMIN C ORAL) Take by mouth once daily.     aspirin (ECOTRIN) 81 MG EC tablet Take 81 mg by mouth once daily.    atorvastatin (LIPITOR) 80 MG tablet Take 1 tablet (80 mg total) by mouth every evening.    blood sugar diagnostic Strp 1 strip by Misc.(Non-Drug; Combo Route) route 3 (three) times daily.    blood-glucose meter kit Use as instructed    blood-glucose meter kit 1 each by Other route 3 (three) times daily. Use as instructed    diclofenac sodium (VOLTAREN) 1 % Gel Apply 2 g topically once daily.    doxycycline (VIBRA-TABS) 100 MG tablet Take 1 tablet (100 mg total) by mouth 2 (two) times daily.    empagliflozin (JARDIANCE) 25 mg tablet Take 1 tablet (25 mg total) by mouth once daily.    fish oil-omega-3 fatty acids 300-1,000 mg  capsule Take 2 g by mouth once daily.    gabapentin (NEURONTIN) 300 MG capsule Take 1 capsule (300 mg total) by mouth 3 (three) times daily.    glimepiride (AMARYL) 4 MG tablet Take 1 tablet (4 mg total) by mouth 2 (two) times a day.    [START ON 10/13/2024] HYDROcodone-acetaminophen (NORCO)  mg per tablet Take 1 tablet by mouth 3 (three) times daily as needed (pain).    [START ON 9/13/2024] HYDROcodone-acetaminophen (NORCO)  mg per tablet Take 1 tablet by mouth 3 (three) times daily as needed (pain).    HYDROcodone-acetaminophen (NORCO)  mg per tablet Take 1 tablet by mouth 3 (three) times daily as needed (pain).    hydrocortisone 2.5 % cream Apply topically 2 (two) times daily.    lancets Misc 1 application  by Misc.(Non-Drug; Combo Route) route 3 (three) times daily.    loratadine (CLARITIN) 10 mg tablet Take 1 tablet (10 mg total) by mouth once daily.    losartan (COZAAR) 25 MG tablet Take 1 tablet (25 mg total) by mouth once daily.    meloxicam (MOBIC) 7.5 MG tablet Take 1 tablet (7.5 mg total) by mouth once daily.    metoprolol succinate (TOPROL-XL) 25 MG 24 hr tablet Take 1 tablet (25 mg total) by mouth once daily.    multivitamin with minerals tablet Take 1 tablet by mouth once daily.    neomycin-polymyxin-hydrocortisone (CORTISPORIN) 3.5-10,000-1 mg/mL-unit/mL-% otic suspension PLACE 3 DROPS INTO THE LEFT EAR 4 TIMES DAILY.    semaglutide (OZEMPIC) 0.25 mg or 0.5 mg (2 mg/3 mL) pen injector Inject 0.5 mg into the skin every 7 days.    sodium chloride (SALINE NASAL) 0.65 % nasal spray 2 sprays by Nasal route 2 (two) times a day.    triamcinolone acetonide 0.1% (KENALOG) 0.1 % ointment Apply topically 2 (two) times daily as needed.     Family History       Problem Relation (Age of Onset)    Blindness Mother    Diabetes Brother    No Known Problems Father, Brother, Daughter, Son, Daughter, Brother          Tobacco Use    Smoking status: Former     Current packs/day: 0.00     Average  packs/day: 0.3 packs/day for 4.0 years (1.0 ttl pk-yrs)     Types: Cigarettes     Start date: 1972     Quit date: 1976     Years since quittin.7    Smokeless tobacco: Never   Substance and Sexual Activity    Alcohol use: Yes     Comment: occ    Drug use: No    Sexual activity: Yes     Partners: Female     Birth control/protection: None     Review of Systems   Constitutional:  Negative for appetite change, chills, diaphoresis, fatigue and fever.   HENT:  Negative for congestion, rhinorrhea and sore throat.    Eyes:  Negative for photophobia and visual disturbance.   Respiratory:  Negative for cough, shortness of breath and wheezing.    Cardiovascular:  Negative for chest pain, palpitations and leg swelling.   Gastrointestinal:  Negative for abdominal distention, abdominal pain, diarrhea, nausea and vomiting.   Genitourinary:  Negative for dysuria, frequency and hematuria.   Musculoskeletal:  Positive for arthralgias (right knee), gait problem and joint swelling. Negative for neck pain.   Skin:  Negative for color change, pallor, rash and wound.   Neurological:  Negative for tremors, syncope, light-headedness, numbness and headaches.   Psychiatric/Behavioral:  Negative for confusion and hallucinations. The patient is not nervous/anxious.      Objective:     Vital Signs (Most Recent):  Temp: 98.6 °F (37 °C) (08/15/24 1617)  Pulse: 101 (08/15/24 1617)  Resp: 18 (08/15/24 1741)  BP: (!) 153/87 (08/15/24 1617)  SpO2: 95 % (08/15/24 1617) Vital Signs (24h Range):  Temp:  [98.6 °F (37 °C)] 98.6 °F (37 °C)  Pulse:  [101] 101  Resp:  [18-20] 18  SpO2:  [95 %] 95 %  BP: (153)/(87) 153/87     Weight: 91.2 kg (201 lb)  Body mass index is 28.84 kg/m².     Physical Exam  Vitals and nursing note reviewed.   Constitutional:       General: He is not in acute distress.     Appearance: He is not toxic-appearing or diaphoretic.   HENT:      Head: Normocephalic and atraumatic.      Nose: Nose normal.      Mouth/Throat:       Mouth: Mucous membranes are moist.   Eyes:      Pupils: Pupils are equal, round, and reactive to light.   Cardiovascular:      Rate and Rhythm: Normal rate and regular rhythm.      Pulses: Normal pulses.           Dorsalis pedis pulses are 2+ on the right side.        Posterior tibial pulses are 2+ on the right side.   Pulmonary:      Effort: Pulmonary effort is normal. No respiratory distress.      Breath sounds: No wheezing, rhonchi or rales.   Abdominal:      General: Bowel sounds are normal. There is no distension.      Palpations: Abdomen is soft.      Tenderness: There is no abdominal tenderness. There is no guarding.   Musculoskeletal:      Cervical back: Normal range of motion.      Right knee: Swelling present. Decreased range of motion. Tenderness present.   Skin:     General: Skin is warm and dry.      Capillary Refill: Capillary refill takes less than 2 seconds.   Neurological:      General: No focal deficit present.      Mental Status: He is alert.      Sensory: No sensory deficit.      Motor: No weakness.   Psychiatric:         Mood and Affect: Mood normal.         Behavior: Behavior normal.              CRANIAL NERVES     CN III, IV, VI   Pupils are equal, round, and reactive to light.       Significant Labs: All pertinent labs within the past 24 hours have been reviewed.  CBC:   Recent Labs   Lab 08/15/24  1700   WBC 12.09   HGB 15.6   HCT 45.0        CMP:   Recent Labs   Lab 08/15/24  1700      K 4.2      CO2 24   *   BUN 17   CREATININE 1.0   CALCIUM 10.0   PROT 7.7   ALBUMIN 4.0   BILITOT 1.1*   ALKPHOS 115   AST 12   ALT 19   ANIONGAP 12     Lactic Acid:   Recent Labs   Lab 08/15/24  1700   LACTATE 1.5       Significant Imaging: I have reviewed all pertinent imaging results/findings within the past 24 hours.

## 2024-08-16 ENCOUNTER — ANESTHESIA (OUTPATIENT)
Dept: SURGERY | Facility: HOSPITAL | Age: 69
End: 2024-08-16
Payer: MEDICARE

## 2024-08-16 ENCOUNTER — ANESTHESIA EVENT (OUTPATIENT)
Dept: SURGERY | Facility: HOSPITAL | Age: 69
End: 2024-08-16
Payer: MEDICARE

## 2024-08-16 LAB
ALBUMIN SERPL BCP-MCNC: 3.2 G/DL (ref 3.5–5.2)
ALP SERPL-CCNC: 94 U/L (ref 55–135)
ALT SERPL W/O P-5'-P-CCNC: 15 U/L (ref 10–44)
ANION GAP SERPL CALC-SCNC: 9 MMOL/L (ref 8–16)
AST SERPL-CCNC: 12 U/L (ref 10–40)
BASOPHILS # BLD AUTO: 0.03 K/UL (ref 0–0.2)
BASOPHILS NFR BLD: 0.3 % (ref 0–1.9)
BILIRUB SERPL-MCNC: 1.1 MG/DL (ref 0.1–1)
BUN SERPL-MCNC: 17 MG/DL (ref 8–23)
CALCIUM SERPL-MCNC: 8.9 MG/DL (ref 8.7–10.5)
CHLORIDE SERPL-SCNC: 103 MMOL/L (ref 95–110)
CO2 SERPL-SCNC: 23 MMOL/L (ref 23–29)
CREAT SERPL-MCNC: 0.9 MG/DL (ref 0.5–1.4)
DIFFERENTIAL METHOD BLD: ABNORMAL
EOSINOPHIL # BLD AUTO: 0 K/UL (ref 0–0.5)
EOSINOPHIL NFR BLD: 0.3 % (ref 0–8)
ERYTHROCYTE [DISTWIDTH] IN BLOOD BY AUTOMATED COUNT: 12.5 % (ref 11.5–14.5)
EST. GFR  (NO RACE VARIABLE): >60 ML/MIN/1.73 M^2
GLUCOSE SERPL-MCNC: 269 MG/DL (ref 70–110)
GLUCOSE SERPL-MCNC: 302 MG/DL (ref 70–110)
HCT VFR BLD AUTO: 41 % (ref 40–54)
HGB BLD-MCNC: 13.4 G/DL (ref 14–18)
IMM GRANULOCYTES # BLD AUTO: 0.05 K/UL (ref 0–0.04)
IMM GRANULOCYTES NFR BLD AUTO: 0.5 % (ref 0–0.5)
LYMPHOCYTES # BLD AUTO: 2 K/UL (ref 1–4.8)
LYMPHOCYTES NFR BLD: 20.7 % (ref 18–48)
MAGNESIUM SERPL-MCNC: 2.1 MG/DL (ref 1.6–2.6)
MCH RBC QN AUTO: 30.9 PG (ref 27–31)
MCHC RBC AUTO-ENTMCNC: 32.7 G/DL (ref 32–36)
MCV RBC AUTO: 95 FL (ref 82–98)
MONOCYTES # BLD AUTO: 1.4 K/UL (ref 0.3–1)
MONOCYTES NFR BLD: 14 % (ref 4–15)
NEUTROPHILS # BLD AUTO: 6.3 K/UL (ref 1.8–7.7)
NEUTROPHILS NFR BLD: 64.2 % (ref 38–73)
NRBC BLD-RTO: 0 /100 WBC
OHS QRS DURATION: 106 MS
OHS QTC CALCULATION: 455 MS
PLATELET # BLD AUTO: 197 K/UL (ref 150–450)
PMV BLD AUTO: 9.1 FL (ref 9.2–12.9)
POCT GLUCOSE: 207 MG/DL (ref 70–110)
POCT GLUCOSE: 225 MG/DL (ref 70–110)
POCT GLUCOSE: 302 MG/DL (ref 70–110)
POCT GLUCOSE: 303 MG/DL (ref 70–110)
POTASSIUM SERPL-SCNC: 4.2 MMOL/L (ref 3.5–5.1)
PROT SERPL-MCNC: 6.5 G/DL (ref 6–8.4)
RBC # BLD AUTO: 4.34 M/UL (ref 4.6–6.2)
SODIUM SERPL-SCNC: 135 MMOL/L (ref 136–145)
WBC # BLD AUTO: 9.83 K/UL (ref 3.9–12.7)

## 2024-08-16 PROCEDURE — 71000033 HC RECOVERY, INTIAL HOUR: Mod: HCNC | Performed by: ORTHOPAEDIC SURGERY

## 2024-08-16 PROCEDURE — 29876 ARTHRS KNEE SURG SYNVCT MAJ: CPT | Mod: HCNC,RT,, | Performed by: ORTHOPAEDIC SURGERY

## 2024-08-16 PROCEDURE — 36000710: Mod: HCNC | Performed by: ORTHOPAEDIC SURGERY

## 2024-08-16 PROCEDURE — 63600175 PHARM REV CODE 636 W HCPCS: Mod: HCNC | Performed by: ORTHOPAEDIC SURGERY

## 2024-08-16 PROCEDURE — 63600175 PHARM REV CODE 636 W HCPCS: Mod: HCNC | Performed by: NURSE PRACTITIONER

## 2024-08-16 PROCEDURE — 25000003 PHARM REV CODE 250: Mod: HCNC | Performed by: NURSE PRACTITIONER

## 2024-08-16 PROCEDURE — 83735 ASSAY OF MAGNESIUM: CPT | Mod: HCNC | Performed by: NURSE PRACTITIONER

## 2024-08-16 PROCEDURE — 25000003 PHARM REV CODE 250: Mod: HCNC | Performed by: HOSPITALIST

## 2024-08-16 PROCEDURE — D9220A PRA ANESTHESIA: Mod: HCNC,CRNA,, | Performed by: NURSE ANESTHETIST, CERTIFIED REGISTERED

## 2024-08-16 PROCEDURE — 36000711: Mod: HCNC | Performed by: ORTHOPAEDIC SURGERY

## 2024-08-16 PROCEDURE — 80053 COMPREHEN METABOLIC PANEL: CPT | Mod: HCNC | Performed by: NURSE PRACTITIONER

## 2024-08-16 PROCEDURE — 0SBC4ZZ EXCISION OF RIGHT KNEE JOINT, PERCUTANEOUS ENDOSCOPIC APPROACH: ICD-10-PCS | Performed by: ORTHOPAEDIC SURGERY

## 2024-08-16 PROCEDURE — 21400001 HC TELEMETRY ROOM: Mod: HCNC

## 2024-08-16 PROCEDURE — 27201423 OPTIME MED/SURG SUP & DEVICES STERILE SUPPLY: Mod: HCNC | Performed by: ORTHOPAEDIC SURGERY

## 2024-08-16 PROCEDURE — 63600175 PHARM REV CODE 636 W HCPCS: Mod: HCNC | Performed by: HOSPITALIST

## 2024-08-16 PROCEDURE — 63600175 PHARM REV CODE 636 W HCPCS: Mod: HCNC | Performed by: ANESTHESIOLOGY

## 2024-08-16 PROCEDURE — 85025 COMPLETE CBC W/AUTO DIFF WBC: CPT | Mod: HCNC | Performed by: NURSE PRACTITIONER

## 2024-08-16 PROCEDURE — 71000016 HC POSTOP RECOV ADDL HR: Mod: HCNC | Performed by: ORTHOPAEDIC SURGERY

## 2024-08-16 PROCEDURE — 71000015 HC POSTOP RECOV 1ST HR: Mod: HCNC | Performed by: ORTHOPAEDIC SURGERY

## 2024-08-16 PROCEDURE — 37000008 HC ANESTHESIA 1ST 15 MINUTES: Mod: HCNC | Performed by: ORTHOPAEDIC SURGERY

## 2024-08-16 PROCEDURE — 37000009 HC ANESTHESIA EA ADD 15 MINS: Mod: HCNC | Performed by: ORTHOPAEDIC SURGERY

## 2024-08-16 PROCEDURE — 63600175 PHARM REV CODE 636 W HCPCS: Mod: HCNC | Performed by: NURSE ANESTHETIST, CERTIFIED REGISTERED

## 2024-08-16 PROCEDURE — D9220A PRA ANESTHESIA: Mod: HCNC,ANES,, | Performed by: ANESTHESIOLOGY

## 2024-08-16 PROCEDURE — 25000003 PHARM REV CODE 250: Mod: HCNC | Performed by: NURSE ANESTHETIST, CERTIFIED REGISTERED

## 2024-08-16 RX ORDER — ASPIRIN 81 MG/1
81 TABLET ORAL DAILY
Status: DISCONTINUED | OUTPATIENT
Start: 2024-08-17 | End: 2024-08-24 | Stop reason: HOSPADM

## 2024-08-16 RX ORDER — GLUCAGON 1 MG
1 KIT INJECTION
Status: DISCONTINUED | OUTPATIENT
Start: 2024-08-16 | End: 2024-08-16 | Stop reason: HOSPADM

## 2024-08-16 RX ORDER — PROPOFOL 10 MG/ML
VIAL (ML) INTRAVENOUS
Status: DISCONTINUED | OUTPATIENT
Start: 2024-08-16 | End: 2024-08-16

## 2024-08-16 RX ORDER — EPINEPHRINE 1 MG/ML
INJECTION, SOLUTION, CONCENTRATE INTRAVENOUS
Status: DISCONTINUED | OUTPATIENT
Start: 2024-08-16 | End: 2024-08-16 | Stop reason: HOSPADM

## 2024-08-16 RX ORDER — PHENYLEPHRINE HCL IN 0.9% NACL 1 MG/10 ML
SYRINGE (ML) INTRAVENOUS
Status: DISCONTINUED | OUTPATIENT
Start: 2024-08-16 | End: 2024-08-16

## 2024-08-16 RX ORDER — VANCOMYCIN HYDROCHLORIDE 1 G/20ML
INJECTION, POWDER, LYOPHILIZED, FOR SOLUTION INTRAVENOUS
Status: DISCONTINUED | OUTPATIENT
Start: 2024-08-16 | End: 2024-08-16 | Stop reason: HOSPADM

## 2024-08-16 RX ORDER — SODIUM CHLORIDE 0.9 % (FLUSH) 0.9 %
10 SYRINGE (ML) INJECTION
Status: DISCONTINUED | OUTPATIENT
Start: 2024-08-16 | End: 2024-08-16 | Stop reason: HOSPADM

## 2024-08-16 RX ORDER — INSULIN GLARGINE 100 [IU]/ML
13 INJECTION, SOLUTION SUBCUTANEOUS 2 TIMES DAILY
Status: DISCONTINUED | OUTPATIENT
Start: 2024-08-16 | End: 2024-08-17

## 2024-08-16 RX ORDER — KETAMINE HCL IN 0.9 % NACL 50 MG/5 ML
SYRINGE (ML) INTRAVENOUS
Status: DISCONTINUED | OUTPATIENT
Start: 2024-08-16 | End: 2024-08-16

## 2024-08-16 RX ORDER — FENTANYL CITRATE 50 UG/ML
INJECTION, SOLUTION INTRAMUSCULAR; INTRAVENOUS
Status: DISCONTINUED | OUTPATIENT
Start: 2024-08-16 | End: 2024-08-16

## 2024-08-16 RX ORDER — HYDROMORPHONE HYDROCHLORIDE 1 MG/ML
0.2 INJECTION, SOLUTION INTRAMUSCULAR; INTRAVENOUS; SUBCUTANEOUS EVERY 5 MIN PRN
Status: DISCONTINUED | OUTPATIENT
Start: 2024-08-16 | End: 2024-08-16 | Stop reason: HOSPADM

## 2024-08-16 RX ORDER — ROCURONIUM BROMIDE 10 MG/ML
INJECTION, SOLUTION INTRAVENOUS
Status: DISCONTINUED | OUTPATIENT
Start: 2024-08-16 | End: 2024-08-16

## 2024-08-16 RX ORDER — CEFAZOLIN SODIUM 1 G/3ML
INJECTION, POWDER, FOR SOLUTION INTRAMUSCULAR; INTRAVENOUS
Status: DISCONTINUED | OUTPATIENT
Start: 2024-08-16 | End: 2024-08-16

## 2024-08-16 RX ORDER — LIDOCAINE HYDROCHLORIDE 20 MG/ML
INJECTION INTRAVENOUS
Status: DISCONTINUED | OUTPATIENT
Start: 2024-08-16 | End: 2024-08-16

## 2024-08-16 RX ORDER — MIDAZOLAM HYDROCHLORIDE 1 MG/ML
INJECTION INTRAMUSCULAR; INTRAVENOUS
Status: DISCONTINUED | OUTPATIENT
Start: 2024-08-16 | End: 2024-08-16

## 2024-08-16 RX ORDER — INSULIN ASPART 100 [IU]/ML
7 INJECTION, SOLUTION INTRAVENOUS; SUBCUTANEOUS
Status: DISCONTINUED | OUTPATIENT
Start: 2024-08-16 | End: 2024-08-17

## 2024-08-16 RX ORDER — DEXMEDETOMIDINE HYDROCHLORIDE 100 UG/ML
INJECTION, SOLUTION INTRAVENOUS
Status: DISCONTINUED | OUTPATIENT
Start: 2024-08-16 | End: 2024-08-16

## 2024-08-16 RX ORDER — INSULIN ASPART 100 [IU]/ML
7 INJECTION, SOLUTION INTRAVENOUS; SUBCUTANEOUS
Status: DISCONTINUED | OUTPATIENT
Start: 2024-08-16 | End: 2024-08-16

## 2024-08-16 RX ORDER — ONDANSETRON HYDROCHLORIDE 2 MG/ML
INJECTION, SOLUTION INTRAVENOUS
Status: DISCONTINUED | OUTPATIENT
Start: 2024-08-16 | End: 2024-08-16

## 2024-08-16 RX ORDER — EPHEDRINE SULFATE 50 MG/ML
INJECTION, SOLUTION INTRAVENOUS
Status: DISCONTINUED | OUTPATIENT
Start: 2024-08-16 | End: 2024-08-16

## 2024-08-16 RX ADMIN — INSULIN ASPART 4 UNITS: 100 INJECTION, SOLUTION INTRAVENOUS; SUBCUTANEOUS at 10:08

## 2024-08-16 RX ADMIN — Medication 200 MCG: at 12:08

## 2024-08-16 RX ADMIN — GABAPENTIN 300 MG: 300 CAPSULE ORAL at 09:08

## 2024-08-16 RX ADMIN — VANCOMYCIN HYDROCHLORIDE 1500 MG: 1.5 INJECTION, POWDER, LYOPHILIZED, FOR SOLUTION INTRAVENOUS at 06:08

## 2024-08-16 RX ADMIN — INSULIN ASPART 1 UNITS: 100 INJECTION, SOLUTION INTRAVENOUS; SUBCUTANEOUS at 09:08

## 2024-08-16 RX ADMIN — ACETAMINOPHEN 1000 MG: 325 TABLET ORAL at 06:08

## 2024-08-16 RX ADMIN — OXYCODONE HYDROCHLORIDE 10 MG: 10 TABLET ORAL at 06:08

## 2024-08-16 RX ADMIN — HYDROMORPHONE HYDROCHLORIDE 0.2 MG: 1 INJECTION, SOLUTION INTRAMUSCULAR; INTRAVENOUS; SUBCUTANEOUS at 01:08

## 2024-08-16 RX ADMIN — ROCURONIUM BROMIDE 50 MG: 10 INJECTION, SOLUTION INTRAVENOUS at 11:08

## 2024-08-16 RX ADMIN — ONDANSETRON 4 MG: 2 INJECTION INTRAMUSCULAR; INTRAVENOUS at 01:08

## 2024-08-16 RX ADMIN — CEFEPIME 2 G: 2 INJECTION, POWDER, FOR SOLUTION INTRAVENOUS at 09:08

## 2024-08-16 RX ADMIN — INSULIN ASPART 3 UNITS: 100 INJECTION, SOLUTION INTRAVENOUS; SUBCUTANEOUS at 01:08

## 2024-08-16 RX ADMIN — EPHEDRINE SULFATE 5 MG: 50 INJECTION INTRAVENOUS at 12:08

## 2024-08-16 RX ADMIN — OXYCODONE HYDROCHLORIDE 10 MG: 10 TABLET ORAL at 01:08

## 2024-08-16 RX ADMIN — LIDOCAINE HYDROCHLORIDE 100 MG: 20 INJECTION INTRAVENOUS at 11:08

## 2024-08-16 RX ADMIN — METHOCARBAMOL 750 MG: 750 TABLET ORAL at 09:08

## 2024-08-16 RX ADMIN — HYDROMORPHONE HYDROCHLORIDE 0.2 MG: 1 INJECTION, SOLUTION INTRAMUSCULAR; INTRAVENOUS; SUBCUTANEOUS at 11:08

## 2024-08-16 RX ADMIN — FENTANYL CITRATE 100 MCG: 50 INJECTION, SOLUTION INTRAMUSCULAR; INTRAVENOUS at 11:08

## 2024-08-16 RX ADMIN — LOSARTAN POTASSIUM 25 MG: 25 TABLET, FILM COATED ORAL at 09:08

## 2024-08-16 RX ADMIN — ACETAMINOPHEN 1000 MG: 325 TABLET ORAL at 09:08

## 2024-08-16 RX ADMIN — Medication 100 MCG: at 12:08

## 2024-08-16 RX ADMIN — OMEGA-3-ACID ETHYL ESTERS 1 G: 1 CAPSULE, LIQUID FILLED ORAL at 09:08

## 2024-08-16 RX ADMIN — DEXMEDETOMIDINE 20 MCG: 100 INJECTION, SOLUTION, CONCENTRATE INTRAVENOUS at 11:08

## 2024-08-16 RX ADMIN — SODIUM CHLORIDE: 0.9 INJECTION, SOLUTION INTRAVENOUS at 11:08

## 2024-08-16 RX ADMIN — SUGAMMADEX 200 MG: 100 INJECTION, SOLUTION INTRAVENOUS at 01:08

## 2024-08-16 RX ADMIN — METHOCARBAMOL 750 MG: 750 TABLET ORAL at 02:08

## 2024-08-16 RX ADMIN — INSULIN ASPART 7 UNITS: 100 INJECTION, SOLUTION INTRAVENOUS; SUBCUTANEOUS at 04:08

## 2024-08-16 RX ADMIN — HYDROMORPHONE HYDROCHLORIDE 0.2 MG: 1 INJECTION, SOLUTION INTRAMUSCULAR; INTRAVENOUS; SUBCUTANEOUS at 10:08

## 2024-08-16 RX ADMIN — Medication 30 MG: at 11:08

## 2024-08-16 RX ADMIN — OXYCODONE HYDROCHLORIDE 10 MG: 10 TABLET ORAL at 10:08

## 2024-08-16 RX ADMIN — VANCOMYCIN HYDROCHLORIDE 1500 MG: 1.5 INJECTION, POWDER, LYOPHILIZED, FOR SOLUTION INTRAVENOUS at 05:08

## 2024-08-16 RX ADMIN — CEFEPIME 2 G: 2 INJECTION, POWDER, FOR SOLUTION INTRAVENOUS at 04:08

## 2024-08-16 RX ADMIN — ATORVASTATIN CALCIUM 80 MG: 40 TABLET, FILM COATED ORAL at 09:08

## 2024-08-16 RX ADMIN — ROCURONIUM BROMIDE 20 MG: 10 INJECTION, SOLUTION INTRAVENOUS at 12:08

## 2024-08-16 RX ADMIN — INSULIN GLARGINE 10 UNITS: 100 INJECTION, SOLUTION SUBCUTANEOUS at 09:08

## 2024-08-16 RX ADMIN — MIDAZOLAM HYDROCHLORIDE 2 MG: 2 INJECTION, SOLUTION INTRAMUSCULAR; INTRAVENOUS at 11:08

## 2024-08-16 RX ADMIN — ACETAMINOPHEN 1000 MG: 325 TABLET ORAL at 01:08

## 2024-08-16 RX ADMIN — METOPROLOL SUCCINATE 25 MG: 25 TABLET, EXTENDED RELEASE ORAL at 09:08

## 2024-08-16 RX ADMIN — INSULIN ASPART 4 UNITS: 100 INJECTION, SOLUTION INTRAVENOUS; SUBCUTANEOUS at 09:08

## 2024-08-16 RX ADMIN — CEFEPIME 2 G: 2 INJECTION, POWDER, FOR SOLUTION INTRAVENOUS at 01:08

## 2024-08-16 RX ADMIN — HYDROMORPHONE HYDROCHLORIDE 0.2 MG: 1 INJECTION, SOLUTION INTRAMUSCULAR; INTRAVENOUS; SUBCUTANEOUS at 02:08

## 2024-08-16 RX ADMIN — APIXABAN 5 MG: 5 TABLET, FILM COATED ORAL at 09:08

## 2024-08-16 RX ADMIN — PROPOFOL 170 MG: 10 INJECTION, EMULSION INTRAVENOUS at 11:08

## 2024-08-16 RX ADMIN — INSULIN GLARGINE 13 UNITS: 100 INJECTION, SOLUTION SUBCUTANEOUS at 09:08

## 2024-08-16 RX ADMIN — CEFAZOLIN 2 G: 330 INJECTION, POWDER, FOR SOLUTION INTRAMUSCULAR; INTRAVENOUS at 12:08

## 2024-08-16 RX ADMIN — INSULIN ASPART 2 UNITS: 100 INJECTION, SOLUTION INTRAVENOUS; SUBCUTANEOUS at 04:08

## 2024-08-16 RX ADMIN — GABAPENTIN 300 MG: 300 CAPSULE ORAL at 02:08

## 2024-08-16 RX ADMIN — THERA TABS 1 TABLET: TAB at 09:08

## 2024-08-16 NOTE — PROGRESS NOTES
[facility]  Orthopedics  Progress Note    Patient Name: King Cool Jr.  MRN: [id number]  Admission Date: 8/15/2024  Hospital Length of Stay: 1 days  Attending Provider: Lorna Galvan MD  Primary Care Provider: Gallo Lara MD  Follow-up For: Procedure(s) (LRB):  ARTHROSCOPY, KNEE, WITH DEBRIDEMENT (Right)    Post-Operative Day:    Subjective:     Principal Problem:Septic joint of right knee joint    Principal Orthopedic Problem: as above    Interval History: Pt seen and examined at bedside. VSS.  NAEON. Reports pain and swellingofthe knee. Reports no new symptoms. Denies fevers or chill.      Review of patient's allergies indicates:   Allergen Reactions    Tamiflu [oseltamivir]      Increases BP    Metformin Hives     Diarrhea, nausea    Penicillins Rash       Current Facility-Administered Medications   Medication    acetaminophen tablet 1,000 mg    atorvastatin tablet 80 mg    ceFEPIme (MAXIPIME) 2 g in D5W 100 mL IVPB (MB+)    dextrose 10% bolus 125 mL 125 mL    dextrose 10% bolus 250 mL 250 mL    gabapentin capsule 300 mg    glucagon (human recombinant) injection 1 mg    glucose chewable tablet 16 g    glucose chewable tablet 24 g    HYDROmorphone injection 0.2 mg    insulin aspart U-100 pen 0-5 Units    insulin glargine U-100 (Lantus) pen 10 Units    losartan tablet 25 mg    melatonin tablet 6 mg    methocarbamoL tablet 750 mg    metoprolol succinate (TOPROL-XL) 24 hr tablet 25 mg    multivitamin tablet    naloxone 0.4 mg/mL injection 0.02 mg    omega-3 acid ethyl esters capsule 1 g    ondansetron injection 4 mg    oxyCODONE immediate release tablet 5 mg    oxyCODONE immediate release tablet Tab 10 mg    sodium chloride 0.9% flush 10 mL    vancomycin - pharmacy to dose    vancomycin 1,500 mg in D5W 250 mL IVPB (Vial-Mate)     Current Outpatient Medications   Medication Sig    alcohol swabs (DROPSAFE ALCOHOL PREP PADS) PadM USE AS DIRECTED AS NEEDED    apixaban (ELIQUIS) 5 mg Tab Take 1  tablet (5 mg total) by mouth 2 (two) times daily.    ASCORBATE CALCIUM (VITAMIN C ORAL) Take by mouth once daily.     aspirin (ECOTRIN) 81 MG EC tablet Take 81 mg by mouth once daily.    atorvastatin (LIPITOR) 80 MG tablet Take 1 tablet (80 mg total) by mouth every evening.    blood sugar diagnostic Strp 1 strip by Misc.(Non-Drug; Combo Route) route 3 (three) times daily.    blood-glucose meter kit Use as instructed    blood-glucose meter kit 1 each by Other route 3 (three) times daily. Use as instructed    diclofenac sodium (VOLTAREN) 1 % Gel Apply 2 g topically once daily.    doxycycline (VIBRA-TABS) 100 MG tablet Take 1 tablet (100 mg total) by mouth 2 (two) times daily.    empagliflozin (JARDIANCE) 25 mg tablet Take 1 tablet (25 mg total) by mouth once daily.    fish oil-omega-3 fatty acids 300-1,000 mg capsule Take 2 g by mouth once daily.    gabapentin (NEURONTIN) 300 MG capsule Take 1 capsule (300 mg total) by mouth 3 (three) times daily.    glimepiride (AMARYL) 4 MG tablet Take 1 tablet (4 mg total) by mouth 2 (two) times a day.    [START ON 10/13/2024] HYDROcodone-acetaminophen (NORCO)  mg per tablet Take 1 tablet by mouth 3 (three) times daily as needed (pain).    [START ON 9/13/2024] HYDROcodone-acetaminophen (NORCO)  mg per tablet Take 1 tablet by mouth 3 (three) times daily as needed (pain).    HYDROcodone-acetaminophen (NORCO)  mg per tablet Take 1 tablet by mouth 3 (three) times daily as needed (pain).    hydrocortisone 2.5 % cream Apply topically 2 (two) times daily.    lancets Misc 1 application  by Misc.(Non-Drug; Combo Route) route 3 (three) times daily.    loratadine (CLARITIN) 10 mg tablet Take 1 tablet (10 mg total) by mouth once daily.    losartan (COZAAR) 25 MG tablet Take 1 tablet (25 mg total) by mouth once daily.    meloxicam (MOBIC) 7.5 MG tablet Take 1 tablet (7.5 mg total) by mouth once daily.    metoprolol succinate (TOPROL-XL) 25 MG 24 hr tablet Take 1 tablet (25 mg  "total) by mouth once daily.    multivitamin with minerals tablet Take 1 tablet by mouth once daily.    neomycin-polymyxin-hydrocortisone (CORTISPORIN) 3.5-10,000-1 mg/mL-unit/mL-% otic suspension PLACE 3 DROPS INTO THE LEFT EAR 4 TIMES DAILY.    semaglutide (OZEMPIC) 0.25 mg or 0.5 mg (2 mg/3 mL) pen injector Inject 0.5 mg into the skin every 7 days.    sodium chloride (SALINE NASAL) 0.65 % nasal spray 2 sprays by Nasal route 2 (two) times a day.    triamcinolone acetonide 0.1% (KENALOG) 0.1 % ointment Apply topically 2 (two) times daily as needed.     Facility-Administered Medications Ordered in Other Encounters   Medication    triamcinolone acetonide injection 40 mg     Objective:     Vital Signs (Most Recent):  Temp: 98.6 °F (37 °C) (08/16/24 0617)  Pulse: 72 (08/16/24 0617)  Resp: 18 (08/16/24 0617)  BP: (!) 151/83 (08/16/24 0617)  SpO2: 95 % (08/16/24 0617) Vital Signs (24h Range):  Temp:  [98.1 °F (36.7 °C)-99.1 °F (37.3 °C)] 98.6 °F (37 °C)  Pulse:  [] 72  Resp:  [16-20] 18  SpO2:  [92 %-95 %] 95 %  BP: (131-157)/(69-87) 151/83     Weight: 91.2 kg (201 lb)  Height: 5' 10" (177.8 cm)  Body mass index is 28.84 kg/m².      Intake/Output Summary (Last 24 hours) at 8/16/2024 0756  Last data filed at 8/16/2024 0151  Gross per 24 hour   Intake 1699 ml   Output 280 ml   Net 1419 ml        Ortho/SPM Exam     AAOx4  NAD  Reg rate  No increased WOB    RLE    Swelling present over the knee  Compartments soft/compressible  SILT throughout  AROM of toes, ankle, intact.  WWP. Brisk cap refill            Significant Labs: All pertinent labs within the past 24 hours have been reviewed.    Significant Imaging: I have reviewed and interpreted all pertinent imaging results/findings.  Assessment/Plan:     * Septic joint of right knee joint  King Knightlola Wetzel is a 69 y.o. male presenting with Right knee septic arthritis. Plan for OR today for arthroscopic vs open I&D. He is booked, marked and consented for surgery.    - " WBAT  - DVT Prophylaxis: SCDs at all times while in bed  - Pain control: multimodal pain management              Anders Allan MD  Orthopedics  [facility]

## 2024-08-16 NOTE — OP NOTE
OP NOTE    DOS:  08/16/2024    Preop Dx: Septic arthritis right knee    Pseudogout right knee    Osteoarthritis right knee    Postop Dx: Septic arthritis right knee    Pseudogout right knee    Extensive synovitis right knee    Osteoarthritis right knee    Procedure: Arthroscopic irrigation with extensive debridement right knee    Surgeon: Isaac Alfaro M.D.    Asst:  Robert Diehl MD, M.D    Anesthesia: GETA    EBL:  Minimal    IVF:  1000 cc crystalloid    Implants: None    Specimens: None (adequate aspiration taken prior to antibiotics and surgery)    Findings: Significant amounts of thick viscous fluid consistent with septic arthritis and/or CPPD    Dispo:  To PACU extubated/stable       Indications for Procedure:      69-year-old male had knee pain.  He has a long history of arthritis and prior knee arthroscopy years ago.  He presented to his primary care physician and and intra-articular steroid injection was performed.  Patient subsequently had significant increase in swelling and pain and presented to our emergency department.  RRR aspiration reveals 143,000 white blood cells with positive calcium pyrophosphate crystals.  Given that extremely large number of white blood cells and his hemoglobin A1c of 11, we have to presume this to be superimposed infection.  We are proceeding to the operating room for arthroscopic irrigation and synovectomy/debridement.  The risks, benefits and alternatives to surgery were discussed with the patient prior to going the operating room.  Informed consent was obtained.    Procedure in Detail:    Patient was identified in preoperative holding area the site was marked.  Patient was wheeled to the operating room placed on the operating table in supine position.  General endotracheal anesthesia was induced.  The right lower extremity was placed in an arthroscopic knee hanley in the left over a padded bump.  The hips were flexed to take pressure off of the hip flexors.  The right  lower extremity was prepped and draped in sterile fashion.  A time-out was undertaken to confirm patient, side, site, surgery, surgeon and administration of preoperative antibiotics.  All agreed we proceeded.      I established an inferolateral portal with an 11 blade.  Trocar was placed.  A significant amount of thick fluid was liberated from the knee.  An attempt was made to place this in the suprapatellar space but this was a very tightened arthritic knee.  We started in the notch.  I developed a an inferomedial portal under direct visualization.  There was significant synovitis in the notch and in the anterior portions of the medial and lateral joint line.  Arthroscopic shaver combined with the arthroscopic ablator was used to remove all of the inflamed synovium.  From the anterior portion of the knee and from the medial and lateral joint lines around into the gutters.    I was able to get somewhat into the medial joint line which was quite arthritic.  Some more synovium was removed from here.  He had bone-on-bone changes in this area and a significantly damaged meniscus with signs of prior partial meniscectomy.    Moving over the lateral joint line I was able to enter this also minimally.  Some more synovium was removed with the shaver and ablator.  He had a large osteophyte on the inferior pole of the patella which I removed with the arthroscopic shaver and bur mode and then used the ablator on the end of this to prevent any bleeding.  I was able at this point get slightly into the suprapatellar space and irrigate this thoroughly.      In all 12 L of normal saline solution were run through the knee.  The inflow was turned off and the excess fluid was suctioned from the knee.  Arthroscopic instruments were then removed.  The portal sites were closed with 3-0 nylon suture in figure-of-eight fashion.  A combination of 2 g cefepime 1 g vancomycin powder diluted in 10 cc of saline were then injected into the knee.   Sterile dressings were then applied.      All instrument and sponge counts were reported correct at the end of the case.  There were no complications.  The patient was extubated, awakened and taken to the recovery room in stable condition.      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  We will treat with broad-spectrum antibiotics pending culture results.  Activity as tolerated.    Isaac Alfaro MD
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
## 2024-08-16 NOTE — ASSESSMENT & PLAN NOTE
Chronic anticoagulation   Patient with Paroxysmal (<7 days) atrial fibrillation which is controlled currently with Beta Blocker. Patient is currently in sinus rhythm.[id number] Score: 3. Anticoagulation indicated. Anticoagulation done with eliquis .  -Hold eliquis for OR today and resuem post op  Per OP cards notes, if has any reoccurent nosebleeds on eliquis then plan to consider watchman long term as had hx of nosebleeds in past on anticoagulation

## 2024-08-16 NOTE — NURSING
Nurses Note -- 4 Eyes      8/16/2024   3:41 PM      Skin assessed during: Admit      [x] No Altered Skin Integrity Present    [x]Prevention Measures Documented      [] Yes- Altered Skin Integrity Present or Discovered   [] LDA Added if Not in Epic (Describe Wound)   [] New Altered Skin Integrity was Present on Admit and Documented in LDA   [] Wound Image Taken    Wound Care Consulted? No    Attending Nurse:  Anette Aponte RN/Staff Member:   Rose

## 2024-08-16 NOTE — SUBJECTIVE & OBJECTIVE
Principal Problem:Septic joint of right knee joint    Principal Orthopedic Problem: as above    Interval History: Pt seen and examined at bedside. VSSBrain COTA. Reports pain and swellingofthe knee. Reports no new symptoms. Denies fevers or chill.      Review of patient's allergies indicates:   Allergen Reactions    Tamiflu [oseltamivir]      Increases BP    Metformin Hives     Diarrhea, nausea    Penicillins Rash       Current Facility-Administered Medications   Medication    acetaminophen tablet 1,000 mg    atorvastatin tablet 80 mg    ceFEPIme (MAXIPIME) 2 g in D5W 100 mL IVPB (MB+)    dextrose 10% bolus 125 mL 125 mL    dextrose 10% bolus 250 mL 250 mL    gabapentin capsule 300 mg    glucagon (human recombinant) injection 1 mg    glucose chewable tablet 16 g    glucose chewable tablet 24 g    HYDROmorphone injection 0.2 mg    insulin aspart U-100 pen 0-5 Units    insulin glargine U-100 (Lantus) pen 10 Units    losartan tablet 25 mg    melatonin tablet 6 mg    methocarbamoL tablet 750 mg    metoprolol succinate (TOPROL-XL) 24 hr tablet 25 mg    multivitamin tablet    naloxone 0.4 mg/mL injection 0.02 mg    omega-3 acid ethyl esters capsule 1 g    ondansetron injection 4 mg    oxyCODONE immediate release tablet 5 mg    oxyCODONE immediate release tablet Tab 10 mg    sodium chloride 0.9% flush 10 mL    vancomycin - pharmacy to dose    vancomycin 1,500 mg in D5W 250 mL IVPB (Vial-Mate)     Current Outpatient Medications   Medication Sig    alcohol swabs (DROPSAFE ALCOHOL PREP PADS) PadM USE AS DIRECTED AS NEEDED    apixaban (ELIQUIS) 5 mg Tab Take 1 tablet (5 mg total) by mouth 2 (two) times daily.    ASCORBATE CALCIUM (VITAMIN C ORAL) Take by mouth once daily.     aspirin (ECOTRIN) 81 MG EC tablet Take 81 mg by mouth once daily.    atorvastatin (LIPITOR) 80 MG tablet Take 1 tablet (80 mg total) by mouth every evening.    blood sugar diagnostic Strp 1 strip by Misc.(Non-Drug; Combo Route) route 3 (three) times daily.     blood-glucose meter kit Use as instructed    blood-glucose meter kit 1 each by Other route 3 (three) times daily. Use as instructed    diclofenac sodium (VOLTAREN) 1 % Gel Apply 2 g topically once daily.    doxycycline (VIBRA-TABS) 100 MG tablet Take 1 tablet (100 mg total) by mouth 2 (two) times daily.    empagliflozin (JARDIANCE) 25 mg tablet Take 1 tablet (25 mg total) by mouth once daily.    fish oil-omega-3 fatty acids 300-1,000 mg capsule Take 2 g by mouth once daily.    gabapentin (NEURONTIN) 300 MG capsule Take 1 capsule (300 mg total) by mouth 3 (three) times daily.    glimepiride (AMARYL) 4 MG tablet Take 1 tablet (4 mg total) by mouth 2 (two) times a day.    [START ON 10/13/2024] HYDROcodone-acetaminophen (NORCO)  mg per tablet Take 1 tablet by mouth 3 (three) times daily as needed (pain).    [START ON 9/13/2024] HYDROcodone-acetaminophen (NORCO)  mg per tablet Take 1 tablet by mouth 3 (three) times daily as needed (pain).    HYDROcodone-acetaminophen (NORCO)  mg per tablet Take 1 tablet by mouth 3 (three) times daily as needed (pain).    hydrocortisone 2.5 % cream Apply topically 2 (two) times daily.    lancets Misc 1 application  by Misc.(Non-Drug; Combo Route) route 3 (three) times daily.    loratadine (CLARITIN) 10 mg tablet Take 1 tablet (10 mg total) by mouth once daily.    losartan (COZAAR) 25 MG tablet Take 1 tablet (25 mg total) by mouth once daily.    meloxicam (MOBIC) 7.5 MG tablet Take 1 tablet (7.5 mg total) by mouth once daily.    metoprolol succinate (TOPROL-XL) 25 MG 24 hr tablet Take 1 tablet (25 mg total) by mouth once daily.    multivitamin with minerals tablet Take 1 tablet by mouth once daily.    neomycin-polymyxin-hydrocortisone (CORTISPORIN) 3.5-10,000-1 mg/mL-unit/mL-% otic suspension PLACE 3 DROPS INTO THE LEFT EAR 4 TIMES DAILY.    semaglutide (OZEMPIC) 0.25 mg or 0.5 mg (2 mg/3 mL) pen injector Inject 0.5 mg into the skin every 7 days.    sodium chloride  "(SALINE NASAL) 0.65 % nasal spray 2 sprays by Nasal route 2 (two) times a day.    triamcinolone acetonide 0.1% (KENALOG) 0.1 % ointment Apply topically 2 (two) times daily as needed.     Facility-Administered Medications Ordered in Other Encounters   Medication    triamcinolone acetonide injection 40 mg     Objective:     Vital Signs (Most Recent):  Temp: 98.6 °F (37 °C) (08/16/24 0617)  Pulse: 72 (08/16/24 0617)  Resp: 18 (08/16/24 0617)  BP: (!) 151/83 (08/16/24 0617)  SpO2: 95 % (08/16/24 0617) Vital Signs (24h Range):  Temp:  [98.1 °F (36.7 °C)-99.1 °F (37.3 °C)] 98.6 °F (37 °C)  Pulse:  [] 72  Resp:  [16-20] 18  SpO2:  [92 %-95 %] 95 %  BP: (131-157)/(69-87) 151/83     Weight: 91.2 kg (201 lb)  Height: 5' 10" (177.8 cm)  Body mass index is 28.84 kg/m².      Intake/Output Summary (Last 24 hours) at 8/16/2024 0756  Last data filed at 8/16/2024 0151  Gross per 24 hour   Intake 1699 ml   Output 280 ml   Net 1419 ml        Ortho/SPM Exam     AAOx4  NAD  Reg rate  No increased WOB    RLE    Swelling present over the knee  Compartments soft/compressible  SILT throughout  AROM of toes, ankle, intact.  WWP. Brisk cap refill            Significant Labs: All pertinent labs within the past 24 hours have been reviewed.    Significant Imaging: I have reviewed and interpreted all pertinent imaging results/findings.  "

## 2024-08-16 NOTE — PROCEDURES
Large Joint Aspiration/Injection: R knee    Date/Time: 8/15/2024 9:30 AM    Performed by: Timoteo Sandoval NP  Authorized by: Timoteo Sandoval NP    Consent Done?:  Yes (Verbal)  Indications:  Joint swelling and pain  Site marked: the procedure site was marked    Timeout: prior to procedure the correct patient, procedure, and site was verified      Local anesthesia used?: Yes    Local anesthetic:  Lidocaine spray    Details:  Needle Size:  22 G  Ultrasonic Guidance for needle placement?: No    Approach:  Anterolateral  Location:  Knee  Site:  R knee  Aspirate amount (mL):  35  Aspirate:  Yellow and cloudy  Lab: fluid sent for laboratory analysis    Patient tolerance:  Patient tolerated the procedure well with no immediate complications

## 2024-08-16 NOTE — ASSESSMENT & PLAN NOTE
King Cool Jr. is a 69 y.o. male presenting with Right knee septic arthritis. Plan for OR today for arthroscopic vs open I&D. He is booked, marked and consented for surgery.    - WBAT  - DVT Prophylaxis: SCDs at all times while in bed  - Pain control: multimodal pain management

## 2024-08-16 NOTE — ASSESSMENT & PLAN NOTE
Patient with known CAD s/p stent placement, which is controlled Will continue Statin and monitor for S/Sx of angina/ACS. Continue to monitor on telemetry.   -Hold ASA until post OR  [facility] outpatient cards notes per dr macias, has known mid LAD lesion per cath in 2023, if symptoms develop freddy consider PCI long term.

## 2024-08-16 NOTE — TRANSFER OF CARE
"Anesthesia Transfer of Care Note    Patient: King Cool Jr.    Procedure(s) Performed: Procedure(s) (LRB):  ARTHROSCOPY, KNEE, WITH DEBRIDEMENT (Right)    Patient location: PACU    Anesthesia Type: general    Transport from OR: Transported from OR on 6-10 L/min O2 by face mask with adequate spontaneous ventilation    Post pain: adequate analgesia    Post assessment: no apparent anesthetic complications    Post vital signs: stable    Level of consciousness: sedated    Nausea/Vomiting: no nausea/vomiting    Complications: none    Transfer of care protocol was followed    Last vitals: Visit Vitals  /75 (BP Location: Right arm, Patient Position: Lying)   Pulse 74   Temp 36.6 °C (97.9 °F) (Temporal)   Resp 18   Ht 5' 10" (1.778 m)   Wt 91.2 kg (201 lb)   SpO2 97%   BMI 28.84 kg/m²     "

## 2024-08-16 NOTE — NURSING
Patient arrived to room 527 via hospital bed, family present at bedside, VS taken and documented, admission documentation completed, SCD's applied, instructed on IS use, oriented to room and equipment, allowed time for questions, all questions answered, no further concerns voiced at this time, safety measures in place, call bell with in reach, instructed to call with any needs, verbalized understanding, continue current plan of care.

## 2024-08-16 NOTE — ANESTHESIA PREPROCEDURE EVALUATION
08/16/2024  King Cool Jr. is a 69 y.o., male.      Pre-op Assessment    I have reviewed the Patient Summary Reports.    I have reviewed the NPO Status.   I have reviewed the Medications.     Review of Systems  Anesthesia Hx:  No problems with previous Anesthesia   History of prior surgery of interest to airway management or planning:            Denies Personal Hx of Anesthesia complications.                    Cardiovascular:     Hypertension   CAD  asymptomatic                                      Pulmonary:  Pulmonary Normal                       Hepatic/GI:  Hepatic/GI Normal                 Musculoskeletal:  Arthritis               Neurological:  Neurology Normal                                      Endocrine:  Diabetes, poorly controlled   A1C>11            Physical Exam  General: Well nourished, Cooperative, Alert and Oriented    Airway:  Mallampati: III / II  Mouth Opening: Normal  TM Distance: Normal  Tongue: Normal  Neck ROM: Normal ROM    Dental:  Dentures        Anesthesia Plan  Type of Anesthesia, risks & benefits discussed:    Anesthesia Type: Gen ETT  Intra-op Monitoring Plan: Standard ASA Monitors  Post Op Pain Control Plan: multimodal analgesia  Induction:  IV  Airway Plan: Direct, Post-Induction  Informed Consent: Informed consent signed with the Patient and all parties understand the risks and agree with anesthesia plan.  All questions answered.   ASA Score: 3    Ready For Surgery From Anesthesia Perspective.     .

## 2024-08-16 NOTE — NURSING TRANSFER
..Nursing Transfer Note      Reason patient is being transferred: post procedure    Transfer To: 527    Transfer via bed    Transfer with 2L NC to O2, cardiac monitoring    Transported by pct and transport    Medicines sent: none    Any special needs or follow-up needed: routine    Chart send with patient: Yes    Notified: daughter    Patient reassessed at: 0739 8/16/2024

## 2024-08-16 NOTE — PROGRESS NOTES
Markos Orozco - Surgery ([facility])  [facility] Medicine  Progress Note    Patient Name: King Cool Jr.  MRN: [id number]  Patient Class: IP- Inpatient   Admission Date: 8/15/2024  Length of Stay: 1 days  Attending Physician: Lorna Galvna MD  Primary Care Provider: Gallo Lara MD        Subjective:     Principal Problem:Septic joint of right knee joint        HPI:  King Cool Jr. is a 69 y.o. male with a PMHx of CAD, a fib on eliquis, HTN, HLD, DM2, and osteoarthritis who presents to the ED from orthopedic clinic for evaluation of septic right knee. The patient reports he has a history of OA in his right knee and receives intra articular steroid injections every 3 months. His last injection was 8/13. He reports beginning yesterday he noticed increased swelling and pain to his right knee. He notes it was so painful he was unable to get out of bed this morning. He denies any trauma or falls. He states at baseline he ambulates unassisted. Denies numbness/tingling. He was seen in ortho clinic today and had an arthrocentesis which showed 717900 WBCs and positive crystals. R knee xray showed severe osteoarthritis. He denies fever/chills, N/V/D, abdominal pain, CP, SOB, cough, or dysuria.    In the ED, pt tachycardic and mildly hypertensive otherwise vitals stable, afebrile. WBC 12.09k. Glucose 330. Tbili 1.1. LA 1.5. Blood cxs in process. The patient received morphine, zofran, 1L NS, cefepime, and vancomycin. Ortho was consulted and recommend continuing NPO with plan for OR tomorrow for I&D right knee.     Overview/Hospital Course:  No notes on file    Interval history- seen in obs unit with wife at bedside right before transport was taking him to the OR as they were in hallway and planning to take him to the pre op area shortly. He reports had the recent injection of the knee with steriods he was receiving regularly and then the knee had significant swellign after and had never had swellign like this until the recent  injection with presumed possible nidus for entry portal for infection. Has no history of gout with crystals seen on the initial tap but with very high of wbc count at 143K in knee concern for infection with CRP of 144. Arthrotomy of knee with ortho today and washout and will f/u further and likely consult ID post op for further titration of antibiotics, was started overnight on antibtioic on admit which will unforutnately likely lower the culture yield. Glucose uncontrolled on admit in 269-330 and started on home insulin regimen, he reports at home are not this high and likely infection driving up as well and will need to titrate insulin further post op and plan for DM diet post op. Has CAD hsitory with mid LAD lesion that is being followed by cards outpatient and their last note reports that if symptoms develop then they would plan to PCI this area per recent notes as has known disease there per dr macias with cards notes.      Review of patient's allergies indicates:   Allergen Reactions    Tamiflu [oseltamivir]      Increases BP    Metformin Hives     Diarrhea, nausea    Penicillins Rash       Current Facility-Administered Medications on File Prior to Encounter   Medication    triamcinolone acetonide injection 40 mg     Current Outpatient Medications on File Prior to Encounter   Medication Sig    alcohol swabs (DROPSAFE ALCOHOL PREP PADS) PadM USE AS DIRECTED AS NEEDED    apixaban (ELIQUIS) 5 mg Tab Take 1 tablet (5 mg total) by mouth 2 (two) times daily.    ASCORBATE CALCIUM (VITAMIN C ORAL) Take by mouth once daily.     aspirin (ECOTRIN) 81 MG EC tablet Take 81 mg by mouth once daily.    atorvastatin (LIPITOR) 80 MG tablet Take 1 tablet (80 mg total) by mouth every evening.    blood sugar diagnostic Strp 1 strip by Misc.(Non-Drug; Combo Route) route 3 (three) times daily.    blood-glucose meter kit Use as instructed    blood-glucose meter kit 1 each by Other route 3 (three) times daily. Use as instructed     diclofenac sodium (VOLTAREN) 1 % Gel Apply 2 g topically once daily.    doxycycline (VIBRA-TABS) 100 MG tablet Take 1 tablet (100 mg total) by mouth 2 (two) times daily.    empagliflozin (JARDIANCE) 25 mg tablet Take 1 tablet (25 mg total) by mouth once daily.    fish oil-omega-3 fatty acids 300-1,000 mg capsule Take 2 g by mouth once daily.    gabapentin (NEURONTIN) 300 MG capsule Take 1 capsule (300 mg total) by mouth 3 (three) times daily.    glimepiride (AMARYL) 4 MG tablet Take 1 tablet (4 mg total) by mouth 2 (two) times a day.    [START ON 10/13/2024] HYDROcodone-acetaminophen (NORCO)  mg per tablet Take 1 tablet by mouth 3 (three) times daily as needed (pain).    [START ON 9/13/2024] HYDROcodone-acetaminophen (NORCO)  mg per tablet Take 1 tablet by mouth 3 (three) times daily as needed (pain).    HYDROcodone-acetaminophen (NORCO)  mg per tablet Take 1 tablet by mouth 3 (three) times daily as needed (pain).    hydrocortisone 2.5 % cream Apply topically 2 (two) times daily.    lancets Misc 1 application  by Misc.(Non-Drug; Combo Route) route 3 (three) times daily.    loratadine (CLARITIN) 10 mg tablet Take 1 tablet (10 mg total) by mouth once daily.    losartan (COZAAR) 25 MG tablet Take 1 tablet (25 mg total) by mouth once daily.    meloxicam (MOBIC) 7.5 MG tablet Take 1 tablet (7.5 mg total) by mouth once daily.    metoprolol succinate (TOPROL-XL) 25 MG 24 hr tablet Take 1 tablet (25 mg total) by mouth once daily.    multivitamin with minerals tablet Take 1 tablet by mouth once daily.    neomycin-polymyxin-hydrocortisone (CORTISPORIN) 3.5-10,000-1 mg/mL-unit/mL-% otic suspension PLACE 3 DROPS INTO THE LEFT EAR 4 TIMES DAILY.    semaglutide (OZEMPIC) 0.25 mg or 0.5 mg (2 mg/3 mL) pen injector Inject 0.5 mg into the skin every 7 days.    sodium chloride (SALINE NASAL) 0.65 % nasal spray 2 sprays by Nasal route 2 (two) times a day.    triamcinolone acetonide 0.1% (KENALOG) 0.1 % ointment Apply  topically 2 (two) times daily as needed.     Family History       Problem Relation (Age of Onset)    Blindness Mother    Diabetes Brother    No Known Problems Father, Brother, Daughter, Son, Daughter, Brother          Tobacco Use    Smoking status: Former     Current packs/day: 0.00     Average packs/day: 0.3 packs/day for 4.0 years (1.0 ttl pk-yrs)     Types: Cigarettes     Start date: 1972     Quit date: 1976     Years since quittin.7    Smokeless tobacco: Never   Substance and Sexual Activity    Alcohol use: Yes     Comment: occ    Drug use: No    Sexual activity: Yes     Partners: Female     Birth control/protection: None     Review of Systems   Constitutional:  Negative for appetite change, chills, diaphoresis, fatigue and fever.   HENT:  Negative for congestion, rhinorrhea and sore throat.    Eyes:  Negative for photophobia and visual disturbance.   Respiratory:  Negative for cough, shortness of breath and wheezing.    Cardiovascular:  Negative for chest pain, palpitations and leg swelling.   Gastrointestinal:  Negative for abdominal distention, abdominal pain, diarrhea, nausea and vomiting.   Genitourinary:  Negative for dysuria, frequency and hematuria.   Musculoskeletal:  Positive for arthralgias (right knee), gait problem and joint swelling. Negative for neck pain.   Skin:  Negative for color change, pallor, rash and wound.   Neurological:  Negative for tremors, syncope, light-headedness, numbness and headaches.   Psychiatric/Behavioral:  Negative for confusion and hallucinations. The patient is not nervous/anxious.      Objective:     Vital Signs (Most Recent):  Temp: 97.9 °F (36.6 °C) (24 1014)  Pulse: 74 (24 1014)  Resp: 18 (24 1100)  BP: 131/75 (24 1014)  SpO2: 97 % (24 1014) Vital Signs (24h Range):  Temp:  [97.9 °F (36.6 °C)-99.1 °F (37.3 °C)] 97.9 °F (36.6 °C)  Pulse:  [] 74  Resp:  [16-20] 18  SpO2:  [92 %-97 %] 97 %  BP: (131-158)/(69-87) 131/75      Weight: 91.2 kg (201 lb)  Body mass index is 28.84 kg/m².     Physical Exam  Vitals and nursing note reviewed.   Constitutional:       General: He is not in acute distress.     Appearance: He is not toxic-appearing or diaphoretic.   HENT:      Head: Normocephalic and atraumatic.      Nose: Nose normal.      Mouth/Throat:      Mouth: Mucous membranes are moist.   Eyes:      Pupils: Pupils are equal, round, and reactive to light.   Cardiovascular:      Rate and Rhythm: Normal rate and regular rhythm.      Pulses: Normal pulses.           Dorsalis pedis pulses are 2+ on the right side.        Posterior tibial pulses are 2+ on the right side.   Pulmonary:      Effort: Pulmonary effort is normal. No respiratory distress.      Breath sounds: No wheezing, rhonchi or rales.   Abdominal:      General: Bowel sounds are normal. There is no distension.      Palpations: Abdomen is soft.      Tenderness: There is no abdominal tenderness. There is no guarding.   Musculoskeletal:      Cervical back: Normal range of motion.      Right knee: Swelling present. Decreased range of motion. Tenderness present.      Comments: Clear swelling to right knee with bandaid from prev tap   Skin:     General: Skin is warm and dry.      Capillary Refill: Capillary refill takes less than 2 seconds.   Neurological:      General: No focal deficit present.      Mental Status: He is alert.      Sensory: No sensory deficit.      Motor: No weakness.   Psychiatric:         Mood and Affect: Mood normal.         Behavior: Behavior normal.              CRANIAL NERVES     CN III, IV, VI   Pupils are equal, round, and reactive to light.       Significant Labs: All pertinent labs within the past 24 hours have been reviewed.  CBC:   Recent Labs   Lab 08/15/24  1700 08/16/24  0401   WBC 12.09 9.83   HGB 15.6 13.4*   HCT 45.0 41.0    197     CMP:   Recent Labs   Lab 08/15/24  1700 08/16/24  0401    135*   K 4.2 4.2    103   CO2 24 23   GLU  330* 269*   BUN 17 17   CREATININE 1.0 0.9   CALCIUM 10.0 8.9   PROT 7.7 6.5   ALBUMIN 4.0 3.2*   BILITOT 1.1* 1.1*   ALKPHOS 115 94   AST 12 12   ALT 19 15   ANIONGAP 12 9     Lactic Acid:   Recent Labs   Lab 08/15/24  1700   LACTATE 1.5       Significant Imaging: I have reviewed all pertinent imaging results/findings within the past 24 hours.        Assessment/Plan:      * Septic joint of right knee joint  -on chronic right knee injections for OA and had recent injection the 13th then swelling after that was not typical for him with presumed nidus with entry portal,s aw ortho in clinic for this with concern for infection. afebrile, WBC 12.09  -LA 1.5 with crp 144 on admit  -blood CX pending  -Arthrocentesis of right knee with 117387 WBCs and positive crystals but no CX sent, started on vanc/cefepime on admit  -OR today for I and D with ortho and will f/u recs post op and likely ID consult further once Cx taken.  -MM pain control.  -Elevate RLE.  -Fall precautions.  -resume eliquis and asa post op pending ortho recs as well as PT/OT pendign recs    Coronary artery disease involving native coronary artery of native heart without angina pectoris  Patient with known CAD s/p stent placement, which is controlled Will continue Statin and monitor for S/Sx of angina/ACS. Continue to monitor on telemetry.   -Hold ASA until post OR  [facility] outpatient cards notes per dr macias, has known mid LAD lesion per cath in 2023, if symptoms develop freddy consider PCI long term.    PAF (paroxysmal atrial fibrillation)  Chronic anticoagulation   Patient with Paroxysmal (<7 days) atrial fibrillation which is controlled currently with Beta Blocker. Patient is currently in sinus rhythm.[id number] Score: 3. Anticoagulation indicated. Anticoagulation done with eliquis .  -Hold eliquis for OR today and resuem post op  Per OP cards notes, if has any reoccurent nosebleeds on eliquis then plan to consider watchman long term as had hx of  nosebleeds in past on anticoagulation    Class 1 obesity due to excess calories with serious comorbidity and body mass index (BMI) of 33.0 to 33.9 in adult  Body mass index is 28.84 kg/m². Obesity complicates all aspects of disease management from diagnostic modalities to treatment.     Essential (primary) hypertension  Chronic, controlled. Latest blood pressure and vitals reviewed-     Temp:  [98.6 °F (37 °C)]   Pulse:  [101]   Resp:  [18-20]   BP: (153)/(87)   SpO2:  [95 %] .   Home meds for hypertension were reviewed and noted below.   Hypertension Medications               losartan (COZAAR) 25 MG tablet Take 1 tablet (25 mg total) by mouth once daily.    metoprolol succinate (TOPROL-XL) 25 MG 24 hr tablet Take 1 tablet (25 mg total) by mouth once daily.            While in the hospital, will manage blood pressure as follows; Continue home antihypertensive regimen    Will utilize p.r.n. blood pressure medication only if patient's blood pressure greater than 180/110 and he develops symptoms such as worsening chest pain or shortness of breath.    Type 2 diabetes mellitus with microalbuminuria, without long-term current use of insulin  Patient's FSGs are uncontrolled due to hyperglycemia on current medication regimen.  Last A1c reviewed-   Lab Results   Component Value Date    HGBA1C 11.5 (H) 08/13/2024     Most recent fingerstick glucose reviewed-   Recent Labs   Lab 08/15/24  2146 08/16/24  0121   POCTGLUCOSE 370* 302*     Current correctional scale  Low  Maintain anti-hyperglycemic dose as follows-   Antihyperglycemics (From admission, onward)      Start     Stop Route Frequency Ordered    08/15/24 2100  insulin glargine U-100 (Lantus) pen 10 Units         -- SubQ 2 times daily 08/15/24 1827    08/15/24 1917  insulin aspart U-100 pen 0-5 Units         -- SubQ Before meals & nightly PRN 08/15/24 1817          Hold Oral hypoglycemics while patient is in the hospital.   -Accuchecks AC/HS  -Diabetic/cardiac  diet    Glucose uncontrolled on admit with values 269-330 since admit, and placed on home regimen, he reports doesn't run this high at home, and so will titrate post op and infection likely also worsening glucose      VTE Risk Mitigation (From admission, onward)           Ordered     IP VTE LOW RISK PATIENT  Once         08/15/24 1817     Place sequential compression device  Until discontinued         08/15/24 1817                    Discharge Planning   JUAN M: 8/19/2024     Code Status: Full Code   Is the patient medically ready for discharge?:     Reason for patient still in hospital (select all that apply): Patient trending condition                     Lorna Galvan MD  Department of Hospital Medicine   [facility] - Surgery (2nd Fl)

## 2024-08-16 NOTE — ANESTHESIA PROCEDURE NOTES
Intubation    Date/Time: 8/16/2024 11:26 AM    Performed by: Jan Singleton CRNA  Authorized by: Natasha Howard MD    Intubation:     Induction:  Intravenous    Intubated:  Postinduction    Mask Ventilation:  Easy mask    Attempts:  1    Attempted By:  Other (see comments) (ER Resident)    Method of Intubation:  Video laryngoscopy    Blade:  Interiano 3    Laryngeal View Grade: Grade I - full view of cords      Difficult Airway Encountered?: No      Complications:  None    Airway Device:  Oral endotracheal tube    Airway Device Size:  7.5    Style/Cuff Inflation:  Cuffed (inflated to minimal occlusive pressure)    Inflation Amount (mL):  4    Tube secured:  21    Secured at:  The lips    Placement Verified By:  Capnometry    Complicating Factors:  None    Findings Post-Intubation:  BS equal bilateral and atraumatic/condition of teeth unchanged

## 2024-08-16 NOTE — ASSESSMENT & PLAN NOTE
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 afebrile, WBC 12.09  -LA 1.5 with crp 144 on admit  -blood CX pending  -Arthrocentesis of right knee with 207023 WBCs and positive crystals but no CX sent, started on vanc/cefepime on admit  -OR today for I and D with ortho and will f/u recs post op and likely ID consult further once Cx taken.  -MM pain control.  -Elevate RLE.  -Fall precautions.  -resume eliquis and asa post op pending ortho recs as well as PT/OT pendign recs

## 2024-08-16 NOTE — ASSESSMENT & PLAN NOTE
Patient's FSGs are uncontrolled due to hyperglycemia on current medication regimen.  Last A1c reviewed-   Lab Results   Component Value Date    HGBA1C 11.5 (H) 08/13/2024     Most recent fingerstick glucose reviewed-   Recent Labs   Lab 08/15/24  2146 08/16/24  0121   POCTGLUCOSE 370* 302*     Current correctional scale  Low  Maintain anti-hyperglycemic dose as follows-   Antihyperglycemics (From admission, onward)      Start     Stop Route Frequency Ordered    08/15/24 2100  insulin glargine U-100 (Lantus) pen 10 Units         -- SubQ 2 times daily 08/15/24 1827    08/15/24 1917  insulin aspart U-100 pen 0-5 Units         -- SubQ Before meals & nightly PRN 08/15/24 1817          Hold Oral hypoglycemics while patient is in the hospital.   -Accuchecks AC/HS  -Diabetic/cardiac diet    Glucose uncontrolled on admit with values 269-330 since admit, and placed on home regimen, he reports doesn't run this high at home, and so will titrate post op and infection likely also worsening glucose

## 2024-08-16 NOTE — SUBJECTIVE & OBJECTIVE
Interval history- seen in obs unit with wife at bedside right before transport was taking him to the OR as they were in hallway and planning to take him to the pre op area shortly. He reports had the recent injection of the knee with steriods he was receiving regularly and then the knee had significant swellign after and had never had swellign like this until the recent injection with presumed possible nidus for entry portal for infection. Has no history of gout with crystals seen on the initial tap but with very high of wbc count at 143K in knee concern for infection with CRP of 144. Arthrotomy of knee with ortho today and washout and will f/u further and likely consult ID post op for further titration of antibiotics, was started overnight on antibtioic on admit which will unforutnately likely lower the culture yield. Glucose uncontrolled on admit in 269-330 and started on home insulin regimen, he reports at home are not this high and likely infection driving up as well and will need to titrate insulin further post op and plan for DM diet post op. Has CAD hsitory with mid LAD lesion that is being followed by [facility] and their last note reports that if symptoms develop then they would plan to PCI this area per recent notes as has known disease there per dr macias with [facility] notes.      Review of patient's allergies indicates:   Allergen Reactions    Tamiflu [oseltamivir]      Increases BP    Metformin Hives     Diarrhea, nausea    Penicillins Rash       Current Facility-Administered Medications on File Prior to Encounter   Medication    triamcinolone acetonide injection 40 mg     Current Outpatient Medications on File Prior to Encounter   Medication Sig    alcohol swabs (DROPSAFE ALCOHOL PREP PADS) PadM USE AS DIRECTED AS NEEDED    apixaban (ELIQUIS) 5 mg Tab Take 1 tablet (5 mg total) by mouth 2 (two) times daily.    ASCORBATE CALCIUM (VITAMIN C ORAL) Take by mouth once daily.     aspirin (ECOTRIN) 81 MG  EC tablet Take 81 mg by mouth once daily.    atorvastatin (LIPITOR) 80 MG tablet Take 1 tablet (80 mg total) by mouth every evening.    blood sugar diagnostic Strp 1 strip by Misc.(Non-Drug; Combo Route) route 3 (three) times daily.    blood-glucose meter kit Use as instructed    blood-glucose meter kit 1 each by Other route 3 (three) times daily. Use as instructed    diclofenac sodium (VOLTAREN) 1 % Gel Apply 2 g topically once daily.    doxycycline (VIBRA-TABS) 100 MG tablet Take 1 tablet (100 mg total) by mouth 2 (two) times daily.    empagliflozin (JARDIANCE) 25 mg tablet Take 1 tablet (25 mg total) by mouth once daily.    fish oil-omega-3 fatty acids 300-1,000 mg capsule Take 2 g by mouth once daily.    gabapentin (NEURONTIN) 300 MG capsule Take 1 capsule (300 mg total) by mouth 3 (three) times daily.    glimepiride (AMARYL) 4 MG tablet Take 1 tablet (4 mg total) by mouth 2 (two) times a day.    [START ON 10/13/2024] HYDROcodone-acetaminophen (NORCO)  mg per tablet Take 1 tablet by mouth 3 (three) times daily as needed (pain).    [START ON 9/13/2024] HYDROcodone-acetaminophen (NORCO)  mg per tablet Take 1 tablet by mouth 3 (three) times daily as needed (pain).    HYDROcodone-acetaminophen (NORCO)  mg per tablet Take 1 tablet by mouth 3 (three) times daily as needed (pain).    hydrocortisone 2.5 % cream Apply topically 2 (two) times daily.    lancets Misc 1 application  by Misc.(Non-Drug; Combo Route) route 3 (three) times daily.    loratadine (CLARITIN) 10 mg tablet Take 1 tablet (10 mg total) by mouth once daily.    losartan (COZAAR) 25 MG tablet Take 1 tablet (25 mg total) by mouth once daily.    meloxicam (MOBIC) 7.5 MG tablet Take 1 tablet (7.5 mg total) by mouth once daily.    metoprolol succinate (TOPROL-XL) 25 MG 24 hr tablet Take 1 tablet (25 mg total) by mouth once daily.    multivitamin with minerals tablet Take 1 tablet by mouth once daily.    neomycin-polymyxin-hydrocortisone  (CORTISPORIN) 3.5-10,000-1 mg/mL-unit/mL-% otic suspension PLACE 3 DROPS INTO THE LEFT EAR 4 TIMES DAILY.    semaglutide (OZEMPIC) 0.25 mg or 0.5 mg (2 mg/3 mL) pen injector Inject 0.5 mg into the skin every 7 days.    sodium chloride (SALINE NASAL) 0.65 % nasal spray 2 sprays by Nasal route 2 (two) times a day.    triamcinolone acetonide 0.1% (KENALOG) 0.1 % ointment Apply topically 2 (two) times daily as needed.     Family History       Problem Relation (Age of Onset)    Blindness Mother    Diabetes Brother    No Known Problems Father, Brother, Daughter, Son, Daughter, Brother          Tobacco Use    Smoking status: Former     Current packs/day: 0.00     Average packs/day: 0.3 packs/day for 4.0 years (1.0 ttl pk-yrs)     Types: Cigarettes     Start date: 1972     Quit date: 1976     Years since quittin.7    Smokeless tobacco: Never   Substance and Sexual Activity    Alcohol use: Yes     Comment: occ    Drug use: No    Sexual activity: Yes     Partners: Female     Birth control/protection: None     Review of Systems   Constitutional:  Negative for appetite change, chills, diaphoresis, fatigue and fever.   HENT:  Negative for congestion, rhinorrhea and sore throat.    Eyes:  Negative for photophobia and visual disturbance.   Respiratory:  Negative for cough, shortness of breath and wheezing.    Cardiovascular:  Negative for chest pain, palpitations and leg swelling.   Gastrointestinal:  Negative for abdominal distention, abdominal pain, diarrhea, nausea and vomiting.   Genitourinary:  Negative for dysuria, frequency and hematuria.   Musculoskeletal:  Positive for arthralgias (right knee), gait problem and joint swelling. Negative for neck pain.   Skin:  Negative for color change, pallor, rash and wound.   Neurological:  Negative for tremors, syncope, light-headedness, numbness and headaches.   Psychiatric/Behavioral:  Negative for confusion and hallucinations. The patient is not nervous/anxious.       Objective:     Vital Signs (Most Recent):  Temp: 97.9 °F (36.6 °C) (08/16/24 1014)  Pulse: 74 (08/16/24 1014)  Resp: 18 (08/16/24 1100)  BP: 131/75 (08/16/24 1014)  SpO2: 97 % (08/16/24 1014) Vital Signs (24h Range):  Temp:  [97.9 °F (36.6 °C)-99.1 °F (37.3 °C)] 97.9 °F (36.6 °C)  Pulse:  [] 74  Resp:  [16-20] 18  SpO2:  [92 %-97 %] 97 %  BP: (131-158)/(69-87) 131/75     Weight: 91.2 kg (201 lb)  Body mass index is 28.84 kg/m².     Physical Exam  Vitals and nursing note reviewed.   Constitutional:       General: He is not in acute distress.     Appearance: He is not toxic-appearing or diaphoretic.   HENT:      Head: Normocephalic and atraumatic.      Nose: Nose normal.      Mouth/Throat:      Mouth: Mucous membranes are moist.   Eyes:      Pupils: Pupils are equal, round, and reactive to light.   Cardiovascular:      Rate and Rhythm: Normal rate and regular rhythm.      Pulses: Normal pulses.           Dorsalis pedis pulses are 2+ on the right side.        Posterior tibial pulses are 2+ on the right side.   Pulmonary:      Effort: Pulmonary effort is normal. No respiratory distress.      Breath sounds: No wheezing, rhonchi or rales.   Abdominal:      General: Bowel sounds are normal. There is no distension.      Palpations: Abdomen is soft.      Tenderness: There is no abdominal tenderness. There is no guarding.   Musculoskeletal:      Cervical back: Normal range of motion.      Right knee: Swelling present. Decreased range of motion. Tenderness present.      Comments: Clear swelling to right knee with bandaid from prev tap   Skin:     General: Skin is warm and dry.      Capillary Refill: Capillary refill takes less than 2 seconds.   Neurological:      General: No focal deficit present.      Mental Status: He is alert.      Sensory: No sensory deficit.      Motor: No weakness.   Psychiatric:         Mood and Affect: Mood normal.         Behavior: Behavior normal.              CRANIAL NERVES     CN III, IV,  VI   Pupils are equal, round, and reactive to light.       Significant Labs: All pertinent labs within the past 24 hours have been reviewed.  CBC:   Recent Labs   Lab 08/15/24  1700 08/16/24  0401   WBC 12.09 9.83   HGB 15.6 13.4*   HCT 45.0 41.0    197     CMP:   Recent Labs   Lab 08/15/24  1700 08/16/24  0401    135*   K 4.2 4.2    103   CO2 24 23   * 269*   BUN 17 17   CREATININE 1.0 0.9   CALCIUM 10.0 8.9   PROT 7.7 6.5   ALBUMIN 4.0 3.2*   BILITOT 1.1* 1.1*   ALKPHOS 115 94   AST 12 12   ALT 19 15   ANIONGAP 12 9     Lactic Acid:   Recent Labs   Lab 08/15/24  1700   LACTATE 1.5       Significant Imaging: I have reviewed all pertinent imaging results/findings within the past 24 hours.

## 2024-08-17 LAB
ALBUMIN SERPL BCP-MCNC: 2.9 G/DL (ref 3.5–5.2)
ALP SERPL-CCNC: 84 U/L (ref 55–135)
ALT SERPL W/O P-5'-P-CCNC: 12 U/L (ref 10–44)
ANION GAP SERPL CALC-SCNC: 11 MMOL/L (ref 8–16)
AST SERPL-CCNC: 12 U/L (ref 10–40)
BILIRUB SERPL-MCNC: 0.8 MG/DL (ref 0.1–1)
BUN SERPL-MCNC: 14 MG/DL (ref 8–23)
CALCIUM SERPL-MCNC: 8.9 MG/DL (ref 8.7–10.5)
CHLORIDE SERPL-SCNC: 103 MMOL/L (ref 95–110)
CO2 SERPL-SCNC: 21 MMOL/L (ref 23–29)
CREAT SERPL-MCNC: 0.8 MG/DL (ref 0.5–1.4)
EST. GFR  (NO RACE VARIABLE): >60 ML/MIN/1.73 M^2
GLUCOSE SERPL-MCNC: 176 MG/DL (ref 70–110)
GRAM STN SPEC: NORMAL
GRAM STN SPEC: NORMAL
MAGNESIUM SERPL-MCNC: 2 MG/DL (ref 1.6–2.6)
POCT GLUCOSE: 217 MG/DL (ref 70–110)
POCT GLUCOSE: 238 MG/DL (ref 70–110)
POCT GLUCOSE: 241 MG/DL (ref 70–110)
POCT GLUCOSE: 246 MG/DL (ref 70–110)
POTASSIUM SERPL-SCNC: 3.9 MMOL/L (ref 3.5–5.1)
PROT SERPL-MCNC: 6.3 G/DL (ref 6–8.4)
SODIUM SERPL-SCNC: 135 MMOL/L (ref 136–145)
VANCOMYCIN TROUGH SERPL-MCNC: 13.1 UG/ML (ref 10–22)

## 2024-08-17 PROCEDURE — 36415 COLL VENOUS BLD VENIPUNCTURE: CPT | Mod: HCNC | Performed by: NURSE PRACTITIONER

## 2024-08-17 PROCEDURE — 97162 PT EVAL MOD COMPLEX 30 MIN: CPT | Mod: HCNC

## 2024-08-17 PROCEDURE — 21400001 HC TELEMETRY ROOM: Mod: HCNC

## 2024-08-17 PROCEDURE — 97165 OT EVAL LOW COMPLEX 30 MIN: CPT | Mod: HCNC

## 2024-08-17 PROCEDURE — 25000003 PHARM REV CODE 250: Mod: HCNC | Performed by: HOSPITALIST

## 2024-08-17 PROCEDURE — 97530 THERAPEUTIC ACTIVITIES: CPT | Mod: HCNC

## 2024-08-17 PROCEDURE — 63600175 PHARM REV CODE 636 W HCPCS: Mod: HCNC | Performed by: HOSPITALIST

## 2024-08-17 PROCEDURE — 99499 UNLISTED E&M SERVICE: CPT | Mod: HCNC,,, | Performed by: NURSE PRACTITIONER

## 2024-08-17 PROCEDURE — 83735 ASSAY OF MAGNESIUM: CPT | Mod: HCNC | Performed by: NURSE PRACTITIONER

## 2024-08-17 PROCEDURE — 87070 CULTURE OTHR SPECIMN AEROBIC: CPT | Mod: HCNC | Performed by: NURSE PRACTITIONER

## 2024-08-17 PROCEDURE — 80202 ASSAY OF VANCOMYCIN: CPT | Mod: HCNC | Performed by: HOSPITALIST

## 2024-08-17 PROCEDURE — 80053 COMPREHEN METABOLIC PANEL: CPT | Mod: HCNC | Performed by: HOSPITALIST

## 2024-08-17 PROCEDURE — 25000003 PHARM REV CODE 250: Mod: HCNC | Performed by: NURSE PRACTITIONER

## 2024-08-17 PROCEDURE — 63600175 PHARM REV CODE 636 W HCPCS: Mod: HCNC | Performed by: NURSE PRACTITIONER

## 2024-08-17 PROCEDURE — 99223 1ST HOSP IP/OBS HIGH 75: CPT | Mod: HCNC,,, | Performed by: NURSE PRACTITIONER

## 2024-08-17 RX ORDER — INSULIN ASPART 100 [IU]/ML
9 INJECTION, SOLUTION INTRAVENOUS; SUBCUTANEOUS
Status: DISCONTINUED | OUTPATIENT
Start: 2024-08-17 | End: 2024-08-18

## 2024-08-17 RX ORDER — INSULIN GLARGINE 100 [IU]/ML
14 INJECTION, SOLUTION SUBCUTANEOUS 2 TIMES DAILY
Status: DISCONTINUED | OUTPATIENT
Start: 2024-08-17 | End: 2024-08-18

## 2024-08-17 RX ADMIN — CEFEPIME 2 G: 2 INJECTION, POWDER, FOR SOLUTION INTRAVENOUS at 09:08

## 2024-08-17 RX ADMIN — OXYCODONE HYDROCHLORIDE 10 MG: 10 TABLET ORAL at 08:08

## 2024-08-17 RX ADMIN — GABAPENTIN 300 MG: 300 CAPSULE ORAL at 08:08

## 2024-08-17 RX ADMIN — APIXABAN 5 MG: 5 TABLET, FILM COATED ORAL at 08:08

## 2024-08-17 RX ADMIN — VANCOMYCIN HYDROCHLORIDE 1750 MG: 500 INJECTION, POWDER, LYOPHILIZED, FOR SOLUTION INTRAVENOUS at 05:08

## 2024-08-17 RX ADMIN — ACETAMINOPHEN 1000 MG: 325 TABLET ORAL at 02:08

## 2024-08-17 RX ADMIN — ASPIRIN 81 MG: 81 TABLET, COATED ORAL at 09:08

## 2024-08-17 RX ADMIN — METHOCARBAMOL 750 MG: 750 TABLET ORAL at 02:08

## 2024-08-17 RX ADMIN — ACETAMINOPHEN 1000 MG: 325 TABLET ORAL at 08:08

## 2024-08-17 RX ADMIN — METHOCARBAMOL 750 MG: 750 TABLET ORAL at 08:08

## 2024-08-17 RX ADMIN — INSULIN ASPART 7 UNITS: 100 INJECTION, SOLUTION INTRAVENOUS; SUBCUTANEOUS at 08:08

## 2024-08-17 RX ADMIN — GABAPENTIN 300 MG: 300 CAPSULE ORAL at 02:08

## 2024-08-17 RX ADMIN — CEFEPIME 2 G: 2 INJECTION, POWDER, FOR SOLUTION INTRAVENOUS at 04:08

## 2024-08-17 RX ADMIN — INSULIN GLARGINE 13 UNITS: 100 INJECTION, SOLUTION SUBCUTANEOUS at 08:08

## 2024-08-17 RX ADMIN — OXYCODONE HYDROCHLORIDE 10 MG: 10 TABLET ORAL at 01:08

## 2024-08-17 RX ADMIN — OMEGA-3-ACID ETHYL ESTERS 1 G: 1 CAPSULE, LIQUID FILLED ORAL at 08:08

## 2024-08-17 RX ADMIN — INSULIN ASPART 7 UNITS: 100 INJECTION, SOLUTION INTRAVENOUS; SUBCUTANEOUS at 01:08

## 2024-08-17 RX ADMIN — ATORVASTATIN CALCIUM 80 MG: 40 TABLET, FILM COATED ORAL at 08:08

## 2024-08-17 RX ADMIN — INSULIN ASPART 2 UNITS: 100 INJECTION, SOLUTION INTRAVENOUS; SUBCUTANEOUS at 08:08

## 2024-08-17 RX ADMIN — INSULIN ASPART 9 UNITS: 100 INJECTION, SOLUTION INTRAVENOUS; SUBCUTANEOUS at 04:08

## 2024-08-17 RX ADMIN — METOPROLOL SUCCINATE 25 MG: 25 TABLET, EXTENDED RELEASE ORAL at 08:08

## 2024-08-17 RX ADMIN — INSULIN ASPART 2 UNITS: 100 INJECTION, SOLUTION INTRAVENOUS; SUBCUTANEOUS at 01:08

## 2024-08-17 RX ADMIN — CEFEPIME 2 G: 2 INJECTION, POWDER, FOR SOLUTION INTRAVENOUS at 01:08

## 2024-08-17 RX ADMIN — VANCOMYCIN HYDROCHLORIDE 1500 MG: 1.5 INJECTION, POWDER, LYOPHILIZED, FOR SOLUTION INTRAVENOUS at 05:08

## 2024-08-17 RX ADMIN — ACETAMINOPHEN 1000 MG: 325 TABLET ORAL at 05:08

## 2024-08-17 RX ADMIN — THERA TABS 1 TABLET: TAB at 08:08

## 2024-08-17 RX ADMIN — LOSARTAN POTASSIUM 25 MG: 25 TABLET, FILM COATED ORAL at 08:08

## 2024-08-17 RX ADMIN — INSULIN GLARGINE 14 UNITS: 100 INJECTION, SOLUTION SUBCUTANEOUS at 08:08

## 2024-08-17 NOTE — PLAN OF CARE
Problem: Physical Therapy  Goal: Physical Therapy Goal  Description: Goals to met by 8/31/2024    1. Gait  x 50 feet with Contact Guard Assistance using Rolling Walker with at least 50% WB on RLE  2. Ascend/Descend 6 inch curb step with Stand-by Assistance using Rolling Walker.  3. Stand for 5 minutes with Stand-by Assistance using Rolling Walker with even WB ANDRE  4. Lower extremity exercise program x15 reps per Instruction, with assistance as needed in order to facilitate improved strength and improvement in functional independence    Rolling Walker- Patient demonstrates a mobility limitation that significantly impairs their ability to participate in one or more mobility related activities of daily living. Patient's mobility limitation cannot be sufficiently resolved with the use of a cane, but can be sufficiently resolved with the use of a rolling walker.The use of a rolling walker will considerably improve their ability to participate in MRADLs. Patient will use the walker on a regular basis at home.      PT Eval: 8/17/2024

## 2024-08-17 NOTE — HPI
King Cool is a 69 year old with CAD, A fib on Eliquis, HTN, DM (A1C 11), and osteoarthritis of the knee who presented from Ortho clinic with concern for septic right knee.  Patient has history of OA in the right knee and receives intraarticular steroid injections every 3 months.  Last injection was on 8/13. He subsequently developed increase pain and swelling.  Was evaluated in Ortho clinic 8/15 and had an arthrocentesis which showed [id number] WBCs and positive calcium pyrophosphate crystals. R knee xray showed severe osteoarthritis. He denied associated fever/chills.  He denies any history of gout.  Denies history of Staph infection or other infections. Denies recent antibiotic use.      In the ED, noted to be tachycardic, afebrile.  WBC 12.  , ESR wnl.   Aspirate cultures - only anaerobic, fungal, and AFB sent.  No aerobic.  Gram stain negative.  He is now s/p I&D with Dr. Alfaro.  No OR cultures available.   He was started on antibiotics prior to surgery. Currently on vancomycin and cefepime. ID consulted for broad spectrum antibiotic regimen.

## 2024-08-17 NOTE — PROGRESS NOTES
Pharmacokinetic Assessment Follow Up: IV Vancomycin    Vancomycin serum concentration assessment(s):    The trough level was drawn correctly and can be used to guide therapy at this time. The measurement is below the desired definitive target range of 15 to 20 mcg/mL.    Vancomycin Regimen Plan:    Change regimen to Vancomycin 1750  mg IV every 12 hours with next serum trough concentration measured at 0500 prior to 4th dose on 8/19    Drug levels (last 3 results):  Recent Labs   Lab Result Units 08/17/24  0329   Vancomycin-Trough ug/mL 13.1       Pharmacy will continue to follow and monitor vancomycin.    Please contact pharmacy at extension [phone] for questions regarding this assessment.    Thank you for the consult,   Tevin Marx       Patient brief summary:  King Cool Jr. is a 69 y.o. male initiated on antimicrobial therapy with IV Vancomycin for treatment of bone/joint infection    Drug Allergies:   Review of patient's allergies indicates:   Allergen Reactions    Tamiflu [oseltamivir]      Increases BP    Metformin Hives     Diarrhea, nausea    Penicillins Rash       Actual Body Weight:   95.4 kg    Renal Function:   Estimated Creatinine Clearance: 102.5 mL/min (based on SCr of 0.8 mg/dL).,     Dialysis Method (if applicable):  N/A

## 2024-08-17 NOTE — PLAN OF CARE
Problem: Occupational Therapy  Goal: Occupational Therapy Goal  Description: Goals to be met by: 8/24/2024     Patient will increase functional independence with ADLs by performing:    UE Dressing with Modified [place].  LE Dressing with Modified [place].  Grooming while standing at sink with Modified [place].  Toileting from toilet with [place] for hygiene and clothing management.   Toilet transfer to toilet with Modified [place].    Outcome: Progressing

## 2024-08-17 NOTE — PROGRESS NOTES
[facility]  Orthopedics  Progress Note    Patient Name: King Cool Jr.  MRN: [id number]  Admission Date: 8/15/2024  Hospital Length of Stay: 2 days  Attending Provider: Lorna Galvan MD  Primary Care Provider: Gallo Lara MD  Follow-up For: Procedure(s) (LRB):  ARTHROSCOPY, KNEE, WITH DEBRIDEMENT (Right)    Post-Operative Day: 1 Day Post-Op  Subjective:     Principal Problem:Septic joint of right knee joint    Principal Orthopedic Problem: as above now s/p R knee arthroscopic I&D 8/16    Interval History: Pt seen and examined at bedside. MARIO COTA. Reports pain and swelling of the knee, slightly improved since yesterday.       Review of patient's allergies indicates:   Allergen Reactions    Tamiflu [oseltamivir]      Increases BP    Metformin Hives     Diarrhea, nausea    Penicillins Rash       Current Facility-Administered Medications   Medication    acetaminophen tablet 1,000 mg    apixaban tablet 5 mg    aspirin EC tablet 81 mg    atorvastatin tablet 80 mg    ceFEPIme (MAXIPIME) 2 g in D5W 100 mL IVPB (MB+)    dextrose 10% bolus 125 mL 125 mL    dextrose 10% bolus 250 mL 250 mL    gabapentin capsule 300 mg    glucagon (human recombinant) injection 1 mg    glucose chewable tablet 16 g    glucose chewable tablet 24 g    HYDROmorphone injection 0.2 mg    insulin aspart U-100 pen 0-5 Units    insulin aspart U-100 pen 7 Units    insulin glargine U-100 (Lantus) pen 13 Units    losartan tablet 25 mg    melatonin tablet 6 mg    methocarbamoL tablet 750 mg    metoprolol succinate (TOPROL-XL) 24 hr tablet 25 mg    multivitamin tablet    naloxone 0.4 mg/mL injection 0.02 mg    omega-3 acid ethyl esters capsule 1 g    ondansetron injection 4 mg    oxyCODONE immediate release tablet 5 mg    oxyCODONE immediate release tablet Tab 10 mg    sodium chloride 0.9% flush 10 mL    vancomycin - pharmacy to dose    vancomycin 1,500 mg in D5W 250 mL IVPB (Vial-Mate)     Facility-Administered Medications Ordered in  "Other Encounters   Medication    triamcinolone acetonide injection 40 mg     Objective:     Vital Signs (Most Recent):  Temp: 98.9 °F (37.2 °C) (08/17/24 0653)  Pulse: 89 (08/17/24 0653)  Resp: 18 (08/17/24 0653)  BP: (!) 141/67 (08/17/24 0653)  SpO2: (!) 91 % (08/17/24 0653) Vital Signs (24h Range):  Temp:  [97.9 °F (36.6 °C)-99.6 °F (37.6 °C)] 98.9 °F (37.2 °C)  Pulse:  [66-89] 89  Resp:  [11-20] 18  SpO2:  [91 %-100 %] 91 %  BP: (124-174)/() 141/67     Weight: 95.4 kg (210 lb 5.1 oz)  Height: 5' 10" (177.8 cm)  Body mass index is 30.18 kg/m².      Intake/Output Summary (Last 24 hours) at 8/17/2024 0725  Last data filed at 8/17/2024 0556  Gross per 24 hour   Intake 900 ml   Output 800 ml   Net 100 ml        Ortho/SPM Exam     AAOx4  NAD  Reg rate  No increased WOB    RLE    Ace wrap over knee  Compartments soft/compressible  SILT throughout  AROM of toes, ankle, intact.  WWP. Brisk cap refill            Significant Labs: All pertinent labs within the past 24 hours have been reviewed.    Significant Imaging: I have reviewed and interpreted all pertinent imaging results/findings.  Assessment/Plan:     * Septic joint of right knee joint  King Cool Jr. is a 69 y.o. male presenting with Right knee septic arthritis. Plan for OR today for arthroscopic vs open I&D. He is booked, marked and consented for surgery.    Now s/p R knee arthroscopic I&D 8/16    - WBAT  - fu cultures   - Abx per ID: vanc [email or web address] cefepime   - Pain control: multimodal pain management    Dispo: will plan to take dressing down tomorrow, repeat CRP, NPO at midnight               JANET Diehl MD  Orthopedics  [facility] - Surgery    "

## 2024-08-17 NOTE — ASSESSMENT & PLAN NOTE
King Travon Wetzel is a 69 y.o. male presenting with Right knee septic arthritis. Plan for OR today for arthroscopic vs open I&D. He is booked, marked and consented for surgery.    Now s/p R knee arthroscopic I&D 8/16    - WBAT  - fu cultures   - Abx per ID: vanc and cefepime   - Pain control: multimodal pain management    Dispo: will plan to take dressing down tomorrow, repeat CRP, NPO at midnight

## 2024-08-17 NOTE — PROGRESS NOTES
[facility] Medicine  Progress Note    Patient Name: King Cool Jr.  MRN: [id number]  Patient Class: IP- Inpatient   Admission Date: 8/15/2024  Length of Stay: 2 days  Attending Physician: Lorna Galvan MD  Primary Care Provider: Gallo Lara MD        Subjective:     Principal Problem:Septic joint of right knee joint        HPI:  King Cool Jr. is a 69 y.o. male with a PMHx of CAD, a fib on eliquis, HTN, HLD, DM2, and osteoarthritis who presents to the ED from orthopedic clinic for evaluation of septic right knee. The patient reports he has a history of OA in his right knee and receives intra articular steroid injections every 3 months. His last injection was 8/13. He reports beginning yesterday he noticed increased swelling and pain to his right knee. He notes it was so painful he was unable to get out of bed this morning. He denies any trauma or falls. He states at baseline he ambulates unassisted. Denies numbness/tingling. He was seen in ortho clinic today and had an arthrocentesis which showed 183282 WBCs and positive crystals. R knee xray showed severe osteoarthritis. He denies fever/chills, N/V/D, abdominal pain, CP, SOB, cough, or dysuria.    In the ED, pt tachycardic and mildly hypertensive otherwise vitals stable, afebrile. WBC 12.09k. Glucose 330. Tbili 1.1. LA 1.5. Blood cxs in process. The patient received morphine, zofran, 1L NS, cefepime, and vancomycin. Ortho was consulted and recommend continuing NPO with plan for OR tomorrow for I&D right knee.     Overview/Hospital Course:  No notes on file    Interval history- he was sitting in chair and feeling well so far this mornig. He reports had some pain when getting into chair with assistance but not as much as he was having before OR yesterday. Knee still feels swollen with bandages and ACE covering it but not as tense as it was before surgery. Has some serous drainage on latera bandages and ortho aware and plan to take down  tomorrow and repeat CRP per Dr torres. Glucose improved on AM labs as was higher 200-300 yesterday and added novolog scheduled and inc dosing and will f/u trends today. He reports he hasn't had any issues with chest pain as hsa the known mid LAD lesion that cards is leaving alone unless he has pain then they would consider stent long term. Home eliquis and asa resumed post op today for afib/cad. Unnclear what happened but initially no Cx data showing in epic yesterday from clinic aspirate, now showing sample with only anaerobic/afb/ and fungal culture. Discussed with James from ID, who is going to talk to micro lab to see if can plate aerobic..   He Is hoping not too stay too long in the hospital and told him earliest probably Monday to develop further plans and trend crp/exam and will have to follow specialists thoughts as could be longer than that pending clinical course.        Review of patient's allergies indicates:   Allergen Reactions    Tamiflu [oseltamivir]      Increases BP    Metformin Hives     Diarrhea, nausea    Penicillins Rash       Current Facility-Administered Medications on File Prior to Encounter   Medication    triamcinolone acetonide injection 40 mg     Current Outpatient Medications on File Prior to Encounter   Medication Sig    alcohol swabs (DROPSAFE ALCOHOL PREP PADS) PadM USE AS DIRECTED AS NEEDED    apixaban (ELIQUIS) 5 mg Tab Take 1 tablet (5 mg total) by mouth 2 (two) times daily.    ASCORBATE CALCIUM (VITAMIN C ORAL) Take by mouth once daily.     aspirin (ECOTRIN) 81 MG EC tablet Take 81 mg by mouth once daily.    atorvastatin (LIPITOR) 80 MG tablet Take 1 tablet (80 mg total) by mouth every evening.    blood sugar diagnostic Strp 1 strip by Misc.(Non-Drug; Combo Route) route 3 (three) times daily.    blood-glucose meter kit Use as instructed    blood-glucose meter kit 1 each by Other route 3 (three) times daily. Use as instructed    diclofenac sodium (VOLTAREN) 1 % Gel Apply 2 g  topically once daily.    doxycycline (VIBRA-TABS) 100 MG tablet Take 1 tablet (100 mg total) by mouth 2 (two) times daily.    empagliflozin (JARDIANCE) 25 mg tablet Take 1 tablet (25 mg total) by mouth once daily.    fish oil-omega-3 fatty acids 300-1,000 mg capsule Take 2 g by mouth once daily.    gabapentin (NEURONTIN) 300 MG capsule Take 1 capsule (300 mg total) by mouth 3 (three) times daily.    glimepiride (AMARYL) 4 MG tablet Take 1 tablet (4 mg total) by mouth 2 (two) times a day.    [START ON 10/13/2024] HYDROcodone-acetaminophen (NORCO)  mg per tablet Take 1 tablet by mouth 3 (three) times daily as needed (pain).    [START ON 9/13/2024] HYDROcodone-acetaminophen (NORCO)  mg per tablet Take 1 tablet by mouth 3 (three) times daily as needed (pain).    HYDROcodone-acetaminophen (NORCO)  mg per tablet Take 1 tablet by mouth 3 (three) times daily as needed (pain).    hydrocortisone 2.5 % cream Apply topically 2 (two) times daily.    lancets Misc 1 application  by Misc.(Non-Drug; Combo Route) route 3 (three) times daily.    loratadine (CLARITIN) 10 mg tablet Take 1 tablet (10 mg total) by mouth once daily.    losartan (COZAAR) 25 MG tablet Take 1 tablet (25 mg total) by mouth once daily.    meloxicam (MOBIC) 7.5 MG tablet Take 1 tablet (7.5 mg total) by mouth once daily.    metoprolol succinate (TOPROL-XL) 25 MG 24 hr tablet Take 1 tablet (25 mg total) by mouth once daily.    multivitamin with minerals tablet Take 1 tablet by mouth once daily.    neomycin-polymyxin-hydrocortisone (CORTISPORIN) 3.5-10,000-1 mg/mL-unit/mL-% otic suspension PLACE 3 DROPS INTO THE LEFT EAR 4 TIMES DAILY.    semaglutide (OZEMPIC) 0.25 mg or 0.5 mg (2 mg/3 mL) pen injector Inject 0.5 mg into the skin every 7 days.    sodium chloride (SALINE NASAL) 0.65 % nasal spray 2 sprays by Nasal route 2 (two) times a day.    triamcinolone acetonide 0.1% (KENALOG) 0.1 % ointment Apply topically 2 (two) times daily as needed.      Family History       Problem Relation (Age of Onset)    Blindness Mother    Diabetes Brother    No Known Problems Father, Brother, Daughter, Son, Daughter, Brother          Tobacco Use    Smoking status: Former     Current packs/day: 0.00     Average packs/day: 0.3 packs/day for 4.0 years (1.0 ttl pk-yrs)     Types: Cigarettes     Start date: 1972     Quit date: 1976     Years since quittin.7    Smokeless tobacco: Never   Substance and Sexual Activity    Alcohol use: Yes     Comment: occ    Drug use: No    Sexual activity: Yes     Partners: Female     Birth control/protection: None     Review of Systems   Constitutional:  Negative for appetite change, chills, diaphoresis, fatigue and fever.   HENT:  Negative for congestion, rhinorrhea and sore throat.    Eyes:  Negative for photophobia and visual disturbance.   Respiratory:  Negative for cough, shortness of breath and wheezing.    Cardiovascular:  Negative for chest pain, palpitations and leg swelling.   Gastrointestinal:  Negative for abdominal distention, abdominal pain, diarrhea, nausea and vomiting.   Genitourinary:  Negative for dysuria, frequency and hematuria.   Musculoskeletal:  Positive for arthralgias (right knee), gait problem and joint swelling. Negative for neck pain.   Skin:  Negative for color change, pallor, rash and wound.   Neurological:  Negative for tremors, syncope, light-headedness, numbness and headaches.   Psychiatric/Behavioral:  Negative for confusion and hallucinations. The patient is not nervous/anxious.      Objective:     Vital Signs (Most Recent):  Temp: 97.4 °F (36.3 °C) (24 1113)  Pulse: 82 (24 1113)  Resp: 18 (24 1113)  BP: 127/65 (24 1113)  SpO2: (!) 94 % (24 1113) Vital Signs (24h Range):  Temp:  [97.4 °F (36.3 °C)-99.6 °F (37.6 °C)] 97.4 °F (36.3 °C)  Pulse:  [68-89] 82  Resp:  [11-20] 18  SpO2:  [91 %-100 %] 94 %  BP: (124-174)/() 127/65     Weight: 95.4 kg (210 lb 5.1  oz)  Body mass index is 30.18 kg/m².     Physical Exam  Vitals and nursing note reviewed.   Constitutional:       General: He is not in acute distress.     Appearance: He is not toxic-appearing or diaphoretic.   HENT:      Head: Normocephalic and atraumatic.      Nose: Nose normal.      Mouth/Throat:      Mouth: Mucous membranes are moist.   Eyes:      Pupils: Pupils are equal, round, and reactive to light.   Cardiovascular:      Rate and Rhythm: Normal rate and regular rhythm.      Pulses: Normal pulses.           Dorsalis pedis pulses are 2+ on the right side.        Posterior tibial pulses are 2+ on the right side.   Pulmonary:      Effort: Pulmonary effort is normal. No respiratory distress.      Breath sounds: No wheezing, rhonchi or rales.   Abdominal:      General: Bowel sounds are normal. There is no distension.      Palpations: Abdomen is soft.      Tenderness: There is no abdominal tenderness. There is no guarding.   Musculoskeletal:      Cervical back: Normal range of motion.      Right knee: Swelling present. Decreased range of motion. Tenderness present.      Comments: Bandages/ace to Right knee with serous drainage on lateral part of bandages.   Skin:     General: Skin is warm and dry.      Capillary Refill: Capillary refill takes less than 2 seconds.   Neurological:      General: No focal deficit present.      Mental Status: He is alert.      Sensory: No sensory deficit.      Motor: No weakness.   Psychiatric:         Mood and Affect: Mood normal.         Behavior: Behavior normal.              CRANIAL NERVES     CN III, IV, VI   Pupils are equal, round, and reactive to light.       Significant Labs: All pertinent labs within the past 24 hours have been reviewed.  CBC:   Recent Labs   Lab 08/15/24  1700 08/16/24  0401   WBC 12.09 9.83   HGB 15.6 13.4*   HCT 45.0 41.0    197     CMP:   Recent Labs   Lab 08/15/24  1700 08/16/24  0401 08/17/24  0329    135* 135*   K 4.2 4.2 3.9    103  103   CO2 24 23 21*   * 269* 176*   BUN 17 17 14   CREATININE 1.0 0.9 0.8   CALCIUM 10.0 8.9 8.9   PROT 7.7 6.5 6.3   ALBUMIN 4.0 3.2* 2.9*   BILITOT 1.1* 1.1* 0.8   ALKPHOS 115 94 84   AST 12 12 12   ALT 19 15 12   ANIONGAP 12 9 11     Lactic Acid:   Recent Labs   Lab 08/15/24  1700   LACTATE 1.5       Significant Imaging: I have reviewed all pertinent imaging results/findings within the past 24 hours.        Assessment/Plan:      * Septic joint of right knee joint  -on chronic right knee injections for OA and had recent injection the 13th then swelling after that was not typical for him with presumed nidus with entry portal,s aw ortho in clinic for this with concern for infection. afebrile, WBC 12.09  -LA 1.5 with crp 144 on admit  -blood CX NG  -Arthrocentesis of right knee with [id number] WBCs and positive crystals but no CX sent, started on vanc/cefepime on admit  -OR 8/16 for I and D with ortho and reported Significant amounts of thick viscous fluid consistent with septic arthritis and/or CPPD. WBAT. Unclear situation as initially had no Cx data in system on 8/16 from clinic showing sent but now showing cultures from this but only fungal, anaerobic and AFB. Discussing with dirk burnham further, who is going to talk to micro about if can aerobic plate.  Ortho to change bandages 8/18 and repeat CRP  -MM pain control.  -Elevate RLE.  -Fall precautions.  -resumed eliquis and asa post op     Coronary artery disease involving native coronary artery of native heart without angina pectoris  Patient with known CAD s/p stent placement, which is controlled Will continue Statin and monitor for S/Sx of angina/ACS. Continue to monitor on telemetry.   -Held ASA until post OR 8/17  [place] outpatient cards notes per dr macias, has known mid LAD lesion per cath in 2023, if symptoms develop freddy consider PCI long term.    PAF (paroxysmal atrial fibrillation)  Chronic anticoagulation   Patient with Paroxysmal (<7 days) atrial  fibrillation which is controlled currently with Beta Blocker. Patient is currently in sinus rhythm.[id number] Score: 3. Anticoagulation indicated. Anticoagulation done with eliquis .  -Hold eliquis for OR today and resumed post op  Per OP cards notes, if has any reoccurent nosebleeds on eliquis then plan to consider watchman long term as had hx of nosebleeds in past on anticoagulation    Class 1 obesity due to excess calories with serious comorbidity and body mass index (BMI) of 33.0 to 33.9 in adult  Body mass index is 28.84 kg/m². Obesity complicates all aspects of disease management from diagnostic modalities to treatment.     Essential (primary) hypertension  Chronic, controlled. Latest blood pressure and vitals reviewed-     Temp:  [98.6 °F (37 °C)]   Pulse:  [101]   Resp:  [18-20]   BP: (153)/(87)   SpO2:  [95 %] .   Home meds for hypertension were reviewed and noted below.   Hypertension Medications               losartan (COZAAR) 25 MG tablet Take 1 tablet (25 mg total) by mouth once daily.    metoprolol succinate (TOPROL-XL) 25 MG 24 hr tablet Take 1 tablet (25 mg total) by mouth once daily.            While in the hospital, will manage blood pressure as follows; Continue home antihypertensive regimen    Will utilize p.r.n. blood pressure medication only if patient's blood pressure greater than 180/110 and he develops symptoms such as worsening chest pain or shortness of breath.    Type 2 diabetes mellitus with microalbuminuria, without long-term current use of insulin  Patient's FSGs are uncontrolled due to hyperglycemia on current medication regimen.  Last A1c reviewed-   Lab Results   Component Value Date    HGBA1C 11.5 (H) 08/13/2024     Most recent fingerstick glucose reviewed-   Recent Labs   Lab 08/16/24  1541 08/16/24  2109 08/17/24  0652   POCTGLUCOSE 207* 225* 217*       Current correctional scale  Low  Maintain anti-hyperglycemic dose as follows-   Antihyperglycemics (From admission, onward)       Start     Stop Route Frequency Ordered    08/16/24 2100  insulin glargine U-100 (Lantus) pen 13 Units         -- SubQ 2 times daily 08/16/24 1322    08/16/24 1645  insulin aspart U-100 pen 7 Units         -- SubQ 3 times daily with meals 08/16/24 1401    08/15/24 1917  insulin aspart U-100 pen 0-5 Units         -- SubQ Before meals & nightly PRN 08/15/24 1817          Hold Oral hypoglycemics while patient is in the hospital.   -Accuchecks AC/HS  -Diabetic/cardiac diet    Glucose uncontrolled on admit with values 269-330 since admit, and placed on home regimen, he reports doesn't run this high at home, and so will titrate post op and infection likely also worsening glucose  -improving 8/17 in higher 100s on cmp and lower 200s this AM and scheduled novolog started last night and adjusted lantus and will trend      VTE Risk Mitigation (From admission, onward)           Ordered     apixaban tablet 5 mg  2 times daily         08/16/24 1537     IP VTE LOW RISK PATIENT  Once         08/15/24 1817     Place sequential compression device  Until discontinued         08/15/24 1817                    Discharge Planning   JUAN M: 8/19/2024     Code Status: Full Code   Is the patient medically ready for discharge?:     Reason for patient still in hospital (select all that apply): Patient trending condition                     Lorna Galvan MD  Department of Hospital Medicine   [facility] - Surgery

## 2024-08-17 NOTE — SUBJECTIVE & OBJECTIVE
Principal Problem:Septic joint of right knee joint    Principal Orthopedic Problem: as above now s/p R knee arthroscopic I&D 8/16    Interval History: Pt seen and examined at bedside. MARIO COTA. Reports pain and swelling of the knee, slightly improved since yesterday.       Review of patient's allergies indicates:   Allergen Reactions    Tamiflu [oseltamivir]      Increases BP    Metformin Hives     Diarrhea, nausea    Penicillins Rash       Current Facility-Administered Medications   Medication    acetaminophen tablet 1,000 mg    apixaban tablet 5 mg    aspirin EC tablet 81 mg    atorvastatin tablet 80 mg    ceFEPIme (MAXIPIME) 2 g in D5W 100 mL IVPB (MB+)    dextrose 10% bolus 125 mL 125 mL    dextrose 10% bolus 250 mL 250 mL    gabapentin capsule 300 mg    glucagon (human recombinant) injection 1 mg    glucose chewable tablet 16 g    glucose chewable tablet 24 g    HYDROmorphone injection 0.2 mg    insulin aspart U-100 pen 0-5 Units    insulin aspart U-100 pen 7 Units    insulin glargine U-100 (Lantus) pen 13 Units    losartan tablet 25 mg    melatonin tablet 6 mg    methocarbamoL tablet 750 mg    metoprolol succinate (TOPROL-XL) 24 hr tablet 25 mg    multivitamin tablet    naloxone 0.4 mg/mL injection 0.02 mg    omega-3 acid ethyl esters capsule 1 g    ondansetron injection 4 mg    oxyCODONE immediate release tablet 5 mg    oxyCODONE immediate release tablet Tab 10 mg    sodium chloride 0.9% flush 10 mL    vancomycin - pharmacy to dose    vancomycin 1,500 mg in D5W 250 mL IVPB (Vial-Mate)     Facility-Administered Medications Ordered in Other Encounters   Medication    triamcinolone acetonide injection 40 mg     Objective:     Vital Signs (Most Recent):  Temp: 98.9 °F (37.2 °C) (08/17/24 0653)  Pulse: 89 (08/17/24 0653)  Resp: 18 (08/17/24 0653)  BP: (!) 141/67 (08/17/24 0653)  SpO2: (!) 91 % (08/17/24 0653) Vital Signs (24h Range):  Temp:  [97.9 °F (36.6 °C)-99.6 °F (37.6 °C)] 98.9 °F (37.2 °C)  Pulse:   "[66-89] 89  Resp:  [11-20] 18  SpO2:  [91 %-100 %] 91 %  BP: (124-174)/() 141/67     Weight: 95.4 kg (210 lb 5.1 oz)  Height: 5' 10" (177.8 cm)  Body mass index is 30.18 kg/m².      Intake/Output Summary (Last 24 hours) at 8/17/2024 0769  Last data filed at 8/17/2024 0556  Gross per 24 hour   Intake 900 ml   Output 800 ml   Net 100 ml        Ortho/SPM Exam     AAOx4  NAD  Reg rate  No increased WOB    RLE    Ace wrap over knee  Compartments soft/compressible  SILT throughout  AROM of toes, ankle, intact.  WWP. Brisk cap refill            Significant Labs: All pertinent labs within the past 24 hours have been reviewed.    Significant Imaging: I have reviewed and interpreted all pertinent imaging results/findings.  "

## 2024-08-17 NOTE — ASSESSMENT & PLAN NOTE
69 year old with CAD, A fib on Eliquis, HTN, DM (A1C 11), and osteoarthritis of the knee with concern for septic right knee.  Patient has history of OA in the right knee and receives intraarticular steroid injections every 3 months.  Last injection was on 8/13.  Developed increased pain and swelling within 24 hours after the injection.  Underwent knee aspiration in Ortho clinic 8/15 which showed 143,000 WBCs, 85% segs and positive CCP crystals. No history of gout.  Subsequently sent to ED.   No history of gout.   He denied associated fever/chills.        Underwent I&D with Dr. Alfaro on 8/16.  Per OP note,  thick viscous fluid encountered.  Suspects septic arthritis superimposed over pseudogout.   No OR cultures available.  Aspirate cultures of 8/15 show anaerobic, fungal, and AFB pending.  No aerobic culture sent.  Gram stain negative. ID consulted for antibiotic recommendations     Patient afebrile, no leukocytosis.  , ESR 12.  Denies history of Staph or other infections. Denies recent antibiotic use.  Reports he is overall very healthy.  No high risk behaviors (except for motorcycles).    Currently on IV vancomycin and cefepime.     Plan/recommendations:   Continue IV vancomycin (pharmacy to dose. Trough goal 15-20)   Discontinue cefepime and start ceftriaxone 2 g q 24 hours   I have called Micro lab and requested add-on aerobic culture from available specimens.  Per Micro, samples just plated and they will be able to do this   Will follow cultures and adjust antibiotics accordingly.    Will follow     Data reviewed and plan discussed with ID staff, Dr. Michaud  Secure chat/Discussed above plan with Primary Team,  Dr. Galvan

## 2024-08-17 NOTE — PT/OT/SLP EVAL
"Occupational Therapy   Evaluation    Name: King Cool Jr.  MRN: [id number]  Admitting Diagnosis: Septic joint of right knee joint  Recent Surgery: Procedure(s) (LRB):  ARTHROSCOPY, KNEE, WITH DEBRIDEMENT (Right) 1 Day Post-Op    Recommendations:     Discharge Recommendations: Low Intensity Therapy  Discharge Equipment Recommendations:  walker, rolling, bedside commode, shower chair  Barriers to discharge:  None    Assessment:     King Cool Jr. is a 69 y.o. male with a medical diagnosis of Septic joint of right knee joint.  He presents with the following performance deficits affecting function: weakness, impaired endurance, impaired self care skills, impaired functional mobility, gait instability, impaired balance, pain, orthopedic precautions, decreased lower extremity function. Pt was willing to participate and tolerated session well overall. He was able to perform functional mobility without assistance but did not place weight into RLE 2/2 to pain. Pt performed t/f's c/ minimal verbal cueing and ADLs assessed with little to no assistance. Pt is determined to progress with therapy.     Rehab Prognosis: Good; patient would benefit from acute skilled OT services to address these deficits and reach maximum level of function.       Plan:     Patient to be seen 3 x/week to address the above listed problems via self-care/home management, therapeutic activities, therapeutic exercises, neuromuscular re-education  Plan of Care Expires: 09/17/24  Plan of Care Reviewed with: patient, daughter    Subjective     Chief Complaint: RLE pain  Patient/Family Comments/goals: "I've been getting up to go to the bathroom."    Occupational Profile:  Living Environment: SSH, threshold entrance, t/s and WIS; lives c/ girlfriend; standard toilet  Previous level of function: indep, drives  Roles and Routines: father, boyfriend, enjoys doing yard work  Equipment Used at Home:    Assistance upon Discharge: per pt report, girlfriend works " nights (~3-10pm); daughter stated she will be with pt during day    Pain/Comfort:  Pain Rating 1: 2/10  Location - Side 1: Right  Location - Orientation 1: generalized  Location 1: knee  Pain Rating Post-Intervention 1:  (not rated)    Patients cultural, spiritual, Congregation conflicts given the current situation: no    Objective:     Communicated with: RN prior to session.  Patient found up in chair with  (no active lines) upon OT entry to room.    General Precautions: Standard, fall  Orthopedic Precautions: RLE weight bearing as tolerated  Braces: N/A  Respiratory Status: Room air    Occupational Performance:    Bed Mobility:    Pt started and finished session up in chair    Functional Mobility/Transfers:  Patient completed Sit <> Stand Transfer with supervision  with  rolling walker   Patient completed Toilet Transfer Step Transfer technique with supervision with  rolling walker  Chair t/f: SPV c/ RW  Functional Mobility: from chair to bathroom back to chair; close SPV; mod vc's for walker management; no LOB; pt not placing weight into RLE (hopping on LLE)    Activities of Daily Living:  Grooming: pt declined to perform    Upper Body Dressing: setup; donned gown as robe sitting eob    Lower Body Dressing: moderate assistance donned socks sitting in chair  Toileting: pt not needing to void at this time      Cognitive/Visual Perceptual:  Cognitive/Psychosocial Skills:     -       Oriented to: Person, Place, Time, and Situation   -       Follows Commands/attention:Follows multistep  commands  -       Safety awareness/insight to disability: intact     Physical Exam:  Balance:    -       dynamic standing balance: close SPV  Upper Extremity Range of Motion:     -       Right Upper Extremity: WFL  -       Left Upper Extremity: WFL  Upper Extremity Strength:    -       Right Upper Extremity: WFL  -       Left Upper Extremity: WFL   Strength:    -       Right Upper Extremity: WFL  -       Left Upper Extremity:  WFL  Fine Motor Coordination:    -       Intact  Left hand thumb/finger opposition skills, Right hand thumb/finger opposition skills, Left hand, manipulation of objects, and Right hand, manipulation of objects    AMPAC 6 Click ADL:  AMPAC Total Score: 21    Treatment & Education:  Pt edu on role of OT, POC, safety when performing self care tasks , benefit of performing OOB activity, and safety when performing functional transfers and mobility.  - White board updated  - Self care tasks completed-- as noted above    - pt educated on WB status  - pt educated on safe t/f's c/ RW to various surfaces to promote ADL participation    Patient left up in chair with all lines intact, call button in reach, RN notified, and daughter present    GOALS:   Multidisciplinary Problems       Occupational Therapy Goals          Problem: Occupational Therapy    Goal Priority Disciplines Outcome Interventions   Occupational Therapy Goal     OT, PT/OT Progressing    Description: Goals to be met by: 8/24/2024     Patient will increase functional independence with ADLs by performing:    UE Dressing with Modified [place].  LE Dressing with Modified [place].  Grooming while standing at sink with Modified [place].  Toileting from toilet with Beryl for hygiene and clothing management.   Toilet transfer to toilet with Modified [place].                         History:     Past Medical History:   Diagnosis Date    Coronary artery disease involving native coronary artery of native heart without angina pectoris 3/7/2023    Essential (primary) hypertension     Male erectile dysfunction, unspecified     New onset a-fib 1/3/2023    Testicular hypofunction     Type 2 diabetes mellitus without complications          Past Surgical History:   Procedure Laterality Date    HIP SURGERY      LEFT HEART CATHETERIZATION Left 3/7/2023    Procedure: Left heart cath;  Surgeon: Wil Mahoney MD;  Location: [facility] CATH LAB;  Service:  Cardiology;  Laterality: Left;  1030am start, R rad access    RN PREOP 2/28/23       Time Tracking:     OT Date of Treatment: 08/17/24  OT Start Time: 1237  OT Stop Time: 1255  OT Total Time (min): 18 min    Billable Minutes:Evaluation 8  Therapeutic Activity 10    8/17/2024

## 2024-08-17 NOTE — ASSESSMENT & PLAN NOTE
Patient with known CAD s/p stent placement, which is controlled Will continue Statin and monitor for S/Sx of angina/ACS. Continue to monitor on telemetry.   -Held ASA until post OR 8/17  [place] outpatient cards notes per dr macias, has known mid LAD lesion per cath in 2023, if symptoms develop freddy consider PCI long term.

## 2024-08-17 NOTE — CONSULTS
ID Consult acknowledged.   Reason for consult: Septic arthritis of knee s/p I and D with ortho    Full consult and recommendations to follow today  In the interim, please secure chat for any immediate concerns.     Thank you.   ALANNA Casper, ANP-C

## 2024-08-17 NOTE — ASSESSMENT & PLAN NOTE
Chronic anticoagulation   Patient with Paroxysmal (<7 days) atrial fibrillation which is controlled currently with Beta Blocker. Patient is currently in sinus rhythm.[id number] Score: 3. Anticoagulation indicated. Anticoagulation done with eliquis .  -Hold eliquis for OR today and resumed post op  Per OP cards notes, if has any reoccurent nosebleeds on eliquis then plan to consider watchman long term as had hx of nosebleeds in past on anticoagulation

## 2024-08-17 NOTE — ASSESSMENT & PLAN NOTE
Patient's FSGs are uncontrolled due to hyperglycemia on current medication regimen.  Last A1c reviewed-   Lab Results   Component Value Date    HGBA1C 11.5 (H) 08/13/2024     Most recent fingerstick glucose reviewed-   Recent Labs   Lab 08/16/24  1541 08/16/24  2109 08/17/24  0652   POCTGLUCOSE 207* 225* 217*       Current correctional scale  Low  Maintain anti-hyperglycemic dose as follows-   Antihyperglycemics (From admission, onward)      Start     Stop Route Frequency Ordered    08/16/24 2100  insulin glargine U-100 (Lantus) pen 13 Units         -- SubQ 2 times daily 08/16/24 1322    08/16/24 1645  insulin aspart U-100 pen 7 Units         -- SubQ 3 times daily with meals 08/16/24 1401    08/15/24 1917  insulin aspart U-100 pen 0-5 Units         -- SubQ Before meals & nightly PRN 08/15/24 1817          Hold Oral hypoglycemics while patient is in the hospital.   -Accuchecks AC/HS  -Diabetic/cardiac diet    Glucose uncontrolled on admit with values 269-330 since admit, and placed on home regimen, he reports doesn't run this high at home, and so will titrate post op and infection likely also worsening glucose  -improving 8/17 in higher 100s on cmp and lower 200s this AM and scheduled novolog started last night and adjusted lantus and will trend

## 2024-08-17 NOTE — PT/OT/SLP EVAL
Physical Therapy Evaluation and Treatment    Patient Name:  King Cool Jr.   MRN:  [id number]  Admit Date: 8/15/2024  Admitting Diagnosis:  Septic joint of right knee joint   Length of Stay: 2 days  Recent Surgery: Procedure(s) (LRB):  ARTHROSCOPY, KNEE, WITH DEBRIDEMENT (Right) 1 Day Post-Op    Recommendations:     Discharge Recommendations:  Low Intensity Therapy  Discharge Equipment Recommendations: walker, rolling, bedside commode, shower chair   Justification for Equipment: The mobility limitation cannot be sufficiently resolved by the use of a cane. Patient's functional mobility deficit can be sufficiently resolved with the use of a Rolling Walker. Patient's mobility limitation significantly impairs their ability to participate in one of more activities of daily living. The use of a Rolling Walker will significantly improve the patient's ability to participate in MRADLS and the patient will use it on regular basis in the home.   Barriers to discharge: Evolving Clinical Presentation    Assessment:     King Cool Jr. is a 69 y.o. male admitted with a medical diagnosis of Septic joint of right knee joint.  He presents with the following impairments/functional limitations: weakness, impaired endurance, gait instability, impaired functional mobility, impaired balance, impaired self care skills, decreased lower extremity function, pain, decreased safety awareness.     Pt agreeable to therapy, reports he can get around well with walker but is unable to do much at all with RLE including bearing weight. Pt able to ambulate into hallway today but unable to bear more than minimal weight through RLE despite encouragement, ambulates as if he is toe touch weight-bearing    Rehab Prognosis: Fair; patient would benefit from acute skilled PT services to address these deficits and reach maximum level of function.      Treatment Tolerated: Fair    Highest level of mobility achieved this visit: ambulates 25ft w/ RW and  "SBA    Activity with RN/PCT: ambulate with one person assist    Plan:     During this hospitalization, patient to be seen 4 x/week to address the identified rehab impairments via gait training, therapeutic activities, therapeutic exercises, neuromuscular re-education and progress towards the established goals.    Plan of Care Expires:  09/17/24    Subjective     RN Tram notified prior to session. No family present upon PT entrance into room.    Chief Complaint: pain  Patient/Family Comments/goals: "It hurts like this pretty much all the time"  Pain/Comfort:  Pain Rating 1: 9/10  Location - Side 1: Right  Location - Orientation 1: generalized  Location 1: knee  Pain Addressed 1: Reposition, Distraction    Social History:  Residence: lives with their partner 1-story house with threshold WONG.  Support available: family  Equipment Used: grab bar, bath bench, cane, straight  Equipment Owned (not using): None  Prior level of function: independent  Work: Retired   Drive: yes.       Objective:     Additional staff present: none    Patient found up in chair with: peripheral IV     General Precautions: Standard, fall   Orthopedic Precautions:RLE weight bearing as tolerated   Braces: N/A   Body mass index is 30.18 kg/m².  Oxygen Device: Room Air    Vitals: /63 (BP Location: Left arm, Patient Position: Sitting)   Pulse 84   Temp 100 °F (37.8 °C) (Oral)   Resp 18   Ht 5' 10" (1.778 m)   Wt 95.4 kg (210 lb 5.1 oz)   SpO2 95%   BMI 30.18 kg/m²     Exams:  Cognition:   Alert and Cooperative  Command following: Follows multistep verbal commands  Fluency: clear/fluent  Hearing: Intact  Vision:  Intact  Skin Integrity: Intact dressing present R knee    Physical Exam:   Edema - None noted  ROM - WFL except R knee extension limited ~ 15 deg due to pain and swelling  Strength - LLE WFL, R hip 4-/5, R knee 2+/5, R ankle 4+/5    Sensation - Intact to light touch  Coordination - No deficits noted    Outcome Measures:    AM-PAC " 6 CLICK MOBILITY  Turning over in bed (including adjusting bedclothes, sheets and blankets)?: 3  Sitting down on and standing up from a chair with arms (e.g., wheelchair, bedside commode, etc.): 3  Moving from lying on back to sitting on the side of the bed?: 3  Moving to and from a bed to a chair (including a wheelchair)?: 3  Need to walk in hospital room?: 3  Climbing 3-5 steps with a railing?: 2  Basic Mobility Total Score: 17     Functional Mobility:    Bed Mobility:   Pt found/returned to bedside chair    Transfers:   Sit > Stand Transfer: contact guard assistance with rolling walker  Stand > Sit Transfer: contact guard assistance with rolling walker  x1 trials from EOB and x1 trials from toliet    Standing Balance:  Assistance Level Required: Stand-by Assistance  Patient used: rolling walker  Time: 8 min + 3 min  Postural deviations noted: decreased WB through RLE  Encouraged: weight bearing as much as possible through RLE      Gait:   Patient ambulated: 25ft   Patient required: standy by assistance  Patient used:  rolling walker  Gait Pattern observed: partial weightbearing ~ 10% through toe of RLE  Gait Deviation(s): decreased step length, decreased weight shift, decreased denys, and increased time in stance phase on unaffected leg  Impairments due to: pain, decreased strength, and decreased endurance  Gait belt utilized  Education:  Time provided for education, counseling and discussion of health disposition in regards to patient's current status  All questions answered within PT scope of practice and to patient's satisfaction  PT role in POC to address current functional deficits  Pt educated on proper body mechanics, safety techniques, and energy conservation with PT facilitation and cueing throughout session    Patient left up in chair with all lines intact and call button in reach.    GOALS:   Multidisciplinary Problems       Physical Therapy Goals          Problem: Physical Therapy    Goal Priority  Disciplines Outcome Goal Variances Interventions   Physical Therapy Goal     PT, PT/OT Progressing     Description: Goals to met by 8/31/2024    1. Gait  x 50 feet with Contact Guard Assistance using Rolling Walker with at least 50% WB on RLE  2. Ascend/Descend 6 inch curb step with Stand-by Assistance using Rolling Walker.  3. Stand for 5 minutes with Stand-by Assistance using Rolling Walker with even WB ANDRE  4. Lower extremity exercise program x15 reps per Instruction, with assistance as needed in order to facilitate improved strength and improvement in functional independence    Rolling Walker- Patient demonstrates a mobility limitation that significantly impairs their ability to participate in one or more mobility related activities of daily living. Patient's mobility limitation cannot be sufficiently resolved with the use of a cane, but can be sufficiently resolved with the use of a rolling walker.The use of a rolling walker will considerably improve their ability to participate in MRADLs. Patient will use the walker on a regular basis at home.                         History:     Past Medical History:   Diagnosis Date    Coronary artery disease involving native coronary artery of native heart without angina pectoris 3/7/2023    Essential (primary) hypertension     Male erectile dysfunction, unspecified     New onset a-fib 1/3/2023    Testicular hypofunction     Type 2 diabetes mellitus without complications        Past Surgical History:   Procedure Laterality Date    HIP SURGERY      LEFT HEART CATHETERIZATION Left 3/7/2023    Procedure: Left heart cath;  Surgeon: Wil Mahoney MD;  Location: [facility] CATH LAB;  Service: Cardiology;  Laterality: Left;  1030am start, R rad access    RN PREOP 2/28/23       Time Tracking:     PT Received On: 08/17/24  PT Start Time: 1502     PT Stop Time: 1518  PT Total Time (min): 16 min     Billable Minutes: Evaluation 1 procedure and Therapeutic Activity 8 min    Romeo  Volodymyr Ansari, PT, DPT  8/17/2024

## 2024-08-17 NOTE — ASSESSMENT & PLAN NOTE
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 afebrile, WBC 12.09  -LA 1.5 with crp 144 on admit  -blood CX NG  -Arthrocentesis of right knee with [id number] WBCs and positive crystals but no CX sent, started on vanc/cefepime on admit  -OR 8/16 for I and D with ortho and reported Significant amounts of thick viscous fluid consistent with septic arthritis and/or CPPD. WBAT. Unclear situation as initially had no Cx data in system on 8/16 from clinic showing sent but now showing cultures from this but only fungal, anaerobic and AFB. Discussing with dirk burnham further, who is going to talk to micro about if can aerobic plate.  Ortho to change bandages 8/18 and repeat CRP  -MM pain control.  -Elevate RLE.  -Fall precautions.  -resumed eliquis and asa post op

## 2024-08-17 NOTE — SUBJECTIVE & OBJECTIVE
Interval history- he was sitting in chair and feeling well so far this mornig. He reports had some pain when getting into chair with assistance but not as much as he was having before OR yesterday. Knee still feels swollen with bandages and ACE covering it but not as tense as it was before surgery. Has some serous drainage on latera bandages and ortho aware and plan to take down tomorrow and repeat CRP per Dr torres. Glucose improved on AM labs as was higher 200-300 yesterday and added novolog scheduled and inc dosing and will f/u trends today. He reports he hasn't had any issues with chest pain as hsa the known mid LAD lesion that cards is leaving alone unless he has pain then they would consider stent long term. Home eliquis and asa resumed post op today for afib/cad. Unnclear what happened but initially no Cx data showing in epic yesterday from clinic aspirate, now showing sample with only anaerobic/afb/ and fungal culture. Discussed with James from ID, who is going to talk to micro lab to see if can plate aerobic..   He Is hoping not too stay too long in the hospital and told him earliest probably Monday to develop further plans and trend crp/exam and will have to follow specialists thoughts as could be longer than that pending clinical course.        Review of patient's allergies indicates:   Allergen Reactions    Tamiflu [oseltamivir]      Increases BP    Metformin Hives     Diarrhea, nausea    Penicillins Rash       Current Facility-Administered Medications on File Prior to Encounter   Medication    triamcinolone acetonide injection 40 mg     Current Outpatient Medications on File Prior to Encounter   Medication Sig    alcohol swabs (DROPSAFE ALCOHOL PREP PADS) PadM USE AS DIRECTED AS NEEDED    apixaban (ELIQUIS) 5 mg Tab Take 1 tablet (5 mg total) by mouth 2 (two) times daily.    ASCORBATE CALCIUM (VITAMIN C ORAL) Take by mouth once daily.     aspirin (ECOTRIN) 81 MG EC tablet Take 81 mg by mouth once daily.     atorvastatin (LIPITOR) 80 MG tablet Take 1 tablet (80 mg total) by mouth every evening.    blood sugar diagnostic Strp 1 strip by Misc.(Non-Drug; Combo Route) route 3 (three) times daily.    blood-glucose meter kit Use as instructed    blood-glucose meter kit 1 each by Other route 3 (three) times daily. Use as instructed    diclofenac sodium (VOLTAREN) 1 % Gel Apply 2 g topically once daily.    doxycycline (VIBRA-TABS) 100 MG tablet Take 1 tablet (100 mg total) by mouth 2 (two) times daily.    empagliflozin (JARDIANCE) 25 mg tablet Take 1 tablet (25 mg total) by mouth once daily.    fish oil-omega-3 fatty acids 300-1,000 mg capsule Take 2 g by mouth once daily.    gabapentin (NEURONTIN) 300 MG capsule Take 1 capsule (300 mg total) by mouth 3 (three) times daily.    glimepiride (AMARYL) 4 MG tablet Take 1 tablet (4 mg total) by mouth 2 (two) times a day.    [START ON 10/13/2024] HYDROcodone-acetaminophen (NORCO)  mg per tablet Take 1 tablet by mouth 3 (three) times daily as needed (pain).    [START ON 9/13/2024] HYDROcodone-acetaminophen (NORCO)  mg per tablet Take 1 tablet by mouth 3 (three) times daily as needed (pain).    HYDROcodone-acetaminophen (NORCO)  mg per tablet Take 1 tablet by mouth 3 (three) times daily as needed (pain).    hydrocortisone 2.5 % cream Apply topically 2 (two) times daily.    lancets Misc 1 application  by Misc.(Non-Drug; Combo Route) route 3 (three) times daily.    loratadine (CLARITIN) 10 mg tablet Take 1 tablet (10 mg total) by mouth once daily.    losartan (COZAAR) 25 MG tablet Take 1 tablet (25 mg total) by mouth once daily.    meloxicam (MOBIC) 7.5 MG tablet Take 1 tablet (7.5 mg total) by mouth once daily.    metoprolol succinate (TOPROL-XL) 25 MG 24 hr tablet Take 1 tablet (25 mg total) by mouth once daily.    multivitamin with minerals tablet Take 1 tablet by mouth once daily.    neomycin-polymyxin-hydrocortisone (CORTISPORIN) 3.5-10,000-1 mg/mL-unit/mL-% otic  suspension PLACE 3 DROPS INTO THE LEFT EAR 4 TIMES DAILY.    semaglutide (OZEMPIC) 0.25 mg or 0.5 mg (2 mg/3 mL) pen injector Inject 0.5 mg into the skin every 7 days.    sodium chloride (SALINE NASAL) 0.65 % nasal spray 2 sprays by Nasal route 2 (two) times a day.    triamcinolone acetonide 0.1% (KENALOG) 0.1 % ointment Apply topically 2 (two) times daily as needed.     Family History       Problem Relation (Age of Onset)    Blindness Mother    Diabetes Brother    No Known Problems Father, Brother, Daughter, Son, Daughter, Brother          Tobacco Use    Smoking status: Former     Current packs/day: 0.00     Average packs/day: 0.3 packs/day for 4.0 years (1.0 ttl pk-yrs)     Types: Cigarettes     Start date: 1972     Quit date: 1976     Years since quittin.7    Smokeless tobacco: Never   Substance and Sexual Activity    Alcohol use: Yes     Comment: occ    Drug use: No    Sexual activity: Yes     Partners: Female     Birth control/protection: None     Review of Systems   Constitutional:  Negative for appetite change, chills, diaphoresis, fatigue and fever.   HENT:  Negative for congestion, rhinorrhea and sore throat.    Eyes:  Negative for photophobia and visual disturbance.   Respiratory:  Negative for cough, shortness of breath and wheezing.    Cardiovascular:  Negative for chest pain, palpitations and leg swelling.   Gastrointestinal:  Negative for abdominal distention, abdominal pain, diarrhea, nausea and vomiting.   Genitourinary:  Negative for dysuria, frequency and hematuria.   Musculoskeletal:  Positive for arthralgias (right knee), gait problem and joint swelling. Negative for neck pain.   Skin:  Negative for color change, pallor, rash and wound.   Neurological:  Negative for tremors, syncope, light-headedness, numbness and headaches.   Psychiatric/Behavioral:  Negative for confusion and hallucinations. The patient is not nervous/anxious.      Objective:     Vital Signs (Most  Recent):  Temp: 97.4 °F (36.3 °C) (08/17/24 1113)  Pulse: 82 (08/17/24 1113)  Resp: 18 (08/17/24 1113)  BP: 127/65 (08/17/24 1113)  SpO2: (!) 94 % (08/17/24 1113) Vital Signs (24h Range):  Temp:  [97.4 °F (36.3 °C)-99.6 °F (37.6 °C)] 97.4 °F (36.3 °C)  Pulse:  [68-89] 82  Resp:  [11-20] 18  SpO2:  [91 %-100 %] 94 %  BP: (124-174)/() 127/65     Weight: 95.4 kg (210 lb 5.1 oz)  Body mass index is 30.18 kg/m².     Physical Exam  Vitals and nursing note reviewed.   Constitutional:       General: He is not in acute distress.     Appearance: He is not toxic-appearing or diaphoretic.   HENT:      Head: Normocephalic and atraumatic.      Nose: Nose normal.      Mouth/Throat:      Mouth: Mucous membranes are moist.   Eyes:      Pupils: Pupils are equal, round, and reactive to light.   Cardiovascular:      Rate and Rhythm: Normal rate and regular rhythm.      Pulses: Normal pulses.           Dorsalis pedis pulses are 2+ on the right side.        Posterior tibial pulses are 2+ on the right side.   Pulmonary:      Effort: Pulmonary effort is normal. No respiratory distress.      Breath sounds: No wheezing, rhonchi or rales.   Abdominal:      General: Bowel sounds are normal. There is no distension.      Palpations: Abdomen is soft.      Tenderness: There is no abdominal tenderness. There is no guarding.   Musculoskeletal:      Cervical back: Normal range of motion.      Right knee: Swelling present. Decreased range of motion. Tenderness present.      Comments: Bandages/ace to Right knee with serous drainage on lateral part of bandages.   Skin:     General: Skin is warm and dry.      Capillary Refill: Capillary refill takes less than 2 seconds.   Neurological:      General: No focal deficit present.      Mental Status: He is alert.      Sensory: No sensory deficit.      Motor: No weakness.   Psychiatric:         Mood and Affect: Mood normal.         Behavior: Behavior normal.              CRANIAL NERVES     CN III, IV, VI    Pupils are equal, round, and reactive to light.       Significant Labs: All pertinent labs within the past 24 hours have been reviewed.  CBC:   Recent Labs   Lab 08/15/24  1700 08/16/24  0401   WBC 12.09 9.83   HGB 15.6 13.4*   HCT 45.0 41.0    197     CMP:   Recent Labs   Lab 08/15/24  1700 08/16/24  0401 08/17/24  0329    135* 135*   K 4.2 4.2 3.9    103 103   CO2 24 23 21*   * 269* 176*   BUN 17 17 14   CREATININE 1.0 0.9 0.8   CALCIUM 10.0 8.9 8.9   PROT 7.7 6.5 6.3   ALBUMIN 4.0 3.2* 2.9*   BILITOT 1.1* 1.1* 0.8   ALKPHOS 115 94 84   AST 12 12 12   ALT 19 15 12   ANIONGAP 12 9 11     Lactic Acid:   Recent Labs   Lab 08/15/24  1700   LACTATE 1.5       Significant Imaging: I have reviewed all pertinent imaging results/findings within the past 24 hours.

## 2024-08-18 LAB
ALBUMIN SERPL BCP-MCNC: 2.6 G/DL (ref 3.5–5.2)
ALP SERPL-CCNC: 89 U/L (ref 55–135)
ALT SERPL W/O P-5'-P-CCNC: 25 U/L (ref 10–44)
ANION GAP SERPL CALC-SCNC: 12 MMOL/L (ref 8–16)
AST SERPL-CCNC: 23 U/L (ref 10–40)
BASOPHILS # BLD AUTO: 0.03 K/UL (ref 0–0.2)
BASOPHILS NFR BLD: 0.4 % (ref 0–1.9)
BILIRUB SERPL-MCNC: 0.6 MG/DL (ref 0.1–1)
BUN SERPL-MCNC: 13 MG/DL (ref 8–23)
CALCIUM SERPL-MCNC: 9.2 MG/DL (ref 8.7–10.5)
CHLORIDE SERPL-SCNC: 103 MMOL/L (ref 95–110)
CO2 SERPL-SCNC: 22 MMOL/L (ref 23–29)
CREAT SERPL-MCNC: 0.7 MG/DL (ref 0.5–1.4)
CRP SERPL-MCNC: 202.2 MG/L (ref 0–8.2)
DIFFERENTIAL METHOD BLD: ABNORMAL
EOSINOPHIL # BLD AUTO: 0 K/UL (ref 0–0.5)
EOSINOPHIL NFR BLD: 0.5 % (ref 0–8)
ERYTHROCYTE [DISTWIDTH] IN BLOOD BY AUTOMATED COUNT: 12 % (ref 11.5–14.5)
EST. GFR  (NO RACE VARIABLE): >60 ML/MIN/1.73 M^2
GLUCOSE SERPL-MCNC: 171 MG/DL (ref 70–110)
HCT VFR BLD AUTO: 39.3 % (ref 40–54)
HGB BLD-MCNC: 13.1 G/DL (ref 14–18)
IMM GRANULOCYTES # BLD AUTO: 0.02 K/UL (ref 0–0.04)
IMM GRANULOCYTES NFR BLD AUTO: 0.3 % (ref 0–0.5)
LYMPHOCYTES # BLD AUTO: 1.3 K/UL (ref 1–4.8)
LYMPHOCYTES NFR BLD: 16.8 % (ref 18–48)
MAGNESIUM SERPL-MCNC: 2 MG/DL (ref 1.6–2.6)
MCH RBC QN AUTO: 30.9 PG (ref 27–31)
MCHC RBC AUTO-ENTMCNC: 33.3 G/DL (ref 32–36)
MCV RBC AUTO: 93 FL (ref 82–98)
MONOCYTES # BLD AUTO: 1 K/UL (ref 0.3–1)
MONOCYTES NFR BLD: 13.4 % (ref 4–15)
NEUTROPHILS # BLD AUTO: 5.2 K/UL (ref 1.8–7.7)
NEUTROPHILS NFR BLD: 68.6 % (ref 38–73)
NRBC BLD-RTO: 0 /100 WBC
PLATELET # BLD AUTO: 240 K/UL (ref 150–450)
PMV BLD AUTO: 9 FL (ref 9.2–12.9)
POCT GLUCOSE: 172 MG/DL (ref 70–110)
POCT GLUCOSE: 221 MG/DL (ref 70–110)
POCT GLUCOSE: 244 MG/DL (ref 70–110)
POCT GLUCOSE: 263 MG/DL (ref 70–110)
POTASSIUM SERPL-SCNC: 3.5 MMOL/L (ref 3.5–5.1)
PROT SERPL-MCNC: 6.1 G/DL (ref 6–8.4)
RBC # BLD AUTO: 4.24 M/UL (ref 4.6–6.2)
SODIUM SERPL-SCNC: 137 MMOL/L (ref 136–145)
WBC # BLD AUTO: 7.62 K/UL (ref 3.9–12.7)

## 2024-08-18 PROCEDURE — 63600175 PHARM REV CODE 636 W HCPCS: Mod: HCNC | Performed by: NURSE PRACTITIONER

## 2024-08-18 PROCEDURE — 80053 COMPREHEN METABOLIC PANEL: CPT | Mod: HCNC | Performed by: HOSPITALIST

## 2024-08-18 PROCEDURE — 36415 COLL VENOUS BLD VENIPUNCTURE: CPT | Mod: HCNC | Performed by: NURSE PRACTITIONER

## 2024-08-18 PROCEDURE — 86140 C-REACTIVE PROTEIN: CPT | Mod: HCNC

## 2024-08-18 PROCEDURE — 63600175 PHARM REV CODE 636 W HCPCS: Mod: HCNC | Performed by: HOSPITALIST

## 2024-08-18 PROCEDURE — 25000003 PHARM REV CODE 250: Mod: HCNC | Performed by: NURSE PRACTITIONER

## 2024-08-18 PROCEDURE — 83735 ASSAY OF MAGNESIUM: CPT | Mod: HCNC | Performed by: NURSE PRACTITIONER

## 2024-08-18 PROCEDURE — 99233 SBSQ HOSP IP/OBS HIGH 50: CPT | Mod: HCNC,,, | Performed by: NURSE PRACTITIONER

## 2024-08-18 PROCEDURE — 25000003 PHARM REV CODE 250: Mod: HCNC | Performed by: HOSPITALIST

## 2024-08-18 PROCEDURE — 85025 COMPLETE CBC W/AUTO DIFF WBC: CPT | Mod: HCNC | Performed by: HOSPITALIST

## 2024-08-18 PROCEDURE — 21400001 HC TELEMETRY ROOM: Mod: HCNC

## 2024-08-18 RX ORDER — GUAIFENESIN 100 MG/5ML
200 SOLUTION ORAL EVERY 4 HOURS PRN
Status: DISCONTINUED | OUTPATIENT
Start: 2024-08-18 | End: 2024-08-24 | Stop reason: HOSPADM

## 2024-08-18 RX ORDER — INSULIN ASPART 100 [IU]/ML
13 INJECTION, SOLUTION INTRAVENOUS; SUBCUTANEOUS
Status: DISCONTINUED | OUTPATIENT
Start: 2024-08-18 | End: 2024-08-20

## 2024-08-18 RX ORDER — INSULIN GLARGINE 100 [IU]/ML
16 INJECTION, SOLUTION SUBCUTANEOUS 2 TIMES DAILY
Status: DISCONTINUED | OUTPATIENT
Start: 2024-08-18 | End: 2024-08-19

## 2024-08-18 RX ORDER — INSULIN ASPART 100 [IU]/ML
11 INJECTION, SOLUTION INTRAVENOUS; SUBCUTANEOUS
Status: DISCONTINUED | OUTPATIENT
Start: 2024-08-18 | End: 2024-08-18

## 2024-08-18 RX ORDER — LOSARTAN POTASSIUM 50 MG/1
50 TABLET ORAL DAILY
Status: DISCONTINUED | OUTPATIENT
Start: 2024-08-18 | End: 2024-08-24 | Stop reason: HOSPADM

## 2024-08-18 RX ADMIN — LOSARTAN POTASSIUM 50 MG: 50 TABLET, FILM COATED ORAL at 08:08

## 2024-08-18 RX ADMIN — GABAPENTIN 300 MG: 300 CAPSULE ORAL at 03:08

## 2024-08-18 RX ADMIN — APIXABAN 5 MG: 5 TABLET, FILM COATED ORAL at 08:08

## 2024-08-18 RX ADMIN — ACETAMINOPHEN 1000 MG: 325 TABLET ORAL at 06:08

## 2024-08-18 RX ADMIN — VANCOMYCIN HYDROCHLORIDE 1750 MG: 500 INJECTION, POWDER, LYOPHILIZED, FOR SOLUTION INTRAVENOUS at 05:08

## 2024-08-18 RX ADMIN — CEFTRIAXONE SODIUM 2 G: 2 INJECTION, POWDER, FOR SOLUTION INTRAMUSCULAR; INTRAVENOUS at 10:08

## 2024-08-18 RX ADMIN — GABAPENTIN 300 MG: 300 CAPSULE ORAL at 08:08

## 2024-08-18 RX ADMIN — INSULIN ASPART 2 UNITS: 100 INJECTION, SOLUTION INTRAVENOUS; SUBCUTANEOUS at 12:08

## 2024-08-18 RX ADMIN — OXYCODONE HYDROCHLORIDE 10 MG: 10 TABLET ORAL at 12:08

## 2024-08-18 RX ADMIN — HYPROMELLOSE 2910 1 DROP: 5 SOLUTION/ DROPS OPHTHALMIC at 10:08

## 2024-08-18 RX ADMIN — GUAIFENESIN 200 MG: 200 SOLUTION ORAL at 10:08

## 2024-08-18 RX ADMIN — OXYCODONE HYDROCHLORIDE 10 MG: 10 TABLET ORAL at 04:08

## 2024-08-18 RX ADMIN — METHOCARBAMOL 750 MG: 750 TABLET ORAL at 09:08

## 2024-08-18 RX ADMIN — APIXABAN 5 MG: 5 TABLET, FILM COATED ORAL at 09:08

## 2024-08-18 RX ADMIN — METHOCARBAMOL 750 MG: 750 TABLET ORAL at 03:08

## 2024-08-18 RX ADMIN — CEFEPIME 2 G: 2 INJECTION, POWDER, FOR SOLUTION INTRAVENOUS at 02:08

## 2024-08-18 RX ADMIN — THERA TABS 1 TABLET: TAB at 08:08

## 2024-08-18 RX ADMIN — OXYCODONE HYDROCHLORIDE 10 MG: 10 TABLET ORAL at 09:08

## 2024-08-18 RX ADMIN — ACETAMINOPHEN 1000 MG: 325 TABLET ORAL at 12:08

## 2024-08-18 RX ADMIN — INSULIN ASPART 13 UNITS: 100 INJECTION, SOLUTION INTRAVENOUS; SUBCUTANEOUS at 04:08

## 2024-08-18 RX ADMIN — OXYCODONE HYDROCHLORIDE 10 MG: 10 TABLET ORAL at 08:08

## 2024-08-18 RX ADMIN — INSULIN ASPART 11 UNITS: 100 INJECTION, SOLUTION INTRAVENOUS; SUBCUTANEOUS at 12:08

## 2024-08-18 RX ADMIN — METOPROLOL SUCCINATE 25 MG: 25 TABLET, EXTENDED RELEASE ORAL at 08:08

## 2024-08-18 RX ADMIN — ACETAMINOPHEN 1000 MG: 325 TABLET ORAL at 09:08

## 2024-08-18 RX ADMIN — INSULIN GLARGINE 16 UNITS: 100 INJECTION, SOLUTION SUBCUTANEOUS at 09:08

## 2024-08-18 RX ADMIN — INSULIN GLARGINE 14 UNITS: 100 INJECTION, SOLUTION SUBCUTANEOUS at 08:08

## 2024-08-18 RX ADMIN — GABAPENTIN 300 MG: 300 CAPSULE ORAL at 09:08

## 2024-08-18 RX ADMIN — ASPIRIN 81 MG: 81 TABLET, COATED ORAL at 08:08

## 2024-08-18 RX ADMIN — ATORVASTATIN CALCIUM 80 MG: 40 TABLET, FILM COATED ORAL at 09:08

## 2024-08-18 RX ADMIN — INSULIN ASPART 2 UNITS: 100 INJECTION, SOLUTION INTRAVENOUS; SUBCUTANEOUS at 04:08

## 2024-08-18 RX ADMIN — OMEGA-3-ACID ETHYL ESTERS 1 G: 1 CAPSULE, LIQUID FILLED ORAL at 08:08

## 2024-08-18 RX ADMIN — VANCOMYCIN HYDROCHLORIDE 1750 MG: 500 INJECTION, POWDER, LYOPHILIZED, FOR SOLUTION INTRAVENOUS at 06:08

## 2024-08-18 RX ADMIN — METHOCARBAMOL 750 MG: 750 TABLET ORAL at 08:08

## 2024-08-18 NOTE — CONSULTS
[facility]  Infectious Disease  Consult Note    Patient Name: King Cool Jr.  MRN: [id number]  Admission Date: 8/15/2024  Hospital Length of Stay: 2 days  Attending Physician: Lorna Galvan MD  Primary Care Provider: Gallo Lara MD     Isolation Status: No active isolations    Patient information was obtained from patient, past medical records, ER records, and primary team.      Consults  Assessment/Plan:     ID  * Septic joint of right knee joint     69 year old with CAD, A fib on Eliquis, HTN, DM (A1C 11), and osteoarthritis of the knee with concern for septic right knee.  Patient has history of OA in the right knee and receives intraarticular steroid injections every 3 months.  Last injection was on 8/13.  Developed increased pain and swelling within 24 hours after the injection.  Underwent knee aspiration in Ortho clinic 8/15 which showed 143,000 WBCs, 85% segs and positive CCP crystals. No history of gout.  Subsequently sent to ED.   No history of gout.   He denied associated fever/chills.        Underwent I&D with Dr. Alfaro on 8/16.  Per OP note,  thick viscous fluid encountered.  Suspects septic arthritis superimposed over pseudogout.   No OR cultures available.  Aspirate cultures of 8/15 show anaerobic, fungal, and AFB pending.  No aerobic culture sent.  Gram stain negative. ID consulted for antibiotic recommendations     Patient afebrile, no leukocytosis.  , ESR 12.  Denies history of Staph or other infections. Denies recent antibiotic use.  Reports he is overall very healthy.  No high risk behaviors (except for motorcycles).    Currently on IV vancomycin and cefepime.     Plan/recommendations:   Continue IV vancomycin (pharmacy to dose. Trough goal 15-20)   Discontinue cefepime and start ceftriaxone 2 g q 24 hours   I have called Micro lab and requested add-on aerobic culture from available specimens.  Per Micro, samples just plated and they will be able to do this   Will follow  cultures and adjust antibiotics accordingly.    Will follow     Data reviewed and plan discussed with ID staff, Dr. Michaud  Secure chat/Discussed above plan with Primary Team,  Dr. Galvan        Thank you.   Please Secure Chat for any questions or concerns.  ALANNA Casper, ANP-C  Infectious Disease     I spent a total of 75 minutes on the day of the visit.This includes face to face time and non-face to face time preparing to see the patient (eg, review of tests), obtaining and/or reviewing separately obtained history, documenting clinical information in the electronic or other health record, independently interpreting results and communicating results to the patient/family/caregiver, or care coordinator.     Subjective:     Principal Problem: Septic joint of right knee joint    HPI:  King Cool is a 69 year old with CAD, A fib on Eliquis, HTN, DM (A1C 11), and osteoarthritis of the knee who presented from Ortho clinic with concern for septic right knee.  Patient has history of OA in the right knee and receives intraarticular steroid injections every 3 months.  Last injection was on 8/13. He subsequently developed increase pain and swelling.  Was evaluated in Ortho clinic 8/15 and had an arthrocentesis which showed [id number] WBCs and positive calcium pyrophosphate crystals. R knee xray showed severe osteoarthritis. He denied associated fever/chills.  He denies any history of gout.       In the ED, noted to be tachycardic, afebrile.  WBC 12.  , ESR wnl.   Aspirate cultures - only anaerobic, fungal, and AFB sent.  No aerobic.  Gram stain negative.  He is now s/p I&D with Dr. Alfaro.  No OR cultures available.   He was started on antibiotics prior to surgery. Currently on vancomycin and cefepime. ID consulted for broad spectrum antibiotic regimen.       Past Medical History:   Diagnosis Date    Coronary artery disease involving native coronary artery of native heart without angina pectoris 3/7/2023     Essential (primary) hypertension     Male erectile dysfunction, unspecified     New onset a-fib 1/3/2023    Testicular hypofunction     Type 2 diabetes mellitus without complications        Past Surgical History:   Procedure Laterality Date    HIP SURGERY      LEFT HEART CATHETERIZATION Left 3/7/2023    Procedure: Left heart cath;  Surgeon: Wil Mahoney MD;  Location: [facility] CATH LAB;  Service: Cardiology;  Laterality: Left;  1030am start, R rad access    RN PREOP 2/28/23       Review of patient's allergies indicates:   Allergen Reactions    Tamiflu [oseltamivir]      Increases BP    Metformin Hives     Diarrhea, nausea    Penicillins Rash       Medications:  Medications Prior to Admission   Medication Sig    alcohol swabs (DROPSAFE ALCOHOL PREP PADS) PadM USE AS DIRECTED AS NEEDED    apixaban (ELIQUIS) 5 mg Tab Take 1 tablet (5 mg total) by mouth 2 (two) times daily.    ASCORBATE CALCIUM (VITAMIN C ORAL) Take by mouth once daily.     aspirin (ECOTRIN) 81 MG EC tablet Take 81 mg by mouth once daily.    atorvastatin (LIPITOR) 80 MG tablet Take 1 tablet (80 mg total) by mouth every evening.    blood sugar diagnostic Strp 1 strip by Misc.(Non-Drug; Combo Route) route 3 (three) times daily.    blood-glucose meter kit Use as instructed    blood-glucose meter kit 1 each by Other route 3 (three) times daily. Use as instructed    diclofenac sodium (VOLTAREN) 1 % Gel Apply 2 g topically once daily.    doxycycline (VIBRA-TABS) 100 MG tablet Take 1 tablet (100 mg total) by mouth 2 (two) times daily.    empagliflozin (JARDIANCE) 25 mg tablet Take 1 tablet (25 mg total) by mouth once daily.    fish oil-omega-3 fatty acids 300-1,000 mg capsule Take 2 g by mouth once daily.    gabapentin (NEURONTIN) 300 MG capsule Take 1 capsule (300 mg total) by mouth 3 (three) times daily.    glimepiride (AMARYL) 4 MG tablet Take 1 tablet (4 mg total) by mouth 2 (two) times a day.    [START ON 10/13/2024] HYDROcodone-acetaminophen (NORCO)  " mg per tablet Take 1 tablet by mouth 3 (three) times daily as needed (pain).    [START ON 9/13/2024] HYDROcodone-acetaminophen (NORCO)  mg per tablet Take 1 tablet by mouth 3 (three) times daily as needed (pain).    HYDROcodone-acetaminophen (NORCO)  mg per tablet Take 1 tablet by mouth 3 (three) times daily as needed (pain).    hydrocortisone 2.5 % cream Apply topically 2 (two) times daily.    lancets Misc 1 application  by Misc.(Non-Drug; Combo Route) route 3 (three) times daily.    loratadine (CLARITIN) 10 mg tablet Take 1 tablet (10 mg total) by mouth once daily.    losartan (COZAAR) 25 MG tablet Take 1 tablet (25 mg total) by mouth once daily.    meloxicam (MOBIC) 7.5 MG tablet Take 1 tablet (7.5 mg total) by mouth once daily.    metoprolol succinate (TOPROL-XL) 25 MG 24 hr tablet Take 1 tablet (25 mg total) by mouth once daily.    multivitamin with minerals tablet Take 1 tablet by mouth once daily.    neomycin-polymyxin-hydrocortisone (CORTISPORIN) 3.5-10,000-1 mg/mL-unit/mL-% otic suspension PLACE 3 DROPS INTO THE LEFT EAR 4 TIMES DAILY.    semaglutide (OZEMPIC) 0.25 mg or 0.5 mg (2 mg/3 mL) pen injector Inject 0.5 mg into the skin every 7 days.    sodium chloride (SALINE NASAL) 0.65 % nasal spray 2 sprays by Nasal route 2 (two) times a day.    triamcinolone acetonide 0.1% (KENALOG) 0.1 % ointment Apply topically 2 (two) times daily as needed.     Antibiotics (From admission, onward)      Start     Stop Route Frequency Ordered    08/17/24 1800  vancomycin 1.75 g in 5 % dextrose 500 mL IVPB         -- IV Every 12 hours (non-standard times) 08/17/24 1306    08/16/24 0200  ceFEPIme (MAXIPIME) 2 g in D5W 100 mL IVPB (MB+)         -- IV Every 8 hours (non-standard times) 08/15/24 1833    08/15/24 1931  vancomycin - pharmacy to dose  (vancomycin IVPB (PEDS and ADULTS))        Placed in "And" Linked Group    -- IV pharmacy to manage frequency 08/15/24 1832          Antifungals (From admission, " onward)      None          Antivirals (From admission, onward)      None             Immunization History   Administered Date(s) Administered    Td (ADULT) 2015       Family History       Problem Relation (Age of Onset)    Blindness Mother    Diabetes Brother    No Known Problems Father, Brother, Daughter, Son, Daughter, Brother          Social History     Socioeconomic History    Marital status:     Number of children: 3    Highest education level: GED or equivalent   Tobacco Use    Smoking status: Former     Current packs/day: 0.00     Average packs/day: 0.3 packs/day for 4.0 years (1.0 ttl pk-yrs)     Types: Cigarettes     Start date: 1972     Quit date: 1976     Years since quittin.7    Smokeless tobacco: Never   Substance and Sexual Activity    Alcohol use: Yes     Comment: occ    Drug use: No    Sexual activity: Yes     Partners: Female     Birth control/protection: None     Social Determinants of Health     Financial Resource Strain: Low Risk  (2020)    Overall Financial Resource Strain (CARDIA)     Difficulty of Paying Living Expenses: Not hard at all   Food Insecurity: No Food Insecurity (2020)    Hunger Vital Sign     Worried About Running Out of Food in the Last Year: Never true     Ran Out of Food in the Last Year: Never true   Transportation Needs: No Transportation Needs (2020)    PRAPARE - Transportation     Lack of Transportation (Medical): No     Lack of Transportation (Non-Medical): No   Physical Activity: Sufficiently Active (2024)    Exercise Vital Sign     Days of Exercise per Week: 7 days     Minutes of Exercise per Session: 150+ min   Stress: No Stress Concern Present (2020)    [employer] - Occupational Stress Questionnaire     Feeling of Stress : Not at all     Review of Systems   Constitutional:  Positive for activity change.   Musculoskeletal:  Positive for arthralgias, gait problem and joint swelling.    All other systems reviewed and are negative.    Objective:     Vital Signs (Most Recent):  Temp: 100 °F (37.8 °C) (08/17/24 1534)  Pulse: 84 (08/17/24 1534)  Resp: 18 (08/17/24 1534)  BP: 123/63 (08/17/24 1534)  SpO2: 95 % (08/17/24 1534) Vital Signs (24h Range):  Temp:  [97.4 °F (36.3 °C)-100 °F (37.8 °C)] 100 °F (37.8 °C)  Pulse:  [71-89] 84  Resp:  [16-18] 18  SpO2:  [91 %-95 %] 95 %  BP: (123-146)/(63-73) 123/63     Weight: 95.4 kg (210 lb 5.1 oz)  Body mass index is 30.18 kg/m².    Estimated Creatinine Clearance: 102.5 mL/min (based on SCr of 0.8 mg/dL).     Physical Exam  Vitals and nursing note reviewed.   Constitutional:       General: He is not in acute distress.     Appearance: Normal appearance. He is not ill-appearing, toxic-appearing or diaphoretic.   HENT:      Head: Normocephalic.      Mouth/Throat:      Mouth: Mucous membranes are moist.   Eyes:      General: No scleral icterus.     Conjunctiva/sclera: Conjunctivae normal.   Cardiovascular:      Rate and Rhythm: Normal rate and regular rhythm.   Pulmonary:      Effort: Pulmonary effort is normal. No respiratory distress.   Abdominal:      General: There is no distension.      Palpations: Abdomen is soft.   Musculoskeletal:         General: Swelling and tenderness present.      Left lower leg: No edema.      Comments: Right knee wrapped in surgical dressing - c/d/i   Skin:     General: Skin is warm and dry.      Findings: Lesion (scabbed over abrasions right ankle) present.   Neurological:      Mental Status: He is alert and oriented to person, place, and time.   Psychiatric:         Mood and Affect: Mood normal.         Behavior: Behavior normal.          Significant Labs: Blood Culture:   Recent Labs   Lab 08/15/24  1700   LABBLOO No Growth to date  No Growth to date  No Growth to date  No Growth to date  No Growth to date  No Growth to date     CBC:   Recent Labs   Lab 08/16/24  0401   WBC 9.83   HGB 13.4*   HCT 41.0        CMP:  "  Recent Labs   Lab 08/16/24  0401 08/17/24  0329   * 135*   K 4.2 3.9    103   CO2 23 21*   * 176*   BUN 17 14   CREATININE 0.9 0.8   CALCIUM 8.9 8.9   PROT 6.5 6.3   ALBUMIN 3.2* 2.9*   BILITOT 1.1* 0.8   ALKPHOS 94 84   AST 12 12   ALT 15 12   ANIONGAP 9 11     Microbiology Results (last 7 days)       Procedure Component Value Units Date/Time    Blood culture x two cultures. Draw prior to antibiotics. [[id number]] Collected: 08/15/24 1700    Order Status: Completed Specimen: Blood from Peripheral, Antecubital, Left Updated: 08/17/24 1812     Blood Culture, Routine No Growth to date      No Growth to date      No Growth to date    Narrative:      Aerobic and anaerobic    Blood culture x two cultures. Draw prior to antibiotics. [[id number]] Collected: 08/15/24 1700    Order Status: Completed Specimen: Blood from Peripheral, Antecubital, Right Updated: 08/17/24 1812     Blood Culture, Routine No Growth to date      No Growth to date      No Growth to date    Narrative:      Aerobic and anaerobic    Aerobic culture [[id number]]     Order Status: Completed Specimen: Abscess from Knee, Right           Wound Culture: No results for input(s): "LABAERO" in the last 4320 hours.    Significant Imaging: I have reviewed all pertinent imaging results/findings within the past 24 hours.              "

## 2024-08-18 NOTE — ANESTHESIA POSTPROCEDURE EVALUATION
Anesthesia Post Evaluation    Patient: King Cool Jr.    Procedure(s) Performed: Procedure(s) (LRB):  ARTHROSCOPY, KNEE, WITH DEBRIDEMENT (Right)    Final Anesthesia Type: general      Patient location during evaluation: PACU  Patient participation: Yes- Able to Participate  Level of consciousness: awake and alert  Post-procedure vital signs: reviewed and stable  Pain management: adequate  Airway patency: patent    PONV status at discharge: No PONV  Anesthetic complications: no      Cardiovascular status: stable  Respiratory status: unassisted and spontaneous ventilation  Hydration status: euvolemic  Follow-up not needed.              Vitals Value Taken Time   /77 08/18/24 0416   Temp 37.4 °C (99.3 °F) 08/18/24 0416   Pulse 93 08/18/24 0416   Resp 16 08/18/24 0416   SpO2 96 % 08/18/24 0416         Event Time   Out of Recovery 08/16/2024 13:45:00         Pain/Tushar Score: Pain Rating Prior to Med Admin: 2 (8/18/2024  6:02 AM)

## 2024-08-18 NOTE — ASSESSMENT & PLAN NOTE
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 afebrile, WBC 12.09  -LA 1.5 with crp 144 on admit  -blood CX NG  -Arthrocentesis of right knee with [id number] WBCs and positive crystals but no CX sent, started on vanc/cefepime on admit  -OR 8/16 for I and D with ortho and reported Significant amounts of thick viscous fluid consistent with septic arthritis and/or CPPD. WBAT. Unclear situation as initially had no Cx data in system on 8/16 from clinic showing sent but now showing cultures from this but only fungal, anaerobic and AFB.dirk burnham with ID was able to get micro to place an aerobic cx and greatly appreciate her assistace and pending now  Ortho to change bandages 8/18 and swelling slowly improving. Crp slightly worse at 202. Ortho reports keep NPo at [place] Sunday night and recheck CRP in AM. If not improving, may aspirate again in AM and patient updated on this plan.  -MM pain control.  -Elevate RLE.  -Fall precautions.  -resumed eliquis and asa post op

## 2024-08-18 NOTE — CARE UPDATE
Orthopaedics Care Update    Patient's right knee appears to be clinically improving. CRP elevated to 202 from 144. 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 Ok for diet today. Will recheck CRP tomorrow and assess for down trending. NPO at midnight. If no improvement tomorrow, plan for reaspiration.    Ulices Diehl MD PGY3

## 2024-08-18 NOTE — ASSESSMENT & PLAN NOTE
Patient's FSGs are uncontrolled due to hyperglycemia on current medication regimen.  Last A1c reviewed-   Lab Results   Component Value Date    HGBA1C 11.5 (H) 08/13/2024     Most recent fingerstick glucose reviewed-   Recent Labs   Lab 08/17/24  1542 08/17/24  2030 08/18/24  0735   POCTGLUCOSE 238* 241* 263*       Current correctional scale  Low  Maintain anti-hyperglycemic dose as follows-   Antihyperglycemics (From admission, onward)      Start     Stop Route Frequency Ordered    08/18/24 2100  insulin glargine U-100 (Lantus) pen 16 Units         -- SubQ 2 times daily 08/18/24 0841    08/18/24 1130  insulin aspart U-100 pen 11 Units         -- SubQ 3 times daily with meals 08/18/24 0841    08/15/24 1917  insulin aspart U-100 pen 0-5 Units         -- SubQ Before meals & nightly PRN 08/15/24 1817          Hold Oral hypoglycemics while patient is in the hospital.   -Accuchecks AC/HS  -Diabetic/cardiac diet    Glucose uncontrolled on admit with values 269-330 since admit, and placed on home regimen, he reports doesn't run this high at home, and so will titrate post op and infection likely also worsening glucose  -improving 8/17 in higher 100s on cmp and lower 200s this AM and scheduled novolog started last night and adjusted lantus and will trend. Adjusting further 8/18

## 2024-08-18 NOTE — PROGRESS NOTES
[facility] Medicine  Progress Note    Patient Name: King Cool Jr.  MRN: [id number]  Patient Class: IP- Inpatient   Admission Date: 8/15/2024  Length of Stay: 3 days  Attending Physician: Lorna Galvan MD  Primary Care Provider: Gallo Lara MD        Subjective:     Principal Problem:Septic joint of right knee joint        HPI:  King Cool Jr. is a 69 y.o. male with a PMHx of CAD, a fib on eliquis, HTN, HLD, DM2, and osteoarthritis who presents to the ED from orthopedic clinic for evaluation of septic right knee. The patient reports he has a history of OA in his right knee and receives intra articular steroid injections every 3 months. His last injection was 8/13. He reports beginning yesterday he noticed increased swelling and pain to his right knee. He notes it was so painful he was unable to get out of bed this morning. He denies any trauma or falls. He states at baseline he ambulates unassisted. Denies numbness/tingling. He was seen in ortho clinic today and had an arthrocentesis which showed [id number] WBCs and positive crystals. R knee xray showed severe osteoarthritis. He denies fever/chills, N/V/D, abdominal pain, CP, SOB, cough, or dysuria.    In the ED, pt tachycardic and mildly hypertensive otherwise vitals stable, afebrile. WBC 12.09k. Glucose 330. Tbili 1.1. LA 1.5. Blood cxs in process. The patient received morphine, zofran, 1L NS, cefepime, and vancomycin. Ortho was consulted and recommend continuing NPO with plan for OR tomorrow for I&D right knee.     Overview/Hospital Course:  No notes on file    Interval history- sitting in chair, feeling well so far this morning, swelling still present but less than it was on admit but still much more than his baseline left knee. Bandages taken down today by ortho, they initially told him to hold off on eating to recheck CRP and was higher at 202 from 144, so checked in with dr torres with ortho, he said cilnically he looks better, so  can eat and he will make NPO at [place] again to check on it in AM and see what CRP does, and if worsening again then will consider aspirate tomorrow again if needed and updated patient on this plan and that cn eat now today. Issues with CX yesterday and dirk burnham talked to micro to get the right cultures plated and appreciate her help greatly and pending now. Glucose still in mid 200s at times and adjusted a few untis this morning.         Review of patient's allergies indicates:   Allergen Reactions    Tamiflu [oseltamivir]      Increases BP    Metformin Hives     Diarrhea, nausea    Penicillins Rash       Current Facility-Administered Medications on File Prior to Encounter   Medication    triamcinolone acetonide injection 40 mg     Current Outpatient Medications on File Prior to Encounter   Medication Sig    alcohol swabs (DROPSAFE ALCOHOL PREP PADS) PadM USE AS DIRECTED AS NEEDED    apixaban (ELIQUIS) 5 mg Tab Take 1 tablet (5 mg total) by mouth 2 (two) times daily.    ASCORBATE CALCIUM (VITAMIN C ORAL) Take by mouth once daily.     aspirin (ECOTRIN) 81 MG EC tablet Take 81 mg by mouth once daily.    atorvastatin (LIPITOR) 80 MG tablet Take 1 tablet (80 mg total) by mouth every evening.    blood sugar diagnostic Strp 1 strip by Misc.(Non-Drug; Combo Route) route 3 (three) times daily.    blood-glucose meter kit Use as instructed    blood-glucose meter kit 1 each by Other route 3 (three) times daily. Use as instructed    diclofenac sodium (VOLTAREN) 1 % Gel Apply 2 g topically once daily.    doxycycline (VIBRA-TABS) 100 MG tablet Take 1 tablet (100 mg total) by mouth 2 (two) times daily.    empagliflozin (JARDIANCE) 25 mg tablet Take 1 tablet (25 mg total) by mouth once daily.    fish oil-omega-3 fatty acids 300-1,000 mg capsule Take 2 g by mouth once daily.    gabapentin (NEURONTIN) 300 MG capsule Take 1 capsule (300 mg total) by mouth 3 (three) times daily.    glimepiride (AMARYL) 4 MG tablet Take 1 tablet (4 mg  total) by mouth 2 (two) times a day.    [START ON 10/13/2024] HYDROcodone-acetaminophen (NORCO)  mg per tablet Take 1 tablet by mouth 3 (three) times daily as needed (pain).    [START ON 2024] HYDROcodone-acetaminophen (NORCO)  mg per tablet Take 1 tablet by mouth 3 (three) times daily as needed (pain).    HYDROcodone-acetaminophen (NORCO)  mg per tablet Take 1 tablet by mouth 3 (three) times daily as needed (pain).    hydrocortisone 2.5 % cream Apply topically 2 (two) times daily.    lancets Misc 1 application  by Misc.(Non-Drug; Combo Route) route 3 (three) times daily.    loratadine (CLARITIN) 10 mg tablet Take 1 tablet (10 mg total) by mouth once daily.    losartan (COZAAR) 25 MG tablet Take 1 tablet (25 mg total) by mouth once daily.    meloxicam (MOBIC) 7.5 MG tablet Take 1 tablet (7.5 mg total) by mouth once daily.    metoprolol succinate (TOPROL-XL) 25 MG 24 hr tablet Take 1 tablet (25 mg total) by mouth once daily.    multivitamin with minerals tablet Take 1 tablet by mouth once daily.    neomycin-polymyxin-hydrocortisone (CORTISPORIN) 3.5-10,000-1 mg/mL-unit/mL-% otic suspension PLACE 3 DROPS INTO THE LEFT EAR 4 TIMES DAILY.    semaglutide (OZEMPIC) 0.25 mg or 0.5 mg (2 mg/3 mL) pen injector Inject 0.5 mg into the skin every 7 days.    sodium chloride (SALINE NASAL) 0.65 % nasal spray 2 sprays by Nasal route 2 (two) times a day.    triamcinolone acetonide 0.1% (KENALOG) 0.1 % ointment Apply topically 2 (two) times daily as needed.     Family History       Problem Relation (Age of Onset)    Blindness Mother    Diabetes Brother    No Known Problems Father, Brother, Daughter, Son, Daughter, Brother          Tobacco Use    Smoking status: Former     Current packs/day: 0.00     Average packs/day: 0.3 packs/day for 4.0 years (1.0 ttl pk-yrs)     Types: Cigarettes     Start date: 1972     Quit date: 1976     Years since quittin.7    Smokeless tobacco: Never   Substance and  Sexual Activity    Alcohol use: Yes     Comment: occ    Drug use: No    Sexual activity: Yes     Partners: Female     Birth control/protection: None     Review of Systems   Constitutional:  Negative for appetite change, chills, diaphoresis, fatigue and fever.   HENT:  Negative for congestion, rhinorrhea and sore throat.    Eyes:  Negative for photophobia and visual disturbance.   Respiratory:  Negative for cough, shortness of breath and wheezing.    Cardiovascular:  Negative for chest pain, palpitations and leg swelling.   Gastrointestinal:  Negative for abdominal distention, abdominal pain, diarrhea, nausea and vomiting.   Genitourinary:  Negative for dysuria, frequency and hematuria.   Musculoskeletal:  Positive for arthralgias (right knee), gait problem and joint swelling. Negative for neck pain.   Skin:  Negative for color change, pallor, rash and wound.   Neurological:  Negative for tremors, syncope, light-headedness, numbness and headaches.   Psychiatric/Behavioral:  Negative for confusion and hallucinations. The patient is not nervous/anxious.      Objective:     Vital Signs (Most Recent):  Temp: 99.6 °F (37.6 °C) (08/18/24 1129)  Pulse: 77 (08/18/24 1139)  Resp: 18 (08/18/24 1129)  BP: 132/65 (08/18/24 1129)  SpO2: 95 % (08/18/24 1129) Vital Signs (24h Range):  Temp:  [98.7 °F (37.1 °C)-100 °F (37.8 °C)] 99.6 °F (37.6 °C)  Pulse:  [76-93] 77  Resp:  [16-18] 18  SpO2:  [95 %-98 %] 95 %  BP: (123-159)/(63-77) 132/65     Weight: 95.4 kg (210 lb 5.1 oz)  Body mass index is 30.18 kg/m².     Physical Exam  Vitals and nursing note reviewed.   Constitutional:       General: He is not in acute distress.     Appearance: He is not toxic-appearing or diaphoretic.   HENT:      Head: Normocephalic and atraumatic.      Nose: Nose normal.      Mouth/Throat:      Mouth: Mucous membranes are moist.   Eyes:      Pupils: Pupils are equal, round, and reactive to light.   Cardiovascular:      Rate and Rhythm: Normal rate and  "regular rhythm.      Pulses: Normal pulses.           Dorsalis pedis pulses are 2+ on the right side.        Posterior tibial pulses are 2+ on the right side.   Pulmonary:      Effort: Pulmonary effort is normal. No respiratory distress.      Breath sounds: No wheezing, rhonchi or rales.   Abdominal:      General: Bowel sounds are normal. There is no distension.      Palpations: Abdomen is soft.      Tenderness: There is no abdominal tenderness. There is no guarding.   Musculoskeletal:      Cervical back: Normal range of motion.      Right knee: Swelling present. Decreased range of motion. Tenderness present.      Comments: Bandages removed with swellign to right knee still present but less than pre op, but still not at baseline compared to left knee.   Skin:     General: Skin is warm and dry.      Capillary Refill: Capillary refill takes less than 2 seconds.   Neurological:      General: No focal deficit present.      Mental Status: He is alert.      Sensory: No sensory deficit.      Motor: No weakness.   Psychiatric:         Mood and Affect: Mood normal.         Behavior: Behavior normal.              CRANIAL NERVES     CN III, IV, VI   Pupils are equal, round, and reactive to light.       Significant Labs: All pertinent labs within the past 24 hours have been reviewed.  CBC:   Recent Labs   Lab 08/18/24  0628   WBC 7.62   HGB 13.1*   HCT 39.3*        CMP:   Recent Labs   Lab 08/17/24  0329 08/18/24  0628   * 137   K 3.9 3.5    103   CO2 21* 22*   * 171*   BUN 14 13   CREATININE 0.8 0.7   CALCIUM 8.9 9.2   PROT 6.3 6.1   ALBUMIN 2.9* 2.6*   BILITOT 0.8 0.6   ALKPHOS 84 89   AST 12 23   ALT 12 25   ANIONGAP 11 12     Lactic Acid:   No results for input(s): "LACTATE" in the last 48 hours.      Significant Imaging: I have reviewed all pertinent imaging results/findings within the past 24 hours.        Assessment/Plan:      * Septic joint of right knee joint  -on chronic right knee injections " for OA and had recent injection the 13th then swelling after that was not typical for him with presumed nidus with entry portal,s aw ortho in clinic for this with concern for infection. afebrile, WBC 12.09  -LA 1.5 with crp 144 on admit  -blood CX NG  -Arthrocentesis of right knee with 103932 WBCs and positive crystals but no CX sent, started on vanc/cefepime on admit  -OR 8/16 for I and D with ortho and reported Significant amounts of thick viscous fluid consistent with septic arthritis and/or CPPD. WBAT. Unclear situation as initially had no Cx data in system on 8/16 from clinic showing sent but now showing cultures from this but only fungal, anaerobic and AFB.dirk burnham with ID was able to get micro to place an aerobic cx and greatly appreciate her assistace and pending now  Ortho to change bandages 8/18 and swelling slowly improving. Crp slightly worse at 202. Ortho reports keep NPo at [place] Sunday night and recheck CRP in AM. If not improving, may aspirate again in AM and patient updated on this plan.  -MM pain control.  -Elevate RLE.  -Fall precautions.  -resumed eliquis and asa post op     Coronary artery disease involving native coronary artery of native heart without angina pectoris  Patient with known CAD s/p stent placement, which is controlled Will continue Statin and monitor for S/Sx of angina/ACS. Continue to monitor on telemetry.   -Held ASA until post OR 8/17  [facility] outpatient cards notes per dr macias, has known mid LAD lesion per cath in 2023, if symptoms develop freddy consider PCI long term.    PAF (paroxysmal atrial fibrillation)  Chronic anticoagulation   Patient with Paroxysmal (<7 days) atrial fibrillation which is controlled currently with Beta Blocker. Patient is currently in sinus rhythm.[id number] Score: 3. Anticoagulation indicated. Anticoagulation done with eliquis .  -Hold eliquis for OR today and resumed post op  Per OP cards notes, if has any reoccurent nosebleeds on eliquis then plan  to consider watchman long term as had hx of nosebleeds in past on anticoagulation    Class 1 obesity due to excess calories with serious comorbidity and body mass index (BMI) of 33.0 to 33.9 in adult  Body mass index is 28.84 kg/m². Obesity complicates all aspects of disease management from diagnostic modalities to treatment.     Essential (primary) hypertension  Chronic, controlled. Latest blood pressure and vitals reviewed-     Temp:  [98.6 °F (37 °C)]   Pulse:  [101]   Resp:  [18-20]   BP: (153)/(87)   SpO2:  [95 %] .   Home meds for hypertension were reviewed and noted below.   Hypertension Medications               losartan (COZAAR) 25 MG tablet Take 1 tablet (25 mg total) by mouth once daily.    metoprolol succinate (TOPROL-XL) 25 MG 24 hr tablet Take 1 tablet (25 mg total) by mouth once daily.            While in the hospital, will manage blood pressure as follows; Continue home antihypertensive regimen    Will utilize p.r.n. blood pressure medication only if patient's blood pressure greater than 180/110 and he develops symptoms such as worsening chest pain or shortness of breath.    Type 2 diabetes mellitus with microalbuminuria, without long-term current use of insulin  Patient's FSGs are uncontrolled due to hyperglycemia on current medication regimen.  Last A1c reviewed-   Lab Results   Component Value Date    HGBA1C 11.5 (H) 08/13/2024     Most recent fingerstick glucose reviewed-   Recent Labs   Lab 08/17/24  1542 08/17/24  2030 08/18/24  0735   POCTGLUCOSE 238* 241* 263*       Current correctional scale  Low  Maintain anti-hyperglycemic dose as follows-   Antihyperglycemics (From admission, onward)      Start     Stop Route Frequency Ordered    08/18/24 2100  insulin glargine U-100 (Lantus) pen 16 Units         -- SubQ 2 times daily 08/18/24 0841    08/18/24 1130  insulin aspart U-100 pen 11 Units         -- SubQ 3 times daily with meals 08/18/24 0841    08/15/24 1917  insulin aspart U-100 pen 0-5 Units          -- SubQ Before meals & nightly PRN 08/15/24 1817          Hold Oral hypoglycemics while patient is in the hospital.   -Accuchecks AC/HS  -Diabetic/cardiac diet    Glucose uncontrolled on admit with values 269-330 since admit, and placed on home regimen, he reports doesn't run this high at home, and so will titrate post op and infection likely also worsening glucose  -improving 8/17 in higher 100s on cmp and lower 200s this AM and scheduled novolog started last night and adjusted lantus and will trend. Adjusting further 8/18      VTE Risk Mitigation (From admission, onward)           Ordered     apixaban tablet 5 mg  2 times daily         08/16/24 1537     IP VTE LOW RISK PATIENT  Once         08/15/24 1817     Place sequential compression device  Until discontinued         08/15/24 1817                    Discharge Planning   JUAN M: 8/19/2024     Code Status: Full Code   Is the patient medically ready for discharge?:     Reason for patient still in hospital (select all that apply): Patient trending condition  Discharge Plan A: Home with family, Home Health                  Lorna Galvan MD  Department of Hospital Medicine   [facility] - Surgery

## 2024-08-18 NOTE — SUBJECTIVE & OBJECTIVE
Interval history- sitting in chair, feeling well so far this morning, swelling still present but less than it was on admit but still much more than his baseline left knee. Bandages taken down today by ortho, they initially told him to hold off on eating to recheck CRP and was higher at 202 from 144, so checked in with dr torres with ortho, he said cilnically he looks better, so can eat and he will make NPO at [place] again to check on it in AM and see what CRP does, and if worsening again then will consider aspirate tomorrow again if needed and updated patient on this plan and that cn eat now today. Issues with CX yesterday and dirk burnham talked to micro to get the right cultures plated and appreciate her help greatly and pending now. Glucose still in mid 200s at times and adjusted a few untis this morning.         Review of patient's allergies indicates:   Allergen Reactions    Tamiflu [oseltamivir]      Increases BP    Metformin Hives     Diarrhea, nausea    Penicillins Rash       Current Facility-Administered Medications on File Prior to Encounter   Medication    triamcinolone acetonide injection 40 mg     Current Outpatient Medications on File Prior to Encounter   Medication Sig    alcohol swabs (DROPSAFE ALCOHOL PREP PADS) PadM USE AS DIRECTED AS NEEDED    apixaban (ELIQUIS) 5 mg Tab Take 1 tablet (5 mg total) by mouth 2 (two) times daily.    ASCORBATE CALCIUM (VITAMIN C ORAL) Take by mouth once daily.     aspirin (ECOTRIN) 81 MG EC tablet Take 81 mg by mouth once daily.    atorvastatin (LIPITOR) 80 MG tablet Take 1 tablet (80 mg total) by mouth every evening.    blood sugar diagnostic Strp 1 strip by Misc.(Non-Drug; Combo Route) route 3 (three) times daily.    blood-glucose meter kit Use as instructed    blood-glucose meter kit 1 each by Other route 3 (three) times daily. Use as instructed    diclofenac sodium (VOLTAREN) 1 % Gel Apply 2 g topically once daily.    doxycycline (VIBRA-TABS) 100 MG tablet Take 1 tablet  (100 mg total) by mouth 2 (two) times daily.    empagliflozin (JARDIANCE) 25 mg tablet Take 1 tablet (25 mg total) by mouth once daily.    fish oil-omega-3 fatty acids 300-1,000 mg capsule Take 2 g by mouth once daily.    gabapentin (NEURONTIN) 300 MG capsule Take 1 capsule (300 mg total) by mouth 3 (three) times daily.    glimepiride (AMARYL) 4 MG tablet Take 1 tablet (4 mg total) by mouth 2 (two) times a day.    [START ON 10/13/2024] HYDROcodone-acetaminophen (NORCO)  mg per tablet Take 1 tablet by mouth 3 (three) times daily as needed (pain).    [START ON 9/13/2024] HYDROcodone-acetaminophen (NORCO)  mg per tablet Take 1 tablet by mouth 3 (three) times daily as needed (pain).    HYDROcodone-acetaminophen (NORCO)  mg per tablet Take 1 tablet by mouth 3 (three) times daily as needed (pain).    hydrocortisone 2.5 % cream Apply topically 2 (two) times daily.    lancets Misc 1 application  by Misc.(Non-Drug; Combo Route) route 3 (three) times daily.    loratadine (CLARITIN) 10 mg tablet Take 1 tablet (10 mg total) by mouth once daily.    losartan (COZAAR) 25 MG tablet Take 1 tablet (25 mg total) by mouth once daily.    meloxicam (MOBIC) 7.5 MG tablet Take 1 tablet (7.5 mg total) by mouth once daily.    metoprolol succinate (TOPROL-XL) 25 MG 24 hr tablet Take 1 tablet (25 mg total) by mouth once daily.    multivitamin with minerals tablet Take 1 tablet by mouth once daily.    neomycin-polymyxin-hydrocortisone (CORTISPORIN) 3.5-10,000-1 mg/mL-unit/mL-% otic suspension PLACE 3 DROPS INTO THE LEFT EAR 4 TIMES DAILY.    semaglutide (OZEMPIC) 0.25 mg or 0.5 mg (2 mg/3 mL) pen injector Inject 0.5 mg into the skin every 7 days.    sodium chloride (SALINE NASAL) 0.65 % nasal spray 2 sprays by Nasal route 2 (two) times a day.    triamcinolone acetonide 0.1% (KENALOG) 0.1 % ointment Apply topically 2 (two) times daily as needed.     Family History       Problem Relation (Age of Onset)    Blindness Mother     Diabetes Brother    No Known Problems Father, Brother, Daughter, Son, Daughter, Brother          Tobacco Use    Smoking status: Former     Current packs/day: 0.00     Average packs/day: 0.3 packs/day for 4.0 years (1.0 ttl pk-yrs)     Types: Cigarettes     Start date: 1972     Quit date: 1976     Years since quittin.7    Smokeless tobacco: Never   Substance and Sexual Activity    Alcohol use: Yes     Comment: occ    Drug use: No    Sexual activity: Yes     Partners: Female     Birth control/protection: None     Review of Systems   Constitutional:  Negative for appetite change, chills, diaphoresis, fatigue and fever.   HENT:  Negative for congestion, rhinorrhea and sore throat.    Eyes:  Negative for photophobia and visual disturbance.   Respiratory:  Negative for cough, shortness of breath and wheezing.    Cardiovascular:  Negative for chest pain, palpitations and leg swelling.   Gastrointestinal:  Negative for abdominal distention, abdominal pain, diarrhea, nausea and vomiting.   Genitourinary:  Negative for dysuria, frequency and hematuria.   Musculoskeletal:  Positive for arthralgias (right knee), gait problem and joint swelling. Negative for neck pain.   Skin:  Negative for color change, pallor, rash and wound.   Neurological:  Negative for tremors, syncope, light-headedness, numbness and headaches.   Psychiatric/Behavioral:  Negative for confusion and hallucinations. The patient is not nervous/anxious.      Objective:     Vital Signs (Most Recent):  Temp: 99.6 °F (37.6 °C) (24 1129)  Pulse: 77 (24 1139)  Resp: 18 (24 1129)  BP: 132/65 (24 1129)  SpO2: 95 % (24 1129) Vital Signs (24h Range):  Temp:  [98.7 °F (37.1 °C)-100 °F (37.8 °C)] 99.6 °F (37.6 °C)  Pulse:  [76-93] 77  Resp:  [16-18] 18  SpO2:  [95 %-98 %] 95 %  BP: (123-159)/(63-77) 132/65     Weight: 95.4 kg (210 lb 5.1 oz)  Body mass index is 30.18 kg/m².     Physical Exam  Vitals and nursing note reviewed.    Constitutional:       General: He is not in acute distress.     Appearance: He is not toxic-appearing or diaphoretic.   HENT:      Head: Normocephalic and atraumatic.      Nose: Nose normal.      Mouth/Throat:      Mouth: Mucous membranes are moist.   Eyes:      Pupils: Pupils are equal, round, and reactive to light.   Cardiovascular:      Rate and Rhythm: Normal rate and regular rhythm.      Pulses: Normal pulses.           Dorsalis pedis pulses are 2+ on the right side.        Posterior tibial pulses are 2+ on the right side.   Pulmonary:      Effort: Pulmonary effort is normal. No respiratory distress.      Breath sounds: No wheezing, rhonchi or rales.   Abdominal:      General: Bowel sounds are normal. There is no distension.      Palpations: Abdomen is soft.      Tenderness: There is no abdominal tenderness. There is no guarding.   Musculoskeletal:      Cervical back: Normal range of motion.      Right knee: Swelling present. Decreased range of motion. Tenderness present.      Comments: Bandages removed with swellign to right knee still present but less than pre op, but still not at baseline compared to left knee.   Skin:     General: Skin is warm and dry.      Capillary Refill: Capillary refill takes less than 2 seconds.   Neurological:      General: No focal deficit present.      Mental Status: He is alert.      Sensory: No sensory deficit.      Motor: No weakness.   Psychiatric:         Mood and Affect: Mood normal.         Behavior: Behavior normal.              CRANIAL NERVES     CN III, IV, VI   Pupils are equal, round, and reactive to light.       Significant Labs: All pertinent labs within the past 24 hours have been reviewed.  CBC:   Recent Labs   Lab 08/18/24  0628   WBC 7.62   HGB 13.1*   HCT 39.3*        CMP:   Recent Labs   Lab 08/17/24  0329 08/18/24  0628   * 137   K 3.9 3.5    103   CO2 21* 22*   * 171*   BUN 14 13   CREATININE 0.8 0.7   CALCIUM 8.9 9.2   PROT 6.3 6.1  "  ALBUMIN 2.9* 2.6*   BILITOT 0.8 0.6   ALKPHOS 84 89   AST 12 23   ALT 12 25   ANIONGAP 11 12     Lactic Acid:   No results for input(s): "LACTATE" in the last 48 hours.      Significant Imaging: I have reviewed all pertinent imaging results/findings within the past 24 hours.      "

## 2024-08-18 NOTE — PLAN OF CARE
Problem: Adult Inpatient Plan of Care  Goal: Absence of Hospital-Acquired Illness or Injury  Outcome: Progressing  Intervention: Identify and Manage Fall Risk  Flowsheets (Taken 8/18/2024 1606)  Safety Promotion/Fall Prevention:   assistive device/personal item within reach   side rails raised x 2   lighting adjusted   medications reviewed  Plan of care was reviewed with the pt. [email or web address]. VSS. Cardiac and glucose monitoring was done. Supplemental insulin was given. Pain management was reviewed with the pt. PRN pain meds were given. Intake and output was documented. No major changes throughout the day. Safety precautions were in placed.

## 2024-08-18 NOTE — NURSING
Nurses Note -- 4 Eyes      8/18/2024   4:25 AM      Skin assessed during: Daily Assessment      [x] No Altered Skin Integrity Present    []Prevention Measures Documented      [] Yes- Altered Skin Integrity Present or Discovered   [] LDA Added if Not in Epic (Describe Wound)   [] New Altered Skin Integrity was Present on Admit and Documented in LDA   [] Wound Image Taken    Wound Care Consulted? No    Attending Nurse:  Jessy NOE RN    Second RN/Staff Member:   P. PCT

## 2024-08-18 NOTE — SUBJECTIVE & OBJECTIVE
Past Medical History:   Diagnosis Date    Coronary artery disease involving native coronary artery of native heart without angina pectoris 3/7/2023    Essential (primary) hypertension     Male erectile dysfunction, unspecified     New onset a-fib 1/3/2023    Testicular hypofunction     Type 2 diabetes mellitus without complications        Past Surgical History:   Procedure Laterality Date    HIP SURGERY      LEFT HEART CATHETERIZATION Left 3/7/2023    Procedure: Left heart cath;  Surgeon: Wil Mahoney MD;  Location: [facility] CATH LAB;  Service: Cardiology;  Laterality: Left;  1030am start, R rad access    RN PREOP 2/28/23       Review of patient's allergies indicates:   Allergen Reactions    Tamiflu [oseltamivir]      Increases BP    Metformin Hives     Diarrhea, nausea    Penicillins Rash       Medications:  Medications Prior to Admission   Medication Sig    alcohol swabs (DROPSAFE ALCOHOL PREP PADS) PadM USE AS DIRECTED AS NEEDED    apixaban (ELIQUIS) 5 mg Tab Take 1 tablet (5 mg total) by mouth 2 (two) times daily.    ASCORBATE CALCIUM (VITAMIN C ORAL) Take by mouth once daily.     aspirin (ECOTRIN) 81 MG EC tablet Take 81 mg by mouth once daily.    atorvastatin (LIPITOR) 80 MG tablet Take 1 tablet (80 mg total) by mouth every evening.    blood sugar diagnostic Strp 1 strip by Misc.(Non-Drug; Combo Route) route 3 (three) times daily.    blood-glucose meter kit Use as instructed    blood-glucose meter kit 1 each by Other route 3 (three) times daily. Use as instructed    diclofenac sodium (VOLTAREN) 1 % Gel Apply 2 g topically once daily.    doxycycline (VIBRA-TABS) 100 MG tablet Take 1 tablet (100 mg total) by mouth 2 (two) times daily.    empagliflozin (JARDIANCE) 25 mg tablet Take 1 tablet (25 mg total) by mouth once daily.    fish oil-omega-3 fatty acids 300-1,000 mg capsule Take 2 g by mouth once daily.    gabapentin (NEURONTIN) 300 MG capsule Take 1 capsule (300 mg total) by mouth 3 (three) times daily.     glimepiride (AMARYL) 4 MG tablet Take 1 tablet (4 mg total) by mouth 2 (two) times a day.    [START ON 10/13/2024] HYDROcodone-acetaminophen (NORCO)  mg per tablet Take 1 tablet by mouth 3 (three) times daily as needed (pain).    [START ON 9/13/2024] HYDROcodone-acetaminophen (NORCO)  mg per tablet Take 1 tablet by mouth 3 (three) times daily as needed (pain).    HYDROcodone-acetaminophen (NORCO)  mg per tablet Take 1 tablet by mouth 3 (three) times daily as needed (pain).    hydrocortisone 2.5 % cream Apply topically 2 (two) times daily.    lancets Misc 1 application  by Misc.(Non-Drug; Combo Route) route 3 (three) times daily.    loratadine (CLARITIN) 10 mg tablet Take 1 tablet (10 mg total) by mouth once daily.    losartan (COZAAR) 25 MG tablet Take 1 tablet (25 mg total) by mouth once daily.    meloxicam (MOBIC) 7.5 MG tablet Take 1 tablet (7.5 mg total) by mouth once daily.    metoprolol succinate (TOPROL-XL) 25 MG 24 hr tablet Take 1 tablet (25 mg total) by mouth once daily.    multivitamin with minerals tablet Take 1 tablet by mouth once daily.    neomycin-polymyxin-hydrocortisone (CORTISPORIN) 3.5-10,000-1 mg/mL-unit/mL-% otic suspension PLACE 3 DROPS INTO THE LEFT EAR 4 TIMES DAILY.    semaglutide (OZEMPIC) 0.25 mg or 0.5 mg (2 mg/3 mL) pen injector Inject 0.5 mg into the skin every 7 days.    sodium chloride (SALINE NASAL) 0.65 % nasal spray 2 sprays by Nasal route 2 (two) times a day.    triamcinolone acetonide 0.1% (KENALOG) 0.1 % ointment Apply topically 2 (two) times daily as needed.     Antibiotics (From admission, onward)      Start     Stop Route Frequency Ordered    08/17/24 1800  vancomycin 1.75 g in 5 % dextrose 500 mL IVPB         -- IV Every 12 hours (non-standard times) 08/17/24 1306    08/16/24 0200  ceFEPIme (MAXIPIME) 2 g in D5W 100 mL IVPB (MB+)         -- IV Every 8 hours (non-standard times) 08/15/24 1839    08/15/24 1931  vancomycin - pharmacy to dose  (vancomycin IVPB  "(PEDS and ADULTS))        Placed in "And" Linked Group    -- IV pharmacy to manage frequency 08/15/24 1832          Antifungals (From admission, onward)      None          Antivirals (From admission, onward)      None             Immunization History   Administered Date(s) Administered    Td (ADULT) 2015       Family History       Problem Relation (Age of Onset)    Blindness Mother    Diabetes Brother    No Known Problems Father, Brother, Daughter, Son, Daughter, Brother          Social History     Socioeconomic History    Marital status:     Number of children: 3    Highest education level: GED or equivalent   Tobacco Use    Smoking status: Former     Current packs/day: 0.00     Average packs/day: 0.3 packs/day for 4.0 years (1.0 ttl pk-yrs)     Types: Cigarettes     Start date: 1972     Quit date: 1976     Years since quittin.7    Smokeless tobacco: Never   Substance and Sexual Activity    Alcohol use: Yes     Comment: occ    Drug use: No    Sexual activity: Yes     Partners: Female     Birth control/protection: None     Social Determinants of Health     Financial Resource Strain: Low Risk  (2020)    Overall Financial Resource Strain (CARDIA)     Difficulty of Paying Living Expenses: Not hard at all   Food Insecurity: No Food Insecurity (2020)    Hunger Vital Sign     Worried About Running Out of Food in the Last Year: Never true     Ran Out of Food in the Last Year: Never true   Transportation Needs: No Transportation Needs (2020)    PRAPARE - Transportation     Lack of Transportation (Medical): No     Lack of Transportation (Non-Medical): No   Physical Activity: Sufficiently Active (2024)    Exercise Vital Sign     Days of Exercise per Week: 7 days     Minutes of Exercise per Session: 150+ min   Stress: No Stress Concern Present (2020)    [employer] [place] - Occupational Stress Questionnaire     Feeling of Stress : Not at all "     Review of Systems   Constitutional:  Positive for activity change.   Musculoskeletal:  Positive for arthralgias, gait problem and joint swelling.   All other systems reviewed and are negative.    Objective:     Vital Signs (Most Recent):  Temp: 100 °F (37.8 °C) (08/17/24 1534)  Pulse: 84 (08/17/24 1534)  Resp: 18 (08/17/24 1534)  BP: 123/63 (08/17/24 1534)  SpO2: 95 % (08/17/24 1534) Vital Signs (24h Range):  Temp:  [97.4 °F (36.3 °C)-100 °F (37.8 °C)] 100 °F (37.8 °C)  Pulse:  [71-89] 84  Resp:  [16-18] 18  SpO2:  [91 %-95 %] 95 %  BP: (123-146)/(63-73) 123/63     Weight: 95.4 kg (210 lb 5.1 oz)  Body mass index is 30.18 kg/m².    Estimated Creatinine Clearance: 102.5 mL/min (based on SCr of 0.8 mg/dL).     Physical Exam  Vitals and nursing note reviewed.   Constitutional:       General: He is not in acute distress.     Appearance: Normal appearance. He is not ill-appearing, toxic-appearing or diaphoretic.   HENT:      Head: Normocephalic.      Mouth/Throat:      Mouth: Mucous membranes are moist.   Eyes:      General: No scleral icterus.     Conjunctiva/sclera: Conjunctivae normal.   Cardiovascular:      Rate and Rhythm: Normal rate and regular rhythm.   Pulmonary:      Effort: Pulmonary effort is normal. No respiratory distress.   Abdominal:      General: There is no distension.      Palpations: Abdomen is soft.   Musculoskeletal:         General: Swelling and tenderness present.      Left lower leg: No edema.      Comments: Right knee wrapped in surgical dressing - c/d/i   Skin:     General: Skin is warm and dry.      Findings: Lesion (scabbed over abrasions right ankle) present.   Neurological:      Mental Status: He is alert and oriented to person, place, and time.   Psychiatric:         Mood and Affect: Mood normal.         Behavior: Behavior normal.          Significant Labs: Blood Culture:   Recent Labs   Lab 08/15/24  1700   LABBLOO No Growth to date  No Growth to date  No Growth to date  No Growth  "to date  No Growth to date  No Growth to date     CBC:   Recent Labs   Lab 08/16/24  0401   WBC 9.83   HGB 13.4*   HCT 41.0        CMP:   Recent Labs   Lab 08/16/24  0401 08/17/24  0329   * 135*   K 4.2 3.9    103   CO2 23 21*   * 176*   BUN 17 14   CREATININE 0.9 0.8   CALCIUM 8.9 8.9   PROT 6.5 6.3   ALBUMIN 3.2* 2.9*   BILITOT 1.1* 0.8   ALKPHOS 94 84   AST 12 12   ALT 15 12   ANIONGAP 9 11     Microbiology Results (last 7 days)       Procedure Component Value Units Date/Time    Blood culture x two cultures. Draw prior to antibiotics. [[id number]] Collected: 08/15/24 1700    Order Status: Completed Specimen: Blood from Peripheral, Antecubital, Left Updated: 08/17/24 1812     Blood Culture, Routine No Growth to date      No Growth to date      No Growth to date    Narrative:      Aerobic and anaerobic    Blood culture x two cultures. Draw prior to antibiotics. [[id number]] Collected: 08/15/24 1700    Order Status: Completed Specimen: Blood from Peripheral, Antecubital, Right Updated: 08/17/24 1812     Blood Culture, Routine No Growth to date      No Growth to date      No Growth to date    Narrative:      Aerobic and anaerobic    Aerobic culture [[id number]]     Order Status: Completed Specimen: Abscess from Knee, Right           Wound Culture: No results for input(s): "LABAERO" in the last 4320 hours.    Significant Imaging: I have reviewed all pertinent imaging results/findings within the past 24 hours.  "

## 2024-08-18 NOTE — SUBJECTIVE & OBJECTIVE
Principal Problem:Septic joint of right knee joint    Principal Orthopedic Problem: as above now s/p R knee arthroscopic I&D 8/16    Interval History: Pt seen and examined at bedside. MARIO COTA. Pain improving, knee still expectedly swollen.       Review of patient's allergies indicates:   Allergen Reactions    Tamiflu [oseltamivir]      Increases BP    Metformin Hives     Diarrhea, nausea    Penicillins Rash       Current Facility-Administered Medications   Medication    acetaminophen tablet 1,000 mg    apixaban tablet 5 mg    aspirin EC tablet 81 mg    atorvastatin tablet 80 mg    cefTRIAXone (ROCEPHIN) 2 g in D5W 100 mL IVPB (MB+)    dextrose 10% bolus 125 mL 125 mL    dextrose 10% bolus 250 mL 250 mL    gabapentin capsule 300 mg    glucagon (human recombinant) injection 1 mg    glucose chewable tablet 16 g    glucose chewable tablet 24 g    HYDROmorphone injection 0.2 mg    insulin aspart U-100 pen 0-5 Units    insulin aspart U-100 pen 9 Units    insulin glargine U-100 (Lantus) pen 14 Units    losartan tablet 25 mg    melatonin tablet 6 mg    methocarbamoL tablet 750 mg    metoprolol succinate (TOPROL-XL) 24 hr tablet 25 mg    multivitamin tablet    naloxone 0.4 mg/mL injection 0.02 mg    omega-3 acid ethyl esters capsule 1 g    ondansetron injection 4 mg    oxyCODONE immediate release tablet 5 mg    oxyCODONE immediate release tablet Tab 10 mg    sodium chloride 0.9% flush 10 mL    vancomycin - pharmacy to dose    vancomycin 1.75 g in 5 % dextrose 500 mL IVPB     Facility-Administered Medications Ordered in Other Encounters   Medication    triamcinolone acetonide injection 40 mg     Objective:     Vital Signs (Most Recent):  Temp: 99.3 °F (37.4 °C) (08/18/24 0416)  Pulse: 93 (08/18/24 0416)  Resp: 16 (08/18/24 0416)  BP: (!) 148/77 (08/18/24 0416)  SpO2: 96 % (08/18/24 0416) Vital Signs (24h Range):  Temp:  [97.4 °F (36.3 °C)-100 °F (37.8 °C)] 99.3 °F (37.4 °C)  Pulse:  [76-93] 93  Resp:  [16-18] 16  SpO2:   "[94 %-98 %] 96 %  BP: (123-159)/(63-77) 148/77     Weight: 95.4 kg (210 lb 5.1 oz)  Height: 5' 10" (177.8 cm)  Body mass index is 30.18 kg/m².      Intake/Output Summary (Last 24 hours) at 8/18/2024 0653  Last data filed at 8/18/2024 0052  Gross per 24 hour   Intake --   Output 1475 ml   Net -1475 ml        Ortho/SPM Exam     AAOx4  NAD  Reg rate  No increased WOB    RLE    Dressing taken down today, saturation of lateral side, no active bleeding or drainage  No expressible purulence  Compartments soft/compressible  SILT throughout  AROM of toes, ankle, intact.  WWP. Brisk cap refill            Significant Labs: All pertinent labs within the past 24 hours have been reviewed.    Significant Imaging: I have reviewed and interpreted all pertinent imaging results/findings.  "

## 2024-08-18 NOTE — ASSESSMENT & PLAN NOTE
King Travon Wetzel is a 69 y.o. male presenting with Right knee septic arthritis. Plan for OR today for arthroscopic vs open I&D. He is booked, marked and consented for surgery.    Now s/p R knee arthroscopic I&D 8/16    - WBAT  - fu cultures   - Abx per ID: vanc and rocephin  - NPO until CRP comes back   - Pain control: multimodal pain management    Dispo: will plan to take dressing down tomorrow, repeat CRP, NPO at midnight

## 2024-08-18 NOTE — PROGRESS NOTES
[facility] - Surgery  Orthopedics  Progress Note    Patient Name: King Cool Jr.  MRN: [id number]  Admission Date: 8/15/2024  Hospital Length of Stay: 3 days  Attending Provider: Lorna Galvan MD  Primary Care Provider: Gallo Lara MD  Follow-up For: Procedure(s) (LRB):  ARTHROSCOPY, KNEE, WITH DEBRIDEMENT (Right)    Post-Operative Day: 2 Days Post-Op  Subjective:     Principal Problem:Septic joint of right knee joint    Principal Orthopedic Problem: as above now s/p R knee arthroscopic I&D 8/16    Interval History: Pt seen and examined at bedside. VSS.  NAEON. Pain improving, knee still expectedly swollen.       Review of patient's allergies indicates:   Allergen Reactions    Tamiflu [oseltamivir]      Increases BP    Metformin Hives     Diarrhea, nausea    Penicillins Rash       Current Facility-Administered Medications   Medication    acetaminophen tablet 1,000 mg    apixaban tablet 5 mg    aspirin EC tablet 81 mg    atorvastatin tablet 80 mg    cefTRIAXone (ROCEPHIN) 2 g in D5W 100 mL IVPB (MB+)    dextrose 10% bolus 125 mL 125 mL    dextrose 10% bolus 250 mL 250 mL    gabapentin capsule 300 mg    glucagon (human recombinant) injection 1 mg    glucose chewable tablet 16 g    glucose chewable tablet 24 g    HYDROmorphone injection 0.2 mg    insulin aspart U-100 pen 0-5 Units    insulin aspart U-100 pen 9 Units    insulin glargine U-100 (Lantus) pen 14 Units    losartan tablet 25 mg    melatonin tablet 6 mg    methocarbamoL tablet 750 mg    metoprolol succinate (TOPROL-XL) 24 hr tablet 25 mg    multivitamin tablet    naloxone 0.4 mg/mL injection 0.02 mg    omega-3 acid ethyl esters capsule 1 g    ondansetron injection 4 mg    oxyCODONE immediate release tablet 5 mg    oxyCODONE immediate release tablet Tab 10 mg    sodium chloride 0.9% flush 10 mL    vancomycin - pharmacy to dose    vancomycin 1.75 g in 5 % dextrose 500 mL IVPB     Facility-Administered Medications Ordered in Other Encounters   Medication  "   triamcinolone acetonide injection 40 mg     Objective:     Vital Signs (Most Recent):  Temp: 99.3 °F (37.4 °C) (08/18/24 0416)  Pulse: 93 (08/18/24 0416)  Resp: 16 (08/18/24 0416)  BP: (!) 148/77 (08/18/24 0416)  SpO2: 96 % (08/18/24 0416) Vital Signs (24h Range):  Temp:  [97.4 °F (36.3 °C)-100 °F (37.8 °C)] 99.3 °F (37.4 °C)  Pulse:  [76-93] 93  Resp:  [16-18] 16  SpO2:  [94 %-98 %] 96 %  BP: (123-159)/(63-77) 148/77     Weight: 95.4 kg (210 lb 5.1 oz)  Height: 5' 10" (177.8 cm)  Body mass index is 30.18 kg/m².      Intake/Output Summary (Last 24 hours) at 8/18/2024 0653  Last data filed at 8/18/2024 0052  Gross per 24 hour   Intake --   Output 1475 ml   Net -1475 ml        Ortho/SPM Exam     AAOx4  NAD  Reg rate  No increased WOB    RLE    Dressing taken down today, saturation of lateral side, no active bleeding or drainage  No expressible purulence  Compartments soft/compressible  SILT throughout  AROM of toes, ankle, intact.  WWP. Brisk cap refill            Significant Labs: All pertinent labs within the past 24 hours have been reviewed.    Significant Imaging: I have reviewed and interpreted all pertinent imaging results/findings.  Assessment/Plan:     * Septic joint of right knee joint  King Cool Jr. is a 69 y.o. male presenting with Right knee septic arthritis. Plan for OR today for arthroscopic vs open I&D. He is booked, marked and consented for surgery.    Now s/p R knee arthroscopic I&D 8/16    - WBAT  - fu cultures   - Abx per ID: vanc and rocephin  - NPO until CRP comes back   - Pain control: multimodal pain management    Dispo: will plan to take dressing down tomorrow, repeat CRP, NPO at midnight               JANET Diehl MD  Orthopedics  [facility] - Surgery    "

## 2024-08-19 LAB
ALBUMIN SERPL BCP-MCNC: 2.8 G/DL (ref 3.5–5.2)
ALP SERPL-CCNC: 115 U/L (ref 55–135)
ALT SERPL W/O P-5'-P-CCNC: 39 U/L (ref 10–44)
ANION GAP SERPL CALC-SCNC: 10 MMOL/L (ref 8–16)
APPEARANCE FLD: NORMAL
AST SERPL-CCNC: 34 U/L (ref 10–40)
BILIRUB SERPL-MCNC: 0.8 MG/DL (ref 0.1–1)
BODY FLD TYPE: ABNORMAL
BODY FLD TYPE: NORMAL
BUN SERPL-MCNC: 16 MG/DL (ref 8–23)
CALCIUM SERPL-MCNC: 9.6 MG/DL (ref 8.7–10.5)
CHLORIDE SERPL-SCNC: 100 MMOL/L (ref 95–110)
CO2 SERPL-SCNC: 25 MMOL/L (ref 23–29)
COLOR FLD: NORMAL
CREAT SERPL-MCNC: 0.9 MG/DL (ref 0.5–1.4)
CRP SERPL-MCNC: 251.9 MG/L (ref 0–8.2)
CRYSTALS FLD MICRO: POSITIVE
EST. GFR  (NO RACE VARIABLE): >60 ML/MIN/1.73 M^2
GLUCOSE SERPL-MCNC: 178 MG/DL (ref 70–110)
GRAM STN SPEC: NORMAL
GRAM STN SPEC: NORMAL
INFLUENZA A, MOLECULAR: NOT DETECTED
INFLUENZA B, MOLECULAR: NOT DETECTED
LYMPHOCYTES NFR FLD MANUAL: 5 %
MONOS+MACROS NFR FLD MANUAL: 9 %
NEUTROPHILS NFR FLD MANUAL: 86 %
POCT GLUCOSE: 215 MG/DL (ref 70–110)
POCT GLUCOSE: 225 MG/DL (ref 70–110)
POCT GLUCOSE: 238 MG/DL (ref 70–110)
POTASSIUM SERPL-SCNC: 3.8 MMOL/L (ref 3.5–5.1)
PROT SERPL-MCNC: 6.8 G/DL (ref 6–8.4)
RSV AG BY MOLECULAR METHOD: NOT DETECTED
SARS-COV-2 RNA RESP QL NAA+PROBE: NOT DETECTED
SODIUM SERPL-SCNC: 135 MMOL/L (ref 136–145)
VANCOMYCIN TROUGH SERPL-MCNC: 12.8 UG/ML (ref 10–22)
WBC # FLD: NORMAL /CU MM

## 2024-08-19 PROCEDURE — 80053 COMPREHEN METABOLIC PANEL: CPT | Mod: HCNC | Performed by: HOSPITALIST

## 2024-08-19 PROCEDURE — 63600175 PHARM REV CODE 636 W HCPCS: Mod: HCNC | Performed by: NURSE PRACTITIONER

## 2024-08-19 PROCEDURE — 86140 C-REACTIVE PROTEIN: CPT | Mod: HCNC

## 2024-08-19 PROCEDURE — 97116 GAIT TRAINING THERAPY: CPT | Mod: HCNC,CQ

## 2024-08-19 PROCEDURE — 87206 SMEAR FLUORESCENT/ACID STAI: CPT | Mod: HCNC

## 2024-08-19 PROCEDURE — 87075 CULTR BACTERIA EXCEPT BLOOD: CPT | Mod: HCNC

## 2024-08-19 PROCEDURE — 0S9C3ZZ DRAINAGE OF RIGHT KNEE JOINT, PERCUTANEOUS APPROACH: ICD-10-PCS | Performed by: ORTHOPAEDIC SURGERY

## 2024-08-19 PROCEDURE — 63600175 PHARM REV CODE 636 W HCPCS: Mod: HCNC | Performed by: HOSPITALIST

## 2024-08-19 PROCEDURE — 87070 CULTURE OTHR SPECIMN AEROBIC: CPT | Mod: HCNC

## 2024-08-19 PROCEDURE — 99233 SBSQ HOSP IP/OBS HIGH 50: CPT | Mod: HCNC,,, | Performed by: PHYSICIAN ASSISTANT

## 2024-08-19 PROCEDURE — 21400001 HC TELEMETRY ROOM: Mod: HCNC

## 2024-08-19 PROCEDURE — 89060 EXAM SYNOVIAL FLUID CRYSTALS: CPT | Mod: HCNC

## 2024-08-19 PROCEDURE — 87102 FUNGUS ISOLATION CULTURE: CPT | Mod: HCNC

## 2024-08-19 PROCEDURE — 87205 SMEAR GRAM STAIN: CPT | Mod: HCNC

## 2024-08-19 PROCEDURE — 87040 BLOOD CULTURE FOR BACTERIA: CPT | Mod: HCNC | Performed by: PHYSICIAN ASSISTANT

## 2024-08-19 PROCEDURE — 0241U SARS-COV2 (COVID) WITH FLU/RSV BY PCR: CPT | Mod: HCNC | Performed by: HOSPITALIST

## 2024-08-19 PROCEDURE — 25000003 PHARM REV CODE 250: Mod: HCNC | Performed by: NURSE PRACTITIONER

## 2024-08-19 PROCEDURE — 87116 MYCOBACTERIA CULTURE: CPT | Mod: HCNC

## 2024-08-19 PROCEDURE — 89051 BODY FLUID CELL COUNT: CPT | Mod: HCNC

## 2024-08-19 PROCEDURE — 80202 ASSAY OF VANCOMYCIN: CPT | Mod: HCNC | Performed by: HOSPITALIST

## 2024-08-19 PROCEDURE — 27000207 HC ISOLATION: Mod: HCNC

## 2024-08-19 PROCEDURE — 36415 COLL VENOUS BLD VENIPUNCTURE: CPT | Mod: HCNC | Performed by: PHYSICIAN ASSISTANT

## 2024-08-19 PROCEDURE — 25000003 PHARM REV CODE 250: Mod: HCNC | Performed by: HOSPITALIST

## 2024-08-19 RX ORDER — INSULIN GLARGINE 100 [IU]/ML
18 INJECTION, SOLUTION SUBCUTANEOUS 2 TIMES DAILY
Status: DISCONTINUED | OUTPATIENT
Start: 2024-08-19 | End: 2024-08-20

## 2024-08-19 RX ADMIN — ACETAMINOPHEN 1000 MG: 325 TABLET ORAL at 10:08

## 2024-08-19 RX ADMIN — INSULIN ASPART 2 UNITS: 100 INJECTION, SOLUTION INTRAVENOUS; SUBCUTANEOUS at 04:08

## 2024-08-19 RX ADMIN — OXYCODONE HYDROCHLORIDE 10 MG: 10 TABLET ORAL at 08:08

## 2024-08-19 RX ADMIN — OXYCODONE HYDROCHLORIDE 10 MG: 10 TABLET ORAL at 06:08

## 2024-08-19 RX ADMIN — GABAPENTIN 300 MG: 300 CAPSULE ORAL at 08:08

## 2024-08-19 RX ADMIN — INSULIN ASPART 13 UNITS: 100 INJECTION, SOLUTION INTRAVENOUS; SUBCUTANEOUS at 12:08

## 2024-08-19 RX ADMIN — CEFTRIAXONE SODIUM 2 G: 2 INJECTION, POWDER, FOR SOLUTION INTRAMUSCULAR; INTRAVENOUS at 11:08

## 2024-08-19 RX ADMIN — INSULIN GLARGINE 16 UNITS: 100 INJECTION, SOLUTION SUBCUTANEOUS at 09:08

## 2024-08-19 RX ADMIN — INSULIN ASPART 2 UNITS: 100 INJECTION, SOLUTION INTRAVENOUS; SUBCUTANEOUS at 12:08

## 2024-08-19 RX ADMIN — INSULIN ASPART 13 UNITS: 100 INJECTION, SOLUTION INTRAVENOUS; SUBCUTANEOUS at 04:08

## 2024-08-19 RX ADMIN — VANCOMYCIN HYDROCHLORIDE 2000 MG: 500 INJECTION, POWDER, LYOPHILIZED, FOR SOLUTION INTRAVENOUS at 05:08

## 2024-08-19 RX ADMIN — METHOCARBAMOL 750 MG: 750 TABLET ORAL at 03:08

## 2024-08-19 RX ADMIN — METHOCARBAMOL 750 MG: 750 TABLET ORAL at 08:08

## 2024-08-19 RX ADMIN — ATORVASTATIN CALCIUM 80 MG: 40 TABLET, FILM COATED ORAL at 08:08

## 2024-08-19 RX ADMIN — INSULIN GLARGINE 18 UNITS: 100 INJECTION, SOLUTION SUBCUTANEOUS at 09:08

## 2024-08-19 RX ADMIN — INSULIN ASPART 2 UNITS: 100 INJECTION, SOLUTION INTRAVENOUS; SUBCUTANEOUS at 09:08

## 2024-08-19 RX ADMIN — VANCOMYCIN HYDROCHLORIDE 1750 MG: 500 INJECTION, POWDER, LYOPHILIZED, FOR SOLUTION INTRAVENOUS at 06:08

## 2024-08-19 RX ADMIN — Medication 6 MG: at 08:08

## 2024-08-19 RX ADMIN — APIXABAN 5 MG: 5 TABLET, FILM COATED ORAL at 08:08

## 2024-08-19 NOTE — ASSESSMENT & PLAN NOTE
Patient's FSGs are uncontrolled due to hyperglycemia on current medication regimen.  Last A1c reviewed-   Lab Results   Component Value Date    HGBA1C 11.5 (H) 08/13/2024     Most recent fingerstick glucose reviewed-   Recent Labs   Lab 08/18/24  1158 08/18/24  1522 08/18/24  2132 08/19/24  0730   POCTGLUCOSE 244* 221* 172* 238*       Current correctional scale  Low  Maintain anti-hyperglycemic dose as follows-   Antihyperglycemics (From admission, onward)      Start     Stop Route Frequency Ordered    08/18/24 2100  insulin glargine U-100 (Lantus) pen 16 Units         -- SubQ 2 times daily 08/18/24 0841    08/18/24 1645  insulin aspart U-100 pen 13 Units         -- SubQ 3 times daily with meals 08/18/24 1603    08/15/24 1917  insulin aspart U-100 pen 0-5 Units         -- SubQ Before meals & nightly PRN 08/15/24 1817          Hold Oral hypoglycemics while patient is in the hospital.   -Accuchecks AC/HS  -Diabetic/cardiac diet    Glucose uncontrolled on admit with values 269-330 since admit, and placed on home regimen, he reports doesn't run this high at home, and so will titrate post op and infection likely also worsening glucose  -improving 8/17 in higher 100s on cmp and lower 200s this AM and scheduled novolog started last night and adjusted lantus and will trend. Adjusting further 8/18  Was improved 8/18 PM and early 8/19 AM but then back in lower 200s so will titrate as held novolog dania this AM as was nPO but now eating so likely will need to adjsut again. Will need novolog on dc as was not on before

## 2024-08-19 NOTE — PLAN OF CARE
Problem: Adult Inpatient Plan of Care  Goal: Plan of Care Review  Outcome: Progressing     Problem: Adult Inpatient Plan of Care  Goal: Optimal Comfort and Wellbeing  Outcome: Progressing     Problem: Diabetes Comorbidity  Goal: Blood Glucose Level Within Targeted Range  Outcome: Progressing     Problem: Wound  Goal: Optimal Functional Ability  Outcome: Progressing     Problem: Wound  Goal: Skin Health and Integrity  Outcome: Progressing     Problem: Wound  Goal: Optimal Wound Healing  Outcome: Progressing   Patient lying supine in bed watching television. NAD noted.Safety measures in place.

## 2024-08-19 NOTE — PROGRESS NOTES
[facility] Medicine  Progress Note    Patient Name: King Cool Jr.  MRN: [id number]  Patient Class: IP- Inpatient   Admission Date: 8/15/2024  Length of Stay: 4 days  Attending Physician: Lorna Galvan MD  Primary Care Provider: Gallo Lara MD        Subjective:     Principal Problem:Septic joint of right knee joint        HPI:  King Cool Jr. is a 69 y.o. male with a PMHx of CAD, a fib on eliquis, HTN, HLD, DM2, and osteoarthritis who presents to the ED from orthopedic clinic for evaluation of septic right knee. The patient reports he has a history of OA in his right knee and receives intra articular steroid injections every 3 months. His last injection was 8/13. He reports beginning yesterday he noticed increased swelling and pain to his right knee. He notes it was so painful he was unable to get out of bed this morning. He denies any trauma or falls. He states at baseline he ambulates unassisted. Denies numbness/tingling. He was seen in ortho clinic today and had an arthrocentesis which showed [id number] WBCs and positive crystals. R knee xray showed severe osteoarthritis. He denies fever/chills, N/V/D, abdominal pain, CP, SOB, cough, or dysuria.    In the ED, pt tachycardic and mildly hypertensive otherwise vitals stable, afebrile. WBC 12.09k. Glucose 330. Tbili 1.1. LA 1.5. Blood cxs in process. The patient received morphine, zofran, 1L NS, cefepime, and vancomycin. Ortho was consulted and recommend continuing NPO with plan for OR tomorrow for I&D right knee.     Overview/Hospital Course:  No notes on file    Interval history- he reports doing okay this aM and waiting on ortho plan as they saw him this AM given CRP higher again and they aspirated right knee with bloody aspirate and ortho showed me the sample earlier on floor before I saw patient as well. Still swellign to right knee, he said at home he usually uses a brace when active that helps prevent swelling and has been mentioned  knee replacement long term but he hasn't wanted to completely consider it in the past but may think about it in future, he had a hip repalcement 20 years ago or so. Glucose was in 100 last night but in lower 200s this AM, held novolog scheduled this AM as was unclear how long he would remain NPO today while ortho was awaiting to decide plans. Staff dr rowell and I discussed and he feels strong suspicion a lot of this is pseudogout with +/- overlap and ortho reports okay to feed now aaround 1130 and nursing notified. Will trend glucose as may have higher given held dania before and only gave sliding scale. Wasn't on novolog at home but discussed plan to dc with this given highs through stay.         Review of patient's allergies indicates:   Allergen Reactions    Tamiflu [oseltamivir]      Increases BP    Metformin Hives     Diarrhea, nausea    Penicillins Rash       Current Facility-Administered Medications on File Prior to Encounter   Medication    triamcinolone acetonide injection 40 mg     Current Outpatient Medications on File Prior to Encounter   Medication Sig    alcohol swabs (DROPSAFE ALCOHOL PREP PADS) PadM USE AS DIRECTED AS NEEDED    apixaban (ELIQUIS) 5 mg Tab Take 1 tablet (5 mg total) by mouth 2 (two) times daily.    ASCORBATE CALCIUM (VITAMIN C ORAL) Take by mouth once daily.     aspirin (ECOTRIN) 81 MG EC tablet Take 81 mg by mouth once daily.    atorvastatin (LIPITOR) 80 MG tablet Take 1 tablet (80 mg total) by mouth every evening.    blood sugar diagnostic Strp 1 strip by Misc.(Non-Drug; Combo Route) route 3 (three) times daily.    blood-glucose meter kit Use as instructed    blood-glucose meter kit 1 each by Other route 3 (three) times daily. Use as instructed    diclofenac sodium (VOLTAREN) 1 % Gel Apply 2 g topically once daily.    doxycycline (VIBRA-TABS) 100 MG tablet Take 1 tablet (100 mg total) by mouth 2 (two) times daily.    empagliflozin (JARDIANCE) 25 mg tablet Take 1 tablet (25 mg total)  by mouth once daily.    fish oil-omega-3 fatty acids 300-1,000 mg capsule Take 2 g by mouth once daily.    gabapentin (NEURONTIN) 300 MG capsule Take 1 capsule (300 mg total) by mouth 3 (three) times daily.    glimepiride (AMARYL) 4 MG tablet Take 1 tablet (4 mg total) by mouth 2 (two) times a day.    [START ON 10/13/2024] HYDROcodone-acetaminophen (NORCO)  mg per tablet Take 1 tablet by mouth 3 (three) times daily as needed (pain).    [START ON 9/13/2024] HYDROcodone-acetaminophen (NORCO)  mg per tablet Take 1 tablet by mouth 3 (three) times daily as needed (pain).    HYDROcodone-acetaminophen (NORCO)  mg per tablet Take 1 tablet by mouth 3 (three) times daily as needed (pain).    hydrocortisone 2.5 % cream Apply topically 2 (two) times daily.    lancets Misc 1 application  by Misc.(Non-Drug; Combo Route) route 3 (three) times daily.    loratadine (CLARITIN) 10 mg tablet Take 1 tablet (10 mg total) by mouth once daily.    losartan (COZAAR) 25 MG tablet Take 1 tablet (25 mg total) by mouth once daily.    meloxicam (MOBIC) 7.5 MG tablet Take 1 tablet (7.5 mg total) by mouth once daily.    metoprolol succinate (TOPROL-XL) 25 MG 24 hr tablet Take 1 tablet (25 mg total) by mouth once daily.    multivitamin with minerals tablet Take 1 tablet by mouth once daily.    neomycin-polymyxin-hydrocortisone (CORTISPORIN) 3.5-10,000-1 mg/mL-unit/mL-% otic suspension PLACE 3 DROPS INTO THE LEFT EAR 4 TIMES DAILY.    semaglutide (OZEMPIC) 0.25 mg or 0.5 mg (2 mg/3 mL) pen injector Inject 0.5 mg into the skin every 7 days.    sodium chloride (SALINE NASAL) 0.65 % nasal spray 2 sprays by Nasal route 2 (two) times a day.    triamcinolone acetonide 0.1% (KENALOG) 0.1 % ointment Apply topically 2 (two) times daily as needed.     Family History       Problem Relation (Age of Onset)    Blindness Mother    Diabetes Brother    No Known Problems Father, Brother, Daughter, Son, Daughter, Brother          Tobacco Use     Smoking status: Former     Current packs/day: 0.00     Average packs/day: 0.3 packs/day for 4.0 years (1.0 ttl pk-yrs)     Types: Cigarettes     Start date: 1972     Quit date: 1976     Years since quittin.7    Smokeless tobacco: Never   Substance and Sexual Activity    Alcohol use: Yes     Comment: occ    Drug use: No    Sexual activity: Yes     Partners: Female     Birth control/protection: None     Review of Systems   Constitutional:  Negative for appetite change, chills, diaphoresis, fatigue and fever.   HENT:  Negative for congestion, rhinorrhea and sore throat.    Eyes:  Negative for photophobia and visual disturbance.   Respiratory:  Negative for cough, shortness of breath and wheezing.    Cardiovascular:  Negative for chest pain, palpitations and leg swelling.   Gastrointestinal:  Negative for abdominal distention, abdominal pain, diarrhea, nausea and vomiting.   Genitourinary:  Negative for dysuria, frequency and hematuria.   Musculoskeletal:  Positive for arthralgias (right knee), gait problem and joint swelling. Negative for neck pain.   Skin:  Negative for color change, pallor, rash and wound.   Neurological:  Negative for tremors, syncope, light-headedness, numbness and headaches.   Psychiatric/Behavioral:  Negative for confusion and hallucinations. The patient is not nervous/anxious.      Objective:     Vital Signs (Most Recent):  Temp: 99.8 °F (37.7 °C) (24)  Pulse: 95 (24)  Resp: 18 (24)  BP: 135/70 (24)  SpO2: (!) 92 % (24) Vital Signs (24h Range):  Temp:  [98.2 °F (36.8 °C)-99.8 °F (37.7 °C)] 99.8 °F (37.7 °C)  Pulse:  [73-98] 95  Resp:  [15-18] 18  SpO2:  [92 %-95 %] 92 %  BP: (129-158)/(64-79) 135/70     Weight: 95.4 kg (210 lb 5.1 oz)  Body mass index is 30.18 kg/m².     Physical Exam  Vitals and nursing note reviewed.   Constitutional:       General: He is not in acute distress.     Appearance: He is not toxic-appearing or  diaphoretic.   HENT:      Head: Normocephalic and atraumatic.      Nose: Nose normal.      Mouth/Throat:      Mouth: Mucous membranes are moist.   Eyes:      Pupils: Pupils are equal, round, and reactive to light.   Cardiovascular:      Rate and Rhythm: Normal rate and regular rhythm.      Pulses: Normal pulses.           Dorsalis pedis pulses are 2+ on the right side.        Posterior tibial pulses are 2+ on the right side.   Pulmonary:      Effort: Pulmonary effort is normal. No respiratory distress.      Breath sounds: No wheezing, rhonchi or rales.   Abdominal:      General: Bowel sounds are normal. There is no distension.      Palpations: Abdomen is soft.      Tenderness: There is no abdominal tenderness. There is no guarding.   Musculoskeletal:      Cervical back: Normal range of motion.      Right knee: Swelling present. Decreased range of motion. Tenderness present.      Comments: Bandages removed with swellign to right knee still present but less than pre op, but still not at baseline compared to left knee.   Skin:     General: Skin is warm and dry.      Capillary Refill: Capillary refill takes less than 2 seconds.   Neurological:      General: No focal deficit present.      Mental Status: He is alert.      Sensory: No sensory deficit.      Motor: No weakness.   Psychiatric:         Mood and Affect: Mood normal.         Behavior: Behavior normal.              CRANIAL NERVES     CN III, IV, VI   Pupils are equal, round, and reactive to light.       Significant Labs: All pertinent labs within the past 24 hours have been reviewed.  CBC:   Recent Labs   Lab 08/18/24  0628   WBC 7.62   HGB 13.1*   HCT 39.3*        CMP:   Recent Labs   Lab 08/18/24  0628 08/19/24  0602    135*   K 3.5 3.8    100   CO2 22* 25   * 178*   BUN 13 16   CREATININE 0.7 0.9   CALCIUM 9.2 9.6   PROT 6.1 6.8   ALBUMIN 2.6* 2.8*   BILITOT 0.6 0.8   ALKPHOS 89 115   AST 23 34   ALT 25 39   ANIONGAP 12 10     Lactic  "Acid:   No results for input(s): "LACTATE" in the last 48 hours.      Significant Imaging: I have reviewed all pertinent imaging results/findings within the past 24 hours.        Assessment/Plan:      * Septic joint of right knee joint  -on chronic right knee injections for OA and had recent injection the 13th then swelling after that was not typical for him with presumed nidus with entry portal,s aw ortho in clinic for this with concern for infection. afebrile, WBC 12.09  -LA 1.5 with crp 144 on admit  -blood CX NG  -Arthrocentesis of right knee with [id number] WBCs and positive crystals but no CX sent, started on vanc/cefepime on admit  -OR 8/16 for I and D with ortho and reported Significant amounts of thick viscous fluid consistent with septic arthritis and/or CPPD. WBAT. Unclear situation as initially had no Cx data in system on 8/16 from clinic showing sent but now showing cultures from this but only fungal, anaerobic and AFB.dirk burnham with ID was able to get micro to place an aerobic cx and greatly appreciate her assistace and pending now  Ortho to change bandages 8/18 and swelling slowly improving. Crp slightly worse at 202. And now 251 but clinically not worsening. Ortho tapped 8/19 again and studies pending. Okay to eat today, staff strongly suspects large element of pseudogout involvement, will f/u further.  -MM pain control.  -Elevate RLE.  -Fall precautions.  -resumed eliquis and asa post op     Coronary artery disease involving native coronary artery of native heart without angina pectoris  Patient with known CAD s/p stent placement, which is controlled Will continue Statin and monitor for S/Sx of angina/ACS. Continue to monitor on telemetry.   -Held ASA until post OR 8/17  [facility] outpatient cards notes per dr macias, has known mid LAD lesion per cath in 2023, if symptoms develop freddy consider PCI long term.    PAF (paroxysmal atrial fibrillation)  Chronic anticoagulation   Patient with Paroxysmal (<7 " days) atrial fibrillation which is controlled currently with Beta Blocker. Patient is currently in sinus rhythm.[id number] Score: 3. Anticoagulation indicated. Anticoagulation done with eliquis .  -Hold eliquis for OR today and resumed post op  Per OP cards notes, if has any reoccurent nosebleeds on eliquis then plan to consider watchman long term as had hx of nosebleeds in past on anticoagulation    Class 1 obesity due to excess calories with serious comorbidity and body mass index (BMI) of 33.0 to 33.9 in adult  Body mass index is 28.84 kg/m². Obesity complicates all aspects of disease management from diagnostic modalities to treatment.     Essential (primary) hypertension  Chronic, controlled. Latest blood pressure and vitals reviewed-     Temp:  [98.6 °F (37 °C)]   Pulse:  [101]   Resp:  [18-20]   BP: (153)/(87)   SpO2:  [95 %] .   Home meds for hypertension were reviewed and noted below.   Hypertension Medications               losartan (COZAAR) 25 MG tablet Take 1 tablet (25 mg total) by mouth once daily.    metoprolol succinate (TOPROL-XL) 25 MG 24 hr tablet Take 1 tablet (25 mg total) by mouth once daily.            While in the hospital, will manage blood pressure as follows; Continue home antihypertensive regimen    Will utilize p.r.n. blood pressure medication only if patient's blood pressure greater than 180/110 and he develops symptoms such as worsening chest pain or shortness of breath.    Type 2 diabetes mellitus with microalbuminuria, without long-term current use of insulin  Patient's FSGs are uncontrolled due to hyperglycemia on current medication regimen.  Last A1c reviewed-   Lab Results   Component Value Date    HGBA1C 11.5 (H) 08/13/2024     Most recent fingerstick glucose reviewed-   Recent Labs   Lab 08/18/24  1158 08/18/24  1522 08/18/24  2132 08/19/24  0730   POCTGLUCOSE 244* 221* 172* 238*       Current correctional scale  Low  Maintain anti-hyperglycemic dose as follows-    Antihyperglycemics (From admission, onward)      Start     Stop Route Frequency Ordered    08/18/24 2100  insulin glargine U-100 (Lantus) pen 16 Units         -- SubQ 2 times daily 08/18/24 0841    08/18/24 1645  insulin aspart U-100 pen 13 Units         -- SubQ 3 times daily with meals 08/18/24 1603    08/15/24 1917  insulin aspart U-100 pen 0-5 Units         -- SubQ Before meals & nightly PRN 08/15/24 1817          Hold Oral hypoglycemics while patient is in the hospital.   -Accuchecks AC/HS  -Diabetic/cardiac diet    Glucose uncontrolled on admit with values 269-330 since admit, and placed on home regimen, he reports doesn't run this high at home, and so will titrate post op and infection likely also worsening glucose  -improving 8/17 in higher 100s on cmp and lower 200s this AM and scheduled novolog started last night and adjusted lantus and will trend. Adjusting further 8/18  Was improved 8/18 PM and early 8/19 AM but then back in lower 200s so will titrate as held novolog daina this AM as was nPO but now eating so likely will need to adjsut again. Will need novolog on dc as was not on before      VTE Risk Mitigation (From admission, onward)           Ordered     apixaban tablet 5 mg  2 times daily         08/16/24 1537     IP VTE LOW RISK PATIENT  Once         08/15/24 1817     Place sequential compression device  Until discontinued         08/15/24 1817                    Discharge Planning   JUAN M: 8/20/2024     Code Status: Full Code   Is the patient medically ready for discharge?:     Reason for patient still in hospital (select all that apply): Patient trending condition  Discharge Plan A: Home with family, Home Health                  Lorna Galvan MD  Department of Hospital Medicine   [facility] - Surgery

## 2024-08-19 NOTE — ASSESSMENT & PLAN NOTE
King Cool  is a 69 y.o. male presenting with Right knee septic arthritis. Plan for OR today for arthroscopic vs open I&D. He is booked, marked and consented for surgery.    Now s/p R knee arthroscopic I&D 8/16  Reaspirated this morning. Labs pending. Remain NPO until fluid analysis results.    - WBAT  - fu cultures   - Abx per ID: vanc and rocephin  -  from 202, continues to increase  - Pain control: multimodal pain management    Dispo: Reaspirated today, remain NPO until fluid analysis results      Procedure Note:  After consent was obtained, using sterile technique the right knee was prepped and the joint was entered from from the lateral suprapatellar approach. 25 ml's of bloody colored fluid was withdrawn and sent for analysis and culture.  The procedure was well tolerated.  The patient is asked to continue to rest the knee for a few more days before resuming regular activities.  It may be more painful for the first 1-2 days.  Watch for fever, or increased swelling or persistent pain in knee.

## 2024-08-19 NOTE — PROGRESS NOTES
[facility] - [facility]  Infectious Disease  Progress Note    Patient Name: King Cool Jr.  MRN: [id number]  Admission Date: 8/15/2024  Length of Stay: 3 days  Attending Physician: Lorna Galvan MD  Primary Care Provider: Gallo Lara MD    Isolation Status: No active isolations  Assessment/Plan:      ID  * Septic joint of right knee joint     69 year old with CAD, A fib on Eliquis, HTN, DM (A1C 11), and osteoarthritis of the knee with concern for septic right knee.  Patient has history of OA in the right knee and receives intraarticular steroid injections every 3 months.  Last injection was on 8/13.  Developed increased pain and swelling within 24 hours after the injection.  Underwent knee aspiration in Ortho clinic 8/15 which showed 143,000 WBCs, 85% segs and positive CCP crystals. No history of gout.  Subsequently sent to ED.  He denied associated fever/chills.        Underwent I&D with Dr. Alfaro on 8/16.  Per OP note,  thick viscous fluid encountered.  Suspects septic arthritis superimposed over pseudogout.   No OR cultures available.  Aspirate cultures of 8/15 NGTD. Micro was able to add on aerobic culture to aspirate specimens of 8/15.  Denies history of Staph infection or other infections. Denies recent antibiotic use.     Remains afebrile, no leukocytosis.  >>202 today.  Ortho following closely.  May re-aspirate tomorrow.  Currently on IV vancomycin and ceftriaxone.     Plan/recommendations:   Continue IV vancomycin (pharmacy to dose. Trough goal 15-20)   Continue ceftriaxone 2 g q 24 hours   Will follow cultures and adjust antibiotics accordingly.     Will follow up tomorrow    Data reviewed and plan discussed with ID staff, Dr. Michaud  Secure chat/Discussed above plan with Primary Team,  Dr. Galvan        Thank you.   Please Secure Chat for any questions or concerns.  ALANNA Casper, ANP-C  Infectious Disease     I spent a total of 50 minutes on the day of the visit.This includes face to  face time and non-face to face time preparing to see the patient (eg, review of tests), obtaining and/or reviewing separately obtained history, documenting clinical information in the electronic or other health record, independently interpreting results and communicating results to the patient/family/caregiver, or care coordinator.      Subjective:     Principal Problem:Septic joint of right knee joint    HPI:  King Cool is a 69 year old with CAD, A fib on Eliquis, HTN, DM (A1C 11), and osteoarthritis of the knee who presented from Ortho clinic with concern for septic right knee.  Patient has history of OA in the right knee and receives intraarticular steroid injections every 3 months.  Last injection was on 8/13. He subsequently developed increase pain and swelling.  Was evaluated in Ortho clinic 8/15 and had an arthrocentesis which showed [id number] WBCs and positive calcium pyrophosphate crystals. R knee xray showed severe osteoarthritis. He denied associated fever/chills.  He denies any history of gout.       In the ED, noted to be tachycardic, afebrile.  WBC 12.  , ESR wnl.   Aspirate cultures - only anaerobic, fungal, and AFB sent.  No aerobic.  Gram stain negative.  He is now s/p I&D with Dr. Alfaro.  No OR cultures available.   He was started on antibiotics prior to surgery. Currently on vancomycin and cefepime. ID consulted for broad spectrum antibiotic regimen.     Interval History: No acute events overnight. Afebrile. No leukocytosis   Aspirate cultures NGTD   Still with swelling/pain, though less than yesterday   >>202  Ortho following closely.  Will re-evaluate in am and may re-aspirate    Review of Systems   Constitutional:  Positive for activity change.   Musculoskeletal:  Positive for arthralgias, gait problem and joint swelling.   All other systems reviewed and are negative.    Objective:     Vital Signs (Most Recent):  Temp: 99.7 °F (37.6 °C) (08/18/24 2029)  Pulse: 87 (08/18/24  2029)  Resp: 16 (08/18/24 2029)  BP: (!) 158/79 (08/18/24 2029)  SpO2: 95 % (08/18/24 2029) Vital Signs (24h Range):  Temp:  [98.6 °F (37 °C)-99.7 °F (37.6 °C)] 99.7 °F (37.6 °C)  Pulse:  [73-93] 87  Resp:  [16-18] 16  SpO2:  [95 %-98 %] 95 %  BP: (129-158)/(64-79) 158/79     Weight: 95.4 kg (210 lb 5.1 oz)  Body mass index is 30.18 kg/m².    Estimated Creatinine Clearance: 117.1 mL/min (based on SCr of 0.7 mg/dL).     Physical Exam  Vitals and nursing note reviewed.   Constitutional:       General: He is not in acute distress.     Appearance: Normal appearance. He is not ill-appearing, toxic-appearing or diaphoretic.   HENT:      Head: Normocephalic.      Mouth/Throat:      Mouth: Mucous membranes are moist.   Eyes:      General: No scleral icterus.     Conjunctiva/sclera: Conjunctivae normal.   Cardiovascular:      Rate and Rhythm: Normal rate and regular rhythm.   Pulmonary:      Effort: Pulmonary effort is normal. No respiratory distress.   Abdominal:      General: There is no distension.      Palpations: Abdomen is soft.   Musculoskeletal:         General: Swelling and tenderness present.      Left lower leg: No edema.      Comments: Right knee swollen. No erythema.  Bandages over arthroscopic sites c/d/I.    Skin:     General: Skin is warm and dry.      Findings: Lesion (scabbed over abrasions right ankle) present.   Neurological:      Mental Status: He is alert and oriented to person, place, and time.   Psychiatric:         Mood and Affect: Mood normal.         Behavior: Behavior normal.          Significant Labs: Blood Culture:   Recent Labs   Lab 08/15/24  1700   LABBLOO No Growth to date  No Growth to date  No Growth to date  No Growth to date  No Growth to date  No Growth to date  No Growth to date  No Growth to date     CBC:   Recent Labs   Lab 08/18/24  0628   WBC 7.62   HGB 13.1*   HCT 39.3*        CMP:   Recent Labs   Lab 08/17/24  0329 08/18/24  0628   * 137   K 3.9 3.5     "103   CO2 21* 22*   * 171*   BUN 14 13   CREATININE 0.8 0.7   CALCIUM 8.9 9.2   PROT 6.3 6.1   ALBUMIN 2.9* 2.6*   BILITOT 0.8 0.6   ALKPHOS 84 89   AST 12 23   ALT 12 25   ANIONGAP 11 12     Microbiology Results (last 7 days)       Procedure Component Value Units Date/Time    Blood culture x two cultures. Draw prior to antibiotics. [[id number]] Collected: 08/15/24 1700    Order Status: Completed Specimen: Blood from Peripheral, Antecubital, Right Updated: 08/18/24 1812     Blood Culture, Routine No Growth to date      No Growth to date      No Growth to date      No Growth to date    Narrative:      Aerobic and anaerobic    Blood culture x two cultures. Draw prior to antibiotics. [[id number]] Collected: 08/15/24 1700    Order Status: Completed Specimen: Blood from Peripheral, Antecubital, Left Updated: 08/18/24 1812     Blood Culture, Routine No Growth to date      No Growth to date      No Growth to date      No Growth to date    Narrative:      Aerobic and anaerobic    Aerobic culture [[id number]]     Order Status: Completed Specimen: Abscess from Knee, Right           Wound Culture: No results for input(s): "LABAERO" in the last 4320 hours.    Significant Imaging: None  "

## 2024-08-19 NOTE — CARE UPDATE
Orthopaedics Care update    Right knee reaspirated today out of concern for infection given increasing CRP. Results not consistent with septic arthritis. Persistent crystals in aspiration. Plan for fu cultures. OK for diet. WBC 32k, 86% segs, -GS, - crystals.     Ulices Diehl MD PGY3

## 2024-08-19 NOTE — ASSESSMENT & PLAN NOTE
"   69 year old with CAD, A fib on Eliquis, HTN, DM (A1C 11), and osteoarthritis of the knee admitted with concern for septic right knee.  Patient has history of OA in the right knee and receives intraarticular steroid injections every 3 months.  Last injection was on 8/13.  Developed increased pain and swelling within 24 hours after the injection.  Underwent knee aspiration in Ortho clinic 8/15 which showed 143,000 WBCs, 85% segs and positive CPP crystals. No history of gout.         Underwent I&D with Dr. Alfaro on 8/16.  Per OP note,  thick viscous fluid encountered.  Suspects septic arthritis superimposed over pseudogout.   No OR cultures sent.   Aspirate cultures of 8/15 is showing "no significant isolate." I called micro lab and will work up/ speciate further.  Denies history of Staph infection or other infections. Denies recent antibiotic use.     Patient is on Vanc/Ceftriaxone. Febrile to 101.3. today. CRP increased. Ortho following closely and re-aspirated knee today. Cx pending. WBC 32K, 86% segs.     Plan/recommendations:   Continue IV vancomycin (pharmacy to dose. Trough goal 15-20)   Continue ceftriaxone 2 g q 24 hours   Repeat blood cultures x 2 in setting of fevers   Will follow cultures and adjust antibiotics accordingly.     If persistently febrile, consider CXR and checking for COVID   Discussed plan with ID staff. ID will follow up tomorrow    "

## 2024-08-19 NOTE — PROGRESS NOTES
[facility]  Orthopedics  Progress Note    Attg Note:  I agree with the resident's assessment and plan.  Aspirated again this morning.  We know he has pseudogout.  His culture results were delayed and there beginning of processing.  Unfortunately, he is on Eliquis for AFib and is not a good candidate for anti-inflammatories which with the mainstay of treatment for pseudogout.    Isaac Alfaro MD      Patient Name: King Cool Jr.  MRN: [id number]  Admission Date: 8/15/2024  Hospital Length of Stay: 4 days  Attending Provider: Lorna Galvan MD  Primary Care Provider: Gallo Lara MD  Follow-up For: Procedure(s) (LRB):  ARTHROSCOPY, KNEE, WITH DEBRIDEMENT (Right)    Post-Operative Day: 3 Days Post-Op  Subjective:     Principal Problem:Septic joint of right knee joint    Principal Orthopedic Problem: as above now s/p R knee arthroscopic I&D 8/16    Interval History: Pt seen and examined at bedside. VSS.  NAEON. Still having some pain. Knee effusion remains.  from 202. Reaspirated today.       Review of patient's allergies indicates:   Allergen Reactions    Tamiflu [oseltamivir]      Increases BP    Metformin Hives     Diarrhea, nausea    Penicillins Rash       Current Facility-Administered Medications   Medication    acetaminophen tablet 1,000 mg    apixaban tablet 5 mg    artificial tears 0.5 % ophthalmic solution 1 drop    aspirin EC tablet 81 mg    atorvastatin tablet 80 mg    cefTRIAXone (ROCEPHIN) 2 g in D5W 100 mL IVPB (MB+)    dextrose 10% bolus 125 mL 125 mL    dextrose 10% bolus 250 mL 250 mL    gabapentin capsule 300 mg    glucagon (human recombinant) injection 1 mg    glucose chewable tablet 16 g    glucose chewable tablet 24 g    guaiFENesin 100 mg/5 ml syrup 200 mg    HYDROmorphone injection 0.2 mg    insulin aspart U-100 pen 0-5 Units    insulin aspart U-100 pen 13 Units    insulin glargine U-100 (Lantus) pen 16 Units    losartan tablet 50 mg    melatonin tablet 6 mg     "methocarbamoL tablet 750 mg    metoprolol succinate (TOPROL-XL) 24 hr tablet 25 mg    multivitamin tablet    naloxone 0.4 mg/mL injection 0.02 mg    omega-3 acid ethyl esters capsule 1 g    ondansetron injection 4 mg    oxyCODONE immediate release tablet 5 mg    oxyCODONE immediate release tablet Tab 10 mg    sodium chloride 0.9% flush 10 mL    vancomycin - pharmacy to dose    vancomycin 2 g in dextrose 5 % 500 mL IVPB     Facility-Administered Medications Ordered in Other Encounters   Medication    triamcinolone acetonide injection 40 mg     Objective:     Vital Signs (Most Recent):  Temp: 99.8 °F (37.7 °C) (08/19/24 0728)  Pulse: 95 (08/19/24 0728)  Resp: 18 (08/19/24 0728)  BP: 135/70 (08/19/24 0728)  SpO2: (!) 92 % (08/19/24 0728) Vital Signs (24h Range):  Temp:  [98.2 °F (36.8 °C)-99.8 °F (37.7 °C)] 99.8 °F (37.7 °C)  Pulse:  [73-98] 95  Resp:  [15-18] 18  SpO2:  [92 %-97 %] 92 %  BP: (129-158)/(64-79) 135/70     Weight: 95.4 kg (210 lb 5.1 oz)  Height: 5' 10" (177.8 cm)  Body mass index is 30.18 kg/m².      Intake/Output Summary (Last 24 hours) at 8/19/2024 0855  Last data filed at 8/18/2024 1602  Gross per 24 hour   Intake 480 ml   Output --   Net 480 ml        Ortho/SPM Exam     AAOx4  NAD  Reg rate  No increased WOB    RLE    Dressing taken down today, saturation of lateral side, no active bleeding or drainage  No expressible purulence  Compartments soft/compressible  SILT throughout  AROM of toes, ankle, intact.  WWP. Brisk cap refill            Significant Labs: All pertinent labs within the past 24 hours have been reviewed.    Significant Imaging: I have reviewed and interpreted all pertinent imaging results/findings.  Assessment/Plan:     * Septic joint of right knee joint  King Knightlola Wetzel is a 69 y.o. male presenting with Right knee CPPD/pseudogout with suspected overlying septic arthritis based on high white blood cell count.  Status post arthroscopic I and D x1 on 08/16/2024.    Now s/p R knee " arthroscopic I&D 8/16  Reaspirated this morning. Labs pending. Remain NPO until fluid analysis results.    - WBAT  - fu cultures   - Abx per ID: vanc and rocephin  -  from 202, continues to increase  - Pain control: multimodal pain management    Dispo: Reaspirated today, remain NPO until fluid analysis results      Procedure Note:  After consent was obtained, using sterile technique the right knee was prepped and the joint was entered from from the lateral suprapatellar approach. 25 ml's of bloody colored fluid was withdrawn and sent for analysis and culture.  The procedure was well tolerated.  The patient is asked to continue to rest the knee for a few more days before resuming regular activities.  It may be more painful for the first 1-2 days.  Watch for fever, or increased swelling or persistent pain in knee.                 JANET Diehl MD  Orthopedics  [facility] - Surgery

## 2024-08-19 NOTE — PROGRESS NOTES
Pharmacokinetic Assessment Follow Up: IV Vancomycin    Vancomycin serum concentration assessment(s):    The trough level was drawn correctly and can be used to guide therapy at this time. The measurement is below the desired definitive target range of 15 to 20 mcg/mL.    Vancomycin Regimen Plan:    Change regimen to Vancomycin 2000 mg IV every 12 hours with next serum trough concentration measured at 0500 prior to 4th dose on 8/21/24.    Drug levels (last 3 results):  Recent Labs   Lab Result Units 08/17/24  0329 08/19/24  0602   Vancomycin-Trough ug/mL 13.1 12.8       Pharmacy will continue to follow and monitor vancomycin.    Please contact pharmacy at extension [phone] for questions regarding this assessment.    Thank you for the consult,   Aileen Jackson       Patient brief summary:  King Cool Jr. is a 69 y.o. male initiated on antimicrobial therapy with IV Vancomycin for treatment of bone/joint infection.    Drug Allergies:   Review of patient's allergies indicates:   Allergen Reactions    Tamiflu [oseltamivir]      Increases BP    Metformin Hives     Diarrhea, nausea    Penicillins Rash       Actual Body Weight:   95.4 kg    Renal Function:   Estimated Creatinine Clearance: 91.1 mL/min (based on SCr of 0.9 mg/dL).,     Dialysis Method (if applicable):  N/A    CBC (last 72 hours):  Recent Labs   Lab Result Units 08/18/24  0628   WBC K/uL 7.62   Hemoglobin g/dL 13.1*   Hematocrit % 39.3*   Platelets K/uL 240   Gran % % 68.6   Lymph % % 16.8*   Mono % % 13.4   Eosinophil % % 0.5   Basophil % % 0.4   Differential Method  Automated       Metabolic Panel (last 72 hours):  Recent Labs   Lab Result Units 08/17/24  0329 08/18/24  0628 08/19/24  0602   Sodium mmol/L 135* 137 135*   Potassium mmol/L 3.9 3.5 3.8   Chloride mmol/L 103 103 100   CO2 mmol/L 21* 22* 25   Glucose mg/dL 176* 171* 178*   BUN mg/dL 14 13 16   Creatinine mg/dL 0.8 0.7 0.9   Albumin g/dL 2.9* 2.6* 2.8*   Total Bilirubin mg/dL 0.8 0.6 0.8    Alkaline Phosphatase U/L 84 89 115   AST U/L 12 23 34   ALT U/L 12 25 39   Magnesium mg/dL 2.0 2.0  --        Vancomycin Administrations:  vancomycin given in the last 96 hours                     vancomycin 1.75 g in 5 % dextrose 500 mL IVPB (mg) 1,750 mg New Bag 08/19/24 0615     1,750 mg New Bag 08/18/24 1704     1,750 mg New Bag  0601     1,750 mg New Bag 08/17/24 1712    vancomycin 1,500 mg in D5W 250 mL IVPB (Vial-Mate) (mg) 1,500 mg New Bag 08/17/24 0556     1,500 mg New Bag 08/16/24 1728     1,500 mg New Bag  0618    vancomycin injection (g) 1 g Given 08/16/24 1153    vancomycin 1.75 g in 5 % dextrose 500 mL IVPB (mg) 1,750 mg New Bag 08/15/24 1849                    Microbiologic Results:  Microbiology Results (last 7 days)       Procedure Component Value Units Date/Time    Blood culture x two cultures. Draw prior to antibiotics. [[id number]] Collected: 08/15/24 1700    Order Status: Completed Specimen: Blood from Peripheral, Antecubital, Right Updated: 08/18/24 1812     Blood Culture, Routine No Growth to date      No Growth to date      No Growth to date      No Growth to date    Narrative:      Aerobic and anaerobic    Blood culture x two cultures. Draw prior to antibiotics. [[id number]] Collected: 08/15/24 1700    Order Status: Completed Specimen: Blood from Peripheral, Antecubital, Left Updated: 08/18/24 1812     Blood Culture, Routine No Growth to date      No Growth to date      No Growth to date      No Growth to date    Narrative:      Aerobic and anaerobic    Aerobic culture [[id number]]     Order Status: Completed Specimen: Abscess from Knee, Right

## 2024-08-19 NOTE — SUBJECTIVE & OBJECTIVE
Interval History: No acute events overnight. Afebrile. No leukocytosis   Aspirate cultures NGTD   Still with swelling/pain, though less than yesterday   >>202  Ortho following closely.  Will re-evaluate in am and may re-aspirate    Review of Systems   Constitutional:  Positive for activity change.   Musculoskeletal:  Positive for arthralgias, gait problem and joint swelling.   All other systems reviewed and are negative.    Objective:     Vital Signs (Most Recent):  Temp: 99.7 °F (37.6 °C) (08/18/24 2029)  Pulse: 87 (08/18/24 2029)  Resp: 16 (08/18/24 2029)  BP: (!) 158/79 (08/18/24 2029)  SpO2: 95 % (08/18/24 2029) Vital Signs (24h Range):  Temp:  [98.6 °F (37 °C)-99.7 °F (37.6 °C)] 99.7 °F (37.6 °C)  Pulse:  [73-93] 87  Resp:  [16-18] 16  SpO2:  [95 %-98 %] 95 %  BP: (129-158)/(64-79) 158/79     Weight: 95.4 kg (210 lb 5.1 oz)  Body mass index is 30.18 kg/m².    Estimated Creatinine Clearance: 117.1 mL/min (based on SCr of 0.7 mg/dL).     Physical Exam  Vitals and nursing note reviewed.   Constitutional:       General: He is not in acute distress.     Appearance: Normal appearance. He is not ill-appearing, toxic-appearing or diaphoretic.   HENT:      Head: Normocephalic.      Mouth/Throat:      Mouth: Mucous membranes are moist.   Eyes:      General: No scleral icterus.     Conjunctiva/sclera: Conjunctivae normal.   Cardiovascular:      Rate and Rhythm: Normal rate and regular rhythm.   Pulmonary:      Effort: Pulmonary effort is normal. No respiratory distress.   Abdominal:      General: There is no distension.      Palpations: Abdomen is soft.   Musculoskeletal:         General: Swelling and tenderness present.      Left lower leg: No edema.      Comments: Right knee swollen. No erythema.  Bandages over arthroscopic sites c/d/I.    Skin:     General: Skin is warm and dry.      Findings: Lesion (scabbed over abrasions right ankle) present.   Neurological:      Mental Status: He is alert and oriented to  "person, place, and time.   Psychiatric:         Mood and Affect: Mood normal.         Behavior: Behavior normal.          Significant Labs: Blood Culture:   Recent Labs   Lab 08/15/24  1700   LABBLOO No Growth to date  No Growth to date  No Growth to date  No Growth to date  No Growth to date  No Growth to date  No Growth to date  No Growth to date     CBC:   Recent Labs   Lab 08/18/24  0628   WBC 7.62   HGB 13.1*   HCT 39.3*        CMP:   Recent Labs   Lab 08/17/24  0329 08/18/24  0628   * 137   K 3.9 3.5    103   CO2 21* 22*   * 171*   BUN 14 13   CREATININE 0.8 0.7   CALCIUM 8.9 9.2   PROT 6.3 6.1   ALBUMIN 2.9* 2.6*   BILITOT 0.8 0.6   ALKPHOS 84 89   AST 12 23   ALT 12 25   ANIONGAP 11 12     Microbiology Results (last 7 days)       Procedure Component Value Units Date/Time    Blood culture x two cultures. Draw prior to antibiotics. [[id number]] Collected: 08/15/24 1700    Order Status: Completed Specimen: Blood from Peripheral, Antecubital, Right Updated: 08/18/24 1812     Blood Culture, Routine No Growth to date      No Growth to date      No Growth to date      No Growth to date    Narrative:      Aerobic and anaerobic    Blood culture x two cultures. Draw prior to antibiotics. [[id number]] Collected: 08/15/24 1700    Order Status: Completed Specimen: Blood from Peripheral, Antecubital, Left Updated: 08/18/24 1812     Blood Culture, Routine No Growth to date      No Growth to date      No Growth to date      No Growth to date    Narrative:      Aerobic and anaerobic    Aerobic culture [[id number]]     Order Status: Completed Specimen: Abscess from Knee, Right           Wound Culture: No results for input(s): "LABAERO" in the last 4320 hours.    Significant Imaging: None  "

## 2024-08-19 NOTE — PLAN OF CARE
08/19/24 1328   Post-Acute Status   Post-Acute Authorization Home Health   Home Health Status Referrals Sent   Discharge Plan   Discharge Plan A Other;Home Health     [facility] following for discharge needs. Pt's d/c pending ID finial rec's. CM team to follow.    Makenzie Gray LMSW  Case Management   [facility]   Ext. [phone]

## 2024-08-19 NOTE — NURSING
Nurses Note -- 4 Eyes      8/19/2024   10:40 AM      Skin assessed during: Daily Assessment      [x] No Altered Skin Integrity Present    []Prevention Measures Documented      [] Yes- Altered Skin Integrity Present or Discovered   [] LDA Added if Not in Epic (Describe Wound)   [] New Altered Skin Integrity was Present on Admit and Documented in LDA   [] Wound Image Taken    Wound Care Consulted? No    Attending Nurse:  Gloria Aponte RN/Staff Member:    ROSALBA Parkinson

## 2024-08-19 NOTE — SUBJECTIVE & OBJECTIVE
Interval history- he reports doing okay this aM and waiting on ortho plan as they saw him this AM given CRP higher again and they aspirated right knee with bloody aspirate and ortho showed me the sample earlier on floor before I saw patient as well. Still swellign to right knee, he said at home he usually uses a brace when active that helps prevent swelling and has been mentioned knee replacement long term but he hasn't wanted to completely consider it in the past but may think about it in future, he had a hip repalcement 20 years ago or so. Glucose was in 100 last night but in lower 200s this AM, held novolog scheduled this AM as was unclear how long he would remain NPO today while ortho was awaiting to decide plans. Staff dr rowell and I discussed and he feels strong suspicion a lot of this is pseudogout with +/- overlap and ortho reports okay to feed now aaround 1130 and nursing notified. Will trend glucose as may have higher given held dania before and only gave sliding scale. Wasn't on novolog at home but discussed plan to dc with this given highs through stay.         Review of patient's allergies indicates:   Allergen Reactions    Tamiflu [oseltamivir]      Increases BP    Metformin Hives     Diarrhea, nausea    Penicillins Rash       Current Facility-Administered Medications on File Prior to Encounter   Medication    triamcinolone acetonide injection 40 mg     Current Outpatient Medications on File Prior to Encounter   Medication Sig    alcohol swabs (DROPSAFE ALCOHOL PREP PADS) PadM USE AS DIRECTED AS NEEDED    apixaban (ELIQUIS) 5 mg Tab Take 1 tablet (5 mg total) by mouth 2 (two) times daily.    ASCORBATE CALCIUM (VITAMIN C ORAL) Take by mouth once daily.     aspirin (ECOTRIN) 81 MG EC tablet Take 81 mg by mouth once daily.    atorvastatin (LIPITOR) 80 MG tablet Take 1 tablet (80 mg total) by mouth every evening.    blood sugar diagnostic Strp 1 strip by Misc.(Non-Drug; Combo Route) route 3 (three) times  daily.    blood-glucose meter kit Use as instructed    blood-glucose meter kit 1 each by Other route 3 (three) times daily. Use as instructed    diclofenac sodium (VOLTAREN) 1 % Gel Apply 2 g topically once daily.    doxycycline (VIBRA-TABS) 100 MG tablet Take 1 tablet (100 mg total) by mouth 2 (two) times daily.    empagliflozin (JARDIANCE) 25 mg tablet Take 1 tablet (25 mg total) by mouth once daily.    fish oil-omega-3 fatty acids 300-1,000 mg capsule Take 2 g by mouth once daily.    gabapentin (NEURONTIN) 300 MG capsule Take 1 capsule (300 mg total) by mouth 3 (three) times daily.    glimepiride (AMARYL) 4 MG tablet Take 1 tablet (4 mg total) by mouth 2 (two) times a day.    [START ON 10/13/2024] HYDROcodone-acetaminophen (NORCO)  mg per tablet Take 1 tablet by mouth 3 (three) times daily as needed (pain).    [START ON 9/13/2024] HYDROcodone-acetaminophen (NORCO)  mg per tablet Take 1 tablet by mouth 3 (three) times daily as needed (pain).    HYDROcodone-acetaminophen (NORCO)  mg per tablet Take 1 tablet by mouth 3 (three) times daily as needed (pain).    hydrocortisone 2.5 % cream Apply topically 2 (two) times daily.    lancets Misc 1 application  by Misc.(Non-Drug; Combo Route) route 3 (three) times daily.    loratadine (CLARITIN) 10 mg tablet Take 1 tablet (10 mg total) by mouth once daily.    losartan (COZAAR) 25 MG tablet Take 1 tablet (25 mg total) by mouth once daily.    meloxicam (MOBIC) 7.5 MG tablet Take 1 tablet (7.5 mg total) by mouth once daily.    metoprolol succinate (TOPROL-XL) 25 MG 24 hr tablet Take 1 tablet (25 mg total) by mouth once daily.    multivitamin with minerals tablet Take 1 tablet by mouth once daily.    neomycin-polymyxin-hydrocortisone (CORTISPORIN) 3.5-10,000-1 mg/mL-unit/mL-% otic suspension PLACE 3 DROPS INTO THE LEFT EAR 4 TIMES DAILY.    semaglutide (OZEMPIC) 0.25 mg or 0.5 mg (2 mg/3 mL) pen injector Inject 0.5 mg into the skin every 7 days.    sodium  chloride (SALINE NASAL) 0.65 % nasal spray 2 sprays by Nasal route 2 (two) times a day.    triamcinolone acetonide 0.1% (KENALOG) 0.1 % ointment Apply topically 2 (two) times daily as needed.     Family History       Problem Relation (Age of Onset)    Blindness Mother    Diabetes Brother    No Known Problems Father, Brother, Daughter, Son, Daughter, Brother          Tobacco Use    Smoking status: Former     Current packs/day: 0.00     Average packs/day: 0.3 packs/day for 4.0 years (1.0 ttl pk-yrs)     Types: Cigarettes     Start date: 1972     Quit date: 1976     Years since quittin.7    Smokeless tobacco: Never   Substance and Sexual Activity    Alcohol use: Yes     Comment: occ    Drug use: No    Sexual activity: Yes     Partners: Female     Birth control/protection: None     Review of Systems   Constitutional:  Negative for appetite change, chills, diaphoresis, fatigue and fever.   HENT:  Negative for congestion, rhinorrhea and sore throat.    Eyes:  Negative for photophobia and visual disturbance.   Respiratory:  Negative for cough, shortness of breath and wheezing.    Cardiovascular:  Negative for chest pain, palpitations and leg swelling.   Gastrointestinal:  Negative for abdominal distention, abdominal pain, diarrhea, nausea and vomiting.   Genitourinary:  Negative for dysuria, frequency and hematuria.   Musculoskeletal:  Positive for arthralgias (right knee), gait problem and joint swelling. Negative for neck pain.   Skin:  Negative for color change, pallor, rash and wound.   Neurological:  Negative for tremors, syncope, light-headedness, numbness and headaches.   Psychiatric/Behavioral:  Negative for confusion and hallucinations. The patient is not nervous/anxious.      Objective:     Vital Signs (Most Recent):  Temp: 99.8 °F (37.7 °C) (24)  Pulse: 95 (24)  Resp: 18 (24)  BP: 135/70 (24)  SpO2: (!) 92 % (24) Vital Signs (24h  Range):  Temp:  [98.2 °F (36.8 °C)-99.8 °F (37.7 °C)] 99.8 °F (37.7 °C)  Pulse:  [73-98] 95  Resp:  [15-18] 18  SpO2:  [92 %-95 %] 92 %  BP: (129-158)/(64-79) 135/70     Weight: 95.4 kg (210 lb 5.1 oz)  Body mass index is 30.18 kg/m².     Physical Exam  Vitals and nursing note reviewed.   Constitutional:       General: He is not in acute distress.     Appearance: He is not toxic-appearing or diaphoretic.   HENT:      Head: Normocephalic and atraumatic.      Nose: Nose normal.      Mouth/Throat:      Mouth: Mucous membranes are moist.   Eyes:      Pupils: Pupils are equal, round, and reactive to light.   Cardiovascular:      Rate and Rhythm: Normal rate and regular rhythm.      Pulses: Normal pulses.           Dorsalis pedis pulses are 2+ on the right side.        Posterior tibial pulses are 2+ on the right side.   Pulmonary:      Effort: Pulmonary effort is normal. No respiratory distress.      Breath sounds: No wheezing, rhonchi or rales.   Abdominal:      General: Bowel sounds are normal. There is no distension.      Palpations: Abdomen is soft.      Tenderness: There is no abdominal tenderness. There is no guarding.   Musculoskeletal:      Cervical back: Normal range of motion.      Right knee: Swelling present. Decreased range of motion. Tenderness present.      Comments: Bandages removed with swellign to right knee still present but less than pre op, but still not at baseline compared to left knee.   Skin:     General: Skin is warm and dry.      Capillary Refill: Capillary refill takes less than 2 seconds.   Neurological:      General: No focal deficit present.      Mental Status: He is alert.      Sensory: No sensory deficit.      Motor: No weakness.   Psychiatric:         Mood and Affect: Mood normal.         Behavior: Behavior normal.              CRANIAL NERVES     CN III, IV, VI   Pupils are equal, round, and reactive to light.       Significant Labs: All pertinent labs within the past 24 hours have been  "reviewed.  CBC:   Recent Labs   Lab 08/18/24  0628   WBC 7.62   HGB 13.1*   HCT 39.3*        CMP:   Recent Labs   Lab 08/18/24  0628 08/19/24  0602    135*   K 3.5 3.8    100   CO2 22* 25   * 178*   BUN 13 16   CREATININE 0.7 0.9   CALCIUM 9.2 9.6   PROT 6.1 6.8   ALBUMIN 2.6* 2.8*   BILITOT 0.6 0.8   ALKPHOS 89 115   AST 23 34   ALT 25 39   ANIONGAP 12 10     Lactic Acid:   No results for input(s): "LACTATE" in the last 48 hours.      Significant Imaging: I have reviewed all pertinent imaging results/findings within the past 24 hours.      "

## 2024-08-19 NOTE — ASSESSMENT & PLAN NOTE
69 year old with CAD, A fib on Eliquis, HTN, DM (A1C 11), and osteoarthritis of the knee with concern for septic right knee.  Patient has history of OA in the right knee and receives intraarticular steroid injections every 3 months.  Last injection was on 8/13.  Developed increased pain and swelling within 24 hours after the injection.  Underwent knee aspiration in Ortho clinic 8/15 which showed 143,000 WBCs, 85% segs and positive CCP crystals. No history of gout.  Subsequently sent to ED.  He denied associated fever/chills.        Underwent I&D with Dr. Alfaro on 8/16.  Per OP note,  thick viscous fluid encountered.  Suspects septic arthritis superimposed over pseudogout.   No OR cultures available.  Aspirate cultures of 8/15 NGTD. Micro was able to add on aerobic culture to aspirate specimens of 8/15.  Denies history of Staph infection or other infections. Denies recent antibiotic use.     Remains afebrile, no leukocytosis.  >>202 today.  Ortho following closely.  May re-aspirate tomorrow.  Currently on IV vancomycin and ceftriaxone.     Plan/recommendations:   Continue IV vancomycin (pharmacy to dose. Trough goal 15-20)   Continue ceftriaxone 2 g q 24 hours   Will follow cultures and adjust antibiotics accordingly.     Will follow up tomorrow    Data reviewed and plan discussed with ID staff, Dr. Michaud  Secure chat/Discussed above plan with Primary Team,  Dr. Galvan

## 2024-08-19 NOTE — ASSESSMENT & PLAN NOTE
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 afebrile, WBC 12.09  -LA 1.5 with crp 144 on admit  -blood CX NG  -Arthrocentesis of right knee with 459251 WBCs and positive crystals but no CX sent, started on vanc/cefepime on admit  -OR 8/16 for I and D with ortho and reported Significant amounts of thick viscous fluid consistent with septic arthritis and/or CPPD. WBAT. Unclear situation as initially had no Cx data in system on 8/16 from clinic showing sent but now showing cultures from this but only fungal, anaerobic and AFB.dirk burnham with ID was able to get micro to place an aerobic cx and greatly appreciate her assistace and pending now  Ortho to change bandages 8/18 and swelling slowly improving. Crp slightly worse at 202. And now 251 but clinically not worsening. Ortho tapped 8/19 again and studies pending. Okay to eat today, staff strongly suspects large element of pseudogout involvement, will f/u further.  -MM pain control.  -Elevate RLE.  -Fall precautions.  -resumed eliquis and asa post op

## 2024-08-19 NOTE — PROGRESS NOTES
"[facility] - Surgery  Infectious Disease  Progress Note    Patient Name: King Cool Jr.  MRN: [id number]  Admission Date: 8/15/2024  Length of Stay: 4 days  Attending Physician: Lorna Galvan MD  Primary Care Provider: Gallo Lara MD    Isolation Status: No active isolations  Assessment/Plan:      ID  * Septic joint of right knee joint     69 year old with CAD, A fib on Eliquis, HTN, DM (A1C 11), and osteoarthritis of the knee admitted with concern for septic right knee.  Patient has history of OA in the right knee and receives intraarticular steroid injections every 3 months.  Last injection was on 8/13.  Developed increased pain and swelling within 24 hours after the injection.  Underwent knee aspiration in Ortho clinic 8/15 which showed 143,000 WBCs, 85% segs and positive CPP crystals. No history of gout.         Underwent I&D with Dr. Alfaro on 8/16.  Per OP note,  thick viscous fluid encountered.  Suspects septic arthritis superimposed over pseudogout.   No OR cultures sent.   Aspirate cultures of 8/15 is showing "no significant isolate." I called micro lab and will work up/ speciate further.  Denies history of Staph infection or other infections. Denies recent antibiotic use.     Patient is on Vanc/Ceftriaxone. Febrile to 101.3. today. CRP increased. Ortho following closely and re-aspirated knee today. Cx pending. WBC 32K, 86% segs.     Plan/recommendations:   Continue IV vancomycin (pharmacy to dose. Trough goal 15-20)   Continue ceftriaxone 2 g q 24 hours   Repeat blood cultures x 2 in setting of fevers   Will follow cultures and adjust antibiotics accordingly.     If persistently febrile, consider CXR and checking for COVID   Discussed plan with ID staff. ID will follow up tomorrow          Thank you for the consult. Please secure chat for any questions.  Nell Sharp PA-C      Subjective:     Principal Problem:Septic joint of right knee joint    HPI:  King Cool is a 69 year old with CAD, A " "fib on Eliquis, HTN, DM (A1C 11), and osteoarthritis of the knee who presented from Ortho clinic with concern for septic right knee.  Patient has history of OA in the right knee and receives intraarticular steroid injections every 3 months.  Last injection was on 8/13. He subsequently developed increase pain and swelling.  Was evaluated in Ortho clinic 8/15 and had an arthrocentesis which showed [id number] WBCs and positive calcium pyrophosphate crystals. R knee xray showed severe osteoarthritis. He denied associated fever/chills.  He denies any history of gout.       In the ED, noted to be tachycardic, afebrile.  WBC 12.  , ESR wnl.   Aspirate cultures - only anaerobic, fungal, and AFB sent.  No aerobic.  Gram stain negative.  He is now s/p I&D with Dr. Alfaro.  No OR cultures available.   He was started on antibiotics prior to surgery. Currently on vancomycin and cefepime. ID consulted for broad spectrum antibiotic regimen.     Interval History: Febrile overnight.  Still with swelling/pain. Knee re-aspirated today. Cx pending. Cell count downtrending.   Called micro lab as aerobic cx resulted with "no significant isolate" - will work up further.   Patient without any complaints other than his knee.     Review of Systems   Constitutional:  Positive for activity change and fever. Negative for chills.   Respiratory:  Negative for cough and shortness of breath.    Cardiovascular:  Negative for chest pain.   Gastrointestinal:  Positive for constipation. Negative for diarrhea and vomiting.   Musculoskeletal:  Positive for arthralgias, gait problem and joint swelling.   All other systems reviewed and are negative.    Objective:     Vital Signs (Most Recent):  Temp: (!) 101.3 °F (38.5 °C) (08/19/24 1148)  Pulse: 101 (08/19/24 1148)  Resp: 18 (08/19/24 1148)  BP: (!) 142/89 (08/19/24 1148)  SpO2: 96 % (08/19/24 1148) Vital Signs (24h Range):  Temp:  [98.2 °F (36.8 °C)-101.3 °F (38.5 °C)] 101.3 °F (38.5 °C)  Pulse:  " [] 101  Resp:  [15-18] 18  SpO2:  [92 %-96 %] 96 %  BP: (129-158)/(64-89) 142/89     Weight: 95.4 kg (210 lb 5.1 oz)  Body mass index is 30.18 kg/m².    Estimated Creatinine Clearance: 91.1 mL/min (based on SCr of 0.9 mg/dL).     Physical Exam  Vitals and nursing note reviewed.   Constitutional:       General: He is not in acute distress.     Appearance: Normal appearance. He is not ill-appearing, toxic-appearing or diaphoretic.   HENT:      Head: Normocephalic.      Mouth/Throat:      Mouth: Mucous membranes are moist.   Eyes:      General: No scleral icterus.     Conjunctiva/sclera: Conjunctivae normal.   Cardiovascular:      Rate and Rhythm: Normal rate and regular rhythm.   Pulmonary:      Effort: Pulmonary effort is normal. No respiratory distress.   Abdominal:      General: There is no distension.      Palpations: Abdomen is soft.   Musculoskeletal:         General: Swelling and tenderness present.      Left lower leg: No edema.      Comments: Right knee swollen. No erythema.  Bandages over arthroscopic sites c/d/I.    Skin:     General: Skin is warm and dry.      Findings: Lesion (scabbed over abrasions right ankle) present.   Neurological:      Mental Status: He is alert and oriented to person, place, and time.   Psychiatric:         Mood and Affect: Mood normal.         Behavior: Behavior normal.         Thought Content: Thought content normal.         Judgment: Judgment normal.          Significant Labs: Blood Culture:   Recent Labs   Lab 08/15/24  1700   LABBLOO No Growth to date  No Growth to date  No Growth to date  No Growth to date  No Growth to date  No Growth to date  No Growth to date  No Growth to date     CBC:   Recent Labs   Lab 08/18/24  0628   WBC 7.62   HGB 13.1*   HCT 39.3*        CMP:   Recent Labs   Lab 08/18/24  0628 08/19/24  0602    135*   K 3.5 3.8    100   CO2 22* 25   * 178*   BUN 13 16   CREATININE 0.7 0.9   CALCIUM 9.2 9.6   PROT 6.1 6.8    ALBUMIN 2.6* 2.8*   BILITOT 0.6 0.8   ALKPHOS 89 115   AST 23 34   ALT 25 39   ANIONGAP 12 10     Microbiology Results (last 7 days)       Procedure Component Value Units Date/Time    Blood culture [[id number]] Collected: 08/19/24 1221    Order Status: Sent Specimen: Blood from Peripheral, Hand, Right Updated: 08/19/24 1317    Blood culture [[id number]] Collected: 08/19/24 1220    Order Status: Sent Specimen: Blood from Peripheral, Hand, Left Updated: 08/19/24 1232    Gram stain [[id number]] Collected: 08/19/24 0853    Order Status: Completed Specimen: Abscess from Knee, Right Updated: 08/19/24 1132     Gram Stain Result Few WBC's      No organisms seen    AFB Culture & Smear [[id number]] Collected: 08/19/24 0853    Order Status: Sent Specimen: Abscess from Knee, Right Updated: 08/19/24 0941    Fungus culture [[id number]] Collected: 08/19/24 0853    Order Status: Sent Specimen: Abscess from Knee, Right Updated: 08/19/24 0940    Culture, Anaerobic [[id number]] Collected: 08/19/24 0853    Order Status: Sent Specimen: Abscess from Knee, Right Updated: 08/19/24 0940    Aerobic culture [[id number]] Collected: 08/19/24 0853    Order Status: Sent Specimen: Abscess from Knee, Right Updated: 08/19/24 0939    Blood culture x two cultures. Draw prior to antibiotics. [[id number]] Collected: 08/15/24 1700    Order Status: Completed Specimen: Blood from Peripheral, Antecubital, Right Updated: 08/18/24 1812     Blood Culture, Routine No Growth to date      No Growth to date      No Growth to date      No Growth to date    Narrative:      Aerobic and anaerobic    Blood culture x two cultures. Draw prior to antibiotics. [[id number]] Collected: 08/15/24 1700    Order Status: Completed Specimen: Blood from Peripheral, Antecubital, Left Updated: 08/18/24 1812     Blood Culture, Routine No Growth to date      No Growth to date      No Growth to date      No Growth to date    Narrative:      Aerobic and anaerobic    Aerobic culture  [[id number]]     Order Status: Completed Specimen: Abscess from Knee, Right           Wound Culture:   Recent Labs   Lab 08/17/24  1608   LABAERO STREPTOCOCCUS MITIS/ORALIS   Rare  *       Significant Imaging: None

## 2024-08-19 NOTE — SUBJECTIVE & OBJECTIVE
Principal Problem:Septic joint of right knee joint    Principal Orthopedic Problem: as above now s/p R knee arthroscopic I&D 8/16    Interval History: Pt seen and examined at bedside. VSS.  REGINEEON. Still having some pain. Knee effusion remains.  from 202. Reaspirated today.       Review of patient's allergies indicates:   Allergen Reactions    Tamiflu [oseltamivir]      Increases BP    Metformin Hives     Diarrhea, nausea    Penicillins Rash       Current Facility-Administered Medications   Medication    acetaminophen tablet 1,000 mg    apixaban tablet 5 mg    artificial tears 0.5 % ophthalmic solution 1 drop    aspirin EC tablet 81 mg    atorvastatin tablet 80 mg    cefTRIAXone (ROCEPHIN) 2 g in D5W 100 mL IVPB (MB+)    dextrose 10% bolus 125 mL 125 mL    dextrose 10% bolus 250 mL 250 mL    gabapentin capsule 300 mg    glucagon (human recombinant) injection 1 mg    glucose chewable tablet 16 g    glucose chewable tablet 24 g    guaiFENesin 100 mg/5 ml syrup 200 mg    HYDROmorphone injection 0.2 mg    insulin aspart U-100 pen 0-5 Units    insulin aspart U-100 pen 13 Units    insulin glargine U-100 (Lantus) pen 16 Units    losartan tablet 50 mg    melatonin tablet 6 mg    methocarbamoL tablet 750 mg    metoprolol succinate (TOPROL-XL) 24 hr tablet 25 mg    multivitamin tablet    naloxone 0.4 mg/mL injection 0.02 mg    omega-3 acid ethyl esters capsule 1 g    ondansetron injection 4 mg    oxyCODONE immediate release tablet 5 mg    oxyCODONE immediate release tablet Tab 10 mg    sodium chloride 0.9% flush 10 mL    vancomycin - pharmacy to dose    vancomycin 2 g in dextrose 5 % 500 mL IVPB     Facility-Administered Medications Ordered in Other Encounters   Medication    triamcinolone acetonide injection 40 mg     Objective:     Vital Signs (Most Recent):  Temp: 99.8 °F (37.7 °C) (08/19/24 0728)  Pulse: 95 (08/19/24 0728)  Resp: 18 (08/19/24 0728)  BP: 135/70 (08/19/24 0728)  SpO2: (!) 92 % (08/19/24 0728) Vital  "Signs (24h Range):  Temp:  [98.2 °F (36.8 °C)-99.8 °F (37.7 °C)] 99.8 °F (37.7 °C)  Pulse:  [73-98] 95  Resp:  [15-18] 18  SpO2:  [92 %-97 %] 92 %  BP: (129-158)/(64-79) 135/70     Weight: 95.4 kg (210 lb 5.1 oz)  Height: 5' 10" (177.8 cm)  Body mass index is 30.18 kg/m².      Intake/Output Summary (Last 24 hours) at 8/19/2024 0855  Last data filed at 8/18/2024 1602  Gross per 24 hour   Intake 480 ml   Output --   Net 480 ml        Ortho/SPM Exam     AAOx4  NAD  Reg rate  No increased WOB    RLE    Dressing taken down today, saturation of lateral side, no active bleeding or drainage  No expressible purulence  Compartments soft/compressible  SILT throughout  AROM of toes, ankle, intact.  WWP. Brisk cap refill            Significant Labs: All pertinent labs within the past 24 hours have been reviewed.    Significant Imaging: I have reviewed and interpreted all pertinent imaging results/findings.  "

## 2024-08-19 NOTE — SUBJECTIVE & OBJECTIVE
"Interval History: Febrile overnight.  Still with swelling/pain. Knee re-aspirated today. Cx pending. Cell count downtrending.   Called micro lab as aerobic cx resulted with "no significant isolate" - will work up further.   Patient without any complaints other than his knee.     Review of Systems   Constitutional:  Positive for activity change and fever. Negative for chills.   Respiratory:  Negative for cough and shortness of breath.    Cardiovascular:  Negative for chest pain.   Gastrointestinal:  Positive for constipation. Negative for diarrhea and vomiting.   Musculoskeletal:  Positive for arthralgias, gait problem and joint swelling.   All other systems reviewed and are negative.    Objective:     Vital Signs (Most Recent):  Temp: (!) 101.3 °F (38.5 °C) (08/19/24 1148)  Pulse: 101 (08/19/24 1148)  Resp: 18 (08/19/24 1148)  BP: (!) 142/89 (08/19/24 1148)  SpO2: 96 % (08/19/24 1148) Vital Signs (24h Range):  Temp:  [98.2 °F (36.8 °C)-101.3 °F (38.5 °C)] 101.3 °F (38.5 °C)  Pulse:  [] 101  Resp:  [15-18] 18  SpO2:  [92 %-96 %] 96 %  BP: (129-158)/(64-89) 142/89     Weight: 95.4 kg (210 lb 5.1 oz)  Body mass index is 30.18 kg/m².    Estimated Creatinine Clearance: 91.1 mL/min (based on SCr of 0.9 mg/dL).     Physical Exam  Vitals and nursing note reviewed.   Constitutional:       General: He is not in acute distress.     Appearance: Normal appearance. He is not ill-appearing, toxic-appearing or diaphoretic.   HENT:      Head: Normocephalic.      Mouth/Throat:      Mouth: Mucous membranes are moist.   Eyes:      General: No scleral icterus.     Conjunctiva/sclera: Conjunctivae normal.   Cardiovascular:      Rate and Rhythm: Normal rate and regular rhythm.   Pulmonary:      Effort: Pulmonary effort is normal. No respiratory distress.   Abdominal:      General: There is no distension.      Palpations: Abdomen is soft.   Musculoskeletal:         General: Swelling and tenderness present.      Left lower leg: No " edema.      Comments: Right knee swollen. No erythema.  Bandages over arthroscopic sites c/d/I.    Skin:     General: Skin is warm and dry.      Findings: Lesion (scabbed over abrasions right ankle) present.   Neurological:      Mental Status: He is alert and oriented to person, place, and time.   Psychiatric:         Mood and Affect: Mood normal.         Behavior: Behavior normal.         Thought Content: Thought content normal.         Judgment: Judgment normal.          Significant Labs: Blood Culture:   Recent Labs   Lab 08/15/24  1700   LABBLOO No Growth to date  No Growth to date  No Growth to date  No Growth to date  No Growth to date  No Growth to date  No Growth to date  No Growth to date     CBC:   Recent Labs   Lab 08/18/24  0628   WBC 7.62   HGB 13.1*   HCT 39.3*        CMP:   Recent Labs   Lab 08/18/24  0628 08/19/24  0602    135*   K 3.5 3.8    100   CO2 22* 25   * 178*   BUN 13 16   CREATININE 0.7 0.9   CALCIUM 9.2 9.6   PROT 6.1 6.8   ALBUMIN 2.6* 2.8*   BILITOT 0.6 0.8   ALKPHOS 89 115   AST 23 34   ALT 25 39   ANIONGAP 12 10     Microbiology Results (last 7 days)       Procedure Component Value Units Date/Time    Blood culture [[id number]] Collected: 08/19/24 1221    Order Status: Sent Specimen: Blood from Peripheral, Hand, Right Updated: 08/19/24 1317    Blood culture [[id number]] Collected: 08/19/24 1220    Order Status: Sent Specimen: Blood from Peripheral, Hand, Left Updated: 08/19/24 1232    Gram stain [[id number]] Collected: 08/19/24 0853    Order Status: Completed Specimen: Abscess from Knee, Right Updated: 08/19/24 1132     Gram Stain Result Few WBC's      No organisms seen    AFB Culture & Smear [[id number]] Collected: 08/19/24 0853    Order Status: Sent Specimen: Abscess from Knee, Right Updated: 08/19/24 0941    Fungus culture [[id number]] Collected: 08/19/24 0853    Order Status: Sent Specimen: Abscess from Knee, Right Updated: 08/19/24 0940    Culture,  Anaerobic [[id number]] Collected: 08/19/24 0853    Order Status: Sent Specimen: Abscess from Knee, Right Updated: 08/19/24 0940    Aerobic culture [[id number]] Collected: 08/19/24 0853    Order Status: Sent Specimen: Abscess from Knee, Right Updated: 08/19/24 0939    Blood culture x two cultures. Draw prior to antibiotics. [[id number]] Collected: 08/15/24 1700    Order Status: Completed Specimen: Blood from Peripheral, Antecubital, Right Updated: 08/18/24 1812     Blood Culture, Routine No Growth to date      No Growth to date      No Growth to date      No Growth to date    Narrative:      Aerobic and anaerobic    Blood culture x two cultures. Draw prior to antibiotics. [[id number]] Collected: 08/15/24 1700    Order Status: Completed Specimen: Blood from Peripheral, Antecubital, Left Updated: 08/18/24 1812     Blood Culture, Routine No Growth to date      No Growth to date      No Growth to date      No Growth to date    Narrative:      Aerobic and anaerobic    Aerobic culture [[id number]]     Order Status: Completed Specimen: Abscess from Knee, Right           Wound Culture:   Recent Labs   Lab 08/17/24  1608   LABAERO STREPTOCOCCUS MITIS/ORALIS   Rare  *       Significant Imaging: None

## 2024-08-20 PROBLEM — R50.9 FEVER: Status: ACTIVE | Noted: 2024-08-20

## 2024-08-20 LAB
ACID FAST MOD KINY STN SPEC: NORMAL
ALBUMIN SERPL BCP-MCNC: 2.5 G/DL (ref 3.5–5.2)
ALP SERPL-CCNC: 143 U/L (ref 55–135)
ALT SERPL W/O P-5'-P-CCNC: 73 U/L (ref 10–44)
ANION GAP SERPL CALC-SCNC: 11 MMOL/L (ref 8–16)
AST SERPL-CCNC: 64 U/L (ref 10–40)
BACTERIA BLD CULT: NORMAL
BACTERIA BLD CULT: NORMAL
BILIRUB SERPL-MCNC: 0.7 MG/DL (ref 0.1–1)
BUN SERPL-MCNC: 13 MG/DL (ref 8–23)
CALCIUM SERPL-MCNC: 9.6 MG/DL (ref 8.7–10.5)
CHLORIDE SERPL-SCNC: 101 MMOL/L (ref 95–110)
CO2 SERPL-SCNC: 24 MMOL/L (ref 23–29)
CREAT SERPL-MCNC: 0.9 MG/DL (ref 0.5–1.4)
EST. GFR  (NO RACE VARIABLE): >60 ML/MIN/1.73 M^2
GLUCOSE SERPL-MCNC: 181 MG/DL (ref 70–110)
MYCOBACTERIUM SPEC QL CULT: NORMAL
PATH INTERP FLD-IMP: NORMAL
POCT GLUCOSE: 128 MG/DL (ref 70–110)
POCT GLUCOSE: 194 MG/DL (ref 70–110)
POCT GLUCOSE: 214 MG/DL (ref 70–110)
POCT GLUCOSE: 242 MG/DL (ref 70–110)
POTASSIUM SERPL-SCNC: 3.9 MMOL/L (ref 3.5–5.1)
PROT SERPL-MCNC: 6.4 G/DL (ref 6–8.4)
SODIUM SERPL-SCNC: 136 MMOL/L (ref 136–145)

## 2024-08-20 PROCEDURE — 21400001 HC TELEMETRY ROOM: Mod: HCNC

## 2024-08-20 PROCEDURE — 63600175 PHARM REV CODE 636 W HCPCS: Mod: HCNC | Performed by: NURSE PRACTITIONER

## 2024-08-20 PROCEDURE — 80053 COMPREHEN METABOLIC PANEL: CPT | Mod: HCNC | Performed by: HOSPITALIST

## 2024-08-20 PROCEDURE — 25000003 PHARM REV CODE 250: Mod: HCNC | Performed by: HOSPITALIST

## 2024-08-20 PROCEDURE — 99233 SBSQ HOSP IP/OBS HIGH 50: CPT | Mod: HCNC,,, | Performed by: PHYSICIAN ASSISTANT

## 2024-08-20 PROCEDURE — 63600175 PHARM REV CODE 636 W HCPCS: Mod: HCNC | Performed by: HOSPITALIST

## 2024-08-20 PROCEDURE — 36415 COLL VENOUS BLD VENIPUNCTURE: CPT | Mod: HCNC | Performed by: HOSPITALIST

## 2024-08-20 PROCEDURE — 25000003 PHARM REV CODE 250: Mod: HCNC | Performed by: NURSE PRACTITIONER

## 2024-08-20 RX ORDER — INSULIN GLARGINE 100 [IU]/ML
19 INJECTION, SOLUTION SUBCUTANEOUS 2 TIMES DAILY
Status: DISCONTINUED | OUTPATIENT
Start: 2024-08-20 | End: 2024-08-24 | Stop reason: HOSPADM

## 2024-08-20 RX ORDER — ACETAMINOPHEN 500 MG
500 TABLET ORAL EVERY 8 HOURS
Status: DISCONTINUED | OUTPATIENT
Start: 2024-08-20 | End: 2024-08-22

## 2024-08-20 RX ORDER — INSULIN ASPART 100 [IU]/ML
15 INJECTION, SOLUTION INTRAVENOUS; SUBCUTANEOUS
Status: DISCONTINUED | OUTPATIENT
Start: 2024-08-20 | End: 2024-08-21

## 2024-08-20 RX ADMIN — GABAPENTIN 300 MG: 300 CAPSULE ORAL at 09:08

## 2024-08-20 RX ADMIN — THERA TABS 1 TABLET: TAB at 08:08

## 2024-08-20 RX ADMIN — ACETAMINOPHEN 500 MG: 500 TABLET ORAL at 04:08

## 2024-08-20 RX ADMIN — OXYCODONE HYDROCHLORIDE 10 MG: 10 TABLET ORAL at 10:08

## 2024-08-20 RX ADMIN — ATORVASTATIN CALCIUM 80 MG: 40 TABLET, FILM COATED ORAL at 09:08

## 2024-08-20 RX ADMIN — METHOCARBAMOL 750 MG: 750 TABLET ORAL at 09:08

## 2024-08-20 RX ADMIN — GABAPENTIN 300 MG: 300 CAPSULE ORAL at 04:08

## 2024-08-20 RX ADMIN — INSULIN ASPART 13 UNITS: 100 INJECTION, SOLUTION INTRAVENOUS; SUBCUTANEOUS at 08:08

## 2024-08-20 RX ADMIN — INSULIN ASPART 2 UNITS: 100 INJECTION, SOLUTION INTRAVENOUS; SUBCUTANEOUS at 12:08

## 2024-08-20 RX ADMIN — INSULIN ASPART 15 UNITS: 100 INJECTION, SOLUTION INTRAVENOUS; SUBCUTANEOUS at 04:08

## 2024-08-20 RX ADMIN — ASPIRIN 81 MG: 81 TABLET, COATED ORAL at 08:08

## 2024-08-20 RX ADMIN — APIXABAN 5 MG: 5 TABLET, FILM COATED ORAL at 08:08

## 2024-08-20 RX ADMIN — VANCOMYCIN HYDROCHLORIDE 2000 MG: 500 INJECTION, POWDER, LYOPHILIZED, FOR SOLUTION INTRAVENOUS at 06:08

## 2024-08-20 RX ADMIN — LOSARTAN POTASSIUM 50 MG: 50 TABLET, FILM COATED ORAL at 08:08

## 2024-08-20 RX ADMIN — CEFTRIAXONE SODIUM 2 G: 2 INJECTION, POWDER, FOR SOLUTION INTRAMUSCULAR; INTRAVENOUS at 09:08

## 2024-08-20 RX ADMIN — OXYCODONE HYDROCHLORIDE 10 MG: 10 TABLET ORAL at 04:08

## 2024-08-20 RX ADMIN — METOPROLOL SUCCINATE 25 MG: 25 TABLET, EXTENDED RELEASE ORAL at 08:08

## 2024-08-20 RX ADMIN — INSULIN ASPART 2 UNITS: 100 INJECTION, SOLUTION INTRAVENOUS; SUBCUTANEOUS at 08:08

## 2024-08-20 RX ADMIN — OMEGA-3-ACID ETHYL ESTERS 1 G: 1 CAPSULE, LIQUID FILLED ORAL at 08:08

## 2024-08-20 RX ADMIN — METHOCARBAMOL 750 MG: 750 TABLET ORAL at 04:08

## 2024-08-20 RX ADMIN — Medication 6 MG: at 08:08

## 2024-08-20 RX ADMIN — GABAPENTIN 300 MG: 300 CAPSULE ORAL at 08:08

## 2024-08-20 RX ADMIN — INSULIN GLARGINE 18 UNITS: 100 INJECTION, SOLUTION SUBCUTANEOUS at 08:08

## 2024-08-20 RX ADMIN — METHOCARBAMOL 750 MG: 750 TABLET ORAL at 08:08

## 2024-08-20 RX ADMIN — INSULIN GLARGINE 19 UNITS: 100 INJECTION, SOLUTION SUBCUTANEOUS at 09:08

## 2024-08-20 RX ADMIN — ACETAMINOPHEN 500 MG: 500 TABLET ORAL at 10:08

## 2024-08-20 RX ADMIN — APIXABAN 5 MG: 5 TABLET, FILM COATED ORAL at 09:08

## 2024-08-20 RX ADMIN — ACETAMINOPHEN 1000 MG: 325 TABLET ORAL at 06:08

## 2024-08-20 RX ADMIN — INSULIN ASPART 13 UNITS: 100 INJECTION, SOLUTION INTRAVENOUS; SUBCUTANEOUS at 12:08

## 2024-08-20 RX ADMIN — OXYCODONE HYDROCHLORIDE 10 MG: 10 TABLET ORAL at 05:08

## 2024-08-20 NOTE — ASSESSMENT & PLAN NOTE
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 afebrile, WBC 12.09  -LA 1.5 with crp 144 on admit  -blood CX NG  -Arthrocentesis of right knee with 399415 WBCs and positive crystals but no CX sent, started on vanc/cefepime on admit  -OR 8/16 for I and D with ortho and reported Significant amounts of thick viscous fluid consistent with septic arthritis and/or CPPD. WBAT. Unclear situation as initially had no Cx data in system on 8/16 from clinic showing sent but now showing cultures from this but only fungal, anaerobic and AFB.dirk burnham with ID was able to get micro to place an aerobic cx and greatly appreciate her assistace and showing strep mitis/oralis  Ortho to change bandages 8/18 and swelling slowly improving. Crp slightly worse at 202. And now 251 but clinically not worsening. Ortho tapped 8/19 again and studies more consistent with pseudogout and improving wbc. Some swelling more in right calf on 8/20 and fevers persisting, will check LE US for clots.  -MM pain control.  -Elevate RLE.  -Fall precautions.  -resumed eliquis and asa post op

## 2024-08-20 NOTE — PROGRESS NOTES
[facility]  Infectious Disease  Progress Note    Patient Name: King Cool Jr.  MRN: [id number]  Admission Date: 8/15/2024  Length of Stay: 5 days  Attending Physician: Lorna Galvan MD  Primary Care Provider: Gallo Lara MD    Isolation Status: No active isolations  Assessment/Plan:      ID  * Septic joint of right knee joint     69 year old with CAD, A fib on Eliquis, HTN, DM (A1C 11), and osteoarthritis of the knee admitted with concern for septic right knee.  Patient has history of OA in the right knee and receives intraarticular steroid injections every 3 months.  Last injection was on 8/13.  Developed increased pain and swelling within 24 hours after the injection.  Underwent knee aspiration in Ortho clinic 8/15 which showed 143K WBCs, 85% segs and positive CPP crystals. No history of gout or pseudogout.        Underwent surgical I&D with Dr. Alfaro on 8/16. Per OP note,  thick viscous fluid encountered.  Suspects septic arthritis superimposed over pseudogout.   No OR cultures sent.  Aspirate cultures of 8/15 is positive for strep mitis (rare).     Patient is on Vanc/Ceftriaxone. Febrile to 101.3 yesterday and CRP increased. Ortho re-aspirated knee. Cx pending. WBC 32K, 86% segs. Blood cx NGTD.     Plan/recommendations:   Continue IV vancomycin (pharmacy to dose. Trough goal 15-20) for now   Continue Ceftriaxone 2 g q 24 hours   Follow repeat blood cultures and aspiration cultures  If cultures remain negative and patient without persistent fevers, will discontinue Vancomycin   Discussed plan with ID staff. ID will follow up tomorrow        Thank you for the consult. Please secure chat for any questions.  Nell Sharp PA-C      Subjective:     Principal Problem:Septic joint of right knee joint    HPI:  King Cool is a 69 year old with CAD, A fib on Eliquis, HTN, DM (A1C 11), and osteoarthritis of the knee who presented from Ortho clinic with concern for septic right knee.  Patient has  "history of OA in the right knee and receives intraarticular steroid injections every 3 months.  Last injection was on 8/13. He subsequently developed increase pain and swelling.  Was evaluated in Ortho clinic 8/15 and had an arthrocentesis which showed 001348 WBCs and positive calcium pyrophosphate crystals. R knee xray showed severe osteoarthritis. He denied associated fever/chills.  He denies any history of gout.  Denies history of Staph infection or other infections. Denies recent antibiotic use.      In the ED, noted to be tachycardic, afebrile.  WBC 12.  , ESR wnl.   Aspirate cultures - only anaerobic, fungal, and AFB sent.  No aerobic.  Gram stain negative.  He is now s/p I&D with Dr. Alfaro.  No OR cultures available.   He was started on antibiotics prior to surgery. Currently on vancomycin and cefepime. ID consulted for broad spectrum antibiotic regimen.     Interval History: last fever yesterday afternoon. None today. Seen sitting up in chair today. Still with swelling/pain. Knee re-aspirated yesterday. Cx pending. Cell count downtrending.   Called micro lab as aerobic cx from clinic aspiration resulted with "no significant isolate" - now showing strep mitis (rare)    Review of Systems   Constitutional:  Positive for activity change. Negative for chills and diaphoresis.   Respiratory:  Negative for cough and shortness of breath.    Cardiovascular:  Negative for chest pain.   Gastrointestinal:  Positive for constipation. Negative for diarrhea and vomiting.   Musculoskeletal:  Positive for arthralgias, gait problem and joint swelling.   All other systems reviewed and are negative.    Objective:     Vital Signs (Most Recent):  Temp: 97.4 °F (36.3 °C) (08/20/24 1201)  Pulse: 82 (08/20/24 1201)  Resp: 16 (08/20/24 1201)  BP: (!) 141/70 (08/20/24 1201)  SpO2: (!) 93 % (08/20/24 1201) Vital Signs (24h Range):  Temp:  [97.4 °F (36.3 °C)-100.7 °F (38.2 °C)] 97.4 °F (36.3 °C)  Pulse:  [] 82  Resp:  " "[16-18] 16  SpO2:  [92 %-97 %] 93 %  BP: (125-184)/(67-86) 141/70     Weight: 95.4 kg (210 lb 5.1 oz)  Body mass index is 30.18 kg/m².    Estimated Creatinine Clearance: 91.1 mL/min (based on SCr of 0.9 mg/dL).     Physical Exam  Vitals and nursing note reviewed.   Constitutional:       General: He is not in acute distress.     Appearance: Normal appearance. He is not ill-appearing, toxic-appearing or diaphoretic.   HENT:      Head: Normocephalic.      Mouth/Throat:      Mouth: Mucous membranes are moist.   Eyes:      General: No scleral icterus.     Conjunctiva/sclera: Conjunctivae normal.   Cardiovascular:      Rate and Rhythm: Normal rate and regular rhythm.   Pulmonary:      Effort: Pulmonary effort is normal. No respiratory distress.   Abdominal:      General: There is no distension.      Palpations: Abdomen is soft.   Musculoskeletal:         General: Swelling and tenderness present.      Left lower leg: No edema.      Comments: Right knee swollen. No erythema.  Bandages over arthroscopic sites c/d/I.    Skin:     General: Skin is warm and dry.      Findings: Lesion (scabbed over abrasions right ankle) present.   Neurological:      Mental Status: He is alert and oriented to person, place, and time.   Psychiatric:         Mood and Affect: Mood normal.         Behavior: Behavior normal.         Thought Content: Thought content normal.         Judgment: Judgment normal.          Significant Labs: Blood Culture:   Recent Labs   Lab 08/15/24  1700 08/19/24  1220 08/19/24  1221   LABBLOO No Growth to date  No Growth to date  No Growth to date  No Growth to date  No Growth to date  No Growth to date  No Growth to date  No Growth to date  No Growth to date  No Growth to date No Growth to date No Growth to date     CBC:   No results for input(s): "WBC", "HGB", "HCT", "PLT" in the last 48 hours.    CMP:   Recent Labs   Lab 08/19/24  0602 08/20/24  0339   * 136   K 3.8 3.9    101   CO2 25 24   GLU " 178* 181*   BUN 16 13   CREATININE 0.9 0.9   CALCIUM 9.6 9.6   PROT 6.8 6.4   ALBUMIN 2.8* 2.5*   BILITOT 0.8 0.7   ALKPHOS 115 143*   AST 34 64*   ALT 39 73*   ANIONGAP 10 11     Microbiology Results (last 7 days)       Procedure Component Value Units Date/Time    Aerobic culture [[id number]] Collected: 08/19/24 0853    Order Status: Completed Specimen: Abscess from Knee, Right Updated: 08/20/24 0735     Aerobic Bacterial Culture No growth    Culture, Anaerobic [[id number]] Collected: 08/19/24 0853    Order Status: Completed Specimen: Abscess from Knee, Right Updated: 08/20/24 0717     Anaerobic Culture Culture in progress    Blood culture [[id number]] Collected: 08/19/24 1221    Order Status: Completed Specimen: Blood from Peripheral, Hand, Right Updated: 08/19/24 1945     Blood Culture, Routine No Growth to date    Blood culture [[id number]] Collected: 08/19/24 1220    Order Status: Completed Specimen: Blood from Peripheral, Hand, Left Updated: 08/19/24 1945     Blood Culture, Routine No Growth to date    Blood culture x two cultures. Draw prior to antibiotics. [[id number]] Collected: 08/15/24 1700    Order Status: Completed Specimen: Blood from Peripheral, Antecubital, Right Updated: 08/19/24 1812     Blood Culture, Routine No Growth to date      No Growth to date      No Growth to date      No Growth to date      No Growth to date    Narrative:      Aerobic and anaerobic    Blood culture x two cultures. Draw prior to antibiotics. [[id number]] Collected: 08/15/24 1700    Order Status: Completed Specimen: Blood from Peripheral, Antecubital, Left Updated: 08/19/24 1812     Blood Culture, Routine No Growth to date      No Growth to date      No Growth to date      No Growth to date      No Growth to date    Narrative:      Aerobic and anaerobic    Gram stain [[id number]] Collected: 08/19/24 0853    Order Status: Completed Specimen: Abscess from Knee, Right Updated: 08/19/24 1132     Gram Stain Result Few WBC's       No organisms seen    AFB Culture & Smear [[id number]] Collected: 08/19/24 0853    Order Status: Sent Specimen: Abscess from Knee, Right Updated: 08/19/24 0941    Fungus culture [[id number]] Collected: 08/19/24 0853    Order Status: Sent Specimen: Abscess from Knee, Right Updated: 08/19/24 0940    Aerobic culture [[id number]]     Order Status: Completed Specimen: Abscess from Knee, Right           Wound Culture:   Recent Labs   Lab 08/17/24  1608 08/19/24  0853   LABAERO STREPTOCOCCUS MITIS/ORALIS   Rare  * No growth       Significant Imaging: None

## 2024-08-20 NOTE — PLAN OF CARE
Problem: Adult Inpatient Plan of Care  Goal: Plan of Care Review  Outcome: Progressing     Problem: Adult Inpatient Plan of Care  Goal: Optimal Comfort and Wellbeing  Outcome: Progressing     Problem: Diabetes Comorbidity  Goal: Blood Glucose Level Within Targeted Range  Outcome: Progressing     Problem: Wound  Goal: Absence of Infection Signs and Symptoms  Outcome: Progressing     Problem: Wound  Goal: Skin Health and Integrity  Outcome: Progressing     Problem: Wound  Goal: Optimal Wound Healing  Outcome: Progressing   Patient lying supine in bed watching television. NAD noted. Safety measures in place.

## 2024-08-20 NOTE — NURSING
Nurses Note -- 4 Eyes      8/20/2024   11:05 AM      Skin assessed during: Daily Assessment      [x] No Altered Skin Integrity Present    []Prevention Measures Documented      [] Yes- Altered Skin Integrity Present or Discovered   [] LDA Added if Not in Epic (Describe Wound)   [] New Altered Skin Integrity was Present on Admit and Documented in LDA   [] Wound Image Taken    Wound Care Consulted? No    Attending Nurse:  Gloria Aponte RN/Staff Member:  Kishore

## 2024-08-20 NOTE — ASSESSMENT & PLAN NOTE
Persistant 8/19 through day, repeat cx pending. Covid neg. Cxr neg. Will check LE US. Improving tap 8/19

## 2024-08-20 NOTE — SUBJECTIVE & OBJECTIVE
"Interval History: last fever yesterday afternoon. None today. Seen sitting up in chair today. Still with swelling/pain. Knee re-aspirated yesterday. Cx pending. Cell count downtrending.   Called micro lab as aerobic cx from clinic aspiration resulted with "no significant isolate" - now showing strep mitis (rare)    Review of Systems   Constitutional:  Positive for activity change. Negative for chills and diaphoresis.   Respiratory:  Negative for cough and shortness of breath.    Cardiovascular:  Negative for chest pain.   Gastrointestinal:  Positive for constipation. Negative for diarrhea and vomiting.   Musculoskeletal:  Positive for arthralgias, gait problem and joint swelling.   All other systems reviewed and are negative.    Objective:     Vital Signs (Most Recent):  Temp: 97.4 °F (36.3 °C) (08/20/24 1201)  Pulse: 82 (08/20/24 1201)  Resp: 16 (08/20/24 1201)  BP: (!) 141/70 (08/20/24 1201)  SpO2: (!) 93 % (08/20/24 1201) Vital Signs (24h Range):  Temp:  [97.4 °F (36.3 °C)-100.7 °F (38.2 °C)] 97.4 °F (36.3 °C)  Pulse:  [] 82  Resp:  [16-18] 16  SpO2:  [92 %-97 %] 93 %  BP: (125-184)/(67-86) 141/70     Weight: 95.4 kg (210 lb 5.1 oz)  Body mass index is 30.18 kg/m².    Estimated Creatinine Clearance: 91.1 mL/min (based on SCr of 0.9 mg/dL).     Physical Exam  Vitals and nursing note reviewed.   Constitutional:       General: He is not in acute distress.     Appearance: Normal appearance. He is not ill-appearing, toxic-appearing or diaphoretic.   HENT:      Head: Normocephalic.      Mouth/Throat:      Mouth: Mucous membranes are moist.   Eyes:      General: No scleral icterus.     Conjunctiva/sclera: Conjunctivae normal.   Cardiovascular:      Rate and Rhythm: Normal rate and regular rhythm.   Pulmonary:      Effort: Pulmonary effort is normal. No respiratory distress.   Abdominal:      General: There is no distension.      Palpations: Abdomen is soft.   Musculoskeletal:         General: Swelling and " "tenderness present.      Left lower leg: No edema.      Comments: Right knee swollen. No erythema.  Bandages over arthroscopic sites c/d/I.    Skin:     General: Skin is warm and dry.      Findings: Lesion (scabbed over abrasions right ankle) present.   Neurological:      Mental Status: He is alert and oriented to person, place, and time.   Psychiatric:         Mood and Affect: Mood normal.         Behavior: Behavior normal.         Thought Content: Thought content normal.         Judgment: Judgment normal.          Significant Labs: Blood Culture:   Recent Labs   Lab 08/15/24  1700 08/19/24  1220 08/19/24  1221   LABBLOO No Growth to date  No Growth to date  No Growth to date  No Growth to date  No Growth to date  No Growth to date  No Growth to date  No Growth to date  No Growth to date  No Growth to date No Growth to date No Growth to date     CBC:   No results for input(s): "WBC", "HGB", "HCT", "PLT" in the last 48 hours.    CMP:   Recent Labs   Lab 08/19/24  0602 08/20/24  0339   * 136   K 3.8 3.9    101   CO2 25 24   * 181*   BUN 16 13   CREATININE 0.9 0.9   CALCIUM 9.6 9.6   PROT 6.8 6.4   ALBUMIN 2.8* 2.5*   BILITOT 0.8 0.7   ALKPHOS 115 143*   AST 34 64*   ALT 39 73*   ANIONGAP 10 11     Microbiology Results (last 7 days)       Procedure Component Value Units Date/Time    Aerobic culture [[id number]] Collected: 08/19/24 0853    Order Status: Completed Specimen: Abscess from Knee, Right Updated: 08/20/24 0735     Aerobic Bacterial Culture No growth    Culture, Anaerobic [[id number]] Collected: 08/19/24 0853    Order Status: Completed Specimen: Abscess from Knee, Right Updated: 08/20/24 0717     Anaerobic Culture Culture in progress    Blood culture [[id number]] Collected: 08/19/24 1221    Order Status: Completed Specimen: Blood from Peripheral, Hand, Right Updated: 08/19/24 1945     Blood Culture, Routine No Growth to date    Blood culture [[id number]] Collected: 08/19/24 1220    " Order Status: Completed Specimen: Blood from Peripheral, Hand, Left Updated: 08/19/24 1945     Blood Culture, Routine No Growth to date    Blood culture x two cultures. Draw prior to antibiotics. [[id number]] Collected: 08/15/24 1700    Order Status: Completed Specimen: Blood from Peripheral, Antecubital, Right Updated: 08/19/24 1812     Blood Culture, Routine No Growth to date      No Growth to date      No Growth to date      No Growth to date      No Growth to date    Narrative:      Aerobic and anaerobic    Blood culture x two cultures. Draw prior to antibiotics. [[id number]] Collected: 08/15/24 1700    Order Status: Completed Specimen: Blood from Peripheral, Antecubital, Left Updated: 08/19/24 1812     Blood Culture, Routine No Growth to date      No Growth to date      No Growth to date      No Growth to date      No Growth to date    Narrative:      Aerobic and anaerobic    Gram stain [[id number]] Collected: 08/19/24 0853    Order Status: Completed Specimen: Abscess from Knee, Right Updated: 08/19/24 1132     Gram Stain Result Few WBC's      No organisms seen    AFB Culture & Smear [[id number]] Collected: 08/19/24 0853    Order Status: Sent Specimen: Abscess from Knee, Right Updated: 08/19/24 0941    Fungus culture [[id number]] Collected: 08/19/24 0853    Order Status: Sent Specimen: Abscess from Knee, Right Updated: 08/19/24 0940    Aerobic culture [[id number]]     Order Status: Completed Specimen: Abscess from Knee, Right           Wound Culture:   Recent Labs   Lab 08/17/24  1608 08/19/24  0853   LABAERO STREPTOCOCCUS MITIS/ORALIS   Rare  * No growth       Significant Imaging: None

## 2024-08-20 NOTE — ASSESSMENT & PLAN NOTE
Patient's FSGs are uncontrolled due to hyperglycemia on current medication regimen.  Last A1c reviewed-   Lab Results   Component Value Date    HGBA1C 11.5 (H) 08/13/2024     Most recent fingerstick glucose reviewed-   Recent Labs   Lab 08/19/24  1606 08/19/24  2244 08/20/24  0728 08/20/24  1159   POCTGLUCOSE 225* 194* 242* 214*       Current correctional scale  Low  Maintain anti-hyperglycemic dose as follows-   Antihyperglycemics (From admission, onward)      Start     Stop Route Frequency Ordered    08/20/24 2100  insulin glargine U-100 (Lantus) pen 19 Units         -- SubQ 2 times daily 08/20/24 1349    08/20/24 1645  insulin aspart U-100 pen 15 Units         -- SubQ 3 times daily with meals 08/20/24 1349    08/15/24 1917  insulin aspart U-100 pen 0-5 Units         -- SubQ Before meals & nightly PRN 08/15/24 1817          Hold Oral hypoglycemics while patient is in the hospital.   -Accuchecks AC/HS  -Diabetic/cardiac diet    Glucose uncontrolled on admit with values 269-330 since admit, and placed on home regimen, he reports doesn't run this high at home, and so will titrate post op and infection likely also worsening glucose  -improving 8/17 in higher 100s on cmp and lower 200s this AM and scheduled novolog started last night and adjusted lantus and will trend. Adjusting further 8/18  Was improved 8/18 PM and early 8/19 AM but then back in lower 200s so will titrate as held novolog dania this AM as was nPO but now eating so likely will need to adjsut again. Will need novolog on dc as was not on before. Titrate more 8/20

## 2024-08-20 NOTE — SUBJECTIVE & OBJECTIVE
Interval history- he reports feeling the fevers he had through afternoon to 101 multiple times. Crp higher. Cultures with strep mitis.oralis. uncler cause, cxr negative, covid negative. Has some inc calf swellign in RLE, will check LE U/S has been on home eliquis for afib since POD 1 but more calf swelilng and not just knee swelling now, more than left calf and pending now,. Glucose 210, will inc a few units novolog/levemir as closer to goal but not there eyet. Will f/u ID recs further. Ortho to check on his knee this PM per dr torres.        Review of patient's allergies indicates:   Allergen Reactions    Tamiflu [oseltamivir]      Increases BP    Metformin Hives     Diarrhea, nausea    Penicillins Rash       Current Facility-Administered Medications on File Prior to Encounter   Medication    triamcinolone acetonide injection 40 mg     Current Outpatient Medications on File Prior to Encounter   Medication Sig    alcohol swabs (DROPSAFE ALCOHOL PREP PADS) PadM USE AS DIRECTED AS NEEDED    apixaban (ELIQUIS) 5 mg Tab Take 1 tablet (5 mg total) by mouth 2 (two) times daily.    ASCORBATE CALCIUM (VITAMIN C ORAL) Take by mouth once daily.     aspirin (ECOTRIN) 81 MG EC tablet Take 81 mg by mouth once daily.    atorvastatin (LIPITOR) 80 MG tablet Take 1 tablet (80 mg total) by mouth every evening.    blood sugar diagnostic Strp 1 strip by Misc.(Non-Drug; Combo Route) route 3 (three) times daily.    blood-glucose meter kit Use as instructed    blood-glucose meter kit 1 each by Other route 3 (three) times daily. Use as instructed    diclofenac sodium (VOLTAREN) 1 % Gel Apply 2 g topically once daily.    doxycycline (VIBRA-TABS) 100 MG tablet Take 1 tablet (100 mg total) by mouth 2 (two) times daily.    empagliflozin (JARDIANCE) 25 mg tablet Take 1 tablet (25 mg total) by mouth once daily.    fish oil-omega-3 fatty acids 300-1,000 mg capsule Take 2 g by mouth once daily.    gabapentin (NEURONTIN) 300 MG capsule Take 1  capsule (300 mg total) by mouth 3 (three) times daily.    glimepiride (AMARYL) 4 MG tablet Take 1 tablet (4 mg total) by mouth 2 (two) times a day.    [START ON 10/13/2024] HYDROcodone-acetaminophen (NORCO)  mg per tablet Take 1 tablet by mouth 3 (three) times daily as needed (pain).    [START ON 9/13/2024] HYDROcodone-acetaminophen (NORCO)  mg per tablet Take 1 tablet by mouth 3 (three) times daily as needed (pain).    HYDROcodone-acetaminophen (NORCO)  mg per tablet Take 1 tablet by mouth 3 (three) times daily as needed (pain).    hydrocortisone 2.5 % cream Apply topically 2 (two) times daily.    lancets Misc 1 application  by Misc.(Non-Drug; Combo Route) route 3 (three) times daily.    loratadine (CLARITIN) 10 mg tablet Take 1 tablet (10 mg total) by mouth once daily.    losartan (COZAAR) 25 MG tablet Take 1 tablet (25 mg total) by mouth once daily.    meloxicam (MOBIC) 7.5 MG tablet Take 1 tablet (7.5 mg total) by mouth once daily.    metoprolol succinate (TOPROL-XL) 25 MG 24 hr tablet Take 1 tablet (25 mg total) by mouth once daily.    multivitamin with minerals tablet Take 1 tablet by mouth once daily.    neomycin-polymyxin-hydrocortisone (CORTISPORIN) 3.5-10,000-1 mg/mL-unit/mL-% otic suspension PLACE 3 DROPS INTO THE LEFT EAR 4 TIMES DAILY.    semaglutide (OZEMPIC) 0.25 mg or 0.5 mg (2 mg/3 mL) pen injector Inject 0.5 mg into the skin every 7 days.    sodium chloride (SALINE NASAL) 0.65 % nasal spray 2 sprays by Nasal route 2 (two) times a day.    triamcinolone acetonide 0.1% (KENALOG) 0.1 % ointment Apply topically 2 (two) times daily as needed.     Family History       Problem Relation (Age of Onset)    Blindness Mother    Diabetes Brother    No Known Problems Father, Brother, Daughter, Son, Daughter, Brother          Tobacco Use    Smoking status: Former     Current packs/day: 0.00     Average packs/day: 0.3 packs/day for 4.0 years (1.0 ttl pk-yrs)     Types: Cigarettes     Start date:  1972     Quit date: 1976     Years since quittin.7    Smokeless tobacco: Never   Substance and Sexual Activity    Alcohol use: Yes     Comment: occ    Drug use: No    Sexual activity: Yes     Partners: Female     Birth control/protection: None     Review of Systems   Constitutional:  Negative for appetite change, chills, diaphoresis, fatigue and fever.   HENT:  Negative for congestion, rhinorrhea and sore throat.    Eyes:  Negative for photophobia and visual disturbance.   Respiratory:  Negative for cough, shortness of breath and wheezing.    Cardiovascular:  Negative for chest pain, palpitations and leg swelling.   Gastrointestinal:  Negative for abdominal distention, abdominal pain, diarrhea, nausea and vomiting.   Genitourinary:  Negative for dysuria, frequency and hematuria.   Musculoskeletal:  Positive for arthralgias (right knee), gait problem and joint swelling. Negative for neck pain.   Skin:  Negative for color change, pallor, rash and wound.   Neurological:  Negative for tremors, syncope, light-headedness, numbness and headaches.   Psychiatric/Behavioral:  Negative for confusion and hallucinations. The patient is not nervous/anxious.      Objective:     Vital Signs (Most Recent):  Temp: 97.4 °F (36.3 °C) (24 1201)  Pulse: 82 (24 1201)  Resp: 16 (24 1201)  BP: (!) 141/70 (24 1201)  SpO2: (!) 93 % (24 1201) Vital Signs (24h Range):  Temp:  [97.4 °F (36.3 °C)-100.7 °F (38.2 °C)] 97.4 °F (36.3 °C)  Pulse:  [] 82  Resp:  [16-18] 16  SpO2:  [92 %-97 %] 93 %  BP: (125-184)/(67-86) 141/70     Weight: 95.4 kg (210 lb 5.1 oz)  Body mass index is 30.18 kg/m².     Physical Exam  Vitals and nursing note reviewed.   Constitutional:       General: He is not in acute distress.     Appearance: He is not toxic-appearing or diaphoretic.   HENT:      Head: Normocephalic and atraumatic.      Nose: Nose normal.      Mouth/Throat:      Mouth: Mucous membranes are moist.  "  Eyes:      Pupils: Pupils are equal, round, and reactive to light.   Cardiovascular:      Rate and Rhythm: Normal rate and regular rhythm.      Pulses: Normal pulses.           Dorsalis pedis pulses are 2+ on the right side.        Posterior tibial pulses are 2+ on the right side.   Pulmonary:      Effort: Pulmonary effort is normal. No respiratory distress.      Breath sounds: No wheezing, rhonchi or rales.   Abdominal:      General: Bowel sounds are normal. There is no distension.      Palpations: Abdomen is soft.      Tenderness: There is no abdominal tenderness. There is no guarding.   Musculoskeletal:      Cervical back: Normal range of motion.      Right knee: Swelling present. Decreased range of motion. Tenderness present.      Comments: Bandages removed with swellign to right knee still present but less than pre op, but still not at baseline compared to left knee.   Skin:     General: Skin is warm and dry.      Capillary Refill: Capillary refill takes less than 2 seconds.      Comments: Some calf swelling in addition to knee in RLE compared to LLE   Neurological:      General: No focal deficit present.      Mental Status: He is alert.      Sensory: No sensory deficit.      Motor: No weakness.   Psychiatric:         Mood and Affect: Mood normal.         Behavior: Behavior normal.              CRANIAL NERVES     CN III, IV, VI   Pupils are equal, round, and reactive to light.       Significant Labs: All pertinent labs within the past 24 hours have been reviewed.  CBC:   No results for input(s): "WBC", "HGB", "HCT", "PLT" in the last 48 hours.    CMP:   Recent Labs   Lab 08/19/24  0602 08/20/24  0339   * 136   K 3.8 3.9    101   CO2 25 24   * 181*   BUN 16 13   CREATININE 0.9 0.9   CALCIUM 9.6 9.6   PROT 6.8 6.4   ALBUMIN 2.8* 2.5*   BILITOT 0.8 0.7   ALKPHOS 115 143*   AST 34 64*   ALT 39 73*   ANIONGAP 10 11     Lactic Acid:   No results for input(s): "LACTATE" in the last 48 " hours.      Significant Imaging: I have reviewed all pertinent imaging results/findings within the past 24 hours.

## 2024-08-20 NOTE — PT/OT/SLP PROGRESS
Occupational Therapy      Patient Name:  King Cool Jr.   MRN:  [id number]    Patient not seen today secondary to a therapy hold for mobilizing due to pt. Reporting pain (at calf region) RLE, along with being warm to touch prior to lunch attempt. For second attempt post lunch pt. Was off the floor for an ultrasound of LE. Will follow-up per POC.    8/20/2024

## 2024-08-20 NOTE — ASSESSMENT & PLAN NOTE
69 year old with CAD, A fib on Eliquis, HTN, DM (A1C 11), and osteoarthritis of the knee admitted with concern for septic right knee.  Patient has history of OA in the right knee and receives intraarticular steroid injections every 3 months.  Last injection was on 8/13.  Developed increased pain and swelling within 24 hours after the injection.  Underwent knee aspiration in Ortho clinic 8/15 which showed 143K WBCs, 85% segs and positive CPP crystals. No history of gout or pseudogout.        Underwent surgical I&D with Dr. Alfaro on 8/16. Per OP note,  thick viscous fluid encountered.  Suspects septic arthritis superimposed over pseudogout.   No OR cultures sent.  Aspirate cultures of 8/15 is positive for strep mitis (rare).     Patient is on Vanc/Ceftriaxone. Febrile to 101.3 yesterday and CRP increased. Ortho re-aspirated knee. Cx pending. WBC 32K, 86% segs. Blood cx NGTD.     Plan/recommendations:   Continue IV vancomycin (pharmacy to dose. Trough goal 15-20) for now   Continue Ceftriaxone 2 g q 24 hours   Follow repeat blood cultures and aspiration cultures  If cultures remain negative and patient without persistent fevers, will discontinue Vancomycin   Discussed plan with ID staff. ID will follow up tomorrow

## 2024-08-20 NOTE — PROGRESS NOTES
[facility] Medicine  Progress Note    Patient Name: King Cool Jr.  MRN: [id number]  Patient Class: IP- Inpatient   Admission Date: 8/15/2024  Length of Stay: 5 days  Attending Physician: Lorna Galvan MD  Primary Care Provider: Gallo Lara MD        Subjective:     Principal Problem:Septic joint of right knee joint        HPI:  King Cool Jr. is a 69 y.o. male with a PMHx of CAD, a fib on eliquis, HTN, HLD, DM2, and osteoarthritis who presents to the ED from orthopedic clinic for evaluation of septic right knee. The patient reports he has a history of OA in his right knee and receives intra articular steroid injections every 3 months. His last injection was 8/13. He reports beginning yesterday he noticed increased swelling and pain to his right knee. He notes it was so painful he was unable to get out of bed this morning. He denies any trauma or falls. He states at baseline he ambulates unassisted. Denies numbness/tingling. He was seen in ortho clinic today and had an arthrocentesis which showed [id number] WBCs and positive crystals. R knee xray showed severe osteoarthritis. He denies fever/chills, N/V/D, abdominal pain, CP, SOB, cough, or dysuria.    In the ED, pt tachycardic and mildly hypertensive otherwise vitals stable, afebrile. WBC 12.09k. Glucose 330. Tbili 1.1. LA 1.5. Blood cxs in process. The patient received morphine, zofran, 1L NS, cefepime, and vancomycin. Ortho was consulted and recommend continuing NPO with plan for OR tomorrow for I&D right knee.     Overview/Hospital Course:  No notes on file    Interval history- he reports feeling the fevers he had through afternoon to 101 multiple times. Crp higher. Cultures with strep mitis.oralis. uncler cause, cxr negative, covid negative. Has some inc calf swellign in RLE, will check LE U/S has been on home eliquis for afib since POD 1 but more calf swelilng and not just knee swelling now, more than left calf and pending now,.  Glucose 210, will inc a few units novolog/levemir as closer to goal but not there eyet. Will f/u ID recs further. Ortho to check on his knee this PM per dr torres.        Review of patient's allergies indicates:   Allergen Reactions    Tamiflu [oseltamivir]      Increases BP    Metformin Hives     Diarrhea, nausea    Penicillins Rash       Current Facility-Administered Medications on File Prior to Encounter   Medication    triamcinolone acetonide injection 40 mg     Current Outpatient Medications on File Prior to Encounter   Medication Sig    alcohol swabs (DROPSAFE ALCOHOL PREP PADS) PadM USE AS DIRECTED AS NEEDED    apixaban (ELIQUIS) 5 mg Tab Take 1 tablet (5 mg total) by mouth 2 (two) times daily.    ASCORBATE CALCIUM (VITAMIN C ORAL) Take by mouth once daily.     aspirin (ECOTRIN) 81 MG EC tablet Take 81 mg by mouth once daily.    atorvastatin (LIPITOR) 80 MG tablet Take 1 tablet (80 mg total) by mouth every evening.    blood sugar diagnostic Strp 1 strip by Misc.(Non-Drug; Combo Route) route 3 (three) times daily.    blood-glucose meter kit Use as instructed    blood-glucose meter kit 1 each by Other route 3 (three) times daily. Use as instructed    diclofenac sodium (VOLTAREN) 1 % Gel Apply 2 g topically once daily.    doxycycline (VIBRA-TABS) 100 MG tablet Take 1 tablet (100 mg total) by mouth 2 (two) times daily.    empagliflozin (JARDIANCE) 25 mg tablet Take 1 tablet (25 mg total) by mouth once daily.    fish oil-omega-3 fatty acids 300-1,000 mg capsule Take 2 g by mouth once daily.    gabapentin (NEURONTIN) 300 MG capsule Take 1 capsule (300 mg total) by mouth 3 (three) times daily.    glimepiride (AMARYL) 4 MG tablet Take 1 tablet (4 mg total) by mouth 2 (two) times a day.    [START ON 10/13/2024] HYDROcodone-acetaminophen (NORCO)  mg per tablet Take 1 tablet by mouth 3 (three) times daily as needed (pain).    [START ON 9/13/2024] HYDROcodone-acetaminophen (NORCO)  mg per tablet Take 1 tablet  by mouth 3 (three) times daily as needed (pain).    HYDROcodone-acetaminophen (NORCO)  mg per tablet Take 1 tablet by mouth 3 (three) times daily as needed (pain).    hydrocortisone 2.5 % cream Apply topically 2 (two) times daily.    lancets Misc 1 application  by Misc.(Non-Drug; Combo Route) route 3 (three) times daily.    loratadine (CLARITIN) 10 mg tablet Take 1 tablet (10 mg total) by mouth once daily.    losartan (COZAAR) 25 MG tablet Take 1 tablet (25 mg total) by mouth once daily.    meloxicam (MOBIC) 7.5 MG tablet Take 1 tablet (7.5 mg total) by mouth once daily.    metoprolol succinate (TOPROL-XL) 25 MG 24 hr tablet Take 1 tablet (25 mg total) by mouth once daily.    multivitamin with minerals tablet Take 1 tablet by mouth once daily.    neomycin-polymyxin-hydrocortisone (CORTISPORIN) 3.5-10,000-1 mg/mL-unit/mL-% otic suspension PLACE 3 DROPS INTO THE LEFT EAR 4 TIMES DAILY.    semaglutide (OZEMPIC) 0.25 mg or 0.5 mg (2 mg/3 mL) pen injector Inject 0.5 mg into the skin every 7 days.    sodium chloride (SALINE NASAL) 0.65 % nasal spray 2 sprays by Nasal route 2 (two) times a day.    triamcinolone acetonide 0.1% (KENALOG) 0.1 % ointment Apply topically 2 (two) times daily as needed.     Family History       Problem Relation (Age of Onset)    Blindness Mother    Diabetes Brother    No Known Problems Father, Brother, Daughter, Son, Daughter, Brother          Tobacco Use    Smoking status: Former     Current packs/day: 0.00     Average packs/day: 0.3 packs/day for 4.0 years (1.0 ttl pk-yrs)     Types: Cigarettes     Start date: 1972     Quit date: 1976     Years since quittin.7    Smokeless tobacco: Never   Substance and Sexual Activity    Alcohol use: Yes     Comment: occ    Drug use: No    Sexual activity: Yes     Partners: Female     Birth control/protection: None     Review of Systems   Constitutional:  Negative for appetite change, chills, diaphoresis, fatigue and fever.   HENT:   Negative for congestion, rhinorrhea and sore throat.    Eyes:  Negative for photophobia and visual disturbance.   Respiratory:  Negative for cough, shortness of breath and wheezing.    Cardiovascular:  Negative for chest pain, palpitations and leg swelling.   Gastrointestinal:  Negative for abdominal distention, abdominal pain, diarrhea, nausea and vomiting.   Genitourinary:  Negative for dysuria, frequency and hematuria.   Musculoskeletal:  Positive for arthralgias (right knee), gait problem and joint swelling. Negative for neck pain.   Skin:  Negative for color change, pallor, rash and wound.   Neurological:  Negative for tremors, syncope, light-headedness, numbness and headaches.   Psychiatric/Behavioral:  Negative for confusion and hallucinations. The patient is not nervous/anxious.      Objective:     Vital Signs (Most Recent):  Temp: 97.4 °F (36.3 °C) (08/20/24 1201)  Pulse: 82 (08/20/24 1201)  Resp: 16 (08/20/24 1201)  BP: (!) 141/70 (08/20/24 1201)  SpO2: (!) 93 % (08/20/24 1201) Vital Signs (24h Range):  Temp:  [97.4 °F (36.3 °C)-100.7 °F (38.2 °C)] 97.4 °F (36.3 °C)  Pulse:  [] 82  Resp:  [16-18] 16  SpO2:  [92 %-97 %] 93 %  BP: (125-184)/(67-86) 141/70     Weight: 95.4 kg (210 lb 5.1 oz)  Body mass index is 30.18 kg/m².     Physical Exam  Vitals and nursing note reviewed.   Constitutional:       General: He is not in acute distress.     Appearance: He is not toxic-appearing or diaphoretic.   HENT:      Head: Normocephalic and atraumatic.      Nose: Nose normal.      Mouth/Throat:      Mouth: Mucous membranes are moist.   Eyes:      Pupils: Pupils are equal, round, and reactive to light.   Cardiovascular:      Rate and Rhythm: Normal rate and regular rhythm.      Pulses: Normal pulses.           Dorsalis pedis pulses are 2+ on the right side.        Posterior tibial pulses are 2+ on the right side.   Pulmonary:      Effort: Pulmonary effort is normal. No respiratory distress.      Breath sounds: No  "wheezing, rhonchi or rales.   Abdominal:      General: Bowel sounds are normal. There is no distension.      Palpations: Abdomen is soft.      Tenderness: There is no abdominal tenderness. There is no guarding.   Musculoskeletal:      Cervical back: Normal range of motion.      Right knee: Swelling present. Decreased range of motion. Tenderness present.      Comments: Bandages removed with swellign to right knee still present but less than pre op, but still not at baseline compared to left knee.   Skin:     General: Skin is warm and dry.      Capillary Refill: Capillary refill takes less than 2 seconds.      Comments: Some calf swelling in addition to knee in RLE compared to LLE   Neurological:      General: No focal deficit present.      Mental Status: He is alert.      Sensory: No sensory deficit.      Motor: No weakness.   Psychiatric:         Mood and Affect: Mood normal.         Behavior: Behavior normal.              CRANIAL NERVES     CN III, IV, VI   Pupils are equal, round, and reactive to light.       Significant Labs: All pertinent labs within the past 24 hours have been reviewed.  CBC:   No results for input(s): "WBC", "HGB", "HCT", "PLT" in the last 48 hours.    CMP:   Recent Labs   Lab 08/19/24  0602 08/20/24  0339   * 136   K 3.8 3.9    101   CO2 25 24   * 181*   BUN 16 13   CREATININE 0.9 0.9   CALCIUM 9.6 9.6   PROT 6.8 6.4   ALBUMIN 2.8* 2.5*   BILITOT 0.8 0.7   ALKPHOS 115 143*   AST 34 64*   ALT 39 73*   ANIONGAP 10 11     Lactic Acid:   No results for input(s): "LACTATE" in the last 48 hours.      Significant Imaging: I have reviewed all pertinent imaging results/findings within the past 24 hours.        Assessment/Plan:      * Septic joint of right knee joint  -on chronic right knee injections for OA and had recent injection the 13th then swelling after that was not typical for him with presumed nidus with entry portal,s aw ortho in clinic for this with concern for infection. " afebrile, WBC 12.09  -LA 1.5 with crp 144 on admit  -blood CX NG  -Arthrocentesis of right knee with [id number] WBCs and positive crystals but no CX sent, started on vanc/cefepime on admit  -OR 8/16 for I and D with ortho and reported Significant amounts of thick viscous fluid consistent with septic arthritis and/or CPPD. WBAT. Unclear situation as initially had no Cx data in system on 8/16 from clinic showing sent but now showing cultures from this but only fungal, anaerobic and AFB.dirk burnham with ID was able to get micro to place an aerobic cx and greatly appreciate her assistace and showing strep mitis/oralis  Ortho to change bandages 8/18 and swelling slowly improving. Crp slightly worse at 202. And now 251 but clinically not worsening. Ortho tapped 8/19 again and studies more consistent with pseudogout and improving wbc. Some swelling more in right calf on 8/20 and fevers persisting, will check LE US for clots.  -MM pain control.  -Elevate RLE.  -Fall precautions.  -resumed eliquis and asa post op     Fever  Persistant 8/19 through day, repeat cx pending. Covid neg. Cxr neg. Will check LE US. Improving tap 8/19      Coronary artery disease involving native coronary artery of native heart without angina pectoris  Patient with known CAD s/p stent placement, which is controlled Will continue Statin and monitor for S/Sx of angina/ACS. Continue to monitor on telemetry.   -Held ASA until post OR 8/17  [place] outpatient cards notes per dr macias, has known mid LAD lesion per cath in 2023, if symptoms develop freddy consider PCI long term.    PAF (paroxysmal atrial fibrillation)  Chronic anticoagulation   Patient with Paroxysmal (<7 days) atrial fibrillation which is controlled currently with Beta Blocker. Patient is currently in sinus rhythm.[id number] Score: 3. Anticoagulation indicated. Anticoagulation done with eliquis .  -Hold eliquis for OR today and resumed post op  Per OP cards notes, if has any reoccurent  nosebleeds on eliquis then plan to consider watchman long term as had hx of nosebleeds in past on anticoagulation    Class 1 obesity due to excess calories with serious comorbidity and body mass index (BMI) of 33.0 to 33.9 in adult  Body mass index is 28.84 kg/m². Obesity complicates all aspects of disease management from diagnostic modalities to treatment.     Essential (primary) hypertension  Chronic, controlled. Latest blood pressure and vitals reviewed-     Temp:  [98.6 °F (37 °C)]   Pulse:  [101]   Resp:  [18-20]   BP: (153)/(87)   SpO2:  [95 %] .   Home meds for hypertension were reviewed and noted below.   Hypertension Medications               losartan (COZAAR) 25 MG tablet Take 1 tablet (25 mg total) by mouth once daily.    metoprolol succinate (TOPROL-XL) 25 MG 24 hr tablet Take 1 tablet (25 mg total) by mouth once daily.            While in the hospital, will manage blood pressure as follows; Continue home antihypertensive regimen    Will utilize p.r.n. blood pressure medication only if patient's blood pressure greater than 180/110 and he develops symptoms such as worsening chest pain or shortness of breath.    Type 2 diabetes mellitus with microalbuminuria, without long-term current use of insulin  Patient's FSGs are uncontrolled due to hyperglycemia on current medication regimen.  Last A1c reviewed-   Lab Results   Component Value Date    HGBA1C 11.5 (H) 08/13/2024     Most recent fingerstick glucose reviewed-   Recent Labs   Lab 08/19/24  1606 08/19/24  2244 08/20/24  0728 08/20/24  1159   POCTGLUCOSE 225* 194* 242* 214*       Current correctional scale  Low  Maintain anti-hyperglycemic dose as follows-   Antihyperglycemics (From admission, onward)      Start     Stop Route Frequency Ordered    08/20/24 2100  insulin glargine U-100 (Lantus) pen 19 Units         -- SubQ 2 times daily 08/20/24 1349    08/20/24 1645  insulin aspart U-100 pen 15 Units         -- SubQ 3 times daily with meals 08/20/24 1349     08/15/24 1917  insulin aspart U-100 pen 0-5 Units         -- SubQ Before meals & nightly PRN 08/15/24 1817          Hold Oral hypoglycemics while patient is in the hospital.   -Accuchecks AC/HS  -Diabetic/cardiac diet    Glucose uncontrolled on admit with values 269-330 since admit, and placed on home regimen, he reports doesn't run this high at home, and so will titrate post op and infection likely also worsening glucose  -improving 8/17 in higher 100s on cmp and lower 200s this AM and scheduled novolog started last night and adjusted lantus and will trend. Adjusting further 8/18  Was improved 8/18 PM and early 8/19 AM but then back in lower 200s so will titrate as held novolog dania this AM as was nPO but now eating so likely will need to adjsut again. Will need novolog on dc as was not on before. Titrate more 8/20      VTE Risk Mitigation (From admission, onward)           Ordered     apixaban tablet 5 mg  2 times daily         08/16/24 1537     IP VTE LOW RISK PATIENT  Once         08/15/24 1817     Place sequential compression device  Until discontinued         08/15/24 1817                    Discharge Planning   JUAN M: 8/21/2024     Code Status: Full Code   Is the patient medically ready for discharge?:     Reason for patient still in hospital (select all that apply): Patient trending condition  Discharge Plan A: Other, Home Health                  Lorna Galvan MD  Department of Hospital Medicine   [facility]

## 2024-08-21 PROBLEM — M00.261 STREPTOCOCCAL ARTHRITIS OF RIGHT KNEE: Status: ACTIVE | Noted: 2024-08-15

## 2024-08-21 PROBLEM — K59.01 SLOW TRANSIT CONSTIPATION: Status: ACTIVE | Noted: 2024-08-21

## 2024-08-21 PROBLEM — K59.00 CONSTIPATION: Status: ACTIVE | Noted: 2024-08-21

## 2024-08-21 PROBLEM — R74.01 TRANSAMINITIS: Status: ACTIVE | Noted: 2024-08-21

## 2024-08-21 PROBLEM — R50.82 POST-PROCEDURAL FEVER: Status: ACTIVE | Noted: 2024-08-20

## 2024-08-21 PROBLEM — M11.20 PSEUDOGOUT: Status: ACTIVE | Noted: 2024-08-21

## 2024-08-21 LAB
ALBUMIN SERPL BCP-MCNC: 2.4 G/DL (ref 3.5–5.2)
ALP SERPL-CCNC: 174 U/L (ref 55–135)
ALT SERPL W/O P-5'-P-CCNC: 80 U/L (ref 10–44)
ANION GAP SERPL CALC-SCNC: 15 MMOL/L (ref 8–16)
AST SERPL-CCNC: 62 U/L (ref 10–40)
BASOPHILS # BLD AUTO: 0.09 K/UL (ref 0–0.2)
BASOPHILS NFR BLD: 1.1 % (ref 0–1.9)
BILIRUB SERPL-MCNC: 0.7 MG/DL (ref 0.1–1)
BUN SERPL-MCNC: 14 MG/DL (ref 8–23)
CALCIUM SERPL-MCNC: 9.3 MG/DL (ref 8.7–10.5)
CHLORIDE SERPL-SCNC: 99 MMOL/L (ref 95–110)
CK SERPL-CCNC: 37 U/L (ref 20–200)
CO2 SERPL-SCNC: 23 MMOL/L (ref 23–29)
CREAT SERPL-MCNC: 0.8 MG/DL (ref 0.5–1.4)
DIFFERENTIAL METHOD BLD: ABNORMAL
EOSINOPHIL # BLD AUTO: 0.1 K/UL (ref 0–0.5)
EOSINOPHIL NFR BLD: 1.2 % (ref 0–8)
ERYTHROCYTE [DISTWIDTH] IN BLOOD BY AUTOMATED COUNT: 12 % (ref 11.5–14.5)
EST. GFR  (NO RACE VARIABLE): >60 ML/MIN/1.73 M^2
GLUCOSE SERPL-MCNC: 125 MG/DL (ref 70–110)
HCT VFR BLD AUTO: 40.4 % (ref 40–54)
HGB BLD-MCNC: 12.9 G/DL (ref 14–18)
IMM GRANULOCYTES # BLD AUTO: 0.03 K/UL (ref 0–0.04)
IMM GRANULOCYTES NFR BLD AUTO: 0.4 % (ref 0–0.5)
LYMPHOCYTES # BLD AUTO: 1.6 K/UL (ref 1–4.8)
LYMPHOCYTES NFR BLD: 18.4 % (ref 18–48)
MCH RBC QN AUTO: 30.6 PG (ref 27–31)
MCHC RBC AUTO-ENTMCNC: 31.9 G/DL (ref 32–36)
MCV RBC AUTO: 96 FL (ref 82–98)
MONOCYTES # BLD AUTO: 1.3 K/UL (ref 0.3–1)
MONOCYTES NFR BLD: 15.6 % (ref 4–15)
NEUTROPHILS # BLD AUTO: 5.4 K/UL (ref 1.8–7.7)
NEUTROPHILS NFR BLD: 63.3 % (ref 38–73)
NRBC BLD-RTO: 0 /100 WBC
PLATELET # BLD AUTO: 252 K/UL (ref 150–450)
PMV BLD AUTO: 9.2 FL (ref 9.2–12.9)
POCT GLUCOSE: 162 MG/DL (ref 70–110)
POCT GLUCOSE: 189 MG/DL (ref 70–110)
POCT GLUCOSE: 223 MG/DL (ref 70–110)
POCT GLUCOSE: 261 MG/DL (ref 70–110)
POTASSIUM SERPL-SCNC: 4.2 MMOL/L (ref 3.5–5.1)
PROT SERPL-MCNC: 6.5 G/DL (ref 6–8.4)
RBC # BLD AUTO: 4.21 M/UL (ref 4.6–6.2)
SODIUM SERPL-SCNC: 137 MMOL/L (ref 136–145)
URATE SERPL-MCNC: 2.7 MG/DL (ref 3.4–7)
VANCOMYCIN TROUGH SERPL-MCNC: 18.1 UG/ML (ref 10–22)
WBC # BLD AUTO: 8.55 K/UL (ref 3.9–12.7)

## 2024-08-21 PROCEDURE — 25000003 PHARM REV CODE 250: Mod: HCNC | Performed by: NURSE PRACTITIONER

## 2024-08-21 PROCEDURE — 80053 COMPREHEN METABOLIC PANEL: CPT | Mod: HCNC | Performed by: HOSPITALIST

## 2024-08-21 PROCEDURE — 80202 ASSAY OF VANCOMYCIN: CPT | Mod: HCNC | Performed by: HOSPITALIST

## 2024-08-21 PROCEDURE — 25000003 PHARM REV CODE 250: Mod: HCNC | Performed by: HOSPITALIST

## 2024-08-21 PROCEDURE — 97116 GAIT TRAINING THERAPY: CPT | Mod: HCNC,CQ

## 2024-08-21 PROCEDURE — 84550 ASSAY OF BLOOD/URIC ACID: CPT | Mod: HCNC | Performed by: HOSPITALIST

## 2024-08-21 PROCEDURE — 63600175 PHARM REV CODE 636 W HCPCS: Mod: HCNC | Performed by: HOSPITALIST

## 2024-08-21 PROCEDURE — 82550 ASSAY OF CK (CPK): CPT | Mod: HCNC | Performed by: HOSPITALIST

## 2024-08-21 PROCEDURE — 63600175 PHARM REV CODE 636 W HCPCS: Mod: HCNC | Performed by: NURSE PRACTITIONER

## 2024-08-21 PROCEDURE — 21400001 HC TELEMETRY ROOM: Mod: HCNC

## 2024-08-21 PROCEDURE — 85025 COMPLETE CBC W/AUTO DIFF WBC: CPT | Mod: HCNC | Performed by: HOSPITALIST

## 2024-08-21 PROCEDURE — 99233 SBSQ HOSP IP/OBS HIGH 50: CPT | Mod: HCNC,,, | Performed by: PHYSICIAN ASSISTANT

## 2024-08-21 PROCEDURE — 97535 SELF CARE MNGMENT TRAINING: CPT | Mod: HCNC

## 2024-08-21 PROCEDURE — 36415 COLL VENOUS BLD VENIPUNCTURE: CPT | Mod: HCNC | Performed by: HOSPITALIST

## 2024-08-21 RX ORDER — COLCHICINE 0.6 MG/1
1.2 TABLET, FILM COATED ORAL ONCE
Status: COMPLETED | OUTPATIENT
Start: 2024-08-21 | End: 2024-08-21

## 2024-08-21 RX ORDER — SYRING-NEEDL,DISP,INSUL,0.3 ML 29 G X1/2"
296 SYRINGE, EMPTY DISPOSABLE MISCELLANEOUS ONCE
Status: COMPLETED | OUTPATIENT
Start: 2024-08-21 | End: 2024-08-21

## 2024-08-21 RX ORDER — COLCHICINE 0.6 MG/1
0.6 TABLET, FILM COATED ORAL DAILY
Status: DISCONTINUED | OUTPATIENT
Start: 2024-08-21 | End: 2024-08-24 | Stop reason: HOSPADM

## 2024-08-21 RX ORDER — INSULIN ASPART 100 [IU]/ML
18 INJECTION, SOLUTION INTRAVENOUS; SUBCUTANEOUS
Status: DISCONTINUED | OUTPATIENT
Start: 2024-08-21 | End: 2024-08-24 | Stop reason: HOSPADM

## 2024-08-21 RX ADMIN — INSULIN ASPART 2 UNITS: 100 INJECTION, SOLUTION INTRAVENOUS; SUBCUTANEOUS at 09:08

## 2024-08-21 RX ADMIN — METHOCARBAMOL 750 MG: 750 TABLET ORAL at 09:08

## 2024-08-21 RX ADMIN — OMEGA-3-ACID ETHYL ESTERS 1 G: 1 CAPSULE, LIQUID FILLED ORAL at 09:08

## 2024-08-21 RX ADMIN — METHOCARBAMOL 750 MG: 750 TABLET ORAL at 03:08

## 2024-08-21 RX ADMIN — INSULIN ASPART 3 UNITS: 100 INJECTION, SOLUTION INTRAVENOUS; SUBCUTANEOUS at 11:08

## 2024-08-21 RX ADMIN — APIXABAN 5 MG: 5 TABLET, FILM COATED ORAL at 09:08

## 2024-08-21 RX ADMIN — ACETAMINOPHEN 500 MG: 500 TABLET ORAL at 06:08

## 2024-08-21 RX ADMIN — INSULIN ASPART 15 UNITS: 100 INJECTION, SOLUTION INTRAVENOUS; SUBCUTANEOUS at 11:08

## 2024-08-21 RX ADMIN — ACETAMINOPHEN 500 MG: 500 TABLET ORAL at 09:08

## 2024-08-21 RX ADMIN — ASPIRIN 81 MG: 81 TABLET, COATED ORAL at 09:08

## 2024-08-21 RX ADMIN — INSULIN ASPART 18 UNITS: 100 INJECTION, SOLUTION INTRAVENOUS; SUBCUTANEOUS at 04:08

## 2024-08-21 RX ADMIN — OXYCODONE HYDROCHLORIDE 10 MG: 10 TABLET ORAL at 08:08

## 2024-08-21 RX ADMIN — THERA TABS 1 TABLET: TAB at 09:08

## 2024-08-21 RX ADMIN — Medication 6 MG: at 09:08

## 2024-08-21 RX ADMIN — ATORVASTATIN CALCIUM 80 MG: 40 TABLET, FILM COATED ORAL at 09:08

## 2024-08-21 RX ADMIN — METOPROLOL SUCCINATE 25 MG: 25 TABLET, EXTENDED RELEASE ORAL at 09:08

## 2024-08-21 RX ADMIN — OXYCODONE HYDROCHLORIDE 10 MG: 10 TABLET ORAL at 06:08

## 2024-08-21 RX ADMIN — GABAPENTIN 300 MG: 300 CAPSULE ORAL at 09:08

## 2024-08-21 RX ADMIN — CEFTRIAXONE SODIUM 2 G: 2 INJECTION, POWDER, FOR SOLUTION INTRAMUSCULAR; INTRAVENOUS at 09:08

## 2024-08-21 RX ADMIN — COLCHICINE 0.6 MG: 0.6 TABLET, FILM COATED ORAL at 03:08

## 2024-08-21 RX ADMIN — INSULIN GLARGINE 19 UNITS: 100 INJECTION, SOLUTION SUBCUTANEOUS at 09:08

## 2024-08-21 RX ADMIN — VANCOMYCIN HYDROCHLORIDE 2000 MG: 500 INJECTION, POWDER, LYOPHILIZED, FOR SOLUTION INTRAVENOUS at 06:08

## 2024-08-21 RX ADMIN — LOSARTAN POTASSIUM 50 MG: 50 TABLET, FILM COATED ORAL at 09:08

## 2024-08-21 RX ADMIN — INSULIN ASPART 15 UNITS: 100 INJECTION, SOLUTION INTRAVENOUS; SUBCUTANEOUS at 09:08

## 2024-08-21 RX ADMIN — COLCHICINE 1.2 MG: 0.6 TABLET, FILM COATED ORAL at 11:08

## 2024-08-21 RX ADMIN — MAGNESIUM CITRATE 296 ML: 1.75 LIQUID ORAL at 11:08

## 2024-08-21 NOTE — ASSESSMENT & PLAN NOTE
69 year old with CAD, A fib on Eliquis, HTN, DM (A1C 11), and osteoarthritis of the knee admitted with concern for septic right knee.  Patient has history of OA in the right knee and receives intraarticular steroid injections every 3 months.  Last injection was on 8/13.  Developed increased pain and swelling within 24 hours after the injection.  Underwent knee aspiration in Ortho clinic 8/15 which showed 143K WBCs, 85% segs and positive CPP crystals. No history of gout or pseudogout.        Underwent surgical I&D with Dr. Alfaro on 8/16. Per OP note,  thick viscous fluid encountered.  Suspects septic arthritis superimposed over pseudogout.   No OR cultures sent.  Aspirate cultures of 8/15 is positive for strep mitis (rare).     Patient is on Vanc/Ceftriaxone. Febrile to 100.6 yesterday and CRP increased. Ortho re-aspirated knee. Cx NGTD. WBC 32K, 86% segs. Blood cx NGTD.     Plan/recommendations:  Discontinue Vancomycin  Continue Ceftriaxone 2 g q 24 hours  Follow repeat blood cultures and aspiration cultures  Consider RLE imaging (CT?) given lower leg pain, redness, swelling  Management of pseudogout per primary and orthopedic surgery  Anticipate four weeks of antibiotics  Discussed plan with ID staff. ID will follow up tomorrow

## 2024-08-21 NOTE — ASSESSMENT & PLAN NOTE
King Cool . is a 69 y.o. male presenting with Right knee septic arthritis. Plan for OR today for arthroscopic vs open I&D. He is booked, marked and consented for surgery. Having some posterior calf pain, DVT US negative    Now s/p R knee arthroscopic I&D 8/16  Reaspirated 8/19 - 32k, 86% segs, -GS, +crystals, NGTD 8/21    - WBAT ROMAT R knee  - fu new aspiration cultures- NGTD (original cx before surgery strep mitis)  - Abx per ID: vanc and rocephin  - ok for diet  - Eliquis 5 bid at baseline    Dispo: ok for diet, no plans for repeat washout as reaspiration results reassuring for gout vs pseudogout and not septic arthritis  , will continue to fu cultures

## 2024-08-21 NOTE — PROGRESS NOTES
[facility]  Progress Note    Patient Name: King Cool Jr.  MRN: [id number]  Patient Class: IP- Inpatient   Admission Date: 8/15/2024  Length of Stay: 6 days  Attending Physician: Lorna Galvan MD  Primary Care Provider: Gallo Lara MD        Subjective:     Principal Problem:Septic joint of right knee joint        HPI:  King Cool Jr. is a 69 y.o. male with a PMHx of CAD, a fib on eliquis, HTN, HLD, DM2, and osteoarthritis who presents to the ED from orthopedic clinic for evaluation of septic right knee. The patient reports he has a history of OA in his right knee and receives intra articular steroid injections every 3 months. His last injection was 8/13. He reports beginning yesterday he noticed increased swelling and pain to his right knee. He notes it was so painful he was unable to get out of bed this morning. He denies any trauma or falls. He states at baseline he ambulates unassisted. Denies numbness/tingling. He was seen in ortho clinic today and had an arthrocentesis which showed [id number] WBCs and positive crystals. R knee xray showed severe osteoarthritis. He denies fever/chills, N/V/D, abdominal pain, CP, SOB, cough, or dysuria.    In the ED, pt tachycardic and mildly hypertensive otherwise vitals stable, afebrile. WBC 12.09k. Glucose 330. Tbili 1.1. LA 1.5. Blood cxs in process. The patient received morphine, zofran, 1L NS, cefepime, and vancomycin. Ortho was consulted and recommend continuing NPO with plan for OR tomorrow for I&D right knee.     Overview/Hospital Course:  No notes on file    Interval history- he had another fever to 100.6 overnight and he was symptomatic he said. Unclear cause for persistant fevesr, and crp has been not improved when last checked. Ortho reports can stop checking but given still febrile, would recheck tomorrow as still very unclear why. Pseudogout can cause fevers, and had steroid injetions about a week ago before had the worsening  swelling/strep in Cx from ortho clinic and stay here so mixed picture. Still very swollen in knee, hasn't had pseudogout treatment yet and that was previous so will try colchicine x 2 today, trying to avoid systemic steroids given superimposed infection and still febrile concerns as wel as uncontrolled glucose. LE US neg for clots. Neg covid, neg cxr. No urinary symptoms. No BM since admit, and mag citrate today. He really wants to avoid a suppository. Glucose  was looking better yesterday in 100s, some jaunts today with lunch to 260s. Will inc a little of mealtime insulin but hold off on the long acting for now given had 128-162 previous. Will need novolog on discharge as was not on that before admit and new dosing of long acting on discharge. Will f/u ID recs given still fevers going on, and cannot place IV line if plan is for IV antibiotics yet given fevers so updated him that stil needs to remain in house for this while still working on cause.  He reports that his late wife came to [facility] for care for her multiple heart valve surgeries by dr bauer after [facility] was very unhelpful for her right before [facility] and that she passed away in about 2017 but had 12 years after her surgeries longer than they thought after he was able to do some valve surgeries for complicated medical history.          Review of patient's allergies indicates:   Allergen Reactions    Tamiflu [oseltamivir]      Increases BP    Metformin Hives     Diarrhea, nausea    Penicillins Rash       Current Facility-Administered Medications on File Prior to Encounter   Medication    triamcinolone acetonide injection 40 mg     Current Outpatient Medications on File Prior to Encounter   Medication Sig    alcohol swabs (DROPSAFE ALCOHOL PREP PADS) PadM USE AS DIRECTED AS NEEDED    apixaban (ELIQUIS) 5 mg Tab Take 1 tablet (5 mg total) by mouth 2 (two) times daily.    ASCORBATE CALCIUM (VITAMIN C ORAL) Take by mouth once daily.     aspirin (ECOTRIN) 81 MG EC  tablet Take 81 mg by mouth once daily.    atorvastatin (LIPITOR) 80 MG tablet Take 1 tablet (80 mg total) by mouth every evening.    blood sugar diagnostic Strp 1 strip by Misc.(Non-Drug; Combo Route) route 3 (three) times daily.    blood-glucose meter kit Use as instructed    blood-glucose meter kit 1 each by Other route 3 (three) times daily. Use as instructed    diclofenac sodium (VOLTAREN) 1 % Gel Apply 2 g topically once daily.    doxycycline (VIBRA-TABS) 100 MG tablet Take 1 tablet (100 mg total) by mouth 2 (two) times daily.    empagliflozin (JARDIANCE) 25 mg tablet Take 1 tablet (25 mg total) by mouth once daily.    fish oil-omega-3 fatty acids 300-1,000 mg capsule Take 2 g by mouth once daily.    gabapentin (NEURONTIN) 300 MG capsule Take 1 capsule (300 mg total) by mouth 3 (three) times daily.    glimepiride (AMARYL) 4 MG tablet Take 1 tablet (4 mg total) by mouth 2 (two) times a day.    [START ON 10/13/2024] HYDROcodone-acetaminophen (NORCO)  mg per tablet Take 1 tablet by mouth 3 (three) times daily as needed (pain).    [START ON 9/13/2024] HYDROcodone-acetaminophen (NORCO)  mg per tablet Take 1 tablet by mouth 3 (three) times daily as needed (pain).    HYDROcodone-acetaminophen (NORCO)  mg per tablet Take 1 tablet by mouth 3 (three) times daily as needed (pain).    hydrocortisone 2.5 % cream Apply topically 2 (two) times daily.    lancets Misc 1 application  by Misc.(Non-Drug; Combo Route) route 3 (three) times daily.    loratadine (CLARITIN) 10 mg tablet Take 1 tablet (10 mg total) by mouth once daily.    losartan (COZAAR) 25 MG tablet Take 1 tablet (25 mg total) by mouth once daily.    meloxicam (MOBIC) 7.5 MG tablet Take 1 tablet (7.5 mg total) by mouth once daily.    metoprolol succinate (TOPROL-XL) 25 MG 24 hr tablet Take 1 tablet (25 mg total) by mouth once daily.    multivitamin with minerals tablet Take 1 tablet by mouth once daily.    neomycin-polymyxin-hydrocortisone  (CORTISPORIN) 3.5-10,000-1 mg/mL-unit/mL-% otic suspension PLACE 3 DROPS INTO THE LEFT EAR 4 TIMES DAILY.    semaglutide (OZEMPIC) 0.25 mg or 0.5 mg (2 mg/3 mL) pen injector Inject 0.5 mg into the skin every 7 days.    sodium chloride (SALINE NASAL) 0.65 % nasal spray 2 sprays by Nasal route 2 (two) times a day.    triamcinolone acetonide 0.1% (KENALOG) 0.1 % ointment Apply topically 2 (two) times daily as needed.     Family History       Problem Relation (Age of Onset)    Blindness Mother    Diabetes Brother    No Known Problems Father, Brother, Daughter, Son, Daughter, Brother          Tobacco Use    Smoking status: Former     Current packs/day: 0.00     Average packs/day: 0.3 packs/day for 4.0 years (1.0 ttl pk-yrs)     Types: Cigarettes     Start date: 1972     Quit date: 1976     Years since quittin.7    Smokeless tobacco: Never   Substance and Sexual Activity    Alcohol use: Yes     Comment: occ    Drug use: No    Sexual activity: Yes     Partners: Female     Birth control/protection: None     Review of Systems   Constitutional:  Negative for appetite change, chills, diaphoresis, fatigue and fever.   HENT:  Negative for congestion, rhinorrhea and sore throat.    Eyes:  Negative for photophobia and visual disturbance.   Respiratory:  Negative for cough, shortness of breath and wheezing.    Cardiovascular:  Negative for chest pain, palpitations and leg swelling.   Gastrointestinal:  Negative for abdominal distention, abdominal pain, diarrhea, nausea and vomiting.   Genitourinary:  Negative for dysuria, frequency and hematuria.   Musculoskeletal:  Positive for arthralgias (right knee), gait problem and joint swelling. Negative for neck pain.   Skin:  Negative for color change, pallor, rash and wound.   Neurological:  Negative for tremors, syncope, light-headedness, numbness and headaches.   Psychiatric/Behavioral:  Negative for confusion and hallucinations. The patient is not nervous/anxious.       Objective:     Vital Signs (Most Recent):  Temp: 98.4 °F (36.9 °C) (08/21/24 1134)  Pulse: 91 (08/21/24 1134)  Resp: 18 (08/21/24 1134)  BP: 130/62 (08/21/24 1134)  SpO2: 96 % (08/21/24 1134) Vital Signs (24h Range):  Temp:  [97.2 °F (36.2 °C)-100.6 °F (38.1 °C)] 98.4 °F (36.9 °C)  Pulse:  [74-91] 91  Resp:  [17-18] 18  SpO2:  [91 %-98 %] 96 %  BP: (107-154)/(58-71) 130/62     Weight: 95.7 kg (210 lb 15.7 oz)  Body mass index is 30.27 kg/m².     Physical Exam  Vitals and nursing note reviewed.   Constitutional:       General: He is not in acute distress.     Appearance: He is not toxic-appearing or diaphoretic.   HENT:      Head: Normocephalic and atraumatic.      Nose: Nose normal.      Mouth/Throat:      Mouth: Mucous membranes are moist.   Eyes:      Pupils: Pupils are equal, round, and reactive to light.   Cardiovascular:      Rate and Rhythm: Normal rate and regular rhythm.      Pulses: Normal pulses.           Dorsalis pedis pulses are 2+ on the right side.        Posterior tibial pulses are 2+ on the right side.   Pulmonary:      Effort: Pulmonary effort is normal. No respiratory distress.      Breath sounds: No wheezing, rhonchi or rales.   Abdominal:      General: Bowel sounds are normal. There is no distension.      Palpations: Abdomen is soft.      Tenderness: There is no abdominal tenderness. There is no guarding.   Musculoskeletal:      Cervical back: Normal range of motion.      Right knee: Swelling present. Decreased range of motion. Tenderness present.      Comments: Bandages removed with swellign to right knee still present but less than pre op, but still not at baseline compared to left knee. Swelilng above knee in lower thigh also seen. Dec ROM   Skin:     General: Skin is warm and dry.      Capillary Refill: Capillary refill takes less than 2 seconds.      Comments: Some calf swelling in addition to knee in RLE compared to LLE   Neurological:      General: No focal deficit present.       "Mental Status: He is alert.      Sensory: No sensory deficit.      Motor: No weakness.   Psychiatric:         Mood and Affect: Mood normal.         Behavior: Behavior normal.              CRANIAL NERVES     CN III, IV, VI   Pupils are equal, round, and reactive to light.       Significant Labs: All pertinent labs within the past 24 hours have been reviewed.  CBC:   Recent Labs   Lab 08/21/24  0527   WBC 8.55   HGB 12.9*   HCT 40.4          CMP:   Recent Labs   Lab 08/20/24  0339 08/21/24  0527    137   K 3.9 4.2    99   CO2 24 23   * 125*   BUN 13 14   CREATININE 0.9 0.8   CALCIUM 9.6 9.3   PROT 6.4 6.5   ALBUMIN 2.5* 2.4*   BILITOT 0.7 0.7   ALKPHOS 143* 174*   AST 64* 62*   ALT 73* 80*   ANIONGAP 11 15     Lactic Acid:   No results for input(s): "LACTATE" in the last 48 hours.      Significant Imaging: I have reviewed all pertinent imaging results/findings within the past 24 hours.        Assessment/Plan:      * Septic joint of right knee joint  -on chronic right knee injections for OA and had recent injection the 13th then swelling after that was not typical for him with presumed nidus with entry portal,s aw ortho in clinic for this with concern for infection. afebrile, WBC 12.09  -LA 1.5 with crp 144 on admit  -blood CX NG  -Arthrocentesis of right knee with [id number] WBCs and positive crystals but no CX sent, started on vanc/cefepime on admit  -OR 8/16 for I and D with ortho and reported Significant amounts of thick viscous fluid consistent with septic arthritis and/or CPPD. WBAT. Unclear situation as initially had no Cx data in system on 8/16 from clinic showing sent but now showing cultures from this but only fungal, anaerobic and AFB.dirk burnham with ID was able to get micro to place an aerobic cx and greatly appreciate her assistace and showing strep mitis/oralis  Ortho to change bandages 8/18 and swelling slowly improving. Crp slightly worse at 202. And now 251 but clinically not " worsening. Ortho tapped 8/19 again and studies more consistent with pseudogout and improving wbc. Some swelling more in right calf on 8/20 and fevers persisting, will check LE US for clots. And is negative. CK wnl. Still prominent swelling above and below knee, US with complex effusion. Has bakers cyst on previous imaging, but not excsesively tender behind knee. Pseudogout new diagnosis with crystals on both taps, got steroids 8/13 with PCP injection then this got set off leading to ortho tap, admit and I and D after that inejction. Did not get intra-op with [facility]. Will try colchicine x 2 today to see if treating that helps swelling. Trying to avoid systemic steroids with glucose elevations and superinfection picture. Uric acid is wnl. Could consider rheum if persists as pseudogout can also cause fevers. Will f/u ID recs if need further hunting for sources of fever. Can't do picc/midline yet given febrile to 100.6 overnight again.  -MM pain control.  -Elevate RLE.  -Fall precautions.  -resumed eliquis and asa post op     Pseudogout  Seen on tap x 2 with ca pyrophosphate cyrstals. Had the steroids outpatient injected 8/13 with pcp. None on the [facility] surgery inejcted. Persistant swelling, try colchicine today. Avoidnig systemic steroids due to glucose/superinfection treatment. Uric acid wnl  Swelilng still very prominent, can consider involving rheum potentially given pseudogout can also cause fevers as undetermined cause of this still. Will f/u response to colcicine today      Constipation  No Bm since admit on bowel regimen, mag citrate today, he wants to avoid suppository      Transaminitis  Mild, possible from tylenol protocols for pain, went from 4 grams to 2 grams on 8/20. Trend daily. Ck wnl. If persists, hold statin. Rocephin can occasionally cause biliary sludge but very mild so would trend for now      Fever  Persistant 8/19 through day, repeat cx pending. Covid neg. Cxr neg. Will check LE US. Improving  tap 8/19  100.6 again on 8/20 overnight, unclear, blodo CX have been negative, will f/u ID if need to source for other places/imaging. Pseudogout as possible cause? Will try colchicine x 2. Crp in AM , ortho reports dont need to trend but has not improved yet and with fevers would reassess. Swelling in knee seems worse. His mobility is also worse      Coronary artery disease involving native coronary artery of native heart without angina pectoris  Patient with known CAD s/p stent placement, which is controlled Will continue Statin and monitor for S/Sx of angina/ACS. Continue to monitor on telemetry.   -Held ASA until post OR 8/17  [facility] outpatient cards notes per dr macias, has known mid LAD lesion per cath in 2023, if symptoms develop freddy consider PCI long term.    PAF (paroxysmal atrial fibrillation)  Chronic anticoagulation   Patient with Paroxysmal (<7 days) atrial fibrillation which is controlled currently with Beta Blocker. Patient is currently in sinus rhythm.[id number] Score: 3. Anticoagulation indicated. Anticoagulation done with eliquis .  -Hold eliquis for OR today and resumed post op  Per OP cards notes, if has any reoccurent nosebleeds on eliquis then plan to consider watchman long term as had hx of nosebleeds in past on anticoagulation    Class 1 obesity due to excess calories with serious comorbidity and body mass index (BMI) of 33.0 to 33.9 in adult  Body mass index is 28.84 kg/m². Obesity complicates all aspects of disease management from diagnostic modalities to treatment.     Essential (primary) hypertension  Chronic, controlled. Latest blood pressure and vitals reviewed-     Temp:  [98.6 °F (37 °C)]   Pulse:  [101]   Resp:  [18-20]   BP: (153)/(87)   SpO2:  [95 %] .   Home meds for hypertension were reviewed and noted below.   Hypertension Medications               losartan (COZAAR) 25 MG tablet Take 1 tablet (25 mg total) by mouth once daily.    metoprolol succinate (TOPROL-XL) 25 MG 24 hr  tablet Take 1 tablet (25 mg total) by mouth once daily.            While in the hospital, will manage blood pressure as follows; Continue home antihypertensive regimen    Will utilize p.r.n. blood pressure medication only if patient's blood pressure greater than 180/110 and he develops symptoms such as worsening chest pain or shortness of breath.    Type 2 diabetes mellitus with microalbuminuria, without long-term current use of insulin  Patient's FSGs are uncontrolled due to hyperglycemia on current medication regimen.  Last A1c reviewed-   Lab Results   Component Value Date    HGBA1C 11.5 (H) 08/13/2024     Most recent fingerstick glucose reviewed-   Recent Labs   Lab 08/20/24  1601 08/20/24  1959 08/21/24  0743 08/21/24  1132   POCTGLUCOSE 162* 128* 223* 261*       Current correctional scale  Low  Maintain anti-hyperglycemic dose as follows-   Antihyperglycemics (From admission, onward)      Start     Stop Route Frequency Ordered    08/21/24 1645  insulin aspart U-100 pen 18 Units         -- SubQ 3 times daily with meals 08/21/24 1315    08/20/24 2100  insulin glargine U-100 (Lantus) pen 19 Units         -- SubQ 2 times daily 08/20/24 1349    08/15/24 1917  insulin aspart U-100 pen 0-5 Units         -- SubQ Before meals & nightly PRN 08/15/24 1817          Hold Oral hypoglycemics while patient is in the hospital.   -Accuchecks AC/HS  -Diabetic/cardiac diet    Glucose uncontrolled on admit with values 269-330 since admit, and placed on home regimen, he reports doesn't run this high at home, and so will titrate post op and infection likely also worsening glucose  -improving 8/17 in higher 100s on cmp and lower 200s this AM and scheduled novolog started last night and adjusted lantus and will trend. Adjusting further 8/18  Was improved 8/18 PM and early 8/19 AM but then back in lower 200s so will titrate as held novolog dania this AM as was nPO but now eating so likely will need to adjsut again. Will need novolog on  dc as was not on before. Titrate more 8/20 and was better in 100s but some jaunts up with meals today now 8/21 so will increase novolog from 15--18      VTE Risk Mitigation (From admission, onward)           Ordered     apixaban tablet 5 mg  2 times daily         08/16/24 1537     IP VTE LOW RISK PATIENT  Once         08/15/24 1817     Place sequential compression device  Until discontinued         08/15/24 1817                    Discharge Planning   JUAN M: 8/21/2024     Code Status: Full Code   Is the patient medically ready for discharge?:     Reason for patient still in hospital (select all that apply): Patient trending condition  Discharge Plan A: Home with family, Home Health, Other                  Lorna Galvan MD  Department of Hospital Medicine   [facility] - Surgery

## 2024-08-21 NOTE — ASSESSMENT & PLAN NOTE
Patient's FSGs are uncontrolled due to hyperglycemia on current medication regimen.  Last A1c reviewed-   Lab Results   Component Value Date    HGBA1C 11.5 (H) 08/13/2024     Most recent fingerstick glucose reviewed-   Recent Labs   Lab 08/20/24  1601 08/20/24  1959 08/21/24  0743 08/21/24  1132   POCTGLUCOSE 162* 128* 223* 261*       Current correctional scale  Low  Maintain anti-hyperglycemic dose as follows-   Antihyperglycemics (From admission, onward)      Start     Stop Route Frequency Ordered    08/21/24 1645  insulin aspart U-100 pen 18 Units         -- SubQ 3 times daily with meals 08/21/24 1315    08/20/24 2100  insulin glargine U-100 (Lantus) pen 19 Units         -- SubQ 2 times daily 08/20/24 1349    08/15/24 1917  insulin aspart U-100 pen 0-5 Units         -- SubQ Before meals & nightly PRN 08/15/24 1817          Hold Oral hypoglycemics while patient is in the hospital.   -Accuchecks AC/HS  -Diabetic/cardiac diet    Glucose uncontrolled on admit with values 269-330 since admit, and placed on home regimen, he reports doesn't run this high at home, and so will titrate post op and infection likely also worsening glucose  -improving 8/17 in higher 100s on cmp and lower 200s this AM and scheduled novolog started last night and adjusted lantus and will trend. Adjusting further 8/18  Was improved 8/18 PM and early 8/19 AM but then back in lower 200s so will titrate as held novolog dania this AM as was nPO but now eating so likely will need to adjsut again. Will need novolog on dc as was not on before. Titrate more 8/20 and was better in 100s but some jaunts up with meals today now 8/21 so will increase novolog from 15--18

## 2024-08-21 NOTE — ASSESSMENT & PLAN NOTE
Mild, possible from tylenol protocols for pain, went from 4 grams to 2 grams on 8/20. Trend daily. Ck wnl. If persists, hold statin. Rocephin can occasionally cause biliary sludge but very mild so would trend for now

## 2024-08-21 NOTE — SUBJECTIVE & OBJECTIVE
Interval history- he had another fever to 100.6 overnight and he was symptomatic he said. Unclear cause for persistant fevesr, and crp has been not improved when last checked. Ortho reports can stop checking but given still febrile, would recheck tomorrow as still very unclear why. Pseudogout can cause fevers, and had steroid injetions about a week ago before had the worsening swelling/strep in Cx from ortho clinic and stay here so mixed picture. Still very swollen in knee, hasn't had pseudogout treatment yet and that was previous so will try colchicine x 2 today, trying to avoid systemic steroids given superimposed infection and still febrile concerns as wel as uncontrolled glucose. LE US neg for clots. Neg covid, neg cxr. No urinary symptoms. No BM since admit, and mag citrate today. He really wants to avoid a suppository. Glucose  was looking better yesterday in 100s, some jaunts today with lunch to 260s. Will inc a little of mealtime insulin but hold off on the long acting for now given had 128-162 previous. Will need novolog on discharge as was not on that before admit and new dosing of long acting on discharge. Will f/u ID recs given still fevers going on, and cannot place IV line if plan is for IV antibiotics yet given fevers so updated him that stil needs to remain in house for this while still working on cause.  He reports that his late wife came to [facility] for care for her multiple heart valve surgeries by dr bauer after [facility] was very unhelpful for her right before an[place]ia and that she passed away in about 2017 but had 12 years after her surgeries longer than they thought after he was able to do some valve surgeries for complicated medical history.          Review of patient's allergies indicates:   Allergen Reactions    Tamiflu [oseltamivir]      Increases BP    Metformin Hives     Diarrhea, nausea    Penicillins Rash       Current Facility-Administered Medications on File Prior to Encounter   Medication     triamcinolone acetonide injection 40 mg     Current Outpatient Medications on File Prior to Encounter   Medication Sig    alcohol swabs (DROPSAFE ALCOHOL PREP PADS) PadM USE AS DIRECTED AS NEEDED    apixaban (ELIQUIS) 5 mg Tab Take 1 tablet (5 mg total) by mouth 2 (two) times daily.    ASCORBATE CALCIUM (VITAMIN C ORAL) Take by mouth once daily.     aspirin (ECOTRIN) 81 MG EC tablet Take 81 mg by mouth once daily.    atorvastatin (LIPITOR) 80 MG tablet Take 1 tablet (80 mg total) by mouth every evening.    blood sugar diagnostic Strp 1 strip by Misc.(Non-Drug; Combo Route) route 3 (three) times daily.    blood-glucose meter kit Use as instructed    blood-glucose meter kit 1 each by Other route 3 (three) times daily. Use as instructed    diclofenac sodium (VOLTAREN) 1 % Gel Apply 2 g topically once daily.    doxycycline (VIBRA-TABS) 100 MG tablet Take 1 tablet (100 mg total) by mouth 2 (two) times daily.    empagliflozin (JARDIANCE) 25 mg tablet Take 1 tablet (25 mg total) by mouth once daily.    fish oil-omega-3 fatty acids 300-1,000 mg capsule Take 2 g by mouth once daily.    gabapentin (NEURONTIN) 300 MG capsule Take 1 capsule (300 mg total) by mouth 3 (three) times daily.    glimepiride (AMARYL) 4 MG tablet Take 1 tablet (4 mg total) by mouth 2 (two) times a day.    [START ON 10/13/2024] HYDROcodone-acetaminophen (NORCO)  mg per tablet Take 1 tablet by mouth 3 (three) times daily as needed (pain).    [START ON 9/13/2024] HYDROcodone-acetaminophen (NORCO)  mg per tablet Take 1 tablet by mouth 3 (three) times daily as needed (pain).    HYDROcodone-acetaminophen (NORCO)  mg per tablet Take 1 tablet by mouth 3 (three) times daily as needed (pain).    hydrocortisone 2.5 % cream Apply topically 2 (two) times daily.    lancets Misc 1 application  by Misc.(Non-Drug; Combo Route) route 3 (three) times daily.    loratadine (CLARITIN) 10 mg tablet Take 1 tablet (10 mg total) by mouth once daily.     losartan (COZAAR) 25 MG tablet Take 1 tablet (25 mg total) by mouth once daily.    meloxicam (MOBIC) 7.5 MG tablet Take 1 tablet (7.5 mg total) by mouth once daily.    metoprolol succinate (TOPROL-XL) 25 MG 24 hr tablet Take 1 tablet (25 mg total) by mouth once daily.    multivitamin with minerals tablet Take 1 tablet by mouth once daily.    neomycin-polymyxin-hydrocortisone (CORTISPORIN) 3.5-10,000-1 mg/mL-unit/mL-% otic suspension PLACE 3 DROPS INTO THE LEFT EAR 4 TIMES DAILY.    semaglutide (OZEMPIC) 0.25 mg or 0.5 mg (2 mg/3 mL) pen injector Inject 0.5 mg into the skin every 7 days.    sodium chloride (SALINE NASAL) 0.65 % nasal spray 2 sprays by Nasal route 2 (two) times a day.    triamcinolone acetonide 0.1% (KENALOG) 0.1 % ointment Apply topically 2 (two) times daily as needed.     Family History       Problem Relation (Age of Onset)    Blindness Mother    Diabetes Brother    No Known Problems Father, Brother, Daughter, Son, Daughter, Brother          Tobacco Use    Smoking status: Former     Current packs/day: 0.00     Average packs/day: 0.3 packs/day for 4.0 years (1.0 ttl pk-yrs)     Types: Cigarettes     Start date: 1972     Quit date: 1976     Years since quittin.7    Smokeless tobacco: Never   Substance and Sexual Activity    Alcohol use: Yes     Comment: occ    Drug use: No    Sexual activity: Yes     Partners: Female     Birth control/protection: None     Review of Systems   Constitutional:  Negative for appetite change, chills, diaphoresis, fatigue and fever.   HENT:  Negative for congestion, rhinorrhea and sore throat.    Eyes:  Negative for photophobia and visual disturbance.   Respiratory:  Negative for cough, shortness of breath and wheezing.    Cardiovascular:  Negative for chest pain, palpitations and leg swelling.   Gastrointestinal:  Negative for abdominal distention, abdominal pain, diarrhea, nausea and vomiting.   Genitourinary:  Negative for dysuria, frequency and  hematuria.   Musculoskeletal:  Positive for arthralgias (right knee), gait problem and joint swelling. Negative for neck pain.   Skin:  Negative for color change, pallor, rash and wound.   Neurological:  Negative for tremors, syncope, light-headedness, numbness and headaches.   Psychiatric/Behavioral:  Negative for confusion and hallucinations. The patient is not nervous/anxious.      Objective:     Vital Signs (Most Recent):  Temp: 98.4 °F (36.9 °C) (08/21/24 1134)  Pulse: 91 (08/21/24 1134)  Resp: 18 (08/21/24 1134)  BP: 130/62 (08/21/24 1134)  SpO2: 96 % (08/21/24 1134) Vital Signs (24h Range):  Temp:  [97.2 °F (36.2 °C)-100.6 °F (38.1 °C)] 98.4 °F (36.9 °C)  Pulse:  [74-91] 91  Resp:  [17-18] 18  SpO2:  [91 %-98 %] 96 %  BP: (107-154)/(58-71) 130/62     Weight: 95.7 kg (210 lb 15.7 oz)  Body mass index is 30.27 kg/m².     Physical Exam  Vitals and nursing note reviewed.   Constitutional:       General: He is not in acute distress.     Appearance: He is not toxic-appearing or diaphoretic.   HENT:      Head: Normocephalic and atraumatic.      Nose: Nose normal.      Mouth/Throat:      Mouth: Mucous membranes are moist.   Eyes:      Pupils: Pupils are equal, round, and reactive to light.   Cardiovascular:      Rate and Rhythm: Normal rate and regular rhythm.      Pulses: Normal pulses.           Dorsalis pedis pulses are 2+ on the right side.        Posterior tibial pulses are 2+ on the right side.   Pulmonary:      Effort: Pulmonary effort is normal. No respiratory distress.      Breath sounds: No wheezing, rhonchi or rales.   Abdominal:      General: Bowel sounds are normal. There is no distension.      Palpations: Abdomen is soft.      Tenderness: There is no abdominal tenderness. There is no guarding.   Musculoskeletal:      Cervical back: Normal range of motion.      Right knee: Swelling present. Decreased range of motion. Tenderness present.      Comments: Bandages removed with swellign to right knee still  "present but less than pre op, but still not at baseline compared to left knee. Swelilng above knee in lower thigh also seen. Dec ROM   Skin:     General: Skin is warm and dry.      Capillary Refill: Capillary refill takes less than 2 seconds.      Comments: Some calf swelling in addition to knee in RLE compared to LLE   Neurological:      General: No focal deficit present.      Mental Status: He is alert.      Sensory: No sensory deficit.      Motor: No weakness.   Psychiatric:         Mood and Affect: Mood normal.         Behavior: Behavior normal.              CRANIAL NERVES     CN III, IV, VI   Pupils are equal, round, and reactive to light.       Significant Labs: All pertinent labs within the past 24 hours have been reviewed.  CBC:   Recent Labs   Lab 08/21/24  0527   WBC 8.55   HGB 12.9*   HCT 40.4          CMP:   Recent Labs   Lab 08/20/24  0339 08/21/24  0527    137   K 3.9 4.2    99   CO2 24 23   * 125*   BUN 13 14   CREATININE 0.9 0.8   CALCIUM 9.6 9.3   PROT 6.4 6.5   ALBUMIN 2.5* 2.4*   BILITOT 0.7 0.7   ALKPHOS 143* 174*   AST 64* 62*   ALT 73* 80*   ANIONGAP 11 15     Lactic Acid:   No results for input(s): "LACTATE" in the last 48 hours.      Significant Imaging: I have reviewed all pertinent imaging results/findings within the past 24 hours.      "

## 2024-08-21 NOTE — PROGRESS NOTES
[facility]  Orthopedics  Progress Note      Attg Note:  I agree with the resident's assessment and plan.  Original cultures growing strep mitis.  I did change to ceftriaxone only.    Isaac Alfaro MD      Patient Name: King Cool Jr.  MRN: [id number]  Admission Date: 8/15/2024  Hospital Length of Stay: 6 days  Attending Provider: Lorna Galvan MD  Primary Care Provider: Gallo Lara MD  Follow-up For: Procedure(s) (LRB):  ARTHROSCOPY, KNEE, WITH DEBRIDEMENT (Right)    Post-Operative Day: 5 Days Post-Op  Subjective:     Principal Problem:Septic joint of right knee joint    Principal Orthopedic Problem: as above now s/p R knee arthroscopic I&D 8/16    Interval History: Pt seen and examined at bedside. VSS.  NAEON. Still having some pain. Knee effusion remains. Reaspirated on 8/19, WBC 32k, 86% segs, +crystals, - GS. Will fu cultures.      Review of patient's allergies indicates:   Allergen Reactions    Tamiflu [oseltamivir]      Increases BP    Metformin Hives     Diarrhea, nausea    Penicillins Rash       Current Facility-Administered Medications   Medication    acetaminophen tablet 500 mg    apixaban tablet 5 mg    artificial tears 0.5 % ophthalmic solution 1 drop    aspirin EC tablet 81 mg    atorvastatin tablet 80 mg    cefTRIAXone (ROCEPHIN) 2 g in D5W 100 mL IVPB (MB+)    dextrose 10% bolus 125 mL 125 mL    dextrose 10% bolus 250 mL 250 mL    gabapentin capsule 300 mg    glucagon (human recombinant) injection 1 mg    glucose chewable tablet 16 g    glucose chewable tablet 24 g    guaiFENesin 100 mg/5 ml syrup 200 mg    HYDROmorphone injection 0.2 mg    insulin aspart U-100 pen 0-5 Units    insulin aspart U-100 pen 15 Units    insulin glargine U-100 (Lantus) pen 19 Units    losartan tablet 50 mg    melatonin tablet 6 mg    methocarbamoL tablet 750 mg    metoprolol succinate (TOPROL-XL) 24 hr tablet 25 mg    multivitamin tablet    naloxone 0.4 mg/mL injection 0.02 mg    omega-3 acid ethyl esters  "capsule 1 g    ondansetron injection 4 mg    oxyCODONE immediate release tablet 5 mg    oxyCODONE immediate release tablet Tab 10 mg    sodium chloride 0.9% flush 10 mL    vancomycin - pharmacy to dose    vancomycin 2 g in dextrose 5 % 500 mL IVPB     Facility-Administered Medications Ordered in Other Encounters   Medication    triamcinolone acetonide injection 40 mg     Objective:     Vital Signs (Most Recent):  Temp: 99 °F (37.2 °C) (08/21/24 0729)  Pulse: 80 (08/21/24 0729)  Resp: 18 (08/21/24 0729)  BP: (!) 116/58 (08/21/24 0729)  SpO2: (!) 93 % (08/21/24 0729) Vital Signs (24h Range):  Temp:  [97.2 °F (36.2 °C)-100.6 °F (38.1 °C)] 99 °F (37.2 °C)  Pulse:  [74-89] 80  Resp:  [16-18] 18  SpO2:  [91 %-98 %] 93 %  BP: (107-154)/(58-71) 116/58     Weight: 95.7 kg (210 lb 15.7 oz)  Height: 5' 10" (177.8 cm)  Body mass index is 30.27 kg/m².      Intake/Output Summary (Last 24 hours) at 8/21/2024 0916  Last data filed at 8/21/2024 0500  Gross per 24 hour   Intake 350 ml   Output 800 ml   Net -450 ml        Ortho/SPM Exam     AAOx4  NAD  Reg rate  No increased WOB    RLE    Dressing taken down today no active bleeding or drainage  TTP suprapatellar pouch  No expressible purulence  Compartments soft/compressible  SILT throughout  AROM of toes, ankle, intact.  WWP. Brisk cap refill            Significant Labs: All pertinent labs within the past 24 hours have been reviewed.    Significant Imaging: I have reviewed and interpreted all pertinent imaging results/findings.  Assessment/Plan:     * Septic joint of right knee joint  King Travon Wetzel is a 69 y.o. male presenting with Right knee septic arthritis. Plan for OR today for arthroscopic vs open I&D. He is booked, marked and consented for surgery. Having some posterior calf pain, DVT US negative    Now s/p R knee arthroscopic I&D 8/16  Reaspirated 8/19 - 32k, 86% segs, -GS, +crystals, NGTD 8/21    - WBAT ROMAT R knee  - fu new aspiration cultures- NGTD (original cx before " surgery strep mitis)  - Abx per ID: vanc and rocephin  - ok for diet  - Eliquis 5 bid at baseline    Dispo: ok for diet, no plans for repeat washout as reaspiration results reassuring for gout vs pseudogout and not septic arthritis  , will continue to fu cultures                    JANET Diehl MD  Orthopedics  [facility] - Surgery

## 2024-08-21 NOTE — SUBJECTIVE & OBJECTIVE
Interval History:   Low grade fever overnight. Patient seen sitting up in chair. Still with knee pain swelling along with calf pain, tenderness, redness, swelling. US negative for DVT. Has not had BM yet. No other complaints.       Review of Systems   Constitutional:  Positive for activity change. Negative for chills and diaphoresis.   Respiratory:  Negative for cough and shortness of breath.    Cardiovascular:  Negative for chest pain.   Gastrointestinal:  Positive for constipation. Negative for diarrhea and vomiting.   Musculoskeletal:  Positive for arthralgias, gait problem and joint swelling.   All other systems reviewed and are negative.    Objective:     Vital Signs (Most Recent):  Temp: 98.4 °F (36.9 °C) (08/21/24 1134)  Pulse: 91 (08/21/24 1134)  Resp: 18 (08/21/24 1134)  BP: 130/62 (08/21/24 1134)  SpO2: 96 % (08/21/24 1134) Vital Signs (24h Range):  Temp:  [97.2 °F (36.2 °C)-100.6 °F (38.1 °C)] 98.4 °F (36.9 °C)  Pulse:  [74-91] 91  Resp:  [17-18] 18  SpO2:  [91 %-98 %] 96 %  BP: (107-154)/(58-71) 130/62     Weight: 95.7 kg (210 lb 15.7 oz)  Body mass index is 30.27 kg/m².    Estimated Creatinine Clearance: 102.6 mL/min (based on SCr of 0.8 mg/dL).     Physical Exam  Vitals and nursing note reviewed.   Constitutional:       General: He is not in acute distress.     Appearance: Normal appearance. He is not ill-appearing, toxic-appearing or diaphoretic.   HENT:      Head: Normocephalic.      Mouth/Throat:      Mouth: Mucous membranes are moist.   Eyes:      General: No scleral icterus.     Conjunctiva/sclera: Conjunctivae normal.   Cardiovascular:      Rate and Rhythm: Normal rate and regular rhythm.   Pulmonary:      Effort: Pulmonary effort is normal. No respiratory distress.   Abdominal:      General: There is no distension.      Palpations: Abdomen is soft.      Tenderness: There is no abdominal tenderness.   Musculoskeletal:         General: Swelling and tenderness present.      Left lower leg: No  edema.      Comments: Right knee swollen. No erythema.  Bandages over arthroscopic sites c/d/I.   Right calf tenderness, swelling, erythema. Right shin abrasion.   Skin:     General: Skin is warm and dry.      Findings: Lesion (scabbed over abrasions right ankle) present.   Neurological:      Mental Status: He is alert and oriented to person, place, and time.   Psychiatric:         Mood and Affect: Mood normal.         Behavior: Behavior normal.         Thought Content: Thought content normal.         Judgment: Judgment normal.          Significant Labs: Blood Culture:   Recent Labs   Lab 08/15/24  1700 08/19/24  1220 08/19/24  1221   LABBLOO No growth after 5 days.  No growth after 5 days. No Growth to date  No Growth to date No Growth to date  No Growth to date     CBC:   Recent Labs   Lab 08/21/24  0527   WBC 8.55   HGB 12.9*   HCT 40.4          CMP:   Recent Labs   Lab 08/20/24  0339 08/21/24  0527    137   K 3.9 4.2    99   CO2 24 23   * 125*   BUN 13 14   CREATININE 0.9 0.8   CALCIUM 9.6 9.3   PROT 6.4 6.5   ALBUMIN 2.5* 2.4*   BILITOT 0.7 0.7   ALKPHOS 143* 174*   AST 64* 62*   ALT 73* 80*   ANIONGAP 11 15     Microbiology Results (last 7 days)       Procedure Component Value Units Date/Time    Aerobic culture [[id number]] Collected: 08/19/24 0853    Order Status: Completed Specimen: Abscess from Knee, Right Updated: 08/21/24 1242     Aerobic Bacterial Culture No growth    Culture, Anaerobic [[id number]] Collected: 08/19/24 0853    Order Status: Completed Specimen: Abscess from Knee, Right Updated: 08/21/24 1024     Anaerobic Culture Culture in progress    AFB Culture & Smear [[id number]] Collected: 08/19/24 0853    Order Status: Completed Specimen: Abscess from Knee, Right Updated: 08/20/24 2127     AFB Culture & Smear Culture in progress     AFB CULTURE STAIN No acid fast bacilli seen.    Blood culture x two cultures. Draw prior to antibiotics. [[id number]] Collected: 08/15/24  1700    Order Status: Completed Specimen: Blood from Peripheral, Antecubital, Left Updated: 08/20/24 1812     Blood Culture, Routine No growth after 5 days.    Narrative:      Aerobic and anaerobic    Blood culture x two cultures. Draw prior to antibiotics. [[id number]] Collected: 08/15/24 1700    Order Status: Completed Specimen: Blood from Peripheral, Antecubital, Right Updated: 08/20/24 1812     Blood Culture, Routine No growth after 5 days.    Narrative:      Aerobic and anaerobic    Blood culture [[id number]] Collected: 08/19/24 1221    Order Status: Completed Specimen: Blood from Peripheral, Hand, Right Updated: 08/20/24 1412     Blood Culture, Routine No Growth to date      No Growth to date    Blood culture [[id number]] Collected: 08/19/24 1220    Order Status: Completed Specimen: Blood from Peripheral, Hand, Left Updated: 08/20/24 1412     Blood Culture, Routine No Growth to date      No Growth to date    Gram stain [[id number]] Collected: 08/19/24 0853    Order Status: Completed Specimen: Abscess from Knee, Right Updated: 08/19/24 1132     Gram Stain Result Few WBC's      No organisms seen    Fungus culture [[id number]] Collected: 08/19/24 0853    Order Status: Sent Specimen: Abscess from Knee, Right Updated: 08/19/24 0940    Aerobic culture [[id number]]     Order Status: Completed Specimen: Abscess from Knee, Right           Wound Culture:   Recent Labs   Lab 08/17/24  1608 08/19/24  0853   LABAERO STREPTOCOCCUS MITIS/ORALIS   Rare  * No growth       Significant Imaging: None

## 2024-08-21 NOTE — PLAN OF CARE
Problem: Adult Inpatient Plan of Care  Goal: Plan of Care Review  Outcome: Progressing     Problem: Adult Inpatient Plan of Care  Goal: Optimal Comfort and Wellbeing  Outcome: Progressing     Problem: Diabetes Comorbidity  Goal: Blood Glucose Level Within Targeted Range  Outcome: Progressing     Problem: Wound  Goal: Optimal Coping  Outcome: Progressing     Problem: Wound  Goal: Skin Health and Integrity  Outcome: Progressing     Problem: Wound  Goal: Optimal Wound Healing  Outcome: Progressing    Patient sitting up in chair watching television. NAD noted. Safety measures in place.

## 2024-08-21 NOTE — NURSING
Patient AAOX4, VSS, NADN Bed in lowest position, call light in reach along with personal items. Plan of care ongoing.

## 2024-08-21 NOTE — PROGRESS NOTES
"Pharmacokinetic Assessment Follow Up: IV Vancomycin    Vancomycin serum concentration assessment(s):    The trough level was drawn correctly and can be used to guide therapy at this time. The measurement is within the desired definitive target range of 15 to 20 mcg/mL.    Vancomycin Regimen Plan:    Continue regimen to Vancomycin 2000 mg IV every 12 hours with next serum trough concentration measured at 17:00 prior to 4th dose on 08/22    Drug levels (last 3 results):  Recent Labs   Lab Result Units 08/19/24  0602 08/21/24  0528   Vancomycin-Trough ug/mL 12.8 18.1       Pharmacy will continue to follow and monitor vancomycin.    Please contact pharmacy at extension [phone] for questions regarding this assessment.    Thank you for the consult,   Genia Kitchen       Patient brief summary:  King Cool Jr. is a 69 y.o. male initiated on antimicrobial therapy with IV Vancomycin for treatment of bone/joint infection    The patient's current regimen is vancomycin 2000 mg every 12 hrs.     Drug Allergies:   Review of patient's allergies indicates:   Allergen Reactions    Tamiflu [oseltamivir]      Increases BP    Metformin Hives     Diarrhea, nausea    Penicillins Rash       Actual Body Weight:   95.7 Kg.     Renal Function:   Estimated Creatinine Clearance: 102.6 mL/min (based on SCr of 0.8 mg/dL).,     Dialysis Method (if applicable):  N/A    CBC (last 72 hours):  No results for input(s): "WHITE BLOOD CELL COUNT", "HEMOGLOBIN", "HEMATOCRIT", "PLATELETS", "GRAN%", "LYMPH%", "MONO%", "EOSINOPHIL%", "BASOPHIL%", "DIFFERENTIAL METHOD" in the last 72 hours.    Metabolic Panel (last 72 hours):  Recent Labs   Lab Result Units 08/19/24  0602 08/20/24  0339 08/21/24  0527   Sodium mmol/L 135* 136 137   Potassium mmol/L 3.8 3.9 4.2   Chloride mmol/L 100 101 99   CO2 mmol/L 25 24 23   Glucose mg/dL 178* 181* 125*   BUN mg/dL 16 13 14   Creatinine mg/dL 0.9 0.9 0.8   Albumin g/dL 2.8* 2.5* 2.4*   Total Bilirubin mg/dL 0.8 0.7 0.7 "   Alkaline Phosphatase U/L 115 143* 174*   AST U/L 34 64* 62*   ALT U/L 39 73* 80*       Vancomycin Administrations:  vancomycin given in the last 96 hours                     vancomycin 2 g in dextrose 5 % 500 mL IVPB (mg) 2,000 mg New Bag 08/21/24 0602     2,000 mg New Bag 08/20/24 1801     2,000 mg New Bag  0607     2,000 mg New Bag 08/19/24 1745    vancomycin 1.75 g in 5 % dextrose 500 mL IVPB (mg) 1,750 mg New Bag 08/19/24 0615     1,750 mg New Bag 08/18/24 1704     1,750 mg New Bag  0601     1,750 mg New Bag 08/17/24 1712                    Microbiologic Results:  Microbiology Results (last 7 days)       Procedure Component Value Units Date/Time    AFB Culture & Smear [[id number]] Collected: 08/19/24 0853    Order Status: Completed Specimen: Abscess from Knee, Right Updated: 08/20/24 2127     AFB Culture & Smear Culture in progress     AFB CULTURE STAIN No acid fast bacilli seen.    Blood culture x two cultures. Draw prior to antibiotics. [[id number]] Collected: 08/15/24 1700    Order Status: Completed Specimen: Blood from Peripheral, Antecubital, Left Updated: 08/20/24 1812     Blood Culture, Routine No growth after 5 days.    Narrative:      Aerobic and anaerobic    Blood culture x two cultures. Draw prior to antibiotics. [[id number]] Collected: 08/15/24 1700    Order Status: Completed Specimen: Blood from Peripheral, Antecubital, Right Updated: 08/20/24 1812     Blood Culture, Routine No growth after 5 days.    Narrative:      Aerobic and anaerobic    Blood culture [[id number]] Collected: 08/19/24 1221    Order Status: Completed Specimen: Blood from Peripheral, Hand, Right Updated: 08/20/24 1412     Blood Culture, Routine No Growth to date      No Growth to date    Blood culture [[id number]] Collected: 08/19/24 1220    Order Status: Completed Specimen: Blood from Peripheral, Hand, Left Updated: 08/20/24 1412     Blood Culture, Routine No Growth to date      No Growth to date    Aerobic culture [[id number]]  Collected: 08/19/24 0853    Order Status: Completed Specimen: Abscess from Knee, Right Updated: 08/20/24 0735     Aerobic Bacterial Culture No growth    Culture, Anaerobic [[id number]] Collected: 08/19/24 0853    Order Status: Completed Specimen: Abscess from Knee, Right Updated: 08/20/24 0717     Anaerobic Culture Culture in progress    Gram stain [[id number]] Collected: 08/19/24 0853    Order Status: Completed Specimen: Abscess from Knee, Right Updated: 08/19/24 1132     Gram Stain Result Few WBC's      No organisms seen    Fungus culture [[id number]] Collected: 08/19/24 0853    Order Status: Sent Specimen: Abscess from Knee, Right Updated: 08/19/24 0940    Aerobic culture [[id number]]     Order Status: Completed Specimen: Abscess from Knee, Right

## 2024-08-21 NOTE — ASSESSMENT & PLAN NOTE
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 afebrile, WBC 12.09  -LA 1.5 with crp 144 on admit  -blood CX NG  -Arthrocentesis of right knee with 616125 WBCs and positive crystals but no CX sent, started on vanc/cefepime on admit  -OR 8/16 for I and D with ortho and reported Significant amounts of thick viscous fluid consistent with septic arthritis and/or CPPD. WBAT. Unclear situation as initially had no Cx data in system on 8/16 from clinic showing sent but now showing cultures from this but only fungal, anaerobic and AFB.dirk burnham with ID was able to get micro to place an aerobic cx and greatly appreciate her assistace and showing strep mitis/oralis  Ortho to change bandages 8/18 and swelling slowly improving. Crp slightly worse at 202. And now 251 but clinically not worsening. Ortho tapped 8/19 again and studies more consistent with pseudogout and improving wbc. Some swelling more in right calf on 8/20 and fevers persisting, will check LE US for clots. And is negative. CK wnl. Still prominent swelling above and below knee, US with complex effusion. Has bakers cyst on previous imaging, but not excsesively tender behind knee. Pseudogout new diagnosis with crystals on both taps, got steroids 8/13 with PCP injection then this got set off leading to ortho tap, admit and I and D after that inejction. Did not get intra-op with [facility]. Will try colchicine x 2 today to see if treating that helps swelling. Trying to avoid systemic steroids with glucose elevations and superinfection picture. Uric acid is wnl. Could consider rheum if persists as pseudogout can also cause fevers. Will f/u ID recs if need further hunting for sources of fever. Can't do picc/midline yet given febrile to 100.6 overnight again.  -MM pain control.  -Elevate RLE.  -Fall  precautions.  -resumed eliquis and asa post op

## 2024-08-21 NOTE — PROGRESS NOTES
[facility]  Infectious Disease  Progress Note    Patient Name: King Cool Jr.  MRN: [id number]  Admission Date: 8/15/2024  Length of Stay: 6 days  Attending Physician: Lorna Galvan MD  Primary Care Provider: Gallo Lara MD    Isolation Status: No active isolations  Assessment/Plan:      ID  * Septic joint of right knee joint     69 year old with CAD, A fib on Eliquis, HTN, DM (A1C 11), and osteoarthritis of the knee admitted with concern for septic right knee.  Patient has history of OA in the right knee and receives intraarticular steroid injections every 3 months.  Last injection was on 8/13.  Developed increased pain and swelling within 24 hours after the injection.  Underwent knee aspiration in Ortho clinic 8/15 which showed 143K WBCs, 85% segs and positive CPP crystals. No history of gout or pseudogout.        Underwent surgical I&D with Dr. Alfaro on 8/16. Per OP note,  thick viscous fluid encountered.  Suspects septic arthritis superimposed over pseudogout.   No OR cultures sent.  Aspirate cultures of 8/15 is positive for strep mitis (rare).     Patient is on Vanc/Ceftriaxone. Febrile to 100.6 yesterday and CRP increased. Ortho re-aspirated knee. Cx NGTD. WBC 32K, 86% segs. Blood cx NGTD.     Plan/recommendations:  Discontinue Vancomycin  Continue Ceftriaxone 2 g q 24 hours  Follow repeat blood cultures and aspiration cultures  Consider RLE imaging (CT?) given lower leg pain, redness, swelling  Management of pseudogout per primary and orthopedic surgery  Anticipate four weeks of antibiotics  Discussed plan with ID staff. ID will follow up tomorrow              Thank you for your consult. I will follow-up with patient. Please contact us if you have any additional questions.    Nell Sharp PA-C  Infectious Disease  [facility]    Subjective:     Principal Problem:Septic joint of right knee joint    HPI:  King Cool is a 69 year old with CAD, A fib on Eliquis, HTN, DM (A1C 11),  and osteoarthritis of the knee who presented from Ortho clinic with concern for septic right knee.  Patient has history of OA in the right knee and receives intraarticular steroid injections every 3 months.  Last injection was on 8/13. He subsequently developed increase pain and swelling.  Was evaluated in Ortho clinic 8/15 and had an arthrocentesis which showed 736664 WBCs and positive calcium pyrophosphate crystals. R knee xray showed severe osteoarthritis. He denied associated fever/chills.  He denies any history of gout.  Denies history of Staph infection or other infections. Denies recent antibiotic use.      In the ED, noted to be tachycardic, afebrile.  WBC 12.  , ESR wnl.   Aspirate cultures - only anaerobic, fungal, and AFB sent.  No aerobic.  Gram stain negative.  He is now s/p I&D with Dr. Alfaro.  No OR cultures available.   He was started on antibiotics prior to surgery. Currently on vancomycin and cefepime. ID consulted for broad spectrum antibiotic regimen.     Interval History:   Low grade fever overnight. Patient seen sitting up in chair. Still with knee pain swelling along with calf pain, tenderness, redness, swelling. US negative for DVT. Has not had BM yet. No other complaints.       Review of Systems   Constitutional:  Positive for activity change. Negative for chills and diaphoresis.   Respiratory:  Negative for cough and shortness of breath.    Cardiovascular:  Negative for chest pain.   Gastrointestinal:  Positive for constipation. Negative for diarrhea and vomiting.   Musculoskeletal:  Positive for arthralgias, gait problem and joint swelling.   All other systems reviewed and are negative.    Objective:     Vital Signs (Most Recent):  Temp: 98.4 °F (36.9 °C) (08/21/24 1134)  Pulse: 91 (08/21/24 1134)  Resp: 18 (08/21/24 1134)  BP: 130/62 (08/21/24 1134)  SpO2: 96 % (08/21/24 1134) Vital Signs (24h Range):  Temp:  [97.2 °F (36.2 °C)-100.6 °F (38.1 °C)] 98.4 °F (36.9 °C)  Pulse:   [74-91] 91  Resp:  [17-18] 18  SpO2:  [91 %-98 %] 96 %  BP: (107-154)/(58-71) 130/62     Weight: 95.7 kg (210 lb 15.7 oz)  Body mass index is 30.27 kg/m².    Estimated Creatinine Clearance: 102.6 mL/min (based on SCr of 0.8 mg/dL).     Physical Exam  Vitals and nursing note reviewed.   Constitutional:       General: He is not in acute distress.     Appearance: Normal appearance. He is not ill-appearing, toxic-appearing or diaphoretic.   HENT:      Head: Normocephalic.      Mouth/Throat:      Mouth: Mucous membranes are moist.   Eyes:      General: No scleral icterus.     Conjunctiva/sclera: Conjunctivae normal.   Cardiovascular:      Rate and Rhythm: Normal rate and regular rhythm.   Pulmonary:      Effort: Pulmonary effort is normal. No respiratory distress.   Abdominal:      General: There is no distension.      Palpations: Abdomen is soft.      Tenderness: There is no abdominal tenderness.   Musculoskeletal:         General: Swelling and tenderness present.      Left lower leg: No edema.      Comments: Right knee swollen. No erythema.  Bandages over arthroscopic sites c/d/I.   Right calf tenderness, swelling, erythema. Right shin abrasion.   Skin:     General: Skin is warm and dry.      Findings: Lesion (scabbed over abrasions right ankle) present.   Neurological:      Mental Status: He is alert and oriented to person, place, and time.   Psychiatric:         Mood and Affect: Mood normal.         Behavior: Behavior normal.         Thought Content: Thought content normal.         Judgment: Judgment normal.          Significant Labs: Blood Culture:   Recent Labs   Lab 08/15/24  1700 08/19/24  1220 08/19/24  1221   LABBLOO No growth after 5 days.  No growth after 5 days. No Growth to date  No Growth to date No Growth to date  No Growth to date     CBC:   Recent Labs   Lab 08/21/24  0527   WBC 8.55   HGB 12.9*   HCT 40.4          CMP:   Recent Labs   Lab 08/20/24  0339 08/21/24  0527    137   K 3.9 4.2     99   CO2 24 23   * 125*   BUN 13 14   CREATININE 0.9 0.8   CALCIUM 9.6 9.3   PROT 6.4 6.5   ALBUMIN 2.5* 2.4*   BILITOT 0.7 0.7   ALKPHOS 143* 174*   AST 64* 62*   ALT 73* 80*   ANIONGAP 11 15     Microbiology Results (last 7 days)       Procedure Component Value Units Date/Time    Aerobic culture [[id number]] Collected: 08/19/24 0853    Order Status: Completed Specimen: Abscess from Knee, Right Updated: 08/21/24 1242     Aerobic Bacterial Culture No growth    Culture, Anaerobic [[id number]] Collected: 08/19/24 0853    Order Status: Completed Specimen: Abscess from Knee, Right Updated: 08/21/24 1024     Anaerobic Culture Culture in progress    AFB Culture & Smear [[id number]] Collected: 08/19/24 0853    Order Status: Completed Specimen: Abscess from Knee, Right Updated: 08/20/24 2127     AFB Culture & Smear Culture in progress     AFB CULTURE STAIN No acid fast bacilli seen.    Blood culture x two cultures. Draw prior to antibiotics. [[id number]] Collected: 08/15/24 1700    Order Status: Completed Specimen: Blood from Peripheral, Antecubital, Left Updated: 08/20/24 1812     Blood Culture, Routine No growth after 5 days.    Narrative:      Aerobic and anaerobic    Blood culture x two cultures. Draw prior to antibiotics. [[id number]] Collected: 08/15/24 1700    Order Status: Completed Specimen: Blood from Peripheral, Antecubital, Right Updated: 08/20/24 1812     Blood Culture, Routine No growth after 5 days.    Narrative:      Aerobic and anaerobic    Blood culture [[id number]] Collected: 08/19/24 1221    Order Status: Completed Specimen: Blood from Peripheral, Hand, Right Updated: 08/20/24 1412     Blood Culture, Routine No Growth to date      No Growth to date    Blood culture [[id number]] Collected: 08/19/24 1220    Order Status: Completed Specimen: Blood from Peripheral, Hand, Left Updated: 08/20/24 1412     Blood Culture, Routine No Growth to date      No Growth to date    Gram stain [[id number]]  Collected: 08/19/24 0853    Order Status: Completed Specimen: Abscess from Knee, Right Updated: 08/19/24 1132     Gram Stain Result Few WBC's      No organisms seen    Fungus culture [[id number]] Collected: 08/19/24 0853    Order Status: Sent Specimen: Abscess from Knee, Right Updated: 08/19/24 0940    Aerobic culture [[id number]]     Order Status: Completed Specimen: Abscess from Knee, Right           Wound Culture:   Recent Labs   Lab 08/17/24  1608 08/19/24  0853   LABAERO STREPTOCOCCUS MITIS/ORALIS   Rare  * No growth       Significant Imaging: None

## 2024-08-21 NOTE — SUBJECTIVE & OBJECTIVE
Principal Problem:Septic joint of right knee joint    Principal Orthopedic Problem: as above now s/p R knee arthroscopic I&D 8/16    Interval History: Pt seen and examined at bedside. VSS.  NAEDUARDOON. Still having some pain. Knee effusion remains. Reaspirated on 8/19, WBC 32k, 86% segs, +crystals, - GS. Will fu cultures.      Review of patient's allergies indicates:   Allergen Reactions    Tamiflu [oseltamivir]      Increases BP    Metformin Hives     Diarrhea, nausea    Penicillins Rash       Current Facility-Administered Medications   Medication    acetaminophen tablet 500 mg    apixaban tablet 5 mg    artificial tears 0.5 % ophthalmic solution 1 drop    aspirin EC tablet 81 mg    atorvastatin tablet 80 mg    cefTRIAXone (ROCEPHIN) 2 g in D5W 100 mL IVPB (MB+)    dextrose 10% bolus 125 mL 125 mL    dextrose 10% bolus 250 mL 250 mL    gabapentin capsule 300 mg    glucagon (human recombinant) injection 1 mg    glucose chewable tablet 16 g    glucose chewable tablet 24 g    guaiFENesin 100 mg/5 ml syrup 200 mg    HYDROmorphone injection 0.2 mg    insulin aspart U-100 pen 0-5 Units    insulin aspart U-100 pen 15 Units    insulin glargine U-100 (Lantus) pen 19 Units    losartan tablet 50 mg    melatonin tablet 6 mg    methocarbamoL tablet 750 mg    metoprolol succinate (TOPROL-XL) 24 hr tablet 25 mg    multivitamin tablet    naloxone 0.4 mg/mL injection 0.02 mg    omega-3 acid ethyl esters capsule 1 g    ondansetron injection 4 mg    oxyCODONE immediate release tablet 5 mg    oxyCODONE immediate release tablet Tab 10 mg    sodium chloride 0.9% flush 10 mL    vancomycin - pharmacy to dose    vancomycin 2 g in dextrose 5 % 500 mL IVPB     Facility-Administered Medications Ordered in Other Encounters   Medication    triamcinolone acetonide injection 40 mg     Objective:     Vital Signs (Most Recent):  Temp: 99 °F (37.2 °C) (08/21/24 0729)  Pulse: 80 (08/21/24 0729)  Resp: 18 (08/21/24 0729)  BP: (!) 116/58 (08/21/24  "0729)  SpO2: (!) 93 % (08/21/24 0729) Vital Signs (24h Range):  Temp:  [97.2 °F (36.2 °C)-100.6 °F (38.1 °C)] 99 °F (37.2 °C)  Pulse:  [74-89] 80  Resp:  [16-18] 18  SpO2:  [91 %-98 %] 93 %  BP: (107-154)/(58-71) 116/58     Weight: 95.7 kg (210 lb 15.7 oz)  Height: 5' 10" (177.8 cm)  Body mass index is 30.27 kg/m².      Intake/Output Summary (Last 24 hours) at 8/21/2024 0916  Last data filed at 8/21/2024 0500  Gross per 24 hour   Intake 350 ml   Output 800 ml   Net -450 ml        Ortho/SPM Exam     AAOx4  NAD  Reg rate  No increased WOB    RLE    Dressing taken down today no active bleeding or drainage  TTP suprapatellar pouch  No expressible purulence  Compartments soft/compressible  SILT throughout  AROM of toes, ankle, intact.  WWP. Brisk cap refill            Significant Labs: All pertinent labs within the past 24 hours have been reviewed.    Significant Imaging: I have reviewed and interpreted all pertinent imaging results/findings.  "

## 2024-08-21 NOTE — PLAN OF CARE
08/21/24 1257   Post-Acute Status   Post-Acute Authorization Home Health;IV Infusion   Discharge Plan   Discharge Plan A Home with family;Home Health;Other     Patient Accepted to [facility] (Pending Finial ID rec's). CM to follow.    2:02 PM  Discharged push back till tomorrow (8/22) pending ID finial Rec's. SW informed [facility].    Makenzie Gray LMSW  Case Management   [facility]   Ext. [phone]

## 2024-08-21 NOTE — PT/OT/SLP PROGRESS
Occupational Therapy   Treatment    Name: King Cool Jr.  MRN: [id number]  Admitting Diagnosis:  Septic joint of right knee joint  5 Days Post-Op    Recommendations:     Discharge Recommendations: Low Intensity Therapy  Discharge Equipment Recommendations:  bedside commode, shower chair, walker, rolling  Barriers to discharge:  None    Assessment:     King Cool Jr. is a 69 y.o. male with a medical diagnosis of Septic joint of right knee joint.  He presents with the following performance deficits affecting function are weakness, impaired endurance, impaired self care skills, impaired functional mobility, gait instability, impaired balance, decreased lower extremity function, pain, orthopedic precautions.  Pt had good participation today during treatment. Pt improving with standing balance and endurance needed for standing ADLs and IADLs.    Rehab Prognosis:  Good; patient would benefit from acute skilled OT services to address these deficits and reach maximum level of function.       Plan:     Patient to be seen 3 x/week to address the above listed problems via self-care/home management, therapeutic activities, therapeutic exercises, neuromuscular re-education  Plan of Care Expires: 09/17/24  Plan of Care Reviewed with: patient    Subjective     Chief Complaint: None stated  Patient/Family Comments/goals: return home  Pain/Comfort:  Pain Rating 1:  (not rated)  Location - Side 1: Right  Location 1: knee  Pain Addressed 1: Reposition, Distraction    Objective:     Communicated with: Nsmaryann prior to session.  Patient found up in chair with peripheral IV upon OT entry to room.    General Precautions: Standard, fall    Orthopedic Precautions:RLE weight bearing as tolerated  Braces: N/A  Respiratory Status: Room air     Occupational Performance:     Bed Mobility:    Pt OOB     Functional Mobility/Transfers:  Patient completed Sit <> Stand Transfer with stand by assistance  with  rolling walker  for timed standing to  simulate standing at sink. Pt able to maintain stand for 1 min with no LOB    Activities of Daily Living:  Feeding:  supervision eating breakfast  Grooming: supervision and setup A oral care seated.      [facility] 6 Click ADL: 21    Treatment & Education:  Pt educated on role and purpose of therapy  Pt educated on goal setting  Pt educated on benefits of OOB activity  Pt educated on self advocacy   Pt educated on WB precautions     Patient left up in chair with all lines intact, call button in reach, and nsg present    GOALS:   Multidisciplinary Problems       Occupational Therapy Goals          Problem: Occupational Therapy    Goal Priority Disciplines Outcome Interventions   Occupational Therapy Goal     OT, PT/OT Progressing    Description: Goals to be met by: 8/24/2024     Patient will increase functional independence with ADLs by performing:    UE Dressing with Modified [place].  LE Dressing with Modified [place].  Grooming while standing at sink with Modified [place].  Toileting from toilet with [place] for hygiene and clothing management.   Toilet transfer to toilet with Modified [place].                         Time Tracking:     OT Date of Treatment: 08/21/24  OT Start Time: 0935  OT Stop Time: 0947  OT Total Time (min): 12 min    Billable Minutes:Self Care/Home Management 12    OT/CASSANDRA: OT          8/21/2024

## 2024-08-21 NOTE — ASSESSMENT & PLAN NOTE
Persistant 8/19 through day, repeat cx pending. Covid neg. Cxr neg. Will check LE US. Improving tap 8/19  100.6 again on 8/20 overnight, unclear, blodo CX have been negative, will f/u ID if need to source for other places/imaging. Pseudogout as possible cause? Will try colchicine x 2. Crp in AM , ortho reports dont need to trend but has not improved yet and with fevers would reassess. Swelling in knee seems worse. His mobility is also worse

## 2024-08-21 NOTE — NURSING
Nurses Note -- 4 Eyes      8/21/2024   12:40 PM      Skin assessed during: Daily Assessment      [x] No Altered Skin Integrity Present    []Prevention Measures Documented      [] Yes- Altered Skin Integrity Present or Discovered   [] LDA Added if Not in Epic (Describe Wound)   [] New Altered Skin Integrity was Present on Admit and Documented in LDA   [] Wound Image Taken    Wound Care Consulted? No    Attending Nurse:  Gloria Aponte RN/Staff Member:  ROSALBA Parkinson

## 2024-08-21 NOTE — ASSESSMENT & PLAN NOTE
Seen on tap x 2 with ca pyrophosphate cyrstals. Had the steroids outpatient injected 8/13 with pcp. None on the [facility] surgery inejcted. Persistant swelling, try colchicine today. Avoidnig systemic steroids due to glucose/superinfection treatment. Uric acid wnl  Swelilng still very prominent, can consider involving rheum potentially given pseudogout can also cause fevers as undetermined cause of this still. Will f/u response to colcicine today

## 2024-08-22 PROBLEM — M11.261 PSEUDOGOUT OF RIGHT KNEE: Status: ACTIVE | Noted: 2024-08-22

## 2024-08-22 PROBLEM — R21 RASH: Status: ACTIVE | Noted: 2024-08-22

## 2024-08-22 PROBLEM — M11.261 PSEUDOGOUT OF RIGHT KNEE: Status: RESOLVED | Noted: 2024-08-22 | Resolved: 2024-08-22

## 2024-08-22 PROBLEM — M11.261 PSEUDOGOUT OF RIGHT KNEE: Status: ACTIVE | Noted: 2024-08-21

## 2024-08-22 LAB
ALBUMIN SERPL BCP-MCNC: 2.3 G/DL (ref 3.5–5.2)
ALP SERPL-CCNC: 275 U/L (ref 55–135)
ALT SERPL W/O P-5'-P-CCNC: 336 U/L (ref 10–44)
ANION GAP SERPL CALC-SCNC: 8 MMOL/L (ref 8–16)
AST SERPL-CCNC: 305 U/L (ref 10–40)
BASOPHILS # BLD AUTO: 0.06 K/UL (ref 0–0.2)
BASOPHILS NFR BLD: 0.6 % (ref 0–1.9)
BILIRUB SERPL-MCNC: 0.6 MG/DL (ref 0.1–1)
BUN SERPL-MCNC: 18 MG/DL (ref 8–23)
CALCIUM SERPL-MCNC: 9.5 MG/DL (ref 8.7–10.5)
CHLORIDE SERPL-SCNC: 100 MMOL/L (ref 95–110)
CO2 SERPL-SCNC: 30 MMOL/L (ref 23–29)
CREAT SERPL-MCNC: 0.8 MG/DL (ref 0.5–1.4)
CRP SERPL-MCNC: 287.4 MG/L (ref 0–8.2)
DIFFERENTIAL METHOD BLD: ABNORMAL
EOSINOPHIL # BLD AUTO: 0.1 K/UL (ref 0–0.5)
EOSINOPHIL NFR BLD: 1.1 % (ref 0–8)
ERYTHROCYTE [DISTWIDTH] IN BLOOD BY AUTOMATED COUNT: 12 % (ref 11.5–14.5)
EST. GFR  (NO RACE VARIABLE): >60 ML/MIN/1.73 M^2
GLUCOSE SERPL-MCNC: 109 MG/DL (ref 70–110)
HCT VFR BLD AUTO: 38 % (ref 40–54)
HGB BLD-MCNC: 12.8 G/DL (ref 14–18)
IMM GRANULOCYTES # BLD AUTO: 0.05 K/UL (ref 0–0.04)
IMM GRANULOCYTES NFR BLD AUTO: 0.5 % (ref 0–0.5)
LYMPHOCYTES # BLD AUTO: 1.6 K/UL (ref 1–4.8)
LYMPHOCYTES NFR BLD: 17 % (ref 18–48)
MCH RBC QN AUTO: 30.8 PG (ref 27–31)
MCHC RBC AUTO-ENTMCNC: 33.7 G/DL (ref 32–36)
MCV RBC AUTO: 92 FL (ref 82–98)
MONOCYTES # BLD AUTO: 1.3 K/UL (ref 0.3–1)
MONOCYTES NFR BLD: 13.2 % (ref 4–15)
NEUTROPHILS # BLD AUTO: 6.5 K/UL (ref 1.8–7.7)
NEUTROPHILS NFR BLD: 67.6 % (ref 38–73)
NRBC BLD-RTO: 0 /100 WBC
PLATELET # BLD AUTO: 321 K/UL (ref 150–450)
PMV BLD AUTO: 8.5 FL (ref 9.2–12.9)
POCT GLUCOSE: 128 MG/DL (ref 70–110)
POCT GLUCOSE: 155 MG/DL (ref 70–110)
POCT GLUCOSE: 248 MG/DL (ref 70–110)
POCT GLUCOSE: 262 MG/DL (ref 70–110)
POCT GLUCOSE: 82 MG/DL (ref 70–110)
POTASSIUM SERPL-SCNC: 3.8 MMOL/L (ref 3.5–5.1)
PROT SERPL-MCNC: 6.6 G/DL (ref 6–8.4)
RBC # BLD AUTO: 4.15 M/UL (ref 4.6–6.2)
SODIUM SERPL-SCNC: 138 MMOL/L (ref 136–145)
WBC # BLD AUTO: 9.64 K/UL (ref 3.9–12.7)

## 2024-08-22 PROCEDURE — 97530 THERAPEUTIC ACTIVITIES: CPT | Mod: HCNC

## 2024-08-22 PROCEDURE — 36415 COLL VENOUS BLD VENIPUNCTURE: CPT | Mod: HCNC | Performed by: HOSPITALIST

## 2024-08-22 PROCEDURE — 25500020 PHARM REV CODE 255: Mod: HCNC | Performed by: INTERNAL MEDICINE

## 2024-08-22 PROCEDURE — 25000003 PHARM REV CODE 250: Mod: HCNC | Performed by: NURSE PRACTITIONER

## 2024-08-22 PROCEDURE — 25000003 PHARM REV CODE 250: Mod: HCNC | Performed by: INTERNAL MEDICINE

## 2024-08-22 PROCEDURE — 85025 COMPLETE CBC W/AUTO DIFF WBC: CPT | Mod: HCNC | Performed by: HOSPITALIST

## 2024-08-22 PROCEDURE — 21400001 HC TELEMETRY ROOM: Mod: HCNC

## 2024-08-22 PROCEDURE — 63600175 PHARM REV CODE 636 W HCPCS: Mod: HCNC | Performed by: NURSE PRACTITIONER

## 2024-08-22 PROCEDURE — 86140 C-REACTIVE PROTEIN: CPT | Mod: HCNC | Performed by: HOSPITALIST

## 2024-08-22 PROCEDURE — 80053 COMPREHEN METABOLIC PANEL: CPT | Mod: HCNC | Performed by: HOSPITALIST

## 2024-08-22 PROCEDURE — 25000003 PHARM REV CODE 250: Mod: HCNC | Performed by: HOSPITALIST

## 2024-08-22 RX ORDER — CELECOXIB 200 MG/1
200 CAPSULE ORAL DAILY
Status: DISCONTINUED | OUTPATIENT
Start: 2024-08-22 | End: 2024-08-24 | Stop reason: HOSPADM

## 2024-08-22 RX ADMIN — ACETAMINOPHEN 500 MG: 500 TABLET ORAL at 05:08

## 2024-08-22 RX ADMIN — GABAPENTIN 300 MG: 300 CAPSULE ORAL at 08:08

## 2024-08-22 RX ADMIN — METHOCARBAMOL 750 MG: 750 TABLET ORAL at 02:08

## 2024-08-22 RX ADMIN — CELECOXIB 200 MG: 200 CAPSULE ORAL at 02:08

## 2024-08-22 RX ADMIN — METHOCARBAMOL 750 MG: 750 TABLET ORAL at 08:08

## 2024-08-22 RX ADMIN — OXYCODONE HYDROCHLORIDE 10 MG: 10 TABLET ORAL at 05:08

## 2024-08-22 RX ADMIN — APIXABAN 5 MG: 5 TABLET, FILM COATED ORAL at 08:08

## 2024-08-22 RX ADMIN — IOHEXOL 100 ML: 350 INJECTION, SOLUTION INTRAVENOUS at 08:08

## 2024-08-22 RX ADMIN — INSULIN ASPART 3 UNITS: 100 INJECTION, SOLUTION INTRAVENOUS; SUBCUTANEOUS at 11:08

## 2024-08-22 RX ADMIN — THERA TABS 1 TABLET: TAB at 08:08

## 2024-08-22 RX ADMIN — COLCHICINE 0.6 MG: 0.6 TABLET, FILM COATED ORAL at 09:08

## 2024-08-22 RX ADMIN — INSULIN ASPART 18 UNITS: 100 INJECTION, SOLUTION INTRAVENOUS; SUBCUTANEOUS at 11:08

## 2024-08-22 RX ADMIN — OXYCODONE HYDROCHLORIDE 10 MG: 10 TABLET ORAL at 02:08

## 2024-08-22 RX ADMIN — GABAPENTIN 300 MG: 300 CAPSULE ORAL at 09:08

## 2024-08-22 RX ADMIN — INSULIN ASPART 18 UNITS: 100 INJECTION, SOLUTION INTRAVENOUS; SUBCUTANEOUS at 08:08

## 2024-08-22 RX ADMIN — INSULIN ASPART 3 UNITS: 100 INJECTION, SOLUTION INTRAVENOUS; SUBCUTANEOUS at 09:08

## 2024-08-22 RX ADMIN — METHOCARBAMOL 750 MG: 750 TABLET ORAL at 09:08

## 2024-08-22 RX ADMIN — OMEGA-3-ACID ETHYL ESTERS 1 G: 1 CAPSULE, LIQUID FILLED ORAL at 08:08

## 2024-08-22 RX ADMIN — GABAPENTIN 300 MG: 300 CAPSULE ORAL at 02:08

## 2024-08-22 RX ADMIN — ASPIRIN 81 MG: 81 TABLET, COATED ORAL at 08:08

## 2024-08-22 RX ADMIN — INSULIN GLARGINE 19 UNITS: 100 INJECTION, SOLUTION SUBCUTANEOUS at 08:08

## 2024-08-22 RX ADMIN — OXYCODONE HYDROCHLORIDE 5 MG: 5 TABLET ORAL at 04:08

## 2024-08-22 RX ADMIN — CEFTRIAXONE SODIUM 2 G: 2 INJECTION, POWDER, FOR SOLUTION INTRAMUSCULAR; INTRAVENOUS at 09:08

## 2024-08-22 RX ADMIN — METOPROLOL SUCCINATE 25 MG: 25 TABLET, EXTENDED RELEASE ORAL at 08:08

## 2024-08-22 RX ADMIN — LOSARTAN POTASSIUM 50 MG: 50 TABLET, FILM COATED ORAL at 08:08

## 2024-08-22 RX ADMIN — OXYCODONE HYDROCHLORIDE 10 MG: 10 TABLET ORAL at 11:08

## 2024-08-22 RX ADMIN — INSULIN ASPART 18 UNITS: 100 INJECTION, SOLUTION INTRAVENOUS; SUBCUTANEOUS at 04:08

## 2024-08-22 NOTE — PLAN OF CARE
Problem: Adult Inpatient Plan of Care  Goal: Plan of Care Review  Outcome: Progressing  Goal: Patient-Specific Goal (Individualized)  Outcome: Progressing  Goal: Absence of Hospital-Acquired Illness or Injury  Outcome: Progressing  Goal: Optimal Comfort and Wellbeing  Outcome: Progressing  Goal: Readiness for Transition of Care  Outcome: Progressing     Problem: Diabetes Comorbidity  Goal: Blood Glucose Level Within Targeted Range  Outcome: Progressing     Problem: Wound  Goal: Optimal Coping  Outcome: Progressing  Goal: Optimal Functional Ability  Outcome: Progressing  Goal: Absence of Infection Signs and Symptoms  Outcome: Progressing  Goal: Improved Oral Intake  Outcome: Progressing  Goal: Optimal Pain Control and Function  Outcome: Progressing  Goal: Skin Health and Integrity  Outcome: Progressing  Goal: Optimal Wound Healing  Outcome: Progressing     Addressed patients pain, position, need for potty, and possessions in reach. Patient remains free of falls, and verbalizes understanding in fall prevention and safety. Safety measures remain in place. Reinforced fall prevention and safety education. Call light and personal belongings within reach, bed in lowest position with wheels locked, side rails up x2, Instructed to call with any needs or complaints, verbalized understanding. Continue current plan of care.

## 2024-08-22 NOTE — ASSESSMENT & PLAN NOTE
- Patient as outpatient receiving chronic right knee injections for OA and had recent injection the 8/13 then swelling  to right knee after that was not typical for him with presumed nidus with entry portal and seen in Ortho in clinic for this with concern for infection.   - Afebrile on admit with WBC 12.09 and lactic 1.5 with  on admit.  - Blood cultures drawn on admit and so far no growth.   - Arthrocentesis of right knee done and had 143,000 WBCs and positive crystals with CPPD and started on empiric IV Cefepime and Vancomycin.   - Patient taken to OR on 8/16/2024 with Dr. Isaac Alfaro and underwent arthroscopic irrigation with extensive debridement right knee and Ortho reported significant amounts of thick viscous fluid consistent with septic arthritis and/or CPPD.   -Patient weight bear as tolerated to right lower extremity post-op and PT/OT consulted and working with patient and recommending low intensity and plan HH PT/OT and home infusion on discharge when medically ready.   - ID consulted and final culture from right knee grew Streptococcus mitis/oralis and antibiotics switched by ID to IV Ceftriaxone alone to treat.   - Ortho following post-op and managing right knee surgical site.  - CRP slightly worse at 202 on 8/18 and up to 251 on 8/19. Right knee swelling unchanged. Ortho did bedside arthrocentesis on 8/19 and showed improvement in WBC to 32,000 with continued positive crystals consistent with CPPD and studies more consistent with pseudogout. Some swelling more in right calf on 8/20 and fevers persisting so US of lower extremities done on 8/20 and negative for DVT.    - Patient with still prominent swelling above and below right knee. CT scan of right leg done and showed large right knee joint effusion and ruptured Baker's cyst that would account for right calf swelling. Ortho aware of CT findings and recommend no change in management.   - Pseudogout new diagnosis with crystals on both taps  and received steroid injection on 8/13 and could account for continued right knee swelling and pain and worsening CRP. Plan to treat pseudogout with anti-inflammatories to see if helps.   - Continue multimodals for pain management.  - Elevate right lower extremity and ice to help with swelling to right knee.  - Fall precautions.  - Patient back on home Apixiban post-op so no other DVT prophylaxis needed post-op.

## 2024-08-22 NOTE — SUBJECTIVE & OBJECTIVE
Interval History: CT scan of right leg done this am and showed large right effusion and air in right knee joint and ruptured Baker's cyst but no abscess noted to right thigh or calf area. Discussed with Dr. Diehl from Ortho on 8/22 and air and effusion related to post-op changes and not concerned. Nothing needs to be done for ruptured right Baker's cyst. Mild right achilles tendonitis noted on CT scan of right leg and no intervention needed. Patient still having significant pain and swelling to right knee so patient advised to elevate and ice and stated on Celebrex 200 mg po daily to help with pain and inflammation an swelling as also has known pseudogout in right knee on top of known septic arthritis. Continue to monitor CRP. Continue IV Ceftriaxone for now to treat septic right knee arthritis as per ID. Patient had sudden increase in AST and ALT on 8/22. AST up to 305 from 62 and ALT up to 336 from 80 with normal T. Marco and ALP. CRP remains elevated at 287 up from 251 on 8/19. RUQ ultrasound ordered to evaluate AST and ALT elevations. Patient denies any abdominal pain or nausea or vomiting. ALP and T.marco normal. Patient's Tylenol and Lipitor discontinued due to elevation in LFTs. Discussed with Infectious Disease team as IV Ceftriaxone can cause cholestasis and elevated LFT's also and for now will monitor but if liver enzymes worsen despite stopping Tylenol and Lipitor and RUQ ultrasound shows no pathology need to consider changing antibiotics. Above was explained to patient and daughter and questions answered. Patient noted skin rash after returning from CT scan this am to entire back but sparing rest of his body and blanching red and small papules and non pruritic. Unclear if reaction to contrast dye or not and will monitor.     Review of Systems   Constitutional:  Negative for fever.   Respiratory:  Negative for cough and shortness of breath.    Cardiovascular:  Positive for leg swelling (Right knee and right  lower leg). Negative for chest pain and palpitations.   Gastrointestinal:  Negative for abdominal pain, diarrhea, nausea and vomiting.   Musculoskeletal:  Positive for arthralgias (Right knee).   Psychiatric/Behavioral:  Negative for agitation and confusion.      Objective:     Vital Signs (Most Recent):  Temp: 98.3 °F (36.8 °C) (08/22/24 1536)  Pulse: 91 (08/22/24 1536)  Resp: 18 (08/22/24 1615)  BP: 150/73 (08/22/24 1536)  SpO2: 96 % (08/22/24 1536) on room air  Vital Signs (24h Range):  Temp:  [97.6 °F (36.4 °C)-102.6 °F (39.2 °C)] 98.3 °F (36.8 °C)  Pulse:  [75-98] 91  Resp:  [16-20] 18  SpO2:  [93 %-98 %] 96 %  BP: (123-155)/(61-77) 150/73     Weight: 95.7 kg (210 lb 15.7 oz)  Body mass index is 30.27 kg/m².    Intake/Output Summary (Last 24 hours) at 8/22/2024 1839  Last data filed at 8/22/2024 1716  Gross per 24 hour   Intake 1030 ml   Output 1150 ml   Net -120 ml         Physical Exam  Vitals and nursing note reviewed.   Constitutional:       General: He is awake. He is not in acute distress.     Appearance: Normal appearance. He is well-developed and normal weight. He is not ill-appearing.      Comments: Patient sitting up on edge of bed rubbing his right knee and in some pain on exam. Patient in no distress.    Eyes:      Conjunctiva/sclera: Conjunctivae normal.   Cardiovascular:      Rate and Rhythm: Normal rate and regular rhythm.      Heart sounds: Normal heart sounds. No murmur heard.  Pulmonary:      Effort: Pulmonary effort is normal. No respiratory distress.      Breath sounds: Normal breath sounds. No wheezing.   Abdominal:      General: Abdomen is flat. Bowel sounds are normal. There is no distension.      Palpations: Abdomen is soft.      Tenderness: There is no abdominal tenderness.   Musculoskeletal:         General: Swelling (Rigth knee swelling and effusion noted) present.      Right lower leg: Edema (1+) present.      Left lower leg: No edema.   Skin:     General: Skin is warm.       Findings: No erythema or rash (Patient noted skin rash after returning from CT scan this am to entire back but sparing rest of his body and blanching red and small papules and non pruritic.).   Neurological:      Mental Status: He is alert and oriented to person, place, and time.   Psychiatric:         Mood and Affect: Mood normal.         Behavior: Behavior normal. Behavior is cooperative.         Thought Content: Thought content normal.         Judgment: Judgment normal.             Significant Labs: CBC:   Recent Labs   Lab 08/21/24 0527 08/22/24  0533   WBC 8.55 9.64   HGB 12.9* 12.8*   HCT 40.4 38.0*    321     CMP:   Recent Labs   Lab 08/21/24 0527 08/22/24  0533    138   K 4.2 3.8   CL 99 100   CO2 23 30*   * 109   BUN 14 18   CREATININE 0.8 0.8   CALCIUM 9.3 9.5   PROT 6.5 6.6   ALBUMIN 2.4* 2.3*   BILITOT 0.7 0.6   ALKPHOS 174* 275*   AST 62* 305*   ALT 80* 336*   ANIONGAP 15 8     Sed Rate   Date Value Ref Range Status   08/15/2024 12 0 - 23 mm/Hr Final   08/15/2024 12 0 - 23 mm/Hr Final     CRP   Date Value Ref Range Status   08/22/2024 287.4 (H) 0.0 - 8.2 mg/L Final   08/19/2024 251.9 (H) 0.0 - 8.2 mg/L Final   08/18/2024 202.2 (H) 0.0 - 8.2 mg/L Final       Significant Imaging: I have reviewed all pertinent imaging results/findings within the past 24 hours.

## 2024-08-22 NOTE — HOSPITAL COURSE
Patient admitted with septic arhtritis of right knee and pseudogout of right knee. Orthopedics consulted and patient taken to OR on 8/16/2024 by Dr. Isaac Alfaro and underwent arthroscopic irrigation with extensive debridement right knee. OP report noted significant amounts of thick viscous fluid consistent with septic arthritis and/or CPPD. Patient placed on broad spectrum antibiotics and Infectious Disease consulted for antibiotic management. Cultures from right knee grew STREPTOCOCCUS MITIS/ORALIS and antibiotcs switched to IV Ceftriaxone 2 grams every 24 hours to treat infection. Despite approparite antibiotics patient's CRP continues to rise. ID concerned. Patient placed on Colchicine to treat pseudogout to see if cause of rising CRP. CT of entire right leg ordered to evaluate cause of increasing CRP. US of lower extremities done and negative for DVT. CT scan of right leg on 8/22 showed large right effusion and air in right knee joint and ruptured Baker's cyst but no abscess noted to right thigh or calf area. Discussed with Dr. Diehl from Ortho on 8/22 and air and effusion related to post-op changes and not concerned. Nothing needs to be done for ruptured right Baker's cyst. Mild right achilles tendonitis noted on CT scan of right leg and no intervention needed. Patient still having significant pain and swelling to right knee so patient advised to elevate and ice and stated on Celebrex 200 mg po daily to help with pain and inflammation an swelling as also has known pseudogout in right knee on top of known septic arthritis. Continue to monitor CRP. Continue IV Ceftriaxone for now to treat septic right knee arthritis as per ID. Patient had sudden increase in AST and ALT on 8/22. AST up to 305 from 62 and ALT up to 336 from 80 with normal T. Marco and ALP. CRP remains elevated at 287 up from 251 on 8/19. RUQ ultrasound ordered to evaluate AST and ALT elevations. Patient's Tylenol and Lipitor discontinued due to  elevation in LFTs. Discussed with Infectious Disease team as IV Ceftriaxone can cause cholestasis and elevated LFT's also and for now will monitor but if liver enzymes worsen despite stopping Tylenol and Lipitor and RUQ ultrasound shows no pathology need to consider changing antibiotics. RUQ ultrasound done on 8/23 showed enlarged liver and biliary sludge and distended gallbladder but no stones and CBD not dilated. AST and ALT improved after stopping Tylenol and Lipitor on 8/22. AST down to 168 on 8/23 from 305 on 8/22 and ALT down to 240 on 8/23 from 336 on 8/22. Patient reports improvement in right knee pain and swelling with Colchicine and Celebrex on 8/23. Final ID recs given and midline placed on 8/23.    Outpatient Antibiotic Therapy Plan:     Please send referral to [facility].     1) Infection: Septic arthritis of knee      2) Discharge Antibiotics:     Intravenous antibiotics:  Ceftriaxone 2 g IV q 24 hours     3) Therapy Duration:  four weeks from surgical I&D     Estimated end date of IV antibiotics: 9/13/24     4) Outpatient Weekly Labs:     Order the following labs to be drawn on Mondays:   CBC  CMP   CRP     5) Fax Lab Results to Infectious Diseases Provider: Maddy Tripp NP     [facility] Fax Number: [phone]    Patient doing well on day of discharge. He did have another fever but WBC was normal and swelling and pain to right knee improving. Lactic acid normal at 1.2. CXR and U/A done and showed no evidence of infection. Suspect residual fever related to inflammation in right knee from pseudogout as clinically no signs of infection/sepsis with improvement in right knee swelling and pain at time of discharge. Patient discharged home with home health PT/OT and nursing and home IV infusion in good condition on 8/23. Patient discharged on Colchicine and Celebrex to treat his pseudogout. Patient to follow-up with Orthopedics as outpatient.

## 2024-08-22 NOTE — PT/OT/SLP PROGRESS
Occupational Therapy   Treatment    Name: King Cool Jr.  MRN: [id number]  Admitting Diagnosis:  Streptococcal arthritis of right knee  6 Days Post-Op    Recommendations:     Discharge Recommendations: Low Intensity Therapy  Discharge Equipment Recommendations:  bedside commode, shower chair, walker, rolling  Barriers to discharge:  None    Assessment:     King Cool Jr. is a 69 y.o. male with a medical diagnosis of Streptococcal arthritis of right knee.  He presents with performance deficits affecting function are weakness, impaired endurance, impaired self care skills, impaired functional mobility, gait instability, impaired balance, decreased lower extremity function, pain. Pt tolerated tx well. No ADL needs at this time. He was able to walk around the room with CGA and RW, not bearing any weight on RLE 2/2 pain.   Daughter noticed red spots on pt's back that weren't there before once he sat EOB, pt with no c/o discomfort or itchyness with spots. JORDAN Thorpe notified and aware after session. Pt will continue to benefit from skilled OT services to address impairments listed above to maximize independence with ADLs and functional mobility to ensure safe return to PLOF.     Rehab Prognosis:  Good; patient would benefit from acute skilled OT services to address these deficits and reach maximum level of function.       Plan:     Patient to be seen 3 x/week to address the above listed problems via self-care/home management, therapeutic activities, therapeutic exercises, neuromuscular re-education  Plan of Care Expires: 09/17/24  Plan of Care Reviewed with: patient    Subjective     Chief Complaint: pain  Patient/Family Comments/goals: return to PLOF  Pain/Comfort:  Pain Rating 1: other (see comments) (unrated)  Location - Side 1: Right  Location - Orientation 1: generalized  Location 1: knee  Pain Addressed 1: Distraction, Reposition  Pain Rating Post-Intervention 1: other (see comments) (unrated)    Objective:   1st  attempt- Pt ZOEY for CT scan in AM.  2nd attempt- ready and agreeable for therapy    Communicated with: RN prior to session.  Patient found supine with telemetry, peripheral IV upon OT entry to room.    General Precautions: Standard, fall    Orthopedic Precautions:RLE weight bearing as tolerated  Braces: N/A  Respiratory Status: Room air     Occupational Performance:     Bed Mobility:    Patient completed Scooting/Bridging with contact guard assistance  Patient completed Supine to Sit with minimum assistance     Functional Mobility/Transfers:  Patient completed Sit <> Stand Transfer with contact guard assistance  with  rolling walker   Functional Mobility: Pt ambulated around room with CGA and RW to simulate home distances and maximize functional endurance required for community mobility. Pt not bearing any weight on RLE 2/2 pain, using BUE support and LLE for hopping pattern    Activities of Daily Living:  No needs, performed prior to OT arrival      [facility] 6 Click ADL: 21    Treatment & Education:  Pt educated on   Role of OT and OT POC  Safe transfer techniques and proper body mechanics for fall prevention and improved independence with functional transfers  Importance of OOB activities to increase endurance and tolerance for increased participation in daily ADLs    Utilizing the call bell to request for assistance with all functional mobility to ensure safety during hospital stay.    Pt verbalized understanding and all questions were addressed within the scope of OT.     Patient left sitting edge of bed with all lines intact, call button in reach, RN notified, and dtr present    GOALS:   Multidisciplinary Problems       Occupational Therapy Goals          Problem: Occupational Therapy    Goal Priority Disciplines Outcome Interventions   Occupational Therapy Goal     OT, PT/OT Progressing    Description: Goals to be met by: 8/24/2024     Patient will increase functional independence with ADLs by performing:    UE  Dressing with Modified [place].  LE Dressing with Modified [place].  Grooming while standing at sink with Modified [place].  Toileting from toilet with [place] for hygiene and clothing management.   Toilet transfer to toilet with Modified [place].                         Time Tracking:     OT Date of Treatment: 08/22/24  OT Start Time: 1242  OT Stop Time: 1251  OT Total Time (min): 9 min    Billable Minutes:Therapeutic Activity 9    OT/CASSANDRA: OT          8/22/2024

## 2024-08-22 NOTE — PT/OT/SLP PROGRESS
Physical Therapy      Patient Name:  King Cool Jr.   MRN:  [id number]    Patient not seen today secondary to Pain. Will follow-up 8/23/2024.  Pt states ambulated earlier w/OT but is in  too much  pain at this time, unable to perform there ex at this time either.

## 2024-08-22 NOTE — PROGRESS NOTES
Nurses Note -- 4 Eyes      8/22/2024   10:42 AM      Skin assessed during: Q Shift Change      [x] No Altered Skin Integrity Present    []Prevention Measures Documented      [] Yes- Altered Skin Integrity Present or Discovered   [] LDA Added if Not in Epic (Describe Wound)   [] New Altered Skin Integrity was Present on Admit and Documented in LDA   [] Wound Image Taken    Wound Care Consulted? No    Attending Nurse:  Ila Aponte RN/Staff Member:  Kishore

## 2024-08-23 PROBLEM — R50.82 POST-PROCEDURAL FEVER: Status: RESOLVED | Noted: 2024-08-20 | Resolved: 2024-08-23

## 2024-08-23 PROBLEM — K59.01 SLOW TRANSIT CONSTIPATION: Status: RESOLVED | Noted: 2024-08-21 | Resolved: 2024-08-23

## 2024-08-23 LAB
ALBUMIN SERPL BCP-MCNC: 2.1 G/DL (ref 3.5–5.2)
ALP SERPL-CCNC: 253 U/L (ref 55–135)
ALT SERPL W/O P-5'-P-CCNC: 240 U/L (ref 10–44)
ANION GAP SERPL CALC-SCNC: 9 MMOL/L (ref 8–16)
AST SERPL-CCNC: 168 U/L (ref 10–40)
BACTERIA SPEC AEROBE CULT: NO GROWTH
BASOPHILS # BLD AUTO: 0.05 K/UL (ref 0–0.2)
BASOPHILS NFR BLD: 0.7 % (ref 0–1.9)
BILIRUB SERPL-MCNC: 0.6 MG/DL (ref 0.1–1)
BUN SERPL-MCNC: 17 MG/DL (ref 8–23)
CALCIUM SERPL-MCNC: 9.2 MG/DL (ref 8.7–10.5)
CHLORIDE SERPL-SCNC: 99 MMOL/L (ref 95–110)
CO2 SERPL-SCNC: 28 MMOL/L (ref 23–29)
CREAT SERPL-MCNC: 0.9 MG/DL (ref 0.5–1.4)
DIFFERENTIAL METHOD BLD: ABNORMAL
EOSINOPHIL # BLD AUTO: 0.1 K/UL (ref 0–0.5)
EOSINOPHIL NFR BLD: 1.7 % (ref 0–8)
ERYTHROCYTE [DISTWIDTH] IN BLOOD BY AUTOMATED COUNT: 12.1 % (ref 11.5–14.5)
EST. GFR  (NO RACE VARIABLE): >60 ML/MIN/1.73 M^2
GLUCOSE SERPL-MCNC: 115 MG/DL (ref 70–110)
HCT VFR BLD AUTO: 35.1 % (ref 40–54)
HGB BLD-MCNC: 11.7 G/DL (ref 14–18)
IMM GRANULOCYTES # BLD AUTO: 0.03 K/UL (ref 0–0.04)
IMM GRANULOCYTES NFR BLD AUTO: 0.4 % (ref 0–0.5)
LYMPHOCYTES # BLD AUTO: 1.4 K/UL (ref 1–4.8)
LYMPHOCYTES NFR BLD: 19.4 % (ref 18–48)
MCH RBC QN AUTO: 30.9 PG (ref 27–31)
MCHC RBC AUTO-ENTMCNC: 33.3 G/DL (ref 32–36)
MCV RBC AUTO: 93 FL (ref 82–98)
MONOCYTES # BLD AUTO: 1 K/UL (ref 0.3–1)
MONOCYTES NFR BLD: 13.7 % (ref 4–15)
NEUTROPHILS # BLD AUTO: 4.6 K/UL (ref 1.8–7.7)
NEUTROPHILS NFR BLD: 64.1 % (ref 38–73)
NRBC BLD-RTO: 0 /100 WBC
PLATELET # BLD AUTO: 344 K/UL (ref 150–450)
PMV BLD AUTO: 8.7 FL (ref 9.2–12.9)
POCT GLUCOSE: 105 MG/DL (ref 70–110)
POCT GLUCOSE: 219 MG/DL (ref 70–110)
POCT GLUCOSE: 220 MG/DL (ref 70–110)
POTASSIUM SERPL-SCNC: 4.1 MMOL/L (ref 3.5–5.1)
PROT SERPL-MCNC: 6.1 G/DL (ref 6–8.4)
RBC # BLD AUTO: 3.79 M/UL (ref 4.6–6.2)
SODIUM SERPL-SCNC: 136 MMOL/L (ref 136–145)
WBC # BLD AUTO: 7.13 K/UL (ref 3.9–12.7)

## 2024-08-23 PROCEDURE — 97535 SELF CARE MNGMENT TRAINING: CPT | Mod: HCNC

## 2024-08-23 PROCEDURE — 25000003 PHARM REV CODE 250: Mod: HCNC | Performed by: INTERNAL MEDICINE

## 2024-08-23 PROCEDURE — 76937 US GUIDE VASCULAR ACCESS: CPT | Mod: HCNC

## 2024-08-23 PROCEDURE — C1751 CATH, INF, PER/CENT/MIDLINE: HCPCS | Mod: HCNC

## 2024-08-23 PROCEDURE — 36410 VNPNXR 3YR/> PHY/QHP DX/THER: CPT | Mod: HCNC

## 2024-08-23 PROCEDURE — 36415 COLL VENOUS BLD VENIPUNCTURE: CPT | Mod: HCNC | Performed by: HOSPITALIST

## 2024-08-23 PROCEDURE — 25000003 PHARM REV CODE 250: Mod: HCNC | Performed by: NURSE PRACTITIONER

## 2024-08-23 PROCEDURE — 80053 COMPREHEN METABOLIC PANEL: CPT | Mod: HCNC | Performed by: HOSPITALIST

## 2024-08-23 PROCEDURE — 99233 SBSQ HOSP IP/OBS HIGH 50: CPT | Mod: HCNC,,, | Performed by: PHYSICIAN ASSISTANT

## 2024-08-23 PROCEDURE — A4216 STERILE WATER/SALINE, 10 ML: HCPCS | Mod: HCNC | Performed by: INTERNAL MEDICINE

## 2024-08-23 PROCEDURE — 25000003 PHARM REV CODE 250: Mod: HCNC | Performed by: HOSPITALIST

## 2024-08-23 PROCEDURE — 97116 GAIT TRAINING THERAPY: CPT | Mod: HCNC

## 2024-08-23 PROCEDURE — 63600175 PHARM REV CODE 636 W HCPCS: Mod: HCNC | Performed by: NURSE PRACTITIONER

## 2024-08-23 PROCEDURE — 21400001 HC TELEMETRY ROOM: Mod: HCNC

## 2024-08-23 PROCEDURE — 85025 COMPLETE CBC W/AUTO DIFF WBC: CPT | Mod: HCNC | Performed by: HOSPITALIST

## 2024-08-23 RX ORDER — SODIUM CHLORIDE 0.9 % (FLUSH) 0.9 %
10 SYRINGE (ML) INJECTION
Status: DISCONTINUED | OUTPATIENT
Start: 2024-08-23 | End: 2024-08-24 | Stop reason: HOSPADM

## 2024-08-23 RX ORDER — CELECOXIB 200 MG/1
200 CAPSULE ORAL DAILY
Qty: 14 CAPSULE | Refills: 0 | Status: SHIPPED | OUTPATIENT
Start: 2024-08-24 | End: 2024-08-24

## 2024-08-23 RX ORDER — METHOCARBAMOL 750 MG/1
750 TABLET, FILM COATED ORAL 3 TIMES DAILY
Qty: 42 TABLET | Refills: 0 | Status: SHIPPED | OUTPATIENT
Start: 2024-08-23 | End: 2024-09-07

## 2024-08-23 RX ORDER — SODIUM CHLORIDE 0.9 % (FLUSH) 0.9 %
10 SYRINGE (ML) INJECTION EVERY 6 HOURS
Status: DISCONTINUED | OUTPATIENT
Start: 2024-08-23 | End: 2024-08-24 | Stop reason: HOSPADM

## 2024-08-23 RX ORDER — COLCHICINE 0.6 MG/1
0.6 TABLET ORAL DAILY
Qty: 30 TABLET | Refills: 11 | Status: SHIPPED | OUTPATIENT
Start: 2024-08-24 | End: 2024-08-24

## 2024-08-23 RX ADMIN — METHOCARBAMOL 750 MG: 750 TABLET ORAL at 02:08

## 2024-08-23 RX ADMIN — GABAPENTIN 300 MG: 300 CAPSULE ORAL at 08:08

## 2024-08-23 RX ADMIN — OXYCODONE HYDROCHLORIDE 10 MG: 10 TABLET ORAL at 09:08

## 2024-08-23 RX ADMIN — GABAPENTIN 300 MG: 300 CAPSULE ORAL at 02:08

## 2024-08-23 RX ADMIN — OXYCODONE HYDROCHLORIDE 10 MG: 10 TABLET ORAL at 10:08

## 2024-08-23 RX ADMIN — OMEGA-3-ACID ETHYL ESTERS 1 G: 1 CAPSULE, LIQUID FILLED ORAL at 08:08

## 2024-08-23 RX ADMIN — APIXABAN 5 MG: 5 TABLET, FILM COATED ORAL at 09:08

## 2024-08-23 RX ADMIN — COLCHICINE 0.6 MG: 0.6 TABLET, FILM COATED ORAL at 08:08

## 2024-08-23 RX ADMIN — OXYCODONE HYDROCHLORIDE 10 MG: 10 TABLET ORAL at 05:08

## 2024-08-23 RX ADMIN — INSULIN ASPART 2 UNITS: 100 INJECTION, SOLUTION INTRAVENOUS; SUBCUTANEOUS at 11:08

## 2024-08-23 RX ADMIN — LOSARTAN POTASSIUM 50 MG: 50 TABLET, FILM COATED ORAL at 08:08

## 2024-08-23 RX ADMIN — METOPROLOL SUCCINATE 25 MG: 25 TABLET, EXTENDED RELEASE ORAL at 08:08

## 2024-08-23 RX ADMIN — INSULIN ASPART 18 UNITS: 100 INJECTION, SOLUTION INTRAVENOUS; SUBCUTANEOUS at 04:08

## 2024-08-23 RX ADMIN — Medication 10 ML: at 05:08

## 2024-08-23 RX ADMIN — INSULIN ASPART 18 UNITS: 100 INJECTION, SOLUTION INTRAVENOUS; SUBCUTANEOUS at 11:08

## 2024-08-23 RX ADMIN — METHOCARBAMOL 750 MG: 750 TABLET ORAL at 08:08

## 2024-08-23 RX ADMIN — INSULIN GLARGINE 19 UNITS: 100 INJECTION, SOLUTION SUBCUTANEOUS at 08:08

## 2024-08-23 RX ADMIN — CEFTRIAXONE SODIUM 2 G: 2 INJECTION, POWDER, FOR SOLUTION INTRAMUSCULAR; INTRAVENOUS at 10:08

## 2024-08-23 RX ADMIN — CELECOXIB 200 MG: 200 CAPSULE ORAL at 08:08

## 2024-08-23 RX ADMIN — ASPIRIN 81 MG: 81 TABLET, COATED ORAL at 08:08

## 2024-08-23 RX ADMIN — INSULIN ASPART 2 UNITS: 100 INJECTION, SOLUTION INTRAVENOUS; SUBCUTANEOUS at 04:08

## 2024-08-23 RX ADMIN — APIXABAN 5 MG: 5 TABLET, FILM COATED ORAL at 08:08

## 2024-08-23 RX ADMIN — THERA TABS 1 TABLET: TAB at 08:08

## 2024-08-23 RX ADMIN — INSULIN ASPART 18 UNITS: 100 INJECTION, SOLUTION INTRAVENOUS; SUBCUTANEOUS at 08:08

## 2024-08-23 NOTE — ASSESSMENT & PLAN NOTE
Patient noted skin rash after returning from CT scan this am, 8/22 to entire back but sparing rest of his body and blanching red and small papules and non pruritic. Unclear if reaction to contrast dye or not and will monitor.

## 2024-08-23 NOTE — PROGRESS NOTES
[facility] Medicine  Progress Note    Patient Name: King Cool Jr.  MRN: [id number]  Patient Class: IP- Inpatient   Admission Date: 8/15/2024  Length of Stay: 8 days  Attending Physician: Emilee Martinez MD  Primary Care Provider: Gallo Lara MD        Subjective:     Principal Problem:Streptococcal arthritis of right knee        HPI:  King Cool Jr. is a 69 y.o. male with a PMHx of CAD, a fib on eliquis, HTN, HLD, DM2, and osteoarthritis who presents to the ED from orthopedic clinic for evaluation of septic right knee. The patient reports he has a history of OA in his right knee and receives intra articular steroid injections every 3 months. His last injection was 8/13. He reports beginning yesterday he noticed increased swelling and pain to his right knee. He notes it was so painful he was unable to get out of bed this morning. He denies any trauma or falls. He states at baseline he ambulates unassisted. Denies numbness/tingling. He was seen in ortho clinic today and had an arthrocentesis which showed [id number] WBCs and positive crystals for calcium pyrophosphate crystals. R knee xray showed severe osteoarthritis. He denies fever/chills, N/V/D, abdominal pain, CP, SOB, cough, or dysuria.    In the ED, pt tachycardic and mildly hypertensive otherwise vitals stable, afebrile. WBC 12.09k. Glucose 330. Tbili 1.1. LA 1.5. Blood cxs in process. The patient received morphine, zofran, 1L NS, cefepime, and vancomycin. Ortho was consulted and recommend continuing NPO with plan for OR tomorrow for I&D right knee.     Overview/Hospital Course:  Patient admitted with septic arhtritis of right knee and pseudogout of right knee. Orthopedics consulted and patient taken to OR on 8/16/2024 by Dr. Isaac Alfaro and underwent arthroscopic irrigation with extensive debridement right knee. OP report noted significant amounts of thick viscous fluid consistent with septic arthritis and/or CPPD. Patient placed on  broad spectrum antibiotics and Infectious Disease consulted for antibiotic management. Cultures from right knee grew STREPTOCOCCUS MITIS/ORALIS and antibiotcs switched to IV Ceftriaxone 2 grams every 24 hours to treat infection. Despite approparite antibiotics patient's CRP continues to rise. ID concerned. Patient placed on Colchicine to treat pseudogout to see if cause of rising CRP. CT of entire right leg ordered to evaluate cause of increasing CRP. US of lower extremities done and negative for DVT. CT scan of right leg on 8/22 showed large right effusion and air in right knee joint and ruptured Baker's cyst but no abscess noted to right thigh or calf area. Discussed with Dr. Diehl from Ortho on 8/22 and air and effusion related to post-op changes and not concerned. Nothing needs to be done for ruptured right Baker's cyst. Mild right achilles tendonitis noted on CT scan of right leg and no intervention needed. Patient still having significant pain and swelling to right knee so patient advised to elevate and ice and stated on Celebrex 200 mg po daily to help with pain and inflammation an swelling as also has known pseudogout in right knee on top of known septic arthritis. Continue to monitor CRP. Continue IV Ceftriaxone for now to treat septic right knee arthritis as per ID. Patient had sudden increase in AST and ALT on 8/22. AST up to 305 from 62 and ALT up to 336 from 80 with normal T. Marco and ALP. CRP remains elevated at 287 up from 251 on 8/19. RUQ ultrasound ordered to evaluate AST and ALT elevations. Patient's Tylenol and Lipitor discontinued due to elevation in LFTs. Discussed with Infectious Disease team as IV Ceftriaxone can cause cholestasis and elevated LFT's also and for now will monitor but if liver enzymes worsen despite stopping Tylenol and Lipitor and RUQ ultrasound shows no pathology need to consider changing antibiotics. RUQ ultrasound done on 8/23 showed enlarged liver and biliary sludge and  distended gallbladder but no stones and CBD not dilated. AST and ALT improved after stopping Tylenol and Lipitor on 8/22. AST down to 168 on 8/23 from 305 on 8/22 and ALT down to 240 on 8/23 from 336 on 8/22. Patient reports improvement in right knee pain and swelling with Colchicine and Celebrex on 8/23. Final ID recs given and midline placed on 8/23.    Outpatient Antibiotic Therapy Plan:     Please send referral to [facility].     1) Infection: Septic arthritis of knee      2) Discharge Antibiotics:     Intravenous antibiotics:  Ceftriaxone 2 g IV q 24 hours     3) Therapy Duration:  four weeks from surgical I&D     Estimated end date of IV antibiotics: 9/13/24     4) Outpatient Weekly Labs:     Order the following labs to be drawn on Mondays:   CBC  CMP   CRP     5) Fax Lab Results to Infectious Diseases Provider: Maddy Tripp NP     [facility] Fax Number: [phone]    Plan home discharge on 8/24 with  PT/OT and home infusion pending improvement in CRP.     Interval History: RUQ ultrasound done this am for elevated LFT's and showed enlarged liver and biliary sludge and distended gallbladder but no stones and CBD not dilated and no acute cholecystitis. AST and ALT improved after stopping Tylenol and Lipitor yesterday. AST down to 168 today from 305 yesterday and ALT down to 240 today from 336 yesterday. Patient reports improvement in right knee pain and swelling with Colchicine and Celebrex today. Celebrex started yesterday and Colchicine on 8/21 to treat his pseudogout. Final ID recs given for right knee septic arthritis and midline placed today. Final ID recs:    Outpatient Antibiotic Therapy Plan:     Please send referral to [facility].     1) Infection: Septic arthritis of knee      2) Discharge Antibiotics:     Intravenous antibiotics:  Ceftriaxone 2 g IV q 24 hours     3) Therapy Duration:  four weeks from surgical I&D      Estimated end date of IV antibiotics: 9/13/24     4) Outpatient Weekly Labs:     Order the following labs to be drawn on Mondays:   CBC  CMP   CRP     5) Fax Lab Results to Infectious Diseases Provider: Maddy Tripp NP     [facility] Fax Number: [phone]    Plan home discharge tomorrow with  PT/OT and home infusion pending improvement in CRP. CRP ordered for the am. Patient states right knee pain 4/10 today. Rash on patient's back noted on 8/22 is improving.     Review of Systems   Constitutional:  Negative for fever.   Respiratory:  Negative for cough and shortness of breath.    Cardiovascular:  Positive for leg swelling (Right knee and right lower leg). Negative for chest pain and palpitations.   Gastrointestinal:  Negative for abdominal pain, diarrhea, nausea and vomiting.   Musculoskeletal:  Positive for arthralgias (Right knee).   Psychiatric/Behavioral:  Negative for agitation and confusion.      Objective:     Vital Signs (Most Recent):  Temp: 98.1 °F (36.7 °C) (08/23/24 1538)  Pulse: 80 (08/23/24 1538)  Resp: 18 (08/23/24 1538)  BP: 165/81 (08/23/24 1538)  SpO2: 98 % (08/23/24 1538) on room air  Vital Signs (24h Range):  Temp:  [98.1 °F (36.7 °C)-99.9 °F (37.7 °C)] 98.1 °F (36.7 °C)  Pulse:  [70-87] 80  Resp:  [16-20] 18  SpO2:  [93 %-98 %] 98 %  BP: (111-165)/(58-81) 165/81     Weight: 95.7 kg (210 lb 15.7 oz)  Body mass index is 30.27 kg/m².    Intake/Output Summary (Last 24 hours) at 8/23/2024 1542  Last data filed at 8/22/2024 1716  Gross per 24 hour   Intake 180 ml   Output --   Net 180 ml         Physical Exam  Vitals and nursing note reviewed.   Constitutional:       General: He is awake. He is not in acute distress.     Appearance: Normal appearance. He is well-developed and normal weight. He is not ill-appearing.      Comments: Patient lying in bed in no distress and no pain or discomfort.    Eyes:      Conjunctiva/sclera: Conjunctivae normal.   Cardiovascular:      Rate and Rhythm:  Normal rate and regular rhythm.      Heart sounds: Normal heart sounds. No murmur heard.  Pulmonary:      Effort: Pulmonary effort is normal. No respiratory distress.      Breath sounds: Normal breath sounds. No wheezing.   Abdominal:      General: Abdomen is flat. Bowel sounds are normal. There is no distension.      Palpations: Abdomen is soft.      Tenderness: There is no abdominal tenderness.   Musculoskeletal:         General: Swelling (Rigth knee swelling and effusion noted) present.      Right lower leg: Edema (1+) present.      Left lower leg: No edema.   Skin:     General: Skin is warm.      Findings: Rash (Patient noted skin rash after returning from CT scan this am to entire back but sparing rest of his body and blanching red and small papules and non pruritic.) present. No erythema.      Comments: Sutures in place to right knee incision   Neurological:      Mental Status: He is alert and oriented to person, place, and time.   Psychiatric:         Mood and Affect: Mood normal.         Behavior: Behavior normal. Behavior is cooperative.         Thought Content: Thought content normal.         Judgment: Judgment normal.             Significant Labs: CBC:   Recent Labs   Lab 08/22/24  0533 08/23/24  0450   WBC 9.64 7.13   HGB 12.8* 11.7*   HCT 38.0* 35.1*    344     CMP:   Recent Labs   Lab 08/22/24  0533 08/23/24  0450    136   K 3.8 4.1    99   CO2 30* 28    115*   BUN 18 17   CREATININE 0.8 0.9   CALCIUM 9.5 9.2   PROT 6.6 6.1   ALBUMIN 2.3* 2.1*   BILITOT 0.6 0.6   ALKPHOS 275* 253*   * 168*   * 240*   ANIONGAP 8 9       POCT Glucose:   Recent Labs   Lab 08/22/24  2142 08/23/24  0848 08/23/24  1537   POCTGLUCOSE 82 219* 220*       Significant Imaging: I have reviewed all pertinent imaging results/findings within the past 24 hours.    Assessment/Plan:      * Streptococcal arthritis of right knee s/p I+D on 8/16/2024  - Patient as outpatient receiving chronic right  knee injections for OA and had recent injection the 8/13 then swelling  to right knee after that was not typical for him with presumed nidus with entry portal and seen in Ortho in clinic for this with concern for infection.   - Afebrile on admit with WBC 12.09 and lactic 1.5 with  on admit.  - Blood cultures drawn on admit and so far no growth.   - Arthrocentesis of right knee done and had 143,000 WBCs and positive crystals with CPPD and started on empiric IV Cefepime and Vancomycin.   - Patient taken to OR on 8/16/2024 with Dr. Isaac Alfaro and underwent arthroscopic irrigation with extensive debridement right knee and Ortho reported significant amounts of thick viscous fluid consistent with septic arthritis and/or CPPD.   -Patient weight bear as tolerated to right lower extremity post-op and PT/OT consulted and working with patient and recommending low intensity and plan  PT/OT and home infusion on discharge when medically ready.   - ID consulted and final culture from right knee grew Streptococcus mitis/oralis and antibiotics switched by ID to IV Ceftriaxone alone to treat. ID gave final recs on 8/23:    Outpatient Antibiotic Therapy Plan:     Please send referral to [facility].     1) Infection: Septic arthritis of knee      2) Discharge Antibiotics:     Intravenous antibiotics:  Ceftriaxone 2 g IV q 24 hours     3) Therapy Duration:  four weeks from surgical I&D     Estimated end date of IV antibiotics: 9/13/24     4) Outpatient Weekly Labs:     Order the following labs to be drawn on Mondays:   CBC  CMP   CRP     5) Fax Lab Results to Infectious Diseases Provider: Maddy Tripp NP     [facility] Fax Number: [phone]  Midline placed by line team for home IV infusion on 8/23 to right arm. Plan [facility] PT[facility] and [facility] on 8/24 as long as CRP improving.      - Ortho following post-op and managing right knee surgical site.  - CRP slightly  worse at 202 on 8/18 and up to 251 on 8/19. Right knee swelling unchanged. Ortho did bedside arthrocentesis on 8/19 and showed improvement in WBC to 32,000 with continued positive crystals consistent with CPPD and studies more consistent with pseudogout. Some swelling more in right calf on 8/20 and fevers persisting so US of lower extremities done on 8/20 and negative for DVT.    - Patient with still prominent swelling above and below right knee. CT scan of right leg done and showed large right knee joint effusion and ruptured Baker's cyst that would account for right calf swelling. Ortho aware of CT findings and recommend no change in management.   - Pseudogout new diagnosis with crystals on both taps and received steroid injection on 8/13 and could account for continued right knee swelling and pain and worsening CRP. Plan to treat pseudogout with anti-inflammatories to see if helps with swelling and CRP and started on Celebrex 200 mg po daily on 8/22. Patient started on Colchicine to treat pseudogout on 8/21. Repeat CRP on 8/24.  - Continue multimodals for pain management.  - Elevate right lower extremity and ice to help with swelling to right knee.  - Fall precautions.  - Patient back on home Apixiban post-op so no other DVT prophylaxis needed post-op.     Transaminitis  - RUQ ultrasound done this am, 8/23 for elevated LFT's and showed enlarged liver and biliary sludge and distended gallbladder but no stones and CBD not dilated and no acute cholecystitis but no obvious cause for elevated AST and ALT. AST and ALT improved after stopping Tylenol and Lipitor yesterday, 8/22. AST down to 168 today from 305 yesterday and ALT down to 240 today from 336 yesterday.   - Patient had sudden increase in AST and ALT on 8/22. AST up to 305 from 62 on 8/21 and ALT up to 336 from 80 on 8/21 with normal T. Marco and ALP. RUQ ultrasound ordered to evaluate AST and ALT elevations. Patient denies any abdominal pain or nausea or  vomiting. Patient's Tylenol and Lipitor discontinued on 8/22 due to elevation in LFTs as both meds have hepatotoxic effects potentially. Discussed with Infectious Disease team as IV Ceftriaxone can cause cholestasis and elevated LFT's also and for now will monitor but if liver enzymes worsen despite stopping Tylenol and Lipitor and RUQ ultrasound shows no pathology need to consider changing antibiotics.    Pseudogout of right knee  - Pain and swelling improved to right knee on 8/24 with Celebrex and Colchicine and will continue to treat pseudogout.   - Patient calcium pyrophosphate crystals not on both taps of right knee. Had the steroid injection outpatient on 8/13 with primary care physician to right knee.   - Patient with persistent right knee swelling and suspect pseudogout as cause. Trial of Colchicine x 2 doses on 8/21 and daily to treat. Trying to avoid systemic steroids with glucose elevations and superinfection picture. Uric acid is normal.    - Trial of Celebrex 200 mg po daily to start on 8/22 to see if helps with right knee swelling and pain related to pseudogout and right knee infection.       Rash  Much improved to back area on 8/24 and no new sites on the skin.   Patient noted skin rash after returning from CT scan this am, 8/22 to entire back but sparing rest of his body and blanching red and small papules and non pruritic. Unclear if reaction to contrast dye or not and will monitor.       Coronary artery disease involving native coronary artery of native heart without angina pectoris  Patient with known CAD s/p stent placement, which is controlled. Monitor for S/Sx of angina/ACS. Continue to monitor on telemetry.   Continue Aspirin to treat daily. Holding Lipitor since  8/22 due elevated AST and ALT.     PAF (paroxysmal atrial fibrillation)  Chronic anticoagulation   Patient with Paroxysmal (<7 days) atrial fibrillation which is controlled currently with Toprol XL 25 mg po daily to treat. Patient is  currently in sinus rhythm.[id number] Score: 3. Anticoagulation done with home Apixiban and continue Apixiban 5 mg po BID to treat.     Class 1 obesity due to excess calories with serious comorbidity and body mass index (BMI) of 33.0 to 33.9 in adult  Body mass index is 30.27 kg/m². Obesity complicates all aspects of disease management from diagnostic modalities to treatment.     Type 2 diabetes mellitus with microalbuminuria, without long-term current use of insulin  Patient's FSGs are controlled on current medication regimen. Diabetes not well controlled as outpatient. Patient on oral Jardiance and Amaryl as outpatient to treat.   Last A1c reviewed-   Lab Results   Component Value Date    HGBA1C 11.5 (H) 08/13/2024     Most recent fingerstick glucose reviewed-   Recent Labs   Lab 08/22/24  2142 08/23/24  0848 08/23/24  1537   POCTGLUCOSE 82 219* 220*       Current correctional scale  Low  Maintain anti-hyperglycemic dose as follows-   Antihyperglycemics (From admission, onward)      Start     Stop Route Frequency Ordered    08/21/24 1645  insulin aspart U-100 pen 18 Units         -- SubQ 3 times daily with meals 08/21/24 1315    08/20/24 2100  insulin glargine U-100 (Lantus) pen 19 Units         -- SubQ 2 times daily 08/20/24 1349    08/15/24 1917  insulin aspart U-100 pen 0-5 Units         -- SubQ Before meals & nightly PRN 08/15/24 1817          Hold Oral hypoglycemics while patient is in the hospital.   Monitor blood sugars with meals and at bedtime in hospital with target blood sugars 140-180.   Diabetic diet.       Essential (primary) hypertension  Chronic, controlled. Latest blood pressure and vitals reviewed-     Temp:  [98.1 °F (36.7 °C)-99.9 °F (37.7 °C)]   Pulse:  [70-87]   Resp:  [16-20]   BP: (111-165)/(58-81)   SpO2:  [93 %-98 %] .   Home meds for hypertension were reviewed and noted below.   Hypertension Medications               losartan (COZAAR) 25 MG tablet Take 1 tablet (25 mg total) by mouth once  daily.    metoprolol succinate (TOPROL-XL) 25 MG 24 hr tablet Take 1 tablet (25 mg total) by mouth once daily.            While in the hospital, will manage blood pressure as follows; Continue home antihypertensive regimen to treat in hospital.     Will utilize p.r.n. blood pressure medication only if patient's blood pressure greater than 180/110 and he develops symptoms such as worsening chest pain or shortness of breath.      VTE Risk Mitigation (From admission, onward)           Ordered     apixaban tablet 5 mg  2 times daily         08/16/24 1537     IP VTE LOW RISK PATIENT  Once         08/15/24 1817     Place sequential compression device  Until discontinued         08/15/24 1817                    Discharge Planning   JUAN M: 8/24/2024     Code Status: Full Code   Is the patient medically ready for discharge?:     Reason for patient still in hospital (select all that apply): Patient trending condition  Discharge Plan A: Home with family, Home Health, Other and home IV infusion if CRP improving on 8/24.            Emilee Martinez MD  Department of Hospital Medicine   [facility] - Surgery

## 2024-08-23 NOTE — ASSESSMENT & PLAN NOTE
69 year old with CAD, A fib on Eliquis, HTN, DM (A1C 11), and osteoarthritis of the knee admitted with concern for septic right knee.  Patient has history of OA in the right knee and receives intraarticular steroid injections every 3 months.  Last injection was on 8/13.  Developed increased pain and swelling within 24 hours after the injection. Underwent knee aspiration in Ortho clinic 8/15 which showed 143K WBCs, 85% segs and positive CPP crystals. Aspirate cultures of 8/15 is positive for strep mitis (rare). No history of gout or pseudogout.        Underwent surgical I&D with Dr. Alfaro on 8/16. Per OP note, thick viscous fluid encountered.  Suspects septic arthritis superimposed over pseudogout.  No OR cultures sent.  Patient had been on Vanc/Ceftriaxone. Had a few days of persistent fevers. Swelling and pain began in leg/calf.  CRP remains elevated. Ortho re-aspirated knee. WBC 32K, 86% segs. Blood and aspiration cx NGTD. CT knee/leg revealed ruptured bakers cyst which could explain all symptoms. Per ortho - no concerns for joint effusion or pneumarthrosis.     Vancomycin discontinued yesterday. Currently on Ceftriaxone monotherapy. Afebrile over last 24 hours. No leucocytosis. HDS. LFTS downtrending.       Plan/recommendations:  Continue Ceftriaxone 2 g q 24 hours  Management of pseudogout per primary and orthopedic surgery. Patient elevating  extremity when able   Anticipate four weeks of antibiotics. See OPAT plan below  Discussed plan with ID staff.          Outpatient Antibiotic Therapy Plan:    Please send referral to [facility].    1) Infection: Septic arthritis of knee     2) Discharge Antibiotics:    Intravenous antibiotics:  Ceftriaxone 2 g IV q 24 hours    3) Therapy Duration:  four weeks from surgical I&D    Estimated end date of IV antibiotics: 9/13/24    4) Outpatient Weekly Labs:    Order the following labs to be drawn on Mondays:   CBC  CMP   CRP    5) Fax  Lab Results to Infectious Diseases Provider: Maddy Tripp NP    [facility] Fax Number: [phone]    6) Outpatient Infectious Diseases Follow-up    Follow-up appointment will be arranged by the ID clinic and will be found in the patient's appointments tab.    Prior to discharge, please ensure the patient's follow-up has been scheduled.    If there is still no follow-up scheduled prior to discharge, please send an Maven[facility] message to [facility] [place] or Call Infectious Diseases Dept.

## 2024-08-23 NOTE — PROGRESS NOTES
"[facility] Home IV ATB Education/Discharge Planning Note:     [facility] educator met with the patient and his daughter (via FaceTime) and discussed discharge plan for home IV ATB. Mr King Cool (Joey) will discharge home with family support but will self infuse. Patient's IV medication will infuse via Elastomeric Pump. The patient & his daughter were educated on S.A.S.H procedure & [facility] S.A.S.H mat provided. Patient education checklist and [facility] consent to treatment form reviewed and acknowledged by the patient and his daughter and they are agreeable to discharge with home infusion plan of care. IV administration process using aspetic technique was reviewed with successful return demonstration by the patient. The patient feels comfortable with home infusion process after bedside education provided. Patient also comfortable with infusing without assistance of nurse, as he is aware that HH will not be out to see him for 1-2 days after discharge. The patient will discharge home on IV Ceftriaxone 2g Q24 hours for estimated end of therapy date of 9/13/2024. Dosing schedule time will be 08:00 daily, beginning with first home dose due on the day after discharge (tentatively will be discharged on 8/24); I requested that the patient receive a dose prior to discharge if he is discharged on 8/24. Extension set to be placed on SL Midline by Anette (Bedside nurse), as patient will be self infusing.  Patient accepted to care by [facility] for weekly dressing changes and lab draws.     Time allotted for questions; questions addressed appropriately. Patients home IV ATB & supplies will be delivered to the patient's home on confirmed day of discharge. Provided patient with [facility] support number [phone] & [facility] number [phone]. Patient is ready for discharge home from [facility] perspective. Patient's discharge planning team and bedside nurse updated " with the information above.      -Patient has been accepted to care by [facility] and report called to [employer] [employer]. They confirmed patient has been accepted onto services by the agency.  -Phone number [phone]     Please do not hesitate to reach out for any additional needs.    Lorie Caro MS, RDN, LDN  Clinical Liaison & Dietitian  [facility]   Phone: [phone]  [email or web address]          As a note, fall risk precautions & preventions discussed with patient.

## 2024-08-23 NOTE — ASSESSMENT & PLAN NOTE
Resolved. Patient with on and off fever after surgery on 8/19 and 8/20 but now resolved. Will monitor for now for recurrence. Most be afebrile x 48 hours prior to placing PICC line. Cause unclear.

## 2024-08-23 NOTE — ASSESSMENT & PLAN NOTE
- Pain and swelling improved to right knee on 8/24 with Celebrex and Colchicine and will continue to treat pseudogout.   - Patient calcium pyrophosphate crystals not on both taps of right knee. Had the steroid injection outpatient on 8/13 with primary care physician to right knee.   - Patient with persistent right knee swelling and suspect pseudogout as cause. Trial of Colchicine x 2 doses on 8/21 and daily to treat. Trying to avoid systemic steroids with glucose elevations and superinfection picture. Uric acid is normal.    - Trial of Celebrex 200 mg po daily to start on 8/22 to see if helps with right knee swelling and pain related to pseudogout and right knee infection.

## 2024-08-23 NOTE — PT/OT/SLP PROGRESS
Physical Therapy   Progress Note    Patient Name:  King Cool Jr.  MRN: [id number]    Admit Date: 8/15/2024  Admitting Diagnosis:  Streptococcal arthritis of right knee  Length of Stay: 8 days  Recent Surgery: Procedure(s) (LRB):  ARTHROSCOPY, KNEE, WITH DEBRIDEMENT (Right) 7 Days Post-Op    Recommendations:     Discharge Recommendations: low intensity therapy  Equipment recommendations: rolling walker, bedside commode, shower chair  Barriers to discharge: Fall risk     Ambulate 3x/day with nsg per progressive mobility protocol - contact guard with RW   Assessment:     King Cool Jr. is a 69 y.o. male [place] [facility] [place]24 with medical diagnosis of Streptococcal arthritis of right knee. Pt presents with weakness, impaired endurance, impaired functional mobility, gait instability, orthopedic precautions, edema, pain, decreased lower extremity function. Pt is progressing towards goals, but has not yet reached prior level of function.     Pt agreeable to therapy session. Up in recliner when therapist entered room. Initially upon standing and ambulating, pt was TTWB on RLE d/t pain. Encouraged pt to trial foot flat WB to prevent heel cord tightness. Pt able to place full foot on ground and assist with pain control by putting more weight through BLE. Pt left in recliner with therex handout provided and exercises demonstrated. Pt verbalizes understanding. Will continue to progress pt as tolerated.    King Cool Jr. would benefit from continued acute PT intervention to improve quality of life, focus on recovery of impairments, provide patient/caregiver education, reduce fall risk, and maximize (I) and safety with functional mobility. Once medically stable, recommending pt discharge to low intensity therapy.  Patient continues to demonstrate the need for low intensity therapy on a scheduled basis exhibited by decreased independence with functional mobility .      Rehab Prognosis: Good    Plan:     During this  "hospitalization, patient to be seen 4 x/week to address the identified rehab impairments via gait training, therapeutic activities, therapeutic exercises, neuromuscular re-education and progress towards stated goals.     Plan of Care Expires:  09/17/24  Plan of Care reviewed with: patient    This plan of care has been discussed with the patient/caregiver, who was included in its development and is in agreement with the identified goals and treatment plan.     Subjective     Communicated with RN prior to session.  Patient found up in chair upon PT entry to room, agreeable to therapy session. Pt alone during session.    Patient/Family Comments/goals: "I need a snack"    Pain/Comfort:  Pain Rating 1: 9/10  Location - Side 1: Right  Location 1: knee  Pain Addressed 1: Reposition, Distraction, Cessation of Activity    Patients cultural, spiritual, Temple conflicts given the current situation: None identified     Objective:     Patient found with: telemetry, peripheral IV    General Precautions: Standard, fall   Orthopedic Precautions:RLE weight bearing as tolerated   Braces:     Oxygen Device: room air    Cognition:  Pt is Alert during session.    Therapist provided skilled verbal and tactile cueing to facilitate the following functional mobility tasks. Listed tasks are focused on recovery of impairments and improving pt's independence and efficiency with bed mobility, transfers and ambulation as able.     Bed Mobility:  Not assessed d/t up in recliner    Transfers:   Sit <> Stand Transfer: Contact Guard Assistance from recliner with RW AD                  Gait:  Distance: ~90 ft  Assistance level: Contact Guard Assistance  Assistive Device: rolling walker  Gait Assessment: initially was TTWB d/t R knee pain/edema but able to progress to full foot WB with antalgic gait pattern; step to gait; decreased gait speed; decreased denys    Balance:  Dynamic Sitting: GOOD: Maintains balance through MODERATE excursions of " active trunk movement  Standing:  Static: GOOD: Takes MODERATE challenges from all directions   Dynamic: FAIR: Needs CONTACT GUARD during gait    Outcome Measure: AM-PAC 6 CLICK MOBILITY  Total Score:17     Patient/Caregiver Education and Additional Therapeutic Activities/Exercises   Educated on the following therex with handout provided at end with primarily focus on RLE:  X10 LAQ  X30 andre ankle pumps  X10 heel slides  X10 with 3 sec hold quad sets  X10 with 3 sec hold glut squeezes  X10 SAQ  X10 SLR     Provided pt/caregiver education regarding:   PT POC and goals for therapy   Safety with mobility and fall risk   Safe management of AD as needed   Energy conservation techniques   Instruction on use of call button and importance of calling nursing staff for assistance with mobility     Patient/caregiver able to verbalize understanding; will follow-up with pt/caregiver during current admit for additional questions/concerns within scope of practice.     White board updated.     Patient left up in chair with all lines intact, call button in reach, and nsg notified that IV is finished.    Goals:     Multidisciplinary Problems       Physical Therapy Goals          Problem: Physical Therapy    Goal Priority Disciplines Outcome Goal Variances Interventions   Physical Therapy Goal     PT, PT/OT Progressing     Description: Goals to met by 8/31/2024    1. Gait  x 50 feet with Contact Guard Assistance using Rolling Walker with at least 50% WB on RLE - met 8/23      1b. Gait  x150 ft with SBA with rolling walker.  2. Ascend/Descend 6 inch curb step with Stand-by Assistance using Rolling Walker.  3. Stand for 5 minutes with Stand-by Assistance using Rolling Walker with even WB ANDRE  4. Lower extremity exercise program x15 reps per Instruction, with assistance as needed in order to facilitate improved strength and improvement in functional independence    Rolling Walker- Patient demonstrates a mobility limitation that  significantly impairs their ability to participate in one or more mobility related activities of daily living. Patient's mobility limitation cannot be sufficiently resolved with the use of a cane, but can be sufficiently resolved with the use of a rolling walker.The use of a rolling walker will considerably improve their ability to participate in MRADLs. Patient will use the walker on a regular basis at home.                         Time Tracking:       PT Received On: 08/23/24  PT Start Time: 1041     PT Stop Time: 1057  PT Total Time (min): 16 min     Billable Minutes: Gait Training 16    08/23/2024

## 2024-08-23 NOTE — ASSESSMENT & PLAN NOTE
Much improved to back area on 8/24 and no new sites on the skin.   Patient noted skin rash after returning from CT scan this am, 8/22 to entire back but sparing rest of his body and blanching red and small papules and non pruritic. Unclear if reaction to contrast dye or not and will monitor.

## 2024-08-23 NOTE — ASSESSMENT & PLAN NOTE
Patient had sudden increase in AST and ALT on 8/22. AST up to 305 from 62 on 8/21 and ALT up to 336 from 80 on 8/21 with normal T. Marco and ALP. RUQ ultrasound ordered to evaluate AST and ALT elevations. Patient denies any abdominal pain or nausea or vomiting. Patient's Tylenol and Lipitor discontinued on 8/22 due to elevation in LFTs as both meds have hepatotoxic effects potentially. Discussed with Infectious Disease team as IV Ceftriaxone can cause cholestasis and elevated LFT's also and for now will monitor but if liver enzymes worsen despite stopping Tylenol and Lipitor and RUQ ultrasound shows no pathology need to consider changing antibiotics.

## 2024-08-23 NOTE — ASSESSMENT & PLAN NOTE
Patient with known CAD s/p stent placement, which is controlled. Monitor for S/Sx of angina/ACS. Continue to monitor on telemetry.   Continue Aspirin to treat daily. Hold Lipitor on 8/22 due to rising AST and ALT.

## 2024-08-23 NOTE — ASSESSMENT & PLAN NOTE
Patient with on and off fever after surgery on 8/19 and 8/20 but now resolved. Will monitor for now for recurrence. Most be afebrile x 48 hours prior to placing PICC line. Cause unclear.

## 2024-08-23 NOTE — PROGRESS NOTES
[facility]  Infectious Disease  Progress Note    Patient Name: King Cool Jr.  MRN: [id number]  Admission Date: 8/15/2024  Length of Stay: 8 days  Attending Physician: Emilee Martinez MD  Primary Care Provider: Gallo Lara MD    Isolation Status: No active isolations  Assessment/Plan:      ID  * Streptococcal arthritis of right knee     69 year old with CAD, A fib on Eliquis, HTN, DM (A1C 11), and osteoarthritis of the knee admitted with concern for septic right knee.  Patient has history of OA in the right knee and receives intraarticular steroid injections every 3 months.  Last injection was on 8/13.  Developed increased pain and swelling within 24 hours after the injection. Underwent knee aspiration in Ortho clinic 8/15 which showed 143K WBCs, 85% segs and positive CPP crystals. Aspirate cultures of 8/15 is positive for strep mitis (rare). No history of gout or pseudogout.        Underwent surgical I&D with Dr. Alfaro on 8/16. Per OP note, thick viscous fluid encountered.  Suspects septic arthritis superimposed over pseudogout.  No OR cultures sent.  Patient had been on Vanc/Ceftriaxone. Had a few days of persistent fevers. Swelling and pain began in leg/calf.  CRP remains elevated. Ortho re-aspirated knee. WBC 32K, 86% segs. Blood and aspiration cx NGTD. CT knee/leg revealed ruptured bakers cyst which could explain all symptoms. Per ortho - no concerns about joint effusion or pneumarthrosis.     Vancomycin discontinued yesterday. Currently on Ceftriaxone monotherapy. Afebrile over last 24 hours. No leucocytosis. HDS. LFTS downtrending.       Plan/recommendations:  Continue Ceftriaxone 2 g q 24 hours  Management of pseudogout per primary and orthopedic surgery. Patient elevating extremity when able   Anticipate four weeks of antibiotics. See OPAT plan below  Discussed plan with ID staff. ID will sign off. Feel free to re-consult ID as needed or call for any infectious  concerns.          Outpatient Antibiotic Therapy Plan:    Please send referral to [facility].    1) Infection: Septic arthritis of knee     2) Discharge Antibiotics:    Intravenous antibiotics:  Ceftriaxone 2 g IV q 24 hours    3) Therapy Duration:  four weeks from surgical I&D    Estimated end date of IV antibiotics: 9/13/24    4) Outpatient Weekly Labs:    Order the following labs to be drawn on Mondays:   CBC  CMP   CRP    5) Fax Lab Results to Infectious Diseases Provider: Maddy Tripp NP    [facility] Fax Number: [phone]    6) Outpatient Infectious Diseases Follow-up    Follow-up appointment will be arranged by the ID clinic and will be found in the patient's appointments tab.    Prior to discharge, please ensure the patient's follow-up has been scheduled.    If there is still no follow-up scheduled prior to discharge, please send an [employer] message to [facility] Clinical Pool or Call Infectious Diseases Dept.                      Thank you for your consult. I will sign off. Please contact us if you have any additional questions.    Nell Sharp PA-C  Infectious Disease  [facility] - Surgery    Subjective:     Principal Problem:Streptococcal arthritis of right knee    HPI:  King Cool is a 69 year old with CAD, A fib on Eliquis, HTN, DM (A1C 11), and osteoarthritis of the knee who presented from Ortho clinic with concern for septic right knee.  Patient has history of OA in the right knee and receives intraarticular steroid injections every 3 months.  Last injection was on 8/13. He subsequently developed increase pain and swelling.  Was evaluated in Ortho clinic 8/15 and had an arthrocentesis which showed 388608 WBCs and positive calcium pyrophosphate crystals. R knee xray showed severe osteoarthritis. He denied associated fever/chills.  He denies any history of gout.  Denies history of Staph infection or other infections. Denies recent antibiotic use.      In the ED,  noted to be tachycardic, afebrile.  WBC 12.  , ESR wnl.   Aspirate cultures - only anaerobic, fungal, and AFB sent.  No aerobic.  Gram stain negative.  He is now s/p I&D with Dr. Alfaro.  No OR cultures available.   He was started on antibiotics prior to surgery. Currently on vancomycin and cefepime. ID consulted for broad spectrum antibiotic regimen.     Interval History:     Patient seen while sitting up in chair. Overall he feels improved. Afebrile over last 24 hours. He still has leg/knee swelling and pain but not worsened. Up and ambulating. He finally had a bowel movement. He denies N/V/abdominal pain. Tylenol and Lipitor discontinued yesterday due to elevation in LFTs and abdominal US reviewed. LFTS downtrending. Discussed CT knee/leg with HM. Ortho surgery is not concerned with air or effusion.       Review of Systems   Constitutional:  Negative for chills, diaphoresis and fatigue.   Respiratory:  Negative for cough and shortness of breath.    Cardiovascular:  Positive for leg swelling. Negative for chest pain.   Gastrointestinal:  Negative for abdominal pain, constipation, diarrhea, nausea and vomiting.   Musculoskeletal:  Positive for arthralgias and joint swelling.   Skin:  Positive for color change.   Psychiatric/Behavioral:  Negative for agitation and confusion. The patient is not nervous/anxious.    All other systems reviewed and are negative.    Objective:     Vital Signs (Most Recent):  Temp: 99.9 °F (37.7 °C) (08/23/24 0353)  Pulse: 87 (08/23/24 0849)  Resp: 18 (08/23/24 0933)  BP: (!) 153/72 (08/23/24 0849)  SpO2: (!) 94 % (08/23/24 0407) Vital Signs (24h Range):  Temp:  [97.6 °F (36.4 °C)-99.9 °F (37.7 °C)] 99.9 °F (37.7 °C)  Pulse:  [70-91] 87  Resp:  [16-20] 18  SpO2:  [94 %-98 %] 94 %  BP: (111-153)/(58-73) 153/72     Weight: 95.7 kg (210 lb 15.7 oz)  Body mass index is 30.27 kg/m².    Estimated Creatinine Clearance: 91.2 mL/min (based on SCr of 0.9 mg/dL).     Physical Exam  Vitals and  nursing note reviewed.   Constitutional:       General: He is not in acute distress.     Appearance: Normal appearance. He is not ill-appearing, toxic-appearing or diaphoretic.   HENT:      Head: Normocephalic and atraumatic.      Mouth/Throat:      Mouth: Mucous membranes are moist.   Eyes:      General: No scleral icterus.     Conjunctiva/sclera: Conjunctivae normal.   Cardiovascular:      Rate and Rhythm: Normal rate and regular rhythm.   Pulmonary:      Effort: Pulmonary effort is normal. No respiratory distress.   Abdominal:      General: There is no distension.      Palpations: Abdomen is soft.      Tenderness: There is no abdominal tenderness. There is no guarding.   Musculoskeletal:         General: Swelling and tenderness present.      Right lower leg: Edema present.      Left lower leg: No edema.      Comments: Right knee swelling. arthroscopic sites c/d/I. Sutures in place.   Right calf tenderness, swelling, erythema. Right shin abrasion.   Skin:     General: Skin is warm and dry.      Findings: Lesion (scabbed over abrasions right ankle) present.   Neurological:      Mental Status: He is alert and oriented to person, place, and time.   Psychiatric:         Mood and Affect: Mood normal.         Behavior: Behavior normal.         Thought Content: Thought content normal.         Judgment: Judgment normal.          Significant Labs: Blood Culture:   Recent Labs   Lab 08/15/24  1700 08/19/24  1220 08/19/24  1221   LABBLOO No growth after 5 days.  No growth after 5 days. No Growth to date  No Growth to date  No Growth to date  No Growth to date No Growth to date  No Growth to date  No Growth to date  No Growth to date     CBC:   Recent Labs   Lab 08/22/24  0533 08/23/24  0450   WBC 9.64 7.13   HGB 12.8* 11.7*   HCT 38.0* 35.1*    344       CMP:   Recent Labs   Lab 08/22/24  0533 08/23/24  0450    136   K 3.8 4.1    99   CO2 30* 28    115*   BUN 18 17   CREATININE 0.8 0.9    CALCIUM 9.5 9.2   PROT 6.6 6.1   ALBUMIN 2.3* 2.1*   BILITOT 0.6 0.6   ALKPHOS 275* 253*   * 168*   * 240*   ANIONGAP 8 9     Microbiology Results (last 7 days)       Procedure Component Value Units Date/Time    Blood culture [[id number]] Collected: 08/19/24 1220    Order Status: Completed Specimen: Blood from Peripheral, Hand, Left Updated: 08/22/24 1412     Blood Culture, Routine No Growth to date      No Growth to date      No Growth to date      No Growth to date    Blood culture [[id number]] Collected: 08/19/24 1221    Order Status: Completed Specimen: Blood from Peripheral, Hand, Right Updated: 08/22/24 1412     Blood Culture, Routine No Growth to date      No Growth to date      No Growth to date      No Growth to date    Aerobic culture [[id number]] Collected: 08/19/24 0853    Order Status: Completed Specimen: Abscess from Knee, Right Updated: 08/22/24 1304     Aerobic Bacterial Culture No growth    Culture, Anaerobic [[id number]] Collected: 08/19/24 0853    Order Status: Completed Specimen: Abscess from Knee, Right Updated: 08/21/24 1024     Anaerobic Culture Culture in progress    AFB Culture & Smear [[id number]] Collected: 08/19/24 0853    Order Status: Completed Specimen: Abscess from Knee, Right Updated: 08/20/24 2127     AFB Culture & Smear Culture in progress     AFB CULTURE STAIN No acid fast bacilli seen.    Blood culture x two cultures. Draw prior to antibiotics. [[id number]] Collected: 08/15/24 1700    Order Status: Completed Specimen: Blood from Peripheral, Antecubital, Left Updated: 08/20/24 1812     Blood Culture, Routine No growth after 5 days.    Narrative:      Aerobic and anaerobic    Blood culture x two cultures. Draw prior to antibiotics. [[id number]] Collected: 08/15/24 1700    Order Status: Completed Specimen: Blood from Peripheral, Antecubital, Right Updated: 08/20/24 1812     Blood Culture, Routine No growth after 5 days.    Narrative:      Aerobic and anaerobic     Gram stain [[id number]] Collected: 08/19/24 0853    Order Status: Completed Specimen: Abscess from Knee, Right Updated: 08/19/24 1132     Gram Stain Result Few WBC's      No organisms seen    Fungus culture [[id number]] Collected: 08/19/24 0853    Order Status: Sent Specimen: Abscess from Knee, Right Updated: 08/19/24 0940    Aerobic culture [[id number]]     Order Status: Completed Specimen: Abscess from Knee, Right           Wound Culture:   Recent Labs   Lab 08/17/24  1608 08/19/24  0853   LABAERO STREPTOCOCCUS MITIS/ORALIS   Rare  * No growth       Significant Imaging: None

## 2024-08-23 NOTE — ASSESSMENT & PLAN NOTE
- Patient as outpatient receiving chronic right knee injections for OA and had recent injection the 8/13 then swelling  to right knee after that was not typical for him with presumed nidus with entry portal and seen in Ortho in clinic for this with concern for infection.   - Afebrile on admit with WBC 12.09 and lactic 1.5 with  on admit.  - Blood cultures drawn on admit and so far no growth.   - Arthrocentesis of right knee done and had 143,000 WBCs and positive crystals with CPPD and started on empiric IV Cefepime and Vancomycin.   - Patient taken to OR on 8/16/2024 with Dr. Isaac Alfaro and underwent arthroscopic irrigation with extensive debridement right knee and Ortho reported significant amounts of thick viscous fluid consistent with septic arthritis and/or CPPD.   -Patient weight bear as tolerated to right lower extremity post-op and PT/OT consulted and working with patient and recommending low intensity and plan  PT/OT and home infusion on discharge when medically ready.   - ID consulted and final culture from right knee grew Streptococcus mitis/oralis and antibiotics switched by ID to IV Ceftriaxone alone to treat. ID gave final recs on 8/23:    Outpatient Antibiotic Therapy Plan:     Please send referral to [facility].     1) Infection: Septic arthritis of knee      2) Discharge Antibiotics:     Intravenous antibiotics:  Ceftriaxone 2 g IV q 24 hours     3) Therapy Duration:  four weeks from surgical I&D     Estimated end date of IV antibiotics: 9/13/24     4) Outpatient Weekly Labs:     Order the following labs to be drawn on Mondays:   CBC  CMP   CRP     5) Fax Lab Results to Infectious Diseases Provider: Maddy Tripp NP     [facility] Fax Number: [phone]  Midline placed by line team for home IV infusion on 8/23 to right arm. Plan [facility] and [facility] IV infusion on 8/24 as long as CRP improving.      - Ortho following post-op and  managing right knee surgical site.  - CRP slightly worse at 202 on 8/18 and up to 251 on 8/19. Right knee swelling unchanged. Ortho did bedside arthrocentesis on 8/19 and showed improvement in WBC to 32,000 with continued positive crystals consistent with CPPD and studies more consistent with pseudogout. Some swelling more in right calf on 8/20 and fevers persisting so US of lower extremities done on 8/20 and negative for DVT.    - Patient with still prominent swelling above and below right knee. CT scan of right leg done and showed large right knee joint effusion and ruptured Baker's cyst that would account for right calf swelling. Ortho aware of CT findings and recommend no change in management.   - Pseudogout new diagnosis with crystals on both taps and received steroid injection on 8/13 and could account for continued right knee swelling and pain and worsening CRP. Plan to treat pseudogout with anti-inflammatories to see if helps with swelling and CRP and started on Celebrex 200 mg po daily on 8/22. Patient started on Colchicine to treat pseudogout on 8/21. Repeat CRP on 8/24.  - Continue multimodals for pain management.  - Elevate right lower extremity and ice to help with swelling to right knee.  - Fall precautions.  - Patient back on home Apixiban post-op so no other DVT prophylaxis needed post-op.

## 2024-08-23 NOTE — ASSESSMENT & PLAN NOTE
Patient's FSGs are controlled on current medication regimen. Diabetes not well controlled as outpatient. Patient on oral Jardiance and Amaryl as outpatient to treat.   Last A1c reviewed-   Lab Results   Component Value Date    HGBA1C 11.5 (H) 08/13/2024     Most recent fingerstick glucose reviewed-   Recent Labs   Lab 08/22/24  2142 08/23/24  0848 08/23/24  1537   POCTGLUCOSE 82 219* 220*       Current correctional scale  Low  Maintain anti-hyperglycemic dose as follows-   Antihyperglycemics (From admission, onward)      Start     Stop Route Frequency Ordered    08/21/24 1645  insulin aspart U-100 pen 18 Units         -- SubQ 3 times daily with meals 08/21/24 1315    08/20/24 2100  insulin glargine U-100 (Lantus) pen 19 Units         -- SubQ 2 times daily 08/20/24 1349    08/15/24 1917  insulin aspart U-100 pen 0-5 Units         -- SubQ Before meals & nightly PRN 08/15/24 1817          Hold Oral hypoglycemics while patient is in the hospital.   Monitor blood sugars with meals and at bedtime in hospital with target blood sugars 140-180.   Diabetic diet.

## 2024-08-23 NOTE — PROGRESS NOTES
[facility] Medicine  Progress Note    Patient Name: King Cool Jr.  MRN: [id number]  Patient Class: IP- Inpatient   Admission Date: 8/15/2024  Length of Stay: 7 days  Attending Physician: Emilee Martinez MD  Primary Care Provider: Gallo Lara MD        Subjective:     Principal Problem:Streptococcal arthritis of right knee        HPI:  King Cool Jr. is a 69 y.o. male with a PMHx of CAD, a fib on eliquis, HTN, HLD, DM2, and osteoarthritis who presents to the ED from orthopedic clinic for evaluation of septic right knee. The patient reports he has a history of OA in his right knee and receives intra articular steroid injections every 3 months. His last injection was 8/13. He reports beginning yesterday he noticed increased swelling and pain to his right knee. He notes it was so painful he was unable to get out of bed this morning. He denies any trauma or falls. He states at baseline he ambulates unassisted. Denies numbness/tingling. He was seen in ortho clinic today and had an arthrocentesis which showed [id number] WBCs and positive crystals for calcium pyrophosphate crystals. R knee xray showed severe osteoarthritis. He denies fever/chills, N/V/D, abdominal pain, CP, SOB, cough, or dysuria.    In the ED, pt tachycardic and mildly hypertensive otherwise vitals stable, afebrile. WBC 12.09k. Glucose 330. Tbili 1.1. LA 1.5. Blood cxs in process. The patient received morphine, zofran, 1L NS, cefepime, and vancomycin. Ortho was consulted and recommend continuing NPO with plan for OR tomorrow for I&D right knee.     Overview/Hospital Course:  Patient admitted with septic arhtritis of right knee and pseudogout of right knee. Orthopedics consulted and patient taken to OR on 8/16/2024 by Dr. Isaac Alfaro and underwent arthroscopic irrigation with extensive debridement right knee. OP report noted significant amounts of thick viscous fluid consistent with septic arthritis and/or CPPD. Patient placed on  broad spectrum antibiotics and Infectious Disease consulted for antibiotic management. Cultures from right knee grew STREPTOCOCCUS MITIS/ORALIS and antibiotcs switched to IV Ceftriaxone 2 grams every 24 hours to treat infection. Despite approparite antibiotics patient's CRP continues to rise. ID concerned. Patient placed on Colchicine to treat pseudogout to see if cause of rising CRP. CT of entire right leg ordered to evaluate cause of increasing CRP. US of lower extremities done and negative for DVT. CT scan of right leg on 8/22 showed large right effusion and air in right knee joint and ruptured Baker's cyst but no abscess noted to right thigh or calf area. Discussed with Dr. Diehl from Ortho on 8/22 and air and effusion related to post-op changes and not concerned. Nothing needs to be done for ruptured right Baker's cyst. Mild right achilles tendonitis noted on CT scan of right leg and no intervention needed. Patient still having significant pain and swelling to right knee so patient advised to elevate and ice and stated on Celebrex 200 mg po daily to help with pain and inflammation an swelling as also has known pseudogout in right knee on top of known septic arthritis. Continue to monitor CRP. Continue IV Ceftriaxone for now to treat septic right knee arthritis as per ID. Patient had sudden increase in AST and ALT on 8/22. AST up to 305 from 62 and ALT up to 336 from 80 with normal T. Marco and ALP. CRP remains elevated at 287 up from 251 on 8/19. RUQ ultrasound ordered to evaluate AST and ALT elevations. Patient's Tylenol and Lipitor discontinued due to elevation in LFTs. Discussed with Infectious Disease team as IV Ceftriaxone can cause cholestasis and elevated LFT's also and for now will monitor but if liver enzymes worsen despite stopping Tylenol and Lipitor and RUQ ultrasound shows no pathology need to consider changing antibiotics.     Interval History: CT scan of right leg done this am and showed large  right effusion and air in right knee joint and ruptured Baker's cyst but no abscess noted to right thigh or calf area. Discussed with Dr. Diehl from Ortho on 8/22 and air and effusion related to post-op changes and not concerned. Nothing needs to be done for ruptured right Baker's cyst. Mild right achilles tendonitis noted on CT scan of right leg and no intervention needed. Patient still having significant pain and swelling to right knee so patient advised to elevate and ice and stated on Celebrex 200 mg po daily to help with pain and inflammation an swelling as also has known pseudogout in right knee on top of known septic arthritis. Continue to monitor CRP. Continue IV Ceftriaxone for now to treat septic right knee arthritis as per ID. Patient had sudden increase in AST and ALT on 8/22. AST up to 305 from 62 and ALT up to 336 from 80 with normal T. Marco and ALP. CRP remains elevated at 287 up from 251 on 8/19. RUQ ultrasound ordered to evaluate AST and ALT elevations. Patient denies any abdominal pain or nausea or vomiting. ALP and T.marco normal. Patient's Tylenol and Lipitor discontinued due to elevation in LFTs. Discussed with Infectious Disease team as IV Ceftriaxone can cause cholestasis and elevated LFT's also and for now will monitor but if liver enzymes worsen despite stopping Tylenol and Lipitor and RUQ ultrasound shows no pathology need to consider changing antibiotics. Above was explained to patient and daughter and questions answered. Patient noted skin rash after returning from CT scan this am to entire back but sparing rest of his body and blanching red and small papules and non pruritic. Unclear if reaction to contrast dye or not and will monitor.     Review of Systems   Constitutional:  Negative for fever.   Respiratory:  Negative for cough and shortness of breath.    Cardiovascular:  Positive for leg swelling (Right knee and right lower leg). Negative for chest pain and palpitations.    Gastrointestinal:  Negative for abdominal pain, diarrhea, nausea and vomiting.   Musculoskeletal:  Positive for arthralgias (Right knee).   Psychiatric/Behavioral:  Negative for agitation and confusion.      Objective:     Vital Signs (Most Recent):  Temp: 98.3 °F (36.8 °C) (08/22/24 1536)  Pulse: 91 (08/22/24 1536)  Resp: 18 (08/22/24 1615)  BP: 150/73 (08/22/24 1536)  SpO2: 96 % (08/22/24 1536) on room air  Vital Signs (24h Range):  Temp:  [97.6 °F (36.4 °C)-102.6 °F (39.2 °C)] 98.3 °F (36.8 °C)  Pulse:  [75-98] 91  Resp:  [16-20] 18  SpO2:  [93 %-98 %] 96 %  BP: (123-155)/(61-77) 150/73     Weight: 95.7 kg (210 lb 15.7 oz)  Body mass index is 30.27 kg/m².    Intake/Output Summary (Last 24 hours) at 8/22/2024 1839  Last data filed at 8/22/2024 1716  Gross per 24 hour   Intake 1030 ml   Output 1150 ml   Net -120 ml         Physical Exam  Vitals and nursing note reviewed.   Constitutional:       General: He is awake. He is not in acute distress.     Appearance: Normal appearance. He is well-developed and normal weight. He is not ill-appearing.      Comments: Patient sitting up on edge of bed rubbing his right knee and in some pain on exam. Patient in no distress.    Eyes:      Conjunctiva/sclera: Conjunctivae normal.   Cardiovascular:      Rate and Rhythm: Normal rate and regular rhythm.      Heart sounds: Normal heart sounds. No murmur heard.  Pulmonary:      Effort: Pulmonary effort is normal. No respiratory distress.      Breath sounds: Normal breath sounds. No wheezing.   Abdominal:      General: Abdomen is flat. Bowel sounds are normal. There is no distension.      Palpations: Abdomen is soft.      Tenderness: There is no abdominal tenderness.   Musculoskeletal:         General: Swelling (Rigth knee swelling and effusion noted) present.      Right lower leg: Edema (1+) present.      Left lower leg: No edema.   Skin:     General: Skin is warm.      Findings: No erythema or rash (Patient noted skin rash after  returning from CT scan this am to entire back but sparing rest of his body and blanching red and small papules and non pruritic.).   Neurological:      Mental Status: He is alert and oriented to person, place, and time.   Psychiatric:         Mood and Affect: Mood normal.         Behavior: Behavior normal. Behavior is cooperative.         Thought Content: Thought content normal.         Judgment: Judgment normal.             Significant Labs: CBC:   Recent Labs   Lab 08/21/24  0527 08/22/24  0533   WBC 8.55 9.64   HGB 12.9* 12.8*   HCT 40.4 38.0*    321     CMP:   Recent Labs   Lab 08/21/24  0527 08/22/24  0533    138   K 4.2 3.8   CL 99 100   CO2 23 30*   * 109   BUN 14 18   CREATININE 0.8 0.8   CALCIUM 9.3 9.5   PROT 6.5 6.6   ALBUMIN 2.4* 2.3*   BILITOT 0.7 0.6   ALKPHOS 174* 275*   AST 62* 305*   ALT 80* 336*   ANIONGAP 15 8     Sed Rate   Date Value Ref Range Status   08/15/2024 12 0 - 23 mm/Hr Final   08/15/2024 12 0 - 23 mm/Hr Final     CRP   Date Value Ref Range Status   08/22/2024 287.4 (H) 0.0 - 8.2 mg/L Final   08/19/2024 251.9 (H) 0.0 - 8.2 mg/L Final   08/18/2024 202.2 (H) 0.0 - 8.2 mg/L Final       Significant Imaging: I have reviewed all pertinent imaging results/findings within the past 24 hours.    Assessment/Plan:      * Streptococcal arthritis of right knee  - Patient as outpatient receiving chronic right knee injections for OA and had recent injection the 8/13 then swelling  to right knee after that was not typical for him with presumed nidus with entry portal and seen in Ortho in clinic for this with concern for infection.   - Afebrile on admit with WBC 12.09 and lactic 1.5 with  on admit.  - Blood cultures drawn on admit and so far no growth.   - Arthrocentesis of right knee done and had 143,000 WBCs and positive crystals with CPPD and started on empiric IV Cefepime and Vancomycin.   - Patient taken to OR on 8/16/2024 with Dr. Isaac Alfaro and underwent arthroscopic  irrigation with extensive debridement right knee and Ortho reported significant amounts of thick viscous fluid consistent with septic arthritis and/or CPPD.   -Patient weight bear as tolerated to right lower extremity post-op and PT/OT consulted and working with patient and recommending low intensity and plan HH PT/OT and home infusion on discharge when medically ready.   - ID consulted and final culture from right knee grew Streptococcus mitis/oralis and antibiotics switched by ID to IV Ceftriaxone alone to treat.   - Ortho following post-op and managing right knee surgical site.  - CRP slightly worse at 202 on 8/18 and up to 251 on 8/19. Right knee swelling unchanged. Ortho did bedside arthrocentesis on 8/19 and showed improvement in WBC to 32,000 with continued positive crystals consistent with CPPD and studies more consistent with pseudogout. Some swelling more in right calf on 8/20 and fevers persisting so US of lower extremities done on 8/20 and negative for DVT.    - Patient with still prominent swelling above and below right knee. CT scan of right leg done and showed large right knee joint effusion and ruptured Baker's cyst that would account for right calf swelling. Ortho aware of CT findings and recommend no change in management.   - Pseudogout new diagnosis with crystals on both taps and received steroid injection on 8/13 and could account for continued right knee swelling and pain and worsening CRP. Plan to treat pseudogout with anti-inflammatories to see if helps.   - Continue multimodals for pain management.  - Elevate right lower extremity and ice to help with swelling to right knee.  - Fall precautions.  - Patient back on home Apixiban post-op so no other DVT prophylaxis needed post-op.     Transaminitis  Patient had sudden increase in AST and ALT on 8/22. AST up to 305 from 62 on 8/21 and ALT up to 336 from 80 on 8/21 with normal T. Marco and ALP. RUQ ultrasound ordered to evaluate AST and ALT  elevations. Patient denies any abdominal pain or nausea or vomiting. Patient's Tylenol and Lipitor discontinued on 8/22 due to elevation in LFTs as both meds have hepatotoxic effects potentially. Discussed with Infectious Disease team as IV Ceftriaxone can cause cholestasis and elevated LFT's also and for now will monitor but if liver enzymes worsen despite stopping Tylenol and Lipitor and RUQ ultrasound shows no pathology need to consider changing antibiotics.    Pseudogout of right knee  - Patient calcium pyrophosphate crystals not on both taps of right knee. Had the steroid injection outpatient on 8/13 with primary care physician to right knee.   - Patient with persistent right knee swelling and suspect pseudogout as cause. Trial of Colchicine x 2 doses on 8/21 and daily to treat. Trying to avoid systemic steroids with glucose elevations and superinfection picture. Uric acid is normal.    - Trial of Celebrex 200 mg po daily to see if helps with right knee swelling and pain related to pseudogout and right knee infection.       Slow transit constipation  Present on admit and continued scheduled Senakot and Miralax. Trial of magnesium citrate to see if helps.       Rash  Patient noted skin rash after returning from CT scan this am, 8/22 to entire back but sparing rest of his body and blanching red and small papules and non pruritic. Unclear if reaction to contrast dye or not and will monitor.       Post-procedural fever  Patient with on and off fever after surgery on 8/19 and 8/20 but now resolved. Will monitor for now for recurrence. Most be afebrile x 48 hours prior to placing PICC line. Cause unclear.     Coronary artery disease involving native coronary artery of native heart without angina pectoris  Patient with known CAD s/p stent placement, which is controlled. Monitor for S/Sx of angina/ACS. Continue to monitor on telemetry.   Continue Aspirin to treat daily. Hold Lipitor on 8/22 due to rising AST and ALT.      PAF (paroxysmal atrial fibrillation)  Chronic anticoagulation   Patient with Paroxysmal (<7 days) atrial fibrillation which is controlled currently with Toprol XL 25 mg po daily to treat. Patient is currently in sinus rhythm.[id number] Score: 3. Anticoagulation done with home Apixiban and continue Apixiban 5 mg po BID to treat.     Class 1 obesity due to excess calories with serious comorbidity and body mass index (BMI) of 33.0 to 33.9 in adult  Body mass index is 30.27 kg/m². Obesity complicates all aspects of disease management from diagnostic modalities to treatment.     Type 2 diabetes mellitus with microalbuminuria, without long-term current use of insulin  Patient's FSGs are controlled on current medication regimen. Diabetes not well controlled as outpatient. Patient on oral Jardiance and Amaryl and weekly Ozempic weekly injection as outpatient to treat.   Last A1c reviewed-   Lab Results   Component Value Date    HGBA1C 11.5 (H) 08/13/2024     Most recent fingerstick glucose reviewed-   Recent Labs   Lab 08/21/24  2101 08/22/24  0943 08/22/24  1146 08/22/24  1530   POCTGLUCOSE 155* 248* 262* 128*       Current correctional scale  Low  Maintain anti-hyperglycemic dose as follows-   Antihyperglycemics (From admission, onward)      Start     Stop Route Frequency Ordered    08/21/24 1645  insulin aspart U-100 pen 18 Units         -- SubQ 3 times daily with meals 08/21/24 1315    08/20/24 2100  insulin glargine U-100 (Lantus) pen 19 Units         -- SubQ 2 times daily 08/20/24 1349    08/15/24 1917  insulin aspart U-100 pen 0-5 Units         -- SubQ Before meals & nightly PRN 08/15/24 1817          Hold Oral hypoglycemics while patient is in the hospital.   Monitor blood sugars with meals and at bedtime in hospital with target blood sugars 140-180.   Diabetic diet.       Essential (primary) hypertension  Chronic, controlled. Latest blood pressure and vitals reviewed-     Temp:  [97.6 °F (36.4 °C)-102.6 °F  (39.2 °C)]   Pulse:  [75-98]   Resp:  [16-20]   BP: (123-155)/(61-77)   SpO2:  [93 %-98 %] .   Home meds for hypertension were reviewed and noted below.   Hypertension Medications               losartan (COZAAR) 25 MG tablet Take 1 tablet (25 mg total) by mouth once daily.    metoprolol succinate (TOPROL-XL) 25 MG 24 hr tablet Take 1 tablet (25 mg total) by mouth once daily.            While in the hospital, will manage blood pressure as follows; Continue home antihypertensive regimen to treat in hospital.     Will utilize p.r.n. blood pressure medication only if patient's blood pressure greater than 180/110 and he develops symptoms such as worsening chest pain or shortness of breath.      VTE Risk Mitigation (From admission, onward)           Ordered     apixaban tablet 5 mg  2 times daily         08/16/24 1537     IP VTE LOW RISK PATIENT  Once         08/15/24 1817     Place sequential compression device  Until discontinued         08/15/24 1817                    Discharge Planning   JUAN M: 8/24/2024     Code Status: Full Code   Is the patient medically ready for discharge?:     Reason for patient still in hospital (select all that apply): Patient new problem and Patient trending condition  Discharge Plan A: Home with family, Home Health, Other with home IV infusion pending improvement in right knee swelling, LFT's and right knee pain and CRP.        Emilee Martinez MD  Department of Hospital Medicine   [facility] - Surgery

## 2024-08-23 NOTE — ASSESSMENT & PLAN NOTE
Patient's FSGs are controlled on current medication regimen. Diabetes not well controlled as outpatient. Patient on oral Jardiance and Amaryl and weekly Ozempic weekly injection as outpatient to treat.   Last A1c reviewed-   Lab Results   Component Value Date    HGBA1C 11.5 (H) 08/13/2024     Most recent fingerstick glucose reviewed-   Recent Labs   Lab 08/21/24  2101 08/22/24  0943 08/22/24  1146 08/22/24  1530   POCTGLUCOSE 155* 248* 262* 128*       Current correctional scale  Low  Maintain anti-hyperglycemic dose as follows-   Antihyperglycemics (From admission, onward)      Start     Stop Route Frequency Ordered    08/21/24 1645  insulin aspart U-100 pen 18 Units         -- SubQ 3 times daily with meals 08/21/24 1315    08/20/24 2100  insulin glargine U-100 (Lantus) pen 19 Units         -- SubQ 2 times daily 08/20/24 1349    08/15/24 1917  insulin aspart U-100 pen 0-5 Units         -- SubQ Before meals & nightly PRN 08/15/24 1817          Hold Oral hypoglycemics while patient is in the hospital.   Monitor blood sugars with meals and at bedtime in hospital with target blood sugars 140-180.   Diabetic diet.

## 2024-08-23 NOTE — ASSESSMENT & PLAN NOTE
Chronic anticoagulation   Patient with Paroxysmal (<7 days) atrial fibrillation which is controlled currently with Toprol XL 25 mg po daily to treat. Patient is currently in sinus rhythm.[id number] Score: 3. Anticoagulation done with home Apixiban and continue Apixiban 5 mg po BID to treat.

## 2024-08-23 NOTE — ASSESSMENT & PLAN NOTE
Patient with known CAD s/p stent placement, which is controlled. Monitor for S/Sx of angina/ACS. Continue to monitor on telemetry.   Continue Aspirin to treat daily. Holding Lipitor since  8/22 due elevated AST and ALT.

## 2024-08-23 NOTE — ASSESSMENT & PLAN NOTE
Present on admit and continued scheduled Senakot and Miralax. Trial of magnesium citrate to see if helps.

## 2024-08-23 NOTE — ASSESSMENT & PLAN NOTE
Body mass index is 30.27 kg/m². Obesity complicates all aspects of disease management from diagnostic modalities to treatment.

## 2024-08-23 NOTE — ASSESSMENT & PLAN NOTE
Chronic, controlled. Latest blood pressure and vitals reviewed-     Temp:  [98.1 °F (36.7 °C)-99.9 °F (37.7 °C)]   Pulse:  [70-87]   Resp:  [16-20]   BP: (111-165)/(58-81)   SpO2:  [93 %-98 %] .   Home meds for hypertension were reviewed and noted below.   Hypertension Medications               losartan (COZAAR) 25 MG tablet Take 1 tablet (25 mg total) by mouth once daily.    metoprolol succinate (TOPROL-XL) 25 MG 24 hr tablet Take 1 tablet (25 mg total) by mouth once daily.            While in the hospital, will manage blood pressure as follows; Continue home antihypertensive regimen to treat in hospital.     Will utilize p.r.n. blood pressure medication only if patient's blood pressure greater than 180/110 and he develops symptoms such as worsening chest pain or shortness of breath.

## 2024-08-23 NOTE — PLAN OF CARE
[facility] HOME HEALTH ORDERS  FACE TO FACE ENCOUNTER    Patient Name: King Cool Jr.   YOB: 1955     PCP: Gallo Lara MD   PCP Address: [address] / [place] [address]   PCP Phone Number: [phone]   PCP Fax: [phone]     Encounter Date: 08/23/2024     Discharging Team: [facility] MED     Admit to [facility]    Diagnoses:  Active Hospital Problems    Diagnosis  POA    *Streptococcal arthritis of right knee [M00.261]  Yes     Priority: 1 - High    Transaminitis [R74.01]  No     Priority: 1 - High    Pseudogout of right knee [M11.261]  Yes     Priority: 2     Rash [R21]  No     Priority: 4     Post-procedural fever [R50.82]  Yes     Priority: 5     Coronary artery disease involving native coronary artery of native heart without angina pectoris [I25.10]  Yes     Priority: 6     PAF (paroxysmal atrial fibrillation) [I48.0]  Yes     Priority: 7      Chronic    Class 1 obesity due to excess calories with serious comorbidity and body mass index (BMI) of 33.0 to 33.9 in adult [E66.09, Z68.33]  Not Applicable     Priority: 8     Type 2 diabetes mellitus with microalbuminuria, without long-term current use of insulin [E11.29, R80.9]  Yes     Priority: 9      Chronic    Essential (primary) hypertension [I10]  Yes     Priority: 10      Chronic     2/2/2021 started on Lisinopril 20 mg daily      Chronic anticoagulation [Z79.01]  Not Applicable     Chronic      Resolved Hospital Problems    Diagnosis Date Resolved POA    Pseudogout of right knee [M11.261] 08/22/2024 Yes     Priority: 2     Slow transit constipation [K59.01] 08/23/2024 Yes     Priority: 3         Future Appointments   Date Time Provider Department Center   8/30/2024 12:00 PM Nell Lopes PA-C [facility] [address] [place]   9/27/2024  9:30 AM Nell Lopes PA-C [facility] [place]   11/13/2024  1:20 PM Gallo Lara MD [facility] [place]   1/3/2025 10:00 AM Taran Martell MD [facility]         My clinical findings that support the need for the home health skilled services and home bound status are the following:  Weakness/numbness causing balance and gait disturbance due to Infection and Surgery making it taxing to leave home.  Requiring assistive device to leave home due to unsteady gait caused by  Infection and Surgery.    Allergies:   Review of patient's allergies indicates:   Allergen Reactions    Tamiflu [oseltamivir]      Increases BP    Metformin Hives     Diarrhea, nausea    Penicillins Rash        Diet: diabetic diet: 2000 calorie    Activities: activity as tolerated; Weight bear as tolerated to left lower extremity     Nursing:   SN to complete comprehensive assessment including routine vital signs. Instruct on disease process and s/s of complications to report to MD. Review/verify medication list sent home with the patient at time of discharge  and instruct patient/caregiver as needed. Frequency may be adjusted depending on start of care date.    Notify MD if SBP > 160 or < 90; DBP > 90 or < 50; HR > 120 or < 50; Temp > 101    Ok to schedule additional visits based on staff availability and patient request on consecutive days within the home health episode.     When multiple disciplines ordered:     Start of Care occurs on Sunday - Wednesday schedule remaining discipline evaluations as ordered on separate consecutive days following the start of care.     Thursday SOC -schedule subsequent evaluations Friday and Monday the following week.      Friday - Saturday SOC - schedule subsequent discipline evaluations on consecutive days starting Monday of the following week.     For all post-discharge communication and subsequent orders please contact patient's primary care physician. If unable to reach primary care physician or do not receive response within 30 minutes, please contact [facility] for clinical staff order clarification.    CONSULTS:    Physical Therapy to evaluate and  treat. Evaluate for home safety and equipment needs; Establish/upgrade home exercise program. Perform / instruct on therapeutic exercises, gait training, transfer training, and Range of Motion.  Occupational Therapy to evaluate and treat. Evaluate home environment for safety and equipment needs. Perform/Instruct on transfers, ADL training, ROM, and therapeutic exercises.    MISCELLANEOUS CARE:  Home Infusion Therapy:   SN to perform Infusion Therapy/Central Line Care.  Review Central Line Care & Central Line Flush with patient.    1) Infection: Septic arthritis of knee      2) Discharge Antibiotics:     Intravenous antibiotics:  Ceftriaxone 2 grams IV every 24 hours     3) Therapy Duration:  four weeks from surgical I&D     Estimated end date of IV antibiotics: 9/13/2024     4) Outpatient Weekly Labs:     Order the following labs to be drawn on Mondays:   CBC  CMP   CRP     5) Fax Lab Results to Infectious Diseases Provider: Maddy Tripp NP     [facility] Fax Number: [phone]    Routine midline care to right arm.    WOUND CARE ORDERS  Leave incision site open to air to right knee.     Medications: Review discharge medications with patient and family and provide education.         Medication List        START taking these medications      celecoxib 200 MG capsule  Commonly known as: CeleBREX  Take 1 capsule (200 mg total) by mouth once daily.     colchicine 0.6 mg tablet  Commonly known as: COLCRYS  Take 1 tablet (0.6 mg total) by mouth once daily. for 10 days     D5W PgBk 100 mL with cefTRIAXone 2 gram SolR 2 g  Inject 2 g into the vein once daily.     methocarbamoL 750 MG Tab  Commonly known as: ROBAXIN  Take 1 tablet (750 mg total) by mouth 3 (three) times daily. for 14 days     oxyCODONE 5 MG immediate release tablet  Commonly known as: ROXICODONE  Take 1 tablet (5 mg total) by mouth every 4 (four) hours as needed for Pain.            CONTINUE taking these medications      apixaban 5 mg Tab  Commonly  known as: ELIQUIS  Take 1 tablet (5 mg total) by mouth 2 (two) times daily.     aspirin 81 MG EC tablet  Commonly known as: ECOTRIN  Take 81 mg by mouth once daily.     atorvastatin 80 MG tablet  Commonly known as: LIPITOR  Take 1 tablet (80 mg total) by mouth every evening.     blood sugar diagnostic Strp  1 strip by Misc.(Non-Drug; Combo Route) route 3 (three) times daily.     * blood-glucose meter kit  Use as instructed     * blood-glucose meter kit  1 each by Other route 3 (three) times daily. Use as instructed     diclofenac sodium 1 % Gel  Commonly known as: VOLTAREN  Apply 2 g topically once daily.     doxycycline 100 MG tablet  Commonly known as: VIBRA-TABS  Take 1 tablet (100 mg total) by mouth 2 (two) times daily.     DROPSAFE ALCOHOL PREP PADS Padm  Generic drug: alcohol swabs  USE AS DIRECTED AS NEEDED     empagliflozin 25 mg tablet  Commonly known as: Jardiance  Take 1 tablet (25 mg total) by mouth once daily.     fish oil-omega-3 fatty acids 300-1,000 mg capsule  Take 2 g by mouth once daily.     gabapentin 300 MG capsule  Commonly known as: NEURONTIN  Take 1 capsule (300 mg total) by mouth 3 (three) times daily.     glimepiride 4 MG tablet  Commonly known as: AMARYL  Take 1 tablet (4 mg total) by mouth 2 (two) times a day.     * HYDROcodone-acetaminophen  mg per tablet  Commonly known as: NORCO  Take 1 tablet by mouth 3 (three) times daily as needed (pain).     * HYDROcodone-acetaminophen  mg per tablet  Commonly known as: NORCO  Take 1 tablet by mouth 3 (three) times daily as needed (pain).  Start taking on: September 13, 2024     * HYDROcodone-acetaminophen  mg per tablet  Commonly known as: NORCO  Take 1 tablet by mouth 3 (three) times daily as needed (pain).  Start taking on: October 13, 2024     hydrocortisone 2.5 % cream  Apply topically 2 (two) times daily.     lancets Misc  1 application  by Misc.(Non-Drug; Combo Route) route 3 (three) times daily.     loratadine 10 mg  tablet  Commonly known as: CLARITIN  Take 1 tablet (10 mg total) by mouth once daily.     losartan 25 MG tablet  Commonly known as: COZAAR  Take 1 tablet (25 mg total) by mouth once daily.     metoprolol succinate 25 MG 24 hr tablet  Commonly known as: TOPROL-XL  Take 1 tablet (25 mg total) by mouth once daily.     multivitamin with minerals tablet  Take 1 tablet by mouth once daily.     neomycin-polymyxin-hydrocortisone 3.5-10,000-1 mg/mL-unit/mL-% otic suspension  Commonly known as: CORTISPORIN  PLACE 3 DROPS INTO THE LEFT EAR 4 TIMES DAILY.     Saline NasaL 0.65 % nasal spray  Generic drug: sodium chloride  2 sprays by Nasal route 2 (two) times a day.     triamcinolone acetonide 0.1% 0.1 % ointment  Commonly known as: KENALOG  Apply topically 2 (two) times daily as needed.     VITAMIN C ORAL  Take by mouth once daily.           * This list has 5 medication(s) that are the same as other medications prescribed for you. Read the directions carefully, and ask your doctor or other care provider to review them with you.                STOP taking these medications      meloxicam 7.5 MG tablet  Commonly known as: MOBIC     OZEMPIC 0.25 mg or 0.5 mg (2 mg/3 mL) pen injector  Generic drug: semaglutide                I certify that this patient is confined to his home and needs intermittent skilled nursing care, physical therapy, and occupational therapy.    _________________________________  Emilee Martinez MD  08/23/2024

## 2024-08-23 NOTE — ASSESSMENT & PLAN NOTE
- RUQ ultrasound done this am, 8/23 for elevated LFT's and showed enlarged liver and biliary sludge and distended gallbladder but no stones and CBD not dilated and no acute cholecystitis but no obvious cause for elevated AST and ALT. AST and ALT improved after stopping Tylenol and Lipitor yesterday, 8/22. AST down to 168 today from 305 yesterday and ALT down to 240 today from 336 yesterday.   - Patient had sudden increase in AST and ALT on 8/22. AST up to 305 from 62 on 8/21 and ALT up to 336 from 80 on 8/21 with normal T. Marco and ALP. RUQ ultrasound ordered to evaluate AST and ALT elevations. Patient denies any abdominal pain or nausea or vomiting. Patient's Tylenol and Lipitor discontinued on 8/22 due to elevation in LFTs as both meds have hepatotoxic effects potentially. Discussed with Infectious Disease team as IV Ceftriaxone can cause cholestasis and elevated LFT's also and for now will monitor but if liver enzymes worsen despite stopping Tylenol and Lipitor and RUQ ultrasound shows no pathology need to consider changing antibiotics.

## 2024-08-23 NOTE — PLAN OF CARE
Problem: Physical Therapy  Goal: Physical Therapy Goal  Description: Goals to met by 8/31/2024    1. Gait  x 50 feet with Contact Guard Assistance using Rolling Walker with at least 50% WB on RLE - met 8/23      1b. Gait  x150 ft with SBA with rolling walker.  2. Ascend/Descend 6 inch curb step with Stand-by Assistance using Rolling Walker.  3. Stand for 5 minutes with Stand-by Assistance using Rolling Walker with even WB ANDRE  4. Lower extremity exercise program x15 reps per Instruction, with assistance as needed in order to facilitate improved strength and improvement in functional independence    Rolling Walker- Patient demonstrates a mobility limitation that significantly impairs their ability to participate in one or more mobility related activities of daily living. Patient's mobility limitation cannot be sufficiently resolved with the use of a cane, but can be sufficiently resolved with the use of a rolling walker.The use of a rolling walker will considerably improve their ability to participate in MRADLs. Patient will use the walker on a regular basis at home.    Outcome: Progressing     8/23/2024

## 2024-08-23 NOTE — CONSULTS
Single lumen 18G, 10CM midline placed in the right basilic vein. Needle advanced into the vessel under real time ultrasound guidance. Image recorded and saved.    Max dwell date 09/21/24.  Lot number: [id number]

## 2024-08-23 NOTE — PT/OT/SLP PROGRESS
"Occupational Therapy   Treatment    Name: King Cool Jr.  MRN: [id number]  Admitting Diagnosis:  Streptococcal arthritis of right knee  7 Days Post-Op    Recommendations:     Discharge Recommendations: Low Intensity Therapy  Discharge Equipment Recommendations:  bedside commode, shower chair, walker, rolling  Barriers to discharge:  None    Assessment:     King Cool Jr. is a 69 y.o. male with a medical diagnosis of Streptococcal arthritis of right knee.  He presents with the following performance deficits affecting function: weakness, impaired endurance, impaired self care skills, impaired functional mobility, gait instability, impaired balance, decreased lower extremity function, pain. Pt with improving mobility and transfers needed for both sitting and standing ADLs.     Rehab Prognosis:  Good; patient would benefit from acute skilled OT services to address these deficits and reach maximum level of function.       Plan:     Patient to be seen 3 x/week to address the above listed problems via self-care/home management, therapeutic activities, therapeutic exercises, neuromuscular re-education  Plan of Care Expires: 09/17/24  Plan of Care Reviewed with: patient    Subjective     Chief Complaint: " I need to use those bands to stretch my leg"  Patient/Family Comments/goals: return home  Pain/Comfort:  Pain Rating 1: 0/10  Pain Addressed 1: Reposition, Distraction, Pre-medicate for activity    Objective:     Communicated with: Nsg prior to session.  Patient found  in bathroom  with telemetry, peripheral IV upon OT entry to room.    General Precautions: Standard, fall    Orthopedic Precautions:RLE weight bearing as tolerated  Braces: N/A  Respiratory Status: Room air     Occupational Performance:     Bed Mobility:    Patient completed Sit to Supine with supervision     Functional Mobility/Transfers:  Patient completed Sit <> Stand Transfer with modified independence  with  rolling walker   Patient completed Toilet " Transfer Step Transfer technique with modified independence with  rolling walker  Functional Mobility: Pt ambulated room level c/ S using RW    Activities of Daily Living:  Feeding:  supervision drinking water and taking medication  Grooming: supervision hand hygiene  Toileting: independence and supervision        [facility] 6 Click ADL: 21    Treatment & Education:  Pt educated on role and purpose of therapy  Pt educated on goal setting  Pt educated on benefits of OOB activity  Pt educated on self advocacy     Patient left HOB elevated with all lines intact, call button in reach, and nsg notified    GOALS:   Multidisciplinary Problems       Occupational Therapy Goals          Problem: Occupational Therapy    Goal Priority Disciplines Outcome Interventions   Occupational Therapy Goal     OT, PT/OT Progressing    Description: Goals to be met by: 8/24/2024     Patient will increase functional independence with ADLs by performing:    UE Dressing with Modified Latta.  LE Dressing with Modified Latta.  Grooming while standing at sink with Modified Latta.  Toileting from toilet with Latta for hygiene and clothing management.   Toilet transfer to toilet with Modified Latta.                         Time Tracking:     OT Date of Treatment: 08/23/24  OT Start Time: 0928  OT Stop Time: 0945  OT Total Time (min): 17 min    Billable Minutes:Self Care/Home Management 17    OT/CASSANDRA: OT          8/23/2024

## 2024-08-23 NOTE — ASSESSMENT & PLAN NOTE
- Patient calcium pyrophosphate crystals not on both taps of right knee. Had the steroid injection outpatient on 8/13 with primary care physician to right knee.   - Patient with persistent right knee swelling and suspect pseudogout as cause. Trial of Colchicine x 2 doses on 8/21 and daily to treat. Trying to avoid systemic steroids with glucose elevations and superinfection picture. Uric acid is normal.    - Trial of Celebrex 200 mg po daily to see if helps with right knee swelling and pain related to pseudogout and right knee infection.

## 2024-08-23 NOTE — SUBJECTIVE & OBJECTIVE
Interval History: RUQ ultrasound done this am for elevated LFT's and showed enlarged liver and biliary sludge and distended gallbladder but no stones and CBD not dilated and no acute cholecystitis. AST and ALT improved after stopping Tylenol and Lipitor yesterday. AST down to 168 today from 305 yesterday and ALT down to 240 today from 336 yesterday. Patient reports improvement in right knee pain and swelling with Colchicine and Celebrex today. Celebrex started yesterday and Colchicine on 8/21 to treat his pseudogout. Final ID recs given for right knee septic arthritis and midline placed today. Final ID recs:    Outpatient Antibiotic Therapy Plan:     Please send referral to [facility].     1) Infection: Septic arthritis of knee      2) Discharge Antibiotics:     Intravenous antibiotics:  Ceftriaxone 2 g IV q 24 hours     3) Therapy Duration:  four weeks from surgical I&D     Estimated end date of IV antibiotics: 9/13/24     4) Outpatient Weekly Labs:     Order the following labs to be drawn on Mondays:   CBC  CMP   CRP     5) Fax Lab Results to Infectious Diseases Provider: Maddy Tripp NP     [facility] Fax Number: [phone]    Plan home discharge tomorrow with  PT/OT and home infusion pending improvement in CRP. CRP ordered for the am. Patient states right knee pain 4/10 today. Rash on patient's back noted on 8/22 is improving.     Review of Systems   Constitutional:  Negative for fever.   Respiratory:  Negative for cough and shortness of breath.    Cardiovascular:  Positive for leg swelling (Right knee and right lower leg). Negative for chest pain and palpitations.   Gastrointestinal:  Negative for abdominal pain, diarrhea, nausea and vomiting.   Musculoskeletal:  Positive for arthralgias (Right knee).   Psychiatric/Behavioral:  Negative for agitation and confusion.      Objective:     Vital Signs (Most Recent):  Temp: 98.1 °F (36.7 °C) (08/23/24 1538)  Pulse: 80  (08/23/24 1538)  Resp: 18 (08/23/24 1538)  BP: 165/81 (08/23/24 1538)  SpO2: 98 % (08/23/24 1538) on room air  Vital Signs (24h Range):  Temp:  [98.1 °F (36.7 °C)-99.9 °F (37.7 °C)] 98.1 °F (36.7 °C)  Pulse:  [70-87] 80  Resp:  [16-20] 18  SpO2:  [93 %-98 %] 98 %  BP: (111-165)/(58-81) 165/81     Weight: 95.7 kg (210 lb 15.7 oz)  Body mass index is 30.27 kg/m².    Intake/Output Summary (Last 24 hours) at 8/23/2024 1542  Last data filed at 8/22/2024 1716  Gross per 24 hour   Intake 180 ml   Output --   Net 180 ml         Physical Exam  Vitals and nursing note reviewed.   Constitutional:       General: He is awake. He is not in acute distress.     Appearance: Normal appearance. He is well-developed and normal weight. He is not ill-appearing.      Comments: Patient lying in bed in no distress and no pain or discomfort.    Eyes:      Conjunctiva/sclera: Conjunctivae normal.   Cardiovascular:      Rate and Rhythm: Normal rate and regular rhythm.      Heart sounds: Normal heart sounds. No murmur heard.  Pulmonary:      Effort: Pulmonary effort is normal. No respiratory distress.      Breath sounds: Normal breath sounds. No wheezing.   Abdominal:      General: Abdomen is flat. Bowel sounds are normal. There is no distension.      Palpations: Abdomen is soft.      Tenderness: There is no abdominal tenderness.   Musculoskeletal:         General: Swelling (Rigth knee swelling and effusion noted) present.      Right lower leg: Edema (1+) present.      Left lower leg: No edema.   Skin:     General: Skin is warm.      Findings: Rash (Patient noted skin rash after returning from CT scan this am to entire back but sparing rest of his body and blanching red and small papules and non pruritic.) present. No erythema.      Comments: Sutures in place to right knee incision   Neurological:      Mental Status: He is alert and oriented to person, place, and time.   Psychiatric:         Mood and Affect: Mood normal.         Behavior:  Behavior normal. Behavior is cooperative.         Thought Content: Thought content normal.         Judgment: Judgment normal.             Significant Labs: CBC:   Recent Labs   Lab 08/22/24  0533 08/23/24  0450   WBC 9.64 7.13   HGB 12.8* 11.7*   HCT 38.0* 35.1*    344     CMP:   Recent Labs   Lab 08/22/24  0533 08/23/24  0450    136   K 3.8 4.1    99   CO2 30* 28    115*   BUN 18 17   CREATININE 0.8 0.9   CALCIUM 9.5 9.2   PROT 6.6 6.1   ALBUMIN 2.3* 2.1*   BILITOT 0.6 0.6   ALKPHOS 275* 253*   * 168*   * 240*   ANIONGAP 8 9       POCT Glucose:   Recent Labs   Lab 08/22/24  2142 08/23/24  0848 08/23/24  1537   POCTGLUCOSE 82 219* 220*       Significant Imaging: I have reviewed all pertinent imaging results/findings within the past 24 hours.

## 2024-08-23 NOTE — SUBJECTIVE & OBJECTIVE
Interval History:     Patient seen while sitting up in chair. Overall he feels improved. Afebrile over last 24 hours. He still has leg/knee swelling and pain but not worsened. Up and ambulating. He finally had a bowel movement. He denies N/V/abdominal pain. Tylenol and Lipitor discontinued yesterday due to elevation in LFTs and abdominal US reviewed. LFTS downtrending. Discussed CT knee/leg with HM. Ortho surgery is not concerned with air or effusion.       Review of Systems   Constitutional:  Negative for chills, diaphoresis and fatigue.   Respiratory:  Negative for cough and shortness of breath.    Cardiovascular:  Positive for leg swelling. Negative for chest pain.   Gastrointestinal:  Negative for abdominal pain, constipation, diarrhea, nausea and vomiting.   Musculoskeletal:  Positive for arthralgias and joint swelling.   Skin:  Positive for color change.   Psychiatric/Behavioral:  Negative for agitation and confusion. The patient is not nervous/anxious.    All other systems reviewed and are negative.    Objective:     Vital Signs (Most Recent):  Temp: 99.9 °F (37.7 °C) (08/23/24 0353)  Pulse: 87 (08/23/24 0849)  Resp: 18 (08/23/24 0933)  BP: (!) 153/72 (08/23/24 0849)  SpO2: (!) 94 % (08/23/24 0407) Vital Signs (24h Range):  Temp:  [97.6 °F (36.4 °C)-99.9 °F (37.7 °C)] 99.9 °F (37.7 °C)  Pulse:  [70-91] 87  Resp:  [16-20] 18  SpO2:  [94 %-98 %] 94 %  BP: (111-153)/(58-73) 153/72     Weight: 95.7 kg (210 lb 15.7 oz)  Body mass index is 30.27 kg/m².    Estimated Creatinine Clearance: 91.2 mL/min (based on SCr of 0.9 mg/dL).     Physical Exam  Vitals and nursing note reviewed.   Constitutional:       General: He is not in acute distress.     Appearance: Normal appearance. He is not ill-appearing, toxic-appearing or diaphoretic.   HENT:      Head: Normocephalic and atraumatic.      Mouth/Throat:      Mouth: Mucous membranes are moist.   Eyes:      General: No scleral icterus.     Conjunctiva/sclera: Conjunctivae  normal.   Cardiovascular:      Rate and Rhythm: Normal rate and regular rhythm.   Pulmonary:      Effort: Pulmonary effort is normal. No respiratory distress.   Abdominal:      General: There is no distension.      Palpations: Abdomen is soft.      Tenderness: There is no abdominal tenderness. There is no guarding.   Musculoskeletal:         General: Swelling and tenderness present.      Right lower leg: Edema present.      Left lower leg: No edema.      Comments: Right knee swelling. arthroscopic sites c/d/I. Sutures in place.   Right calf tenderness, swelling, erythema. Right shin abrasion.   Skin:     General: Skin is warm and dry.      Findings: Lesion (scabbed over abrasions right ankle) present.   Neurological:      Mental Status: He is alert and oriented to person, place, and time.   Psychiatric:         Mood and Affect: Mood normal.         Behavior: Behavior normal.         Thought Content: Thought content normal.         Judgment: Judgment normal.          Significant Labs: Blood Culture:   Recent Labs   Lab 08/15/24  1700 08/19/24  1220 08/19/24  1221   LABBLOO No growth after 5 days.  No growth after 5 days. No Growth to date  No Growth to date  No Growth to date  No Growth to date No Growth to date  No Growth to date  No Growth to date  No Growth to date     CBC:   Recent Labs   Lab 08/22/24  0533 08/23/24  0450   WBC 9.64 7.13   HGB 12.8* 11.7*   HCT 38.0* 35.1*    344       CMP:   Recent Labs   Lab 08/22/24  0533 08/23/24  0450    136   K 3.8 4.1    99   CO2 30* 28    115*   BUN 18 17   CREATININE 0.8 0.9   CALCIUM 9.5 9.2   PROT 6.6 6.1   ALBUMIN 2.3* 2.1*   BILITOT 0.6 0.6   ALKPHOS 275* 253*   * 168*   * 240*   ANIONGAP 8 9     Microbiology Results (last 7 days)       Procedure Component Value Units Date/Time    Blood culture [[id number]] Collected: 08/19/24 1220    Order Status: Completed Specimen: Blood from Peripheral, Hand, Left Updated: 08/22/24  1412     Blood Culture, Routine No Growth to date      No Growth to date      No Growth to date      No Growth to date    Blood culture [[id number]] Collected: 08/19/24 1221    Order Status: Completed Specimen: Blood from Peripheral, Hand, Right Updated: 08/22/24 1412     Blood Culture, Routine No Growth to date      No Growth to date      No Growth to date      No Growth to date    Aerobic culture [[id number]] Collected: 08/19/24 0853    Order Status: Completed Specimen: Abscess from Knee, Right Updated: 08/22/24 1304     Aerobic Bacterial Culture No growth    Culture, Anaerobic [[id number]] Collected: 08/19/24 0853    Order Status: Completed Specimen: Abscess from Knee, Right Updated: 08/21/24 1024     Anaerobic Culture Culture in progress    AFB Culture & Smear [[id number]] Collected: 08/19/24 0853    Order Status: Completed Specimen: Abscess from Knee, Right Updated: 08/20/24 2127     AFB Culture & Smear Culture in progress     AFB CULTURE STAIN No acid fast bacilli seen.    Blood culture x two cultures. Draw prior to antibiotics. [[id number]] Collected: 08/15/24 1700    Order Status: Completed Specimen: Blood from Peripheral, Antecubital, Left Updated: 08/20/24 1812     Blood Culture, Routine No growth after 5 days.    Narrative:      Aerobic and anaerobic    Blood culture x two cultures. Draw prior to antibiotics. [[id number]] Collected: 08/15/24 1700    Order Status: Completed Specimen: Blood from Peripheral, Antecubital, Right Updated: 08/20/24 1812     Blood Culture, Routine No growth after 5 days.    Narrative:      Aerobic and anaerobic    Gram stain [[id number]] Collected: 08/19/24 0853    Order Status: Completed Specimen: Abscess from Knee, Right Updated: 08/19/24 1132     Gram Stain Result Few WBC's      No organisms seen    Fungus culture [[id number]] Collected: 08/19/24 0853    Order Status: Sent Specimen: Abscess from Knee, Right Updated: 08/19/24 0940    Aerobic culture [[id number]]     Order  Status: Completed Specimen: Abscess from Knee, Right           Wound Culture:   Recent Labs   Lab 08/17/24  1608 08/19/24  0853   LABAERO STREPTOCOCCUS MITIS/ORALIS   Rare  * No growth       Significant Imaging: None

## 2024-08-23 NOTE — ASSESSMENT & PLAN NOTE
Chronic anticoagulation   Patient with Paroxysmal (<7 days) atrial fibrillation which is controlled currently with Toprol XL 25 mg po daily to treat. Patient is currently in sinus rhythm.[id number] Score: 3. Anticoagulation done with home Apixiban and continue Apixiban 5 mg po BID to treat.

## 2024-08-23 NOTE — ASSESSMENT & PLAN NOTE
Chronic, controlled. Latest blood pressure and vitals reviewed-     Temp:  [97.6 °F (36.4 °C)-102.6 °F (39.2 °C)]   Pulse:  [75-98]   Resp:  [16-20]   BP: (123-155)/(61-77)   SpO2:  [93 %-98 %] .   Home meds for hypertension were reviewed and noted below.   Hypertension Medications               losartan (COZAAR) 25 MG tablet Take 1 tablet (25 mg total) by mouth once daily.    metoprolol succinate (TOPROL-XL) 25 MG 24 hr tablet Take 1 tablet (25 mg total) by mouth once daily.            While in the hospital, will manage blood pressure as follows; Continue home antihypertensive regimen to treat in hospital.     Will utilize p.r.n. blood pressure medication only if patient's blood pressure greater than 180/110 and he develops symptoms such as worsening chest pain or shortness of breath.

## 2024-08-24 VITALS
OXYGEN SATURATION: 93 % | TEMPERATURE: 98 F | HEART RATE: 76 BPM | HEIGHT: 70 IN | SYSTOLIC BLOOD PRESSURE: 132 MMHG | DIASTOLIC BLOOD PRESSURE: 63 MMHG | WEIGHT: 211 LBS | RESPIRATION RATE: 16 BRPM | BODY MASS INDEX: 30.21 KG/M2

## 2024-08-24 PROBLEM — R21 RASH: Status: RESOLVED | Noted: 2024-08-22 | Resolved: 2024-08-24

## 2024-08-24 LAB
ANION GAP SERPL CALC-SCNC: 13 MMOL/L (ref 8–16)
BACTERIA BLD CULT: NORMAL
BACTERIA BLD CULT: NORMAL
BASOPHILS # BLD AUTO: 0.08 K/UL (ref 0–0.2)
BASOPHILS NFR BLD: 0.9 % (ref 0–1.9)
BILIRUB UR QL STRIP: NEGATIVE
BUN SERPL-MCNC: 14 MG/DL (ref 8–23)
CALCIUM SERPL-MCNC: 9.2 MG/DL (ref 8.7–10.5)
CHLORIDE SERPL-SCNC: 100 MMOL/L (ref 95–110)
CLARITY UR REFRACT.AUTO: CLEAR
CO2 SERPL-SCNC: 22 MMOL/L (ref 23–29)
COLOR UR AUTO: YELLOW
CREAT SERPL-MCNC: 0.9 MG/DL (ref 0.5–1.4)
CRP SERPL-MCNC: 217.3 MG/L (ref 0–8.2)
DIFFERENTIAL METHOD BLD: ABNORMAL
EOSINOPHIL # BLD AUTO: 0.1 K/UL (ref 0–0.5)
EOSINOPHIL NFR BLD: 1.3 % (ref 0–8)
ERYTHROCYTE [DISTWIDTH] IN BLOOD BY AUTOMATED COUNT: 12.1 % (ref 11.5–14.5)
EST. GFR  (NO RACE VARIABLE): >60 ML/MIN/1.73 M^2
GLUCOSE SERPL-MCNC: 165 MG/DL (ref 70–110)
GLUCOSE UR QL STRIP: ABNORMAL
HCT VFR BLD AUTO: 36.3 % (ref 40–54)
HGB BLD-MCNC: 12.1 G/DL (ref 14–18)
HGB UR QL STRIP: NEGATIVE
IMM GRANULOCYTES # BLD AUTO: 0.04 K/UL (ref 0–0.04)
IMM GRANULOCYTES NFR BLD AUTO: 0.5 % (ref 0–0.5)
KETONES UR QL STRIP: ABNORMAL
LACTATE SERPL-SCNC: 1.2 MMOL/L (ref 0.5–2.2)
LEUKOCYTE ESTERASE UR QL STRIP: NEGATIVE
LYMPHOCYTES # BLD AUTO: 1.5 K/UL (ref 1–4.8)
LYMPHOCYTES NFR BLD: 17.3 % (ref 18–48)
MCH RBC QN AUTO: 30.8 PG (ref 27–31)
MCHC RBC AUTO-ENTMCNC: 33.3 G/DL (ref 32–36)
MCV RBC AUTO: 92 FL (ref 82–98)
MONOCYTES # BLD AUTO: 1.1 K/UL (ref 0.3–1)
MONOCYTES NFR BLD: 12.2 % (ref 4–15)
NEUTROPHILS # BLD AUTO: 5.9 K/UL (ref 1.8–7.7)
NEUTROPHILS NFR BLD: 67.8 % (ref 38–73)
NITRITE UR QL STRIP: NEGATIVE
NRBC BLD-RTO: 0 /100 WBC
PH UR STRIP: 7 [PH] (ref 5–8)
PLATELET # BLD AUTO: 392 K/UL (ref 150–450)
PMV BLD AUTO: 8.6 FL (ref 9.2–12.9)
POCT GLUCOSE: 191 MG/DL (ref 70–110)
POCT GLUCOSE: 192 MG/DL (ref 70–110)
POCT GLUCOSE: 263 MG/DL (ref 70–110)
POTASSIUM SERPL-SCNC: 4.5 MMOL/L (ref 3.5–5.1)
PROT UR QL STRIP: NEGATIVE
RBC # BLD AUTO: 3.93 M/UL (ref 4.6–6.2)
SODIUM SERPL-SCNC: 135 MMOL/L (ref 136–145)
SP GR UR STRIP: 1.01 (ref 1–1.03)
URN SPEC COLLECT METH UR: ABNORMAL
WBC # BLD AUTO: 8.75 K/UL (ref 3.9–12.7)

## 2024-08-24 PROCEDURE — 63600175 PHARM REV CODE 636 W HCPCS: Mod: HCNC

## 2024-08-24 PROCEDURE — 25000003 PHARM REV CODE 250: Mod: HCNC | Performed by: NURSE PRACTITIONER

## 2024-08-24 PROCEDURE — 25000003 PHARM REV CODE 250: Mod: HCNC | Performed by: INTERNAL MEDICINE

## 2024-08-24 PROCEDURE — 81003 URINALYSIS AUTO W/O SCOPE: CPT | Mod: HCNC

## 2024-08-24 PROCEDURE — 25000003 PHARM REV CODE 250: Mod: HCNC | Performed by: HOSPITALIST

## 2024-08-24 PROCEDURE — 80048 BASIC METABOLIC PNL TOTAL CA: CPT | Mod: HCNC | Performed by: INTERNAL MEDICINE

## 2024-08-24 PROCEDURE — 85025 COMPLETE CBC W/AUTO DIFF WBC: CPT | Mod: HCNC | Performed by: HOSPITALIST

## 2024-08-24 PROCEDURE — 63600175 PHARM REV CODE 636 W HCPCS: Mod: HCNC | Performed by: NURSE PRACTITIONER

## 2024-08-24 PROCEDURE — 83605 ASSAY OF LACTIC ACID: CPT | Mod: HCNC

## 2024-08-24 PROCEDURE — 87040 BLOOD CULTURE FOR BACTERIA: CPT | Mod: 59,HCNC

## 2024-08-24 PROCEDURE — A4216 STERILE WATER/SALINE, 10 ML: HCPCS | Mod: HCNC | Performed by: INTERNAL MEDICINE

## 2024-08-24 PROCEDURE — 86140 C-REACTIVE PROTEIN: CPT | Mod: HCNC | Performed by: INTERNAL MEDICINE

## 2024-08-24 PROCEDURE — 36415 COLL VENOUS BLD VENIPUNCTURE: CPT | Mod: HCNC

## 2024-08-24 PROCEDURE — 63600175 PHARM REV CODE 636 W HCPCS: Mod: HCNC | Performed by: HOSPITALIST

## 2024-08-24 PROCEDURE — 36415 COLL VENOUS BLD VENIPUNCTURE: CPT | Mod: HCNC | Performed by: INTERNAL MEDICINE

## 2024-08-24 RX ORDER — CELECOXIB 200 MG/1
200 CAPSULE ORAL DAILY
Qty: 30 CAPSULE | Refills: 0 | Status: SHIPPED | OUTPATIENT
Start: 2024-08-24 | End: 2024-09-23

## 2024-08-24 RX ORDER — OXYCODONE HYDROCHLORIDE 5 MG/1
5 TABLET ORAL EVERY 4 HOURS PRN
Qty: 25 TABLET | Refills: 0 | Status: SHIPPED | OUTPATIENT
Start: 2024-08-24 | End: 2024-08-30 | Stop reason: SDUPTHER

## 2024-08-24 RX ORDER — ACETAMINOPHEN 325 MG/1
650 TABLET ORAL EVERY 6 HOURS PRN
Status: DISCONTINUED | OUTPATIENT
Start: 2024-08-24 | End: 2024-08-24

## 2024-08-24 RX ORDER — COLCHICINE 0.6 MG/1
0.6 TABLET ORAL DAILY
Qty: 10 TABLET | Refills: 0 | Status: SHIPPED | OUTPATIENT
Start: 2024-08-24 | End: 2024-09-03

## 2024-08-24 RX ADMIN — CEFTRIAXONE SODIUM 2 G: 2 INJECTION, POWDER, FOR SOLUTION INTRAMUSCULAR; INTRAVENOUS at 09:08

## 2024-08-24 RX ADMIN — METHOCARBAMOL 750 MG: 750 TABLET ORAL at 08:08

## 2024-08-24 RX ADMIN — GABAPENTIN 300 MG: 300 CAPSULE ORAL at 08:08

## 2024-08-24 RX ADMIN — INSULIN ASPART 18 UNITS: 100 INJECTION, SOLUTION INTRAVENOUS; SUBCUTANEOUS at 11:08

## 2024-08-24 RX ADMIN — OXYCODONE HYDROCHLORIDE 5 MG: 5 TABLET ORAL at 01:08

## 2024-08-24 RX ADMIN — APIXABAN 5 MG: 5 TABLET, FILM COATED ORAL at 08:08

## 2024-08-24 RX ADMIN — ASPIRIN 81 MG: 81 TABLET, COATED ORAL at 08:08

## 2024-08-24 RX ADMIN — Medication 10 ML: at 06:08

## 2024-08-24 RX ADMIN — CELECOXIB 200 MG: 200 CAPSULE ORAL at 08:08

## 2024-08-24 RX ADMIN — THERA TABS 1 TABLET: TAB at 08:08

## 2024-08-24 RX ADMIN — LOSARTAN POTASSIUM 50 MG: 50 TABLET, FILM COATED ORAL at 09:08

## 2024-08-24 RX ADMIN — OXYCODONE HYDROCHLORIDE 10 MG: 10 TABLET ORAL at 08:08

## 2024-08-24 RX ADMIN — INSULIN ASPART 18 UNITS: 100 INJECTION, SOLUTION INTRAVENOUS; SUBCUTANEOUS at 08:08

## 2024-08-24 RX ADMIN — INSULIN GLARGINE 19 UNITS: 100 INJECTION, SOLUTION SUBCUTANEOUS at 09:08

## 2024-08-24 RX ADMIN — OMEGA-3-ACID ETHYL ESTERS 1 G: 1 CAPSULE, LIQUID FILLED ORAL at 09:08

## 2024-08-24 RX ADMIN — Medication 10 ML: at 01:08

## 2024-08-24 RX ADMIN — SODIUM CHLORIDE, POTASSIUM CHLORIDE, SODIUM LACTATE AND CALCIUM CHLORIDE 500 ML: 600; 310; 30; 20 INJECTION, SOLUTION INTRAVENOUS at 06:08

## 2024-08-24 RX ADMIN — Medication 10 ML: at 12:08

## 2024-08-24 RX ADMIN — METOPROLOL SUCCINATE 25 MG: 25 TABLET, EXTENDED RELEASE ORAL at 08:08

## 2024-08-24 RX ADMIN — COLCHICINE 0.6 MG: 0.6 TABLET, FILM COATED ORAL at 08:08

## 2024-08-24 RX ADMIN — INSULIN ASPART 3 UNITS: 100 INJECTION, SOLUTION INTRAVENOUS; SUBCUTANEOUS at 11:08

## 2024-08-25 NOTE — ASSESSMENT & PLAN NOTE
- LFT's improving on discharge. Okay for patient to resume his home statin on discharge and continue IV Ceftriaxone. Patient will be getting weekly CMP on discharge via home health and ID to follow labs so can continue to improve LFT's as outpatient. No signs of any liver failure or obstruction and suspect was medication effect.   - RUQ ultrasound done this am, 8/23 for elevated LFT's and showed enlarged liver and biliary sludge and distended gallbladder but no stones and CBD not dilated and no acute cholecystitis but no obvious cause for elevated AST and ALT. AST and ALT improved after stopping Tylenol and Lipitor yesterday, 8/22. AST down to 168 today from 305 yesterday and ALT down to 240 today from 336 yesterday.   - Patient had sudden increase in AST and ALT on 8/22. AST up to 305 from 62 on 8/21 and ALT up to 336 from 80 on 8/21 with normal T. Marco and ALP. RUQ ultrasound ordered to evaluate AST and ALT elevations. Patient denies any abdominal pain or nausea or vomiting. Patient's Tylenol and Lipitor discontinued on 8/22 due to elevation in LFTs as both meds have hepatotoxic effects potentially. Discussed with Infectious Disease team as IV Ceftriaxone can cause cholestasis and elevated LFT's also and for now will monitor but if liver enzymes worsen despite stopping Tylenol and Lipitor and RUQ ultrasound shows no pathology need to consider changing antibiotics.

## 2024-08-25 NOTE — ASSESSMENT & PLAN NOTE
Resolved at time of discharge.   Much improved to back area on 8/24 and no new sites on the skin.   Patient noted skin rash after returning from CT scan this am, 8/22 to entire back but sparing rest of his body and blanching red and small papules and non pruritic. Unclear if reaction to contrast dye or not and will monitor.

## 2024-08-25 NOTE — ASSESSMENT & PLAN NOTE
Patient with known CAD s/p stent placement, which is controlled. Monitor for S/Sx of angina/ACS. Continue to monitor on telemetry.   Continue Aspirin to treat daily. Holding Lipitor since  8/22 due elevated AST and ALT. Okay to resume Lipitor on discharge. Continue Aspirin daily on discharge to treat.

## 2024-08-25 NOTE — ASSESSMENT & PLAN NOTE
Chronic anticoagulation   Patient with Paroxysmal (<7 days) atrial fibrillation which is controlled currently with Toprol XL 25 mg po daily to treat on discharge. Patient is currently in sinus rhythm.[id number] Score: 3. Anticoagulation done with home Apixiban and continue Apixiban 5 mg po BID to treat on discharge.

## 2024-08-25 NOTE — ASSESSMENT & PLAN NOTE
- Patient as outpatient receiving chronic right knee injections for OA and had recent injection the 8/13 then swelling  to right knee after that was not typical for him with presumed nidus with entry portal and seen in Ortho in clinic for this with concern for infection.   - Afebrile on admit with WBC 12.09 and lactic 1.5 with  on admit.  - Blood cultures drawn on admit and no growth on discharge.   - Arthrocentesis of right knee done and had 143,000 WBCs and positive crystals with CPPD and started on empiric IV Cefepime and Vancomycin.   - Patient taken to OR on 8/16/2024 with Dr. Isaac Alfaro and underwent arthroscopic irrigation with extensive debridement right knee and Ortho reported significant amounts of thick viscous fluid consistent with septic arthritis and/or CPPD.   -Patient weight bear as tolerated to right lower extremity post-op and PT/OT consulted and working with patient and recommending low intensity and plan  PT/OT and home infusion on discharge when medically ready.   - ID consulted and final culture from right knee grew Streptococcus mitis/oralis and antibiotics switched by ID to IV Ceftriaxone alone to treat. ID gave final recs on 8/23:    Outpatient Antibiotic Therapy Plan:     Please send referral to [facility].     1) Infection: Septic arthritis of knee      2) Discharge Antibiotics:     Intravenous antibiotics:  Ceftriaxone 2 g IV q 24 hours     3) Therapy Duration:  four weeks from surgical I&D     Estimated end date of IV antibiotics: 9/13/24     4) Outpatient Weekly Labs:     Order the following labs to be drawn on Mondays:   CBC  CMP   CRP     5) Fax Lab Results to Infectious Diseases Provider: Maddy Tripp NP     [facility] Fax Number: [phone]  Midline placed by line team for home IV infusion on 8/23 to right arm. [facility] on 8/24 as long as CRP improving.      - CRP slightly worse at 202 on  8/18 and up to 251 on 8/19. Right knee swelling unchanged. Ortho did bedside arthrocentesis on 8/19 and showed improvement in WBC to 32,000 with continued positive crystals consistent with CPPD and studies more consistent with pseudogout. Some swelling more in right calf on 8/20 and fevers persisting so US of lower extremities done on 8/20 and negative for DVT.    - Patient with still prominent swelling above and below right knee. CT scan of right leg done and showed large right knee joint effusion and ruptured Baker's cyst that would account for right calf swelling. Ortho aware of CT findings and recommend no change in management.   - Pseudogout new diagnosis with crystals on both taps and received steroid injection on 8/13 and could account for continued right knee swelling and pain and worsening CRP. Plan to treat pseudogout with anti-inflammatories to see if helps with swelling and CRP and started on Celebrex 200 mg po daily on 8/22. Patient started on Colchicine to treat pseudogout on 8/21. Repeat CRP on 8/24 done and CRP down to 217 from 287.   - Patient discharged home in good condition on 8/24 with  PT/OT and nursing and home IV infusion. Midline placed to right arm prior to discharge on 8/23 for home IV infusion.   - Continue multimodals for pain management on discharge with Oxycodone IR po prn for breakthrough pain.  - Elevate right lower extremity and ice to help with swelling to right knee.  - Patient back on home Apixiban post-op so no other DVT prophylaxis needed post-op.

## 2024-08-25 NOTE — DISCHARGE SUMMARY
[facility]  Discharge Summary      Patient Name: King Cool Jr.  MRN: [id number]  TIFFANY: [id number]  Patient Class: IP- Inpatient  Admission Date: 8/15/2024  Hospital Length of Stay: 9 days  Discharge Date and Time: 8/24/2024  2:17 PM  Attending Physician: Emilee Martinez MD   Discharging Provider: Emilee Martinez MD  Primary Care Provider: Gallo Lara MD  [facility] Medicine Team: [facility]  Emilee Martinez MD  Primary Care Team: [facility]    HPI:   King Cool Jr. is a 69 y.o. male with a PMHx of CAD, a fib on eliquis, HTN, HLD, DM2, and osteoarthritis who presents to the ED from orthopedic clinic for evaluation of septic right knee. The patient reports he has a history of OA in his right knee and receives intra articular steroid injections every 3 months. His last injection was 8/13. He reports beginning yesterday he noticed increased swelling and pain to his right knee. He notes it was so painful he was unable to get out of bed this morning. He denies any trauma or falls. He states at baseline he ambulates unassisted. Denies numbness/tingling. He was seen in ortho clinic today and had an arthrocentesis which showed [id number] WBCs and positive crystals for calcium pyrophosphate crystals. R knee xray showed severe osteoarthritis. He denies fever/chills, N/V/D, abdominal pain, CP, SOB, cough, or dysuria.    In the ED, pt tachycardic and mildly hypertensive otherwise vitals stable, afebrile. WBC 12.09k. Glucose 330. Tbili 1.1. LA 1.5. Blood cxs in process. The patient received morphine, zofran, 1L NS, cefepime, and vancomycin. Ortho was consulted and recommend continuing NPO with plan for OR tomorrow for I&D right knee.       8/16/2024  Procedure(s) (LRB):  ARTHROSCOPY, KNEE, WITH DEBRIDEMENT (Right)    Surgeon(s):  JANET Diehl MD Mautner, James F., MD      Hospital Course:   Patient admitted with septic arhtritis of right knee and pseudogout of right knee.  Orthopedics consulted and patient taken to OR on 8/16/2024 by Dr. Isaac Alfaro and underwent arthroscopic irrigation with extensive debridement right knee. OP report noted significant amounts of thick viscous fluid consistent with septic arthritis and/or CPPD. Patient placed on broad spectrum antibiotics and Infectious Disease consulted for antibiotic management. Cultures from right knee grew STREPTOCOCCUS MITIS/ORALIS and antibiotcs switched to IV Ceftriaxone 2 grams every 24 hours to treat infection. Despite approparite antibiotics patient's CRP continues to rise. ID concerned. Patient placed on Colchicine to treat pseudogout to see if cause of rising CRP. CT of entire right leg ordered to evaluate cause of increasing CRP. US of lower extremities done and negative for DVT. CT scan of right leg on 8/22 showed large right effusion and air in right knee joint and ruptured Baker's cyst but no abscess noted to right thigh or calf area. Discussed with Dr. Diehl from Ortho on 8/22 and air and effusion related to post-op changes and not concerned. Nothing needs to be done for ruptured right Baker's cyst. Mild right achilles tendonitis noted on CT scan of right leg and no intervention needed. Patient still having significant pain and swelling to right knee so patient advised to elevate and ice and stated on Celebrex 200 mg po daily to help with pain and inflammation an swelling as also has known pseudogout in right knee on top of known septic arthritis. Continue to monitor CRP. Continue IV Ceftriaxone for now to treat septic right knee arthritis as per ID. Patient had sudden increase in AST and ALT on 8/22. AST up to 305 from 62 and ALT up to 336 from 80 with normal T. Marco and ALP. CRP remains elevated at 287 up from 251 on 8/19. RUQ ultrasound ordered to evaluate AST and ALT elevations. Patient's Tylenol and Lipitor discontinued due to elevation in LFTs. Discussed with Infectious Disease team as IV Ceftriaxone can cause  cholestasis and elevated LFT's also and for now will monitor but if liver enzymes worsen despite stopping Tylenol and Lipitor and RUQ ultrasound shows no pathology need to consider changing antibiotics. RUQ ultrasound done on 8/23 showed enlarged liver and biliary sludge and distended gallbladder but no stones and CBD not dilated. AST and ALT improved after stopping Tylenol and Lipitor on 8/22. AST down to 168 on 8/23 from 305 on 8/22 and ALT down to 240 on 8/23 from 336 on 8/22. Patient reports improvement in right knee pain and swelling with Colchicine and Celebrex on 8/23. Final ID recs given and midline placed on 8/23.    Outpatient Antibiotic Therapy Plan:     Please send referral to [facility].     1) Infection: Septic arthritis of knee      2) Discharge Antibiotics:     Intravenous antibiotics:  Ceftriaxone 2 g IV q 24 hours     3) Therapy Duration:  four weeks from surgical I&D     Estimated end date of IV antibiotics: 9/13/24     4) Outpatient Weekly Labs:     Order the following labs to be drawn on Mondays:   CBC  CMP   CRP     5) Fax Lab Results to Infectious Diseases Provider: Maddy Tripp NP     [facility] Fax Number: [phone]    Patient doing well on day of discharge. He did have another fever but WBC was normal and swelling and pain to right knee improving. Lactic acid normal at 1.2. CXR and U/A done and showed no evidence of infection. Suspect residual fever related to inflammation in right knee from pseudogout as clinically no signs of infection/sepsis with improvement in right knee swelling and pain at time of discharge. Patient discharged home with home health PT/OT and nursing and home IV infusion in good condition on 8/23. Patient discharged on Colchicine and Celebrex to treat his pseudogout. Patient to follow-up with Orthopedics as outpatient.       Goals of Care Treatment Preferences:  Code Status: Full Code         Consults:   Consults (From  admission, onward)          Status Ordering Provider     [facility]  Once        Provider:  (Not yet assigned)    Completed LAUREL TORRES     Inpatient consult to Infectious Diseases  Once        Provider:  (Not yet assigned)    Completed OLYA PARKER     Inpatient consult to Orthopedic Surgery  Once        Provider:  (Not yet assigned)    Completed TING TAM  Rash-resolved as of 8/24/2024  Resolved at time of discharge.   Much improved to back area on 8/24 and no new sites on the skin.   Patient noted skin rash after returning from CT scan this am, 8/22 to entire back but sparing rest of his body and blanching red and small papules and non pruritic. Unclear if reaction to contrast dye or not and will monitor.       Cardiac/Vascular  Essential (primary) hypertension  Chronic, controlled. Latest blood pressure and vitals reviewed-     Temp:  [98.1 °F (36.7 °C)-100.4 °F (38 °C)]   Pulse:  [76-91]   Resp:  [16-20]   BP: (132-141)/(63-71)   SpO2:  [93 %-99 %] .   Home meds for hypertension were reviewed and noted below.   Hypertension Medications               losartan (COZAAR) 25 MG tablet Take 1 tablet (25 mg total) by mouth once daily.    metoprolol succinate (TOPROL-XL) 25 MG 24 hr tablet Take 1 tablet (25 mg total) by mouth once daily.          Continue home antihypertensive regimen of Losartan and Toprol XL to treat on discharge.    PAF (paroxysmal atrial fibrillation)  Chronic anticoagulation   Patient with Paroxysmal (<7 days) atrial fibrillation which is controlled currently with Toprol XL 25 mg po daily to treat on discharge. Patient is currently in sinus rhythm.[id number] Score: 3. Anticoagulation done with home Apixiban and continue Apixiban 5 mg po BID to treat on discharge.     Coronary artery disease involving native coronary artery of native heart without angina pectoris  Patient with known CAD s/p stent placement, which is controlled. Monitor  for S/Sx of angina/ACS. Continue to monitor on telemetry.   Continue Aspirin to treat daily. Holding Lipitor since  8/22 due elevated AST and ALT. Okay to resume Lipitor on discharge. Continue Aspirin daily on discharge to treat.     ID  * Streptococcal arthritis of right knee s/p I+D on 8/16/2024  - Patient as outpatient receiving chronic right knee injections for OA and had recent injection the 8/13 then swelling  to right knee after that was not typical for him with presumed nidus with entry portal and seen in Ortho in clinic for this with concern for infection.   - Afebrile on admit with WBC 12.09 and lactic 1.5 with  on admit.  - Blood cultures drawn on admit and no growth on discharge.   - Arthrocentesis of right knee done and had 143,000 WBCs and positive crystals with CPPD and started on empiric IV Cefepime and Vancomycin.   - Patient taken to OR on 8/16/2024 with Dr. Isaac Alfaro and underwent arthroscopic irrigation with extensive debridement right knee and Ortho reported significant amounts of thick viscous fluid consistent with septic arthritis and/or CPPD.   -Patient weight bear as tolerated to right lower extremity post-op and PT/OT consulted and working with patient and recommending low intensity and plan HH PT/OT and home infusion on discharge when medically ready.   - ID consulted and final culture from right knee grew Streptococcus mitis/oralis and antibiotics switched by ID to IV Ceftriaxone alone to treat. ID gave final recs on 8/23:    Outpatient Antibiotic Therapy Plan:     Please send referral to [facility].     1) Infection: Septic arthritis of knee      2) Discharge Antibiotics:     Intravenous antibiotics:  Ceftriaxone 2 g IV q 24 hours     3) Therapy Duration:  four weeks from surgical I&D     Estimated end date of IV antibiotics: 9/13/24     4) Outpatient Weekly Labs:     Order the following labs to be drawn on Mondays:   CBC  CMP   CRP     5)  Fax Lab Results to Infectious Diseases Provider: Maddy Tripp NP     [facility] Fax Number: [phone]  Midline placed by line team for home IV infusion on 8/23 to right arm. Plan [facility] PT/[facility] and [facility] infusion on 8/24 as long as CRP improving.      - CRP slightly worse at 202 on 8/18 and up to 251 on 8/19. Right knee swelling unchanged. Ortho did bedside arthrocentesis on 8/19 and showed improvement in WBC to 32,000 with continued positive crystals consistent with CPPD and studies more consistent with pseudogout. Some swelling more in right calf on 8/20 and fevers persisting so US of lower extremities done on 8/20 and negative for DVT.    - Patient with still prominent swelling above and below right knee. CT scan of right leg done and showed large right knee joint effusion and ruptured Baker's cyst that would account for right calf swelling. Ortho aware of CT findings and recommend no change in management.   - Pseudogout new diagnosis with crystals on both taps and received steroid injection on 8/13 and could account for continued right knee swelling and pain and worsening CRP. Plan to treat pseudogout with anti-inflammatories to see if helps with swelling and CRP and started on Celebrex 200 mg po daily on 8/22. Patient started on Colchicine to treat pseudogout on 8/21. Repeat CRP on 8/24 done and CRP down to 217 from 287.   - Patient discharged home in good condition on 8/24 with HH PT/OT and nursing and home IV infusion. Midline placed to right arm prior to discharge on 8/23 for home IV infusion.   - Continue multimodals for pain management on discharge with Oxycodone IR po prn for breakthrough pain.  - Elevate right lower extremity and ice to help with swelling to right knee.  - Patient back on home Apixiban post-op so no other DVT prophylaxis needed post-op.     Endocrine  Type 2 diabetes mellitus with microalbuminuria, without long-term current use of insulin  Patient's FSGs are controlled  on current medication regimen. Diabetes not well controlled as outpatient. Patient on oral Jardiance and Amaryl as outpatient to treat and continue on discharge.   Last A1c reviewed-   Lab Results   Component Value Date    HGBA1C 11.5 (H) 08/13/2024     Most recent fingerstick glucose reviewed-   Recent Labs   Lab 08/24/24  0622 08/24/24  0719 08/24/24  1130   POCTGLUCOSE 191* 192* 263*     Diabetic diet on discharge.       Class 1 obesity due to excess calories with serious comorbidity and body mass index (BMI) of 33.0 to 33.9 in adult  Body mass index is 30.27 kg/m². Obesity complicates all aspects of disease management from diagnostic modalities to treatment.     GI  Transaminitis  - LFT's improving on discharge. Okay for patient to resume his home statin on discharge and continue IV Ceftriaxone. Patient will be getting weekly CMP on discharge via home health and ID to follow labs so can continue to improve LFT's as outpatient. No signs of any liver failure or obstruction and suspect was medication effect.   - RUQ ultrasound done this am, 8/23 for elevated LFT's and showed enlarged liver and biliary sludge and distended gallbladder but no stones and CBD not dilated and no acute cholecystitis but no obvious cause for elevated AST and ALT. AST and ALT improved after stopping Tylenol and Lipitor yesterday, 8/22. AST down to 168 today from 305 yesterday and ALT down to 240 today from 336 yesterday.   - Patient had sudden increase in AST and ALT on 8/22. AST up to 305 from 62 on 8/21 and ALT up to 336 from 80 on 8/21 with normal T. Marco and ALP. RUQ ultrasound ordered to evaluate AST and ALT elevations. Patient denies any abdominal pain or nausea or vomiting. Patient's Tylenol and Lipitor discontinued on 8/22 due to elevation in LFTs as both meds have hepatotoxic effects potentially. Discussed with Infectious Disease team as IV Ceftriaxone can cause cholestasis and elevated LFT's also and for now will monitor but if  liver enzymes worsen despite stopping Tylenol and Lipitor and RUQ ultrasound shows no pathology need to consider changing antibiotics.    Slow transit constipation-resolved as of 8/23/2024  Resolved on discharge. Present on admit and continued scheduled Senakot and Miralax. Trial of magnesium citrate to see if helps.       Orthopedic  Pseudogout of right knee  - Pain and swelling improved to right knee on 8/24 with Celebrex and Colchicine and will continue to treat pseudogout on discharge with Colchicine x 10 days and Celebrex on discharge.   - Patient calcium pyrophosphate crystals not on both taps of right knee. Had the steroid injection outpatient on 8/13 with primary care physician to right knee.   - Patient with persistent right knee swelling and suspect pseudogout as cause. Trial of Colchicine x 2 doses on 8/21 and daily to treat. Trying to avoid systemic steroids with glucose elevations and superinfection picture. Uric acid is normal.    - Trial of Celebrex 200 mg po daily to start on 8/22 to see if helps with right knee swelling and pain related to pseudogout and right knee infection.       Other  Post-procedural fever-resolved as of 8/23/2024  Resolved. Patient with on and off fever after surgery on 8/19 and 8/20 but now resolved. Will monitor for now for recurrence. Most be afebrile x 48 hours prior to placing PICC line. Cause unclear.       Final Active Diagnoses:    Diagnosis Date Noted POA    PRINCIPAL PROBLEM:  Streptococcal arthritis of right knee s/p I+D on 8/16/2024 [M00.261] 08/15/2024 Yes    Transaminitis [R74.01] 08/21/2024 No    Pseudogout of right knee [M11.261] 08/21/2024 Yes    Coronary artery disease involving native coronary artery of native heart without angina pectoris [I25.10] 03/07/2023 Yes    PAF (paroxysmal atrial fibrillation) [I48.0] 01/03/2023 Yes     Chronic    Class 1 obesity due to excess calories with serious comorbidity and body mass index (BMI) of 33.0 to 33.9 in adult  "[E66.09, Z68.33] 11/23/2020 Not Applicable    Type 2 diabetes mellitus with microalbuminuria, without long-term current use of insulin [E11.29, R80.9] 01/27/2020 Yes     Chronic    Essential (primary) hypertension [I10]  Yes     Chronic    Chronic anticoagulation [Z79.01] 01/10/2023 Not Applicable     Chronic      Problems Resolved During this Admission:    Diagnosis Date Noted Date Resolved POA    Pseudogout of right knee [M11.261] 08/22/2024 08/22/2024 Yes    Slow transit constipation [K59.01] 08/21/2024 08/23/2024 Yes    Rash [R21] 08/22/2024 08/24/2024 No    Post-procedural fever [R50.82] 08/20/2024 08/23/2024 Yes       Discharged Condition: good    Disposition: Home-Health Care c    Follow Up:    Future Appointments   Date Time Provider Department Center   8/30/2024 12:00 PM Nell Lopes PA-C [facility]   9/27/2024  9:30 AM Nell Lopes PA-C [facility]   11/13/2024  1:20 PM Gallo Lara MD [facility]   1/3/2025 10:00 AM Taran Martell MD [facility]       Patient Instructions:      WALKER FOR HOME USE     Order Specific Question Answer Comments   Type of Walker: Adult (5'4"-6'6")    With wheels? Yes    Height: 5' 10" (1.778 m)    Weight: 95.7 kg (210 lb 15.7 oz)    Length of need (1-99 months): 99    Does patient have medical equipment at home? grab bar    Does patient have medical equipment at home? bath bench    Does patient have medical equipment at home? cane, straight    Please check all that apply: Patient's condition impairs ambulation.    Please check all that apply: Walker will be used for gait training.    Please check all that apply: Patient is unable to safely ambulate without equipment.      COMMODE FOR HOME USE     Order Specific Question Answer Comments   Type: Standard    Height: 5' 10" (1.778 m)    Weight: 95.7 kg (210 lb 15.7 oz)    Does patient have medical equipment at home? grab bar    Does patient have medical equipment at " home? bath bench    Does patient have medical equipment at home? cane, straight    Length of need (1-99 months): 99      Diet Adult Regular     Notify your health care provider if you experience any of the following:  temperature >100.4     Notify your health care provider if you experience any of the following:  persistent nausea and vomiting or diarrhea     Notify your health care provider if you experience any of the following:  severe uncontrolled pain     Notify your health care provider if you experience any of the following:  redness, tenderness, or signs of infection (pain, swelling, redness, odor or green/yellow discharge around incision site)     Notify your health care provider if you experience any of the following:  difficulty breathing or increased cough     Notify your health care provider if you experience any of the following:  severe persistent headache     Notify your health care provider if you experience any of the following:  worsening rash     Notify your health care provider if you experience any of the following:  persistent dizziness, light-headedness, or visual disturbances     Notify your health care provider if you experience any of the following:  increased confusion or weakness     Weight bearing restrictions (specify):   Order Comments: Weight bear as tolerated to right lower extremity       Significant Diagnostic Studies: Labs: CMP   Recent Labs   Lab 08/23/24  0450 08/24/24  0455    135*   K 4.1 4.5   CL 99 100   CO2 28 22*   * 165*   BUN 17 14   CREATININE 0.9 0.9   CALCIUM 9.2 9.2   PROT 6.1  --    ALBUMIN 2.1*  --    BILITOT 0.6  --    ALKPHOS 253*  --    *  --    *  --    ANIONGAP 9 13    and CBC   Recent Labs   Lab 08/23/24  0450 08/24/24  0455   WBC 7.13 8.75   HGB 11.7* 12.1*   HCT 35.1* 36.3*    392     Aerobic Bacterial Culture   Date Value Ref Range Status   08/19/2024 No growth  Final   08/17/2024 STREPTOCOCCUS MITIS/ORALIS   Rare   (A)   Final     Blood Culture, Routine   Date Value Ref Range Status   08/24/2024 No Growth to date  Preliminary   08/24/2024 No Growth to date  Preliminary   08/19/2024 No growth after 5 days.  Final   08/19/2024 No growth after 5 days.  Final   08/15/2024 No growth after 5 days.  Final   08/15/2024 No growth after 5 days.  Final     Sed Rate   Date Value Ref Range Status   08/15/2024 12 0 - 23 mm/Hr Final   08/15/2024 12 0 - 23 mm/Hr Final     CRP   Date Value Ref Range Status   08/24/2024 217.3 (H) 0.0 - 8.2 mg/L Final   08/22/2024 287.4 (H) 0.0 - 8.2 mg/L Final   08/19/2024 251.9 (H) 0.0 - 8.2 mg/L Final       Pending Diagnostic Studies:       None           Medications:  Reconciled Home Medications:      Medication List        START taking these medications      celecoxib 200 MG capsule  Commonly known as: CeleBREX  Take 1 capsule (200 mg total) by mouth once daily.     colchicine 0.6 mg tablet  Commonly known as: COLCRYS  Take 1 tablet (0.6 mg total) by mouth once daily. for 10 days     D5W PgBk 100 mL with cefTRIAXone 2 gram SolR 2 g  Inject 2 g into the vein once daily.     methocarbamoL 750 MG Tab  Commonly known as: ROBAXIN  Take 1 tablet (750 mg total) by mouth 3 (three) times daily. for 14 days     oxyCODONE 5 MG immediate release tablet  Commonly known as: ROXICODONE  Take 1 tablet (5 mg total) by mouth every 4 (four) hours as needed for Pain.            CONTINUE taking these medications      apixaban 5 mg Tab  Commonly known as: ELIQUIS  Take 1 tablet (5 mg total) by mouth 2 (two) times daily.     aspirin 81 MG EC tablet  Commonly known as: ECOTRIN  Take 81 mg by mouth once daily.     atorvastatin 80 MG tablet  Commonly known as: LIPITOR  Take 1 tablet (80 mg total) by mouth every evening.     blood sugar diagnostic Strp  1 strip by Misc.(Non-Drug; Combo Route) route 3 (three) times daily.     * blood-glucose meter kit  Use as instructed     * blood-glucose meter kit  1 each by Other route 3 (three) times  daily. Use as instructed     diclofenac sodium 1 % Gel  Commonly known as: VOLTAREN  Apply 2 g topically once daily.     doxycycline 100 MG tablet  Commonly known as: VIBRA-TABS  Take 1 tablet (100 mg total) by mouth 2 (two) times daily.     DROPSAFE ALCOHOL PREP PADS Padm  Generic drug: alcohol swabs  USE AS DIRECTED AS NEEDED     empagliflozin 25 mg tablet  Commonly known as: Jardiance  Take 1 tablet (25 mg total) by mouth once daily.     fish oil-omega-3 fatty acids 300-1,000 mg capsule  Take 2 g by mouth once daily.     gabapentin 300 MG capsule  Commonly known as: NEURONTIN  Take 1 capsule (300 mg total) by mouth 3 (three) times daily.     glimepiride 4 MG tablet  Commonly known as: AMARYL  Take 1 tablet (4 mg total) by mouth 2 (two) times a day.     * HYDROcodone-acetaminophen  mg per tablet  Commonly known as: NORCO  Take 1 tablet by mouth 3 (three) times daily as needed (pain).     * HYDROcodone-acetaminophen  mg per tablet  Commonly known as: NORCO  Take 1 tablet by mouth 3 (three) times daily as needed (pain).  Start taking on: September 13, 2024     * HYDROcodone-acetaminophen  mg per tablet  Commonly known as: NORCO  Take 1 tablet by mouth 3 (three) times daily as needed (pain).  Start taking on: October 13, 2024     hydrocortisone 2.5 % cream  Apply topically 2 (two) times daily.     lancets Misc  1 application  by Misc.(Non-Drug; Combo Route) route 3 (three) times daily.     loratadine 10 mg tablet  Commonly known as: CLARITIN  Take 1 tablet (10 mg total) by mouth once daily.     losartan 25 MG tablet  Commonly known as: COZAAR  Take 1 tablet (25 mg total) by mouth once daily.     metoprolol succinate 25 MG 24 hr tablet  Commonly known as: TOPROL-XL  Take 1 tablet (25 mg total) by mouth once daily.     multivitamin with minerals tablet  Take 1 tablet by mouth once daily.     neomycin-polymyxin-hydrocortisone 3.5-10,000-1 mg/mL-unit/mL-% otic suspension  Commonly known as:  CORTISPORIN  PLACE 3 DROPS INTO THE LEFT EAR 4 TIMES DAILY.     Saline NasaL 0.65 % nasal spray  Generic drug: sodium chloride  2 sprays by Nasal route 2 (two) times a day.     triamcinolone acetonide 0.1% 0.1 % ointment  Commonly known as: KENALOG  Apply topically 2 (two) times daily as needed.     VITAMIN C ORAL  Take by mouth once daily.           * This list has 5 medication(s) that are the same as other medications prescribed for you. Read the directions carefully, and ask your doctor or other care provider to review them with you.                STOP taking these medications      meloxicam 7.5 MG tablet  Commonly known as: MOBIC     OZEMPIC 0.25 mg or 0.5 mg (2 mg/3 mL) pen injector  Generic drug: semaglutide              Indwelling Lines/Drains at time of discharge:   Lines/Drains/Airways       None                   33 minutes of time spent on discharge, including examining the patient, providing discharge instructions, arranging follow-up and documentation.            Emilee Martinez MD  Department of Hospital Medicine  [facility] - Surgery

## 2024-08-25 NOTE — ASSESSMENT & PLAN NOTE
Resolved on discharge. Present on admit and continued scheduled Senakot and Miralax. Trial of magnesium citrate to see if helps.

## 2024-08-25 NOTE — ASSESSMENT & PLAN NOTE
Chronic, controlled. Latest blood pressure and vitals reviewed-     Temp:  [98.1 °F (36.7 °C)-100.4 °F (38 °C)]   Pulse:  [76-91]   Resp:  [16-20]   BP: (132-141)/(63-71)   SpO2:  [93 %-99 %] .   Home meds for hypertension were reviewed and noted below.   Hypertension Medications               losartan (COZAAR) 25 MG tablet Take 1 tablet (25 mg total) by mouth once daily.    metoprolol succinate (TOPROL-XL) 25 MG 24 hr tablet Take 1 tablet (25 mg total) by mouth once daily.          Continue home antihypertensive regimen of Losartan and Toprol XL to treat on discharge.

## 2024-08-25 NOTE — ASSESSMENT & PLAN NOTE
- Pain and swelling improved to right knee on 8/24 with Celebrex and Colchicine and will continue to treat pseudogout on discharge with Colchicine x 10 days and Celebrex on discharge.   - Patient calcium pyrophosphate crystals not on both taps of right knee. Had the steroid injection outpatient on 8/13 with primary care physician to right knee.   - Patient with persistent right knee swelling and suspect pseudogout as cause. Trial of Colchicine x 2 doses on 8/21 and daily to treat. Trying to avoid systemic steroids with glucose elevations and superinfection picture. Uric acid is normal.    - Trial of Celebrex 200 mg po daily to start on 8/22 to see if helps with right knee swelling and pain related to pseudogout and right knee infection.

## 2024-08-25 NOTE — PLAN OF CARE
[facility]  Discharge Final Note    Primary Care Provider: Gallo Lara MD    Expected Discharge Date: 8/24/2024    Final Discharge Note (most recent)       Final Note - 08/24/24 1936          Final Note    Assessment Type Final Discharge Note (P)      Anticipated Discharge Disposition IV Therapy Provider (P)      Hospital Resources/Appts/Education Provided Provided patient/caregiver with written discharge plan information;Provided education on problems/symptoms using teachback;Appointments scheduled and added to AVS (P)         Post-Acute Status    Post-Acute Authorization HME;Home Health;IV Infusion (P)      HME Status Set-up Complete/Auth obtained (P)    [facility]    Home Health Status Set-up Complete/Auth obtained (P)    [facility]    Coverage [employer] (P)      IV Infusion Status Set-up Complete/Auth obtained (P)    [facility]    Discharge Delays None known at this time (P)                      Important Message from Medicare             Pt. discharged with [facility], [facility] & [facility] for rolling walker.     Maxim Brady LMSW

## 2024-08-25 NOTE — ASSESSMENT & PLAN NOTE
Patient's FSGs are controlled on current medication regimen. Diabetes not well controlled as outpatient. Patient on oral Jardiance and Amaryl as outpatient to treat and continue on discharge.   Last A1c reviewed-   Lab Results   Component Value Date    HGBA1C 11.5 (H) 08/13/2024     Most recent fingerstick glucose reviewed-   Recent Labs   Lab 08/24/24  0622 08/24/24  0719 08/24/24  1130   POCTGLUCOSE 191* 192* 263*     Diabetic diet on discharge.

## 2024-08-26 ENCOUNTER — TELEPHONE (OUTPATIENT)
Dept: INFECTIOUS DISEASES | Facility: CLINIC | Age: 69
End: 2024-08-26
Payer: MEDICARE

## 2024-08-26 ENCOUNTER — LAB VISIT (OUTPATIENT)
Dept: LAB | Facility: HOSPITAL | Age: 69
End: 2024-08-26
Payer: MEDICARE

## 2024-08-26 ENCOUNTER — PATIENT MESSAGE (OUTPATIENT)
Dept: ADMINISTRATIVE | Facility: CLINIC | Age: 69
End: 2024-08-26
Payer: MEDICARE

## 2024-08-26 ENCOUNTER — PATIENT OUTREACH (OUTPATIENT)
Dept: ADMINISTRATIVE | Facility: CLINIC | Age: 69
End: 2024-08-26
Payer: MEDICARE

## 2024-08-26 DIAGNOSIS — M00.261 OTHER STREPTOCOCCAL ARTHRITIS, RIGHT KNEE: Primary | ICD-10-CM

## 2024-08-26 LAB
ALBUMIN SERPL BCP-MCNC: 2.5 G/DL (ref 3.5–5.2)
ALP SERPL-CCNC: 213 U/L (ref 55–135)
ALT SERPL W/O P-5'-P-CCNC: 186 U/L (ref 10–44)
ANION GAP SERPL CALC-SCNC: 14 MMOL/L (ref 8–16)
AST SERPL-CCNC: 73 U/L (ref 10–40)
BACTERIA SPEC ANAEROBE CULT: NORMAL
BILIRUB SERPL-MCNC: 0.5 MG/DL (ref 0.1–1)
BUN SERPL-MCNC: 19 MG/DL (ref 8–23)
CALCIUM SERPL-MCNC: 10 MG/DL (ref 8.7–10.5)
CHLORIDE SERPL-SCNC: 99 MMOL/L (ref 95–110)
CK SERPL-CCNC: 67 U/L (ref 20–200)
CO2 SERPL-SCNC: 23 MMOL/L (ref 23–29)
CREAT SERPL-MCNC: 0.9 MG/DL (ref 0.5–1.4)
EST. GFR  (NO RACE VARIABLE): >60 ML/MIN/1.73 M^2
FUNGUS SPEC CULT: NORMAL
GLUCOSE SERPL-MCNC: 169 MG/DL (ref 70–110)
POTASSIUM SERPL-SCNC: 4.5 MMOL/L (ref 3.5–5.1)
PROT SERPL-MCNC: 7.1 G/DL (ref 6–8.4)
SODIUM SERPL-SCNC: 136 MMOL/L (ref 136–145)

## 2024-08-26 PROCEDURE — 80053 COMPREHEN METABOLIC PANEL: CPT | Mod: HCNC | Performed by: NURSE PRACTITIONER

## 2024-08-26 PROCEDURE — 82550 ASSAY OF CK (CPK): CPT | Mod: HCNC | Performed by: NURSE PRACTITIONER

## 2024-08-26 NOTE — TELEPHONE ENCOUNTER
----- Message from ALANNA Lopez, ANP sent at 8/26/2024  4:14 PM CDT -----  Hey there!   Please call Home Health and tell them we need weekly CRP as well.  Was not drawn.  Thanks.  ----- Message -----  From: Kip, [facility] Interface  Sent: 8/26/2024   3:55 PM CDT  To: ALANNA Lopez, ANP

## 2024-08-26 NOTE — PROGRESS NOTES
C3 nurse attempted to contact King Cool Jr. for a TCC post hospital discharge follow up call. No answer. Left voicemail with callback information. The patient has a scheduled HOSFU appointment with Nell Lopes on 08/30/24 @ 1200.

## 2024-08-27 ENCOUNTER — TELEPHONE (OUTPATIENT)
Dept: INFECTIOUS DISEASES | Facility: CLINIC | Age: 69
End: 2024-08-27
Payer: MEDICARE

## 2024-08-27 ENCOUNTER — TELEPHONE (OUTPATIENT)
Dept: ORTHOPEDICS | Facility: CLINIC | Age: 69
End: 2024-08-27
Payer: MEDICARE

## 2024-08-27 DIAGNOSIS — R80.9 TYPE 2 DIABETES MELLITUS WITH MICROALBUMINURIA, WITHOUT LONG-TERM CURRENT USE OF INSULIN: Chronic | ICD-10-CM

## 2024-08-27 DIAGNOSIS — E11.29 TYPE 2 DIABETES MELLITUS WITH MICROALBUMINURIA, WITHOUT LONG-TERM CURRENT USE OF INSULIN: Chronic | ICD-10-CM

## 2024-08-27 NOTE — TELEPHONE ENCOUNTER
----- Message from Elsie Miller sent at 8/27/2024 10:44 AM CDT -----  Regarding: Refill request  .Type: RX Refill Request    Who Called:self     Have you contacted your pharmacy:no    Refill or New Rx: Refill    RX Name and Strength:empagliflozin (JARDIANCE) 25 mg tablet    Preferred Pharmacy with phone number:.  [facility]/pharmacy #[id number] - [place] - [address]  [address]  [place] [address]  Phone: [phone] Fax: [phone]    Local or Mail Order:local     Ordering Provider: Lianne     Would the patient rather a call back or a response via My [facility]? Call     Best Call Back Number:.[phone]      Additional Information:

## 2024-08-27 NOTE — TELEPHONE ENCOUNTER
----- Message from Kristian Jordan sent at 8/27/2024  1:06 PM CDT -----  Regarding: Appt  Contact: pt [phone]  Missed Callback    Pt returning callback from missed call. Requesting to speak with staff in Nell Lopes's  office. Please call@ [phone]

## 2024-08-27 NOTE — TELEPHONE ENCOUNTER
No care due was identified.  [facility] Embedded Care Due Messages. Reference number: [id number].   8/27/2024 10:49:15 AM CDT

## 2024-08-27 NOTE — PROGRESS NOTES
C3 nurse spoke with King Cool JrBrain's daughter for a TCC post hospital discharge follow up call. The patient has a scheduled HOSFU appointment with Nell Lopes on 08/30/24 @ 1200.

## 2024-08-27 NOTE — TELEPHONE ENCOUNTER
----- Message from Mic Dickerson sent at 8/27/2024 12:01 AM CDT -----  Regarding: Client Services  Contact: [phone]  Good Morning,     My name is Mci Dickerson, I work in the Lab Client Services. We had a problem with some lab work on this patient. If someone from your office could call us at [phone] or pts. [phone] that would be great. Anyone in my department can help.      Thank you,

## 2024-08-28 NOTE — PROGRESS NOTES
On IV ceftriaxone for septic arthritis of knee.   Liver enzymes trending down.   CBC hemolyzed   No CRP drawn.  Home Health/Infusion notified need crp, cbc, cmp weekly.

## 2024-08-29 ENCOUNTER — TELEPHONE (OUTPATIENT)
Dept: INFECTIOUS DISEASES | Facility: CLINIC | Age: 69
End: 2024-08-29
Payer: MEDICARE

## 2024-08-29 LAB
BACTERIA BLD CULT: NORMAL
BACTERIA BLD CULT: NORMAL

## 2024-08-29 NOTE — TELEPHONE ENCOUNTER
----- Message from ALANNA Lopez, ANP sent at 8/28/2024  6:55 PM CDT -----  Can you please call the Infusion company or  and advise that we need weekly cbc, cmp, and crp.  Do not need CK!   Thanks

## 2024-08-30 ENCOUNTER — OFFICE VISIT (OUTPATIENT)
Dept: ORTHOPEDICS | Facility: CLINIC | Age: 69
End: 2024-08-30
Payer: MEDICARE

## 2024-08-30 VITALS — WEIGHT: 211 LBS | HEIGHT: 70 IN | BODY MASS INDEX: 30.21 KG/M2 | TEMPERATURE: 99 F

## 2024-08-30 DIAGNOSIS — M00.261 STREPTOCOCCAL ARTHRITIS OF RIGHT KNEE: Primary | ICD-10-CM

## 2024-08-30 PROCEDURE — 99999 PR PBB SHADOW E&M-EST. PATIENT-LVL II: CPT | Mod: PBBFAC,HCNC,, | Performed by: PHYSICIAN ASSISTANT

## 2024-08-30 RX ORDER — OXYCODONE HYDROCHLORIDE 5 MG/1
5 TABLET ORAL EVERY 4 HOURS PRN
Qty: 25 TABLET | Refills: 0 | Status: SHIPPED | OUTPATIENT
Start: 2024-08-30

## 2024-08-30 NOTE — PROGRESS NOTES
Principal Orthopedic Problem: Septic arthritis right knee                          Pseudogout right knee                          Extensive synovitis right knee                          Osteoarthritis right knee     08/16/24: :   Arthroscopic irrigation with extensive debridement right knee     Per ID: IV ceftriaxone     Mr. Cool is here today for a post-operative visit    Interval History:  he reports that he is doing ok, but feels the same. .   he is at home . he is not participating in PT/OT.   Pain is somewhat controlled.  he is  taking pain medication.    He is taking antibiotics as prescribed.   he denies fever, chills, and sweats .     Physical exam:    Patient arrives to exam room: wheelchair.  Patient is  accompanied moderate swelling to the knee.    Dressing taken down.  Incision is clean, dry and intact.  Sutures left in place   Healing well no signs of breakdown or infection.    RADS: All pertinent images were reviewed by myself:   none done today    Assessment:  Post-op visit ( 2 weeks)    Plan:  Current care, treatment plan, precautions, activity level/ modifications, limitations, rehabilitation exercises and proposed future treatment were discussed with the patient. We discussed the need to monitor for changes in symptoms and condition and report them to the physician.  Discussed importance of compliance with all appointments and follow up examinations.     WOUND CARE :  - keep area covered, will use compression.   -Patient was advised to monitor wound closely and multiple times daily for any problems. Call clinic immediately or report to ED for immediate medical attention for any complications including reopening of wound, drainage, purulence, redness, streaking, odor, pain out of proportion, fever, chills, etc.       ACTIVITY:   - as tolerated  -range of motion as tolerated    - WBAT      -PT/OT, Patient is responsible to establish and continue care      PAIN MEDICATION:   - Multimodal  pain control  - Pain medication: refill was not needed  - Pain medication refill policy provided to patient for review, yes.    - Patient was informed a multi-modal approach is used to treat their pain. With the goal to get off of narcotic pain medication and discontinue as soon as possible.   - ice and elevation to reduce pain and swelling     DVT PROPHYLAXIS:   - Eliquis     FOLLOW UP:   - Patient will follow up in the clinic in 1 weeks, sooner if any concerns.      If there are any questions prior to scheduled follow up, the patient was instructed to contact the office

## 2024-09-01 ENCOUNTER — PATIENT MESSAGE (OUTPATIENT)
Dept: FAMILY MEDICINE | Facility: CLINIC | Age: 69
End: 2024-09-01
Payer: MEDICARE

## 2024-09-01 DIAGNOSIS — M10.9 GOUT, UNSPECIFIED CAUSE, UNSPECIFIED CHRONICITY, UNSPECIFIED SITE: Primary | ICD-10-CM

## 2024-09-02 ENCOUNTER — PATIENT MESSAGE (OUTPATIENT)
Dept: FAMILY MEDICINE | Facility: CLINIC | Age: 69
End: 2024-09-02
Payer: MEDICARE

## 2024-09-03 ENCOUNTER — LAB VISIT (OUTPATIENT)
Dept: LAB | Facility: HOSPITAL | Age: 69
End: 2024-09-03
Attending: NURSE PRACTITIONER
Payer: MEDICARE

## 2024-09-03 DIAGNOSIS — M00.261 OTHER STREPTOCOCCAL ARTHRITIS, RIGHT KNEE: Primary | ICD-10-CM

## 2024-09-03 DIAGNOSIS — M00.261 OTHER STREPTOCOCCAL ARTHRITIS, RIGHT KNEE: ICD-10-CM

## 2024-09-03 LAB
ALBUMIN SERPL BCP-MCNC: 2.7 G/DL (ref 3.5–5.2)
ALP SERPL-CCNC: 117 U/L (ref 55–135)
ALT SERPL W/O P-5'-P-CCNC: 53 U/L (ref 10–44)
ANION GAP SERPL CALC-SCNC: 17 MMOL/L (ref 8–16)
AST SERPL-CCNC: 22 U/L (ref 10–40)
BASOPHILS # BLD AUTO: 0.04 K/UL (ref 0–0.2)
BASOPHILS NFR BLD: 0.6 % (ref 0–1.9)
BILIRUB SERPL-MCNC: 0.3 MG/DL (ref 0.1–1)
BUN SERPL-MCNC: 18 MG/DL (ref 8–23)
CALCIUM SERPL-MCNC: 9.9 MG/DL (ref 8.7–10.5)
CHLORIDE SERPL-SCNC: 100 MMOL/L (ref 95–110)
CK SERPL-CCNC: 46 U/L (ref 20–200)
CO2 SERPL-SCNC: 23 MMOL/L (ref 23–29)
CREAT SERPL-MCNC: 1 MG/DL (ref 0.5–1.4)
DIFFERENTIAL METHOD BLD: ABNORMAL
EOSINOPHIL # BLD AUTO: 0.1 K/UL (ref 0–0.5)
EOSINOPHIL NFR BLD: 1.2 % (ref 0–8)
ERYTHROCYTE [DISTWIDTH] IN BLOOD BY AUTOMATED COUNT: 11.9 % (ref 11.5–14.5)
EST. GFR  (NO RACE VARIABLE): >60 ML/MIN/1.73 M^2
GLUCOSE SERPL-MCNC: 192 MG/DL (ref 70–110)
HCT VFR BLD AUTO: 36.8 % (ref 40–54)
HGB BLD-MCNC: 11.6 G/DL (ref 14–18)
IMM GRANULOCYTES # BLD AUTO: 0.03 K/UL (ref 0–0.04)
IMM GRANULOCYTES NFR BLD AUTO: 0.4 % (ref 0–0.5)
LYMPHOCYTES # BLD AUTO: 1.4 K/UL (ref 1–4.8)
LYMPHOCYTES NFR BLD: 21.1 % (ref 18–48)
MCH RBC QN AUTO: 29.6 PG (ref 27–31)
MCHC RBC AUTO-ENTMCNC: 31.5 G/DL (ref 32–36)
MCV RBC AUTO: 94 FL (ref 82–98)
MONOCYTES # BLD AUTO: 0.7 K/UL (ref 0.3–1)
MONOCYTES NFR BLD: 10.8 % (ref 4–15)
NEUTROPHILS # BLD AUTO: 4.5 K/UL (ref 1.8–7.7)
NEUTROPHILS NFR BLD: 65.9 % (ref 38–73)
NRBC BLD-RTO: 0 /100 WBC
PLATELET # BLD AUTO: 518 K/UL (ref 150–450)
PMV BLD AUTO: 9.3 FL (ref 9.2–12.9)
POTASSIUM SERPL-SCNC: 3.9 MMOL/L (ref 3.5–5.1)
PROT SERPL-MCNC: 7.2 G/DL (ref 6–8.4)
RBC # BLD AUTO: 3.92 M/UL (ref 4.6–6.2)
SODIUM SERPL-SCNC: 140 MMOL/L (ref 136–145)
WBC # BLD AUTO: 6.83 K/UL (ref 3.9–12.7)

## 2024-09-03 PROCEDURE — 82550 ASSAY OF CK (CPK): CPT | Mod: HCNC | Performed by: NURSE PRACTITIONER

## 2024-09-03 PROCEDURE — 80053 COMPREHEN METABOLIC PANEL: CPT | Mod: HCNC | Performed by: NURSE PRACTITIONER

## 2024-09-03 PROCEDURE — 85025 COMPLETE CBC W/AUTO DIFF WBC: CPT | Mod: HCNC | Performed by: NURSE PRACTITIONER

## 2024-09-03 RX ORDER — COLCHICINE 0.6 MG/1
0.6 TABLET ORAL DAILY PRN
Qty: 30 TABLET | Refills: 0 | Status: SHIPPED | OUTPATIENT
Start: 2024-09-03

## 2024-09-03 NOTE — TELEPHONE ENCOUNTER
No care due was identified.  [facility] Embedded Care Due Messages. Reference number: [id number].   9/03/2024 9:58:52 AM CDT

## 2024-09-05 ENCOUNTER — TELEPHONE (OUTPATIENT)
Dept: INFECTIOUS DISEASES | Facility: CLINIC | Age: 69
End: 2024-09-05
Payer: MEDICARE

## 2024-09-05 ENCOUNTER — LAB VISIT (OUTPATIENT)
Dept: LAB | Facility: HOSPITAL | Age: 69
End: 2024-09-05
Attending: NURSE PRACTITIONER
Payer: MEDICARE

## 2024-09-05 DIAGNOSIS — M00.9 PYOGENIC ARTHRITIS, UPPER ARM: ICD-10-CM

## 2024-09-05 DIAGNOSIS — M00.261 OTHER STREPTOCOCCAL ARTHRITIS, RIGHT KNEE: ICD-10-CM

## 2024-09-05 DIAGNOSIS — M00.9 PYOGENIC ARTHRITIS, UPPER ARM: Primary | ICD-10-CM

## 2024-09-05 LAB
ALBUMIN SERPL BCP-MCNC: 3.1 G/DL (ref 3.5–5.2)
ALP SERPL-CCNC: 136 U/L (ref 55–135)
ALT SERPL W/O P-5'-P-CCNC: 42 U/L (ref 10–44)
ANION GAP SERPL CALC-SCNC: 15 MMOL/L (ref 8–16)
AST SERPL-CCNC: 23 U/L (ref 10–40)
BASOPHILS # BLD AUTO: 0.07 K/UL (ref 0–0.2)
BASOPHILS NFR BLD: 0.8 % (ref 0–1.9)
BILIRUB SERPL-MCNC: 0.3 MG/DL (ref 0.1–1)
BUN SERPL-MCNC: 19 MG/DL (ref 8–23)
CALCIUM SERPL-MCNC: 10.4 MG/DL (ref 8.7–10.5)
CHLORIDE SERPL-SCNC: 101 MMOL/L (ref 95–110)
CO2 SERPL-SCNC: 25 MMOL/L (ref 23–29)
CREAT SERPL-MCNC: 1 MG/DL (ref 0.5–1.4)
CRP SERPL-MCNC: 82.7 MG/L (ref 0–8.2)
DIFFERENTIAL METHOD BLD: ABNORMAL
EOSINOPHIL # BLD AUTO: 0.1 K/UL (ref 0–0.5)
EOSINOPHIL NFR BLD: 0.9 % (ref 0–8)
ERYTHROCYTE [DISTWIDTH] IN BLOOD BY AUTOMATED COUNT: 11.9 % (ref 11.5–14.5)
EST. GFR  (NO RACE VARIABLE): >60 ML/MIN/1.73 M^2
GLUCOSE SERPL-MCNC: 200 MG/DL (ref 70–110)
HCT VFR BLD AUTO: 41.6 % (ref 40–54)
HGB BLD-MCNC: 13.6 G/DL (ref 14–18)
IMM GRANULOCYTES # BLD AUTO: 0.02 K/UL (ref 0–0.04)
IMM GRANULOCYTES NFR BLD AUTO: 0.2 % (ref 0–0.5)
LYMPHOCYTES # BLD AUTO: 1.6 K/UL (ref 1–4.8)
LYMPHOCYTES NFR BLD: 18.2 % (ref 18–48)
MCH RBC QN AUTO: 30.6 PG (ref 27–31)
MCHC RBC AUTO-ENTMCNC: 32.7 G/DL (ref 32–36)
MCV RBC AUTO: 94 FL (ref 82–98)
MONOCYTES # BLD AUTO: 0.7 K/UL (ref 0.3–1)
MONOCYTES NFR BLD: 7.4 % (ref 4–15)
NEUTROPHILS # BLD AUTO: 6.4 K/UL (ref 1.8–7.7)
NEUTROPHILS NFR BLD: 72.5 % (ref 38–73)
NRBC BLD-RTO: 0 /100 WBC
PLATELET # BLD AUTO: 577 K/UL (ref 150–450)
PMV BLD AUTO: 8.6 FL (ref 9.2–12.9)
POTASSIUM SERPL-SCNC: 4.5 MMOL/L (ref 3.5–5.1)
PROT SERPL-MCNC: 8.3 G/DL (ref 6–8.4)
RBC # BLD AUTO: 4.45 M/UL (ref 4.6–6.2)
SODIUM SERPL-SCNC: 141 MMOL/L (ref 136–145)
WBC # BLD AUTO: 8.78 K/UL (ref 3.9–12.7)

## 2024-09-05 PROCEDURE — 80053 COMPREHEN METABOLIC PANEL: CPT | Mod: HCNC | Performed by: NURSE PRACTITIONER

## 2024-09-05 PROCEDURE — 86140 C-REACTIVE PROTEIN: CPT | Mod: HCNC | Performed by: NURSE PRACTITIONER

## 2024-09-05 PROCEDURE — 85025 COMPLETE CBC W/AUTO DIFF WBC: CPT | Mod: HCNC | Performed by: NURSE PRACTITIONER

## 2024-09-05 NOTE — PROGRESS NOTES
ID OPAT   Septic arthritis knee - on IV ceftriaxone  Labs within acceptable limits.  Need CRP.   Infusion company contacted

## 2024-09-05 NOTE — TELEPHONE ENCOUNTER
----- Message from ALANNA Lopez, ANP sent at 9/4/2024  7:36 PM CDT -----  See attached. I am still getting CK on this fellow and no crp.

## 2024-09-05 NOTE — TELEPHONE ENCOUNTER
Called and gave them another order that we do not need CK but a CRP   GSA Summary Report   Report Generated On 05/28/2018 18:07   Admission Date: 05/27/2018   Discharge Date:05/28/2018   Medical Service:MED   Reason for visit:acute severe anemia and thrombocytopenia, gi bleed wit   Patient Name:ERIN GIBBONS   Sex:MALE   Age: 87 Years   Attending Physician:FREDERICK SON   Consulting Physician:MYRON SHEFFIELD      ALLERGIES:Eggs (edible)(Diarrhea)      CODE STATUS:   None Specified      RADIOLOGY ORDERS COMPLETED:        XR CHEST 2V     05/27/18      WEIGHT DOCUMENTED:   Initial Weight :05/27                             83.8(kg)    184(lb)   Weights display the last 5 within 7 days   05/27                             83.7(kg)    184(lb)   05/27                             83.8(kg)    184(lb)               VITAL SIGNS (Vitals Signs are the last 5 in the past 48 hours)   Vitals              Temp                        BP                  Pulse               RR                  SpO2   05/28 15:36--------------------------------------------71--------------------------------------93------------------   05/28 15:36------------------------141/73--------------------------------------------------------------------------   05/28 15:35----36.7 Oral-------------------------------------------------------------------------------------------   05/28 11:43----36.6----------------168/71--------------55------------------18------------------97------------------   05/28 09:28----36.3----------------163/76--------------78------------------18------------------96------------------         FLU VACCINE INFORMATION :Vaccine not previously administered at an [facility] within the past year      PNEUMOCOCCAL VACCINE INFORMATION:Vaccine not previously administered at an [facility] within the past 5 years         LABORATORY   36hr Labs   05/28 1017        MCV 87.5        MCH 29.3        MCHC 33.5        Analyzer ANC  NOT APPLIC..        NRBC  NOT APPLIC..        WBC 1.6   L         Hemoglobin 8.0   L        RBC 2.73   L        Platelet 36   L        Hematocrit 23.9   L        RDW-CV 17.2  H   05/28 0555        Magnesium 1.9   05/27 1847        Type And Screen  Type And S..        Sodium 135        Potassium 4.5        Chloride 101        Carbon Dioxide (CO2) 25        Anion Gap 14        BUN 14        GFR ESTIMATE  63        GFR ESTIMATE NON AFR 54        BUN/Creatinine Ratio 12        Calcium 8.5        Glucose Level 107  H        Creatinine 1.20  H        Protime 11.0        Prothrombin Time  NOT APPLIC..        INR (Lab) 1.1        PTT (Activated) 26        Partial Thromboplast  NOT APPLIC..        MCV 88.5        MCH 29.6        MCHC 33.5        Analyzer ANC  NOT APPLIC..        NRBC  NOT APPLIC..        Segs 26        Band Neutrophil 4        Lymphocyte 62        Reactive Lymphocyte 1        Monocyte 7        Eosinophil 0        Basophil 0        Abs Lymph 1.3        Abs Baso 0.0        WBC Morphology NORMAL        Platelet Morphology NORMAL        Ovalocytes FEW        Acanthocytes FEW        Smear Review Result  NOT APPLIC..        Abs Eos 0.0   L        Absolute Neutrophils 0.6   L        RBC 2.43   L        Absolute Mono 0.1   L        Nucleated RBC's 1  H        Platelet 10   C        Hematocrit 21.5   L        WBC 2.1   L        RDW-CV 17.7  H        Hemoglobin 7.2   L       Labs in pending status : None      FOLLOW UP INFORMATION:   Follow up with primary care provider  Follow up date:  Comments:as needed   MYRON DAVILA MD  Follow up date:  Comments:follow up this week in office for CBC lab work         DISCHARGE MEDICATION LIST   escitalopram (escitalopram oral 5 mg tablet)   latanoprost ophthalmic (Xalatan ophthalmic 0.005% solution)   magnesium oxide (magnesium oxide oral 400 mg tablet (Mag-Ox 400))   metoprolol (Metoprolol Tartrate 50 mg oral tablet)   omeprazole (omeprazole 20 mg oral delayed release capsule)   potassium CHLORIDE (potassium CHLORIDE oral 10 mEq  ER tablet (K-Dur))   timolol ophthalmic (timolol ophthalmic 0.5% solution (Timoptic))   tranexamic acid (tranexamic acid oral 650 mg tablet (Lysteda / TXA))

## 2024-09-06 ENCOUNTER — OFFICE VISIT (OUTPATIENT)
Dept: ORTHOPEDICS | Facility: CLINIC | Age: 69
End: 2024-09-06
Payer: MEDICARE

## 2024-09-06 DIAGNOSIS — M00.261 STREPTOCOCCAL ARTHRITIS OF RIGHT KNEE: Primary | ICD-10-CM

## 2024-09-06 RX ORDER — IBUPROFEN 800 MG/1
800 TABLET ORAL EVERY 8 HOURS PRN
Qty: 90 TABLET | Refills: 0 | Status: SHIPPED | OUTPATIENT
Start: 2024-09-06 | End: 2024-10-06

## 2024-09-06 NOTE — PROGRESS NOTES
Principal Orthopedic Problem: Septic arthritis right knee                          Pseudogout right knee                          Extensive synovitis right knee                          Osteoarthritis right knee     24: :   Arthroscopic irrigation with extensive debridement right knee   NGTD  Per ID: IV ceftriaxone   CRP: from 217 to 82     Mr. Cool is here today for a post-operative visit    Interval History:  he reports that he is doing ok .   he is at home . he is participating in PT/OT.   Pain is somewhat controlled.  he is  taking pain medication.    He is taking antibiotics as prescribed.  Patient reports that over all he is doing somewhat better. He did use compression to the knee but stated that it caused swelling and pain in his ankle. He then switched to a compression sleeve.   His pain is intermittent intensity. Fells like a stabbing in his knee.   he denies fever, chills, and sweats .     Physical exam:    Patient arrives to exam room: wheelchair.  Patient is  accompanied moderate swelling to the knee.    Dressing taken down.  Incision is clean, dry and intact.  Sutures removed without difficulty    Healing well no signs of breakdown or infection.    RADS: All pertinent images were reviewed by myself:   none done today    Assessment:  Post-op visit ( 3 weeks)    Plan:  Current care, treatment plan, precautions, activity level/ modifications, limitations, rehabilitation exercises and proposed future treatment were discussed with the patient. We discussed the need to monitor for changes in symptoms and condition and report them to the physician.  Discussed importance of compliance with all appointments and follow up examinations.     WOUND CARE :  - use compression to  swelling  - The patient was advised to keep the incision clean and dry for the next 24 hours after which he may wash the area with antibacterial soap in the shower. Will not submerge until the incision is completely  healed  -Patient was advised to monitor wound closely and multiple times daily for any problems. Call clinic immediately or report to ED for immediate medical attention for any complications including reopening of wound, drainage, purulence, redness, streaking, odor, pain out of proportion, fever, chills, etc.   -- If there are signs of infection, please call [facility] [phone] for further instructions and to make appt to be seen.         ACTIVITY:   - as tolerated  -range of motion as tolerated    - WBAT      -PT/OT, Patient is responsible to establish and continue care      PAIN MEDICATION:   - Multimodal pain control  - Pain medication: refill was needed  - Pain medication refill policy provided to patient for review, yes.    - Patient was informed a multi-modal approach is used to treat their pain. With the goal to get off of narcotic pain medication and discontinue as soon as possible.   - ice and elevation to reduce pain and swelling     DVT PROPHYLAXIS:   - Eliquis     FOLLOW UP:   - Patient will follow up in the clinic in 3 weeks, sooner if any concerns.      If there are any questions prior to scheduled follow up, the patient was instructed to contact the office

## 2024-09-08 NOTE — PROGRESS NOTES
ID OPAT lab review:  Septic arthritis knee - on IV ceftriaxone  CRP downtrending but still elevated (287>82)  EOC 9/13  ID follow up 9/12 with FEMI Whitfield

## 2024-09-09 ENCOUNTER — TELEPHONE (OUTPATIENT)
Dept: FAMILY MEDICINE | Facility: CLINIC | Age: 69
End: 2024-09-09
Payer: MEDICARE

## 2024-09-09 ENCOUNTER — LAB VISIT (OUTPATIENT)
Dept: LAB | Facility: HOSPITAL | Age: 69
End: 2024-09-09
Attending: NURSE PRACTITIONER
Payer: MEDICARE

## 2024-09-09 DIAGNOSIS — M00.261 OTHER STREPTOCOCCAL ARTHRITIS, RIGHT KNEE: ICD-10-CM

## 2024-09-09 DIAGNOSIS — M00.9 PYOGENIC ARTHRITIS, UPPER ARM: Primary | ICD-10-CM

## 2024-09-09 DIAGNOSIS — G89.29 CHRONIC PAIN OF RIGHT KNEE: ICD-10-CM

## 2024-09-09 DIAGNOSIS — M17.0 PRIMARY OSTEOARTHRITIS OF BOTH KNEES: ICD-10-CM

## 2024-09-09 DIAGNOSIS — M25.561 CHRONIC PAIN OF RIGHT KNEE: ICD-10-CM

## 2024-09-09 LAB
ALBUMIN SERPL BCP-MCNC: 3.1 G/DL (ref 3.5–5.2)
ALP SERPL-CCNC: 116 U/L (ref 55–135)
ALT SERPL W/O P-5'-P-CCNC: 28 U/L (ref 10–44)
ANION GAP SERPL CALC-SCNC: 12 MMOL/L (ref 8–16)
AST SERPL-CCNC: 20 U/L (ref 10–40)
BASOPHILS # BLD AUTO: 0.05 K/UL (ref 0–0.2)
BASOPHILS NFR BLD: 0.7 % (ref 0–1.9)
BILIRUB SERPL-MCNC: 0.4 MG/DL (ref 0.1–1)
BUN SERPL-MCNC: 20 MG/DL (ref 8–23)
CALCIUM SERPL-MCNC: 9.3 MG/DL (ref 8.7–10.5)
CHLORIDE SERPL-SCNC: 102 MMOL/L (ref 95–110)
CO2 SERPL-SCNC: 24 MMOL/L (ref 23–29)
CREAT SERPL-MCNC: 0.9 MG/DL (ref 0.5–1.4)
CRP SERPL-MCNC: 49.2 MG/L (ref 0–8.2)
DIFFERENTIAL METHOD BLD: ABNORMAL
EOSINOPHIL # BLD AUTO: 0.1 K/UL (ref 0–0.5)
EOSINOPHIL NFR BLD: 1.2 % (ref 0–8)
ERYTHROCYTE [DISTWIDTH] IN BLOOD BY AUTOMATED COUNT: 11.9 % (ref 11.5–14.5)
EST. GFR  (NO RACE VARIABLE): >60 ML/MIN/1.73 M^2
GLUCOSE SERPL-MCNC: 281 MG/DL (ref 70–110)
HCT VFR BLD AUTO: 37 % (ref 40–54)
HGB BLD-MCNC: 12.3 G/DL (ref 14–18)
IMM GRANULOCYTES # BLD AUTO: 0.03 K/UL (ref 0–0.04)
IMM GRANULOCYTES NFR BLD AUTO: 0.4 % (ref 0–0.5)
LYMPHOCYTES # BLD AUTO: 1.4 K/UL (ref 1–4.8)
LYMPHOCYTES NFR BLD: 18.9 % (ref 18–48)
MCH RBC QN AUTO: 30 PG (ref 27–31)
MCHC RBC AUTO-ENTMCNC: 33.2 G/DL (ref 32–36)
MCV RBC AUTO: 90 FL (ref 82–98)
MONOCYTES # BLD AUTO: 0.6 K/UL (ref 0.3–1)
MONOCYTES NFR BLD: 8.9 % (ref 4–15)
NEUTROPHILS # BLD AUTO: 5.1 K/UL (ref 1.8–7.7)
NEUTROPHILS NFR BLD: 69.9 % (ref 38–73)
NRBC BLD-RTO: 0 /100 WBC
PLATELET # BLD AUTO: 382 K/UL (ref 150–450)
PMV BLD AUTO: 8.7 FL (ref 9.2–12.9)
POTASSIUM SERPL-SCNC: 4.7 MMOL/L (ref 3.5–5.1)
PROT SERPL-MCNC: 7 G/DL (ref 6–8.4)
RBC # BLD AUTO: 4.1 M/UL (ref 4.6–6.2)
SODIUM SERPL-SCNC: 138 MMOL/L (ref 136–145)
WBC # BLD AUTO: 7.23 K/UL (ref 3.9–12.7)

## 2024-09-09 PROCEDURE — 86140 C-REACTIVE PROTEIN: CPT | Mod: HCNC | Performed by: NURSE PRACTITIONER

## 2024-09-09 PROCEDURE — 80053 COMPREHEN METABOLIC PANEL: CPT | Mod: HCNC | Performed by: NURSE PRACTITIONER

## 2024-09-09 PROCEDURE — 85025 COMPLETE CBC W/AUTO DIFF WBC: CPT | Mod: HCNC | Performed by: NURSE PRACTITIONER

## 2024-09-09 RX ORDER — HYDROCODONE BITARTRATE AND ACETAMINOPHEN 10; 325 MG/1; MG/1
1 TABLET ORAL 3 TIMES DAILY PRN
Qty: 72 TABLET | Refills: 0 | Status: SHIPPED | OUTPATIENT
Start: 2024-09-09

## 2024-09-09 NOTE — TELEPHONE ENCOUNTER
No care due was identified.  [facility] Embedded Care Due Messages. Reference number: [id number].   9/09/2024 3:09:58 PM CDT

## 2024-09-09 NOTE — TELEPHONE ENCOUNTER
----- Message from Rowan Jaffe sent at 9/9/2024 11:40 AM CDT -----  Name of Who is calling :MICHELE MONTOYA JR. [[id number]]        What is the request in detail:Pt daughter called back and would like pt to be seen soon because he is having problems with his prostate. Please assist         Can the clinic reply by [facility]:no            What number to call back if not in [facility]: [phone]

## 2024-09-09 NOTE — TELEPHONE ENCOUNTER
Spoke with patient and due to weather condition patient is opting out to do virtual visit with provider on 9/11/2024.

## 2024-09-10 RX ORDER — GABAPENTIN 300 MG/1
300 CAPSULE ORAL 3 TIMES DAILY
Qty: 270 CAPSULE | Refills: 2 | OUTPATIENT
Start: 2024-09-10

## 2024-09-10 RX ORDER — HYDROCODONE BITARTRATE AND ACETAMINOPHEN 10; 325 MG/1; MG/1
1 TABLET ORAL 3 TIMES DAILY PRN
Qty: 72 TABLET | Refills: 0 | OUTPATIENT
Start: 2024-09-10

## 2024-09-12 ENCOUNTER — OFFICE VISIT (OUTPATIENT)
Dept: INFECTIOUS DISEASES | Facility: CLINIC | Age: 69
End: 2024-09-12
Payer: MEDICARE

## 2024-09-12 ENCOUNTER — PATIENT MESSAGE (OUTPATIENT)
Dept: INFECTIOUS DISEASES | Facility: CLINIC | Age: 69
End: 2024-09-12

## 2024-09-12 DIAGNOSIS — M11.261 PSEUDOGOUT OF RIGHT KNEE: Primary | ICD-10-CM

## 2024-09-12 DIAGNOSIS — M00.261 STREPTOCOCCAL ARTHRITIS OF RIGHT KNEE: ICD-10-CM

## 2024-09-12 PROCEDURE — 3046F HEMOGLOBIN A1C LEVEL >9.0%: CPT | Mod: HCNC,CPTII,95, | Performed by: STUDENT IN AN ORGANIZED HEALTH CARE EDUCATION/TRAINING PROGRAM

## 2024-09-12 PROCEDURE — 4010F ACE/ARB THERAPY RXD/TAKEN: CPT | Mod: HCNC,CPTII,95, | Performed by: STUDENT IN AN ORGANIZED HEALTH CARE EDUCATION/TRAINING PROGRAM

## 2024-09-12 PROCEDURE — 3066F NEPHROPATHY DOC TX: CPT | Mod: HCNC,CPTII,95, | Performed by: STUDENT IN AN ORGANIZED HEALTH CARE EDUCATION/TRAINING PROGRAM

## 2024-09-12 PROCEDURE — 3061F NEG MICROALBUMINURIA REV: CPT | Mod: HCNC,CPTII,95, | Performed by: STUDENT IN AN ORGANIZED HEALTH CARE EDUCATION/TRAINING PROGRAM

## 2024-09-12 PROCEDURE — 99213 OFFICE O/P EST LOW 20 MIN: CPT | Mod: HCNC,95,, | Performed by: STUDENT IN AN ORGANIZED HEALTH CARE EDUCATION/TRAINING PROGRAM

## 2024-09-12 PROCEDURE — 1111F DSCHRG MED/CURRENT MED MERGE: CPT | Mod: HCNC,CPTII,95, | Performed by: STUDENT IN AN ORGANIZED HEALTH CARE EDUCATION/TRAINING PROGRAM

## 2024-09-12 NOTE — PROGRESS NOTES
Infectious Disease Clinic Visit    --------------------------------------------------------------------------------------------------------------------------------------------------------------------------------------------------------------  -- Today's visit conducted via Virtual Tele-health visit with audio/video.    Visit type: audiovisual     Face to Face time with patient: 30mins  30 minutes of total time spent on the encounter, which includes face to face time and non-face to face time preparing to see the patient (eg, review of tests), Obtaining and/or reviewing separately obtained history, Documenting clinical information in the electronic or other health record, Independently interpreting results (not separately reported) and communicating results to the patient/family/caregiver, or Care coordination (not separately reported).         Each patient to whom is provided medical services by telemedicine is:  (1) informed of the relationship between the physician and patient and the respective role of any other health care provider with respect to management of the patient; and (2) notified that he or she may decline to receive medical services by telemedicine and may withdraw from such care at any time.    --------------------------------------------------------------------------------------------------------------------------------------------------------------------------------------------------------------      Reason:    Hosp f/u    HPI:        69M with CAD, Afib on Eliquis, DM (A1C 11), and osteoarthritis of the knee -- recently admitted with pseudogout with superimposed septic arthitis of native Rt knee.   Patient has history of OA in the right knee and receives intraarticular steroid injections every 3 months.  Last injection was on 8/13.  Developed increased pain and swelling within 24 hours after the injection. Underwent knee aspiration in Ortho clinic 8/15 which showed 143K WBCs, 85% segs and positive  CPP crystals. Aspirate cultures of 8/15 is positive for strep mitis (rare). No history of gout or pseudogout.         Underwent surgical I&D with Dr. Alfaro on 8/16. Per OP note, thick viscous fluid encountered.  Suspects septic arthritis superimposed over pseudogout.  No OR cultures sent as patient had been on Vanc/Ceftriaxone prior to surgery. Post-op had a few days of persistent fevers. Swelling and pain began in leg/calf.  CRP remains elevated. Ortho re-aspirated knee. WBC 32K, 86% segs. Blood and aspiration cx NGTD. CT knee/leg revealed ruptured bakers cyst which could explain all symptoms. Per ortho - no concerns about joint effusion or pneumarthrosis present. Improved on IV-cefriaxone inpatient, LFTS downtrended, possibly elevated d/t early post op surgery. Pt discharged to home with Iv-ceftriaxone, for 4wk course, YAMILET 09/13.     Last labs 09/09: CRP 49 (continued downtrend), cbc / cmp wnl.      Seen today virtually for f/u appt for EOC. Pt reports ongoing swelling and pain 9/10, with inability to bear weight, with mild ache in calf / popliteal fossa 2/2 ruptured backers cyst; without redness or warmth, or systemic s/sxs. Ongoing pain swelling of Rt knee globablly, could be multi-factorial; OA, gout, ruptured bakers cyst, post-infectious inflammation vs possible ongoing infection (Septic arthritis).     Review of Systems   Musculoskeletal:  Positive for joint pain.   All other systems reviewed and are negative.        Physical Exam  Vitals reviewed.   Constitutional:       General: He is not in acute distress.     Appearance: Normal appearance. He is not ill-appearing, toxic-appearing or diaphoretic.      Comments: Exam limited by tele-health visit today.   Musculoskeletal:         General: Swelling present.   Neurological:      Mental Status: He is alert.   Psychiatric:         Behavior: Behavior normal.         Thought Content: Thought content normal.           Review of patient's allergies indicates:    Allergen Reactions    Tamiflu [oseltamivir]      Increases BP    Metformin Hives     Diarrhea, nausea    Penicillins Rash         Current Outpatient Medications:     alcohol swabs (DROPSAFE ALCOHOL PREP PADS) PadM, USE AS DIRECTED AS NEEDED, Disp: 400 each, Rfl: 1    apixaban (ELIQUIS) 5 mg Tab, Take 1 tablet (5 mg total) by mouth 2 (two) times daily., Disp: 180 tablet, Rfl: 3    ASCORBATE CALCIUM (VITAMIN C ORAL), Take by mouth once daily. , Disp: , Rfl:     aspirin (ECOTRIN) 81 MG EC tablet, Take 81 mg by mouth once daily., Disp: , Rfl:     atorvastatin (LIPITOR) 80 MG tablet, Take 1 tablet (80 mg total) by mouth every evening., Disp: 90 tablet, Rfl: 3    blood sugar diagnostic Strp, 1 strip by Misc.(Non-Drug; Combo Route) route 3 (three) times daily., Disp: 200 strip, Rfl: 5    blood-glucose meter kit, Use as instructed, Disp: 1 each, Rfl: 0    blood-glucose meter kit, 1 each by Other route 3 (three) times daily. Use as instructed, Disp: 1 each, Rfl: 0    celecoxib (CELEBREX) 200 MG capsule, Take 1 capsule (200 mg total) by mouth once daily., Disp: 30 capsule, Rfl: 0    colchicine (COLCRYS) 0.6 mg tablet, Take 1 tablet (0.6 mg total) by mouth daily as needed (gout attack for 3 days.)., Disp: 30 tablet, Rfl: 0    D5W PgBk 100 mL with cefTRIAXone 2 gram SolR 2 g, Inject 2 g into the vein once daily., Disp: , Rfl:     diclofenac sodium (VOLTAREN) 1 % Gel, Apply 2 g topically once daily., Disp: 450 g, Rfl: 0    empagliflozin (JARDIANCE) 25 mg tablet, Take 1 tablet (25 mg total) by mouth once daily., Disp: 90 tablet, Rfl: 1    fish oil-omega-3 fatty acids 300-1,000 mg capsule, Take 2 g by mouth once daily., Disp: , Rfl:     gabapentin (NEURONTIN) 300 MG capsule, Take 1 capsule (300 mg total) by mouth 3 (three) times daily., Disp: 270 capsule, Rfl: 2    glimepiride (AMARYL) 4 MG tablet, Take 1 tablet (4 mg total) by mouth 2 (two) times a day., Disp: 180 tablet, Rfl: 3    [START ON 10/13/2024]  HYDROcodone-acetaminophen (NORCO)  mg per tablet, Take 1 tablet by mouth 3 (three) times daily as needed (pain)., Disp: 72 tablet, Rfl: 0    HYDROcodone-acetaminophen (NORCO)  mg per tablet, Take 1 tablet by mouth 3 (three) times daily as needed (pain)., Disp: 72 tablet, Rfl: 0    HYDROcodone-acetaminophen (NORCO)  mg per tablet, Take 1 tablet by mouth 3 (three) times daily as needed (pain)., Disp: 72 tablet, Rfl: 0    hydrocortisone 2.5 % cream, Apply topically 2 (two) times daily., Disp: 20 g, Rfl: 2    ibuprofen (ADVIL,MOTRIN) 800 MG tablet, Take 1 tablet (800 mg total) by mouth every 8 (eight) hours as needed for Pain., Disp: 90 tablet, Rfl: 0    lancets Misc, 1 application  by Misc.(Non-Drug; Combo Route) route 3 (three) times daily., Disp: 200 each, Rfl: 5    loratadine (CLARITIN) 10 mg tablet, Take 1 tablet (10 mg total) by mouth once daily., Disp: 90 tablet, Rfl: 0    losartan (COZAAR) 25 MG tablet, Take 1 tablet (25 mg total) by mouth once daily., Disp: 90 tablet, Rfl: 3    metoprolol succinate (TOPROL-XL) 25 MG 24 hr tablet, Take 1 tablet (25 mg total) by mouth once daily., Disp: 90 tablet, Rfl: 3    multivitamin with minerals tablet, Take 1 tablet by mouth once daily., Disp: , Rfl:     neomycin-polymyxin-hydrocortisone (CORTISPORIN) 3.5-10,000-1 mg/mL-unit/mL-% otic suspension, PLACE 3 DROPS INTO THE LEFT EAR 4 TIMES DAILY., Disp: 10 mL, Rfl: 0    sodium chloride (SALINE NASAL) 0.65 % nasal spray, 2 sprays by Nasal route 2 (two) times a day., Disp: 104 mL, Rfl: 0    triamcinolone acetonide 0.1% (KENALOG) 0.1 % ointment, Apply topically 2 (two) times daily as needed., Disp: 30 g, Rfl: 0  No current facility-administered medications for this visit.    Facility-Administered Medications Ordered in Other Visits:     triamcinolone acetonide injection 40 mg, 40 mg, Intra-articular, , Gallo Lara MD, 40 mg at 09/12/19 1631    Past Medical History:   Diagnosis Date    Coronary artery disease  involving native coronary artery of native heart without angina pectoris 3/7/2023    Essential (primary) hypertension     Male erectile dysfunction, unspecified     New onset a-fib 1/3/2023    Testicular hypofunction     Type 2 diabetes mellitus without complications        Lab Results   Component Value Date    WBC 7.23 09/09/2024    CRP 49.2 (H) 09/09/2024    SEDRATE 12 08/15/2024    SEDRATE 12 08/15/2024    CREATININE 0.9 09/09/2024    AST 20 09/09/2024    ALT 28 09/09/2024    ALKPHOS 116 09/09/2024       Immunization History   Administered Date(s) Administered    Td (ADULT) 02/18/2015         Reviewed available labs and imaging.     There were no vitals filed for this visit.      Assessment:  Encounter Diagnoses   Name Primary?    Pseudogout of right knee Yes    Streptococcal arthritis of right knee s/p I+D on 8/16/2024    Midline placed 08/23, so max duration 4wks s/p placement.     Plan:     Continue Iv- Ceftriaxone for additional 10d, with new YAMILET 09/23.  F/u with ortho as rec'd      Follow-up: 7-10d      No orders of the defined types were placed in this encounter.

## 2024-09-13 ENCOUNTER — TELEPHONE (OUTPATIENT)
Dept: PHARMACY | Facility: CLINIC | Age: 69
End: 2024-09-13
Payer: MEDICARE

## 2024-09-13 ENCOUNTER — OFFICE VISIT (OUTPATIENT)
Dept: FAMILY MEDICINE | Facility: CLINIC | Age: 69
End: 2024-09-13
Payer: MEDICARE

## 2024-09-13 VITALS
HEIGHT: 70 IN | DIASTOLIC BLOOD PRESSURE: 60 MMHG | OXYGEN SATURATION: 99 % | HEART RATE: 92 BPM | BODY MASS INDEX: 25.83 KG/M2 | SYSTOLIC BLOOD PRESSURE: 110 MMHG | TEMPERATURE: 98 F | WEIGHT: 180.44 LBS

## 2024-09-13 DIAGNOSIS — M00.261 STREPTOCOCCAL ARTHRITIS OF RIGHT KNEE: Primary | ICD-10-CM

## 2024-09-13 DIAGNOSIS — E11.29 TYPE 2 DIABETES MELLITUS WITH MICROALBUMINURIA, WITHOUT LONG-TERM CURRENT USE OF INSULIN: Chronic | ICD-10-CM

## 2024-09-13 DIAGNOSIS — M11.261 PSEUDOGOUT OF RIGHT KNEE: ICD-10-CM

## 2024-09-13 DIAGNOSIS — R80.9 TYPE 2 DIABETES MELLITUS WITH MICROALBUMINURIA, WITHOUT LONG-TERM CURRENT USE OF INSULIN: Chronic | ICD-10-CM

## 2024-09-13 DIAGNOSIS — I10 ESSENTIAL (PRIMARY) HYPERTENSION: Chronic | ICD-10-CM

## 2024-09-13 PROCEDURE — 99999 PR PBB SHADOW E&M-EST. PATIENT-LVL III: CPT | Mod: PBBFAC,HCNC,, | Performed by: FAMILY MEDICINE

## 2024-09-13 RX ORDER — INSULIN GLARGINE 100 [IU]/ML
10 INJECTION, SOLUTION SUBCUTANEOUS NIGHTLY
Qty: 15 ML | Refills: 1 | Status: SHIPPED | OUTPATIENT
Start: 2024-09-13 | End: 2025-09-13

## 2024-09-13 NOTE — TELEPHONE ENCOUNTER
King Cool Jr. has been informed of the [employer] application process for Lantus Pens and what's required to apply.  He will provide the following documents: Proof of household Income( such as social security statement, 1099 form, pension statement or 3 consecutive pay stubs, Copy of all Insurance cards( front and back), and ·Completed [facility] Authorization Forms        Follow-up will be made in 5 business days.     Jonathan Garcia  Pharmacy Patient Assistance Team

## 2024-09-13 NOTE — PROGRESS NOTES
[facility]  Progress Note    SUBJECTIVE:     Chief Complaint   Patient presents with    Hospital Follow Up       HPI   King Cool Jr.  is a 69 y.o. male here for hospital follow-up. Was dx with R septic arthritis with strep, pseudogout, and R baker cyst rupture. S/p arthoscopy/clean out. Currently on IV abx. Rates pain as moderate and currently using walker to ambulate.     Review of patient's allergies indicates:   Allergen Reactions    Tamiflu [oseltamivir]      Increases BP    Metformin Hives     Diarrhea, nausea    Penicillins Rash       Past Medical History:   Diagnosis Date    Coronary artery disease involving native coronary artery of native heart without angina pectoris 3/7/2023    Essential (primary) hypertension     Male erectile dysfunction, unspecified     New onset a-fib 1/3/2023    Testicular hypofunction     Type 2 diabetes mellitus without complications      Past Surgical History:   Procedure Laterality Date    HIP SURGERY      KNEE ARTHROSCOPY W/ DEBRIDEMENT Right 8/16/2024    Procedure: ARTHROSCOPY, KNEE, WITH DEBRIDEMENT;  Surgeon: Isaac Alfaro MD;  Location: [facility] OR [address] [place] [address];  Service: Orthopedics;  Laterality: Right;    LEFT HEART CATHETERIZATION Left 3/7/2023    Procedure: Left heart cath;  Surgeon: Wil Mahoney MD;  Location: [facility] CATH LAB;  Service: Cardiology;  Laterality: Left;  1030am start, R rad access    RN PREOP 2/28/23     Family History   Problem Relation Name Age of Onset    Blindness Mother      No Known Problems Father      No Known Problems Brother      No Known Problems Daughter      No Known Problems Son      No Known Problems Daughter      Diabetes Brother      No Known Problems Brother      Amblyopia Neg Hx      Cancer Neg Hx      Cataracts Neg Hx      Glaucoma Neg Hx      Hypertension Neg Hx      Macular degeneration Neg Hx      Retinal detachment Neg Hx      Strabismus Neg Hx      Stroke Neg Hx      Thyroid disease Neg Hx       Social History      Tobacco Use    Smoking status: Former     Current packs/day: 0.00     Average packs/day: 0.3 packs/day for 4.0 years (1.0 ttl pk-yrs)     Types: Cigarettes     Start date: 1972     Quit date: 1976     Years since quittin.8    Smokeless tobacco: Never   Substance Use Topics    Alcohol use: Yes     Comment: occ    Drug use: No        Review of Systems   Constitutional:  Negative for chills and fever.   HENT: Negative.     Respiratory: Negative.  Negative for shortness of breath.    Cardiovascular: Negative.  Negative for chest pain.   Gastrointestinal: Negative.  Negative for abdominal pain, nausea and vomiting.   Genitourinary: Negative.    Musculoskeletal:  Positive for joint pain (R knee). Negative for falls.   Neurological:  Negative for headaches.   All other systems reviewed and are negative.    OBJECTIVE:     Vitals:    24 1309   BP: 110/60   Pulse: 92   Temp: 98.2 °F (36.8 °C)     Body mass index is 25.89 kg/m².    Physical Exam  Constitutional:       General: He is not in acute distress.     Appearance: He is not diaphoretic.   HENT:      Head: Normocephalic and atraumatic.   Eyes:      Conjunctiva/sclera: Conjunctivae normal.   Pulmonary:      Effort: Pulmonary effort is normal. No respiratory distress.   Musculoskeletal:      Comments: + R knee swelling but no redness, warmth. Decent ROM   Skin:     General: Skin is warm.   Neurological:      Mental Status: He is alert and oriented to person, place, and time.         Old records were reviewed. Labs and/or images were independently reviewed.    ASSESSMENT     1. Streptococcal arthritis of right knee s/p I+D on 2024    2. Essential (primary) hypertension    3. Type 2 diabetes mellitus with microalbuminuria, without long-term current use of insulin    4. Pseudogout of right knee        PLAN:     Streptococcal arthritis of right knee s/p I+D on 2024   -      continue with IV abx per ID. F/u with ortho as  directed.    Essential (primary) hypertension   -     Stable. Continue current regimen.    Type 2 diabetes mellitus with microalbuminuria, without long-term current use of insulin  -     Start insulin glargine U-100, Lantus, (LANTUS SOLOSTAR U-100 INSULIN) 100 unit/mL (3 mL) InPn pen; Inject 10 Units into the skin every evening.  Dispense: 15 mL; Refill: 1  -     Comprehensive Metabolic Panel; Future  -     CBC Auto Differential; Future  -     Ambulatory referral/consult to Pharmacy Assistance; Future; Expected date: 09/20/2024  -     Microalbumin/Creatinine Ratio, Urine; Future; Expected date: 09/13/2024  -     Urinalysis; Future; Expected date: 09/13/2024  -     needs much better control of sugars to prevent infection.    Pseudogout of right knee      RTC PRN  60 minutes of total time spent on the encounter, which includes face to face time and non-face to face time preparing to see the patient (eg, review of tests), Obtaining and/or reviewing separately obtained history, Documenting clinical information in the electronic or other health record, Independently interpreting results (not separately reported), communicating results to the patient/family/caregiver, and/or Care coordination (not separately reported).       Gallo Lara MD  09/13/2024 1:32 PM

## 2024-09-16 ENCOUNTER — PATIENT MESSAGE (OUTPATIENT)
Dept: FAMILY MEDICINE | Facility: CLINIC | Age: 69
End: 2024-09-16
Payer: MEDICARE

## 2024-09-16 ENCOUNTER — TELEPHONE (OUTPATIENT)
Dept: INFECTIOUS DISEASES | Facility: CLINIC | Age: 69
End: 2024-09-16
Payer: MEDICARE

## 2024-09-16 DIAGNOSIS — R80.9 TYPE 2 DIABETES MELLITUS WITH MICROALBUMINURIA, WITHOUT LONG-TERM CURRENT USE OF INSULIN: Primary | Chronic | ICD-10-CM

## 2024-09-16 DIAGNOSIS — E11.29 TYPE 2 DIABETES MELLITUS WITH MICROALBUMINURIA, WITHOUT LONG-TERM CURRENT USE OF INSULIN: Primary | Chronic | ICD-10-CM

## 2024-09-16 NOTE — BRIEF OP NOTE
[facility] - Cath Lab  Brief Operative Note     SUMMARY     Surgery Date: 3/7/2023     Surgeon(s) and Role:     * Wil Mahoney MD - Primary    Assisting Surgeon: None    Pre-op Diagnosis:  Elevated troponin level not due to acute coronary syndrome [R77.8]  Abnormal nuclear stress test [R94.39]    Post-op Diagnosis:  Post-Op Diagnosis Codes:     * Elevated troponin level not due to acute coronary syndrome [R77.8]     * Abnormal nuclear stress test [R94.39]    Procedure(s) (LRB):  Left heart cath (Left)    Anesthesia: RN IV Sedation    Description of the findings of the procedure: uneventful LHC/cor angio via R radial. MV CAD noted.    Findings/Key Components:  LVEDP: 7mmHg  LVEF: 50% by echo    Dominance: Right  LM: normal  LAD: mid-sub-TO with T1 flow, distal vessel collateralized via L-L and R-L filling.  LCx: MIL, dist 90%  RCA: MLI, anderson's crook prox vessel   RPDA mid 90%    Hemostasis:  R Radial band    Impression:  Abnl MPI suggesting MV CAD (high risk, performed after troponin release in setting of PAF/RVR).  3V CAD, low normal LV fxn  R rad vasband for hemostasis    Plan:  Cont med rx  Cont ASA 81mg qd  Resume Xarelto 20mg qhs this evening  Cont atorva 40mg qhs  Home today  Follow up with Dr. Mahoney as planned  Check CT chest and carotid US  CTS opinion (with Dr. Chan per pt request) re CABG vs PCI (vs hybrid vs med rx as pt is asymptomatic).    Estimated Blood Loss: <50cc         Specimens: None      Addendum 330pm:  Case discussed with Dr. Chan ([facility] C[facility]).  He does not believe the patient's LAD is a good surgical target, and that the patient's distal circumflex and PDA stenoses are not amenable to bypass.  He recommends medical therapy versus PCI if sxs develop.  
No

## 2024-09-16 NOTE — TELEPHONE ENCOUNTER
----- Message from Anna Lugo sent at 9/16/2024  3:22 PM CDT -----  Regarding: pt advice  Contact: pratik Clark/[facility] @[phone]  Caller stated pt had labs due today. Caller was unable to collect the labs. She will see him tomorrow and try to collect the lab then.

## 2024-09-16 NOTE — TELEPHONE ENCOUNTER
Called pt HH nurse, she stated they were unable to get blood draw back but line flushes fine, unable to get venipuncture. She told pt to hydrate and they will try again tomorrow

## 2024-09-17 ENCOUNTER — LAB VISIT (OUTPATIENT)
Dept: LAB | Facility: HOSPITAL | Age: 69
End: 2024-09-17
Attending: FAMILY MEDICINE
Payer: MEDICARE

## 2024-09-17 ENCOUNTER — TELEPHONE (OUTPATIENT)
Dept: FAMILY MEDICINE | Facility: CLINIC | Age: 69
End: 2024-09-17
Payer: MEDICARE

## 2024-09-17 DIAGNOSIS — Z12.11 ENCOUNTER FOR SCREENING FOR MALIGNANT NEOPLASM OF COLON: ICD-10-CM

## 2024-09-17 LAB — HEMOCCULT STL QL IA: NEGATIVE

## 2024-09-17 PROCEDURE — 82274 ASSAY TEST FOR BLOOD FECAL: CPT | Mod: HCNC | Performed by: FAMILY MEDICINE

## 2024-09-17 RX ORDER — PEN NEEDLE, DIABETIC 30 GX3/16"
1 NEEDLE, DISPOSABLE MISCELLANEOUS NIGHTLY
Qty: 100 EACH | Refills: 3 | Status: SHIPPED | OUTPATIENT
Start: 2024-09-17 | End: 2024-10-17

## 2024-09-17 NOTE — TELEPHONE ENCOUNTER
----- Message from Kierra Issa sent at 9/17/2024  3:46 PM CDT -----  Regarding: Refill Request  Type: RX Refill Request      Who Called: Self       Refill or New Rx: refill       RX Name and Strength: need needles         Preferred Pharmacy with phone number:  [id number] - [place], [place] - [address] [facility];    Would the patient rather a call back or a response via [facility]? Call       Best Call Back Number: .[phone]

## 2024-09-17 NOTE — TELEPHONE ENCOUNTER
No care due was identified.  [facility] Embedded Care Due Messages. Reference number: [id number].   9/17/2024 2:22:00 PM CDT

## 2024-09-19 NOTE — PROGRESS NOTES
Infectious Disease Clinic Visit    Reason:    Hosp f/u    HPI:        69M with CAD, Afib on Eliquis, DM (A1C 11), and osteoarthritis of the knee -- recently admitted with pseudogout with superimposed septic arthitis of native Rt knee.   Patient has history of OA in the right knee and receives intraarticular steroid injections every 3 months.  Last injection was on 8/13.  Developed increased pain and swelling within 24 hours after the injection. Underwent knee aspiration in Ortho clinic 8/15 which showed 143K WBCs, 85% segs and positive CPP crystals. Aspirate cultures of 8/15 is positive for strep mitis (rare). No history of gout or pseudogout.         Underwent surgical I&D with Dr. Alfaro on 8/16. Per OP note, thick viscous fluid encountered.  Suspects septic arthritis superimposed over pseudogout.  No OR cultures sent as patient had been on Vanc/Ceftriaxone prior to surgery. Post-op had a few days of persistent fevers. Swelling and pain began in leg/calf.  CRP remains elevated. Ortho re-aspirated knee. WBC 32K, 86% segs. Blood and aspiration cx NGTD. CT knee/leg revealed ruptured bakers cyst which could explain all symptoms. Per ortho - no concerns about joint effusion or pneumarthrosis present. Improved on IV-cefriaxone inpatient, LFTS downtrended, possibly elevated d/t early post op surgery. Pt discharged to home with Iv-ceftriaxone, for 4wk course, YAMILET 09/13.     Last labs 09/09: CRP 49 (continued downtrend), cbc / cmp wnl.      Seen today virtually for f/u appt for EOC. Pt reports ongoing swelling and pain 9/10, with inability to bear weight, with mild ache in calf / popliteal fossa 2/2 ruptured backers cyst; without redness or warmth, or systemic s/sxs. Ongoing pain swelling of Rt knee globablly, could be multi-factorial; OA, gout, ruptured bakers cyst, post-infectious inflammation vs possible ongoing infection (Septic arthritis).     ROS      Physical Exam      Review of patient's allergies indicates:    Allergen Reactions    Tamiflu [oseltamivir]      Increases BP    Metformin Hives     Diarrhea, nausea    Penicillins Rash         Current Outpatient Medications:     alcohol swabs (DROPSAFE ALCOHOL PREP PADS) PadM, USE AS DIRECTED AS NEEDED, Disp: 400 each, Rfl: 1    apixaban (ELIQUIS) 5 mg Tab, Take 1 tablet (5 mg total) by mouth 2 (two) times daily., Disp: 180 tablet, Rfl: 3    ASCORBATE CALCIUM (VITAMIN C ORAL), Take by mouth once daily. , Disp: , Rfl:     aspirin (ECOTRIN) 81 MG EC tablet, Take 81 mg by mouth once daily., Disp: , Rfl:     atorvastatin (LIPITOR) 80 MG tablet, Take 1 tablet (80 mg total) by mouth every evening., Disp: 90 tablet, Rfl: 3    blood sugar diagnostic Strp, 1 strip by Misc.(Non-Drug; Combo Route) route 3 (three) times daily., Disp: 200 strip, Rfl: 5    blood-glucose meter kit, Use as instructed, Disp: 1 each, Rfl: 0    blood-glucose meter kit, 1 each by Other route 3 (three) times daily. Use as instructed, Disp: 1 each, Rfl: 0    celecoxib (CELEBREX) 200 MG capsule, Take 1 capsule (200 mg total) by mouth once daily., Disp: 30 capsule, Rfl: 0    colchicine (COLCRYS) 0.6 mg tablet, Take 1 tablet (0.6 mg total) by mouth daily as needed (gout attack for 3 days.)., Disp: 30 tablet, Rfl: 0    D5W PgBk 100 mL with cefTRIAXone 2 gram SolR 2 g, Inject 2 g into the vein once daily., Disp: , Rfl:     diclofenac sodium (VOLTAREN) 1 % Gel, Apply 2 g topically once daily., Disp: 450 g, Rfl: 0    empagliflozin (JARDIANCE) 25 mg tablet, Take 1 tablet (25 mg total) by mouth once daily., Disp: 90 tablet, Rfl: 1    fish oil-omega-3 fatty acids 300-1,000 mg capsule, Take 2 g by mouth once daily., Disp: , Rfl:     gabapentin (NEURONTIN) 300 MG capsule, Take 1 capsule (300 mg total) by mouth 3 (three) times daily., Disp: 270 capsule, Rfl: 2    glimepiride (AMARYL) 4 MG tablet, Take 1 tablet (4 mg total) by mouth 2 (two) times a day., Disp: 180 tablet, Rfl: 3    [START ON 10/13/2024]  "HYDROcodone-acetaminophen (NORCO)  mg per tablet, Take 1 tablet by mouth 3 (three) times daily as needed (pain)., Disp: 72 tablet, Rfl: 0    HYDROcodone-acetaminophen (NORCO)  mg per tablet, Take 1 tablet by mouth 3 (three) times daily as needed (pain)., Disp: 72 tablet, Rfl: 0    HYDROcodone-acetaminophen (NORCO)  mg per tablet, Take 1 tablet by mouth 3 (three) times daily as needed (pain)., Disp: 72 tablet, Rfl: 0    hydrocortisone 2.5 % cream, Apply topically 2 (two) times daily., Disp: 20 g, Rfl: 2    ibuprofen (ADVIL,MOTRIN) 800 MG tablet, Take 1 tablet (800 mg total) by mouth every 8 (eight) hours as needed for Pain., Disp: 90 tablet, Rfl: 0    insulin glargine U-100, Lantus, (LANTUS SOLOSTAR U-100 INSULIN) 100 unit/mL (3 mL) InPn pen, Inject 10 Units into the skin every evening., Disp: 15 mL, Rfl: 1    lancets Misc, 1 application  by Misc.(Non-Drug; Combo Route) route 3 (three) times daily., Disp: 200 each, Rfl: 5    loratadine (CLARITIN) 10 mg tablet, Take 1 tablet (10 mg total) by mouth once daily., Disp: 90 tablet, Rfl: 0    losartan (COZAAR) 25 MG tablet, Take 1 tablet (25 mg total) by mouth once daily., Disp: 90 tablet, Rfl: 3    metoprolol succinate (TOPROL-XL) 25 MG 24 hr tablet, Take 1 tablet (25 mg total) by mouth once daily., Disp: 90 tablet, Rfl: 3    multivitamin with minerals tablet, Take 1 tablet by mouth once daily., Disp: , Rfl:     neomycin-polymyxin-hydrocortisone (CORTISPORIN) 3.5-10,000-1 mg/mL-unit/mL-% otic suspension, PLACE 3 DROPS INTO THE LEFT EAR 4 TIMES DAILY., Disp: 10 mL, Rfl: 0    pen needle, diabetic 31 gauge x 3/16" Ndle, Inject 1 Needle into the skin nightly., Disp: 100 each, Rfl: 3    sodium chloride (SALINE NASAL) 0.65 % nasal spray, 2 sprays by Nasal route 2 (two) times a day., Disp: 104 mL, Rfl: 0    triamcinolone acetonide 0.1% (KENALOG) 0.1 % ointment, Apply topically 2 (two) times daily as needed., Disp: 30 g, Rfl: 0  No current facility-administered " medications for this visit.    Facility-Administered Medications Ordered in Other Visits:     triamcinolone acetonide injection 40 mg, 40 mg, Intra-articular, , Gallo Lara MD, 40 mg at 09/12/19 1631    Past Medical History:   Diagnosis Date    Coronary artery disease involving native coronary artery of native heart without angina pectoris 3/7/2023    Essential (primary) hypertension     Male erectile dysfunction, unspecified     New onset a-fib 1/3/2023    Testicular hypofunction     Type 2 diabetes mellitus without complications        Lab Results   Component Value Date    WBC 5.98 09/17/2024    CRP 34.4 (H) 09/17/2024    SEDRATE 12 08/15/2024    SEDRATE 12 08/15/2024    CREATININE 0.8 09/17/2024    AST 15 09/17/2024    ALT 18 09/17/2024    ALKPHOS 108 09/17/2024       Immunization History   Administered Date(s) Administered    Td (ADULT) 02/18/2015         Reviewed available labs and imaging.     There were no vitals filed for this visit.      Assessment:  No diagnosis found.      Plan:     Continue Iv- Ceftriaxone for additional 2wks, for 6wks total, YAMILET 09/27.  F/u with ortho as rec'd      Follow-up: 2wks      No orders of the defined types were placed in this encounter.

## 2024-09-20 ENCOUNTER — OFFICE VISIT (OUTPATIENT)
Dept: INFECTIOUS DISEASES | Facility: CLINIC | Age: 69
End: 2024-09-20
Payer: MEDICARE

## 2024-09-20 VITALS
DIASTOLIC BLOOD PRESSURE: 70 MMHG | HEIGHT: 70 IN | SYSTOLIC BLOOD PRESSURE: 121 MMHG | BODY MASS INDEX: 26.2 KG/M2 | HEART RATE: 64 BPM | TEMPERATURE: 98 F | WEIGHT: 183 LBS

## 2024-09-20 DIAGNOSIS — M00.261 STREPTOCOCCAL ARTHRITIS OF RIGHT KNEE: Primary | ICD-10-CM

## 2024-09-20 PROCEDURE — 99999 PR PBB SHADOW E&M-EST. PATIENT-LVL V: CPT | Mod: PBBFAC,HCNC,, | Performed by: STUDENT IN AN ORGANIZED HEALTH CARE EDUCATION/TRAINING PROGRAM

## 2024-09-20 RX ORDER — HEPARIN 100 UNIT/ML
500 SYRINGE INTRAVENOUS
OUTPATIENT
Start: 2024-09-20

## 2024-09-20 RX ORDER — SODIUM CHLORIDE 0.9 % (FLUSH) 0.9 %
10 SYRINGE (ML) INJECTION
OUTPATIENT
Start: 2024-09-20

## 2024-09-20 NOTE — PROGRESS NOTES
Infectious Disease Clinic Visit    Reason:    Hosp f/u    HPI:        69M with CAD, Afib on Eliquis, DM (A1C 11), and osteoarthritis of the knee -- recently admitted with pseudogout with superimposed septic arthitis of native Rt knee.   Patient has history of OA in the right knee and receives intraarticular steroid injections every 3 months.  Last injection was on 8/13.  Developed increased pain and swelling within 24 hours after the injection. Underwent knee aspiration in Ortho clinic 8/15 which showed 143K WBCs, 85% segs and positive CPP crystals. Aspirate cultures of 8/15 is positive for strep mitis (rare). No history of gout or pseudogout.         Underwent surgical I&D with Dr. Alfaro on 8/16. Per OP note, thick viscous fluid encountered.  Suspects septic arthritis superimposed over pseudogout.  No OR cultures sent as patient had been on Vanc/Ceftriaxone prior to surgery. Post-op had a few days of persistent fevers. Swelling and pain began in leg/calf.  CRP remains elevated. Ortho re-aspirated knee. WBC 32K, 86% segs. Blood and aspiration cx NGTD. CT knee/leg revealed ruptured bakers cyst which could explain all symptoms. Per ortho - no concerns about joint effusion or pneumarthrosis present. Improved on IV-cefriaxone inpatient, LFTS downtrended, possibly elevated d/t early post op surgery. Pt discharged to home with Iv-ceftriaxone, for 4wk course, YAMILET 09/13.      Seen 09/12 virtually for f/u appt for EOC. Pt reported ongoing swelling and pain 9/10, with inability to bear weight, with mild ache in calf / popliteal fossa 2/2 ruptured backers cyst; without redness or warmth, or systemic s/sxs. Ongoing pain swelling of Rt knee globablly, could be multi-factorial; OA, gout, ruptured bakers cyst, post-infectious inflammation vs possible ongoing infection (Septic arthritis). Favored extending Iv-ceftriaxone an additional 10d, new YAMILET 09/23. (Midline placed 08/23 so 4wks max).     Review of Systems   Musculoskeletal:   Positive for joint pain.   All other systems reviewed and are negative.        Physical Exam  Vitals reviewed.   Constitutional:       General: He is not in acute distress.     Appearance: Normal appearance. He is not toxic-appearing or diaphoretic.   HENT:      Head: Normocephalic and atraumatic.      Nose: No congestion.      Mouth/Throat:      Pharynx: No oropharyngeal exudate.   Eyes:      General: No scleral icterus.     Conjunctiva/sclera: Conjunctivae normal.   Cardiovascular:      Rate and Rhythm: Normal rate.      Heart sounds: Normal heart sounds. No murmur heard.  Pulmonary:      Effort: Pulmonary effort is normal. No respiratory distress.      Breath sounds: Normal breath sounds.   Abdominal:      General: Bowel sounds are normal. There is no distension.      Palpations: Abdomen is soft.      Tenderness: There is no abdominal tenderness.   Musculoskeletal:         General: Swelling and tenderness present.      Cervical back: Neck supple. No tenderness.   Skin:     General: Skin is warm and dry.      Coloration: Skin is not jaundiced.      Findings: No erythema or rash.   Neurological:      Mental Status: He is alert and oriented to person, place, and time. Mental status is at baseline.   Psychiatric:         Behavior: Behavior normal.         Thought Content: Thought content normal.           Review of patient's allergies indicates:   Allergen Reactions    Tamiflu [oseltamivir]      Increases BP    Metformin Hives     Diarrhea, nausea    Penicillins Rash         Current Outpatient Medications:     alcohol swabs (DROPSAFE ALCOHOL PREP PADS) PadM, USE AS DIRECTED AS NEEDED, Disp: 400 each, Rfl: 1    apixaban (ELIQUIS) 5 mg Tab, Take 1 tablet (5 mg total) by mouth 2 (two) times daily., Disp: 180 tablet, Rfl: 3    ASCORBATE CALCIUM (VITAMIN C ORAL), Take by mouth once daily. , Disp: , Rfl:     aspirin (ECOTRIN) 81 MG EC tablet, Take 81 mg by mouth once daily., Disp: , Rfl:     atorvastatin (LIPITOR) 80 MG  tablet, Take 1 tablet (80 mg total) by mouth every evening., Disp: 90 tablet, Rfl: 3    blood sugar diagnostic Strp, 1 strip by Misc.(Non-Drug; Combo Route) route 3 (three) times daily., Disp: 200 strip, Rfl: 5    blood-glucose meter kit, Use as instructed, Disp: 1 each, Rfl: 0    blood-glucose meter kit, 1 each by Other route 3 (three) times daily. Use as instructed, Disp: 1 each, Rfl: 0    celecoxib (CELEBREX) 200 MG capsule, Take 1 capsule (200 mg total) by mouth once daily., Disp: 30 capsule, Rfl: 0    colchicine (COLCRYS) 0.6 mg tablet, Take 1 tablet (0.6 mg total) by mouth daily as needed (gout attack for 3 days.)., Disp: 30 tablet, Rfl: 0    diclofenac sodium (VOLTAREN) 1 % Gel, Apply 2 g topically once daily., Disp: 450 g, Rfl: 0    empagliflozin (JARDIANCE) 25 mg tablet, Take 1 tablet (25 mg total) by mouth once daily., Disp: 90 tablet, Rfl: 1    fish oil-omega-3 fatty acids 300-1,000 mg capsule, Take 2 g by mouth once daily., Disp: , Rfl:     gabapentin (NEURONTIN) 300 MG capsule, Take 1 capsule (300 mg total) by mouth 3 (three) times daily., Disp: 270 capsule, Rfl: 2    glimepiride (AMARYL) 4 MG tablet, Take 1 tablet (4 mg total) by mouth 2 (two) times a day., Disp: 180 tablet, Rfl: 3    [START ON 10/13/2024] HYDROcodone-acetaminophen (NORCO)  mg per tablet, Take 1 tablet by mouth 3 (three) times daily as needed (pain)., Disp: 72 tablet, Rfl: 0    HYDROcodone-acetaminophen (NORCO)  mg per tablet, Take 1 tablet by mouth 3 (three) times daily as needed (pain)., Disp: 72 tablet, Rfl: 0    HYDROcodone-acetaminophen (NORCO)  mg per tablet, Take 1 tablet by mouth 3 (three) times daily as needed (pain)., Disp: 72 tablet, Rfl: 0    hydrocortisone 2.5 % cream, Apply topically 2 (two) times daily., Disp: 20 g, Rfl: 2    ibuprofen (ADVIL,MOTRIN) 800 MG tablet, Take 1 tablet (800 mg total) by mouth every 8 (eight) hours as needed for Pain., Disp: 90 tablet, Rfl: 0    insulin glargine U-100, Lantus,  "(LANTUS SOLOSTAR U-100 INSULIN) 100 unit/mL (3 mL) InPn pen, Inject 10 Units into the skin every evening., Disp: 15 mL, Rfl: 1    lancets Misc, 1 application  by Misc.(Non-Drug; Combo Route) route 3 (three) times daily., Disp: 200 each, Rfl: 5    loratadine (CLARITIN) 10 mg tablet, Take 1 tablet (10 mg total) by mouth once daily., Disp: 90 tablet, Rfl: 0    losartan (COZAAR) 25 MG tablet, Take 1 tablet (25 mg total) by mouth once daily., Disp: 90 tablet, Rfl: 3    metoprolol succinate (TOPROL-XL) 25 MG 24 hr tablet, Take 1 tablet (25 mg total) by mouth once daily., Disp: 90 tablet, Rfl: 3    multivitamin with minerals tablet, Take 1 tablet by mouth once daily., Disp: , Rfl:     neomycin-polymyxin-hydrocortisone (CORTISPORIN) 3.5-10,000-1 mg/mL-unit/mL-% otic suspension, PLACE 3 DROPS INTO THE LEFT EAR 4 TIMES DAILY., Disp: 10 mL, Rfl: 0    pen needle, diabetic 31 gauge x 3/16" Ndle, Inject 1 Needle into the skin nightly., Disp: 100 each, Rfl: 3    sodium chloride (SALINE NASAL) 0.65 % nasal spray, 2 sprays by Nasal route 2 (two) times a day., Disp: 104 mL, Rfl: 0    triamcinolone acetonide 0.1% (KENALOG) 0.1 % ointment, Apply topically 2 (two) times daily as needed., Disp: 30 g, Rfl: 0    cefadroxil (DURICEF) 500 MG Cap, Take 1 capsule (500 mg total) by mouth every 12 (twelve) hours. for 14 days, Disp: 28 capsule, Rfl: 0  No current facility-administered medications for this visit.    Facility-Administered Medications Ordered in Other Visits:     triamcinolone acetonide injection 40 mg, 40 mg, Intra-articular, , Gallo Lara MD, 40 mg at 09/12/19 1631    Past Medical History:   Diagnosis Date    Coronary artery disease involving native coronary artery of native heart without angina pectoris 3/7/2023    Essential (primary) hypertension     Male erectile dysfunction, unspecified     New onset a-fib 1/3/2023    Testicular hypofunction     Type 2 diabetes mellitus without complications        Lab Results   Component " "Value Date    WBC 5.98 09/17/2024    CRP 34.4 (H) 09/17/2024    SEDRATE 12 08/15/2024    SEDRATE 12 08/15/2024    CREATININE 0.8 09/17/2024    AST 15 09/17/2024    ALT 18 09/17/2024    ALKPHOS 108 09/17/2024       Immunization History   Administered Date(s) Administered    Td (ADULT) 02/18/2015         Reviewed available labs and imaging.     Vitals:    09/20/24 0846   BP: 121/70   BP Location: Left arm   Pulse: 64   Temp: 98 °F (36.7 °C)   TempSrc: Oral   Weight: 83 kg (182 lb 15.7 oz)   Height: 5' 10" (1.778 m)         Assessment:  Encounter Diagnoses   Name Primary?    Streptococcal arthritis of right knee s/p I+D on 8/16/2024 Yes     Midline placed 08/23, so max duration 4wks s/p placement.   Still having residual ongoing pain / swelling with weight bearing and reduced ROM 2/2 pain/swelling.  Suspect this may be more related to pseudogout, OA, and possible  post-infectious arthritis / inflammation; rather than persistent septic joint or failure of abx. However, pt to f/u with ortho next week, may consider joint aspiration if sufficient effusion present.     Plan:     Continue Iv-ceftriaxone with YAMILET 09/23  HH to remove line then with labs, cbc, cmp, and crp.   Following this, will start oral cefadroxil 500mg BID x 2wks, YAMILET 10/07. Pt has reported PCN allergy (low- rash), occurred "many years"; reports he has tolerated amoxicillin, and known to tolerate cefazolin and ceftriaxone. Explained risk very low for cross-reactivity, pt agreeable to plan to observe for any rashes, facial swelling, or SOB while on oral abx.   F/u with ortho 09/27.      Follow-up: 1-2wks      No orders of the defined types were placed in this encounter.      "

## 2024-09-23 RX ORDER — CEFADROXIL 500 MG/1
500 CAPSULE ORAL EVERY 12 HOURS
Qty: 28 CAPSULE | Refills: 0 | Status: SHIPPED | OUTPATIENT
Start: 2024-09-23 | End: 2024-10-07

## 2024-09-24 ENCOUNTER — LAB VISIT (OUTPATIENT)
Dept: LAB | Facility: HOSPITAL | Age: 69
End: 2024-09-24
Attending: NURSE PRACTITIONER
Payer: MEDICARE

## 2024-09-24 ENCOUNTER — EXTERNAL HOME HEALTH (OUTPATIENT)
Dept: HOME HEALTH SERVICES | Facility: HOSPITAL | Age: 69
End: 2024-09-24
Payer: MEDICARE

## 2024-09-24 DIAGNOSIS — M00.261 OTHER STREPTOCOCCAL ARTHRITIS, RIGHT KNEE: Primary | ICD-10-CM

## 2024-09-24 DIAGNOSIS — M00.9 PYOGENIC ARTHRITIS, UPPER ARM: ICD-10-CM

## 2024-09-24 LAB
ALBUMIN SERPL BCP-MCNC: 3.2 G/DL (ref 3.5–5.2)
ALP SERPL-CCNC: 110 U/L (ref 55–135)
ALT SERPL W/O P-5'-P-CCNC: 19 U/L (ref 10–44)
ANION GAP SERPL CALC-SCNC: 13 MMOL/L (ref 8–16)
AST SERPL-CCNC: 16 U/L (ref 10–40)
BASOPHILS # BLD AUTO: 0.06 K/UL (ref 0–0.2)
BASOPHILS NFR BLD: 0.9 % (ref 0–1.9)
BILIRUB SERPL-MCNC: 0.4 MG/DL (ref 0.1–1)
BUN SERPL-MCNC: 18 MG/DL (ref 8–23)
CALCIUM SERPL-MCNC: 9.8 MG/DL (ref 8.7–10.5)
CHLORIDE SERPL-SCNC: 104 MMOL/L (ref 95–110)
CO2 SERPL-SCNC: 22 MMOL/L (ref 23–29)
CREAT SERPL-MCNC: 0.8 MG/DL (ref 0.5–1.4)
CRP SERPL-MCNC: 28.6 MG/L (ref 0–8.2)
DIFFERENTIAL METHOD BLD: ABNORMAL
EOSINOPHIL # BLD AUTO: 0.1 K/UL (ref 0–0.5)
EOSINOPHIL NFR BLD: 1.6 % (ref 0–8)
ERYTHROCYTE [DISTWIDTH] IN BLOOD BY AUTOMATED COUNT: 12.1 % (ref 11.5–14.5)
EST. GFR  (NO RACE VARIABLE): >60 ML/MIN/1.73 M^2
GLUCOSE SERPL-MCNC: 189 MG/DL (ref 70–110)
HCT VFR BLD AUTO: 36.3 % (ref 40–54)
HGB BLD-MCNC: 12.1 G/DL (ref 14–18)
IMM GRANULOCYTES # BLD AUTO: 0.03 K/UL (ref 0–0.04)
IMM GRANULOCYTES NFR BLD AUTO: 0.4 % (ref 0–0.5)
LYMPHOCYTES # BLD AUTO: 1.7 K/UL (ref 1–4.8)
LYMPHOCYTES NFR BLD: 24.4 % (ref 18–48)
MCH RBC QN AUTO: 30 PG (ref 27–31)
MCHC RBC AUTO-ENTMCNC: 33.3 G/DL (ref 32–36)
MCV RBC AUTO: 90 FL (ref 82–98)
MONOCYTES # BLD AUTO: 0.7 K/UL (ref 0.3–1)
MONOCYTES NFR BLD: 9.5 % (ref 4–15)
NEUTROPHILS # BLD AUTO: 4.3 K/UL (ref 1.8–7.7)
NEUTROPHILS NFR BLD: 63.2 % (ref 38–73)
NRBC BLD-RTO: 0 /100 WBC
PLATELET # BLD AUTO: 379 K/UL (ref 150–450)
PMV BLD AUTO: 8.6 FL (ref 9.2–12.9)
POTASSIUM SERPL-SCNC: 4.5 MMOL/L (ref 3.5–5.1)
PROT SERPL-MCNC: 7.4 G/DL (ref 6–8.4)
RBC # BLD AUTO: 4.04 M/UL (ref 4.6–6.2)
SODIUM SERPL-SCNC: 139 MMOL/L (ref 136–145)
WBC # BLD AUTO: 6.85 K/UL (ref 3.9–12.7)

## 2024-09-24 PROCEDURE — 80053 COMPREHEN METABOLIC PANEL: CPT | Mod: HCNC | Performed by: NURSE PRACTITIONER

## 2024-09-24 PROCEDURE — 86140 C-REACTIVE PROTEIN: CPT | Mod: HCNC | Performed by: NURSE PRACTITIONER

## 2024-09-24 PROCEDURE — 85025 COMPLETE CBC W/AUTO DIFF WBC: CPT | Mod: HCNC | Performed by: NURSE PRACTITIONER

## 2024-09-25 ENCOUNTER — PATIENT MESSAGE (OUTPATIENT)
Dept: PHARMACY | Facility: CLINIC | Age: 69
End: 2024-09-25
Payer: MEDICARE

## 2024-09-25 ENCOUNTER — PATIENT MESSAGE (OUTPATIENT)
Dept: FAMILY MEDICINE | Facility: CLINIC | Age: 69
End: 2024-09-25
Payer: MEDICARE

## 2024-09-25 DIAGNOSIS — M10.9 GOUT, UNSPECIFIED CAUSE, UNSPECIFIED CHRONICITY, UNSPECIFIED SITE: ICD-10-CM

## 2024-09-25 NOTE — TELEPHONE ENCOUNTER
No care due was identified.  [facility] Embedded Care Due Messages. Reference number: [id number].   9/25/2024 11:34:30 AM CDT

## 2024-09-25 NOTE — TELEPHONE ENCOUNTER
Refill Routing Note   Medication(s) are not appropriate for processing by [facility] for the following reason(s):        Med affordability    [facility] action(s):  Defer      Medication Therapy Plan: Pharmacy comment: REQUEST FOR 90 DAYS PRESCRIPTION. DX Code Needed.      Appointments  past 12m or future 3m with PCP    Date Provider   Last Visit   9/13/2024 Gallo Lara MD   Next Visit   11/13/2024 Gallo Lara MD   ED visits in past 90 days: 0        Note composed:5:39 PM 09/25/2024

## 2024-09-26 RX ORDER — COLCHICINE 0.6 MG/1
0.6 TABLET ORAL DAILY PRN
Qty: 90 TABLET | Refills: 1 | Status: SHIPPED | OUTPATIENT
Start: 2024-09-26

## 2024-09-27 ENCOUNTER — LAB VISIT (OUTPATIENT)
Dept: LAB | Facility: HOSPITAL | Age: 69
End: 2024-09-27
Attending: FAMILY MEDICINE
Payer: MEDICARE

## 2024-09-27 ENCOUNTER — OFFICE VISIT (OUTPATIENT)
Dept: ORTHOPEDICS | Facility: CLINIC | Age: 69
End: 2024-09-27
Payer: MEDICARE

## 2024-09-27 ENCOUNTER — PATIENT MESSAGE (OUTPATIENT)
Dept: SPORTS MEDICINE | Facility: CLINIC | Age: 69
End: 2024-09-27

## 2024-09-27 ENCOUNTER — OFFICE VISIT (OUTPATIENT)
Dept: SPORTS MEDICINE | Facility: CLINIC | Age: 69
End: 2024-09-27
Payer: MEDICARE

## 2024-09-27 VITALS
HEART RATE: 80 BPM | BODY MASS INDEX: 26.2 KG/M2 | WEIGHT: 183 LBS | HEIGHT: 70 IN | DIASTOLIC BLOOD PRESSURE: 69 MMHG | SYSTOLIC BLOOD PRESSURE: 126 MMHG

## 2024-09-27 VITALS — HEIGHT: 70 IN | WEIGHT: 183 LBS | BODY MASS INDEX: 26.2 KG/M2

## 2024-09-27 DIAGNOSIS — E11.29 TYPE 2 DIABETES MELLITUS WITH MICROALBUMINURIA, WITHOUT LONG-TERM CURRENT USE OF INSULIN: Chronic | ICD-10-CM

## 2024-09-27 DIAGNOSIS — R26.9 ABNORMAL GAIT: ICD-10-CM

## 2024-09-27 DIAGNOSIS — M17.31 POST-TRAUMATIC OSTEOARTHRITIS OF RIGHT KNEE: ICD-10-CM

## 2024-09-27 DIAGNOSIS — M25.561 ACUTE PAIN OF RIGHT KNEE: Primary | ICD-10-CM

## 2024-09-27 DIAGNOSIS — M11.261 PSEUDOGOUT OF RIGHT KNEE: Primary | ICD-10-CM

## 2024-09-27 DIAGNOSIS — R80.9 TYPE 2 DIABETES MELLITUS WITH MICROALBUMINURIA, WITHOUT LONG-TERM CURRENT USE OF INSULIN: Chronic | ICD-10-CM

## 2024-09-27 DIAGNOSIS — M25.461 EFFUSION OF BURSA OF RIGHT KNEE: ICD-10-CM

## 2024-09-27 LAB
ALBUMIN SERPL BCP-MCNC: 3.2 G/DL (ref 3.5–5.2)
ALP SERPL-CCNC: 110 U/L (ref 55–135)
ALT SERPL W/O P-5'-P-CCNC: 19 U/L (ref 10–44)
ANION GAP SERPL CALC-SCNC: 14 MMOL/L (ref 8–16)
AST SERPL-CCNC: 22 U/L (ref 10–40)
BASOPHILS # BLD AUTO: 0.06 K/UL (ref 0–0.2)
BASOPHILS NFR BLD: 0.8 % (ref 0–1.9)
BILIRUB SERPL-MCNC: 0.3 MG/DL (ref 0.1–1)
BUN SERPL-MCNC: 19 MG/DL (ref 8–23)
CALCIUM SERPL-MCNC: 9.6 MG/DL (ref 8.7–10.5)
CHLORIDE SERPL-SCNC: 104 MMOL/L (ref 95–110)
CO2 SERPL-SCNC: 21 MMOL/L (ref 23–29)
CREAT SERPL-MCNC: 0.8 MG/DL (ref 0.5–1.4)
DIFFERENTIAL METHOD BLD: ABNORMAL
EOSINOPHIL # BLD AUTO: 0.1 K/UL (ref 0–0.5)
EOSINOPHIL NFR BLD: 2 % (ref 0–8)
ERYTHROCYTE [DISTWIDTH] IN BLOOD BY AUTOMATED COUNT: 12.3 % (ref 11.5–14.5)
EST. GFR  (NO RACE VARIABLE): >60 ML/MIN/1.73 M^2
GLUCOSE SERPL-MCNC: 138 MG/DL (ref 70–110)
HCT VFR BLD AUTO: 37.4 % (ref 40–54)
HGB BLD-MCNC: 11.5 G/DL (ref 14–18)
IMM GRANULOCYTES # BLD AUTO: 0.01 K/UL (ref 0–0.04)
IMM GRANULOCYTES NFR BLD AUTO: 0.1 % (ref 0–0.5)
LYMPHOCYTES # BLD AUTO: 2.3 K/UL (ref 1–4.8)
LYMPHOCYTES NFR BLD: 31.7 % (ref 18–48)
MCH RBC QN AUTO: 28.7 PG (ref 27–31)
MCHC RBC AUTO-ENTMCNC: 30.7 G/DL (ref 32–36)
MCV RBC AUTO: 93 FL (ref 82–98)
MONOCYTES # BLD AUTO: 0.8 K/UL (ref 0.3–1)
MONOCYTES NFR BLD: 11.1 % (ref 4–15)
NEUTROPHILS # BLD AUTO: 3.9 K/UL (ref 1.8–7.7)
NEUTROPHILS NFR BLD: 54.3 % (ref 38–73)
NRBC BLD-RTO: 0 /100 WBC
PLATELET # BLD AUTO: 396 K/UL (ref 150–450)
PMV BLD AUTO: 8.7 FL (ref 9.2–12.9)
POTASSIUM SERPL-SCNC: 4.1 MMOL/L (ref 3.5–5.1)
PROT SERPL-MCNC: 7.2 G/DL (ref 6–8.4)
RBC # BLD AUTO: 4.01 M/UL (ref 4.6–6.2)
SODIUM SERPL-SCNC: 139 MMOL/L (ref 136–145)
WBC # BLD AUTO: 7.12 K/UL (ref 3.9–12.7)

## 2024-09-27 PROCEDURE — 99999 PR PBB SHADOW E&M-EST. PATIENT-LVL IV: CPT | Mod: PBBFAC,HCNC,, | Performed by: STUDENT IN AN ORGANIZED HEALTH CARE EDUCATION/TRAINING PROGRAM

## 2024-09-27 PROCEDURE — 36415 COLL VENOUS BLD VENIPUNCTURE: CPT | Mod: HCNC,PO | Performed by: FAMILY MEDICINE

## 2024-09-27 PROCEDURE — 99999 PR PBB SHADOW E&M-EST. PATIENT-LVL IV: CPT | Mod: PBBFAC,HCNC,, | Performed by: PHYSICIAN ASSISTANT

## 2024-09-27 PROCEDURE — 85025 COMPLETE CBC W/AUTO DIFF WBC: CPT | Mod: HCNC | Performed by: FAMILY MEDICINE

## 2024-09-27 PROCEDURE — 80053 COMPREHEN METABOLIC PANEL: CPT | Mod: HCNC | Performed by: FAMILY MEDICINE

## 2024-09-27 NOTE — PROCEDURES
Large Joint Aspiration/Injection    Date/Time: 9/27/2024 1:15 PM    Performed by: Rupal Woodard MD  Authorized by: Rupal Woodard MD    Consent Done?:  Yes (Verbal)  Indications:  Diagnostic evaluation, joint swelling and pain  Site marked: the procedure site was marked    Timeout: prior to procedure the correct patient, procedure, and site was verified    Prep: patient was prepped and draped in usual sterile fashion      Local anesthesia used?: Yes    Anesthesia:  Local infiltration  Local anesthetic:  Co-phenylcaine spray    Details:  Needle Size:  18 G  Ultrasonic Guidance for needle placement?: Yes (Ultrasound guidance used to avoid neurovascular injury and/or to improve accuracy given body habitus.)    Images are saved and documented.  Approach: Superolateral.  Location:  Knee  Aspirate amount (mL):  0  Patient tolerance:  Patient tolerated the procedure well with no immediate complications     TECHNIQUE: Real time ultrasound examination of the right knee was performed with [employer]uitLab[employer] Edge 2, 9-L MHz linear probe(s). Ultrasound guidance was used for needle localization. Images were saved and stored for documentation. Dynamic visualization of the needle was continuous throughout the procedures and maintained in good position.

## 2024-09-27 NOTE — PROGRESS NOTES
CC: right knee pain    69 y.o. Male presents today for aspiration of his right knee. Pt was referred by Nell Lopes PA-C.    Attempted treatments: walker  Pain score: 8/10  History of trauma/injury: none since last visit with Nell Lopes PA-C  Affecting ADLs: yes     REVIEW OF SYSTEMS:   Constitution: Patient denies fever or chills.  Eyes: Patient denies eye pain or vision changes.  HEENT: Patient denies ear pain, sore throat, or nasal discharge.  CVS: Patient denies chest pain.  Lungs: Patient denies shortness of breath or cough.  Skin: Patient denies skin rash or itching.    Musculoskeletal: Patient denies recent falls. See HPI.  Psych: Patient denies any current anxiety or nervousness.    PAST MEDICAL HISTORY:   Past Medical History:   Diagnosis Date    Coronary artery disease involving native coronary artery of native heart without angina pectoris 3/7/2023    Essential (primary) hypertension     Male erectile dysfunction, unspecified     New onset a-fib 1/3/2023    Testicular hypofunction     Type 2 diabetes mellitus without complications        MEDICATIONS:     Current Outpatient Medications:     alcohol swabs (DROPSAFE ALCOHOL PREP PADS) PadM, USE AS DIRECTED AS NEEDED, Disp: 400 each, Rfl: 1    apixaban (ELIQUIS) 5 mg Tab, Take 1 tablet (5 mg total) by mouth 2 (two) times daily., Disp: 180 tablet, Rfl: 3    ASCORBATE CALCIUM (VITAMIN C ORAL), Take by mouth once daily. , Disp: , Rfl:     aspirin (ECOTRIN) 81 MG EC tablet, Take 81 mg by mouth once daily., Disp: , Rfl:     atorvastatin (LIPITOR) 80 MG tablet, Take 1 tablet (80 mg total) by mouth every evening., Disp: 90 tablet, Rfl: 3    blood sugar diagnostic Strp, 1 strip by Misc.(Non-Drug; Combo Route) route 3 (three) times daily., Disp: 200 strip, Rfl: 5    blood-glucose meter kit, Use as instructed, Disp: 1 each, Rfl: 0    blood-glucose meter kit, 1 each by Other route 3 (three) times daily. Use as instructed, Disp: 1 each, Rfl: 0    cefadroxil (DURICEF)  500 MG Cap, Take 1 capsule (500 mg total) by mouth every 12 (twelve) hours. for 14 days, Disp: 28 capsule, Rfl: 0    colchicine (COLCRYS) 0.6 mg tablet, TAKE 1 TABLET (0.6 MG TOTAL) BY MOUTH DAILY AS NEEDED (GOUT ATTACK FOR 3 DAYS.)., Disp: 90 tablet, Rfl: 1    diclofenac sodium (VOLTAREN) 1 % Gel, Apply 2 g topically once daily., Disp: 450 g, Rfl: 0    empagliflozin (JARDIANCE) 25 mg tablet, Take 1 tablet (25 mg total) by mouth once daily., Disp: 90 tablet, Rfl: 1    fish oil-omega-3 fatty acids 300-1,000 mg capsule, Take 2 g by mouth once daily., Disp: , Rfl:     gabapentin (NEURONTIN) 300 MG capsule, Take 1 capsule (300 mg total) by mouth 3 (three) times daily., Disp: 270 capsule, Rfl: 2    glimepiride (AMARYL) 4 MG tablet, Take 1 tablet (4 mg total) by mouth 2 (two) times a day., Disp: 180 tablet, Rfl: 3    [START ON 10/13/2024] HYDROcodone-acetaminophen (NORCO)  mg per tablet, Take 1 tablet by mouth 3 (three) times daily as needed (pain)., Disp: 72 tablet, Rfl: 0    HYDROcodone-acetaminophen (NORCO)  mg per tablet, Take 1 tablet by mouth 3 (three) times daily as needed (pain)., Disp: 72 tablet, Rfl: 0    HYDROcodone-acetaminophen (NORCO)  mg per tablet, Take 1 tablet by mouth 3 (three) times daily as needed (pain)., Disp: 72 tablet, Rfl: 0    hydrocortisone 2.5 % cream, Apply topically 2 (two) times daily., Disp: 20 g, Rfl: 2    ibuprofen (ADVIL,MOTRIN) 800 MG tablet, Take 1 tablet (800 mg total) by mouth every 8 (eight) hours as needed for Pain., Disp: 90 tablet, Rfl: 0    insulin glargine U-100, Lantus, (LANTUS SOLOSTAR U-100 INSULIN) 100 unit/mL (3 mL) InPn pen, Inject 10 Units into the skin every evening., Disp: 15 mL, Rfl: 1    lancets Misc, 1 application  by Misc.(Non-Drug; Combo Route) route 3 (three) times daily., Disp: 200 each, Rfl: 5    loratadine (CLARITIN) 10 mg tablet, Take 1 tablet (10 mg total) by mouth once daily., Disp: 90 tablet, Rfl: 0    losartan (COZAAR) 25 MG tablet,  "Take 1 tablet (25 mg total) by mouth once daily., Disp: 90 tablet, Rfl: 3    metoprolol succinate (TOPROL-XL) 25 MG 24 hr tablet, Take 1 tablet (25 mg total) by mouth once daily., Disp: 90 tablet, Rfl: 3    multivitamin with minerals tablet, Take 1 tablet by mouth once daily., Disp: , Rfl:     neomycin-polymyxin-hydrocortisone (CORTISPORIN) 3.5-10,000-1 mg/mL-unit/mL-% otic suspension, PLACE 3 DROPS INTO THE LEFT EAR 4 TIMES DAILY., Disp: 10 mL, Rfl: 0    pen needle, diabetic 31 gauge x 3/16" Ndle, Inject 1 Needle into the skin nightly., Disp: 100 each, Rfl: 3    sodium chloride (SALINE NASAL) 0.65 % nasal spray, 2 sprays by Nasal route 2 (two) times a day., Disp: 104 mL, Rfl: 0    triamcinolone acetonide 0.1% (KENALOG) 0.1 % ointment, Apply topically 2 (two) times daily as needed., Disp: 30 g, Rfl: 0  No current facility-administered medications for this visit.    Facility-Administered Medications Ordered in Other Visits:     triamcinolone acetonide injection 40 mg, 40 mg, Intra-articular, , Gallo Lara MD, 40 mg at 09/12/19 1631    ALLERGIES:   Review of patient's allergies indicates:   Allergen Reactions    Tamiflu [oseltamivir]      Increases BP    Metformin Hives     Diarrhea, nausea    Penicillins Rash        PHYSICAL EXAMINATION:  /69   Pulse 80   Ht 5' 10" (1.778 m)   Wt 83 kg (182 lb 15.7 oz)   BMI 26.26 kg/m²   There are no signs of infection at the injection site, including no rubor, calor, or skin lesions.  Gen: NAD.  Psych: Affect & judgment nl.  Neuro: Grossly CNI. BENAVIDES.  HEENT: -Trach dev. -Eye d/c.   CV: Color nl. -E/C/C. WWPx4.  Pulm: -Dyspnea. -Cough.  Lymph: -Edema.  Int: -Rash/lesion noted. Skin is warm and dry    Diagnostic Extremity - [facility] Ultrasound was recommended due to right knee(s) evaluation.    FOCUSED: examination.     TECHNIQUE: Real time ultrasound examination of the right knee was performed with SonoSite Edge 2, 9-L MHz linear probe.     FINDINGS: The images are " of adequate diagnostic quality with identification of all echogenic structures made except for the vascular structures unless otherwise noted. There is no sonographic evidence of periosteal abnormalities, soft tissue edema, musculotendinous or nerve irregularities. Presence of effusion and extensive synovitid. The rest of the sonographic examination was unremarkable.    Ultrasound images were directly reviewed with the patient and then a treatment plan was discussed.     Images were stored in the patients medical record.     IMPRESSION: Effusion to be aspirated.       ASSESSMENT:      ICD-10-CM ICD-9-CM   1. Acute pain of right knee  M25.561 719.46   2. Effusion of bursa of right knee  M25.461 719.06   3. Abnormal gait  R26.9 781.2         PLAN:  Ultrasound-guided aspiration of the right knee performed at visit today. No fluid was retrieved.     Risks and benefits were discussed with patient prior to receiving aspiration.  Risks include the possibility of infection, pain, and cosmetic deformity at site of injection.    All questions were answered to the best of my ability and all concerns were addressed at this time.      This note is dictated using the M*Modal Fluency Direct word recognition program. There are word recognition mistakes that are occasionally missed on review.

## 2024-09-27 NOTE — PROGRESS NOTES
Principal Orthopedic Problem: Septic arthritis right knee                          Pseudogout right knee                          Extensive synovitis right knee                          Osteoarthritis right knee     08/16/24: :   Arthroscopic irrigation with extensive debridement right knee   NGTD, + Crystals   Per ID: IV ceftriaxone, completed 6 weeks transitioned to Duricef   CRP: continued to decreased from 287 now to 28    Has history of gout,  CT 8/22 knee showed ruptured baker cyst     Mr. Cool is here today for a post-operative visit    Interval History:  he reports that he is doing ok/ a little better, but still has pain in his knee. .   he is at home . he is participating in PT/OT.  Using walker. Was independent prior to issue.   Pain is somewhat controlled.  he is  taking pain medication.  Takes Norco, gabapentin and voltren gel at baseline with PCP.  Pain in creased with weightbearing and ambulation.   He is taking antibiotics as prescribed. No linger on PICC line is on oral Duricef.   Patient reports that over all he is doing somewhat better, but has continued global pain in his knee. He  has thigh high compression stocking.   His pain is intermittent intensity. Fells like a stabbing in his knee.   he denies fever, chills, and sweats .     Physical exam:    Patient arrives to exam room: walked in with walker with compression sleeve in place. .  Patient is  un accompanied, mild swelling to the knee, slight increase in warmth. TTP to joint line., mild pain with passive range of motion knee. .      Incision is clean, dry and intact.    Healing well no signs of breakdown     RADS: All pertinent images were reviewed by myself:   none done today    Assessment:  Post-op visit ( 6 weeks)    Plan:  Current care, treatment plan, precautions, activity level/ modifications, limitations, rehabilitation exercises and proposed future treatment were discussed with the patient. We discussed the need to  monitor for changes in symptoms and condition and report them to the physician.  Discussed importance of compliance with all appointments and follow up examinations.     WOUND CARE :  - use compression to  swelling  - well healed      ACTIVITY:   - as tolerated  -range of motion as tolerated    - WBAT      -PT/OT, Patient is responsible to establish and continue care      PAIN MEDICATION:   - Multimodal pain control, norco and gabapentin , voltren gel, baseline from PCP,    - rest, ice and elevation, compression to reduce pain and swelling     DVT PROPHYLAXIS:   - Eliquis, takes at baseline    OTHER:   Aspiration previously attempted in clinic a few weeks ago was a dry tap. Discussed repeat aspiration with patient. At this time will get appointment with Sports for possible ultrasound guided aspiration and send for cultures.     FOLLOW UP:   - Patient will follow up is pending results.         If there are any questions prior to scheduled follow up, the patient was instructed to contact the office

## 2024-09-30 ENCOUNTER — TELEPHONE (OUTPATIENT)
Dept: INFECTIOUS DISEASES | Facility: CLINIC | Age: 69
End: 2024-09-30
Payer: MEDICARE

## 2024-09-30 NOTE — TELEPHONE ENCOUNTER
----- Message from Wil Amaya PA-C sent at 9/28/2024  5:57 PM CDT -----  Regarding: f/u  Can you schedule for clinic f/u appt 10/08, it can be virtual. Tnk you.  I am in hospital that week, so before 11am if possible. Tnk you

## 2024-10-01 ENCOUNTER — PATIENT MESSAGE (OUTPATIENT)
Dept: ORTHOPEDICS | Facility: CLINIC | Age: 69
End: 2024-10-01
Payer: MEDICARE

## 2024-10-01 DIAGNOSIS — M11.261 PSEUDOGOUT OF RIGHT KNEE: Primary | ICD-10-CM

## 2024-10-01 RX ORDER — OXYCODONE HYDROCHLORIDE 5 MG/1
5 TABLET ORAL EVERY 6 HOURS PRN
Qty: 28 TABLET | Refills: 0 | Status: SHIPPED | OUTPATIENT
Start: 2024-10-01 | End: 2024-10-08

## 2024-10-03 DIAGNOSIS — M00.261 STREPTOCOCCAL ARTHRITIS OF RIGHT KNEE: ICD-10-CM

## 2024-10-03 RX ORDER — IBUPROFEN 800 MG/1
800 TABLET ORAL EVERY 8 HOURS PRN
Qty: 90 TABLET | Refills: 0 | Status: SHIPPED | OUTPATIENT
Start: 2024-10-03

## 2024-10-08 ENCOUNTER — OFFICE VISIT (OUTPATIENT)
Dept: INFECTIOUS DISEASES | Facility: CLINIC | Age: 69
End: 2024-10-08
Payer: MEDICARE

## 2024-10-08 ENCOUNTER — LAB VISIT (OUTPATIENT)
Dept: LAB | Facility: HOSPITAL | Age: 69
End: 2024-10-08
Payer: MEDICARE

## 2024-10-08 VITALS
TEMPERATURE: 98 F | DIASTOLIC BLOOD PRESSURE: 68 MMHG | HEART RATE: 105 BPM | SYSTOLIC BLOOD PRESSURE: 154 MMHG | BODY MASS INDEX: 26.61 KG/M2 | WEIGHT: 185.88 LBS | HEIGHT: 70 IN

## 2024-10-08 DIAGNOSIS — M00.261 STREPTOCOCCAL ARTHRITIS OF RIGHT KNEE: ICD-10-CM

## 2024-10-08 DIAGNOSIS — M11.261 PSEUDOGOUT OF RIGHT KNEE: ICD-10-CM

## 2024-10-08 DIAGNOSIS — M00.261 STREPTOCOCCAL ARTHRITIS OF RIGHT KNEE: Primary | ICD-10-CM

## 2024-10-08 LAB
ALBUMIN SERPL BCP-MCNC: 3.7 G/DL (ref 3.5–5.2)
ALP SERPL-CCNC: 111 U/L (ref 55–135)
ALT SERPL W/O P-5'-P-CCNC: 19 U/L (ref 10–44)
ANION GAP SERPL CALC-SCNC: 9 MMOL/L (ref 8–16)
AST SERPL-CCNC: 16 U/L (ref 10–40)
BASOPHILS # BLD AUTO: 0.04 K/UL (ref 0–0.2)
BASOPHILS NFR BLD: 0.7 % (ref 0–1.9)
BILIRUB SERPL-MCNC: 0.7 MG/DL (ref 0.1–1)
BUN SERPL-MCNC: 19 MG/DL (ref 8–23)
CALCIUM SERPL-MCNC: 9.8 MG/DL (ref 8.7–10.5)
CHLORIDE SERPL-SCNC: 104 MMOL/L (ref 95–110)
CO2 SERPL-SCNC: 26 MMOL/L (ref 23–29)
CREAT SERPL-MCNC: 0.8 MG/DL (ref 0.5–1.4)
CRP SERPL-MCNC: 4.8 MG/L (ref 0–8.2)
DIFFERENTIAL METHOD BLD: ABNORMAL
EOSINOPHIL # BLD AUTO: 0.1 K/UL (ref 0–0.5)
EOSINOPHIL NFR BLD: 1.1 % (ref 0–8)
ERYTHROCYTE [DISTWIDTH] IN BLOOD BY AUTOMATED COUNT: 12.7 % (ref 11.5–14.5)
EST. GFR  (NO RACE VARIABLE): >60 ML/MIN/1.73 M^2
GLUCOSE SERPL-MCNC: 170 MG/DL (ref 70–110)
HCT VFR BLD AUTO: 37.9 % (ref 40–54)
HGB BLD-MCNC: 12.3 G/DL (ref 14–18)
IMM GRANULOCYTES # BLD AUTO: 0.01 K/UL (ref 0–0.04)
IMM GRANULOCYTES NFR BLD AUTO: 0.2 % (ref 0–0.5)
LYMPHOCYTES # BLD AUTO: 1.9 K/UL (ref 1–4.8)
LYMPHOCYTES NFR BLD: 34.9 % (ref 18–48)
MCH RBC QN AUTO: 28.9 PG (ref 27–31)
MCHC RBC AUTO-ENTMCNC: 32.5 G/DL (ref 32–36)
MCV RBC AUTO: 89 FL (ref 82–98)
MONOCYTES # BLD AUTO: 0.5 K/UL (ref 0.3–1)
MONOCYTES NFR BLD: 8.6 % (ref 4–15)
NEUTROPHILS # BLD AUTO: 3 K/UL (ref 1.8–7.7)
NEUTROPHILS NFR BLD: 54.5 % (ref 38–73)
NRBC BLD-RTO: 0 /100 WBC
PLATELET # BLD AUTO: 323 K/UL (ref 150–450)
PMV BLD AUTO: 8.4 FL (ref 9.2–12.9)
POTASSIUM SERPL-SCNC: 4.1 MMOL/L (ref 3.5–5.1)
PROT SERPL-MCNC: 7.4 G/DL (ref 6–8.4)
RBC # BLD AUTO: 4.25 M/UL (ref 4.6–6.2)
SODIUM SERPL-SCNC: 139 MMOL/L (ref 136–145)
WBC # BLD AUTO: 5.45 K/UL (ref 3.9–12.7)

## 2024-10-08 PROCEDURE — 3061F NEG MICROALBUMINURIA REV: CPT | Mod: HCNC,CPTII,S$GLB, | Performed by: STUDENT IN AN ORGANIZED HEALTH CARE EDUCATION/TRAINING PROGRAM

## 2024-10-08 PROCEDURE — 99999 PR PBB SHADOW E&M-EST. PATIENT-LVL IV: CPT | Mod: PBBFAC,HCNC,, | Performed by: STUDENT IN AN ORGANIZED HEALTH CARE EDUCATION/TRAINING PROGRAM

## 2024-10-08 PROCEDURE — 3066F NEPHROPATHY DOC TX: CPT | Mod: HCNC,CPTII,S$GLB, | Performed by: STUDENT IN AN ORGANIZED HEALTH CARE EDUCATION/TRAINING PROGRAM

## 2024-10-08 PROCEDURE — 36415 COLL VENOUS BLD VENIPUNCTURE: CPT | Mod: HCNC | Performed by: STUDENT IN AN ORGANIZED HEALTH CARE EDUCATION/TRAINING PROGRAM

## 2024-10-08 PROCEDURE — 3288F FALL RISK ASSESSMENT DOCD: CPT | Mod: HCNC,CPTII,S$GLB, | Performed by: STUDENT IN AN ORGANIZED HEALTH CARE EDUCATION/TRAINING PROGRAM

## 2024-10-08 PROCEDURE — 4010F ACE/ARB THERAPY RXD/TAKEN: CPT | Mod: HCNC,CPTII,S$GLB, | Performed by: STUDENT IN AN ORGANIZED HEALTH CARE EDUCATION/TRAINING PROGRAM

## 2024-10-08 PROCEDURE — 1125F AMNT PAIN NOTED PAIN PRSNT: CPT | Mod: HCNC,CPTII,S$GLB, | Performed by: STUDENT IN AN ORGANIZED HEALTH CARE EDUCATION/TRAINING PROGRAM

## 2024-10-08 PROCEDURE — 3008F BODY MASS INDEX DOCD: CPT | Mod: HCNC,CPTII,S$GLB, | Performed by: STUDENT IN AN ORGANIZED HEALTH CARE EDUCATION/TRAINING PROGRAM

## 2024-10-08 PROCEDURE — 1159F MED LIST DOCD IN RCRD: CPT | Mod: HCNC,CPTII,S$GLB, | Performed by: STUDENT IN AN ORGANIZED HEALTH CARE EDUCATION/TRAINING PROGRAM

## 2024-10-08 PROCEDURE — 3078F DIAST BP <80 MM HG: CPT | Mod: HCNC,CPTII,S$GLB, | Performed by: STUDENT IN AN ORGANIZED HEALTH CARE EDUCATION/TRAINING PROGRAM

## 2024-10-08 PROCEDURE — 3077F SYST BP >= 140 MM HG: CPT | Mod: HCNC,CPTII,S$GLB, | Performed by: STUDENT IN AN ORGANIZED HEALTH CARE EDUCATION/TRAINING PROGRAM

## 2024-10-08 PROCEDURE — 85025 COMPLETE CBC W/AUTO DIFF WBC: CPT | Mod: HCNC | Performed by: STUDENT IN AN ORGANIZED HEALTH CARE EDUCATION/TRAINING PROGRAM

## 2024-10-08 PROCEDURE — 99212 OFFICE O/P EST SF 10 MIN: CPT | Mod: HCNC,S$GLB,, | Performed by: STUDENT IN AN ORGANIZED HEALTH CARE EDUCATION/TRAINING PROGRAM

## 2024-10-08 PROCEDURE — 1101F PT FALLS ASSESS-DOCD LE1/YR: CPT | Mod: HCNC,CPTII,S$GLB, | Performed by: STUDENT IN AN ORGANIZED HEALTH CARE EDUCATION/TRAINING PROGRAM

## 2024-10-08 PROCEDURE — 86140 C-REACTIVE PROTEIN: CPT | Mod: HCNC | Performed by: STUDENT IN AN ORGANIZED HEALTH CARE EDUCATION/TRAINING PROGRAM

## 2024-10-08 PROCEDURE — 3052F HG A1C>EQUAL 8.0%<EQUAL 9.0%: CPT | Mod: HCNC,CPTII,S$GLB, | Performed by: STUDENT IN AN ORGANIZED HEALTH CARE EDUCATION/TRAINING PROGRAM

## 2024-10-08 PROCEDURE — 80053 COMPREHEN METABOLIC PANEL: CPT | Mod: HCNC | Performed by: STUDENT IN AN ORGANIZED HEALTH CARE EDUCATION/TRAINING PROGRAM

## 2024-10-08 NOTE — PROGRESS NOTES
Infectious Disease Clinic Visit    Reason:    Hosp f/u    HPI:        69M with CAD, Afib on Eliquis, DM (A1C 11), and osteoarthritis of the knee -- recently admitted with pseudogout with superimposed septic arthitis of native Rt knee.   Patient has history of OA in the right knee and receives intraarticular steroid injections every 3 months.  Last injection was on 8/13.  Developed increased pain and swelling within 24 hours after the injection. Underwent knee aspiration in Ortho clinic 8/15 which showed 143K WBCs, 85% segs and positive CPP crystals. Aspirate cultures of 8/15 is positive for strep mitis (rare). No history of gout or pseudogout.         Underwent surgical I&D with Dr. Alfaro on 8/16. Per OP note, thick viscous fluid encountered.  Suspects septic arthritis superimposed over pseudogout.  No OR cultures sent as patient had been on Vanc/Ceftriaxone prior to surgery. Post-op had a few days of persistent fevers. Swelling and pain began in leg/calf.  CRP remains elevated. Ortho re-aspirated knee. WBC 32K, 86% segs. Blood and aspiration cx NGTD. CT knee/leg revealed ruptured bakers cyst which could explain all symptoms. Per ortho - no concerns about joint effusion or pneumarthrosis present. Improved on IV-cefriaxone inpatient, LFTS downtrended, possibly elevated d/t early post op surgery. Pt discharged to home with Iv-ceftriaxone, for 4wk course, YAMILET 09/13.      Seen 09/12 virtually for f/u appt for EOC. Pt reported ongoing swelling and pain 9/10, with inability to bear weight, with mild ache in calf / popliteal fossa 2/2 ruptured backers cyst; without redness or warmth, or systemic s/sxs. Ongoing pain swelling of Rt knee globablly, could be multi-factorial; OA, gout, ruptured bakers cyst, post-infectious inflammation vs possible ongoing infection (Septic arthritis). Favored extending Iv-ceftriaxone an additional 10d, new YAMILET 09/23. (Midline placed 08/23 so 4wks max).     09/20: Still had residual ongoing  pain / swelling with weight bearing and reduced ROM 2/2 pain/swelling.  Suspect this may be more related to pseudogout, OA, and possible  post-infectious arthritis / inflammation; rather than persistent septic joint or failure of abx. However, pt to f/u with ortho next week, may consider joint aspiration if sufficient effusion present. Continue Iv-ceftriaxone until 09/23, then switch to cefadroxil 500mg BID for 2wks, YAMILET 10/07.     10/08: Pt saw ortho, attempted to aspirate knee, but unsuccessful. Pt continuing on colchicine for pseudogout. Knee swelling improved. Still with lingering pain with WB. ROM limited by stiffness and pain. Denies systemic s/sxs.       Review of Systems   Musculoskeletal:  Positive for joint pain.   All other systems reviewed and are negative.        Physical Exam  Vitals reviewed.   Constitutional:       General: He is not in acute distress.     Appearance: Normal appearance. He is not toxic-appearing or diaphoretic.   HENT:      Head: Normocephalic and atraumatic.      Nose: No congestion.      Mouth/Throat:      Pharynx: No oropharyngeal exudate.   Eyes:      General: No scleral icterus.     Conjunctiva/sclera: Conjunctivae normal.   Cardiovascular:      Rate and Rhythm: Normal rate.      Heart sounds: Normal heart sounds. No murmur heard.  Pulmonary:      Effort: Pulmonary effort is normal. No respiratory distress.      Breath sounds: Normal breath sounds.   Abdominal:      General: Bowel sounds are normal. There is no distension.      Palpations: Abdomen is soft.      Tenderness: There is no abdominal tenderness.   Musculoskeletal:         General: Swelling and tenderness present.      Cervical back: Neck supple. No tenderness.   Skin:     General: Skin is warm and dry.      Coloration: Skin is not jaundiced.      Findings: No erythema or rash.   Neurological:      Mental Status: He is alert and oriented to person, place, and time. Mental status is at baseline.   Psychiatric:          Behavior: Behavior normal.         Thought Content: Thought content normal.           Review of patient's allergies indicates:   Allergen Reactions    Tamiflu [oseltamivir]      Increases BP    Metformin Hives     Diarrhea, nausea    Penicillins Rash         Current Outpatient Medications:     alcohol swabs (DROPSAFE ALCOHOL PREP PADS) PadM, USE AS DIRECTED AS NEEDED, Disp: 400 each, Rfl: 1    apixaban (ELIQUIS) 5 mg Tab, Take 1 tablet (5 mg total) by mouth 2 (two) times daily., Disp: 180 tablet, Rfl: 3    ASCORBATE CALCIUM (VITAMIN C ORAL), Take by mouth once daily. , Disp: , Rfl:     aspirin (ECOTRIN) 81 MG EC tablet, Take 81 mg by mouth once daily., Disp: , Rfl:     atorvastatin (LIPITOR) 80 MG tablet, Take 1 tablet (80 mg total) by mouth every evening., Disp: 90 tablet, Rfl: 3    blood sugar diagnostic Strp, 1 strip by Misc.(Non-Drug; Combo Route) route 3 (three) times daily., Disp: 200 strip, Rfl: 5    blood-glucose meter kit, Use as instructed, Disp: 1 each, Rfl: 0    blood-glucose meter kit, 1 each by Other route 3 (three) times daily. Use as instructed, Disp: 1 each, Rfl: 0    colchicine (COLCRYS) 0.6 mg tablet, TAKE 1 TABLET (0.6 MG TOTAL) BY MOUTH DAILY AS NEEDED (GOUT ATTACK FOR 3 DAYS.)., Disp: 90 tablet, Rfl: 1    diclofenac sodium (VOLTAREN) 1 % Gel, Apply 2 g topically once daily., Disp: 450 g, Rfl: 0    fish oil-omega-3 fatty acids 300-1,000 mg capsule, Take 2 g by mouth once daily., Disp: , Rfl:     gabapentin (NEURONTIN) 300 MG capsule, Take 1 capsule (300 mg total) by mouth 3 (three) times daily., Disp: 270 capsule, Rfl: 2    glimepiride (AMARYL) 4 MG tablet, Take 1 tablet (4 mg total) by mouth 2 (two) times a day., Disp: 180 tablet, Rfl: 3    hydrocortisone 2.5 % cream, Apply topically 2 (two) times daily., Disp: 20 g, Rfl: 2    ibuprofen (ADVIL,MOTRIN) 800 MG tablet, TAKE 1 TABLET BY MOUTH EVERY 8 HOURS AS NEEDED FOR PAIN, Disp: 90 tablet, Rfl: 0    insulin glargine U-100, Lantus, (LANTUS  "SOLOSTAR U-100 INSULIN) 100 unit/mL (3 mL) InPn pen, Inject 10 Units into the skin every evening., Disp: 15 mL, Rfl: 1    lancets Misc, 1 application  by Misc.(Non-Drug; Combo Route) route 3 (three) times daily., Disp: 200 each, Rfl: 5    loratadine (CLARITIN) 10 mg tablet, Take 1 tablet (10 mg total) by mouth once daily., Disp: 90 tablet, Rfl: 0    losartan (COZAAR) 25 MG tablet, Take 1 tablet (25 mg total) by mouth once daily., Disp: 90 tablet, Rfl: 3    metoprolol succinate (TOPROL-XL) 25 MG 24 hr tablet, Take 1 tablet (25 mg total) by mouth once daily., Disp: 90 tablet, Rfl: 3    multivitamin with minerals tablet, Take 1 tablet by mouth once daily., Disp: , Rfl:     neomycin-polymyxin-hydrocortisone (CORTISPORIN) 3.5-10,000-1 mg/mL-unit/mL-% otic suspension, PLACE 3 DROPS INTO THE LEFT EAR 4 TIMES DAILY., Disp: 10 mL, Rfl: 0    pen needle, diabetic 31 gauge x 3/16" Ndle, Inject 1 Needle into the skin nightly., Disp: 100 each, Rfl: 3    sodium chloride (SALINE NASAL) 0.65 % nasal spray, 2 sprays by Nasal route 2 (two) times a day., Disp: 104 mL, Rfl: 0    triamcinolone acetonide 0.1% (KENALOG) 0.1 % ointment, Apply topically 2 (two) times daily as needed., Disp: 30 g, Rfl: 0    blood-glucose meter,continuous (FREESTYLE AMIE 3 READER) [facility], 1 application  by Misc.(Non-Drug; Combo Route) route 3 (three) times daily., Disp: 1 each, Rfl: 0    blood-glucose sensor (FREESTYLE AMIE 3 SENSOR) [place], 1 Device by Misc.(Non-Drug; Combo Route) route 3 (three) times daily., Disp: 4 each, Rfl: 5    [START ON 12/10/2024] HYDROcodone-acetaminophen (NORCO)  mg per tablet, Take 1 tablet by mouth 3 (three) times daily as needed (pain)., Disp: 72 tablet, Rfl: 0    [START ON 11/10/2024] HYDROcodone-acetaminophen (NORCO)  mg per tablet, Take 1 tablet by mouth 3 (three) times daily as needed (pain)., Disp: 72 tablet, Rfl: 0    [START ON 10/13/2024] HYDROcodone-acetaminophen (NORCO)  mg per tablet, Take 1 tablet by " "mouth 3 (three) times daily as needed (pain)., Disp: 72 tablet, Rfl: 0    tirzepatide (MOUNJARO) 2.5 mg/0.5 mL PnIj, Inject 2.5 mg into the skin every 7 days., Disp: 4 Pen, Rfl: 1  No current facility-administered medications for this visit.    Facility-Administered Medications Ordered in Other Visits:     triamcinolone acetonide injection 40 mg, 40 mg, Intra-articular, , Gallo aLra MD, 40 mg at 09/12/19 1631    Past Medical History:   Diagnosis Date    Coronary artery disease involving native coronary artery of native heart without angina pectoris 3/7/2023    Essential (primary) hypertension     Male erectile dysfunction, unspecified     New onset a-fib 1/3/2023    Testicular hypofunction     Type 2 diabetes mellitus without complications        Lab Results   Component Value Date    WBC 5.45 10/08/2024    CRP 4.8 10/08/2024    SEDRATE 12 08/15/2024    SEDRATE 12 08/15/2024    CREATININE 0.8 10/08/2024    AST 16 10/08/2024    ALT 19 10/08/2024    ALKPHOS 111 10/08/2024       Immunization History   Administered Date(s) Administered    Td (ADULT) 02/18/2015         Reviewed available labs and imaging.     Vitals:    10/08/24 0858   BP: (!) 154/68   BP Location: Left arm   Patient Position: Sitting   Pulse: 105   Temp: 98 °F (36.7 °C)   TempSrc: Oral   Weight: 84.3 kg (185 lb 13.6 oz)   Height: 5' 10" (1.778 m)           Assessment:  Encounter Diagnoses   Name Primary?    Streptococcal arthritis of right knee s/p I+D on 8/16/2024 Yes    Pseudogout of right knee      --knee swelling improved. Still some  lingering pain with WB. S/p 4wks Iv ceftriaxone, and 2wks cefadroxil 10/07.       Plan:      Monitor off abx.   Continue colchicine for pseudogout.   Follow with ortho as rec'd  Labs today: crp, cbc, cmp; CRP nml.       Follow-up: PRN      Orders Placed This Encounter   Procedures    C-REACTIVE PROTEIN    COMPREHENSIVE METABOLIC PANEL    CBC Auto Differential           "

## 2024-10-10 ENCOUNTER — OFFICE VISIT (OUTPATIENT)
Dept: FAMILY MEDICINE | Facility: CLINIC | Age: 69
End: 2024-10-10
Payer: MEDICARE

## 2024-10-10 ENCOUNTER — TELEPHONE (OUTPATIENT)
Dept: PHARMACY | Facility: CLINIC | Age: 69
End: 2024-10-10
Payer: MEDICARE

## 2024-10-10 ENCOUNTER — LAB VISIT (OUTPATIENT)
Dept: LAB | Facility: HOSPITAL | Age: 69
End: 2024-10-10
Attending: FAMILY MEDICINE
Payer: MEDICARE

## 2024-10-10 VITALS
BODY MASS INDEX: 26.84 KG/M2 | HEIGHT: 70 IN | TEMPERATURE: 98 F | HEART RATE: 81 BPM | WEIGHT: 187.5 LBS | SYSTOLIC BLOOD PRESSURE: 132 MMHG | DIASTOLIC BLOOD PRESSURE: 66 MMHG | OXYGEN SATURATION: 97 %

## 2024-10-10 DIAGNOSIS — E11.29 TYPE 2 DIABETES MELLITUS WITH MICROALBUMINURIA, WITHOUT LONG-TERM CURRENT USE OF INSULIN: Primary | Chronic | ICD-10-CM

## 2024-10-10 DIAGNOSIS — I10 ESSENTIAL (PRIMARY) HYPERTENSION: Chronic | ICD-10-CM

## 2024-10-10 DIAGNOSIS — M17.0 PRIMARY OSTEOARTHRITIS OF BOTH KNEES: ICD-10-CM

## 2024-10-10 DIAGNOSIS — E11.29 TYPE 2 DIABETES MELLITUS WITH MICROALBUMINURIA, WITHOUT LONG-TERM CURRENT USE OF INSULIN: Chronic | ICD-10-CM

## 2024-10-10 DIAGNOSIS — R80.9 TYPE 2 DIABETES MELLITUS WITH MICROALBUMINURIA, WITHOUT LONG-TERM CURRENT USE OF INSULIN: Chronic | ICD-10-CM

## 2024-10-10 DIAGNOSIS — R80.9 TYPE 2 DIABETES MELLITUS WITH MICROALBUMINURIA, WITHOUT LONG-TERM CURRENT USE OF INSULIN: Primary | Chronic | ICD-10-CM

## 2024-10-10 LAB
ESTIMATED AVG GLUCOSE: 209 MG/DL (ref 68–131)
HBA1C MFR BLD: 8.9 % (ref 4–5.6)

## 2024-10-10 PROCEDURE — 83036 HEMOGLOBIN GLYCOSYLATED A1C: CPT | Mod: HCNC | Performed by: FAMILY MEDICINE

## 2024-10-10 PROCEDURE — 3288F FALL RISK ASSESSMENT DOCD: CPT | Mod: HCNC,CPTII,S$GLB, | Performed by: FAMILY MEDICINE

## 2024-10-10 PROCEDURE — 1101F PT FALLS ASSESS-DOCD LE1/YR: CPT | Mod: HCNC,CPTII,S$GLB, | Performed by: FAMILY MEDICINE

## 2024-10-10 PROCEDURE — 99215 OFFICE O/P EST HI 40 MIN: CPT | Mod: HCNC,S$GLB,, | Performed by: FAMILY MEDICINE

## 2024-10-10 PROCEDURE — 3078F DIAST BP <80 MM HG: CPT | Mod: HCNC,CPTII,S$GLB, | Performed by: FAMILY MEDICINE

## 2024-10-10 PROCEDURE — 3046F HEMOGLOBIN A1C LEVEL >9.0%: CPT | Mod: HCNC,CPTII,S$GLB, | Performed by: FAMILY MEDICINE

## 2024-10-10 PROCEDURE — 4010F ACE/ARB THERAPY RXD/TAKEN: CPT | Mod: HCNC,CPTII,S$GLB, | Performed by: FAMILY MEDICINE

## 2024-10-10 PROCEDURE — 3008F BODY MASS INDEX DOCD: CPT | Mod: HCNC,CPTII,S$GLB, | Performed by: FAMILY MEDICINE

## 2024-10-10 PROCEDURE — 99999 PR PBB SHADOW E&M-EST. PATIENT-LVL III: CPT | Mod: PBBFAC,HCNC,, | Performed by: FAMILY MEDICINE

## 2024-10-10 PROCEDURE — 3061F NEG MICROALBUMINURIA REV: CPT | Mod: HCNC,CPTII,S$GLB, | Performed by: FAMILY MEDICINE

## 2024-10-10 PROCEDURE — 1125F AMNT PAIN NOTED PAIN PRSNT: CPT | Mod: HCNC,CPTII,S$GLB, | Performed by: FAMILY MEDICINE

## 2024-10-10 PROCEDURE — 3075F SYST BP GE 130 - 139MM HG: CPT | Mod: HCNC,CPTII,S$GLB, | Performed by: FAMILY MEDICINE

## 2024-10-10 PROCEDURE — 3066F NEPHROPATHY DOC TX: CPT | Mod: HCNC,CPTII,S$GLB, | Performed by: FAMILY MEDICINE

## 2024-10-10 PROCEDURE — 36415 COLL VENOUS BLD VENIPUNCTURE: CPT | Mod: HCNC,PO | Performed by: FAMILY MEDICINE

## 2024-10-10 RX ORDER — BLOOD-GLUCOSE,RECEIVER,CONT
1 EACH MISCELLANEOUS 3 TIMES DAILY
Qty: 1 EACH | Refills: 0 | Status: SHIPPED | OUTPATIENT
Start: 2024-10-10 | End: 2025-10-10

## 2024-10-10 RX ORDER — HYDROCODONE BITARTRATE AND ACETAMINOPHEN 10; 325 MG/1; MG/1
1 TABLET ORAL 3 TIMES DAILY PRN
Qty: 72 TABLET | Refills: 0 | Status: SHIPPED | OUTPATIENT
Start: 2024-12-10

## 2024-10-10 RX ORDER — HYDROCODONE BITARTRATE AND ACETAMINOPHEN 10; 325 MG/1; MG/1
1 TABLET ORAL 3 TIMES DAILY PRN
Qty: 72 TABLET | Refills: 0 | Status: SHIPPED | OUTPATIENT
Start: 2024-11-10

## 2024-10-10 RX ORDER — HYDROCODONE BITARTRATE AND ACETAMINOPHEN 10; 325 MG/1; MG/1
1 TABLET ORAL 3 TIMES DAILY PRN
Qty: 72 TABLET | Refills: 0 | Status: SHIPPED | OUTPATIENT
Start: 2024-10-13

## 2024-10-10 RX ORDER — TIRZEPATIDE 2.5 MG/.5ML
2.5 INJECTION, SOLUTION SUBCUTANEOUS
Qty: 4 PEN | Refills: 1 | Status: SHIPPED | OUTPATIENT
Start: 2024-10-10

## 2024-10-10 RX ORDER — BLOOD-GLUCOSE SENSOR
1 EACH MISCELLANEOUS 3 TIMES DAILY
Qty: 4 EACH | Refills: 5 | Status: SHIPPED | OUTPATIENT
Start: 2024-10-10 | End: 2025-10-10

## 2024-10-10 NOTE — PROGRESS NOTES
[facility]  Progress Note    SUBJECTIVE:     Chief Complaint   Patient presents with    Follow-up     Diabetes     Diabetes       HPI   King Cool Jr.  is a 69 y.o. male here for follow-up of his chronic conditions. Sugars been up to 160s. Takes meds as directed. He says jardiance is too expensive and will stop getting it. Patient has no other new complaints/problems at this time.      Review of patient's allergies indicates:   Allergen Reactions    Tamiflu [oseltamivir]      Increases BP    Metformin Hives     Diarrhea, nausea    Penicillins Rash       Past Medical History:   Diagnosis Date    Coronary artery disease involving native coronary artery of native heart without angina pectoris 3/7/2023    Essential (primary) hypertension     Male erectile dysfunction, unspecified     New onset a-fib 1/3/2023    Testicular hypofunction     Type 2 diabetes mellitus without complications      Past Surgical History:   Procedure Laterality Date    HIP SURGERY      KNEE ARTHROSCOPY W/ DEBRIDEMENT Right 8/16/2024    Procedure: ARTHROSCOPY, KNEE, WITH DEBRIDEMENT;  Surgeon: Isaac Alfaro MD;  Location: [facility] OR [address] [place] [address];  Service: Orthopedics;  Laterality: Right;    LEFT HEART CATHETERIZATION Left 3/7/2023    Procedure: Left heart cath;  Surgeon: Wil Mahoney MD;  Location: [facility] CATH LAB;  Service: Cardiology;  Laterality: Left;  1030am start, R rad access    RN PREOP 2/28/23     Family History   Problem Relation Name Age of Onset    Blindness Mother      No Known Problems Father      No Known Problems Brother      No Known Problems Daughter      No Known Problems Son      No Known Problems Daughter      Diabetes Brother      No Known Problems Brother      Amblyopia Neg Hx      Cancer Neg Hx      Cataracts Neg Hx      Glaucoma Neg Hx      Hypertension Neg Hx      Macular degeneration Neg Hx      Retinal detachment Neg Hx      Strabismus Neg Hx      Stroke Neg Hx      Thyroid disease Neg Hx       Social  History     Tobacco Use    Smoking status: Former     Current packs/day: 0.00     Average packs/day: 0.3 packs/day for 4.0 years (1.0 ttl pk-yrs)     Types: Cigarettes     Start date: 1972     Quit date: 1976     Years since quittin.9    Smokeless tobacco: Never   Substance Use Topics    Alcohol use: Yes     Comment: occ    Drug use: No        Review of Systems   Constitutional:  Negative for chills and fever.   HENT: Negative.     Respiratory: Negative.  Negative for shortness of breath.    Cardiovascular: Negative.  Negative for chest pain.   Gastrointestinal: Negative.  Negative for abdominal pain, nausea and vomiting.   Genitourinary: Negative.    Neurological:  Negative for headaches.   All other systems reviewed and are negative.    OBJECTIVE:     Vitals:    10/10/24 1256   BP: 132/66   Pulse: 81   Temp: 98.4 °F (36.9 °C)     Body mass index is 26.9 kg/m².    Physical Exam  Constitutional:       General: He is not in acute distress.     Appearance: He is not diaphoretic.   HENT:      Head: Normocephalic and atraumatic.   Eyes:      Conjunctiva/sclera: Conjunctivae normal.   Pulmonary:      Effort: Pulmonary effort is normal. No respiratory distress.   Skin:     General: Skin is warm.   Neurological:      Mental Status: He is alert and oriented to person, place, and time.         Old records were reviewed. Labs and/or images were independently reviewed.    ASSESSMENT     1. Type 2 diabetes mellitus with microalbuminuria, without long-term current use of insulin    2. Essential (primary) hypertension    3. Primary osteoarthritis of both knees        PLAN:     Type 2 diabetes mellitus with microalbuminuria, without long-term current use of insulin  -     Hemoglobin A1C; Future  -     Start tirzepatide (MOUNJARO) 2.5 mg/0.5 mL PnIj; Inject 2.5 mg into the skin every 7 days.  Dispense: 4 Pen; Refill: 1  -     Ambulatory referral/consult to Pharmacy Assistance; Future; Expected date: 10/17/2024  -      blood-glucose meter,continuous (FREESTYLE AMIE 3 READER) Misc; 1 application  by Misc.(Non-Drug; Combo Route) route 3 (three) times daily.  Dispense: 1 each; Refill: 0  -     blood-glucose sensor (FREESTYLE AMIE 3 SENSOR) Johanna; 1 Device by Misc.(Non-Drug; Combo Route) route 3 (three) times daily.  Dispense: 4 each; Refill: 5  -     Instructed patient to take daily glucose AM logs and to write them down to bring with on next visit. Advised patient to decrease intake of carbohydrates/simple sugars.  -     unable to afford jardiance. Patient will stop taking it.     Essential (primary) hypertension   -     Stable. Continue current regimen.    Primary osteoarthritis of both knees  -     HYDROcodone-acetaminophen (NORCO)  mg per tablet; Take 1 tablet by mouth 3 (three) times daily as needed (pain).  Dispense: 72 tablet; Refill: 0  -     HYDROcodone-acetaminophen (NORCO)  mg per tablet; Take 1 tablet by mouth 3 (three) times daily as needed (pain).  Dispense: 72 tablet; Refill: 0  -     HYDROcodone-acetaminophen (NORCO)  mg per tablet; Take 1 tablet by mouth 3 (three) times daily as needed (pain).  Dispense: 72 tablet; Refill: 0  -     Stable. Continue current regimen. Unable to wean at this time but am monitoring closely for any drug toxicity. Checked .      RTC TYLOR Lara MD  10/10/2024 1:13 PM

## 2024-10-10 NOTE — TELEPHONE ENCOUNTER
In order to properly assistance the patient in timely manner, the specific medications being requested must be listed. After medications are varied, the PAP advocate can move forward with assistance attempts     Martinez Madden  Pharmacy Patient Assistance Team

## 2024-10-11 ENCOUNTER — TELEPHONE (OUTPATIENT)
Dept: PHARMACY | Facility: CLINIC | Age: 69
End: 2024-10-11
Payer: MEDICARE

## 2024-10-11 NOTE — LETTER
October 11, 2024    King Cool Jr.  [address] [place] [address]       Dear King Cool Jr.    To follow up on your request for assistance. The Pharmacy Patient Assistance Program needs more information from you before we can submit your Jardiance and Lantus Pens application to the [facility] [employer] Program. Please return the following documents to the Pharmacy Patient Assistance office (forms can be returned by mail, email or fax) asap:      Proof of household Income( such as social security statement, 1099 form, pension statement or 3 consecutive pay stubs, Copy of all Insurance cards( front and back), and Completed Medication Access Center Authorization Forms             Whats Next:      Once  we receive your documentation and authorization from your Provider, your application will be submitted to the [facility] Assistance Program for review. Please be advised it will take 2 to 4 weeks for your application to be processed so you may have to purchase a month's supply of medication from your pharmacy to hold you over during the waiting period. You will be notified of approval or denial by The Program(mail) or myself.       If you have any questions or concerns, please give me a call          Sincerely   Jonathan SHAH @[phone]  Pharmacy Patient Assistance Team  [address]  [address]  [place] [address]  Fax: [phone]  Email: [email or web address]

## 2024-10-11 NOTE — TELEPHONE ENCOUNTER
King Cool Jr. was was informed of the application process for Lantus Pens  on 09/13/24. Unfortunately we never heard back from the patient. Good news is.  The same documentation is required to assist the patient with Jardiance:    Proof of household Income( such as social security statement, 1099 form, pension statement or 3 consecutive pay stubs, Copy of all Insurance cards( front and back), and Completed Medication Access Center Authorization Forms   A reminder letter has been sent to the patient.     Please be advised we are unable to start the application process until are required documentation is received.   In addition [facility] currently does not have a Patient Assistance Program. [facility] is only offering co-pay cards to patients with private and/or commercial insurance      Follow-up will be made in 5 business days.     Jonathan Garcia  Pharmacy Patient Assistance Team

## 2024-10-17 ENCOUNTER — DOCUMENT SCAN (OUTPATIENT)
Dept: HOME HEALTH SERVICES | Facility: HOSPITAL | Age: 69
End: 2024-10-17
Payer: MEDICARE

## 2024-11-03 DIAGNOSIS — M00.261 STREPTOCOCCAL ARTHRITIS OF RIGHT KNEE: ICD-10-CM

## 2024-11-04 DIAGNOSIS — M25.561 CHRONIC PAIN OF RIGHT KNEE: ICD-10-CM

## 2024-11-04 DIAGNOSIS — G89.29 CHRONIC PAIN OF RIGHT KNEE: ICD-10-CM

## 2024-11-04 RX ORDER — IBUPROFEN 800 MG/1
800 TABLET ORAL EVERY 8 HOURS PRN
Qty: 90 TABLET | Refills: 0 | Status: SHIPPED | OUTPATIENT
Start: 2024-11-04

## 2024-11-04 RX ORDER — GABAPENTIN 300 MG/1
300 CAPSULE ORAL 3 TIMES DAILY
Qty: 270 CAPSULE | Refills: 2 | Status: SHIPPED | OUTPATIENT
Start: 2024-11-04

## 2024-11-04 NOTE — TELEPHONE ENCOUNTER
No care due was identified.  [facility] Embedded Care Due Messages. Reference number: [id number].   11/04/2024 2:53:14 PM CST

## 2024-11-04 NOTE — TELEPHONE ENCOUNTER
----- Message from Sheila sent at 11/4/2024  1:25 PM CST -----  .Type: RX Refill Request    Who Called: Self     Have you contacted your pharmacy: Yes     Refill or New Rx: Refill     RX Name and Strength:gabapentin (NEURONTIN) 300 MG capsule        Preferred Pharmacy with phone number:.  [facility]/pharmacy #[id number] - [place] - [address] [facility]  [address]  [place] [address]  Phone: [phone] Fax: [phone]    Local or Mail Order: Local     Ordering Provider: Gallo Lara    Would the patient rather a call back or a response via [facility]? Call Back     Best Call Back Number: [phone] (home)       Additional Information:

## 2024-11-13 ENCOUNTER — OFFICE VISIT (OUTPATIENT)
Dept: FAMILY MEDICINE | Facility: CLINIC | Age: 69
End: 2024-11-13
Payer: MEDICARE

## 2024-11-13 ENCOUNTER — HOSPITAL ENCOUNTER (OUTPATIENT)
Dept: RADIOLOGY | Facility: HOSPITAL | Age: 69
Discharge: HOME | End: 2024-11-13
Attending: FAMILY MEDICINE
Payer: MEDICARE

## 2024-11-13 VITALS
HEIGHT: 70 IN | DIASTOLIC BLOOD PRESSURE: 60 MMHG | OXYGEN SATURATION: 98 % | WEIGHT: 195.88 LBS | SYSTOLIC BLOOD PRESSURE: 118 MMHG | HEART RATE: 63 BPM | BODY MASS INDEX: 28.04 KG/M2 | TEMPERATURE: 98 F

## 2024-11-13 DIAGNOSIS — I10 ESSENTIAL (PRIMARY) HYPERTENSION: Chronic | ICD-10-CM

## 2024-11-13 DIAGNOSIS — M17.0 PRIMARY OSTEOARTHRITIS OF BOTH KNEES: ICD-10-CM

## 2024-11-13 DIAGNOSIS — M17.0 OSTEOARTHRITIS OF BOTH KNEES, UNSPECIFIED OSTEOARTHRITIS TYPE: Primary | ICD-10-CM

## 2024-11-13 DIAGNOSIS — M17.0 OSTEOARTHRITIS OF BOTH KNEES, UNSPECIFIED OSTEOARTHRITIS TYPE: ICD-10-CM

## 2024-11-13 DIAGNOSIS — R80.9 TYPE 2 DIABETES MELLITUS WITH MICROALBUMINURIA, WITHOUT LONG-TERM CURRENT USE OF INSULIN: Chronic | ICD-10-CM

## 2024-11-13 DIAGNOSIS — E11.29 TYPE 2 DIABETES MELLITUS WITH MICROALBUMINURIA, WITHOUT LONG-TERM CURRENT USE OF INSULIN: Chronic | ICD-10-CM

## 2024-11-13 PROCEDURE — 4010F ACE/ARB THERAPY RXD/TAKEN: CPT | Mod: HCNC,CPTII,S$GLB, | Performed by: FAMILY MEDICINE

## 2024-11-13 PROCEDURE — 3008F BODY MASS INDEX DOCD: CPT | Mod: HCNC,CPTII,S$GLB, | Performed by: FAMILY MEDICINE

## 2024-11-13 PROCEDURE — 3052F HG A1C>EQUAL 8.0%<EQUAL 9.0%: CPT | Mod: HCNC,CPTII,S$GLB, | Performed by: FAMILY MEDICINE

## 2024-11-13 PROCEDURE — 73562 X-RAY EXAM OF KNEE 3: CPT | Mod: TC,HCNC,FY,PO,RT

## 2024-11-13 PROCEDURE — 3066F NEPHROPATHY DOC TX: CPT | Mod: HCNC,CPTII,S$GLB, | Performed by: FAMILY MEDICINE

## 2024-11-13 PROCEDURE — 73565 X-RAY EXAM OF KNEES: CPT | Mod: 26,HCNC,, | Performed by: RADIOLOGY

## 2024-11-13 PROCEDURE — 1125F AMNT PAIN NOTED PAIN PRSNT: CPT | Mod: HCNC,CPTII,S$GLB, | Performed by: FAMILY MEDICINE

## 2024-11-13 PROCEDURE — 3061F NEG MICROALBUMINURIA REV: CPT | Mod: HCNC,CPTII,S$GLB, | Performed by: FAMILY MEDICINE

## 2024-11-13 PROCEDURE — 3074F SYST BP LT 130 MM HG: CPT | Mod: HCNC,CPTII,S$GLB, | Performed by: FAMILY MEDICINE

## 2024-11-13 PROCEDURE — 73565 X-RAY EXAM OF KNEES: CPT | Mod: TC,HCNC,FY,PO

## 2024-11-13 PROCEDURE — 99999 PR PBB SHADOW E&M-EST. PATIENT-LVL V: CPT | Mod: PBBFAC,HCNC,, | Performed by: FAMILY MEDICINE

## 2024-11-13 PROCEDURE — 1159F MED LIST DOCD IN RCRD: CPT | Mod: HCNC,CPTII,S$GLB, | Performed by: FAMILY MEDICINE

## 2024-11-13 PROCEDURE — 3288F FALL RISK ASSESSMENT DOCD: CPT | Mod: HCNC,CPTII,S$GLB, | Performed by: FAMILY MEDICINE

## 2024-11-13 PROCEDURE — 99215 OFFICE O/P EST HI 40 MIN: CPT | Mod: HCNC,S$GLB,, | Performed by: FAMILY MEDICINE

## 2024-11-13 PROCEDURE — 1101F PT FALLS ASSESS-DOCD LE1/YR: CPT | Mod: HCNC,CPTII,S$GLB, | Performed by: FAMILY MEDICINE

## 2024-11-13 PROCEDURE — 3078F DIAST BP <80 MM HG: CPT | Mod: HCNC,CPTII,S$GLB, | Performed by: FAMILY MEDICINE

## 2024-11-13 RX ORDER — HYDROCODONE BITARTRATE AND ACETAMINOPHEN 10; 325 MG/1; MG/1
1 TABLET ORAL 3 TIMES DAILY PRN
Qty: 72 TABLET | Refills: 0 | Status: SHIPPED | OUTPATIENT
Start: 2024-12-13

## 2024-11-13 RX ORDER — HYDROCODONE BITARTRATE AND ACETAMINOPHEN 10; 325 MG/1; MG/1
1 TABLET ORAL 3 TIMES DAILY PRN
Qty: 72 TABLET | Refills: 0 | Status: SHIPPED | OUTPATIENT
Start: 2024-11-13

## 2024-11-13 RX ORDER — HYDROCODONE BITARTRATE AND ACETAMINOPHEN 10; 325 MG/1; MG/1
1 TABLET ORAL 3 TIMES DAILY PRN
Qty: 72 TABLET | Refills: 0 | Status: SHIPPED | OUTPATIENT
Start: 2025-01-13

## 2024-11-13 RX ORDER — TIRZEPATIDE 2.5 MG/.5ML
2.5 INJECTION, SOLUTION SUBCUTANEOUS
Qty: 4 PEN | Refills: 1 | Status: SHIPPED | OUTPATIENT
Start: 2024-11-13

## 2024-11-13 NOTE — PROGRESS NOTES
[facility]  Progress Note    SUBJECTIVE:     Chief Complaint   Patient presents with    Follow-up       HPI   King Cool Jr.  is a 69 y.o. male here for follow-up of his chronic conditions. Sugars been much better, takes meds as directed. Also c/o b/l knee pain, requesting x-rays today. Patient has no other new complaints/problems at this time.      Review of patient's allergies indicates:   Allergen Reactions    Tamiflu [oseltamivir]      Increases BP    Metformin Hives     Diarrhea, nausea    Penicillins Rash       Past Medical History:   Diagnosis Date    Coronary artery disease involving native coronary artery of native heart without angina pectoris 3/7/2023    Essential (primary) hypertension     Male erectile dysfunction, unspecified     New onset a-fib 1/3/2023    Testicular hypofunction     Type 2 diabetes mellitus without complications      Past Surgical History:   Procedure Laterality Date    HIP SURGERY      KNEE ARTHROSCOPY W/ DEBRIDEMENT Right 8/16/2024    Procedure: ARTHROSCOPY, KNEE, WITH DEBRIDEMENT;  Surgeon: Isaac Alfaro MD;  Location: [facility] OR [address] [place] [address];  Service: Orthopedics;  Laterality: Right;    LEFT HEART CATHETERIZATION Left 3/7/2023    Procedure: Left heart cath;  Surgeon: Wil Mahoney MD;  Location: [facility] CATH LAB;  Service: Cardiology;  Laterality: Left;  1030am start, R rad access    RN PREOP 2/28/23     Family History   Problem Relation Name Age of Onset    Blindness Mother      No Known Problems Father      No Known Problems Brother      No Known Problems Daughter      No Known Problems Son      No Known Problems Daughter      Diabetes Brother      No Known Problems Brother      Amblyopia Neg Hx      Cancer Neg Hx      Cataracts Neg Hx      Glaucoma Neg Hx      Hypertension Neg Hx      Macular degeneration Neg Hx      Retinal detachment Neg Hx      Strabismus Neg Hx      Stroke Neg Hx      Thyroid disease Neg Hx       Social History     Tobacco Use    Smoking  status: Former     Current packs/day: 0.00     Average packs/day: 0.3 packs/day for 4.0 years (1.0 ttl pk-yrs)     Types: Cigarettes     Start date: 1972     Quit date: 1976     Years since quittin.0    Smokeless tobacco: Never   Substance Use Topics    Alcohol use: Yes     Comment: occ    Drug use: No        Review of Systems   Constitutional:  Negative for chills and fever.   HENT: Negative.     Respiratory: Negative.  Negative for shortness of breath.    Cardiovascular: Negative.  Negative for chest pain.   Gastrointestinal: Negative.  Negative for abdominal pain, nausea and vomiting.   Genitourinary: Negative.    Musculoskeletal:  Positive for joint pain (both knees).   Neurological:  Negative for headaches.   All other systems reviewed and are negative.    OBJECTIVE:     Vitals:    24 1319   BP: 118/60   Pulse: 63   Temp: 98.2 °F (36.8 °C)     Body mass index is 28.11 kg/m².    Physical Exam  Constitutional:       General: He is not in acute distress.     Appearance: He is not diaphoretic.   HENT:      Head: Normocephalic and atraumatic.      Nose: Nose normal.   Eyes:      Conjunctiva/sclera: Conjunctivae normal.   Cardiovascular:      Rate and Rhythm: Normal rate and regular rhythm.      Heart sounds: Normal heart sounds. No murmur heard.     No friction rub. No gallop.   Pulmonary:      Effort: Pulmonary effort is normal. No respiratory distress.      Breath sounds: Normal breath sounds. No wheezing or rales.   Abdominal:      Palpations: Abdomen is soft.   Skin:     General: Skin is warm.   Neurological:      Mental Status: He is alert and oriented to person, place, and time.     Protective Sensation (w/ 10 gram monofilament):  Right: Intact  Left: Intact    Visual Inspection:  Normal -  Bilateral    Pedal Pulses:   Right: Present  Left: Present    Posterior Tibialis Pulses:   Right:Present  Left: Present      Old records were reviewed. Labs and/or images were independently  reviewed.    ASSESSMENT     1. Osteoarthritis of both knees, unspecified osteoarthritis type    2. Type 2 diabetes mellitus with microalbuminuria, without long-term current use of insulin    3. Essential (primary) hypertension    4. Primary osteoarthritis of both knees        PLAN:     Osteoarthritis of both knees, unspecified osteoarthritis type  -     X-Ray Knee AP Standing Bilateral; Future; Expected date: 11/13/2024  -     X-Ray Knee 3 View Right; Future; Expected date: 11/13/2024    Type 2 diabetes mellitus with microalbuminuria, without long-term current use of insulin  -     CBC Auto Differential; Future  -     Comprehensive Metabolic Panel; Future  -     Hemoglobin A1C; Future  -     Instructed patient to take daily glucose AM logs and to write them down to bring with on next visit. Advised patient to decrease intake of carbohydrates/simple sugars.         Essential (primary) hypertension   -     Stable. Continue current regimen.    Primary osteoarthritis of both knees  -     HYDROcodone-acetaminophen (NORCO)  mg per tablet; Take 1 tablet by mouth 3 (three) times daily as needed (pain).  Dispense: 72 tablet; Refill: 0  -     HYDROcodone-acetaminophen (NORCO)  mg per tablet; Take 1 tablet by mouth 3 (three) times daily as needed (pain).  Dispense: 72 tablet; Refill: 0  -     HYDROcodone-acetaminophen (NORCO)  mg per tablet; Take 1 tablet by mouth 3 (three) times daily as needed (pain).  Dispense: 72 tablet; Refill: 0  -     Stable. Continue current regimen. Unable to wean at this time but am monitoring closely for any drug toxicity. Checked .      RTC TYLOR Lara MD  11/13/2024 1:29 PM

## 2024-11-20 ENCOUNTER — EXTERNAL HOME HEALTH (OUTPATIENT)
Dept: HOME HEALTH SERVICES | Facility: HOSPITAL | Age: 69
End: 2024-11-20
Payer: MEDICARE

## 2024-12-20 ENCOUNTER — TELEPHONE (OUTPATIENT)
Dept: ENDOCRINOLOGY | Facility: CLINIC | Age: 69
End: 2024-12-20
Payer: MEDICARE

## 2024-12-20 NOTE — TELEPHONE ENCOUNTER
Attempted to call pt twice to move appt. Dr Martell will not be in on Jan 3rd so his appt is being moved to our diabetes NP.

## 2024-12-26 ENCOUNTER — PATIENT MESSAGE (OUTPATIENT)
Dept: OPTOMETRY | Facility: CLINIC | Age: 69
End: 2024-12-26
Payer: MEDICARE

## 2025-01-09 ENCOUNTER — OFFICE VISIT (OUTPATIENT)
Dept: FAMILY MEDICINE | Facility: CLINIC | Age: 70
End: 2025-01-09
Payer: MEDICARE

## 2025-01-09 VITALS
WEIGHT: 200.19 LBS | HEART RATE: 78 BPM | TEMPERATURE: 99 F | HEIGHT: 70 IN | DIASTOLIC BLOOD PRESSURE: 70 MMHG | SYSTOLIC BLOOD PRESSURE: 128 MMHG | BODY MASS INDEX: 28.66 KG/M2

## 2025-01-09 DIAGNOSIS — R80.9 TYPE 2 DIABETES MELLITUS WITH MICROALBUMINURIA, WITHOUT LONG-TERM CURRENT USE OF INSULIN: ICD-10-CM

## 2025-01-09 DIAGNOSIS — J06.9 VIRAL URI: Primary | ICD-10-CM

## 2025-01-09 DIAGNOSIS — I10 ESSENTIAL (PRIMARY) HYPERTENSION: Chronic | ICD-10-CM

## 2025-01-09 DIAGNOSIS — I48.91 ATRIAL FIBRILLATION, UNSPECIFIED TYPE: ICD-10-CM

## 2025-01-09 DIAGNOSIS — F11.29 OPIOID DEPENDENCE WITH OPIOID-INDUCED DISORDER: ICD-10-CM

## 2025-01-09 DIAGNOSIS — M17.0 PRIMARY OSTEOARTHRITIS OF BOTH KNEES: ICD-10-CM

## 2025-01-09 DIAGNOSIS — E11.29 TYPE 2 DIABETES MELLITUS WITH MICROALBUMINURIA, WITHOUT LONG-TERM CURRENT USE OF INSULIN: ICD-10-CM

## 2025-01-09 PROBLEM — R74.01 TRANSAMINITIS: Status: RESOLVED | Noted: 2024-08-21 | Resolved: 2025-01-09

## 2025-01-09 PROCEDURE — 99215 OFFICE O/P EST HI 40 MIN: CPT | Mod: HCNC,S$GLB,, | Performed by: FAMILY MEDICINE

## 2025-01-09 PROCEDURE — 3078F DIAST BP <80 MM HG: CPT | Mod: HCNC,CPTII,S$GLB, | Performed by: FAMILY MEDICINE

## 2025-01-09 PROCEDURE — 3074F SYST BP LT 130 MM HG: CPT | Mod: HCNC,CPTII,S$GLB, | Performed by: FAMILY MEDICINE

## 2025-01-09 PROCEDURE — 1125F AMNT PAIN NOTED PAIN PRSNT: CPT | Mod: HCNC,CPTII,S$GLB, | Performed by: FAMILY MEDICINE

## 2025-01-09 PROCEDURE — 3288F FALL RISK ASSESSMENT DOCD: CPT | Mod: HCNC,CPTII,S$GLB, | Performed by: FAMILY MEDICINE

## 2025-01-09 PROCEDURE — 1159F MED LIST DOCD IN RCRD: CPT | Mod: HCNC,CPTII,S$GLB, | Performed by: FAMILY MEDICINE

## 2025-01-09 PROCEDURE — 1101F PT FALLS ASSESS-DOCD LE1/YR: CPT | Mod: HCNC,CPTII,S$GLB, | Performed by: FAMILY MEDICINE

## 2025-01-09 PROCEDURE — G2211 COMPLEX E/M VISIT ADD ON: HCPCS | Mod: HCNC,S$GLB,, | Performed by: FAMILY MEDICINE

## 2025-01-09 PROCEDURE — 99999 PR PBB SHADOW E&M-EST. PATIENT-LVL IV: CPT | Mod: PBBFAC,HCNC,, | Performed by: FAMILY MEDICINE

## 2025-01-09 PROCEDURE — 3008F BODY MASS INDEX DOCD: CPT | Mod: HCNC,CPTII,S$GLB, | Performed by: FAMILY MEDICINE

## 2025-01-09 RX ORDER — HYDROCODONE BITARTRATE AND ACETAMINOPHEN 10; 325 MG/1; MG/1
1 TABLET ORAL 3 TIMES DAILY PRN
Qty: 72 TABLET | Refills: 0 | Status: SHIPPED | OUTPATIENT
Start: 2025-01-13

## 2025-01-09 RX ORDER — HYDROCODONE BITARTRATE AND ACETAMINOPHEN 10; 325 MG/1; MG/1
1 TABLET ORAL 3 TIMES DAILY PRN
Qty: 72 TABLET | Refills: 0 | Status: SHIPPED | OUTPATIENT
Start: 2025-02-13

## 2025-01-09 RX ORDER — INSULIN GLARGINE 100 [IU]/ML
12 INJECTION, SOLUTION SUBCUTANEOUS NIGHTLY
Start: 2025-01-09 | End: 2026-01-09

## 2025-01-09 RX ORDER — PROMETHAZINE HYDROCHLORIDE AND DEXTROMETHORPHAN HYDROBROMIDE 6.25; 15 MG/5ML; MG/5ML
5 SYRUP ORAL EVERY 6 HOURS PRN
Qty: 180 ML | Refills: 0 | Status: SHIPPED | OUTPATIENT
Start: 2025-01-09 | End: 2025-01-19

## 2025-01-09 NOTE — PROGRESS NOTES
[facility]  Progress Note    SUBJECTIVE:     Chief Complaint   Patient presents with    Cough           Knee Pain    Joint Swelling       HPI   King Cool Jr.  is a 69 y.o. male here for c/o cough, congestion for the past couple weeks. Took otc meds without much relief. Patient has no other new complaints/problems at this time.      Review of patient's allergies indicates:   Allergen Reactions    Tamiflu [oseltamivir]      Increases BP    Metformin Hives     Diarrhea, nausea    Penicillins Rash       Past Medical History:   Diagnosis Date    Coronary artery disease involving native coronary artery of native heart without angina pectoris 3/7/2023    Essential (primary) hypertension     Male erectile dysfunction, unspecified     New onset a-fib 1/3/2023    Testicular hypofunction     Type 2 diabetes mellitus without complications      Past Surgical History:   Procedure Laterality Date    HIP SURGERY      KNEE ARTHROSCOPY W/ DEBRIDEMENT Right 8/16/2024    Procedure: ARTHROSCOPY, KNEE, WITH DEBRIDEMENT;  Surgeon: Isaac Alfaro MD;  Location: [facility] OR [address] [place] [address];  Service: Orthopedics;  Laterality: Right;    LEFT HEART CATHETERIZATION Left 3/7/2023    Procedure: Left heart cath;  Surgeon: Wil Mahoney MD;  Location: [facility] CATH LAB;  Service: Cardiology;  Laterality: Left;  1030am start, R rad access    RN PREOP 2/28/23     Family History   Problem Relation Name Age of Onset    Blindness Mother      No Known Problems Father      No Known Problems Brother      No Known Problems Daughter      No Known Problems Son      No Known Problems Daughter      Diabetes Brother      No Known Problems Brother      Amblyopia Neg Hx      Cancer Neg Hx      Cataracts Neg Hx      Glaucoma Neg Hx      Hypertension Neg Hx      Macular degeneration Neg Hx      Retinal detachment Neg Hx      Strabismus Neg Hx      Stroke Neg Hx      Thyroid disease Neg Hx       Social History     Tobacco Use    Smoking status: Former      Current packs/day: 0.00     Average packs/day: 0.3 packs/day for 4.0 years (1.0 ttl pk-yrs)     Types: Cigarettes     Start date: 1972     Quit date: 1976     Years since quittin.1    Smokeless tobacco: Never   Substance Use Topics    Alcohol use: Yes     Comment: occ    Drug use: No        Review of Systems   Constitutional:  Negative for chills, fever and malaise/fatigue.   HENT:  Positive for congestion. Negative for hearing loss and sore throat.    Respiratory:  Positive for cough. Negative for shortness of breath and wheezing.    Cardiovascular:  Negative for chest pain.   Gastrointestinal:  Negative for nausea and vomiting.   Neurological:  Negative for weakness and headaches.   All other systems reviewed and are negative.    OBJECTIVE:     Vitals:    25 1353   BP: 128/70   Pulse: 78   Temp: 98.6 °F (37 °C)     Body mass index is 28.72 kg/m².    Physical Exam  Constitutional:       General: He is not in acute distress.     Appearance: He is not diaphoretic.   HENT:      Head: Normocephalic and atraumatic.   Eyes:      Conjunctiva/sclera: Conjunctivae normal.   Cardiovascular:      Rate and Rhythm: Normal rate and regular rhythm.      Heart sounds: No murmur heard.     No friction rub. No gallop.   Pulmonary:      Effort: Pulmonary effort is normal. No respiratory distress.      Breath sounds: Normal breath sounds. No stridor. No wheezing or rales.   Lymphadenopathy:      Cervical: No cervical adenopathy.   Neurological:      Mental Status: He is alert and oriented to person, place, and time.         Old records were reviewed. Labs and/or images were independently reviewed.    ASSESSMENT     1. Viral URI    2. Type 2 diabetes mellitus with microalbuminuria, without long-term current use of insulin    3. Opioid dependence with opioid-induced disorder    4. Atrial fibrillation, unspecified type    5. Essential (primary) hypertension    6. Primary osteoarthritis of both knees        PLAN:      Viral URI  -     promethazine-dextromethorphan (PROMETHAZINE-DM) 6.25-15 mg/5 mL Syrp; Take 5 mLs by mouth every 6 (six) hours as needed (cough).  Dispense: 180 mL; Refill: 0  -     OK to take tylenol as needed PRN fever. Take mucinex and or claritin to help decrease congestion. Educated patient to drink plenty of fluids and to take vitamin C to help boost immune system. Instructed patient to call or RTC if symptoms persist or worsen.    Type 2 diabetes mellitus with microalbuminuria, without long-term current use of insulin   -     Instructed patient to take daily glucose AM logs and to write them down to bring with on next visit. Advised patient to decrease intake of carbohydrates/simple sugars.         Opioid dependence with opioid-induced disorder   -     Stable. Continue current regimen.    Atrial fibrillation, unspecified type   -     Stable. Continue current regimen.    Essential (primary) hypertension   -     Stable. Continue current regimen.    Primary osteoarthritis of both knees  -     HYDROcodone-acetaminophen (NORCO)  mg per tablet; Take 1 tablet by mouth 3 (three) times daily as needed (pain).  Dispense: 72 tablet; Refill: 0  -     HYDROcodone-acetaminophen (NORCO)  mg per tablet; Take 1 tablet by mouth 3 (three) times daily as needed (pain).  Dispense: 72 tablet; Refill: 0'  -     Stable. Continue current regimen. Unable to wean at this time but am monitoring closely for any drug toxicity. Checked .    RTC PRN    Gallo Lara MD  01/09/2025 2:03 PM

## 2025-02-21 DIAGNOSIS — Z00.00 ENCOUNTER FOR MEDICARE ANNUAL WELLNESS EXAM: ICD-10-CM

## 2025-03-09 DIAGNOSIS — R80.9 TYPE 2 DIABETES MELLITUS WITH MICROALBUMINURIA, WITHOUT LONG-TERM CURRENT USE OF INSULIN: ICD-10-CM

## 2025-03-09 DIAGNOSIS — E11.29 TYPE 2 DIABETES MELLITUS WITH MICROALBUMINURIA, WITHOUT LONG-TERM CURRENT USE OF INSULIN: ICD-10-CM

## 2025-03-09 NOTE — TELEPHONE ENCOUNTER
Care Due:                  Date            Visit Type   Department     Provider  --------------------------------------------------------------------------------                                STACY      [facility]                              FOLLOWUP/OF  MED/ INTERNAL  Last Visit: 01-      FICE VISIT   MED/ PEDS      PRERNA CHRISTIE                               -         [facility]                              PRIMARY      MED/ INTERNAL  Next Visit: 04-      CARE ([facility])   MED/ PEDS      PRERNA CHRISTIE                                                            Last  Test          Frequency    Reason                     Performed    Due Date  --------------------------------------------------------------------------------    HBA1C.......  6 months...  glimepiride, insulin.....  11- 05-    [facility] Embedded Care Due Messages. Reference number: [id number].   3/09/2025 12:22:27 AM CST

## 2025-03-10 RX ORDER — INSULIN GLARGINE 100 [IU]/ML
12 INJECTION, SOLUTION SUBCUTANEOUS NIGHTLY
Qty: 15 ML | Refills: 2
Start: 2025-03-10

## 2025-03-10 RX ORDER — INSULIN GLARGINE 100 [IU]/ML
10 INJECTION, SOLUTION SUBCUTANEOUS
Qty: 15 EACH | Refills: 1 | OUTPATIENT
Start: 2025-03-10

## 2025-03-10 NOTE — TELEPHONE ENCOUNTER
"Refill Routing Note   Medication(s) are not appropriate for processing by [facility] for the following reason(s):        No active prescription written by provider  New or recently adjusted medication    [facility] action(s):  Quick Discontinue  Defer   Requires labs : Yes        Medication Therapy Plan: dose increased to 12 units nightly 1/9/25; No active prescription, previously set to "no print." Reset to "normal" and pended for your review.      Appointments  past 12m or future 3m with PCP    Date Provider   Last Visit   1/9/2025 Gallo Lara MD   Next Visit   4/9/2025 Gallo Lara MD   ED visits in past 90 days: 0        Note composed:12:23 AM 03/10/2025          " Lab Results   Component Value Date    EGFR 63 10/30/2022    EGFR 55 08/04/2022    EGFR 55 01/06/2022    CREATININE 0 91 10/30/2022    CREATININE 1 01 08/04/2022    CREATININE 1 02 01/06/2022

## 2025-03-13 ENCOUNTER — TELEPHONE (OUTPATIENT)
Dept: FAMILY MEDICINE | Facility: CLINIC | Age: 70
End: 2025-03-13
Payer: MEDICARE

## 2025-03-13 DIAGNOSIS — M17.0 PRIMARY OSTEOARTHRITIS OF BOTH KNEES: ICD-10-CM

## 2025-03-13 RX ORDER — HYDROCODONE BITARTRATE AND ACETAMINOPHEN 10; 325 MG/1; MG/1
1 TABLET ORAL 3 TIMES DAILY PRN
Qty: 72 TABLET | Refills: 0 | Status: CANCELLED | OUTPATIENT
Start: 2025-03-13

## 2025-03-13 NOTE — TELEPHONE ENCOUNTER
----- Message from Ion sent at 3/13/2025  9:22 AM CDT -----  Regarding: Yves  Type: RX Refill Request Who Called:Yves  Have you contacted your pharmacy: Yes Refill or New Rx: Refill  RX Name and Strength: HYDROcodone-acetaminophen (NORCO)  mg per tablet// Patient stated that the doctor is suppose to put in for his prescription in every month on the 13th and the pharmacy has not receieved it. Patient is out of medication and needs his refills sent to the pharmacy. Please reach out to the pharmacy and call the patient once done. Patient just booked an appointment for 03/31/2025. Preferred Pharmacy with phone number:.[facility]/pharmacy #[id number] - [place] - [address] [place] [address]: [phone] Fax: [phone] Local or Mail Order: Local Ordering Provider: Dr. Gallo Lara Would the patient rather a call back or a response via [facility]? Callback Best Call Back Number: [phone] Information:

## 2025-03-13 NOTE — TELEPHONE ENCOUNTER
----- Message from Yeimy sent at 3/13/2025  3:25 PM CDT -----  Type: Patient Call Back Who called:Self  What is the request in detail:Pt would like a call back from nurse to discuss his prescription  Can the clinic reply by [facility]? No Would the patient rather a call back or a response via [facility]? Call back Best call back number:.[phone]  Additional Information: Thank you.

## 2025-03-13 NOTE — TELEPHONE ENCOUNTER
No care due was identified.  [facility] Embedded Care Due Messages. Reference number: [id number].   3/13/2025 11:11:36 AM CDT

## 2025-03-21 ENCOUNTER — TELEPHONE (OUTPATIENT)
Dept: FAMILY MEDICINE | Facility: CLINIC | Age: 70
End: 2025-03-21
Payer: MEDICARE

## 2025-03-21 NOTE — TELEPHONE ENCOUNTER
Spoke with rohini at [facility] to verify chronic condition. Earal does not have any other questions or concerns at this time.

## 2025-03-21 NOTE — TELEPHONE ENCOUNTER
----- Message from Mickie sent at 3/20/2025  4:05 PM CDT -----  Name of Who is Calling: Siddharth Aframe) What is the request in detail:They want to verify if the pt been diagnosed with cardiovascular disease and diabetes. Please contact to further discuss and advise. Can the clinic reply by [facility]:callWhat Number to Call Back [facility]: #[phone]  ----- Message -----  From: Mickie Suárez  Sent: 3/20/2025   4:08 PM CDT  To: Jane Holder Staff    Name of Who is Calling: Siddharth Aframe) What is the request in detail:They want to verify if the pt been diagnosed with cardiovascular disease and diabetes. Please contact to further discuss and advise. Can the clinic reply by [facility]:callWhat Number to Call Back [facility]: #[phone]

## 2025-03-31 ENCOUNTER — OFFICE VISIT (OUTPATIENT)
Dept: FAMILY MEDICINE | Facility: CLINIC | Age: 70
End: 2025-03-31
Payer: MEDICARE

## 2025-03-31 VITALS
OXYGEN SATURATION: 97 % | DIASTOLIC BLOOD PRESSURE: 78 MMHG | SYSTOLIC BLOOD PRESSURE: 138 MMHG | BODY MASS INDEX: 29.35 KG/M2 | TEMPERATURE: 98 F | HEIGHT: 70 IN | HEART RATE: 86 BPM | WEIGHT: 205 LBS

## 2025-03-31 DIAGNOSIS — M17.0 PRIMARY OSTEOARTHRITIS OF BOTH KNEES: Primary | ICD-10-CM

## 2025-03-31 DIAGNOSIS — E11.29 TYPE 2 DIABETES MELLITUS WITH MICROALBUMINURIA, WITHOUT LONG-TERM CURRENT USE OF INSULIN: Chronic | ICD-10-CM

## 2025-03-31 DIAGNOSIS — R80.9 TYPE 2 DIABETES MELLITUS WITH MICROALBUMINURIA, WITHOUT LONG-TERM CURRENT USE OF INSULIN: Chronic | ICD-10-CM

## 2025-03-31 DIAGNOSIS — I10 ESSENTIAL (PRIMARY) HYPERTENSION: Chronic | ICD-10-CM

## 2025-03-31 PROCEDURE — 99999 PR PBB SHADOW E&M-EST. PATIENT-LVL V: CPT | Mod: PBBFAC,HCNC,, | Performed by: FAMILY MEDICINE

## 2025-03-31 PROCEDURE — 1159F MED LIST DOCD IN RCRD: CPT | Mod: HCNC,CPTII,S$GLB, | Performed by: FAMILY MEDICINE

## 2025-03-31 PROCEDURE — 3075F SYST BP GE 130 - 139MM HG: CPT | Mod: HCNC,CPTII,S$GLB, | Performed by: FAMILY MEDICINE

## 2025-03-31 PROCEDURE — 1125F AMNT PAIN NOTED PAIN PRSNT: CPT | Mod: HCNC,CPTII,S$GLB, | Performed by: FAMILY MEDICINE

## 2025-03-31 PROCEDURE — 4010F ACE/ARB THERAPY RXD/TAKEN: CPT | Mod: HCNC,CPTII,S$GLB, | Performed by: FAMILY MEDICINE

## 2025-03-31 PROCEDURE — 1101F PT FALLS ASSESS-DOCD LE1/YR: CPT | Mod: HCNC,CPTII,S$GLB, | Performed by: FAMILY MEDICINE

## 2025-03-31 PROCEDURE — 3288F FALL RISK ASSESSMENT DOCD: CPT | Mod: HCNC,CPTII,S$GLB, | Performed by: FAMILY MEDICINE

## 2025-03-31 PROCEDURE — 3078F DIAST BP <80 MM HG: CPT | Mod: HCNC,CPTII,S$GLB, | Performed by: FAMILY MEDICINE

## 2025-03-31 PROCEDURE — G2211 COMPLEX E/M VISIT ADD ON: HCPCS | Mod: HCNC,S$GLB,, | Performed by: FAMILY MEDICINE

## 2025-03-31 PROCEDURE — 3008F BODY MASS INDEX DOCD: CPT | Mod: HCNC,CPTII,S$GLB, | Performed by: FAMILY MEDICINE

## 2025-03-31 PROCEDURE — 99215 OFFICE O/P EST HI 40 MIN: CPT | Mod: HCNC,S$GLB,, | Performed by: FAMILY MEDICINE

## 2025-03-31 RX ORDER — HYDROCODONE BITARTRATE AND ACETAMINOPHEN 10; 325 MG/1; MG/1
1 TABLET ORAL 3 TIMES DAILY PRN
Qty: 72 TABLET | Refills: 0 | Status: SHIPPED | OUTPATIENT
Start: 2025-05-12

## 2025-03-31 RX ORDER — HYDROCODONE BITARTRATE AND ACETAMINOPHEN 10; 325 MG/1; MG/1
1 TABLET ORAL 3 TIMES DAILY PRN
Qty: 72 TABLET | Refills: 0 | Status: SHIPPED | OUTPATIENT
Start: 2025-06-12

## 2025-03-31 RX ORDER — HYDROCODONE BITARTRATE AND ACETAMINOPHEN 10; 325 MG/1; MG/1
1 TABLET ORAL 3 TIMES DAILY PRN
Qty: 72 TABLET | Refills: 0 | Status: SHIPPED | OUTPATIENT
Start: 2025-04-12

## 2025-03-31 NOTE — PROGRESS NOTES
[facility]  Progress Note    SUBJECTIVE:     Chief Complaint   Patient presents with    Medication Refill       HPI   King Cool Jr.  is a 70 y.o. male here for follow-up of his chronic conditions. Patient has no other new complaints/problems at this time.      Review of patient's allergies indicates:   Allergen Reactions    Tamiflu [oseltamivir]      Increases BP    Metformin Hives     Diarrhea, nausea    Penicillins Rash       Past Medical History:   Diagnosis Date    Coronary artery disease involving native coronary artery of native heart without angina pectoris 3/7/2023    Essential (primary) hypertension     Male erectile dysfunction, unspecified     New onset a-fib 1/3/2023    Testicular hypofunction     Type 2 diabetes mellitus without complications      Past Surgical History:   Procedure Laterality Date    HIP SURGERY      KNEE ARTHROSCOPY W/ DEBRIDEMENT Right 8/16/2024    Procedure: ARTHROSCOPY, KNEE, WITH DEBRIDEMENT;  Surgeon: Isaac Alfaro MD;  Location: [facility] OR [address] [place] [address];  Service: Orthopedics;  Laterality: Right;    LEFT HEART CATHETERIZATION Left 3/7/2023    Procedure: Left heart cath;  Surgeon: Wil Mahoney MD;  Location: [facility] CATH LAB;  Service: Cardiology;  Laterality: Left;  1030am start, R rad access    RN PREOP 2/28/23     Family History   Problem Relation Name Age of Onset    Blindness Mother      No Known Problems Father      No Known Problems Brother      No Known Problems Daughter      No Known Problems Son      No Known Problems Daughter      Diabetes Brother      No Known Problems Brother      Amblyopia Neg Hx      Cancer Neg Hx      Cataracts Neg Hx      Glaucoma Neg Hx      Hypertension Neg Hx      Macular degeneration Neg Hx      Retinal detachment Neg Hx      Strabismus Neg Hx      Stroke Neg Hx      Thyroid disease Neg Hx       Social History[1]     Review of Systems   Constitutional:  Negative for chills and fever.   HENT: Negative.     Respiratory: Negative.   Negative for shortness of breath.    Cardiovascular: Negative.  Negative for chest pain.   Gastrointestinal: Negative.  Negative for abdominal pain, nausea and vomiting.   Genitourinary: Negative.    Musculoskeletal:  Positive for back pain.   Neurological:  Negative for headaches.   All other systems reviewed and are negative.    OBJECTIVE:     Vitals:    03/31/25 1457   BP: 138/78   Pulse: 86   Temp: 98.4 °F (36.9 °C)     Body mass index is 29.42 kg/m².    Physical Exam  Constitutional:       General: He is not in acute distress.     Appearance: He is not diaphoretic.   HENT:      Head: Normocephalic and atraumatic.      Nose: Nose normal.   Eyes:      Conjunctiva/sclera: Conjunctivae normal.   Cardiovascular:      Rate and Rhythm: Normal rate and regular rhythm.      Heart sounds: Normal heart sounds. No murmur heard.     No friction rub. No gallop.   Pulmonary:      Effort: Pulmonary effort is normal. No respiratory distress.      Breath sounds: Normal breath sounds. No wheezing or rales.   Abdominal:      Palpations: Abdomen is soft.   Skin:     General: Skin is warm.   Neurological:      Mental Status: He is alert and oriented to person, place, and time.         Old records were reviewed. Labs and/or images were independently reviewed.    ASSESSMENT     1. Primary osteoarthritis of both knees    2. Essential (primary) hypertension    3. Type 2 diabetes mellitus with microalbuminuria, without long-term current use of insulin        PLAN:     Primary osteoarthritis of both knees  -     HYDROcodone-acetaminophen (NORCO)  mg per tablet; Take 1 tablet by mouth 3 (three) times daily as needed (pain).  Dispense: 72 tablet; Refill: 0  -     HYDROcodone-acetaminophen (NORCO)  mg per tablet; Take 1 tablet by mouth 3 (three) times daily as needed (pain).  Dispense: 72 tablet; Refill: 0  -     HYDROcodone-acetaminophen (NORCO)  mg per tablet; Take 1 tablet by mouth 3 (three) times daily as needed (pain).   Dispense: 72 tablet; Refill: 0  -     Stable. Continue current regimen. Unable to wean at this time but am monitoring closely for any drug toxicity. Checked .    Essential (primary) hypertension   -     Stable. Continue current regimen.    Type 2 diabetes mellitus with microalbuminuria, without long-term current use of insulin   -     Instructed patient to take daily glucose AM logs and to write them down to bring with on next visit. Advised patient to decrease intake of carbohydrates/simple sugars.               RTC PRJOHNNY Lara MD  2025 3:03 PM           [1]   Social History  Tobacco Use    Smoking status: Former     Current packs/day: 0.00     Average packs/day: 0.3 packs/day for 4.0 years (1.0 ttl pk-yrs)     Types: Cigarettes     Start date: 1972     Quit date: 1976     Years since quittin.3    Smokeless tobacco: Never   Substance Use Topics    Alcohol use: Yes     Comment: occ    Drug use: No

## 2025-04-11 DIAGNOSIS — M17.0 PRIMARY OSTEOARTHRITIS OF BOTH KNEES: ICD-10-CM

## 2025-04-11 RX ORDER — HYDROCODONE BITARTRATE AND ACETAMINOPHEN 10; 325 MG/1; MG/1
1 TABLET ORAL 3 TIMES DAILY PRN
Qty: 72 TABLET | Refills: 0 | Status: SHIPPED | OUTPATIENT
Start: 2025-04-12

## 2025-04-11 NOTE — TELEPHONE ENCOUNTER
No care due was identified.  [facility] Embedded Care Due Messages. Reference number: [id number].   4/11/2025 11:45:06 AM CDT

## 2025-04-11 NOTE — TELEPHONE ENCOUNTER
----- Message from Tech Elsie sent at 4/11/2025 11:35 AM CDT -----  Regarding: Patient call back  .Type: Patient Call BackWho called:self What is the request in detail:caller states medication HYDROcodone-acetaminophen (NORCO)  mg per tablet prescription was written for tomorrow's date(04/12/2025). Asking if prescription can be rewritten (or if pharmacy can be contacted) for today's date 04/11/2025 so he can get his medication at his pharmacy on today. Caller states he stays 97 miles away from his pharmacy.  Can the clinic reply by [facility]?no Would the patient rather a call back or a response via [facility]? Call Best call back number:.[phone] Information:

## 2025-04-22 ENCOUNTER — OFFICE VISIT (OUTPATIENT)
Dept: OPTOMETRY | Facility: CLINIC | Age: 70
End: 2025-04-22
Payer: MEDICARE

## 2025-04-22 DIAGNOSIS — E11.9 DIABETIC EYE EXAM: Primary | ICD-10-CM

## 2025-04-22 DIAGNOSIS — H52.7 REFRACTIVE ERROR: ICD-10-CM

## 2025-04-22 DIAGNOSIS — H25.13 NUCLEAR SCLEROSIS OF BOTH EYES: ICD-10-CM

## 2025-04-22 DIAGNOSIS — Z01.00 DIABETIC EYE EXAM: Primary | ICD-10-CM

## 2025-04-22 PROCEDURE — 1126F AMNT PAIN NOTED NONE PRSNT: CPT | Mod: HCNC,CPTII,S$GLB, | Performed by: OPTOMETRIST

## 2025-04-22 PROCEDURE — 92004 COMPRE OPH EXAM NEW PT 1/>: CPT | Mod: HCNC,S$GLB,, | Performed by: OPTOMETRIST

## 2025-04-22 PROCEDURE — 1160F RVW MEDS BY RX/DR IN RCRD: CPT | Mod: HCNC,CPTII,S$GLB, | Performed by: OPTOMETRIST

## 2025-04-22 PROCEDURE — 99999 PR PBB SHADOW E&M-EST. PATIENT-LVL I: CPT | Mod: PBBFAC,HCNC,, | Performed by: OPTOMETRIST

## 2025-04-22 PROCEDURE — 1101F PT FALLS ASSESS-DOCD LE1/YR: CPT | Mod: HCNC,CPTII,S$GLB, | Performed by: OPTOMETRIST

## 2025-04-22 PROCEDURE — 3288F FALL RISK ASSESSMENT DOCD: CPT | Mod: HCNC,CPTII,S$GLB, | Performed by: OPTOMETRIST

## 2025-04-22 PROCEDURE — 2023F DILAT RTA XM W/O RTNOPTHY: CPT | Mod: HCNC,CPTII,S$GLB, | Performed by: OPTOMETRIST

## 2025-04-22 PROCEDURE — 1159F MED LIST DOCD IN RCRD: CPT | Mod: HCNC,CPTII,S$GLB, | Performed by: OPTOMETRIST

## 2025-04-22 PROCEDURE — 92015 DETERMINE REFRACTIVE STATE: CPT | Mod: HCNC,S$GLB,, | Performed by: OPTOMETRIST

## 2025-04-22 PROCEDURE — 4010F ACE/ARB THERAPY RXD/TAKEN: CPT | Mod: HCNC,CPTII,S$GLB, | Performed by: OPTOMETRIST

## 2025-04-22 NOTE — PROGRESS NOTES
Subjective:       Patient ID: King Cool Jr. is a 70 y.o. male      Chief Complaint   Patient presents with    Concerns About Ocular Health    Diabetic Eye Exam     History of Present Illness  Dls: 7/9/19 Dr. Bhakta     71 y/o male presents today for diabetic eye exam.  Pt states no changes in vision.  Pt wears otc readers.     's    No tearing  No itching   No burning  No pain  No ha's  No floaters  No flashes    Eye meds  Otc gtts OU PRN     Pohx:   None    Fohx:   Blindness- mother   Cat- mother  Glaucoma - mother     Hemoglobin A1C       Date                     Value               Ref Range             Status                11/13/2024               7.8 (H)             4.0 - 5.6 %           Final                   10/10/2024               8.9 (H)             4.0 - 5.6 %           Final                  08/13/2024               11.5 (H)            4.0 - 5.6 %           Final                   Assessment/Plan:     1. Diabetic eye exam (Primary)  [facility]    No diabetic retinopathy. Discussed with pt the effects of diabetes on vision, importance of good blood sugar control, compliance with meds, and follow up care with PCP. Return in 1 year for dilated eye exam, sooner PRN.    2. Refractive error  Educated patient on refractive error and discussed lens options. Dispensed updated spectacle Rx. Educated about adaptation period to new specs.    Eyeglass Final Rx       Eyeglass Final Rx         Sphere Cylinder Axis Add    Right +1.00 +1.00 165 +2.50    Left +1.00 Sphere  +2.50      Expiration Date: 4/22/2026                    3. Nuclear sclerosis of both eyes  Educated pt on presence of cataracts and effects on vision. No surgery at this time. Recheck in one year, sooner PRN.      Follow up in about 1 year (around 4/22/2026) for Diabetic Eye Exam.

## 2025-05-28 DIAGNOSIS — I48.91 NEW ONSET A-FIB: ICD-10-CM

## 2025-05-28 RX ORDER — METOPROLOL SUCCINATE 25 MG/1
25 TABLET, EXTENDED RELEASE ORAL
Qty: 90 TABLET | Refills: 3 | Status: SHIPPED | OUTPATIENT
Start: 2025-05-28

## 2025-05-28 NOTE — TELEPHONE ENCOUNTER
Refill Routing Note   Medication(s) are not appropriate for processing by [facility] for the following reason(s):        No active prescription written by provider    [facility] action(s):  Defer     Requires labs : Yes             Appointments  past 12m or future 3m with PCP    Date Provider   Last Visit   3/31/2025 Gallo Lara MD   Next Visit   Visit date not found Gallo Lara MD   ED visits in past 90 days: 0        Note composed:5:38 AM 05/28/2025

## 2025-05-28 NOTE — TELEPHONE ENCOUNTER
Care Due:                  Date            Visit Type   Department     Provider  --------------------------------------------------------------------------------                                [facility]                              PRIMARY      / INTERNAL MED  Last Visit: 03-      CARE ([facility])   / FRANCESCO Lara  Next Visit: None Scheduled  None         None Found                                                            Last  Test          Frequency    Reason                     Performed    Due Date  --------------------------------------------------------------------------------    HBA1C.......  6 months...  glimepiride, insulin.....  11- 05-    Uric Acid...  12 months..  colchicine...............  08- 08-    [employer] Embedded Care Due Messages. Reference number: [id number].   5/28/2025 12:24:03 AM ROCCO

## 2025-06-09 ENCOUNTER — OFFICE VISIT (OUTPATIENT)
Dept: FAMILY MEDICINE | Facility: CLINIC | Age: 70
End: 2025-06-09
Payer: MEDICARE

## 2025-06-09 VITALS
DIASTOLIC BLOOD PRESSURE: 84 MMHG | BODY MASS INDEX: 29.73 KG/M2 | OXYGEN SATURATION: 98 % | SYSTOLIC BLOOD PRESSURE: 132 MMHG | WEIGHT: 207.69 LBS | TEMPERATURE: 98 F | HEART RATE: 63 BPM | HEIGHT: 70 IN

## 2025-06-09 DIAGNOSIS — G89.29 CHRONIC PAIN OF RIGHT KNEE: ICD-10-CM

## 2025-06-09 DIAGNOSIS — I10 ESSENTIAL (PRIMARY) HYPERTENSION: Chronic | ICD-10-CM

## 2025-06-09 DIAGNOSIS — R80.9 TYPE 2 DIABETES MELLITUS WITH MICROALBUMINURIA, WITHOUT LONG-TERM CURRENT USE OF INSULIN: Chronic | ICD-10-CM

## 2025-06-09 DIAGNOSIS — I48.0 PAF (PAROXYSMAL ATRIAL FIBRILLATION): Chronic | ICD-10-CM

## 2025-06-09 DIAGNOSIS — M17.0 PRIMARY OSTEOARTHRITIS OF BOTH KNEES: Primary | ICD-10-CM

## 2025-06-09 DIAGNOSIS — E11.29 TYPE 2 DIABETES MELLITUS WITH MICROALBUMINURIA, WITHOUT LONG-TERM CURRENT USE OF INSULIN: Chronic | ICD-10-CM

## 2025-06-09 DIAGNOSIS — M25.561 CHRONIC PAIN OF RIGHT KNEE: ICD-10-CM

## 2025-06-09 PROCEDURE — 1159F MED LIST DOCD IN RCRD: CPT | Mod: CPTII,HCNC,S$GLB, | Performed by: FAMILY MEDICINE

## 2025-06-09 PROCEDURE — 3079F DIAST BP 80-89 MM HG: CPT | Mod: CPTII,HCNC,S$GLB, | Performed by: FAMILY MEDICINE

## 2025-06-09 PROCEDURE — G2211 COMPLEX E/M VISIT ADD ON: HCPCS | Mod: HCNC,S$GLB,, | Performed by: FAMILY MEDICINE

## 2025-06-09 PROCEDURE — 3288F FALL RISK ASSESSMENT DOCD: CPT | Mod: CPTII,HCNC,S$GLB, | Performed by: FAMILY MEDICINE

## 2025-06-09 PROCEDURE — 99215 OFFICE O/P EST HI 40 MIN: CPT | Mod: HCNC,S$GLB,, | Performed by: FAMILY MEDICINE

## 2025-06-09 PROCEDURE — 1125F AMNT PAIN NOTED PAIN PRSNT: CPT | Mod: CPTII,HCNC,S$GLB, | Performed by: FAMILY MEDICINE

## 2025-06-09 PROCEDURE — 1101F PT FALLS ASSESS-DOCD LE1/YR: CPT | Mod: CPTII,HCNC,S$GLB, | Performed by: FAMILY MEDICINE

## 2025-06-09 PROCEDURE — 3008F BODY MASS INDEX DOCD: CPT | Mod: CPTII,HCNC,S$GLB, | Performed by: FAMILY MEDICINE

## 2025-06-09 PROCEDURE — 3075F SYST BP GE 130 - 139MM HG: CPT | Mod: CPTII,HCNC,S$GLB, | Performed by: FAMILY MEDICINE

## 2025-06-09 PROCEDURE — 99999 PR PBB SHADOW E&M-EST. PATIENT-LVL IV: CPT | Mod: PBBFAC,HCNC,, | Performed by: FAMILY MEDICINE

## 2025-06-09 PROCEDURE — 4010F ACE/ARB THERAPY RXD/TAKEN: CPT | Mod: CPTII,HCNC,S$GLB, | Performed by: FAMILY MEDICINE

## 2025-06-09 RX ORDER — HYDROCODONE BITARTRATE AND ACETAMINOPHEN 10; 325 MG/1; MG/1
1 TABLET ORAL 3 TIMES DAILY PRN
Qty: 72 TABLET | Refills: 0 | Status: SHIPPED | OUTPATIENT
Start: 2025-08-12

## 2025-06-09 RX ORDER — HYDROCODONE BITARTRATE AND ACETAMINOPHEN 10; 325 MG/1; MG/1
1 TABLET ORAL 3 TIMES DAILY PRN
Qty: 72 TABLET | Refills: 0 | Status: SHIPPED | OUTPATIENT
Start: 2025-07-12

## 2025-06-09 RX ORDER — GABAPENTIN 300 MG/1
300 CAPSULE ORAL 3 TIMES DAILY
Qty: 270 CAPSULE | Refills: 2 | Status: SHIPPED | OUTPATIENT
Start: 2025-06-09

## 2025-06-09 RX ORDER — HYDROCODONE BITARTRATE AND ACETAMINOPHEN 10; 325 MG/1; MG/1
1 TABLET ORAL 3 TIMES DAILY PRN
Qty: 72 TABLET | Refills: 0 | Status: SHIPPED | OUTPATIENT
Start: 2025-06-12

## 2025-06-09 NOTE — PROGRESS NOTES
[facility]  Progress Note    SUBJECTIVE:     Chief Complaint   Patient presents with    Medication Refill       HPI   King Cool Jr.  is a 70 y.o. male here for follow-up of his chronic conditions. Doing well on current regimen. Patient has no other new complaints/problems at this time.      Review of patient's allergies indicates:   Allergen Reactions    Tamiflu [oseltamivir]      Increases BP    Metformin Hives     Diarrhea, nausea    Penicillins Rash       Past Medical History:   Diagnosis Date    Coronary artery disease involving native coronary artery of native heart without angina pectoris 3/7/2023    Essential (primary) hypertension     Male erectile dysfunction, unspecified     New onset a-fib 1/3/2023    Testicular hypofunction     Type 2 diabetes mellitus without complications      Past Surgical History:   Procedure Laterality Date    HIP SURGERY      KNEE ARTHROSCOPY W/ DEBRIDEMENT Right 8/16/2024    Procedure: ARTHROSCOPY, KNEE, WITH DEBRIDEMENT;  Surgeon: Isaac Alfaro MD;  Location: [facility] OR [address] [place] [address];  Service: Orthopedics;  Laterality: Right;    LEFT HEART CATHETERIZATION Left 3/7/2023    Procedure: Left heart cath;  Surgeon: Wil Mahoney MD;  Location: [facility] CATH LAB;  Service: Cardiology;  Laterality: Left;  1030am start, R rad access    RN PREOP 2/28/23     Family History   Problem Relation Name Age of Onset    Blindness Mother      Cataracts Mother      Glaucoma Mother      No Known Problems Father      No Known Problems Brother      Diabetes Brother      No Known Problems Brother      No Known Problems Daughter      No Known Problems Daughter      No Known Problems Son      Amblyopia Neg Hx      Cancer Neg Hx      Hypertension Neg Hx      Macular degeneration Neg Hx      Retinal detachment Neg Hx      Strabismus Neg Hx      Stroke Neg Hx      Thyroid disease Neg Hx       Social History[1]     Review of Systems   Constitutional:  Negative for chills and fever.   HENT:  Negative.     Respiratory: Negative.  Negative for shortness of breath.    Cardiovascular: Negative.  Negative for chest pain.   Gastrointestinal: Negative.  Negative for abdominal pain, nausea and vomiting.   Genitourinary: Negative.    Musculoskeletal:  Positive for back pain and joint pain.   Neurological:  Negative for headaches.   All other systems reviewed and are negative.    OBJECTIVE:     Vitals:    06/09/25 0817   BP: 132/84   Pulse: 63   Temp: 98.1 °F (36.7 °C)     Body mass index is 29.8 kg/m².    Physical Exam  Constitutional:       General: He is not in acute distress.     Appearance: He is not diaphoretic.   HENT:      Head: Normocephalic and atraumatic.      Nose: Nose normal.   Eyes:      Conjunctiva/sclera: Conjunctivae normal.   Cardiovascular:      Rate and Rhythm: Normal rate and regular rhythm.      Heart sounds: Normal heart sounds. No murmur heard.     No friction rub. No gallop.   Pulmonary:      Effort: Pulmonary effort is normal. No respiratory distress.      Breath sounds: Normal breath sounds. No wheezing or rales.   Abdominal:      Palpations: Abdomen is soft.   Skin:     General: Skin is warm.   Neurological:      Mental Status: He is alert and oriented to person, place, and time.         Old records were reviewed. Labs and/or images were independently reviewed.    ASSESSMENT     1. Primary osteoarthritis of both knees    2. Essential (primary) hypertension    3. Type 2 diabetes mellitus with microalbuminuria, without long-term current use of insulin    4. Chronic pain of right knee    5. PAF (paroxysmal atrial fibrillation)        PLAN:     Primary osteoarthritis of both knees  -     HYDROcodone-acetaminophen (NORCO)  mg per tablet; Take 1 tablet by mouth 3 (three) times daily as needed (pain).  Dispense: 72 tablet; Refill: 0  -     HYDROcodone-acetaminophen (NORCO)  mg per tablet; Take 1 tablet by mouth 3 (three) times daily as needed (pain).  Dispense: 72 tablet; Refill:  0  -     HYDROcodone-acetaminophen (NORCO)  mg per tablet; Take 1 tablet by mouth 3 (three) times daily as needed (pain).  Dispense: 72 tablet; Refill: 0  -     Stable. Continue current regimen. Unable to wean at this time but am monitoring closely for any drug toxicity. Checked .    Essential (primary) hypertension   -     Stable. Continue current regimen.    Type 2 diabetes mellitus with microalbuminuria, without long-term current use of insulin   -     Instructed patient to take daily glucose AM logs and to write them down to bring with on next visit. Advised patient to decrease intake of carbohydrates/simple sugars.         Chronic pain of right knee  -     gabapentin (NEURONTIN) 300 MG capsule; Take 1 capsule (300 mg total) by mouth 3 (three) times daily.  Dispense: 270 capsule; Refill: 2  -     Stable. Continue current regimen.    PAF (paroxysmal atrial fibrillation)  -     apixaban (ELIQUIS) 5 mg Tab; Take 1 tablet (5 mg total) by mouth 2 (two) times daily.  Dispense: 180 tablet; Refill: 3  -     Stable. Continue current regimen.      RTC PRJOHNNY Lara MD  2025 8:31 AM           [1]   Social History  Tobacco Use    Smoking status: Former     Current packs/day: 0.00     Average packs/day: 0.3 packs/day for 4.0 years (1.0 ttl pk-yrs)     Types: Cigarettes     Start date: 1972     Quit date: 1976     Years since quittin.5    Smokeless tobacco: Never   Substance Use Topics    Alcohol use: Yes     Comment: occ    Drug use: No

## 2025-08-07 DIAGNOSIS — M17.0 PRIMARY OSTEOARTHRITIS OF BOTH KNEES: ICD-10-CM

## 2025-08-07 RX ORDER — HYDROCODONE BITARTRATE AND ACETAMINOPHEN 10; 325 MG/1; MG/1
1 TABLET ORAL 3 TIMES DAILY PRN
Qty: 72 TABLET | Refills: 0 | OUTPATIENT
Start: 2025-08-07

## 2025-08-07 NOTE — TELEPHONE ENCOUNTER
Copied from CRM #[id number]. Topic: Medications - Medication Refill  >> Aug 7, 2025  9:13 AM Med Assistant Mariya wrote:  Type: RX Refill Request    Who Called: Self    Have you contacted your pharmacy:no    Refill or New Rx:refill     RX Name and Strength:    gabapentin (NEURONTIN) 300 MG capsule  HYDROcodone-acetaminophen (NORCO)  mg per tablet      Preferred Pharmacy with phone number: [facility]/pharmacy #[id number] - [place] - [address]  [address] [place] [address]  Phone: [phone] Fax: [phone]  Hours: Not open 24 hours        Local or Mail Order: local    Ordering Provider:Ehrensing    Would the patient rather a call back or a response via [facility]? Yes, call     Best Call Back Number: [phone] (home)

## 2025-08-13 DIAGNOSIS — E11.9 TYPE 2 DIABETES MELLITUS WITHOUT COMPLICATION: ICD-10-CM

## 2025-08-19 ENCOUNTER — PATIENT MESSAGE (OUTPATIENT)
Dept: ADMINISTRATIVE | Facility: HOSPITAL | Age: 70
End: 2025-08-19
Payer: MEDICARE

## 2025-08-21 ENCOUNTER — OFFICE VISIT (OUTPATIENT)
Dept: FAMILY MEDICINE | Facility: CLINIC | Age: 70
End: 2025-08-21
Payer: MEDICARE

## 2025-08-21 ENCOUNTER — PATIENT MESSAGE (OUTPATIENT)
Dept: ADMINISTRATIVE | Facility: HOSPITAL | Age: 70
End: 2025-08-21
Payer: MEDICARE

## 2025-08-21 ENCOUNTER — LAB VISIT (OUTPATIENT)
Dept: LAB | Facility: HOSPITAL | Age: 70
End: 2025-08-21
Attending: FAMILY MEDICINE
Payer: MEDICARE

## 2025-08-21 VITALS
SYSTOLIC BLOOD PRESSURE: 139 MMHG | TEMPERATURE: 98 F | HEART RATE: 68 BPM | WEIGHT: 208 LBS | HEIGHT: 70 IN | DIASTOLIC BLOOD PRESSURE: 82 MMHG | BODY MASS INDEX: 29.78 KG/M2 | OXYGEN SATURATION: 96 %

## 2025-08-21 DIAGNOSIS — E11.29 TYPE 2 DIABETES MELLITUS WITH MICROALBUMINURIA, WITHOUT LONG-TERM CURRENT USE OF INSULIN: Chronic | ICD-10-CM

## 2025-08-21 DIAGNOSIS — E11.9 TYPE 2 DIABETES MELLITUS WITHOUT COMPLICATION: ICD-10-CM

## 2025-08-21 DIAGNOSIS — I10 ESSENTIAL (PRIMARY) HYPERTENSION: ICD-10-CM

## 2025-08-21 DIAGNOSIS — R80.9 TYPE 2 DIABETES MELLITUS WITH MICROALBUMINURIA, WITHOUT LONG-TERM CURRENT USE OF INSULIN: Chronic | ICD-10-CM

## 2025-08-21 DIAGNOSIS — I10 ESSENTIAL (PRIMARY) HYPERTENSION: Chronic | ICD-10-CM

## 2025-08-21 DIAGNOSIS — M17.0 PRIMARY OSTEOARTHRITIS OF BOTH KNEES: Primary | ICD-10-CM

## 2025-08-21 LAB
ABSOLUTE EOSINOPHIL (OHS): 0.07 K/UL
ABSOLUTE MONOCYTE (OHS): 0.58 K/UL (ref 0.3–1)
ABSOLUTE NEUTROPHIL COUNT (OHS): 4.1 K/UL (ref 1.8–7.7)
ALBUMIN SERPL BCP-MCNC: 4.1 G/DL (ref 3.5–5.2)
ALP SERPL-CCNC: 148 UNIT/L (ref 40–150)
ALT SERPL W/O P-5'-P-CCNC: 35 UNIT/L (ref 0–55)
ANION GAP (OHS): 11 MMOL/L (ref 8–16)
AST SERPL-CCNC: 26 UNIT/L (ref 0–50)
BASOPHILS # BLD AUTO: 0.06 K/UL
BASOPHILS NFR BLD AUTO: 0.9 %
BILIRUB SERPL-MCNC: 0.4 MG/DL (ref 0.1–1)
BUN SERPL-MCNC: 15 MG/DL (ref 8–23)
CALCIUM SERPL-MCNC: 9.2 MG/DL (ref 8.7–10.5)
CHLORIDE SERPL-SCNC: 103 MMOL/L (ref 95–110)
CHOLEST SERPL-MCNC: 232 MG/DL (ref 120–199)
CHOLEST/HDLC SERPL: 6.6 {RATIO} (ref 2–5)
CO2 SERPL-SCNC: 23 MMOL/L (ref 23–29)
CREAT SERPL-MCNC: 0.9 MG/DL (ref 0.5–1.4)
EAG (OHS): 272 MG/DL (ref 68–131)
ERYTHROCYTE [DISTWIDTH] IN BLOOD BY AUTOMATED COUNT: 12.5 % (ref 11.5–14.5)
GFR SERPLBLD CREATININE-BSD FMLA CKD-EPI: >60 ML/MIN/1.73/M2
GLUCOSE SERPL-MCNC: 397 MG/DL (ref 70–110)
HBA1C MFR BLD: 11.1 % (ref 4–5.6)
HCT VFR BLD AUTO: 44.5 % (ref 40–54)
HDLC SERPL-MCNC: 35 MG/DL (ref 40–75)
HDLC SERPL: 15.1 % (ref 20–50)
HGB BLD-MCNC: 14.9 GM/DL (ref 14–18)
IMM GRANULOCYTES # BLD AUTO: 0.02 K/UL (ref 0–0.04)
IMM GRANULOCYTES NFR BLD AUTO: 0.3 % (ref 0–0.5)
LDLC SERPL CALC-MCNC: 138.6 MG/DL (ref 63–159)
LYMPHOCYTES # BLD AUTO: 1.63 K/UL (ref 1–4.8)
MCH RBC QN AUTO: 30.6 PG (ref 27–31)
MCHC RBC AUTO-ENTMCNC: 33.5 G/DL (ref 32–36)
MCV RBC AUTO: 91 FL (ref 82–98)
NONHDLC SERPL-MCNC: 197 MG/DL
NUCLEATED RBC (/100WBC) (OHS): 0 /100 WBC
PLATELET # BLD AUTO: 221 K/UL (ref 150–450)
PMV BLD AUTO: 9.5 FL (ref 9.2–12.9)
POTASSIUM SERPL-SCNC: 4.3 MMOL/L (ref 3.5–5.1)
PROT SERPL-MCNC: 6.9 GM/DL (ref 6–8.4)
RBC # BLD AUTO: 4.87 M/UL (ref 4.6–6.2)
RELATIVE EOSINOPHIL (OHS): 1.1 %
RELATIVE LYMPHOCYTE (OHS): 25.2 % (ref 18–48)
RELATIVE MONOCYTE (OHS): 9 % (ref 4–15)
RELATIVE NEUTROPHIL (OHS): 63.5 % (ref 38–73)
SODIUM SERPL-SCNC: 137 MMOL/L (ref 136–145)
TRIGL SERPL-MCNC: 292 MG/DL (ref 30–150)
WBC # BLD AUTO: 6.46 K/UL (ref 3.9–12.7)

## 2025-08-21 PROCEDURE — 99215 OFFICE O/P EST HI 40 MIN: CPT | Mod: HCNC,S$GLB,, | Performed by: FAMILY MEDICINE

## 2025-08-21 PROCEDURE — 83036 HEMOGLOBIN GLYCOSYLATED A1C: CPT | Mod: HCNC

## 2025-08-21 PROCEDURE — 1101F PT FALLS ASSESS-DOCD LE1/YR: CPT | Mod: CPTII,HCNC,S$GLB, | Performed by: FAMILY MEDICINE

## 2025-08-21 PROCEDURE — 3075F SYST BP GE 130 - 139MM HG: CPT | Mod: CPTII,HCNC,S$GLB, | Performed by: FAMILY MEDICINE

## 2025-08-21 PROCEDURE — 3288F FALL RISK ASSESSMENT DOCD: CPT | Mod: CPTII,HCNC,S$GLB, | Performed by: FAMILY MEDICINE

## 2025-08-21 PROCEDURE — 82465 ASSAY BLD/SERUM CHOLESTEROL: CPT | Mod: HCNC

## 2025-08-21 PROCEDURE — 3079F DIAST BP 80-89 MM HG: CPT | Mod: CPTII,HCNC,S$GLB, | Performed by: FAMILY MEDICINE

## 2025-08-21 PROCEDURE — 85025 COMPLETE CBC W/AUTO DIFF WBC: CPT | Mod: HCNC

## 2025-08-21 PROCEDURE — 1159F MED LIST DOCD IN RCRD: CPT | Mod: CPTII,HCNC,S$GLB, | Performed by: FAMILY MEDICINE

## 2025-08-21 PROCEDURE — 1125F AMNT PAIN NOTED PAIN PRSNT: CPT | Mod: CPTII,HCNC,S$GLB, | Performed by: FAMILY MEDICINE

## 2025-08-21 PROCEDURE — 4010F ACE/ARB THERAPY RXD/TAKEN: CPT | Mod: CPTII,HCNC,S$GLB, | Performed by: FAMILY MEDICINE

## 2025-08-21 PROCEDURE — 3008F BODY MASS INDEX DOCD: CPT | Mod: CPTII,HCNC,S$GLB, | Performed by: FAMILY MEDICINE

## 2025-08-21 PROCEDURE — 99999 PR PBB SHADOW E&M-EST. PATIENT-LVL IV: CPT | Mod: PBBFAC,HCNC,, | Performed by: FAMILY MEDICINE

## 2025-08-21 PROCEDURE — G2211 COMPLEX E/M VISIT ADD ON: HCPCS | Mod: HCNC,S$GLB,, | Performed by: FAMILY MEDICINE

## 2025-08-21 PROCEDURE — 36415 COLL VENOUS BLD VENIPUNCTURE: CPT | Mod: HCNC,PO

## 2025-08-21 PROCEDURE — 80053 COMPREHEN METABOLIC PANEL: CPT | Mod: HCNC

## 2025-08-21 RX ORDER — LOSARTAN POTASSIUM 50 MG/1
50 TABLET ORAL DAILY
Qty: 90 TABLET | Refills: 3 | Status: SHIPPED | OUTPATIENT
Start: 2025-08-21

## 2025-08-21 RX ORDER — HYDROCODONE BITARTRATE AND ACETAMINOPHEN 10; 325 MG/1; MG/1
1 TABLET ORAL 3 TIMES DAILY PRN
Qty: 72 TABLET | Refills: 0 | Status: SHIPPED | OUTPATIENT
Start: 2025-09-12

## 2025-08-21 RX ORDER — HYDROCODONE BITARTRATE AND ACETAMINOPHEN 10; 325 MG/1; MG/1
1 TABLET ORAL 3 TIMES DAILY PRN
Qty: 72 TABLET | Refills: 0 | Status: SHIPPED | OUTPATIENT
Start: 2025-11-12

## 2025-08-21 RX ORDER — GLIMEPIRIDE 4 MG/1
4 TABLET ORAL 2 TIMES DAILY
Qty: 180 TABLET | Refills: 3 | Status: SHIPPED | OUTPATIENT
Start: 2025-08-21

## 2025-08-21 RX ORDER — HYDROCODONE BITARTRATE AND ACETAMINOPHEN 10; 325 MG/1; MG/1
1 TABLET ORAL 3 TIMES DAILY PRN
Qty: 72 TABLET | Refills: 0 | Status: SHIPPED | OUTPATIENT
Start: 2025-10-12

## (undated) DEVICE — PAD DEFIB CADENCE ADULT R2

## (undated) DEVICE — NDL SPINAL 18GX3.5 SPINOCAN

## (undated) DEVICE — SUT ETHILON 3/0 18IN PS-1

## (undated) DEVICE — PAD RADI FEMORAL

## (undated) DEVICE — ELECTRODE REM PLYHSV RETURN 9

## (undated) DEVICE — DRAPE THREE-QTR REINF 53X77IN

## (undated) DEVICE — TOURNIQUET SB QC SP 34X4IN

## (undated) DEVICE — KIT HAND CONTROL HIGH PRESSUR

## (undated) DEVICE — KIT SYR REUSABLE

## (undated) DEVICE — DRAPE U SPLIT SHEET 54X76IN

## (undated) DEVICE — KIT MANIFOLD LOW PRESS TUBING

## (undated) DEVICE — OMNIPAQUE 350 200ML

## (undated) DEVICE — TUBE SET INFLOW/OUTFLOW

## (undated) DEVICE — DRAPE ARTHSCP T ORTHOMAX POUCH

## (undated) DEVICE — TAPE SURG DURAPORE 2 X10YD

## (undated) DEVICE — KIT GLIDESHEATH SLEND 6FR 10CM

## (undated) DEVICE — SOL IRR NACL .9% 3000ML

## (undated) DEVICE — WIRE GUIDE SAFE-T-J .035 260CM

## (undated) DEVICE — DRAPE STERI U-SHAPED 47X51IN

## (undated) DEVICE — SYR 30CC LUER LOCK

## (undated) DEVICE — HEMOSTAT VASC BAND REG 24CM

## (undated) DEVICE — NDL ECLIPSE SAFETY 18GX1-1/2IN

## (undated) DEVICE — SPONGE COTTON TRAY 4X4IN

## (undated) DEVICE — TRAY ARTHROSCOPY 2/CS

## (undated) DEVICE — PACK CATH LAB

## (undated) DEVICE — PROBE ARTHO ENERGY 90 DEG

## (undated) DEVICE — SHAVER ULTRAFFR 4.2MM

## (undated) DEVICE — DRESSING GAUZE OIL EMUL 3X8

## (undated) DEVICE — APPLICATOR CHLORAPREP ORN 26ML

## (undated) DEVICE — BANDAGE ELAS SOFTWRAP ST 6X5YD

## (undated) DEVICE — GOWN AERO CHROME W/ TOWEL XL

## (undated) DEVICE — CATH JACKY RADIAL 3.5 110CM